# Patient Record
Sex: MALE | Race: WHITE | NOT HISPANIC OR LATINO | Employment: OTHER | URBAN - METROPOLITAN AREA
[De-identification: names, ages, dates, MRNs, and addresses within clinical notes are randomized per-mention and may not be internally consistent; named-entity substitution may affect disease eponyms.]

---

## 2022-11-11 ENCOUNTER — OFFICE VISIT (OUTPATIENT)
Dept: PRIMARY CARE | Facility: CLINIC | Age: 82
End: 2022-11-11
Payer: COMMERCIAL

## 2022-11-11 VITALS
OXYGEN SATURATION: 95 % | TEMPERATURE: 97.2 F | BODY MASS INDEX: 32.65 KG/M2 | DIASTOLIC BLOOD PRESSURE: 68 MMHG | SYSTOLIC BLOOD PRESSURE: 112 MMHG | WEIGHT: 208 LBS | HEART RATE: 65 BPM | HEIGHT: 67 IN | RESPIRATION RATE: 18 BRPM

## 2022-11-11 DIAGNOSIS — F10.10 ALCOHOL ABUSE: ICD-10-CM

## 2022-11-11 DIAGNOSIS — I50.21 CHF (CONGESTIVE HEART FAILURE), NYHA CLASS I, ACUTE, SYSTOLIC (CMS/HCC): ICD-10-CM

## 2022-11-11 DIAGNOSIS — I10 ESSENTIAL HYPERTENSION: ICD-10-CM

## 2022-11-11 DIAGNOSIS — I25.10 CORONARY ARTERY DISEASE INVOLVING NATIVE CORONARY ARTERY OF NATIVE HEART WITHOUT ANGINA PECTORIS: ICD-10-CM

## 2022-11-11 DIAGNOSIS — I50.22 CHRONIC SYSTOLIC CHF (CONGESTIVE HEART FAILURE) (CMS/HCC): ICD-10-CM

## 2022-11-11 DIAGNOSIS — E11.9 TYPE 2 DIABETES MELLITUS WITHOUT COMPLICATION, WITHOUT LONG-TERM CURRENT USE OF INSULIN (CMS/HCC): ICD-10-CM

## 2022-11-11 DIAGNOSIS — D50.8 IRON DEFICIENCY ANEMIA SECONDARY TO INADEQUATE DIETARY IRON INTAKE: ICD-10-CM

## 2022-11-11 DIAGNOSIS — I27.20 PULMONARY HYPERTENSION (CMS/HCC): ICD-10-CM

## 2022-11-11 DIAGNOSIS — I48.91 ATRIAL FIBRILLATION BY ELECTROCARDIOGRAM (CMS/HCC): ICD-10-CM

## 2022-11-11 DIAGNOSIS — Z95.0 PRESENCE OF CARDIAC PACEMAKER: ICD-10-CM

## 2022-11-11 DIAGNOSIS — E78.5 DYSLIPIDEMIA: ICD-10-CM

## 2022-11-11 DIAGNOSIS — E05.90 HYPERTHYROIDISM: ICD-10-CM

## 2022-11-11 DIAGNOSIS — U07.1 LAB TEST POSITIVE FOR DETECTION OF COVID-19 VIRUS: ICD-10-CM

## 2022-11-11 DIAGNOSIS — R06.89 RESPIRATORY INSUFFICIENCY: ICD-10-CM

## 2022-11-11 DIAGNOSIS — I45.9 HEART BLOCK: ICD-10-CM

## 2022-11-11 DIAGNOSIS — I48.0 PAROXYSMAL ATRIAL FIBRILLATION (CMS/HCC): Primary | ICD-10-CM

## 2022-11-11 DIAGNOSIS — Z95.2 S/P TAVR (TRANSCATHETER AORTIC VALVE REPLACEMENT): ICD-10-CM

## 2022-11-11 DIAGNOSIS — I26.90 ACUTE SEPTIC PULMONARY EMBOLISM WITHOUT ACUTE COR PULMONALE (CMS/HCC): ICD-10-CM

## 2022-11-11 DIAGNOSIS — Z95.5 STENTED CORONARY ARTERY: ICD-10-CM

## 2022-11-11 DIAGNOSIS — R41.0 DISORIENTATION: ICD-10-CM

## 2022-11-11 DIAGNOSIS — I71.21 ANEURYSM OF ASCENDING AORTA WITHOUT RUPTURE (CMS/HCC): ICD-10-CM

## 2022-11-11 DIAGNOSIS — F32.89 OTHER DEPRESSION: ICD-10-CM

## 2022-11-11 DIAGNOSIS — I50.43 ACUTE ON CHRONIC COMBINED SYSTOLIC AND DIASTOLIC CONGESTIVE HEART FAILURE (CMS/HCC): ICD-10-CM

## 2022-11-11 DIAGNOSIS — J41.8 MIXED SIMPLE AND MUCOPURULENT CHRONIC BRONCHITIS (CMS/HCC): ICD-10-CM

## 2022-11-11 DIAGNOSIS — E03.9 HYPOTHYROIDISM, UNSPECIFIED TYPE: ICD-10-CM

## 2022-11-11 DIAGNOSIS — I49.5 SSS (SICK SINUS SYNDROME) (CMS/HCC): ICD-10-CM

## 2022-11-11 PROBLEM — R41.82 ALTERED MENTAL STATUS: Status: ACTIVE | Noted: 2022-04-08

## 2022-11-11 PROBLEM — I95.1 ORTHOSTATIC HYPOTENSION: Status: ACTIVE | Noted: 2022-04-27

## 2022-11-11 PROBLEM — R93.89 ABNORMAL CXR: Status: ACTIVE | Noted: 2022-05-17

## 2022-11-11 PROBLEM — J44.9 COPD (CHRONIC OBSTRUCTIVE PULMONARY DISEASE) (CMS/HCC): Status: ACTIVE | Noted: 2020-11-17

## 2022-11-11 PROBLEM — R55 NEAR SYNCOPE: Status: ACTIVE | Noted: 2022-05-21

## 2022-11-11 PROBLEM — E78.00 HYPERCHOLESTEREMIA: Status: ACTIVE | Noted: 2019-11-11

## 2022-11-11 PROBLEM — R09.89 BILATERAL CAROTID BRUITS: Status: ACTIVE | Noted: 2019-11-11

## 2022-11-11 PROBLEM — I26.99 PULMONARY EMBOLISM (CMS/HCC): Status: ACTIVE | Noted: 2022-11-11

## 2022-11-11 PROBLEM — F32.A DEPRESSION: Status: ACTIVE | Noted: 2022-11-10

## 2022-11-11 PROBLEM — I45.10 RBBB: Status: ACTIVE | Noted: 2019-11-11

## 2022-11-11 PROBLEM — Z95.820 S/P ANGIOPLASTY WITH STENT: Status: ACTIVE | Noted: 2021-03-23

## 2022-11-11 PROBLEM — N39.0 UTI (URINARY TRACT INFECTION): Status: ACTIVE | Noted: 2022-04-09

## 2022-11-11 PROBLEM — A41.9 SEPSIS (CMS/HCC): Status: ACTIVE | Noted: 2022-05-16

## 2022-11-11 PROBLEM — R09.02 HYPOXIA: Status: ACTIVE | Noted: 2022-11-11

## 2022-11-11 PROBLEM — D50.9 IRON DEFICIENCY ANEMIA: Status: RESOLVED | Noted: 2020-02-17 | Resolved: 2022-11-11

## 2022-11-11 PROBLEM — I34.0 MITRAL REGURGITATION: Status: ACTIVE | Noted: 2020-12-10

## 2022-11-11 PROBLEM — I47.29 NSVT (NONSUSTAINED VENTRICULAR TACHYCARDIA) (CMS/HCC): Status: ACTIVE | Noted: 2022-04-08

## 2022-11-11 PROBLEM — A41.9 SEPSIS (CMS/HCC): Status: RESOLVED | Noted: 2022-05-16 | Resolved: 2022-11-11

## 2022-11-11 PROBLEM — R06.02 SHORTNESS OF BREATH: Status: ACTIVE | Noted: 2020-11-13

## 2022-11-11 PROBLEM — D64.9 ANEMIA: Status: ACTIVE | Noted: 2020-11-05

## 2022-11-11 PROBLEM — I35.0 NONRHEUMATIC AORTIC VALVE STENOSIS: Status: ACTIVE | Noted: 2019-11-11

## 2022-11-11 PROBLEM — D50.9 IRON DEFICIENCY ANEMIA: Status: ACTIVE | Noted: 2020-02-17

## 2022-11-11 PROBLEM — I48.19 PERSISTENT ATRIAL FIBRILLATION (CMS/HCC): Status: ACTIVE | Noted: 2020-11-13

## 2022-11-11 PROBLEM — E66.812 CLASS 2 OBESITY IN ADULT: Status: ACTIVE | Noted: 2020-11-17

## 2022-11-11 PROBLEM — I44.1 MOBITZ (TYPE) I (WENCKEBACH'S) ATRIOVENTRICULAR BLOCK: Status: ACTIVE | Noted: 2019-11-11

## 2022-11-11 PROCEDURE — 99205 OFFICE O/P NEW HI 60 MIN: CPT | Performed by: PHYSICIAN ASSISTANT

## 2022-11-11 PROCEDURE — 3008F BODY MASS INDEX DOCD: CPT | Performed by: PHYSICIAN ASSISTANT

## 2022-11-11 RX ORDER — FUROSEMIDE 40 MG/1
40 TABLET ORAL DAILY
Qty: 90 TABLET | Refills: 1 | Status: SHIPPED | OUTPATIENT
Start: 2022-11-11 | End: 2022-12-30 | Stop reason: SDUPTHER

## 2022-11-11 RX ORDER — LANOLIN ALCOHOL/MO/W.PET/CERES
100 CREAM (GRAM) TOPICAL DAILY
COMMUNITY
Start: 2022-11-11 | End: 2022-11-11 | Stop reason: SDUPTHER

## 2022-11-11 RX ORDER — ALBUTEROL SULFATE 90 UG/1
2 INHALANT RESPIRATORY (INHALATION) 4 TIMES DAILY PRN
Qty: 18 G | Refills: 1 | Status: SHIPPED | OUTPATIENT
Start: 2022-11-11 | End: 2023-05-05 | Stop reason: SDUPTHER

## 2022-11-11 RX ORDER — ATORVASTATIN CALCIUM 20 MG/1
20 TABLET, FILM COATED ORAL DAILY
COMMUNITY
Start: 2022-10-25 | End: 2022-11-11 | Stop reason: SDUPTHER

## 2022-11-11 RX ORDER — QUETIAPINE FUMARATE 25 MG/1
75 TABLET, FILM COATED ORAL NIGHTLY
COMMUNITY
End: 2022-11-11

## 2022-11-11 RX ORDER — MELATONIN 5 MG
10 CAPSULE ORAL NIGHTLY
COMMUNITY

## 2022-11-11 RX ORDER — DICLOFENAC SODIUM 10 MG/G
1 GEL TOPICAL AS NEEDED
COMMUNITY

## 2022-11-11 RX ORDER — ALBUTEROL SULFATE 0.83 MG/ML
2.5 SOLUTION RESPIRATORY (INHALATION)
COMMUNITY
End: 2022-11-11 | Stop reason: SDUPTHER

## 2022-11-11 RX ORDER — CLONAZEPAM 1 MG/1
0.5 TABLET ORAL DAILY
COMMUNITY
Start: 2022-11-10 | End: 2022-11-11

## 2022-11-11 RX ORDER — PANTOPRAZOLE SODIUM 40 MG/1
40 TABLET, DELAYED RELEASE ORAL 2 TIMES DAILY
COMMUNITY
Start: 2022-10-31 | End: 2022-12-27

## 2022-11-11 RX ORDER — METOPROLOL SUCCINATE 25 MG/1
25 TABLET, EXTENDED RELEASE ORAL DAILY
Qty: 90 TABLET | Refills: 1 | Status: SHIPPED | OUTPATIENT
Start: 2022-11-11 | End: 2023-04-14

## 2022-11-11 RX ORDER — FOLIC ACID 1 MG/1
1 TABLET ORAL DAILY
Qty: 90 TABLET | Refills: 1 | Status: SHIPPED | OUTPATIENT
Start: 2022-11-11 | End: 2023-05-05 | Stop reason: SDUPTHER

## 2022-11-11 RX ORDER — CLONAZEPAM 0.5 MG/1
0.5 TABLET ORAL NIGHTLY PRN
Qty: 30 TABLET | Refills: 0 | Status: SHIPPED | OUTPATIENT
Start: 2022-11-11 | End: 2022-12-13

## 2022-11-11 RX ORDER — ALBUTEROL SULFATE 0.83 MG/ML
2.5 SOLUTION RESPIRATORY (INHALATION)
Qty: 180 ML | Refills: 1 | Status: SHIPPED | OUTPATIENT
Start: 2022-11-11 | End: 2023-05-05 | Stop reason: SDUPTHER

## 2022-11-11 RX ORDER — ALBUTEROL SULFATE 90 UG/1
2 INHALANT RESPIRATORY (INHALATION)
COMMUNITY
End: 2022-11-11 | Stop reason: SDUPTHER

## 2022-11-11 RX ORDER — FOLIC ACID 1 MG/1
1 TABLET ORAL DAILY
COMMUNITY
Start: 2022-11-10 | End: 2022-11-11 | Stop reason: SDUPTHER

## 2022-11-11 RX ORDER — FERROUS SULFATE 325(65) MG
325 TABLET ORAL
COMMUNITY
End: 2022-11-11 | Stop reason: SDUPTHER

## 2022-11-11 RX ORDER — FLUOXETINE HYDROCHLORIDE 20 MG/1
CAPSULE ORAL
COMMUNITY
Start: 2022-10-31 | End: 2022-11-11

## 2022-11-11 RX ORDER — FUROSEMIDE 40 MG/1
TABLET ORAL
COMMUNITY
Start: 2022-11-10 | End: 2022-11-11 | Stop reason: SDUPTHER

## 2022-11-11 RX ORDER — LORATADINE 10 MG/1
10 TABLET ORAL DAILY
COMMUNITY
End: 2022-12-02

## 2022-11-11 RX ORDER — LANOLIN ALCOHOL/MO/W.PET/CERES
100 CREAM (GRAM) TOPICAL DAILY
Qty: 30 TABLET | Refills: 1 | Status: SHIPPED | OUTPATIENT
Start: 2022-11-11 | End: 2023-05-05 | Stop reason: SDUPTHER

## 2022-11-11 RX ORDER — METOPROLOL SUCCINATE 25 MG/1
25 TABLET, EXTENDED RELEASE ORAL
COMMUNITY
Start: 2022-10-25 | End: 2022-11-11 | Stop reason: SDUPTHER

## 2022-11-11 RX ORDER — LEVOTHYROXINE SODIUM 100 UG/1
100 TABLET ORAL
Qty: 90 TABLET | Refills: 1 | Status: SHIPPED | OUTPATIENT
Start: 2022-11-11 | End: 2023-01-24

## 2022-11-11 RX ORDER — FERROUS SULFATE 325(65) MG
325 TABLET ORAL
Qty: 90 TABLET | Refills: 1 | Status: SHIPPED | OUTPATIENT
Start: 2022-11-11 | End: 2023-05-05 | Stop reason: SDUPTHER

## 2022-11-11 RX ORDER — SERTRALINE HYDROCHLORIDE 25 MG/1
TABLET, FILM COATED ORAL
COMMUNITY
Start: 2022-11-10 | End: 2022-11-11 | Stop reason: SDUPTHER

## 2022-11-11 RX ORDER — LEVOTHYROXINE SODIUM 100 UG/1
100 TABLET ORAL
COMMUNITY
Start: 2022-11-10 | End: 2022-11-11 | Stop reason: SDUPTHER

## 2022-11-11 RX ORDER — ACETAMINOPHEN 325 MG/1
650 TABLET ORAL AS NEEDED
COMMUNITY

## 2022-11-11 RX ORDER — ATORVASTATIN CALCIUM 20 MG/1
20 TABLET, FILM COATED ORAL DAILY
Qty: 90 TABLET | Refills: 1 | Status: SHIPPED | OUTPATIENT
Start: 2022-11-11 | End: 2023-05-05 | Stop reason: SDUPTHER

## 2022-11-11 RX ORDER — SERTRALINE HYDROCHLORIDE 25 MG/1
25 TABLET, FILM COATED ORAL DAILY
Qty: 90 TABLET | Refills: 0 | Status: SHIPPED | OUTPATIENT
Start: 2022-11-11 | End: 2023-02-23

## 2022-11-11 RX ORDER — FLUTICASONE PROPIONATE AND SALMETEROL XINAFOATE 45; 21 UG/1; UG/1
2 AEROSOL, METERED RESPIRATORY (INHALATION) 2 TIMES DAILY
COMMUNITY
End: 2022-11-11

## 2022-11-11 ASSESSMENT — PATIENT HEALTH QUESTIONNAIRE - PHQ9
SUM OF ALL RESPONSES TO PHQ QUESTIONS 1-9: 9
SUM OF ALL RESPONSES TO PHQ9 QUESTIONS 1 & 2: 3

## 2022-11-11 NOTE — ASSESSMENT & PLAN NOTE
Previously provoked event when septic  He would not be an appropriate candidate for anticoagulation long-term as he is disoriented, a fall risk with his alcohol use as well  Socks is good today, no lower extremity edema

## 2022-11-11 NOTE — PATIENT INSTRUCTIONS
You are not to drive as discussed, you must get your neuropscyh eval and if they clear you for driving, then you can have your license back  Please get blood work and follow up with Dr. Chambers as scheduled

## 2022-11-11 NOTE — PROGRESS NOTES
I was immediately available to provide supervision and direction for the care of the patient.    Melanie Chambers DO

## 2022-11-11 NOTE — ASSESSMENT & PLAN NOTE
Paroxysmal A. fib, also with COPD  Also with alcohol abuse  Cardiology consult recommended with Dr. Luciano Hwang complete cessation of alcohol  On Lasix 40 mg daily, recent hospitalization 11 9-11 11 for acute exacerbation

## 2022-11-11 NOTE — ASSESSMENT & PLAN NOTE
Had been followed by cardiology  Daughter would like to see cardiologist here as it is closer to home  Consult

## 2022-11-11 NOTE — ASSESSMENT & PLAN NOTE
Secondary to COPD  He has acute systolic and diastolic CHF  He currently has no lower extremity edema  He currently has no rales  His blood pressure is currently well controlled

## 2022-11-11 NOTE — ASSESSMENT & PLAN NOTE
Patient is oriented to himself and daughter, not to place or time  I have asked the daughter to hold his keys for driving, I have sent forward to the department of state that I am pulling his license until he has a neuropsych eval.  Concern for alcohol use, marijuana use, and questionable underlying dementia  Patient also on Klonopin 1 mg daily, weaning to 0.5 mg daily and eventually off

## 2022-11-11 NOTE — ASSESSMENT & PLAN NOTE
Currently in sinus rhythm, he is not appropriate for anticoagulation therapy as he is a fall risk with his alcohol use and his disorientation

## 2022-11-11 NOTE — ASSESSMENT & PLAN NOTE
Sending for A1c as he has not had one that I can see  He is not currently on any medications for diabetes  Follow-up as appropriate  Labs

## 2022-11-11 NOTE — ASSESSMENT & PLAN NOTE
Currently in sinus rhythm, with his history of disorientation, and alcohol use, he is not appropriate for anticoagulation at this time

## 2022-11-11 NOTE — ASSESSMENT & PLAN NOTE
Trelegy daily, either albuterol nebs or inhaler as needed  Does not have a pulmonologist at this time
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
## 2022-11-11 NOTE — ASSESSMENT & PLAN NOTE
Meds were currently changed in his recent hospitalization from Prozac to Zoloft  He has been on 1 mg of Klonopin at home daily, it was recommended by psychiatric provider in the hospital that he be decreased to 0.5 mg daily, and slowly weaned, I discussed this with patient and his daughter  In light of his disorientation and alcohol use I completely agree with lowering this dose and titrating off  I have requested a neuropsych evaluation

## 2022-11-11 NOTE — ASSESSMENT & PLAN NOTE
Systolic and diastolic dysfunction with recent hospitalization 11 9-11 11, gentle gentle diuresis done in the hospital  No acute findings for cause  Questionable missed medications at home due to his disorientation  I have requested home health to follow with patient for home care needs  He is euvolemic today

## 2022-11-11 NOTE — ASSESSMENT & PLAN NOTE
Required hospitalization 11/9 to 11/11/2022   Patient may not be taking all of his evening meds due to his disorientation  I have requested home care for him to help with twice daily med management

## 2022-11-15 ENCOUNTER — TELEPHONE (OUTPATIENT)
Dept: SCHEDULING | Facility: CLINIC | Age: 82
End: 2022-11-15
Payer: COMMERCIAL

## 2022-11-15 NOTE — TELEPHONE ENCOUNTER
New Patient Appointment  [facility] 11/23/2022    Name of caller: Ann Caretaker     Reason for Visit: chf    Insurance: [facility]  medicare    Insurance ID #: [id number]    Recent Procedures: none    Referred by: PCP Melanie Chambers    Previous Cardiologist name and phone number: none    Best contact number: pt [phone]    Additional notes:

## 2022-11-19 LAB
ALBUMIN SERPL-MCNC: 4.1 G/DL (ref 3.6–5.1)
ALBUMIN/GLOB SERPL: 1.6 (CALC) (ref 1–2.5)
ALP SERPL-CCNC: 122 U/L (ref 35–144)
ALT SERPL-CCNC: 35 U/L (ref 9–46)
AST SERPL-CCNC: 56 U/L (ref 10–35)
B BURGDOR AB SER IA-ACNC: <0.9 INDEX
BASOPHILS # BLD AUTO: 41 CELLS/UL (ref 0–200)
BASOPHILS NFR BLD AUTO: 0.4 %
BILIRUB SERPL-MCNC: 1.8 MG/DL (ref 0.2–1.2)
BUN SERPL-MCNC: 12 MG/DL (ref 7–25)
BUN/CREAT SERPL: ABNORMAL (CALC) (ref 6–22)
CALCIUM SERPL-MCNC: 8.6 MG/DL (ref 8.6–10.3)
CHLORIDE SERPL-SCNC: 94 MMOL/L (ref 98–110)
CHOLEST SERPL-MCNC: 138 MG/DL
CHOLEST/HDLC SERPL: 2.2 (CALC)
CO2 SERPL-SCNC: 33 MMOL/L (ref 20–32)
CREAT SERPL-MCNC: 1.06 MG/DL (ref 0.7–1.22)
EGFRCR SERPLBLD CKD-EPI 2021: 69 ML/MIN/1.73M2
EOSINOPHIL # BLD AUTO: 20 CELLS/UL (ref 15–500)
EOSINOPHIL NFR BLD AUTO: 0.2 %
ERYTHROCYTE [DISTWIDTH] IN BLOOD BY AUTOMATED COUNT: 15.4 % (ref 11–15)
FERRITIN SERPL-MCNC: 142 NG/ML (ref 24–380)
GGT SERPL-CCNC: 113 U/L (ref 3–70)
GLOBULIN SER CALC-MCNC: 2.5 G/DL (CALC) (ref 1.9–3.7)
GLUCOSE SERPL-MCNC: 156 MG/DL (ref 65–139)
HBA1C MFR BLD: 5.9 % OF TOTAL HGB
HCT VFR BLD AUTO: 38.8 % (ref 38.5–50)
HDLC SERPL-MCNC: 64 MG/DL
HGB BLD-MCNC: 13 G/DL (ref 13.2–17.1)
IRON SATN MFR SERPL: 92 % (CALC) (ref 20–48)
IRON SERPL-MCNC: 311 MCG/DL (ref 50–180)
LDLC SERPL CALC-MCNC: 59 MG/DL (CALC)
LYMPHOCYTES # BLD AUTO: 5834 CELLS/UL (ref 850–3900)
LYMPHOCYTES NFR BLD AUTO: 57.2 %
MCH RBC QN AUTO: 31.8 PG (ref 27–33)
MCHC RBC AUTO-ENTMCNC: 33.5 G/DL (ref 32–36)
MCV RBC AUTO: 94.9 FL (ref 80–100)
MONOCYTES # BLD AUTO: 704 CELLS/UL (ref 200–950)
MONOCYTES NFR BLD AUTO: 6.9 %
NEUTROPHILS # BLD AUTO: 3601 CELLS/UL (ref 1500–7800)
NEUTROPHILS NFR BLD AUTO: 35.3 %
NONHDLC SERPL-MCNC: 74 MG/DL (CALC)
PLATELET # BLD AUTO: 210 THOUSAND/UL (ref 140–400)
PMV BLD REES-ECKER: 9.8 FL (ref 7.5–12.5)
POTASSIUM SERPL-SCNC: 4.3 MMOL/L (ref 3.5–5.3)
PROT SERPL-MCNC: 6.6 G/DL (ref 6.1–8.1)
RBC # BLD AUTO: 4.09 MILLION/UL (ref 4.2–5.8)
SERVICE CMNT-IMP: ABNORMAL
SODIUM SERPL-SCNC: 136 MMOL/L (ref 135–146)
T4 FREE SERPL-MCNC: 0.9 NG/DL (ref 0.8–1.8)
TIBC SERPL-MCNC: 337 MCG/DL (CALC) (ref 250–425)
TRIGL SERPL-MCNC: 71 MG/DL
TSH SERPL-ACNC: 93.33 MIU/L (ref 0.4–4.5)
VIT B12 SERPL-MCNC: 563 PG/ML (ref 200–1100)
WBC # BLD AUTO: 10.2 THOUSAND/UL (ref 3.8–10.8)

## 2022-11-21 NOTE — RESULT ENCOUNTER NOTE
CBC shows a normocytic anemia with a hemoglobin of 13, iron studies are high normal with a normal ferritin, B12 is normal at 563  Glucose is 156, hemoglobin A1c is 5.9  AST is 56, ggt is 113  Total cholesterol is 138, HDL is 64, LDL is 59    TSH is high at 93.33 with normal t4  Lyme is negative

## 2022-11-22 ENCOUNTER — TELEPHONE (OUTPATIENT)
Dept: PRIMARY CARE | Facility: CLINIC | Age: 82
End: 2022-11-22
Payer: COMMERCIAL

## 2022-11-22 ENCOUNTER — TELEPHONE (OUTPATIENT)
Dept: CARDIOLOGY | Facility: CLINIC | Age: 82
End: 2022-11-22
Payer: COMMERCIAL

## 2022-11-22 NOTE — TELEPHONE ENCOUNTER
Patient is out of alcohol and is a little shaky.  Patient is negative for Orthastatic and is a little SOB, but is scheduled to see Dr. Wolf tomorrow.  Family has taken his license.
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
## 2022-11-22 NOTE — TELEPHONE ENCOUNTER
I spoke with Cecil daughter today about his lab results, and asked how I could help to make sure he is taking his meds properly.  She reports that the home care that they have paid for has noticed he is not taking his meds and he is drinking all day.  She will encourage the home care helpers to make sure he takes his medications    I have asked to consider doing a cognitive evaluation at home how he is able to get alcohol since I revoked his license, and she reports he still has his keys.    I discussed with the daughter that I would reach out to our social work to see if there is any way we can put something in place for either help to get his meds or to potentially do a cognitive evaluation at home for safety as patient is drinking, not showering and not eating per the daughter.    Patient has an appointment tomorrow with Dr. Chambers and I will inform her of all the above

## 2022-11-23 ENCOUNTER — TELEPHONE (OUTPATIENT)
Dept: PRIMARY CARE | Facility: CLINIC | Age: 82
End: 2022-11-23
Payer: COMMERCIAL

## 2022-11-23 DIAGNOSIS — F10.930 ALCOHOL WITHDRAWAL SYNDROME WITHOUT COMPLICATION (CMS/HCC): Primary | ICD-10-CM

## 2022-11-23 RX ORDER — DIAZEPAM 5 MG/1
TABLET ORAL
Qty: 12 TABLET | Refills: 0 | Status: SHIPPED
Start: 2022-11-23 | End: 2022-12-02 | Stop reason: ALTCHOICE

## 2022-11-23 NOTE — TELEPHONE ENCOUNTER
Monique, patient's daughter called this morning, he was sent home from the ER last evening.  ER notes were reviewed during the conversation with daughter.  She reports that he has been shaky and calling her on the phone saying he is very anxious.  She would like to know if I would give him some medications to help since he has had not had alcohol for 2 days and she is worried he is going through withdrawal.  I discussed with her and explained that 1 his keys need to be taken from him which she promised the caregiver was doing today, and #2 someone needed to be there to administer and monitor him with these medications since he has not been taking his medications properly.  She agreed to both of these and takes full responsibility for monitoring her father.  If anything changes or his condition worsen she will bring him back to the emergency room or call 911  Sending diazepam for withdrawal  He has a scheduled appointment 12-22  Karina

## 2022-11-26 ENCOUNTER — NURSE TRIAGE (OUTPATIENT)
Dept: PRIMARY CARE | Facility: CLINIC | Age: 82
End: 2022-11-26
Payer: COMMERCIAL

## 2022-11-26 RX ORDER — FUROSEMIDE 20 MG/1
20 TABLET ORAL DAILY
Qty: 3 TABLET | Refills: 0 | Status: SHIPPED
Start: 2022-11-26 | End: 2022-12-02 | Stop reason: ALTCHOICE

## 2022-11-26 NOTE — TELEPHONE ENCOUNTER
"    Reason for Disposition   Weight gain > 5 lbs (2.3 kg) in one week (or 5 pounds over target weight)    Answer Assessment - Initial Assessment Questions  1. MAIN CONCERN OR SYMPTOM:  \"What is your main concern right now?\" \"What questions do you have?\" \"What's the main symptom you're worried about?\" (e.g., breathing difficulty, ankle swelling, weight gain.)      Mild difficulty Breathing   2. ONSET: \"When did the  ________  start?\"      yetserday  3. BETTER-SAME-WORSE: \"Are you getting better, staying the same, or getting worse compared to the day you were discharged?\"      same  4. HOSPITALIZATION: \"How long were you hospitalized?\" (e.g., days)      Went to ED on 11/22  5. DISCHARGE DATE: \"What date were you discharged from the hospital?\"      N/A  6. DISCHARGE DOCTOR: \"Who is the main doctor taking care of you now?\"      N/A  7. DISCHARGE APPOINTMENT: \"Have you scheduled a follow-up discharge appointment with your doctor?\"      N/a  8. DISCHARGE MEDICATIONS: \"Did the physician who discharged you order any new medications for you to use? If yes, have you filled the prescription and started taking the medication?\"       no  9. WEIGHT - DISCHARGE:  \"Do you know your weight when you were discharged from the hospital?\"       187.2lb  10. WEIGHT - TARGET:  \"Do you have a target weight?\"         no  11. WEIGHT - CURRENT:  \"What is your current weight?\"         193.2lb  12. BREATHING DIFFICULTY: \"Are you having any difficulty breathing?\" If so, ask \"How bad is it?\"  (e.g., none, mild, moderate, severe)    - MILD: No SOB at rest, mild SOB with walking, speaks normally in sentences, able to lie down, no retractions, pulse < 100.    - MODERATE: SOB at rest, SOB with minimal exertion and prefers to sit, cannot lie down flat, speaks in phrases, mild retractions, audible wheezing, pulse 100-120.    - SEVERE: Very SOB at rest, speaks in single words, struggling to breathe, sitting hunched forward, retractions, pulse > 120      " "   mild  13. OTHER SYMPTOMS: \"Do you have any other symptoms?\" (e.g., weakness)        none    Protocols used: HEART FAILURE POST-HOSPITALIZATION FOLLOW-UP CALL-ADULT-    Synopsis:  CHF/Weight gain of 6 lbs  Disposition:  Call PCP Within 24 Hours  Care Advice:  Care Advice Given     Given By Given At Modified    Rancho Johnson CRNP 11/26/2022 11:09 AM No    CONGESTIVE HEART FAILURE (CHF) - DEFINITION:   * Your heart is a pump. It pumps blood to your brain, your lungs, and all other parts of your body.   * In people with heart failure, the heart is not able to pump blood as well as it should. As a result, this can cause ankle swelling, shortness of breath, and weakness.    * There are medicines that help treat heart failure. There are things that you can do to prevent problems.   * The more you know about heart failure, the less chance you will have of getting admitted to the hospital.    Rancho Johnson CRNP 11/26/2022 11:09 AM No    CHF - TREATMENT - GENERAL ADVICE. Here is some general advice about things that you can do to treat your heart failure:   * Avoid eating too much salt. Salt can cause water to stay in your body. When this happens it is called fluid retention. Certain foods are very salty and you should avoid them. Examples include pizza and chips. Do not add salt to your food.   * Take your medicines according to your doctor's recommendations.   * Talk with your doctor before taking over-the-counter arthritis (anti-inflammatory) medicines like ibuprofen (e.g., Advil, Motrin) or naproxen (e.g., Aleve). In people with heart failure these medicines can cause fluid retention.   * Some regular exercise (such as daily walks) is healthy. Talk with your doctor and get his or her recommendations about how much exercise is right for you.   * If your doctor has placed you fluid restriction, then it is important to measure how much fluid you drink each day. Write this down in a daily diary.    Rancho Johnson CRNP " 11/26/2022 11:09 AM No    CALL BACK IF:    * You develop new or worsening symptoms.    * You become worse.    Rancho Johnson CRNP 11/26/2022 11:09 AM No    CARE ADVICE given per Heart Failure Post-Hospitalization Follow-Up Call (Adult) guideline.    Rancho Johnson CRNP 11/26/2022 11:09 AM No    CHF - TREATMENT - YOUR DOCTOR'S INSTRUCTIONS:    * You should closely follow all instructions (verbal and written) that your doctor gave you.    * Take all prescribed medicines as directed.       Patient/Caregiver understands and will follow care advice?  Yes, plans to follow advice        Orders Placed This Encounter:  Orders Placed This Encounter    furosemide (LASIX) 20 mg tablet     Sig: Take 1 tablet (20 mg total) by mouth daily for 3 days.     Dispense:  3 tablet     Refill:  0     Patient's daughterJovana called with concerns of her father's weight gain and labored breathing. Spoke with on call  who recommended lasix 20 mg for 3 days and follow with PCP/cardiologist on Monday. Strict instruction given to daughter to take patient to ED if symptoms get worse or new symptoms develop.

## 2022-11-28 ENCOUNTER — TELEPHONE (OUTPATIENT)
Dept: PRIMARY CARE | Facility: CLINIC | Age: 82
End: 2022-11-28
Payer: COMMERCIAL

## 2022-11-28 NOTE — TELEPHONE ENCOUNTER
Spoke with patient, he states he is feeling much better and I reminded him of his up coming appointment on 12/02/2022 with us, and told him to call if he needing anything in the mean time Thanks LW

## 2022-11-28 NOTE — TELEPHONE ENCOUNTER
Call from home care nurse Rylee on11/25/2022, patient weighs in the morning, and had a 3 pound weight gain on 11/25/2022, no lower extremity edema no shortness of breath and notes rales appreciated.  Patient had eaten a lot for Thanksgiving dinner  Encouraged family to weigh patient again the next morning, and if he gains more than 2 pounds, to call the office  Karina

## 2022-12-02 ENCOUNTER — OFFICE VISIT (OUTPATIENT)
Dept: PRIMARY CARE | Facility: CLINIC | Age: 82
End: 2022-12-02
Payer: COMMERCIAL

## 2022-12-02 VITALS
SYSTOLIC BLOOD PRESSURE: 110 MMHG | OXYGEN SATURATION: 98 % | HEIGHT: 67 IN | DIASTOLIC BLOOD PRESSURE: 78 MMHG | TEMPERATURE: 97.1 F | HEART RATE: 71 BPM | WEIGHT: 194 LBS | BODY MASS INDEX: 30.45 KG/M2

## 2022-12-02 DIAGNOSIS — E05.90 HYPERTHYROIDISM: ICD-10-CM

## 2022-12-02 DIAGNOSIS — J06.9 VIRAL URI WITH COUGH: ICD-10-CM

## 2022-12-02 DIAGNOSIS — I10 ESSENTIAL HYPERTENSION: Primary | ICD-10-CM

## 2022-12-02 DIAGNOSIS — R41.89 COGNITIVE IMPAIRMENT: ICD-10-CM

## 2022-12-02 PROCEDURE — 99214 OFFICE O/P EST MOD 30 MIN: CPT | Performed by: FAMILY MEDICINE

## 2022-12-02 PROCEDURE — 3008F BODY MASS INDEX DOCD: CPT | Performed by: FAMILY MEDICINE

## 2022-12-02 PROCEDURE — 87637 SARSCOV2&INF A&B&RSV AMP PRB: CPT | Performed by: FAMILY MEDICINE

## 2022-12-02 RX ORDER — LORATADINE 10 MG/1
10 TABLET ORAL DAILY
COMMUNITY
End: 2023-11-24

## 2022-12-02 RX ORDER — BENZONATATE 100 MG/1
100 CAPSULE ORAL 3 TIMES DAILY PRN
Qty: 21 CAPSULE | Refills: 0 | Status: SHIPPED | OUTPATIENT
Start: 2022-12-02 | End: 2022-12-09

## 2022-12-02 NOTE — ASSESSMENT & PLAN NOTE
MoCA score of 14 out of 30 today  Encouraged follow-up with neurology for work-up of reversible causes and consideration of imaging if needed  Can discuss medications with neurology if desired  Does have social support in place with 3 caregivers and 2 daughters  Also has medications being delivered in pill packs for morning and nighttime medication administration

## 2022-12-02 NOTE — PATIENT INSTRUCTIONS
Thyroid function tests in Feb 2022    Follow up with neurology  Dr Pinzon [phone]    Try Tessalon perles for cough if needed  Nasal saline spray 4x per day  Come back or call the office if symptoms worsen

## 2022-12-02 NOTE — ASSESSMENT & PLAN NOTE
TSH very elevated at last check in 11/2022  Will recheck in February 2023 at which time he will be taking levothyroxine consistently  Does now have pill packs from the pharmacy which are delivered in a time-based fashion so that he will get as easily confused when taking his medication

## 2022-12-02 NOTE — PROGRESS NOTES
[facility] in [place] [address] [address]  [facility] [address]  Phone: [phone]  Fax: [phone]       CHIEF COMPLAINT   Chief Complaint   Patient presents with   • Follow-up     3 week / Review lab results   • Cough     Junky cough that has been waking him up at night for 3 days       HISTORY OF PRESENT ILLNESS      This is a 82 y.o. male who presents for   Chief Complaint   Patient presents with   • Follow-up     3 week / Review lab results   • Cough     Junky cough that has been waking him up at night for 3 days      Ann, caregiver with him today  Started with him in April 2022  2nd person is Lupe for about one year  3rd person, Lindsay added for evenings    Jovana and Monique are his daughters    Ann states he is now getting all medications in a pill pack  Previously he was getting levothyroxine in a bottle and not in the pill pack  Was complicated for him previously     Does drink vodka at times  None since being out of the hospital    Recent admin on 11/22  Last weight on Tuesday was 187 or 189    Cardiology appointment yesterday, Dr Srinivasa Vargas    Does complain of a cough  Ann is with him today and states that on Tuesday when she saw him she he did not have this cough  He denies any fevers chills or muscle aches  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  Does not want to wear hearing aid  Denies any ear pain or sinus headaches    PAST MEDICAL AND SURGICAL HISTORY      Past Medical History:   Diagnosis Date   • A-fib (CMS/[facility])    • Acute on chronic combined systolic and diastolic congestive heart failure (CMS/HCC)    • CHF (congestive heart failure), NYHA class I, acute, systolic (CMS/HCC)    • Depression    • Hyperthyroidism    • Hypoxia    • Near syncope    • NSVT (nonsustained ventricular tachycardia)    • Orthostatic hypotension    • Presence of cardiac pacemaker    • Pulmonary embolism (CMS/HCC)    • Sepsis (CMS/HCC)    • Sepsis (CMS/HCC) 5/16/2022      Past Surgical History:   Procedure Laterality Date   • CARDIAC CATHETERIZATION  11/19/2020    CATH PLACE/CORON ANGIO, IMG SUPER/INTERP,R&L HRT CATH, L HRT VENTRIC   • CORONARY ARTERY BYPASS GRAFT  2005     MEDICATIONS        Current Outpatient Medications:   •  acetaminophen (TYLENOL) 325 mg tablet, Take 650 mg by mouth as needed., Disp: , Rfl:   •  albuterol 2.5 mg /3 mL (0.083 %) nebulizer solution, Take 3 mL (2.5 mg total) by nebulization 4 (four) times a day., Disp: 180 mL, Rfl: 1  •  albuterol HFA (VENTOLIN HFA) 90 mcg/actuation inhaler, Inhale 2 puffs 4 (four) times a day as needed for wheezing., Disp: 18 g, Rfl: 1  •  atorvastatin (LIPITOR) 20 mg tablet, Take 1 tablet (20 mg total) by mouth daily., Disp: 90 tablet, Rfl: 1  •  benzonatate (TESSALON PERLES) 100 mg capsule, Take 1 capsule (100 mg total) by mouth 3 (three) times a day as needed for cough for up to 7 days., Disp: 21 capsule, Rfl: 0  •  clonazePAM (KlonoPIN) 0.5 mg tablet, Take 1 tablet (0.5 mg total) by mouth nightly as needed for anxiety. (Patient taking differently: Take 0.5 mg by mouth nightly.), Disp: 30 tablet, Rfl: 0  •  diclofenac sodium (VOLTAREN) 1 % topical gel, Apply 1 g topically as needed., Disp: , Rfl:   •  ferrous sulfate 325 mg (65 mg iron) tablet, Take 1 tablet (325 mg total) by mouth daily with breakfast., Disp: 90 tablet, Rfl: 1  •  fluticasone-umeclidinium-vilanterol (TRELEGY ELLIPTA) 100-62.5-25 mcg blister with device powder for inhalation, Inhale 1 puff daily. (Patient taking differently: Inhale 1 puff as needed.), Disp: 1 each, Rfl: 1  •  folic acid (FOLVITE) 1 mg tablet, Take 1 tablet (1 mg total) by mouth daily., Disp: 90 tablet, Rfl: 1  •  furosemide (LASIX) 40 mg tablet, Take 1 tablet (40 mg total) by mouth daily., Disp: 90 tablet, Rfl: 1  •  levothyroxine (SYNTHROID) 100 mcg tablet, Take 1 tablet (100 mcg total) by mouth daily., Disp: 90 tablet, Rfl: 1  •  loratadine (CLARITIN) 10 mg tablet, Take 10 mg by mouth  daily., Disp: , Rfl:   •  medical marijuana, Take 1 each by mouth as needed., Disp: , Rfl:   •  melatonin 5 mg capsule, Take 5 mg by mouth nightly. Hs prn, Disp: , Rfl:   •  metoprolol succinate XL (TOPROL-XL) 25 mg 24 hr tablet, Take 1 tablet (25 mg total) by mouth daily. 0.5mg bid (Patient taking differently: Take 12.5 mg by mouth 2 (two) times a day.), Disp: 90 tablet, Rfl: 1  •  multivitamin tablet, Take 1 tablet by mouth daily., Disp: , Rfl:   •  pantoprazole (PROTONIX) 40 mg EC tablet, Take 40 mg by mouth 2 (two) times a day., Disp: , Rfl:   •  sertraline (ZOLOFT) 25 mg tablet, Take 1 tablet (25 mg total) by mouth daily., Disp: 90 tablet, Rfl: 0  •  thiamine 100 mg tablet, Take 1 tablet (100 mg total) by mouth daily., Disp: 30 tablet, Rfl: 1    ALLERGIES      Patient has no known allergies.    FAMILY HISTORY      Family History   Problem Relation Age of Onset   • Thyroid cancer Biological Mother    • ALS Biological Mother    • Parkinsonism Biological Mother    • Heart attack Biological Father    • Liver cancer Maternal Grandmother      SOCIAL HISTORY      Social History     Socioeconomic History   • Marital status:      Spouse name: None   • Number of children: 2   • Years of education: None   • Highest education level: None   Occupational History   • Occupation: retired     Comment: [employer] in [place]   Tobacco Use   • Smoking status: Former     Years: 40.00     Types: Cigarettes     Quit date: 11/1/2012     Years since quitting: 10.0   • Smokeless tobacco: Never   Vaping Use   • Vaping Use: Never used   Substance and Sexual Activity   • Alcohol use: Yes     Alcohol/week: 9.0 standard drinks     Types: 9 Shots of liquor per week     Comment: vodka   • Drug use: Never   • Sexual activity: Not Currently   Social History Narrative    Lives alone, and has help for meal prep and med management     REVIEW OF SYSTEMS      Review of Systems   All other systems reviewed and are negative.    PHYSICAL  "EXAMINATION      Vitals:    12/02/22 1104   BP: 110/78   BP Location: Left upper arm   Patient Position: Sitting   Pulse: 71   Temp: 36.2 °C (97.1 °F)   TempSrc: Temporal   SpO2: 98%   Weight: 88 kg (194 lb)   Height: 1.702 m (5' 7\")     Body mass index is 30.38 kg/m².     BP Readings from Last 3 Encounters:   12/02/22 110/78   11/11/22 112/68     Wt Readings from Last 3 Encounters:   12/02/22 88 kg (194 lb)   11/11/22 94.3 kg (208 lb)     Ht Readings from Last 3 Encounters:   12/02/22 1.702 m (5' 7\")   11/11/22 1.702 m (5' 7\")       Physical Exam  Vitals reviewed.   Constitutional:       General: He is not in acute distress.     Appearance: Normal appearance.   HENT:      Head: Normocephalic and atraumatic.      Right Ear: Ear canal and external ear normal. Tympanic membrane is bulging. Tympanic membrane is not injected or erythematous.      Left Ear: Ear canal and external ear normal. A middle ear effusion is present. Tympanic membrane is bulging. Tympanic membrane is not injected or erythematous.      Nose: Congestion and rhinorrhea present. Rhinorrhea is clear.      Right Turbinates: Enlarged.      Left Turbinates: Enlarged.      Mouth/Throat:      Mouth: Mucous membranes are moist.      Pharynx: Oropharynx is clear.   Eyes:      Conjunctiva/sclera: Conjunctivae normal.   Cardiovascular:      Rate and Rhythm: Normal rate and regular rhythm.   Pulmonary:      Effort: Pulmonary effort is normal. No respiratory distress.      Breath sounds: Normal breath sounds. No wheezing.   Skin:     General: Skin is warm and dry.   Neurological:      Mental Status: He is alert and oriented to person, place, and time. Mental status is at baseline.   Psychiatric:         Mood and Affect: Mood normal.         Behavior: Behavior normal.           LABS / IMAGING / STUDIES        Labs    Lab Results   Component Value Date    CREATININE 1.06 11/17/2022     Lab Results   Component Value Date    WBC 10.2 11/17/2022    HGB 13.0 (L) " 11/17/2022    HCT 38.8 11/17/2022    MCV 94.9 11/17/2022     11/17/2022         Lab Results   Component Value Date    HGBA1C 5.9 (H) 11/17/2022       HEALTH MAINTENANCE              ASSESSMENT AND PLAN   Problem List Items Addressed This Visit        Circulatory    Essential hypertension - Primary     Blood pressure well controlled in office today  Continue current medication regimen  Encouraged low sodium diet and increased physical activity  RTO in 2 months for routine follow up            Endocrine/Metabolic    Hyperthyroidism     TSH very elevated at last check in 11/2022  Will recheck in February 2023 at which time he will be taking levothyroxine consistently  Does now have pill packs from the pharmacy which are delivered in a time-based fashion so that he will get as easily confused when taking his medication         Relevant Orders    TSH w reflex FT4       Other    Cognitive impairment     MoCA score of 14 out of 30 today  Encouraged follow-up with neurology for work-up of reversible causes and consideration of imaging if needed  Can discuss medications with neurology if desired  Does have social support in place with 3 caregivers and 2 daughters  Also has medications being delivered in pill packs for morning and nighttime medication administration         Relevant Orders    Ambulatory referral to Neurology   Other Visit Diagnoses     Viral URI with cough        Testing for COVID flu and RSV  Encouraged conservative treatment per AVS  Return to office if symptoms persist beyond 1 week    Relevant Medications    benzonatate (TESSALON PERLES) 100 mg capsule    Other Relevant Orders    COVID- 19 PCR Symptomatic (includes FLU A/B & RSV) - [facility]          Patient Instructions   Thyroid function tests in Feb 2022    Follow up with neurology  Dr Pinzon [phone]    Try Tessalon perles for cough if needed  Nasal saline spray 4x per day  Come back or call the office if symptoms worsen      Return in about  2 months (around 2/2/2023) for Routine follow up.         Melanie Chambers,   12/02/22

## 2022-12-02 NOTE — ASSESSMENT & PLAN NOTE
Blood pressure well controlled in office today  Continue current medication regimen  Encouraged low sodium diet and increased physical activity  RTO in 2 months for routine follow up

## 2022-12-03 LAB
FLUAV RNA SPEC QL NAA+PROBE: NEGATIVE
FLUBV RNA SPEC QL NAA+PROBE: NEGATIVE
RSV RNA SPEC QL NAA+PROBE: NEGATIVE
SARS-COV-2 RNA RESP QL NAA+PROBE: NEGATIVE

## 2022-12-06 ENCOUNTER — TELEPHONE (OUTPATIENT)
Dept: CARDIOLOGY | Facility: CLINIC | Age: 82
End: 2022-12-06
Payer: COMMERCIAL

## 2022-12-06 ENCOUNTER — HOSPITAL ENCOUNTER (OUTPATIENT)
Dept: RADIOLOGY | Age: 82
Discharge: HOME | End: 2022-12-06
Attending: PHYSICIAN ASSISTANT
Payer: COMMERCIAL

## 2022-12-06 ENCOUNTER — TELEPHONE (OUTPATIENT)
Dept: PRIMARY CARE | Facility: CLINIC | Age: 82
End: 2022-12-06
Payer: COMMERCIAL

## 2022-12-06 DIAGNOSIS — R05.1 ACUTE COUGH: ICD-10-CM

## 2022-12-06 DIAGNOSIS — R05.1 ACUTE COUGH: Primary | ICD-10-CM

## 2022-12-06 PROCEDURE — 71046 X-RAY EXAM CHEST 2 VIEWS: CPT

## 2022-12-06 NOTE — TELEPHONE ENCOUNTER
Bring him in on my schedule at 3 or 330 for the message from nursing about his lungs    Or we can order a chest xray if he can not make it in    He was negative for covid, flu and rsv

## 2022-12-06 NOTE — TELEPHONE ENCOUNTER
Patient does not have a ride here today.  Spoke with daughter (Jovana) and she will get him a ride to [place] for a chest xray.  Please order chest xray.  Thanks

## 2022-12-06 NOTE — TELEPHONE ENCOUNTER
Pt has productive cough X 3 days.  Lungs diminished on right side.  Pt has wheezing and improved slightly with his Albuterol, no fever for sob.  Neg Covid test    Message received from [facility]

## 2022-12-07 ENCOUNTER — TELEPHONE (OUTPATIENT)
Dept: PRIMARY CARE | Facility: CLINIC | Age: 82
End: 2022-12-07
Payer: COMMERCIAL

## 2022-12-07 ENCOUNTER — OFFICE VISIT (OUTPATIENT)
Dept: PRIMARY CARE | Facility: CLINIC | Age: 82
End: 2022-12-07
Payer: COMMERCIAL

## 2022-12-07 VITALS
TEMPERATURE: 97.9 F | RESPIRATION RATE: 16 BRPM | BODY MASS INDEX: 30.64 KG/M2 | HEART RATE: 65 BPM | DIASTOLIC BLOOD PRESSURE: 68 MMHG | WEIGHT: 195.6 LBS | OXYGEN SATURATION: 95 % | SYSTOLIC BLOOD PRESSURE: 118 MMHG

## 2022-12-07 DIAGNOSIS — I50.21 CHF (CONGESTIVE HEART FAILURE), NYHA CLASS I, ACUTE, SYSTOLIC (CMS/HCC): ICD-10-CM

## 2022-12-07 DIAGNOSIS — J44.1 CHRONIC OBSTRUCTIVE PULMONARY DISEASE WITH ACUTE EXACERBATION (CMS/HCC): ICD-10-CM

## 2022-12-07 DIAGNOSIS — J18.9 COMMUNITY ACQUIRED PNEUMONIA OF LEFT LOWER LOBE OF LUNG: Primary | ICD-10-CM

## 2022-12-07 PROCEDURE — 99213 OFFICE O/P EST LOW 20 MIN: CPT | Performed by: PHYSICIAN ASSISTANT

## 2022-12-07 PROCEDURE — 3008F BODY MASS INDEX DOCD: CPT | Performed by: PHYSICIAN ASSISTANT

## 2022-12-07 RX ORDER — AZITHROMYCIN 250 MG/1
TABLET, FILM COATED ORAL
Qty: 6 TABLET | Refills: 0 | Status: SHIPPED
Start: 2022-12-07 | End: 2023-01-06

## 2022-12-07 RX ORDER — AMOXICILLIN AND CLAVULANATE POTASSIUM 875; 125 MG/1; MG/1
1 TABLET, FILM COATED ORAL 2 TIMES DAILY
Qty: 10 TABLET | Refills: 0 | Status: SHIPPED | OUTPATIENT
Start: 2022-12-07 | End: 2022-12-09 | Stop reason: SDUPTHER

## 2022-12-07 NOTE — ASSESSMENT & PLAN NOTE
"Takes nebs a \"few times a day\"  Unsure if he has a pnuemonia vs exac of copd  Treating with zithromax and augmentin- he has not started- Ann will get today  Continue nebulizer 4x's a day  Follow up here in 2 days  "

## 2022-12-07 NOTE — TELEPHONE ENCOUNTER
Chest x-ray significant for: Possible L pneumonia vs atelectasis. Nurse yesterday heard decreased lung sound in RLL.     Please call today and check in on his vitals and symptoms     Melanie Chambers, DO

## 2022-12-07 NOTE — ASSESSMENT & PLAN NOTE
Treating the left lower lung opacity as pneumonia, he has a history of COPD so we can consider this an exacerbation as well  His lungs are coarse throughout, no focal sounds in the left lower lobe  His pulse ox is great, his effort is normal  He will continue his nebulizers every 4 hours  Follow-up in 2 days with

## 2022-12-07 NOTE — TELEPHONE ENCOUNTER
Spoke with mary, he sounded winded when he was speaking with me. He stated he does have a little SOB,junky cough but nothing is coming up. He will have the nurse call us with vitals when she comes today Thanks LW

## 2022-12-07 NOTE — TELEPHONE ENCOUNTER
Sent treatment for pneumonia.  Advise patient to take Augmentin and azithromycin for 5 days per prescription which was sent to [place] [facility].  Have him scheduled for same-day appointment on Friday to check in on how he is feeling.     Melanie Chambers, DO

## 2022-12-07 NOTE — PROGRESS NOTES
[facility]      Reason for visit:   Chief Complaint   Patient presents with   • Cough   • left lower lobe pneumonia      Cecil Morris is a 82 y.o. male who presents for     Patient being seen because physical therapist went is at the house today and his pulse ox had dropped to 8991% and they were concerned about him looking a little more short of breath.  He had a chest x-ray done yesterday because his home visiting nurse thought she heard some decreased breath sounds on the right as compared to the left which improved with albuterol.  He had a left lower lobe opacity noted on the chest x-ray unsure if it was atelectasis versus pneumonia so antibiotics were sent this morning Zithromax and Augmentin, his caregiver is here with him Ann and they have not picked up the antibiotics yet.  He has been using his nebulizers a couple times a day, he is not so good at taking the inhaler  And reports he seems fine to her, does not seem short of breath or really changed in his physical appearance and she has been taking care of him since April          Past Medical History:   Diagnosis Date   • A-fib (CMS/HCC)    • Acute on chronic combined systolic and diastolic congestive heart failure (CMS/HCC)    • CHF (congestive heart failure), NYHA class I, acute, systolic (CMS/HCC)    • Depression    • Hyperthyroidism    • Hypoxia    • Near syncope    • NSVT (nonsustained ventricular tachycardia)    • Orthostatic hypotension    • Presence of cardiac pacemaker    • Pulmonary embolism (CMS/HCC)    • Sepsis (CMS/HCC)    • Sepsis (CMS/HCC) 5/16/2022       Past Surgical History:   Procedure Laterality Date   • CARDIAC CATHETERIZATION  11/19/2020    CATH PLACE/CORON ANGIO, IMG SUPER/INTERP,R&L HRT CATH, L HRT VENTRIC   • CORONARY ARTERY BYPASS GRAFT  2005       Social History     Tobacco Use   • Smoking status: Former     Years: 40.00     Types: Cigarettes     Quit date: 11/1/2012     Years since quitting: 10.1   • Smokeless tobacco: Never    Vaping Use   • Vaping Use: Never used   Substance Use Topics   • Alcohol use: Yes     Alcohol/week: 9.0 standard drinks     Types: 9 Shots of liquor per week     Comment: vodka   • Drug use: Never       Family History   Problem Relation Age of Onset   • Thyroid cancer Biological Mother    • ALS Biological Mother    • Parkinsonism Biological Mother    • Heart attack Biological Father    • Liver cancer Maternal Grandmother        Patient has no known allergies.      Current Outpatient Medications:   •  acetaminophen (TYLENOL) 325 mg tablet, Take 650 mg by mouth as needed., Disp: , Rfl:   •  albuterol 2.5 mg /3 mL (0.083 %) nebulizer solution, Take 3 mL (2.5 mg total) by nebulization 4 (four) times a day., Disp: 180 mL, Rfl: 1  •  albuterol HFA (VENTOLIN HFA) 90 mcg/actuation inhaler, Inhale 2 puffs 4 (four) times a day as needed for wheezing., Disp: 18 g, Rfl: 1  •  amoxicillin-pot clavulanate (AUGMENTIN) 875-125 mg per tablet, Take 1 tablet by mouth 2 (two) times a day for 5 days., Disp: 10 tablet, Rfl: 0  •  atorvastatin (LIPITOR) 20 mg tablet, Take 1 tablet (20 mg total) by mouth daily., Disp: 90 tablet, Rfl: 1  •  azithromycin (ZITHROMAX Z-PRESLEY) 250 mg tablet, Take 2 tablets the first day, then 1 tablet daily for 4 days., Disp: 6 tablet, Rfl: 0  •  benzonatate (TESSALON PERLES) 100 mg capsule, Take 1 capsule (100 mg total) by mouth 3 (three) times a day as needed for cough for up to 7 days., Disp: 21 capsule, Rfl: 0  •  clonazePAM (KlonoPIN) 0.5 mg tablet, Take 1 tablet (0.5 mg total) by mouth nightly as needed for anxiety. (Patient taking differently: Take 0.5 mg by mouth nightly.), Disp: 30 tablet, Rfl: 0  •  diclofenac sodium (VOLTAREN) 1 % topical gel, Apply 1 g topically as needed., Disp: , Rfl:   •  ferrous sulfate 325 mg (65 mg iron) tablet, Take 1 tablet (325 mg total) by mouth daily with breakfast., Disp: 90 tablet, Rfl: 1  •  fluticasone-umeclidinium-vilanterol (TRELEGY ELLIPTA) 100-62.5-25 mcg blister  with device powder for inhalation, Inhale 1 puff daily. (Patient taking differently: Inhale 1 puff as needed.), Disp: 1 each, Rfl: 1  •  folic acid (FOLVITE) 1 mg tablet, Take 1 tablet (1 mg total) by mouth daily., Disp: 90 tablet, Rfl: 1  •  furosemide (LASIX) 40 mg tablet, Take 1 tablet (40 mg total) by mouth daily., Disp: 90 tablet, Rfl: 1  •  levothyroxine (SYNTHROID) 100 mcg tablet, Take 1 tablet (100 mcg total) by mouth daily., Disp: 90 tablet, Rfl: 1  •  loratadine (CLARITIN) 10 mg tablet, Take 10 mg by mouth daily., Disp: , Rfl:   •  medical marijuana, Take 1 each by mouth as needed., Disp: , Rfl:   •  melatonin 5 mg capsule, Take 5 mg by mouth nightly. Hs prn, Disp: , Rfl:   •  metoprolol succinate XL (TOPROL-XL) 25 mg 24 hr tablet, Take 1 tablet (25 mg total) by mouth daily. 0.5mg bid (Patient taking differently: Take 12.5 mg by mouth 2 (two) times a day.), Disp: 90 tablet, Rfl: 1  •  multivitamin tablet, Take 1 tablet by mouth daily., Disp: , Rfl:   •  pantoprazole (PROTONIX) 40 mg EC tablet, Take 40 mg by mouth 2 (two) times a day., Disp: , Rfl:   •  sertraline (ZOLOFT) 25 mg tablet, Take 1 tablet (25 mg total) by mouth daily., Disp: 90 tablet, Rfl: 0  •  thiamine 100 mg tablet, Take 1 tablet (100 mg total) by mouth daily., Disp: 30 tablet, Rfl: 1    Review of Systems   All other systems reviewed and are negative.      Objective     Wt Readings from Last 3 Encounters:   12/07/22 88.7 kg (195 lb 9.6 oz)   12/02/22 88 kg (194 lb)   11/11/22 94.3 kg (208 lb)       Temp Readings from Last 3 Encounters:   12/07/22 36.6 °C (97.9 °F)   12/02/22 36.2 °C (97.1 °F) (Temporal)   11/11/22 36.2 °C (97.2 °F)       BP Readings from Last 3 Encounters:   12/07/22 118/68   12/02/22 110/78   11/11/22 112/68       Pulse Readings from Last 3 Encounters:   12/07/22 65   12/02/22 71   11/11/22 65     Vitals:    12/07/22 1349   BP: 118/68   BP Location: Left upper arm   Patient Position: Sitting   Pulse: 65   Resp: 16   Temp:  "36.6 °C (97.9 °F)   SpO2: 95%   Weight: 88.7 kg (195 lb 9.6 oz)     Body mass index is 30.64 kg/m².    Physical Exam  Vitals and nursing note reviewed.   HENT:      Head: Normocephalic.      Right Ear: Tympanic membrane normal.      Left Ear: Tympanic membrane normal.      Nose: Nose normal.      Mouth/Throat:      Mouth: Mucous membranes are moist.   Cardiovascular:      Rate and Rhythm: Normal rate and regular rhythm.      Pulses: Normal pulses.      Heart sounds: Normal heart sounds.   Pulmonary:      Effort: Pulmonary effort is normal.      Comments: He has and expiratory coarse breath sounds throughout all lung fields, no wheezing, no focal abnormality on exam  His effort is normal he is not tachypneic.  He is talking and laughing normally during the exam  Abdominal:      General: Abdomen is flat. Bowel sounds are normal.      Palpations: Abdomen is soft.         Lab Results   Component Value Date    WBC 10.2 11/17/2022    HGB 13.0 (L) 11/17/2022    HCT 38.8 11/17/2022     11/17/2022    CHOL 138 11/17/2022    TRIG 71 11/17/2022    HDL 64 11/17/2022    ALT 35 11/17/2022    AST 56 (H) 11/17/2022     11/17/2022    K 4.3 11/17/2022    CL 94 (L) 11/17/2022    CREATININE 1.06 11/17/2022    BUN 12 11/17/2022    CO2 33 (H) 11/17/2022    TSH 93.33 (H) 11/17/2022    HGBA1C 5.9 (H) 11/17/2022                            Assessment   Problem List Items Addressed This Visit        Respiratory    COPD (chronic obstructive pulmonary disease) (CMS/[facility])     Takes nebs a \"few times a day\"  Unsure if he has a pnuemonia vs exac of copd  Treating with zithromax and augmentin- he has not started- Ann will get today  Continue nebulizer 4x's a day  Follow up here in 2 days         Community acquired pneumonia of left lower lobe of lung - Primary     Treating the left lower lung opacity as pneumonia, he has a history of COPD so we can consider this an exacerbation as well  His lungs are coarse throughout, no focal sounds in " the left lower lobe  His pulse ox is great, his effort is normal  He will continue his nebulizers every 4 hours  Follow-up in 2 days with             Circulatory    CHF (congestive heart failure), NYHA class I, acute, systolic (CMS/HCC)     He is euvolemic today  Chest xray shows lll opacity, no chf              I spent over 45 minutes with patient, and or looking at previous documentation, labs or images  AMARILIS James  12/7/2022

## 2022-12-09 ENCOUNTER — OFFICE VISIT (OUTPATIENT)
Dept: PRIMARY CARE | Facility: CLINIC | Age: 82
End: 2022-12-09
Payer: COMMERCIAL

## 2022-12-09 VITALS
TEMPERATURE: 97 F | BODY MASS INDEX: 30.29 KG/M2 | HEART RATE: 67 BPM | OXYGEN SATURATION: 95 % | WEIGHT: 193 LBS | DIASTOLIC BLOOD PRESSURE: 70 MMHG | SYSTOLIC BLOOD PRESSURE: 110 MMHG | HEIGHT: 67 IN

## 2022-12-09 DIAGNOSIS — R41.89 COGNITIVE IMPAIRMENT: ICD-10-CM

## 2022-12-09 DIAGNOSIS — J18.9 COMMUNITY ACQUIRED PNEUMONIA OF LEFT LOWER LOBE OF LUNG: ICD-10-CM

## 2022-12-09 DIAGNOSIS — H66.002 NON-RECURRENT ACUTE SUPPURATIVE OTITIS MEDIA OF LEFT EAR WITHOUT SPONTANEOUS RUPTURE OF TYMPANIC MEMBRANE: Primary | ICD-10-CM

## 2022-12-09 PROBLEM — I48.91 ATRIAL FIBRILLATION BY ELECTROCARDIOGRAM (CMS/HCC): Status: RESOLVED | Noted: 2020-10-27 | Resolved: 2022-12-09

## 2022-12-09 PROBLEM — I44.1 MOBITZ (TYPE) I (WENCKEBACH'S) ATRIOVENTRICULAR BLOCK: Status: RESOLVED | Noted: 2019-11-11 | Resolved: 2022-12-09

## 2022-12-09 PROBLEM — N39.0 UTI (URINARY TRACT INFECTION): Status: RESOLVED | Noted: 2022-04-09 | Resolved: 2022-12-09

## 2022-12-09 PROBLEM — I50.43 ACUTE ON CHRONIC COMBINED SYSTOLIC AND DIASTOLIC CONGESTIVE HEART FAILURE (CMS/HCC): Status: RESOLVED | Noted: 2021-10-20 | Resolved: 2022-12-09

## 2022-12-09 PROBLEM — Z95.5 STENTED CORONARY ARTERY: Status: RESOLVED | Noted: 2020-11-19 | Resolved: 2022-12-09

## 2022-12-09 PROBLEM — I50.22 CHRONIC SYSTOLIC CHF (CONGESTIVE HEART FAILURE) (CMS/HCC): Status: RESOLVED | Noted: 2020-11-13 | Resolved: 2022-12-09

## 2022-12-09 PROBLEM — I95.1 ORTHOSTATIC HYPOTENSION: Status: RESOLVED | Noted: 2022-04-27 | Resolved: 2022-12-09

## 2022-12-09 PROBLEM — R09.02 HYPOXIA: Status: RESOLVED | Noted: 2022-11-11 | Resolved: 2022-12-09

## 2022-12-09 PROBLEM — E78.5 DYSLIPIDEMIA: Status: RESOLVED | Noted: 2022-11-11 | Resolved: 2022-12-09

## 2022-12-09 PROBLEM — R93.89 ABNORMAL CXR: Status: RESOLVED | Noted: 2022-05-17 | Resolved: 2022-12-09

## 2022-12-09 PROBLEM — I26.99 PULMONARY EMBOLISM (CMS/HCC): Status: RESOLVED | Noted: 2022-11-11 | Resolved: 2022-12-09

## 2022-12-09 PROBLEM — R55 NEAR SYNCOPE: Status: RESOLVED | Noted: 2022-05-21 | Resolved: 2022-12-09

## 2022-12-09 PROBLEM — E66.812 CLASS 2 OBESITY IN ADULT: Status: RESOLVED | Noted: 2020-11-17 | Resolved: 2022-12-09

## 2022-12-09 PROBLEM — R06.89 RESPIRATORY INSUFFICIENCY: Status: RESOLVED | Noted: 2020-11-27 | Resolved: 2022-12-09

## 2022-12-09 PROBLEM — I45.9 HEART BLOCK: Status: RESOLVED | Noted: 2021-03-11 | Resolved: 2022-12-09

## 2022-12-09 PROBLEM — R41.82 ALTERED MENTAL STATUS: Status: RESOLVED | Noted: 2022-04-08 | Resolved: 2022-12-09

## 2022-12-09 PROBLEM — D64.9 ANEMIA: Status: RESOLVED | Noted: 2020-11-05 | Resolved: 2022-12-09

## 2022-12-09 PROBLEM — R06.02 SHORTNESS OF BREATH: Status: RESOLVED | Noted: 2020-11-13 | Resolved: 2022-12-09

## 2022-12-09 PROCEDURE — 99214 OFFICE O/P EST MOD 30 MIN: CPT | Performed by: FAMILY MEDICINE

## 2022-12-09 PROCEDURE — 3008F BODY MASS INDEX DOCD: CPT | Performed by: FAMILY MEDICINE

## 2022-12-09 RX ORDER — AMOXICILLIN AND CLAVULANATE POTASSIUM 875; 125 MG/1; MG/1
1 TABLET, FILM COATED ORAL 2 TIMES DAILY
Qty: 10 TABLET | Refills: 0 | Status: SHIPPED | OUTPATIENT
Start: 2022-12-09 | End: 2022-12-14

## 2022-12-09 NOTE — ASSESSMENT & PLAN NOTE
Lungs improved and no longer sound coarse  Lungs clear to auscultation bilaterally today  Coughing less as well  Continue treatment as prescribed

## 2022-12-09 NOTE — PROGRESS NOTES
[facility] in [place] [address] [address]  [facility] [address]  Phone: [phone]  Fax: [phone]       CHIEF COMPLAINT   Chief Complaint   Patient presents with   • Follow-up     Feeling much better, still coughing but much improved with antibiotics, no SOB       HISTORY OF PRESENT ILLNESS      This is a 82 y.o. male who presents for   Chief Complaint   Patient presents with   • Follow-up     Feeling much better, still coughing but much improved with antibiotics, no SOB      Caretakers with him today and states he has been coughing much less since starting the antibiotics for suspected pneumonia  Chest x-ray with the left lower lobe opacification consistent with either atelectasis or pneumonia  Patient has been improving since initiation of antibiotics  His breathing has improved at home as well    He discusses his possibility of driving today and is concerned because he cannot find his keys  States his car is there but he is gone  Caretaker and I discussed in detail that it is considered unsafe for him to drive at this time due to his memory deficits  He accepts this and would like to follow-up with a neurologist for further evaluation as was previously discussed    PAST MEDICAL AND SURGICAL HISTORY      Past Medical History:   Diagnosis Date   • A-fib (CMS/HCC)    • Acute on chronic combined systolic and diastolic congestive heart failure (CMS/HCC)    • CHF (congestive heart failure), NYHA class I, acute, systolic (CMS/HCC)    • Depression    • Hyperthyroidism    • Hypoxia    • Near syncope    • NSVT (nonsustained ventricular tachycardia)    • Orthostatic hypotension    • Presence of cardiac pacemaker    • Pulmonary embolism (CMS/HCC)    • Sepsis (CMS/HCC)    • Sepsis (CMS/HCC) 5/16/2022       Past Surgical History:   Procedure Laterality Date   • CARDIAC CATHETERIZATION  11/19/2020    CATH PLACE/CORON ANGIO, IMG SUPER/INTERP,R&L HRT CATH, L HRT VENTRIC   • CORONARY ARTERY BYPASS GRAFT   2005       MEDICATIONS        Current Outpatient Medications:   •  acetaminophen (TYLENOL) 325 mg tablet, Take 650 mg by mouth as needed., Disp: , Rfl:   •  albuterol 2.5 mg /3 mL (0.083 %) nebulizer solution, Take 3 mL (2.5 mg total) by nebulization 4 (four) times a day., Disp: 180 mL, Rfl: 1  •  albuterol HFA (VENTOLIN HFA) 90 mcg/actuation inhaler, Inhale 2 puffs 4 (four) times a day as needed for wheezing., Disp: 18 g, Rfl: 1  •  amoxicillin-pot clavulanate (AUGMENTIN) 875-125 mg per tablet, Take 1 tablet by mouth 2 (two) times a day for 5 days., Disp: 10 tablet, Rfl: 0  •  atorvastatin (LIPITOR) 20 mg tablet, Take 1 tablet (20 mg total) by mouth daily., Disp: 90 tablet, Rfl: 1  •  azithromycin (ZITHROMAX Z-PRESLEY) 250 mg tablet, Take 2 tablets the first day, then 1 tablet daily for 4 days., Disp: 6 tablet, Rfl: 0  •  benzonatate (TESSALON PERLES) 100 mg capsule, Take 1 capsule (100 mg total) by mouth 3 (three) times a day as needed for cough for up to 7 days., Disp: 21 capsule, Rfl: 0  •  clonazePAM (KlonoPIN) 0.5 mg tablet, Take 1 tablet (0.5 mg total) by mouth nightly as needed for anxiety. (Patient taking differently: Take 0.5 mg by mouth nightly.), Disp: 30 tablet, Rfl: 0  •  diclofenac sodium (VOLTAREN) 1 % topical gel, Apply 1 g topically as needed., Disp: , Rfl:   •  ferrous sulfate 325 mg (65 mg iron) tablet, Take 1 tablet (325 mg total) by mouth daily with breakfast., Disp: 90 tablet, Rfl: 1  •  fluticasone-umeclidinium-vilanterol (TRELEGY ELLIPTA) 100-62.5-25 mcg blister with device powder for inhalation, Inhale 1 puff daily. (Patient taking differently: Inhale 1 puff as needed.), Disp: 1 each, Rfl: 1  •  folic acid (FOLVITE) 1 mg tablet, Take 1 tablet (1 mg total) by mouth daily., Disp: 90 tablet, Rfl: 1  •  furosemide (LASIX) 40 mg tablet, Take 1 tablet (40 mg total) by mouth daily., Disp: 90 tablet, Rfl: 1  •  levothyroxine (SYNTHROID) 100 mcg tablet, Take 1 tablet (100 mcg total) by mouth daily.,  Disp: 90 tablet, Rfl: 1  •  loratadine (CLARITIN) 10 mg tablet, Take 10 mg by mouth daily., Disp: , Rfl:   •  medical marijuana, Take 1 each by mouth as needed., Disp: , Rfl:   •  melatonin 5 mg capsule, Take 5 mg by mouth nightly. Hs prn, Disp: , Rfl:   •  metoprolol succinate XL (TOPROL-XL) 25 mg 24 hr tablet, Take 1 tablet (25 mg total) by mouth daily. 0.5mg bid (Patient taking differently: Take 12.5 mg by mouth 2 (two) times a day.), Disp: 90 tablet, Rfl: 1  •  multivitamin tablet, Take 1 tablet by mouth daily., Disp: , Rfl:   •  pantoprazole (PROTONIX) 40 mg EC tablet, Take 40 mg by mouth 2 (two) times a day., Disp: , Rfl:   •  sertraline (ZOLOFT) 25 mg tablet, Take 1 tablet (25 mg total) by mouth daily., Disp: 90 tablet, Rfl: 0  •  thiamine 100 mg tablet, Take 1 tablet (100 mg total) by mouth daily., Disp: 30 tablet, Rfl: 1    ALLERGIES      Patient has no known allergies.    FAMILY HISTORY      Family History   Problem Relation Age of Onset   • Thyroid cancer Biological Mother    • ALS Biological Mother    • Parkinsonism Biological Mother    • Heart attack Biological Father    • Liver cancer Maternal Grandmother        SOCIAL HISTORY      Social History     Socioeconomic History   • Marital status:      Spouse name: None   • Number of children: 2   • Years of education: None   • Highest education level: None   Occupational History   • Occupation: retired     Comment: [employer] in [place]   Tobacco Use   • Smoking status: Former     Years: 40.00     Types: Cigarettes     Quit date: 11/1/2012     Years since quitting: 10.1   • Smokeless tobacco: Never   Vaping Use   • Vaping Use: Never used   Substance and Sexual Activity   • Alcohol use: Yes     Alcohol/week: 9.0 standard drinks     Types: 9 Shots of liquor per week     Comment: vodka   • Drug use: Never   • Sexual activity: Not Currently   Social History Narrative    Lives alone, and has help for meal prep and med management       REVIEW OF SYSTEMS     "  Review of Systems   All other systems reviewed and are negative.    PHYSICAL EXAMINATION      Vitals:    12/09/22 0950   BP: 110/70   BP Location: Left upper arm   Patient Position: Sitting   Pulse: 67   Temp: 36.1 °C (97 °F)   TempSrc: Temporal   SpO2: 95%   Weight: 87.5 kg (193 lb)   Height: 1.702 m (5' 7\")     Body mass index is 30.23 kg/m².     BP Readings from Last 3 Encounters:   12/09/22 110/70   12/07/22 118/68   12/02/22 110/78     Wt Readings from Last 3 Encounters:   12/09/22 87.5 kg (193 lb)   12/07/22 88.7 kg (195 lb 9.6 oz)   12/02/22 88 kg (194 lb)     Ht Readings from Last 3 Encounters:   12/09/22 1.702 m (5' 7\")   12/02/22 1.702 m (5' 7\")   11/11/22 1.702 m (5' 7\")     Physical Exam  Vitals reviewed.   Constitutional:       General: He is not in acute distress.     Appearance: Normal appearance.   HENT:      Head: Normocephalic and atraumatic.      Right Ear: Tympanic membrane, ear canal and external ear normal.      Left Ear: Ear canal and external ear normal. A middle ear effusion is present. Tympanic membrane is injected and erythematous.      Nose: Congestion and rhinorrhea ( dry and yellow) present.      Mouth/Throat:      Mouth: Mucous membranes are moist.      Pharynx: Oropharynx is clear.   Eyes:      Conjunctiva/sclera: Conjunctivae normal.   Cardiovascular:      Rate and Rhythm: Normal rate and regular rhythm.      Heart sounds: Normal heart sounds.   Pulmonary:      Effort: Pulmonary effort is normal. No respiratory distress.      Breath sounds: Normal breath sounds. No wheezing or rhonchi.   Skin:     General: Skin is warm and dry.   Neurological:      Mental Status: He is alert and oriented to person, place, and time. Mental status is at baseline.   Psychiatric:         Mood and Affect: Mood and affect normal.         Behavior: Behavior normal. Behavior is cooperative.         Cognition and Memory: Memory is impaired (unable to remember the President).         LABS / IMAGING / STUDIES "        Labs    Lab Results   Component Value Date    CREATININE 1.06 11/17/2022     Lab Results   Component Value Date    WBC 10.2 11/17/2022    HGB 13.0 (L) 11/17/2022    HCT 38.8 11/17/2022    MCV 94.9 11/17/2022     11/17/2022         Lab Results   Component Value Date    HGBA1C 5.9 (H) 11/17/2022     HEALTH MAINTENANCE              ASSESSMENT AND PLAN   Problem List Items Addressed This Visit        Respiratory    Community acquired pneumonia of left lower lobe of lung     Lungs improved and no longer sound coarse  Lungs clear to auscultation bilaterally today  Coughing less as well  Continue treatment as prescribed         Relevant Medications    amoxicillin-pot clavulanate (AUGMENTIN) 875-125 mg per tablet       Other    Cognitive impairment     Caretaker and I discussed with Liu that driving at this time is considered unsafe  He does not currently have a license due to his cognitive decline and memory issues  He does have any caregivers in the home that come daily and will drive him where he needs to go  He does have upcoming follow-up with neurology and encouraged patient to further discuss at that follow-up visit  He understands and agrees to the plan        Other Visit Diagnoses     Non-recurrent acute suppurative otitis media of left ear without spontaneous rupture of tympanic membrane    -  Primary    Signs of ear infection on left side on physical exam today  Will extend Augmentin to a total of 10 days for treatment          Patient Instructions   Continue Augmentin for a total of 10 days    No driving a car      Return in about 2 months (around 2/9/2023) for Routine follow up.         Melanie Chambers,   12/09/22

## 2022-12-13 ENCOUNTER — TELEPHONE (OUTPATIENT)
Dept: PRIMARY CARE | Facility: CLINIC | Age: 82
End: 2022-12-13
Payer: COMMERCIAL

## 2022-12-13 DIAGNOSIS — F32.89 OTHER DEPRESSION: ICD-10-CM

## 2022-12-13 RX ORDER — CLONAZEPAM 0.5 MG/1
0.5 TABLET ORAL NIGHTLY PRN
Qty: 30 TABLET | Refills: 0 | Status: SHIPPED | OUTPATIENT
Start: 2022-12-22 | End: 2023-02-07

## 2022-12-13 NOTE — TELEPHONE ENCOUNTER
Phone call from patients care  Giver ANISA [phone].  She is asking abut antibiotics.  Patient did take 5 days of Zithromax. Patient took  5 days of Augmentin ( medication was lost).  Anisa states she found a full bottle of Zithromax and Augmentin, she is questioning if he should continue meds and if so which one or both.  Patient is feeling better but not 100%.  Aware you are in this afternoon.

## 2022-12-13 NOTE — TELEPHONE ENCOUNTER
Medicine Refill Request    Last Office: 12/9/2022   Last Consult Visit: Visit date not found  Last Telemedicine Visit: Visit date not found    Next Appointment: 2/17/2023      Current Outpatient Medications:   •  acetaminophen (TYLENOL) 325 mg tablet, Take 650 mg by mouth as needed., Disp: , Rfl:   •  albuterol 2.5 mg /3 mL (0.083 %) nebulizer solution, Take 3 mL (2.5 mg total) by nebulization 4 (four) times a day., Disp: 180 mL, Rfl: 1  •  albuterol HFA (VENTOLIN HFA) 90 mcg/actuation inhaler, Inhale 2 puffs 4 (four) times a day as needed for wheezing., Disp: 18 g, Rfl: 1  •  amoxicillin-pot clavulanate (AUGMENTIN) 875-125 mg per tablet, Take 1 tablet by mouth 2 (two) times a day for 5 days., Disp: 10 tablet, Rfl: 0  •  atorvastatin (LIPITOR) 20 mg tablet, Take 1 tablet (20 mg total) by mouth daily., Disp: 90 tablet, Rfl: 1  •  azithromycin (ZITHROMAX Z-PRESLEY) 250 mg tablet, Take 2 tablets the first day, then 1 tablet daily for 4 days., Disp: 6 tablet, Rfl: 0  •  clonazePAM (KlonoPIN) 0.5 mg tablet, Take 1 tablet (0.5 mg total) by mouth nightly as needed for anxiety. (Patient taking differently: Take 0.5 mg by mouth nightly.), Disp: 30 tablet, Rfl: 0  •  diclofenac sodium (VOLTAREN) 1 % topical gel, Apply 1 g topically as needed., Disp: , Rfl:   •  ferrous sulfate 325 mg (65 mg iron) tablet, Take 1 tablet (325 mg total) by mouth daily with breakfast., Disp: 90 tablet, Rfl: 1  •  fluticasone-umeclidinium-vilanterol (TRELEGY ELLIPTA) 100-62.5-25 mcg blister with device powder for inhalation, Inhale 1 puff daily. (Patient taking differently: Inhale 1 puff as needed.), Disp: 1 each, Rfl: 1  •  folic acid (FOLVITE) 1 mg tablet, Take 1 tablet (1 mg total) by mouth daily., Disp: 90 tablet, Rfl: 1  •  furosemide (LASIX) 40 mg tablet, Take 1 tablet (40 mg total) by mouth daily., Disp: 90 tablet, Rfl: 1  •  levothyroxine (SYNTHROID) 100 mcg tablet, Take 1 tablet (100 mcg total) by mouth daily., Disp: 90 tablet, Rfl: 1  •   loratadine (CLARITIN) 10 mg tablet, Take 10 mg by mouth daily., Disp: , Rfl:   •  medical marijuana, Take 1 each by mouth as needed., Disp: , Rfl:   •  melatonin 5 mg capsule, Take 5 mg by mouth nightly. Hs prn, Disp: , Rfl:   •  metoprolol succinate XL (TOPROL-XL) 25 mg 24 hr tablet, Take 1 tablet (25 mg total) by mouth daily. 0.5mg bid (Patient taking differently: Take 12.5 mg by mouth 2 (two) times a day.), Disp: 90 tablet, Rfl: 1  •  multivitamin tablet, Take 1 tablet by mouth daily., Disp: , Rfl:   •  pantoprazole (PROTONIX) 40 mg EC tablet, Take 40 mg by mouth 2 (two) times a day., Disp: , Rfl:   •  sertraline (ZOLOFT) 25 mg tablet, Take 1 tablet (25 mg total) by mouth daily., Disp: 90 tablet, Rfl: 0  •  thiamine 100 mg tablet, Take 1 tablet (100 mg total) by mouth daily., Disp: 30 tablet, Rfl: 1      BP Readings from Last 3 Encounters:   12/09/22 110/70   12/07/22 118/68   12/02/22 110/78       Recent Lab results:  Lab Results   Component Value Date    CHOL 138 11/17/2022   ,   Lab Results   Component Value Date    HDL 64 11/17/2022   ,   Lab Results   Component Value Date    LDLCALC 59 11/17/2022   ,   Lab Results   Component Value Date    TRIG 71 11/17/2022        Lab Results   Component Value Date    GLUCOSE 156 (H) 11/17/2022   ,   Lab Results   Component Value Date    HGBA1C 5.9 (H) 11/17/2022         Lab Results   Component Value Date    CREATININE 1.06 11/17/2022

## 2022-12-14 NOTE — TELEPHONE ENCOUNTER
PDMP reviewed. Refill approved but not until 12/22/2022.     Next visit is on: 2/17/2023    Melanie Chambers DO

## 2022-12-27 RX ORDER — PANTOPRAZOLE SODIUM 40 MG/1
40 TABLET, DELAYED RELEASE ORAL 2 TIMES DAILY
Qty: 180 TABLET | Refills: 0 | Status: SHIPPED | OUTPATIENT
Start: 2022-12-27 | End: 2023-02-07

## 2022-12-27 NOTE — TELEPHONE ENCOUNTER
Please see attached refill req. Thanks!    Medicine Refill Request    Last Office: 12/9/2022   Last Consult Visit: Visit date not found  Last Telemedicine Visit: Visit date not found    Next Appointment: 2/17/2023      Current Outpatient Medications:     acetaminophen (TYLENOL) 325 mg tablet, Take 650 mg by mouth as needed., Disp: , Rfl:     albuterol 2.5 mg /3 mL (0.083 %) nebulizer solution, Take 3 mL (2.5 mg total) by nebulization 4 (four) times a day., Disp: 180 mL, Rfl: 1    albuterol HFA (VENTOLIN HFA) 90 mcg/actuation inhaler, Inhale 2 puffs 4 (four) times a day as needed for wheezing., Disp: 18 g, Rfl: 1    atorvastatin (LIPITOR) 20 mg tablet, Take 1 tablet (20 mg total) by mouth daily., Disp: 90 tablet, Rfl: 1    azithromycin (ZITHROMAX Z-PRESLEY) 250 mg tablet, Take 2 tablets the first day, then 1 tablet daily for 4 days., Disp: 6 tablet, Rfl: 0    clonazePAM (klonoPIN) 0.5 mg tablet, Take 1 tablet (0.5 mg total) by mouth nightly as needed for anxiety., Disp: 30 tablet, Rfl: 0    diclofenac sodium (VOLTAREN) 1 % topical gel, Apply 1 g topically as needed., Disp: , Rfl:     ferrous sulfate 325 mg (65 mg iron) tablet, Take 1 tablet (325 mg total) by mouth daily with breakfast., Disp: 90 tablet, Rfl: 1    fluticasone-umeclidinium-vilanterol (TRELEGY ELLIPTA) 100-62.5-25 mcg blister with device powder for inhalation, Inhale 1 puff daily. (Patient taking differently: Inhale 1 puff as needed.), Disp: 1 each, Rfl: 1    folic acid (FOLVITE) 1 mg tablet, Take 1 tablet (1 mg total) by mouth daily., Disp: 90 tablet, Rfl: 1    furosemide (LASIX) 40 mg tablet, Take 1 tablet (40 mg total) by mouth daily., Disp: 90 tablet, Rfl: 1    levothyroxine (SYNTHROID) 100 mcg tablet, Take 1 tablet (100 mcg total) by mouth daily., Disp: 90 tablet, Rfl: 1    loratadine (CLARITIN) 10 mg tablet, Take 10 mg by mouth daily., Disp: , Rfl:     medical marijuana, Take 1 each by mouth as needed., Disp: , Rfl:     melatonin 5 mg capsule, Take 5  mg by mouth nightly. Hs prn, Disp: , Rfl:     metoprolol succinate XL (TOPROL-XL) 25 mg 24 hr tablet, Take 1 tablet (25 mg total) by mouth daily. 0.5mg bid (Patient taking differently: Take 12.5 mg by mouth 2 (two) times a day.), Disp: 90 tablet, Rfl: 1    multivitamin tablet, Take 1 tablet by mouth daily., Disp: , Rfl:     pantoprazole (PROTONIX) 40 mg EC tablet, Take 40 mg by mouth 2 (two) times a day., Disp: , Rfl:     sertraline (ZOLOFT) 25 mg tablet, Take 1 tablet (25 mg total) by mouth daily., Disp: 90 tablet, Rfl: 0    thiamine 100 mg tablet, Take 1 tablet (100 mg total) by mouth daily., Disp: 30 tablet, Rfl: 1      BP Readings from Last 3 Encounters:   12/09/22 110/70   12/07/22 118/68   12/02/22 110/78       Recent Lab results:  Lab Results   Component Value Date    CHOL 138 11/17/2022   ,   Lab Results   Component Value Date    HDL 64 11/17/2022   ,   Lab Results   Component Value Date    LDLCALC 59 11/17/2022   ,   Lab Results   Component Value Date    TRIG 71 11/17/2022        Lab Results   Component Value Date    GLUCOSE 156 (H) 11/17/2022   ,   Lab Results   Component Value Date    HGBA1C 5.9 (H) 11/17/2022         Lab Results   Component Value Date    CREATININE 1.06 11/17/2022       Lab Results   Component Value Date    TSH 93.33 (H) 11/17/2022

## 2022-12-29 ENCOUNTER — OFFICE VISIT (OUTPATIENT)
Dept: CARDIOLOGY | Facility: CLINIC | Age: 82
End: 2022-12-29
Attending: PHYSICIAN ASSISTANT
Payer: COMMERCIAL

## 2022-12-29 VITALS
HEIGHT: 67 IN | SYSTOLIC BLOOD PRESSURE: 114 MMHG | DIASTOLIC BLOOD PRESSURE: 70 MMHG | OXYGEN SATURATION: 97 % | BODY MASS INDEX: 30.29 KG/M2 | WEIGHT: 193 LBS | RESPIRATION RATE: 20 BRPM | HEART RATE: 62 BPM

## 2022-12-29 DIAGNOSIS — R41.89 COGNITIVE IMPAIRMENT: ICD-10-CM

## 2022-12-29 DIAGNOSIS — I48.91 ATRIAL FIBRILLATION BY ELECTROCARDIOGRAM (CMS/HCC): ICD-10-CM

## 2022-12-29 DIAGNOSIS — I50.22 CHRONIC SYSTOLIC CHF (CONGESTIVE HEART FAILURE) (CMS/HCC): ICD-10-CM

## 2022-12-29 DIAGNOSIS — I35.0 NONRHEUMATIC AORTIC VALVE STENOSIS: ICD-10-CM

## 2022-12-29 DIAGNOSIS — Z95.2 S/P TAVR (TRANSCATHETER AORTIC VALVE REPLACEMENT): ICD-10-CM

## 2022-12-29 DIAGNOSIS — Z95.0 PRESENCE OF CARDIAC PACEMAKER: ICD-10-CM

## 2022-12-29 DIAGNOSIS — I27.20 PULMONARY HYPERTENSION (CMS/HCC): ICD-10-CM

## 2022-12-29 DIAGNOSIS — E78.5 DYSLIPIDEMIA: ICD-10-CM

## 2022-12-29 DIAGNOSIS — I10 ESSENTIAL HYPERTENSION: ICD-10-CM

## 2022-12-29 DIAGNOSIS — I50.21 CHF (CONGESTIVE HEART FAILURE), NYHA CLASS I, ACUTE, SYSTOLIC (CMS/HCC): ICD-10-CM

## 2022-12-29 DIAGNOSIS — I49.5 SSS (SICK SINUS SYNDROME) (CMS/HCC): ICD-10-CM

## 2022-12-29 DIAGNOSIS — I25.10 CORONARY ARTERY DISEASE INVOLVING NATIVE CORONARY ARTERY OF NATIVE HEART WITHOUT ANGINA PECTORIS: Primary | ICD-10-CM

## 2022-12-29 DIAGNOSIS — J18.9 COMMUNITY ACQUIRED PNEUMONIA OF LEFT LOWER LOBE OF LUNG: ICD-10-CM

## 2022-12-29 DIAGNOSIS — I71.21 ANEURYSM OF ASCENDING AORTA WITHOUT RUPTURE (CMS/HCC): ICD-10-CM

## 2022-12-29 DIAGNOSIS — R09.89 BILATERAL CAROTID BRUITS: ICD-10-CM

## 2022-12-29 DIAGNOSIS — Z95.820 S/P ANGIOPLASTY WITH STENT: ICD-10-CM

## 2022-12-29 DIAGNOSIS — I45.10 RBBB: ICD-10-CM

## 2022-12-29 DIAGNOSIS — J00 ACUTE RHINITIS: ICD-10-CM

## 2022-12-29 DIAGNOSIS — I48.0 PAROXYSMAL ATRIAL FIBRILLATION (CMS/HCC): ICD-10-CM

## 2022-12-29 DIAGNOSIS — I47.29 NSVT (NONSUSTAINED VENTRICULAR TACHYCARDIA) (CMS/HCC): ICD-10-CM

## 2022-12-29 DIAGNOSIS — E78.00 HYPERCHOLESTEREMIA: ICD-10-CM

## 2022-12-29 DIAGNOSIS — I34.0 MITRAL VALVE INSUFFICIENCY, UNSPECIFIED ETIOLOGY: ICD-10-CM

## 2022-12-29 PROCEDURE — 99205 OFFICE O/P NEW HI 60 MIN: CPT | Performed by: INTERNAL MEDICINE

## 2022-12-29 PROCEDURE — 93000 ELECTROCARDIOGRAM COMPLETE: CPT | Performed by: INTERNAL MEDICINE

## 2022-12-29 PROCEDURE — 3008F BODY MASS INDEX DOCD: CPT | Performed by: INTERNAL MEDICINE

## 2022-12-29 RX ORDER — FLUTICASONE PROPIONATE 50 MCG
1 SPRAY, SUSPENSION (ML) NASAL DAILY
Qty: 16 G | Refills: 5 | Status: SHIPPED | OUTPATIENT
Start: 2022-12-29 | End: 2023-05-05 | Stop reason: SDUPTHER

## 2022-12-29 RX ORDER — BENZONATATE 100 MG/1
100 CAPSULE ORAL 3 TIMES DAILY PRN
Qty: 21 CAPSULE | Refills: 0 | Status: SHIPPED | OUTPATIENT
Start: 2022-12-29 | End: 2023-01-05

## 2022-12-29 ASSESSMENT — ENCOUNTER SYMPTOMS
FREQUENCY: 1
HEMATEMESIS: 0
LOSS OF BALANCE: 0
WHEEZING: 0
COUGH: 1
NUMBNESS: 0
BLURRED VISION: 0
JOINT SWELLING: 0
POOR WOUND HEALING: 0
HOARSE VOICE: 0
ANOREXIA: 0
SPUTUM PRODUCTION: 1
CHANGE IN BOWEL HABIT: 0
TREMORS: 0
ORTHOPNEA: 0
SHORTNESS OF BREATH: 0
SENSORY CHANGE: 0
NIGHT SWEATS: 0
BOWEL INCONTINENCE: 0
LEFT EYE: 0
SUSPICIOUS LESIONS: 0
RIGHT EYE: 0
IRREGULAR HEARTBEAT: 0
SLEEP DISTURBANCES DUE TO BREATHING: 0
PALPITATIONS: 0
DYSPNEA ON EXERTION: 0
BACK PAIN: 0
CLAUDICATION: 0
MYALGIAS: 0
DECREASED APPETITE: 0
NEAR-SYNCOPE: 0
FLANK PAIN: 0
WEIGHT GAIN: 0
DIZZINESS: 0
PERSISTENT INFECTIONS: 0
WEAKNESS: 0

## 2022-12-29 NOTE — PROGRESS NOTES
Cecil Morris is a 82 y.o. male is present in the office today for new patient appointment.    Cecil has a permanent pacemaker and a recent TAVR for bicuspid aortic valve.  Most recent echo in November with good gradients, normal function.     He is accompanied by helper and is suffering from dementia.  Denies cardiac symptoms.  Meds are packaged by the week and sent to him.    Denies recent bleeding.  He is getting over pneumonia, just finishing course of abx with improvement in symptoms.      Has been intolerant of GDMT due to low MAPs.  Denies fluid retention within 1lb of dry weight per caregiver.       Past Medical History:   Diagnosis Date   • A-fib (CMS/HCC)    • Acute on chronic combined systolic and diastolic congestive heart failure (CMS/HCC)    • CHF (congestive heart failure), NYHA class I, acute, systolic (CMS/HCC)    • Depression    • Hyperthyroidism    • Hypoxia    • Near syncope    • NSVT (nonsustained ventricular tachycardia)    • Orthostatic hypotension    • Presence of cardiac pacemaker    • Pulmonary embolism (CMS/HCC)    • Sepsis (CMS/HCC)    • Sepsis (CMS/HCC) 5/16/2022       Past Surgical History:   Procedure Laterality Date   • CARDIAC CATHETERIZATION  11/19/2020    CATH PLACE/CORON ANGIO, IMG SUPER/INTERP,R&L HRT CATH, L HRT VENTRIC   • CORONARY ARTERY BYPASS GRAFT  2005        Social History     Socioeconomic History   • Marital status:      Spouse name: None   • Number of children: 2   • Years of education: None   • Highest education level: None   Occupational History   • Occupation: retired     Comment: [employer] in [place]   Tobacco Use   • Smoking status: Former     Years: 40.00     Types: Cigarettes     Quit date: 11/1/2012     Years since quitting: 10.1   • Smokeless tobacco: Never   Vaping Use   • Vaping Use: Never used   Substance and Sexual Activity   • Alcohol use: Yes     Alcohol/week: 9.0 standard drinks     Types: 9 Shots of liquor per week     Comment: vodka   •  Drug use: Never   • Sexual activity: Not Currently   Social History Narrative    Lives alone, and has help for meal prep and med management       Family History   Problem Relation Age of Onset   • Thyroid cancer Biological Mother    • ALS Biological Mother    • Parkinsonism Biological Mother    • Heart attack Biological Father    • Liver cancer Maternal Grandmother        Patient has no known allergies.    Current Outpatient Medications   Medication Sig Dispense Refill   • acetaminophen (TYLENOL) 325 mg tablet Take 650 mg by mouth as needed.     • albuterol 2.5 mg /3 mL (0.083 %) nebulizer solution Take 3 mL (2.5 mg total) by nebulization 4 (four) times a day. 180 mL 1   • albuterol HFA (VENTOLIN HFA) 90 mcg/actuation inhaler Inhale 2 puffs 4 (four) times a day as needed for wheezing. 18 g 1   • atorvastatin (LIPITOR) 20 mg tablet Take 1 tablet (20 mg total) by mouth daily. 90 tablet 1   • azithromycin (ZITHROMAX Z-PRESLEY) 250 mg tablet Take 2 tablets the first day, then 1 tablet daily for 4 days. 6 tablet 0   • benzonatate (TESSALON PERLES) 100 mg capsule Take 1 capsule (100 mg total) by mouth 3 (three) times a day as needed for cough for up to 7 days. 21 capsule 0   • clonazePAM (klonoPIN) 0.5 mg tablet Take 1 tablet (0.5 mg total) by mouth nightly as needed for anxiety. 30 tablet 0   • diclofenac sodium (VOLTAREN) 1 % topical gel Apply 1 g topically as needed.     • ferrous sulfate 325 mg (65 mg iron) tablet Take 1 tablet (325 mg total) by mouth daily with breakfast. 90 tablet 1   • fluticasone propionate (FLONASE) 50 mcg/actuation nasal spray Administer 1 spray into each nostril daily. 16 g 5   • fluticasone-umeclidinium-vilanterol (TRELEGY ELLIPTA) 100-62.5-25 mcg blister with device powder for inhalation Inhale 1 puff daily. (Patient taking differently: Inhale 1 puff as needed.) 1 each 1   • folic acid (FOLVITE) 1 mg tablet Take 1 tablet (1 mg total) by mouth daily. 90 tablet 1   • furosemide (LASIX) 40 mg tablet  Take 1 tablet (40 mg total) by mouth daily. 90 tablet 1   • levothyroxine (SYNTHROID) 100 mcg tablet Take 1 tablet (100 mcg total) by mouth daily. 90 tablet 1   • loratadine (CLARITIN) 10 mg tablet Take 10 mg by mouth daily.     • medical marijuana Take 1 each by mouth as needed.     • melatonin 5 mg capsule Take 5 mg by mouth nightly. Hs prn     • metoprolol succinate XL (TOPROL-XL) 25 mg 24 hr tablet Take 1 tablet (25 mg total) by mouth daily. 0.5mg bid (Patient taking differently: Take 12.5 mg by mouth 2 (two) times a day.) 90 tablet 1   • multivitamin tablet Take 1 tablet by mouth daily.     • pantoprazole (PROTONIX) 40 mg EC tablet Take 1 tablet (40 mg total) by mouth 2 (two) times a day. 180 tablet 0   • rivaroxaban (XARELTO) 20 mg tablet Take 1 tablet (20 mg total) by mouth daily with dinner. 90 tablet 3   • sertraline (ZOLOFT) 25 mg tablet Take 1 tablet (25 mg total) by mouth daily. 90 tablet 0   • thiamine 100 mg tablet Take 1 tablet (100 mg total) by mouth daily. 30 tablet 1     No current facility-administered medications for this visit.       Review of Systems   Constitutional: Negative for decreased appetite, night sweats and weight gain.   HENT: Negative for congestion, hoarse voice and nosebleeds.    Eyes: Negative for blurred vision, vision loss in left eye and vision loss in right eye.   Cardiovascular: Negative for chest pain, claudication, cyanosis, dyspnea on exertion, irregular heartbeat, leg swelling, near-syncope, orthopnea and palpitations.   Respiratory: Positive for cough and sputum production. Negative for shortness of breath, sleep disturbances due to breathing and wheezing.    Hematologic/Lymphatic: Negative for bleeding problem.   Skin: Negative for poor wound healing, skin cancer and suspicious lesions.   Musculoskeletal: Negative for back pain, joint swelling and myalgias.   Gastrointestinal: Negative for anorexia, change in bowel habit, bowel incontinence, dysphagia, hematemesis and  melena.   Genitourinary: Positive for frequency. Negative for flank pain and nocturia.   Neurological: Negative for dizziness, loss of balance, numbness, sensory change, tremors and weakness.   Allergic/Immunologic: Negative for HIV exposure, hives and persistent infections.          Vitals:    12/29/22 1131   BP: 114/70   Pulse: 62   Resp: 20   SpO2: 97%       Body mass index is 30.23 kg/m².      Physical Exam  Constitutional:       Appearance: Normal appearance. He is normal weight.   HENT:      Head: Normocephalic.      Mouth/Throat:      Mouth: Mucous membranes are moist.      Pharynx: No posterior oropharyngeal erythema.   Eyes:      Extraocular Movements: Extraocular movements intact.   Cardiovascular:      Rate and Rhythm: Normal rate and regular rhythm.      Chest Wall: PMI is not displaced.      Pulses: Normal pulses.      Heart sounds: S1 normal and S2 normal. Murmur heard.   High-pitched blowing holosystolic murmur is present with a grade of 2/6 at the apex.    No gallop. No S3 or S4 sounds.   Pulmonary:      Effort: Pulmonary effort is normal.      Breath sounds: Normal breath sounds.   Abdominal:      General: Abdomen is flat. Bowel sounds are normal. There is no distension.      Tenderness: There is no abdominal tenderness.   Musculoskeletal:      Cervical back: Neck supple.      Comments: Functional status is:  Fair   Skin:     General: Skin is warm.   Neurological:      Mental Status: He is alert and oriented to person, place, and time. Mental status is at baseline.   Psychiatric:         Attention and Perception: Attention normal.         Mood and Affect: Mood and affect normal.         Speech: Speech normal.         Cognition and Memory: Cognition is impaired. He exhibits impaired recent memory and impaired remote memory.      No JVD or edema.    Lab Results   Component Value Date    WBC 10.2 11/17/2022    RBC 4.09 (L) 11/17/2022    HGB 13.0 (L) 11/17/2022    HCT 38.8 11/17/2022    MCV 94.9  11/17/2022    MCH 31.8 11/17/2022    MCHC 33.5 11/17/2022    RDW 15.4 (H) 11/17/2022     11/17/2022        Lab Results   Component Value Date    GLUCOSE 156 (H) 11/17/2022    BUN 12 11/17/2022    CREATININE 1.06 11/17/2022    EGFR 69 11/17/2022     11/17/2022    K 4.3 11/17/2022    CL 94 (L) 11/17/2022    CO2 33 (H) 11/17/2022    CALCIUM 8.6 11/17/2022    PROTEIN 6.6 11/17/2022    ALBUMIN 4.1 11/17/2022    GLOB 2.5 11/17/2022    AGRATIO 1.6 11/17/2022    BILITOT 1.8 (H) 11/17/2022    ALKPHOS 122 11/17/2022    AST 56 (H) 11/17/2022    ALT 35 11/17/2022        Lab Results   Component Value Date    HGBA1C 5.9 (H) 11/17/2022        Lab Results   Component Value Date    ALBUMIN 4.1 11/17/2022        Lab Results   Component Value Date    CHOL 138 11/17/2022    TRIG 71 11/17/2022    HDL 64 11/17/2022    LDLCALC 59 11/17/2022            No results found for this or any previous visit.   TTE 11/20/2022:    •  A complete transthoracic echocardiogram (including 2D, color flow   Doppler, spectral Doppler, M-mode and strain imaging) was performed using   the standard protocol. The study quality was good.   •  The left ventricle is moderately dilated. Top normal left ventricular   wall thickness. Regional wall motion abnormalities are noted (see wall   motion diagram). Moderately decreased left ventricular ejection fraction.   The left ventricular ejection fraction by biplane method of discs is 42%.   The average global longitudinal peak systolic strain is reduced. The   average global longitudinal peak systolic strain is 7.9 % (absolute   value).   •  Unable to assess LV diastolic function due to the presence of mitral   annular calcification.   •  The right ventricle is moderately dilated. There is normal function of   the right ventricle.   •  The left atrium is severely dilated.   •  The right atrium is mildly dilated.   •  There is mild mitral valve leaflet thickening. There is mild mitral   valve leaflet  calcification. There is moderate posterior mitral annular   calcification. There is severe mitral regurgitation. There is no mitral   stenosis.   •  There is a transcatheter bioprosthetic aortic valve present. There are   normal gradients across the prosthetic valve. There is trace anterior   paravalvular regurgitation present. Prosthetic aortic valve mean gradient   measures 6 mmHg. Dimensionless valve index is 0.3.   •  The tricuspid valve is normal in structure. There is mild to moderate   tricuspid regurgitation. Pulmonary artery systolic pressure measures 67   mmHg. The pulmonary artery systolic pressure is severely elevated.   •  The pulmonic valve is normal in structure.   •  The proximal segment of the ascending aorta is mildly enlarged. The   proximal segment of the ascending aorta measures 3.7 cm.   •  The inferior vena cava is dilated (diameter >21 mm) and does not   collapse during inspiration, suggesting an elevated right atrial pressure   of 20 mmHg (range 10-20 mmHg).   •  Compared to the prior study of 4/8/2022, there are no significant   changes.     Left Ventricle   The left ventricle is moderately dilated. Top normal left ventricular wall thickness. Regional wall motion abnormalities are noted (see wall motion diagram). The left ventricular ejection fraction by biplane method of discs is 42%. Moderately decreased left ventricular ejection fraction. The average global longitudinal peak systolic strain is reduced. The average global longitudinal peak systolic strain is 7.9 % (absolute value). Unable to assess LV diastolic function due to the presence of mitral annular calcification.     Right Ventricle   The right ventricle is moderately dilated. RV basal diameter measures 5.4 cm. There is normal function of the right ventricle.     Left Atrium   The left atrium is severely dilated. Left atrial volume index is 85 mL/m2.     Right Atrium   The right atrium is mildly dilated.     IVC/SVC   The  inferior vena cava is dilated (diameter >21 mm) and does not collapse during inspiration, suggesting an elevated right atrial pressure of 20 mmHg (range 10-20 mmHg).     Mitral Valve   There is mild mitral valve leaflet thickening. There is mild mitral valve leaflet calcification. There is moderate posterior mitral annular calcification. There is no mitral stenosis. There is severe mitral regurgitation.     Tricuspid Valve   The tricuspid valve is normal in structure. There is no tricuspid stenosis. There is mild to moderate tricuspid regurgitation. Pulmonary artery systolic pressure measures 67 mmHg. The pulmonary artery systolic pressure is severely elevated.     Aortic Valve   There is a transcatheter bioprosthetic aortic valve present. There are normal gradients across the prosthetic valve. There is trace anterior paravalvular regurgitation present. Prosthetic aortic valve mean gradient measures 6 mmHg. Dimensionless valve index is 0.3.     Pulmonic Valve   The pulmonic valve is normal in structure. There is no pulmonic stenosis. There is mild pulmonic valve regurgitation.     Pericardium   No pericardial effusion.     Septum   No obvious interatrial communication noted with color Doppler imaging.     Aortic Arch/Descending/Abdominal Aorta   The aortic root has post-procedural changes. The proximal segment of the ascending aorta is mildly enlarged. The proximal segment of the ascending aorta measures 3.7 cm.     Study Details   A complete transthoracic echocardiogram (including 2D, color flow Doppler, spectral Doppler, M-mode and strain imaging) was performed using the standard protocol. ECG: Sinus rhythm. During the study, the following view(s) were acquired: apical, parasternal, subcostal and suprasternal. The study quality was good. The study was difficult due to patient's body habitus.               Assessment/Plan        EKG: Atrial fibrillation, vpaced    A-fib (CMS/HCC)  Permanent.  Continue rate control  and anticoagulation.        Ascending aortic aneurysm  Stable would repeat TTE in 2023NOV.    Community acquired pneumonia of left lower lobe of lung  Resolving, will f/u with PCM.    Coronary artery disease involving native coronary artery  Stable, no symptoms.  Continue Lipitor.  He is off ASA due to bleeding risk on full dose AC with Xarelto.      Essential hypertension  Due to lower pressures, would defer adding more agents.      S/P TAVR (transcatheter aortic valve replacement)  Stable function with low gradients on most recent assessment.      CHF (congestive heart failure), NYHA class I, acute, systolic (CMS/[facility])  Ambulatory Class IIC.  His EF is stable at 42%.  He has been euvolemic on PO Lasix and appears euvolemic today.  In the future can consider adding Losartan 25mg or Diovan 40mg if BP allows.      Continue beta blocker and Lasix.  Labs ordered today.      Cognitive impairment  Limited history due to dementia.  He has an aid and no longer driving.         New Medications Ordered This Visit   Medications   • rivaroxaban (XARELTO) 20 mg tablet     Sig: Take 1 tablet (20 mg total) by mouth daily with dinner.     Dispense:  90 tablet     Refill:  3   • fluticasone propionate (FLONASE) 50 mcg/actuation nasal spray     Sig: Administer 1 spray into each nostril daily.     Dispense:  16 g     Refill:  5   • benzonatate (TESSALON PERLES) 100 mg capsule     Sig: Take 1 capsule (100 mg total) by mouth 3 (three) times a day as needed for cough for up to 7 days.     Dispense:  21 capsule     Refill:  0     No JVD on exam, would get labs to evaluate BNP, updated electrolytes.      Discussed CHF action plan with written instructions:  If weight increases by 2-3 lb would take an extra Furosemide 40mg.      There are no discontinued medications.     Orders Placed This Encounter   Procedures   • B-type natriuretic peptide     Standing Status:   Future     Number of Occurrences:   1     Standing Expiration Date:    12/29/2023     Order Specific Question:   Release to patient     Answer:   Immediate   • Basic metabolic panel     Standing Status:   Future     Number of Occurrences:   1     Standing Expiration Date:   12/29/2023     Order Specific Question:   Release to patient     Answer:   Immediate   • Magnesium     Standing Status:   Future     Number of Occurrences:   1     Standing Expiration Date:   12/29/2023     Order Specific Question:   Release to patient     Answer:   Immediate   • ECG 12 LEAD-OFFICE PERFORMED     Scheduling Instructions:      PLEASE USE THIS ORDER FOR ECG'S PERFORMED IN PHYSICIAN OFFICES     Order Specific Question:   Release to patient     Answer:   Immediate    I spent a total of 65 minutes on exam, interview, documentation and record review.      Yousuf Wolf MD  12/29/2022

## 2022-12-29 NOTE — LETTER
December 29, 2022     AMARILIS James C  [address]  [place] [address]    Patient: Cecil Morris  YOB: 1940  Date of Visit: 12/29/2022      Dear Dr. Bernstein:    Thank you for referring Cecil Morris to me for evaluation. Below are my notes for this consultation.    If you have questions, please do not hesitate to call me. I look forward to following your patient along with you.    He was c/o persistent cough, but lungs were clear and he appeared euvolemic.       Sincerely,        Yousuf Wolf MD        CC: DO Luciano Munguia Charles, MD  12/29/2022  1:06 PM  Signed    Cecil Morris is a 82 y.o. male is present in the office today for new patient appointment.    Cecil has a permanent pacemaker and a recent TAVR for bicuspid aortic valve.  Most recent echo in November with good gradients, normal function.     He is accompanied by helper and is suffering from dementia.  Denies cardiac symptoms.  Meds are packaged by the week and sent to him.    Denies recent bleeding.  He is getting over pneumonia, just finishing course of abx with improvement in symptoms.      Has been intolerant of GDMT due to low MAPs.  Denies fluid retention within 1lb of dry weight per caregiver.       Past Medical History:   Diagnosis Date   • A-fib (CMS/HCC)    • Acute on chronic combined systolic and diastolic congestive heart failure (CMS/HCC)    • CHF (congestive heart failure), NYHA class I, acute, systolic (CMS/HCC)    • Depression    • Hyperthyroidism    • Hypoxia    • Near syncope    • NSVT (nonsustained ventricular tachycardia)    • Orthostatic hypotension    • Presence of cardiac pacemaker    • Pulmonary embolism (CMS/HCC)    • Sepsis (CMS/HCC)    • Sepsis (CMS/HCC) 5/16/2022       Past Surgical History:   Procedure Laterality Date   • CARDIAC CATHETERIZATION  11/19/2020    CATH PLACE/CORON ANGIO, IMG SUPER/INTERP,R&L HRT CATH, L HRT VENTRIC   • CORONARY ARTERY BYPASS GRAFT   2005        Social History     Socioeconomic History   • Marital status:      Spouse name: None   • Number of children: 2   • Years of education: None   • Highest education level: None   Occupational History   • Occupation: retired     Comment: [employer] in [place]   Tobacco Use   • Smoking status: Former     Years: 40.00     Types: Cigarettes     Quit date: 11/1/2012     Years since quitting: 10.1   • Smokeless tobacco: Never   Vaping Use   • Vaping Use: Never used   Substance and Sexual Activity   • Alcohol use: Yes     Alcohol/week: 9.0 standard drinks     Types: 9 Shots of liquor per week     Comment: vodka   • Drug use: Never   • Sexual activity: Not Currently   Social History Narrative    Lives alone, and has help for meal prep and med management       Family History   Problem Relation Age of Onset   • Thyroid cancer Biological Mother    • ALS Biological Mother    • Parkinsonism Biological Mother    • Heart attack Biological Father    • Liver cancer Maternal Grandmother        Patient has no known allergies.    Current Outpatient Medications   Medication Sig Dispense Refill   • acetaminophen (TYLENOL) 325 mg tablet Take 650 mg by mouth as needed.     • albuterol 2.5 mg /3 mL (0.083 %) nebulizer solution Take 3 mL (2.5 mg total) by nebulization 4 (four) times a day. 180 mL 1   • albuterol HFA (VENTOLIN HFA) 90 mcg/actuation inhaler Inhale 2 puffs 4 (four) times a day as needed for wheezing. 18 g 1   • atorvastatin (LIPITOR) 20 mg tablet Take 1 tablet (20 mg total) by mouth daily. 90 tablet 1   • azithromycin (ZITHROMAX Z-PRESLEY) 250 mg tablet Take 2 tablets the first day, then 1 tablet daily for 4 days. 6 tablet 0   • benzonatate (TESSALON PERLES) 100 mg capsule Take 1 capsule (100 mg total) by mouth 3 (three) times a day as needed for cough for up to 7 days. 21 capsule 0   • clonazePAM (klonoPIN) 0.5 mg tablet Take 1 tablet (0.5 mg total) by mouth nightly as needed for anxiety. 30 tablet 0   • diclofenac  sodium (VOLTAREN) 1 % topical gel Apply 1 g topically as needed.     • ferrous sulfate 325 mg (65 mg iron) tablet Take 1 tablet (325 mg total) by mouth daily with breakfast. 90 tablet 1   • fluticasone propionate (FLONASE) 50 mcg/actuation nasal spray Administer 1 spray into each nostril daily. 16 g 5   • fluticasone-umeclidinium-vilanterol (TRELEGY ELLIPTA) 100-62.5-25 mcg blister with device powder for inhalation Inhale 1 puff daily. (Patient taking differently: Inhale 1 puff as needed.) 1 each 1   • folic acid (FOLVITE) 1 mg tablet Take 1 tablet (1 mg total) by mouth daily. 90 tablet 1   • furosemide (LASIX) 40 mg tablet Take 1 tablet (40 mg total) by mouth daily. 90 tablet 1   • levothyroxine (SYNTHROID) 100 mcg tablet Take 1 tablet (100 mcg total) by mouth daily. 90 tablet 1   • loratadine (CLARITIN) 10 mg tablet Take 10 mg by mouth daily.     • medical marijuana Take 1 each by mouth as needed.     • melatonin 5 mg capsule Take 5 mg by mouth nightly. Hs prn     • metoprolol succinate XL (TOPROL-XL) 25 mg 24 hr tablet Take 1 tablet (25 mg total) by mouth daily. 0.5mg bid (Patient taking differently: Take 12.5 mg by mouth 2 (two) times a day.) 90 tablet 1   • multivitamin tablet Take 1 tablet by mouth daily.     • pantoprazole (PROTONIX) 40 mg EC tablet Take 1 tablet (40 mg total) by mouth 2 (two) times a day. 180 tablet 0   • rivaroxaban (XARELTO) 20 mg tablet Take 1 tablet (20 mg total) by mouth daily with dinner. 90 tablet 3   • sertraline (ZOLOFT) 25 mg tablet Take 1 tablet (25 mg total) by mouth daily. 90 tablet 0   • thiamine 100 mg tablet Take 1 tablet (100 mg total) by mouth daily. 30 tablet 1     No current facility-administered medications for this visit.       Review of Systems   Constitutional: Negative for decreased appetite, night sweats and weight gain.   HENT: Negative for congestion, hoarse voice and nosebleeds.    Eyes: Negative for blurred vision, vision loss in left eye and vision loss in  right eye.   Cardiovascular: Negative for chest pain, claudication, cyanosis, dyspnea on exertion, irregular heartbeat, leg swelling, near-syncope, orthopnea and palpitations.   Respiratory: Positive for cough and sputum production. Negative for shortness of breath, sleep disturbances due to breathing and wheezing.    Hematologic/Lymphatic: Negative for bleeding problem.   Skin: Negative for poor wound healing, skin cancer and suspicious lesions.   Musculoskeletal: Negative for back pain, joint swelling and myalgias.   Gastrointestinal: Negative for anorexia, change in bowel habit, bowel incontinence, dysphagia, hematemesis and melena.   Genitourinary: Positive for frequency. Negative for flank pain and nocturia.   Neurological: Negative for dizziness, loss of balance, numbness, sensory change, tremors and weakness.   Allergic/Immunologic: Negative for HIV exposure, hives and persistent infections.          Vitals:    12/29/22 1131   BP: 114/70   Pulse: 62   Resp: 20   SpO2: 97%       Body mass index is 30.23 kg/m².      Physical Exam  Constitutional:       Appearance: Normal appearance. He is normal weight.   HENT:      Head: Normocephalic.      Mouth/Throat:      Mouth: Mucous membranes are moist.      Pharynx: No posterior oropharyngeal erythema.   Eyes:      Extraocular Movements: Extraocular movements intact.   Cardiovascular:      Rate and Rhythm: Normal rate and regular rhythm.      Chest Wall: PMI is not displaced.      Pulses: Normal pulses.      Heart sounds: S1 normal and S2 normal. Murmur heard.   High-pitched blowing holosystolic murmur is present with a grade of 2/6 at the apex.    No gallop. No S3 or S4 sounds.   Pulmonary:      Effort: Pulmonary effort is normal.      Breath sounds: Normal breath sounds.   Abdominal:      General: Abdomen is flat. Bowel sounds are normal. There is no distension.      Tenderness: There is no abdominal tenderness.   Musculoskeletal:      Cervical back: Neck supple.       Comments: Functional status is:  Fair   Skin:     General: Skin is warm.   Neurological:      Mental Status: He is alert and oriented to person, place, and time. Mental status is at baseline.   Psychiatric:         Attention and Perception: Attention normal.         Mood and Affect: Mood and affect normal.         Speech: Speech normal.         Cognition and Memory: Cognition is impaired. He exhibits impaired recent memory and impaired remote memory.      No JVD or edema.    Lab Results   Component Value Date    WBC 10.2 11/17/2022    RBC 4.09 (L) 11/17/2022    HGB 13.0 (L) 11/17/2022    HCT 38.8 11/17/2022    MCV 94.9 11/17/2022    MCH 31.8 11/17/2022    MCHC 33.5 11/17/2022    RDW 15.4 (H) 11/17/2022     11/17/2022        Lab Results   Component Value Date    GLUCOSE 156 (H) 11/17/2022    BUN 12 11/17/2022    CREATININE 1.06 11/17/2022    EGFR 69 11/17/2022     11/17/2022    K 4.3 11/17/2022    CL 94 (L) 11/17/2022    CO2 33 (H) 11/17/2022    CALCIUM 8.6 11/17/2022    PROTEIN 6.6 11/17/2022    ALBUMIN 4.1 11/17/2022    GLOB 2.5 11/17/2022    AGRATIO 1.6 11/17/2022    BILITOT 1.8 (H) 11/17/2022    ALKPHOS 122 11/17/2022    AST 56 (H) 11/17/2022    ALT 35 11/17/2022        Lab Results   Component Value Date    HGBA1C 5.9 (H) 11/17/2022        Lab Results   Component Value Date    ALBUMIN 4.1 11/17/2022        Lab Results   Component Value Date    CHOL 138 11/17/2022    TRIG 71 11/17/2022    HDL 64 11/17/2022    LDLCALC 59 11/17/2022            No results found for this or any previous visit.   TTE 11/20/2022:    •  A complete transthoracic echocardiogram (including 2D, color flow   Doppler, spectral Doppler, M-mode and strain imaging) was performed using   the standard protocol. The study quality was good.   •  The left ventricle is moderately dilated. Top normal left ventricular   wall thickness. Regional wall motion abnormalities are noted (see wall   motion diagram). Moderately decreased left  ventricular ejection fraction.   The left ventricular ejection fraction by biplane method of discs is 42%.   The average global longitudinal peak systolic strain is reduced. The   average global longitudinal peak systolic strain is 7.9 % (absolute   value).   •  Unable to assess LV diastolic function due to the presence of mitral   annular calcification.   •  The right ventricle is moderately dilated. There is normal function of   the right ventricle.   •  The left atrium is severely dilated.   •  The right atrium is mildly dilated.   •  There is mild mitral valve leaflet thickening. There is mild mitral   valve leaflet calcification. There is moderate posterior mitral annular   calcification. There is severe mitral regurgitation. There is no mitral   stenosis.   •  There is a transcatheter bioprosthetic aortic valve present. There are   normal gradients across the prosthetic valve. There is trace anterior   paravalvular regurgitation present. Prosthetic aortic valve mean gradient   measures 6 mmHg. Dimensionless valve index is 0.3.   •  The tricuspid valve is normal in structure. There is mild to moderate   tricuspid regurgitation. Pulmonary artery systolic pressure measures 67   mmHg. The pulmonary artery systolic pressure is severely elevated.   •  The pulmonic valve is normal in structure.   •  The proximal segment of the ascending aorta is mildly enlarged. The   proximal segment of the ascending aorta measures 3.7 cm.   •  The inferior vena cava is dilated (diameter >21 mm) and does not   collapse during inspiration, suggesting an elevated right atrial pressure   of 20 mmHg (range 10-20 mmHg).   •  Compared to the prior study of 4/8/2022, there are no significant   changes.     Left Ventricle   The left ventricle is moderately dilated. Top normal left ventricular wall thickness. Regional wall motion abnormalities are noted (see wall motion diagram). The left ventricular ejection fraction by biplane method of  discs is 42%. Moderately decreased left ventricular ejection fraction. The average global longitudinal peak systolic strain is reduced. The average global longitudinal peak systolic strain is 7.9 % (absolute value). Unable to assess LV diastolic function due to the presence of mitral annular calcification.     Right Ventricle   The right ventricle is moderately dilated. RV basal diameter measures 5.4 cm. There is normal function of the right ventricle.     Left Atrium   The left atrium is severely dilated. Left atrial volume index is 85 mL/m2.     Right Atrium   The right atrium is mildly dilated.     IVC/SVC   The inferior vena cava is dilated (diameter >21 mm) and does not collapse during inspiration, suggesting an elevated right atrial pressure of 20 mmHg (range 10-20 mmHg).     Mitral Valve   There is mild mitral valve leaflet thickening. There is mild mitral valve leaflet calcification. There is moderate posterior mitral annular calcification. There is no mitral stenosis. There is severe mitral regurgitation.     Tricuspid Valve   The tricuspid valve is normal in structure. There is no tricuspid stenosis. There is mild to moderate tricuspid regurgitation. Pulmonary artery systolic pressure measures 67 mmHg. The pulmonary artery systolic pressure is severely elevated.     Aortic Valve   There is a transcatheter bioprosthetic aortic valve present. There are normal gradients across the prosthetic valve. There is trace anterior paravalvular regurgitation present. Prosthetic aortic valve mean gradient measures 6 mmHg. Dimensionless valve index is 0.3.     Pulmonic Valve   The pulmonic valve is normal in structure. There is no pulmonic stenosis. There is mild pulmonic valve regurgitation.     Pericardium   No pericardial effusion.     Septum   No obvious interatrial communication noted with color Doppler imaging.     Aortic Arch/Descending/Abdominal Aorta   The aortic root has post-procedural changes. The proximal  segment of the ascending aorta is mildly enlarged. The proximal segment of the ascending aorta measures 3.7 cm.     Study Details   A complete transthoracic echocardiogram (including 2D, color flow Doppler, spectral Doppler, M-mode and strain imaging) was performed using the standard protocol. ECG: Sinus rhythm. During the study, the following view(s) were acquired: apical, parasternal, subcostal and suprasternal. The study quality was good. The study was difficult due to patient's body habitus.               Assessment/Plan         EKG: Atrial fibrillation, vpaced    A-fib (CMS/HCC)  Permanent.  Continue rate control and anticoagulation.        Ascending aortic aneurysm  Stable would repeat TTE in 2023NOV.    Community acquired pneumonia of left lower lobe of lung  Resolving, will f/u with PCM.    Coronary artery disease involving native coronary artery  Stable, no symptoms.  Continue Lipitor.  He is off ASA due to bleeding risk on full dose AC with Xarelto.      Essential hypertension  Due to lower pressures, would defer adding more agents.      S/P TAVR (transcatheter aortic valve replacement)  Stable function with low gradients on most recent assessment.      CHF (congestive heart failure), NYHA class I, acute, systolic (CMS/HCC)  Ambulatory Class IIC.  His EF is stable at 42%.  He has been euvolemic on PO Lasix and appears euvolemic today.  In the future can consider adding Losartan 25mg or Diovan 40mg if BP allows.      Continue beta blocker and Lasix.  Labs ordered today.      Cognitive impairment  Limited history due to dementia.  He has an aid and no longer driving.         New Medications Ordered This Visit   Medications   • rivaroxaban (XARELTO) 20 mg tablet     Sig: Take 1 tablet (20 mg total) by mouth daily with dinner.     Dispense:  90 tablet     Refill:  3   • fluticasone propionate (FLONASE) 50 mcg/actuation nasal spray     Sig: Administer 1 spray into each nostril daily.     Dispense:  16 g      Refill:  5   • benzonatate (TESSALON PERLES) 100 mg capsule     Sig: Take 1 capsule (100 mg total) by mouth 3 (three) times a day as needed for cough for up to 7 days.     Dispense:  21 capsule     Refill:  0     No JVD on exam, would get labs to evaluate BNP, updated electrolytes.      Discussed CHF action plan with written instructions:  If weight increases by 2-3 lb would take an extra Furosemide 40mg.      There are no discontinued medications.     Orders Placed This Encounter   Procedures   • B-type natriuretic peptide     Standing Status:   Future     Number of Occurrences:   1     Standing Expiration Date:   12/29/2023     Order Specific Question:   Release to patient     Answer:   Immediate   • Basic metabolic panel     Standing Status:   Future     Number of Occurrences:   1     Standing Expiration Date:   12/29/2023     Order Specific Question:   Release to patient     Answer:   Immediate   • Magnesium     Standing Status:   Future     Number of Occurrences:   1     Standing Expiration Date:   12/29/2023     Order Specific Question:   Release to patient     Answer:   Immediate   • ECG 12 LEAD-OFFICE PERFORMED     Scheduling Instructions:      PLEASE USE THIS ORDER FOR ECG'S PERFORMED IN PHYSICIAN OFFICES     Order Specific Question:   Release to patient     Answer:   Immediate    I spent a total of 65 minutes on exam, interview, documentation and record review.      Yousuf Wolf MD  12/29/2022

## 2022-12-29 NOTE — PATIENT INSTRUCTIONS
If weight increases by 2-3 lb would take an extra Furosemide 40mg    Low sodium diet    Resuming the anticoagulation

## 2022-12-29 NOTE — ASSESSMENT & PLAN NOTE
Stable, no symptoms.  Continue Lipitor.  He is off ASA due to bleeding risk on full dose AC with Xarelto.

## 2022-12-29 NOTE — ASSESSMENT & PLAN NOTE
Ambulatory Class IIC.  His EF is stable at 42%.  He has been euvolemic on PO Lasix and appears euvolemic today.  In the future can consider adding Losartan 25mg or Diovan 40mg if BP allows.      Continue beta blocker and Lasix.  Labs ordered today.

## 2022-12-30 ENCOUNTER — TELEPHONE (OUTPATIENT)
Dept: PRIMARY CARE | Facility: CLINIC | Age: 82
End: 2022-12-30
Payer: COMMERCIAL

## 2022-12-30 ENCOUNTER — TELEPHONE (OUTPATIENT)
Dept: SCHEDULING | Facility: CLINIC | Age: 82
End: 2022-12-30
Payer: COMMERCIAL

## 2022-12-30 DIAGNOSIS — I50.43 ACUTE ON CHRONIC COMBINED SYSTOLIC AND DIASTOLIC CONGESTIVE HEART FAILURE (CMS/HCC): ICD-10-CM

## 2022-12-30 DIAGNOSIS — I50.21 CHF (CONGESTIVE HEART FAILURE), NYHA CLASS I, ACUTE, SYSTOLIC (CMS/HCC): ICD-10-CM

## 2022-12-30 DIAGNOSIS — E05.90 HYPERTHYROIDISM: Primary | ICD-10-CM

## 2022-12-30 RX ORDER — FUROSEMIDE 40 MG/1
40 TABLET ORAL DAILY
Qty: 180 TABLET | Refills: 1 | Status: SHIPPED | OUTPATIENT
Start: 2022-12-30 | End: 2023-05-05 | Stop reason: SDUPTHER

## 2022-12-30 RX ORDER — FUROSEMIDE 40 MG/1
40 TABLET ORAL DAILY
Qty: 90 TABLET | Refills: 1 | Status: CANCELLED | OUTPATIENT
Start: 2022-12-30

## 2022-12-30 NOTE — TELEPHONE ENCOUNTER
"Rylee from [facility] called to let us know that while she was drawing labs for Dr. Wolf she saw your order for TSH and added that order in. She didn't realize until afterwards that it was to be drawn in Feb. If you would like him to have another TSH for Feb please mail that to his address with a note stating \"draw in Feb.\" Thanks!  "

## 2022-12-30 NOTE — TELEPHONE ENCOUNTER
Lm on Rylee's vm asking for r/c    Has pt been out of lasix? Or did he just run out today?    He was in yesterday with caregiver and both were poor historian    Do we need to send in a refill? (Looks like script is still good that was sent to pharmacy in November)

## 2022-12-30 NOTE — TELEPHONE ENCOUNTER
Please mail new TSH order to his home and indicate this should be completed in February 2023    Melanie Chambers, DO

## 2022-12-30 NOTE — TELEPHONE ENCOUNTER
Patient's daughter called needs a DME application/ form filled out, they are applying for assisted living home that is closest to the other sister, please advise if this requires an appointment Thanks LW

## 2022-12-30 NOTE — TELEPHONE ENCOUNTER
Spoke to Rylee pt is taking lasix everyday but is going to run out d/t the way the script is written     Per dr vazquez note states take extra 40mg prn for 2-3 lb wt gain     Updated script and sent to pharmacy and Rylee did update the orders on her end    AC

## 2022-12-30 NOTE — TELEPHONE ENCOUNTER
[facility] at home called to inform Dr. Wolf that pt has gained 3 more lbs since yesterday. She put in a req. For pt's lasix due to him currently not having any more pills left     P: [phone]

## 2022-12-30 NOTE — TELEPHONE ENCOUNTER
Medicine Refill Request    Last Office: Visit date not found   Last Consult Visit: Visit date not found  Last Telemedicine Visit: Visit date not found    [facility] req. A refill for lasix 40MG to be sent to [facility] pt has no more pills left     Next Appointment: Visit date not found      Current Outpatient Medications:   •  acetaminophen (TYLENOL) 325 mg tablet, Take 650 mg by mouth as needed., Disp: , Rfl:   •  albuterol 2.5 mg /3 mL (0.083 %) nebulizer solution, Take 3 mL (2.5 mg total) by nebulization 4 (four) times a day., Disp: 180 mL, Rfl: 1  •  albuterol HFA (VENTOLIN HFA) 90 mcg/actuation inhaler, Inhale 2 puffs 4 (four) times a day as needed for wheezing., Disp: 18 g, Rfl: 1  •  atorvastatin (LIPITOR) 20 mg tablet, Take 1 tablet (20 mg total) by mouth daily., Disp: 90 tablet, Rfl: 1  •  azithromycin (ZITHROMAX Z-PRESLEY) 250 mg tablet, Take 2 tablets the first day, then 1 tablet daily for 4 days., Disp: 6 tablet, Rfl: 0  •  benzonatate (TESSALON PERLES) 100 mg capsule, Take 1 capsule (100 mg total) by mouth 3 (three) times a day as needed for cough for up to 7 days., Disp: 21 capsule, Rfl: 0  •  clonazePAM (klonoPIN) 0.5 mg tablet, Take 1 tablet (0.5 mg total) by mouth nightly as needed for anxiety., Disp: 30 tablet, Rfl: 0  •  diclofenac sodium (VOLTAREN) 1 % topical gel, Apply 1 g topically as needed., Disp: , Rfl:   •  ferrous sulfate 325 mg (65 mg iron) tablet, Take 1 tablet (325 mg total) by mouth daily with breakfast., Disp: 90 tablet, Rfl: 1  •  fluticasone propionate (FLONASE) 50 mcg/actuation nasal spray, Administer 1 spray into each nostril daily., Disp: 16 g, Rfl: 5  •  fluticasone-umeclidinium-vilanterol (TRELEGY ELLIPTA) 100-62.5-25 mcg blister with device powder for inhalation, Inhale 1 puff daily. (Patient taking differently: Inhale 1 puff as needed.), Disp: 1 each, Rfl: 1  •  folic acid (FOLVITE) 1 mg tablet, Take 1 tablet (1 mg total) by mouth daily., Disp: 90  tablet, Rfl: 1  •  furosemide (LASIX) 40 mg tablet, Take 1 tablet (40 mg total) by mouth daily., Disp: 90 tablet, Rfl: 1  •  levothyroxine (SYNTHROID) 100 mcg tablet, Take 1 tablet (100 mcg total) by mouth daily., Disp: 90 tablet, Rfl: 1  •  loratadine (CLARITIN) 10 mg tablet, Take 10 mg by mouth daily., Disp: , Rfl:   •  medical marijuana, Take 1 each by mouth as needed., Disp: , Rfl:   •  melatonin 5 mg capsule, Take 5 mg by mouth nightly. Hs prn, Disp: , Rfl:   •  metoprolol succinate XL (TOPROL-XL) 25 mg 24 hr tablet, Take 1 tablet (25 mg total) by mouth daily. 0.5mg bid (Patient taking differently: Take 12.5 mg by mouth 2 (two) times a day.), Disp: 90 tablet, Rfl: 1  •  multivitamin tablet, Take 1 tablet by mouth daily., Disp: , Rfl:   •  pantoprazole (PROTONIX) 40 mg EC tablet, Take 1 tablet (40 mg total) by mouth 2 (two) times a day., Disp: 180 tablet, Rfl: 0  •  rivaroxaban (XARELTO) 20 mg tablet, Take 1 tablet (20 mg total) by mouth daily with dinner., Disp: 90 tablet, Rfl: 3  •  sertraline (ZOLOFT) 25 mg tablet, Take 1 tablet (25 mg total) by mouth daily., Disp: 90 tablet, Rfl: 0  •  thiamine 100 mg tablet, Take 1 tablet (100 mg total) by mouth daily., Disp: 30 tablet, Rfl: 1      BP Readings from Last 3 Encounters:   12/29/22 114/70   12/09/22 110/70   12/07/22 118/68       Recent Lab results:  Lab Results   Component Value Date    CHOL 138 11/17/2022   ,   Lab Results   Component Value Date    HDL 64 11/17/2022   ,   Lab Results   Component Value Date    LDLCALC 59 11/17/2022   ,   Lab Results   Component Value Date    TRIG 71 11/17/2022        Lab Results   Component Value Date    GLUCOSE 156 (H) 11/17/2022   ,   Lab Results   Component Value Date    HGBA1C 5.9 (H) 11/17/2022         Lab Results   Component Value Date    CREATININE 1.06 11/17/2022       Lab Results   Component Value Date    TSH 93.33 (H) 11/17/2022

## 2022-12-31 LAB
BUN SERPL-MCNC: 19 MG/DL (ref 7–25)
BUN/CREAT SERPL: ABNORMAL (CALC) (ref 6–22)
CALCIUM SERPL-MCNC: 8.8 MG/DL (ref 8.6–10.3)
CHLORIDE SERPL-SCNC: 95 MMOL/L (ref 98–110)
CO2 SERPL-SCNC: 34 MMOL/L (ref 20–32)
CREAT SERPL-MCNC: 0.84 MG/DL (ref 0.7–1.22)
EGFRCR SERPLBLD CKD-EPI 2021: 86 ML/MIN/1.73M2
GLUCOSE SERPL-MCNC: 235 MG/DL (ref 65–139)
MAGNESIUM SERPL-MCNC: 1.7 MG/DL (ref 1.5–2.5)
POTASSIUM SERPL-SCNC: 3.6 MMOL/L (ref 3.5–5.3)
SERVICE CMNT-IMP: NORMAL
SODIUM SERPL-SCNC: 137 MMOL/L (ref 135–146)
T4 FREE SERPL-MCNC: 1.4 NG/DL (ref 0.8–1.8)
TSH SERPL-ACNC: 11.85 MIU/L (ref 0.4–4.5)

## 2023-01-03 ENCOUNTER — TELEPHONE (OUTPATIENT)
Dept: PRIMARY CARE | Facility: CLINIC | Age: 83
End: 2023-01-03
Payer: COMMERCIAL

## 2023-01-03 ENCOUNTER — TELEPHONE (OUTPATIENT)
Dept: CARDIOLOGY | Facility: CLINIC | Age: 83
End: 2023-01-03
Payer: COMMERCIAL

## 2023-01-03 DIAGNOSIS — E83.42 HYPOMAGNESEMIA: Primary | ICD-10-CM

## 2023-01-03 RX ORDER — LANOLIN ALCOHOL/MO/W.PET/CERES
400 CREAM (GRAM) TOPICAL 2 TIMES DAILY
Qty: 180 TABLET | Refills: 3 | Status: SHIPPED | OUTPATIENT
Start: 2023-01-03 | End: 2023-06-27 | Stop reason: SDUPTHER

## 2023-01-03 NOTE — TELEPHONE ENCOUNTER
Spoke with Caregiver and she req an appt on Fri since she will be with him that day but will call if he needs to be seen sooner. Thanks!

## 2023-01-03 NOTE — TELEPHONE ENCOUNTER
"Dr. Wolf ot    Seen in the office on 12/29/22.    Call from [employer]O with tele monitoring trends.    Weight up 3.8 lbs overnight.    Pt denies SOB.    Chronic cough persists but no different than usual.    \" No worsening swelling\"    Vital signs: HR=68 /63 pulse ox on room air=92% (norm for him=92-95%).    Pt took an xtra 40mg Lasix dose earlier today.    Not clear what pt ate yesterday/wt gain sodium related.    MITCHELL Ramos will continue to track weight trends and call back with anything noteworthy.    Rachel can be reached at [phone] if needed.                "

## 2023-01-03 NOTE — TELEPHONE ENCOUNTER
Ann called because patient has cough and congestion for two weeks. Ann wants to know should she bring in patient. Ann, also, has a question on ordering an new nebulizer for patient. Patient current nubulizer is not working

## 2023-01-04 ENCOUNTER — TELEPHONE (OUTPATIENT)
Dept: CARDIOLOGY | Facility: CLINIC | Age: 83
End: 2023-01-04
Payer: COMMERCIAL

## 2023-01-04 NOTE — TELEPHONE ENCOUNTER
The patient was made aware that Dr. Wolf ordered magnesium as a new medication due to abnormal labs.

## 2023-01-05 ENCOUNTER — TELEPHONE (OUTPATIENT)
Dept: CARDIOLOGY | Facility: CLINIC | Age: 83
End: 2023-01-05
Payer: COMMERCIAL

## 2023-01-05 DIAGNOSIS — J18.9 COMMUNITY ACQUIRED PNEUMONIA OF LEFT LOWER LOBE OF LUNG: Primary | ICD-10-CM

## 2023-01-05 NOTE — TELEPHONE ENCOUNTER
Dr. Wolf pt   Last OV 12/29/22    PMH PPM, TAVR, Dementia., Afib, Ascending Aortic Aneurysm, CHF NYHA Class 1    [facility] is calling     90-92% pulseox     Productive cough, clear to light yellow   Cough is not better Pt does have recent pneumonia per OV note, Left lower lobe     Non skilled aids are giving Lasix -they have given an additional 5 times in the last 7 days.     BP was 91/49 on 1/4/23     1. VN thinks he needs a CXray or Abx   2. What should they do about Lasix? Perhaps better parameters need to be assigned? If aid is using more than 2 additional/ week should they be directed to call the office.   3. Should he have another visit? The aid can bring him.      Pt is not sodium restricted.     No peripheral edema   Heart is regular rate and rhythm.         VN can be reached at [phone]

## 2023-01-06 ENCOUNTER — HOSPITAL ENCOUNTER (OUTPATIENT)
Dept: RADIOLOGY | Age: 83
Discharge: HOME | End: 2023-01-06
Attending: FAMILY MEDICINE
Payer: COMMERCIAL

## 2023-01-06 ENCOUNTER — OFFICE VISIT (OUTPATIENT)
Dept: PRIMARY CARE | Facility: CLINIC | Age: 83
End: 2023-01-06
Payer: COMMERCIAL

## 2023-01-06 VITALS
DIASTOLIC BLOOD PRESSURE: 68 MMHG | HEART RATE: 72 BPM | WEIGHT: 189 LBS | HEIGHT: 67 IN | BODY MASS INDEX: 29.66 KG/M2 | TEMPERATURE: 97.2 F | SYSTOLIC BLOOD PRESSURE: 98 MMHG | OXYGEN SATURATION: 96 %

## 2023-01-06 DIAGNOSIS — I48.0 PAROXYSMAL ATRIAL FIBRILLATION (CMS/HCC): ICD-10-CM

## 2023-01-06 DIAGNOSIS — R09.89 BIBASILAR CRACKLES: ICD-10-CM

## 2023-01-06 DIAGNOSIS — J44.1 CHRONIC OBSTRUCTIVE PULMONARY DISEASE WITH ACUTE EXACERBATION (CMS/HCC): ICD-10-CM

## 2023-01-06 DIAGNOSIS — J06.9 VIRAL URI WITH COUGH: Primary | ICD-10-CM

## 2023-01-06 DIAGNOSIS — E11.9 TYPE 2 DIABETES MELLITUS WITHOUT COMPLICATION, WITHOUT LONG-TERM CURRENT USE OF INSULIN (CMS/HCC): ICD-10-CM

## 2023-01-06 DIAGNOSIS — J44.9 CHRONIC OBSTRUCTIVE PULMONARY DISEASE, UNSPECIFIED COPD TYPE (CMS/HCC): Primary | ICD-10-CM

## 2023-01-06 DIAGNOSIS — I27.20 PULMONARY HYPERTENSION (CMS/HCC): ICD-10-CM

## 2023-01-06 DIAGNOSIS — R41.89 COGNITIVE IMPAIRMENT: ICD-10-CM

## 2023-01-06 DIAGNOSIS — I10 ESSENTIAL HYPERTENSION: ICD-10-CM

## 2023-01-06 LAB
FLUAV RNA SPEC QL NAA+PROBE: NEGATIVE
FLUBV RNA SPEC QL NAA+PROBE: NEGATIVE
RSV RNA SPEC QL NAA+PROBE: NEGATIVE
SARS-COV-2 RNA RESP QL NAA+PROBE: POSITIVE

## 2023-01-06 PROCEDURE — 87637 SARSCOV2&INF A&B&RSV AMP PRB: CPT | Performed by: FAMILY MEDICINE

## 2023-01-06 PROCEDURE — 99214 OFFICE O/P EST MOD 30 MIN: CPT | Performed by: FAMILY MEDICINE

## 2023-01-06 PROCEDURE — 3008F BODY MASS INDEX DOCD: CPT | Performed by: FAMILY MEDICINE

## 2023-01-06 PROCEDURE — 71046 X-RAY EXAM CHEST 2 VIEWS: CPT

## 2023-01-06 RX ORDER — PEN NEEDLE, DIABETIC 31 GX5/16"
1 NEEDLE, DISPOSABLE MISCELLANEOUS 4 TIMES DAILY PRN
Qty: 1 EACH | Refills: 0 | Status: SHIPPED | OUTPATIENT
Start: 2023-01-06

## 2023-01-06 RX ORDER — BENZONATATE 100 MG/1
100 CAPSULE ORAL 3 TIMES DAILY PRN
COMMUNITY
End: 2023-01-09 | Stop reason: SDUPTHER

## 2023-01-06 NOTE — ASSESSMENT & PLAN NOTE
Continue follow-up with Dr. Wolf as scheduled  Continue anticoagulation and rate control as prescribed

## 2023-01-06 NOTE — ASSESSMENT & PLAN NOTE
Has upcoming appointment with neurology for further evaluation  Patient with dementia  Completed form for assisted living per daughter's request

## 2023-01-06 NOTE — PROGRESS NOTES
[facility] in [place] [address] [address]  [facility] [address]  Phone: [phone]  Fax: [phone]       CHIEF COMPLAINT   Chief Complaint   Patient presents with   • Cough     Still coughing, junky but only sometimes getting mucus up       HISTORY OF PRESENT ILLNESS      This is a 82 y.o. male who presents for   Chief Complaint   Patient presents with   • Cough     Still coughing, junky but only sometimes getting mucus up      Chest congestion and mucus coming up at times  Today is the best day of the week  Did have a viral illness that had started on Friday, one week ago  Did spend the holidays with family and may have been exposed to a virus  Having shortness of breath and chest congestion over the past 1 week  Caretaker is with him today and states that he sounds better today than he has all week  Overall she feels he is improving    Does have a telehealth scale at home  Not weighed the same way daily so caretaker is unsure if weights are truly accurate  She is unsure if his weight is truly up or not  He denies any lower extremity swelling  She is unsure if they should be continuing his diuretic  Reviewed Dr. Wolf's note who states that he should continue on Lasix 40 mg daily and increase dose to twice a day if weight is elevated 3 to 5 pounds    PAST MEDICAL AND SURGICAL HISTORY      Past Medical History:   Diagnosis Date   • A-fib (CMS/HCC)    • Acute on chronic combined systolic and diastolic congestive heart failure (CMS/HCC)    • CHF (congestive heart failure), NYHA class I, acute, systolic (CMS/HCC)    • Depression    • Hyperthyroidism    • Hypoxia    • Near syncope    • NSVT (nonsustained ventricular tachycardia)    • Orthostatic hypotension    • Presence of cardiac pacemaker    • Pulmonary embolism (CMS/HCC)    • Sepsis (CMS/HCC) 05/16/2022       Past Surgical History:   Procedure Laterality Date   • CARDIAC CATHETERIZATION  11/19/2020    CATH PLACE/CORON ANGIO, IMG  SUPER/INTERP,R&L HRT CATH, L HRT VENTRIC   • CORONARY ARTERY BYPASS GRAFT  2005       MEDICATIONS        Current Outpatient Medications:   •  acetaminophen (TYLENOL) 325 mg tablet, Take 650 mg by mouth as needed., Disp: , Rfl:   •  albuterol 2.5 mg /3 mL (0.083 %) nebulizer solution, Take 3 mL (2.5 mg total) by nebulization 4 (four) times a day., Disp: 180 mL, Rfl: 1  •  albuterol HFA (VENTOLIN HFA) 90 mcg/actuation inhaler, Inhale 2 puffs 4 (four) times a day as needed for wheezing., Disp: 18 g, Rfl: 1  •  atorvastatin (LIPITOR) 20 mg tablet, Take 1 tablet (20 mg total) by mouth daily., Disp: 90 tablet, Rfl: 1  •  benzonatate (TESSALON) 100 mg capsule, Take 100 mg by mouth 3 (three) times a day as needed for cough., Disp: , Rfl:   •  clonazePAM (klonoPIN) 0.5 mg tablet, Take 1 tablet (0.5 mg total) by mouth nightly as needed for anxiety., Disp: 30 tablet, Rfl: 0  •  diclofenac sodium (VOLTAREN) 1 % topical gel, Apply 1 g topically as needed., Disp: , Rfl:   •  ferrous sulfate 325 mg (65 mg iron) tablet, Take 1 tablet (325 mg total) by mouth daily with breakfast., Disp: 90 tablet, Rfl: 1  •  fluticasone propionate (FLONASE) 50 mcg/actuation nasal spray, Administer 1 spray into each nostril daily., Disp: 16 g, Rfl: 5  •  fluticasone-umeclidinium-vilanterol (TRELEGY ELLIPTA) 100-62.5-25 mcg blister with device powder for inhalation, Inhale 1 puff daily. (Patient taking differently: Inhale 1 puff as needed.), Disp: 1 each, Rfl: 1  •  folic acid (FOLVITE) 1 mg tablet, Take 1 tablet (1 mg total) by mouth daily., Disp: 90 tablet, Rfl: 1  •  furosemide (LASIX) 40 mg tablet, Take 1 tablet (40 mg total) by mouth daily. Per weight gain protocol extra 40 mg tab for 2-3 lbs wt gain, Disp: 180 tablet, Rfl: 1  •  levothyroxine (SYNTHROID) 100 mcg tablet, Take 1 tablet (100 mcg total) by mouth daily., Disp: 90 tablet, Rfl: 1  •  loratadine (CLARITIN) 10 mg tablet, Take 10 mg by mouth daily., Disp: , Rfl:   •  magnesium oxide  (MAG-OX) 400 mg (241.3 mg magnesium) tablet, Take 1 tablet (400 mg total) by mouth 2 (two) times a day., Disp: 180 tablet, Rfl: 3  •  medical marijuana, Take 1 each by mouth as needed., Disp: , Rfl:   •  melatonin 5 mg capsule, Take 5 mg by mouth nightly. Hs prn, Disp: , Rfl:   •  metoprolol succinate XL (TOPROL-XL) 25 mg 24 hr tablet, Take 1 tablet (25 mg total) by mouth daily. 0.5mg bid (Patient taking differently: Take 12.5 mg by mouth 2 (two) times a day.), Disp: 90 tablet, Rfl: 1  •  multivitamin tablet, Take 1 tablet by mouth daily., Disp: , Rfl:   •  nebulizers misc, 1 each 4 (four) times a day as needed (sob or wheeze)., Disp: 1 each, Rfl: 0  •  pantoprazole (PROTONIX) 40 mg EC tablet, Take 1 tablet (40 mg total) by mouth 2 (two) times a day., Disp: 180 tablet, Rfl: 0  •  rivaroxaban (XARELTO) 20 mg tablet, Take 1 tablet (20 mg total) by mouth daily with dinner., Disp: 90 tablet, Rfl: 3  •  sertraline (ZOLOFT) 25 mg tablet, Take 1 tablet (25 mg total) by mouth daily., Disp: 90 tablet, Rfl: 0  •  thiamine 100 mg tablet, Take 1 tablet (100 mg total) by mouth daily., Disp: 30 tablet, Rfl: 1    ALLERGIES      Patient has no known allergies.    FAMILY HISTORY      Family History   Problem Relation Age of Onset   • Thyroid cancer Biological Mother    • ALS Biological Mother    • Parkinsonism Biological Mother    • Heart attack Biological Father    • Liver cancer Maternal Grandmother      SOCIAL HISTORY      Social History     Socioeconomic History   • Marital status:      Spouse name: None   • Number of children: 2   • Years of education: None   • Highest education level: None   Occupational History   • Occupation: retired     Comment: [employer] in [place]   Tobacco Use   • Smoking status: Former     Years: 40.00     Types: Cigarettes     Quit date: 11/1/2012     Years since quitting: 10.1   • Smokeless tobacco: Never   Vaping Use   • Vaping Use: Never used   Substance and Sexual Activity   • Alcohol use:  "Yes     Alcohol/week: 9.0 standard drinks     Types: 9 Shots of liquor per week     Comment: vodka   • Drug use: Never   • Sexual activity: Not Currently   Social History Narrative    Lives alone, and has help for meal prep and med management       REVIEW OF SYSTEMS      Review of Systems   All other systems reviewed and are negative.    PHYSICAL EXAMINATION      Vitals:    01/06/23 1523   BP: 98/68   BP Location: Left upper arm   Patient Position: Sitting   Pulse: 72   Temp: 36.2 °C (97.2 °F)   TempSrc: Temporal   SpO2: 96%   Weight: 85.7 kg (189 lb)   Height: 1.702 m (5' 7\")     Body mass index is 29.6 kg/m².     BP Readings from Last 3 Encounters:   01/06/23 98/68   12/29/22 114/70   12/09/22 110/70     Wt Readings from Last 3 Encounters:   01/06/23 85.7 kg (189 lb)   12/29/22 87.5 kg (193 lb)   12/09/22 87.5 kg (193 lb)     Ht Readings from Last 3 Encounters:   01/06/23 1.702 m (5' 7\")   12/29/22 1.702 m (5' 7\")   12/09/22 1.702 m (5' 7\")     Physical Exam  Vitals reviewed.   Constitutional:       General: He is not in acute distress.     Appearance: Normal appearance.   HENT:      Head: Normocephalic and atraumatic.      Right Ear: Tympanic membrane, ear canal and external ear normal. Tympanic membrane is not injected, erythematous or bulging.      Left Ear: Ear canal and external ear normal. A middle ear effusion is present. Tympanic membrane is not injected, erythematous or bulging.      Nose: Congestion and rhinorrhea present.      Mouth/Throat:      Mouth: Mucous membranes are moist.      Pharynx: Oropharynx is clear.   Eyes:      Conjunctiva/sclera: Conjunctivae normal.   Cardiovascular:      Rate and Rhythm: Normal rate and regular rhythm.      Heart sounds: Normal heart sounds.   Pulmonary:      Effort: Pulmonary effort is normal. No respiratory distress.      Breath sounds: Rales (Bibasilar crackles) present. No wheezing.   Musculoskeletal:      Right lower leg: No edema.      Left lower leg: No edema. "   Skin:     General: Skin is warm and dry.   Neurological:      Mental Status: He is alert and oriented to person, place, and time. Mental status is at baseline.   Psychiatric:         Mood and Affect: Mood normal.         Behavior: Behavior normal.         LABS / IMAGING / STUDIES        Labs    Lab Results   Component Value Date    CREATININE 0.84 12/30/2022     Lab Results   Component Value Date    WBC 10.2 11/17/2022    HGB 13.0 (L) 11/17/2022    HCT 38.8 11/17/2022    MCV 94.9 11/17/2022     11/17/2022         Lab Results   Component Value Date    HGBA1C 5.9 (H) 11/17/2022     HEALTH MAINTENANCE              ASSESSMENT AND PLAN   Problem List Items Addressed This Visit        Respiratory    COPD (chronic obstructive pulmonary disease) (CMS/[facility])     Will check chest x-ray for concern of pneumonia  Oxygenating well on physical exam today         Relevant Medications    benzonatate (TESSALON) 100 mg capsule    nebulizers misc       Circulatory    Essential hypertension     Blood pressure slightly low today  Chest x-ray to assess for possible pneumonia         A-fib (CMS/[facility])     Continue follow-up with Dr. Wolf as scheduled  Continue anticoagulation and rate control as prescribed           Pulmonary hypertension (CMS/[facility])     Stable  Will continue assess and follow-up  Continue follow-up with Dr. Wolf            Endocrine/Metabolic    Type 2 diabetes mellitus without complication, without long-term current use of insulin (CMS/HCC)     Follow-up in 2 months for ongoing management            Other    Cognitive impairment     Has upcoming appointment with neurology for further evaluation  Patient with dementia  Completed form for assisted living per daughter's request        Other Visit Diagnoses     Viral URI with cough    -  Primary    Suspect symptoms are viral URI with cough  Test for COVID flu and RSV  Vitals appear stable currently  Chest x-ray to assess    Relevant Orders    COVID- 19 PCR Symptomatic  (includes FLU A/B & RSV) - [facility]    Bibasilar crackles        Concern for viral pneumonia versus bacterial pneumonia versus volume overload  No other signs to suspect volume  VSS so viral more likely than bacterial    Relevant Orders    X-RAY CHEST 2 VIEWS          Patient Instructions   Have chest x-ray completed    Will call with test results      Return in about 8 weeks (around 3/3/2023) for Routine follow up.         Melanie Chambers,   01/06/23

## 2023-01-09 DIAGNOSIS — J18.1 LEFT LOWER LOBE CONSOLIDATION (CMS/HCC): Primary | ICD-10-CM

## 2023-01-09 RX ORDER — BENZONATATE 100 MG/1
100 CAPSULE ORAL 3 TIMES DAILY PRN
Qty: 30 CAPSULE | Refills: 0 | Status: SHIPPED | OUTPATIENT
Start: 2023-01-09 | End: 2023-01-19

## 2023-01-09 NOTE — TELEPHONE ENCOUNTER
Medicine Refill Request    Last Office: 1/6/2023   Last Consult Visit: Visit date not found  Last Telemedicine Visit: Visit date not found    Next Appointment: 3/3/2023      Current Outpatient Medications:   •  acetaminophen (TYLENOL) 325 mg tablet, Take 650 mg by mouth as needed., Disp: , Rfl:   •  albuterol 2.5 mg /3 mL (0.083 %) nebulizer solution, Take 3 mL (2.5 mg total) by nebulization 4 (four) times a day., Disp: 180 mL, Rfl: 1  •  albuterol HFA (VENTOLIN HFA) 90 mcg/actuation inhaler, Inhale 2 puffs 4 (four) times a day as needed for wheezing., Disp: 18 g, Rfl: 1  •  atorvastatin (LIPITOR) 20 mg tablet, Take 1 tablet (20 mg total) by mouth daily., Disp: 90 tablet, Rfl: 1  •  benzonatate (TESSALON) 100 mg capsule, Take 100 mg by mouth 3 (three) times a day as needed for cough., Disp: , Rfl:   •  clonazePAM (klonoPIN) 0.5 mg tablet, Take 1 tablet (0.5 mg total) by mouth nightly as needed for anxiety., Disp: 30 tablet, Rfl: 0  •  diclofenac sodium (VOLTAREN) 1 % topical gel, Apply 1 g topically as needed., Disp: , Rfl:   •  ferrous sulfate 325 mg (65 mg iron) tablet, Take 1 tablet (325 mg total) by mouth daily with breakfast., Disp: 90 tablet, Rfl: 1  •  fluticasone propionate (FLONASE) 50 mcg/actuation nasal spray, Administer 1 spray into each nostril daily., Disp: 16 g, Rfl: 5  •  fluticasone-umeclidinium-vilanterol (TRELEGY ELLIPTA) 100-62.5-25 mcg blister with device powder for inhalation, Inhale 1 puff daily. (Patient taking differently: Inhale 1 puff as needed.), Disp: 1 each, Rfl: 1  •  folic acid (FOLVITE) 1 mg tablet, Take 1 tablet (1 mg total) by mouth daily., Disp: 90 tablet, Rfl: 1  •  furosemide (LASIX) 40 mg tablet, Take 1 tablet (40 mg total) by mouth daily. Per weight gain protocol extra 40 mg tab for 2-3 lbs wt gain, Disp: 180 tablet, Rfl: 1  •  levothyroxine (SYNTHROID) 100 mcg tablet, Take 1 tablet (100 mcg total) by mouth daily., Disp: 90 tablet, Rfl: 1  •  loratadine (CLARITIN) 10 mg  tablet, Take 10 mg by mouth daily., Disp: , Rfl:   •  magnesium oxide (MAG-OX) 400 mg (241.3 mg magnesium) tablet, Take 1 tablet (400 mg total) by mouth 2 (two) times a day., Disp: 180 tablet, Rfl: 3  •  medical marijuana, Take 1 each by mouth as needed., Disp: , Rfl:   •  melatonin 5 mg capsule, Take 5 mg by mouth nightly. Hs prn, Disp: , Rfl:   •  metoprolol succinate XL (TOPROL-XL) 25 mg 24 hr tablet, Take 1 tablet (25 mg total) by mouth daily. 0.5mg bid (Patient taking differently: Take 12.5 mg by mouth 2 (two) times a day.), Disp: 90 tablet, Rfl: 1  •  multivitamin tablet, Take 1 tablet by mouth daily., Disp: , Rfl:   •  nebulizers misc, 1 each 4 (four) times a day as needed (sob or wheeze)., Disp: 1 each, Rfl: 0  •  pantoprazole (PROTONIX) 40 mg EC tablet, Take 1 tablet (40 mg total) by mouth 2 (two) times a day., Disp: 180 tablet, Rfl: 0  •  rivaroxaban (XARELTO) 20 mg tablet, Take 1 tablet (20 mg total) by mouth daily with dinner., Disp: 90 tablet, Rfl: 3  •  sertraline (ZOLOFT) 25 mg tablet, Take 1 tablet (25 mg total) by mouth daily., Disp: 90 tablet, Rfl: 0  •  thiamine 100 mg tablet, Take 1 tablet (100 mg total) by mouth daily., Disp: 30 tablet, Rfl: 1      BP Readings from Last 3 Encounters:   01/06/23 98/68   12/29/22 114/70   12/09/22 110/70       Recent Lab results:  Lab Results   Component Value Date    CHOL 138 11/17/2022   ,   Lab Results   Component Value Date    HDL 64 11/17/2022   ,   Lab Results   Component Value Date    LDLCALC 59 11/17/2022   ,   Lab Results   Component Value Date    TRIG 71 11/17/2022        Lab Results   Component Value Date    GLUCOSE 235 (H) 12/30/2022   ,   Lab Results   Component Value Date    HGBA1C 5.9 (H) 11/17/2022         Lab Results   Component Value Date    CREATININE 0.84 12/30/2022       Lab Results   Component Value Date    TSH 11.85 (H) 12/30/2022

## 2023-01-09 NOTE — PROGRESS NOTES
Placed order for CTAP with and without contrast due to LLL consolidation which has been persistent since last imaging study. Will ruled out infection vs tumor.     Melanie Chambers, DO

## 2023-01-09 NOTE — TELEPHONE ENCOUNTER
Rylee from [place] [facility], [phone] called and is discharging patient today.  Lungs are clear and is doing good.

## 2023-01-12 ENCOUNTER — TELEPHONE (OUTPATIENT)
Dept: SCHEDULING | Facility: CLINIC | Age: 83
End: 2023-01-12
Payer: COMMERCIAL

## 2023-01-12 NOTE — TELEPHONE ENCOUNTER
Milagro from [facility] [place] calling to see if Dr Wolf will be signing home health care orders received and scanned in chart on   1/11/2023    Milagro [phone]  Fax [phone]

## 2023-01-12 NOTE — TELEPHONE ENCOUNTER
I lm on vm to r/c    The orders scanned in are not under dr vazquez name it looks like its under the pcp name    Looking for clarification on the orders that they need dr simth to sign off on     AC

## 2023-01-24 DIAGNOSIS — E05.90 HYPERTHYROIDISM: ICD-10-CM

## 2023-01-24 RX ORDER — LEVOTHYROXINE SODIUM 100 UG/1
TABLET ORAL
Qty: 30 TABLET | Refills: 0 | Status: SHIPPED
Start: 2023-01-24 | End: 2023-02-06

## 2023-02-04 LAB
T4 FREE SERPL-MCNC: 1.5 NG/DL (ref 0.8–1.8)
TSH SERPL-ACNC: 7.86 MIU/L (ref 0.4–4.5)

## 2023-02-06 DIAGNOSIS — E05.90 HYPERTHYROIDISM: Primary | ICD-10-CM

## 2023-02-06 RX ORDER — LEVOTHYROXINE SODIUM 112 UG/1
112 TABLET ORAL
Qty: 90 TABLET | Refills: 0 | Status: SHIPPED | OUTPATIENT
Start: 2023-02-06 | End: 2023-04-18

## 2023-02-07 DIAGNOSIS — F32.89 OTHER DEPRESSION: ICD-10-CM

## 2023-02-07 DIAGNOSIS — K21.9 GASTROESOPHAGEAL REFLUX DISEASE, UNSPECIFIED WHETHER ESOPHAGITIS PRESENT: Primary | ICD-10-CM

## 2023-02-07 RX ORDER — CLONAZEPAM 0.5 MG/1
0.5 TABLET ORAL NIGHTLY PRN
Qty: 30 TABLET | Refills: 0 | Status: SHIPPED | OUTPATIENT
Start: 2023-02-16 | End: 2023-04-12 | Stop reason: SDUPTHER

## 2023-02-07 RX ORDER — PANTOPRAZOLE SODIUM 40 MG/1
TABLET, DELAYED RELEASE ORAL
Qty: 60 TABLET | Refills: 0 | Status: SHIPPED | OUTPATIENT
Start: 2023-02-07 | End: 2023-05-05 | Stop reason: SDUPTHER

## 2023-02-08 NOTE — TELEPHONE ENCOUNTER
PDMP reviewed. Refill approved for fill on 2/16/2023.    Next visit is on: 2/7/2023    Melanie Chambers DO

## 2023-02-09 ENCOUNTER — TELEPHONE (OUTPATIENT)
Dept: PRIMARY CARE | Facility: CLINIC | Age: 83
End: 2023-02-09
Payer: COMMERCIAL

## 2023-02-10 NOTE — TELEPHONE ENCOUNTER
Discuss with Ann we do not refill medical cards here. They can make an appt where insurance does not cover it Thanks LW

## 2023-02-24 ENCOUNTER — OFFICE VISIT (OUTPATIENT)
Dept: NEUROLOGY | Facility: CLINIC | Age: 83
End: 2023-02-24
Payer: COMMERCIAL

## 2023-02-24 VITALS
SYSTOLIC BLOOD PRESSURE: 108 MMHG | WEIGHT: 189 LBS | OXYGEN SATURATION: 99 % | HEART RATE: 65 BPM | HEIGHT: 67 IN | BODY MASS INDEX: 29.66 KG/M2 | DIASTOLIC BLOOD PRESSURE: 62 MMHG

## 2023-02-24 DIAGNOSIS — R41.89 COGNITIVE IMPAIRMENT: Primary | ICD-10-CM

## 2023-02-24 PROCEDURE — 3008F BODY MASS INDEX DOCD: CPT | Performed by: STUDENT IN AN ORGANIZED HEALTH CARE EDUCATION/TRAINING PROGRAM

## 2023-02-24 PROCEDURE — 99204 OFFICE O/P NEW MOD 45 MIN: CPT | Performed by: STUDENT IN AN ORGANIZED HEALTH CARE EDUCATION/TRAINING PROGRAM

## 2023-02-24 RX ORDER — DONEPEZIL HYDROCHLORIDE 5 MG/1
TABLET, FILM COATED ORAL
Qty: 30 TABLET | Refills: 0 | Status: SHIPPED | OUTPATIENT
Start: 2023-02-24 | End: 2023-06-27

## 2023-02-24 RX ORDER — DONEPEZIL HYDROCHLORIDE 10 MG/1
10 TABLET, FILM COATED ORAL NIGHTLY
Qty: 90 TABLET | Refills: 1 | Status: SHIPPED | OUTPATIENT
Start: 2023-03-26 | End: 2023-09-18

## 2023-02-24 NOTE — PROGRESS NOTES
"Neurology Outpatient Encounter  [facility]    Patient Name: Cecil Morris   Patient : 1940  Patient MRN: [id number]  Date of service: 23     Summary:   Presents with caretaker, Ann.  Presents for management of dementia.  He has experience a progressive decline in cognitive abilities.  He currently lives alone but does have caretakers.  His daughter manages finances.  Caretakers help arrange food but Liu is able to make most of his meals.  Lives is a mobile home in [place], [place].  No college, retired .  Past period of excessive alcohol use.  Physically is doing fairly well, ambulates without assistance, no tremor, no hallucinations.  He reports his sleep is \"pretty good,\" takes clonazepam nightly.  Does have history of excessive alcohol use recently, hasn't had much since not being able to drive and caretaker has noted great improvement.       Assessment & Plan:  Pt has progressive cognitive decline consistent with Alzheimer's dementia.  This appears relatively mild at this point and he has a great support system.      Will start donepezil 10 mg daily.      Recent B12 testing was OK.     Encouraged judicious alcohol use.     Examination:   Vitals:    23 1409   BP: 108/62   Pulse: 65   SpO2: 99%     Mental status: Wide awake, alert.  Pleasant.  Oriented to .  Not oriented to President (Marcelo Kraig).  Immediate 3/3, 2/3 delayed recall.  Able to draw clock but not hands (ten past eleven).    Cranial nerves: Pupils equal, reactive.  Full EOM.  Face symmetric.  Speech clear.    Motor: No focal weakness.  Normal tone.  No bradykinesia.   No abnormal movements.    Reflexes: 2+ biceps, triceps, patellar.    Cerebellar: No dysmetria FNF.  No truncal ataxia.    No orders of the defined types were placed in this encounter.    Past Medical History:   Diagnosis Date   • A-fib (CMS/[facility])    • Acute on chronic combined systolic and diastolic congestive heart failure " (CMS/[facility])    • CHF (congestive heart failure), NYHA class I, acute, systolic (CMS/[facility])    • Depression    • Hyperthyroidism    • Hypoxia    • Near syncope    • NSVT (nonsustained ventricular tachycardia)    • Orthostatic hypotension    • Presence of cardiac pacemaker    • Pulmonary embolism (CMS/[facility])    • Sepsis (CMS/[facility]) 05/16/2022     Current Outpatient Medications   Medication Sig Dispense Refill   • acetaminophen (TYLENOL) 325 mg tablet Take 650 mg by mouth as needed.     • albuterol 2.5 mg /3 mL (0.083 %) nebulizer solution Take 3 mL (2.5 mg total) by nebulization 4 (four) times a day. 180 mL 1   • albuterol HFA (VENTOLIN HFA) 90 mcg/actuation inhaler Inhale 2 puffs 4 (four) times a day as needed for wheezing. (Patient taking differently: Inhale 2 puffs as needed for wheezing.) 18 g 1   • atorvastatin (LIPITOR) 20 mg tablet Take 1 tablet (20 mg total) by mouth daily. 90 tablet 1   • clonazePAM (klonoPIN) 0.5 mg tablet Take 1 tablet (0.5 mg total) by mouth nightly as needed for anxiety. 30 tablet 0   • diclofenac sodium (VOLTAREN) 1 % topical gel Apply 1 g topically as needed.     • ferrous sulfate 325 mg (65 mg iron) tablet Take 1 tablet (325 mg total) by mouth daily with breakfast. 90 tablet 1   • folic acid (FOLVITE) 1 mg tablet Take 1 tablet (1 mg total) by mouth daily. 90 tablet 1   • furosemide (LASIX) 40 mg tablet Take 1 tablet (40 mg total) by mouth daily. Per weight gain protocol extra 40 mg tab for 2-3 lbs wt gain 180 tablet 1   • levothyroxine (SYNTHROID) 112 mcg tablet Take 1 tablet (112 mcg total) by mouth daily. 90 tablet 0   • loratadine (CLARITIN) 10 mg tablet Take 10 mg by mouth daily.     • magnesium oxide (MAG-OX) 400 mg (241.3 mg magnesium) tablet Take 1 tablet (400 mg total) by mouth 2 (two) times a day. 180 tablet 3   • medical marijuana Take 1 each by mouth as needed.     • melatonin 5 mg capsule Take 5 mg by mouth nightly. Hs prn     • metoprolol succinate XL (TOPROL-XL) 25 mg 24 hr  tablet Take 1 tablet (25 mg total) by mouth daily. 0.5mg bid (Patient taking differently: Take 12.5 mg by mouth 2 (two) times a day.) 90 tablet 1   • multivitamin tablet Take 1 tablet by mouth daily.     • nebulizers misc 1 each 4 (four) times a day as needed (sob or wheeze). 1 each 0   • pantoprazole (PROTONIX) 40 mg EC tablet TAKE 1 TABLET BY MOUTH 2 TIMES A DAY. 60 tablet 0   • rivaroxaban (XARELTO) 20 mg tablet Take 1 tablet (20 mg total) by mouth daily with dinner. 90 tablet 3   • sertraline (ZOLOFT) 25 mg tablet TAKE 1 TABLET BY MOUTH DAILY 30 tablet 1   • thiamine 100 mg tablet Take 1 tablet (100 mg total) by mouth daily. 30 tablet 1   • fluticasone propionate (FLONASE) 50 mcg/actuation nasal spray Administer 1 spray into each nostril daily. (Patient not taking: Reported on 2/24/2023) 16 g 5   • fluticasone-umeclidinium-vilanterol (TRELEGY ELLIPTA) 100-62.5-25 mcg blister with device powder for inhalation Inhale 1 puff daily. (Patient not taking: Reported on 2/24/2023) 1 each 1     No current facility-administered medications for this visit.     Morgan Barrientos MD  Neurology

## 2023-03-03 ENCOUNTER — OFFICE VISIT (OUTPATIENT)
Dept: PRIMARY CARE | Facility: CLINIC | Age: 83
End: 2023-03-03
Payer: COMMERCIAL

## 2023-03-03 VITALS
OXYGEN SATURATION: 97 % | HEIGHT: 67 IN | WEIGHT: 195 LBS | DIASTOLIC BLOOD PRESSURE: 60 MMHG | HEART RATE: 74 BPM | BODY MASS INDEX: 30.61 KG/M2 | TEMPERATURE: 96.7 F | SYSTOLIC BLOOD PRESSURE: 110 MMHG

## 2023-03-03 DIAGNOSIS — I10 ESSENTIAL HYPERTENSION: ICD-10-CM

## 2023-03-03 DIAGNOSIS — F02.B0 MODERATE ALZHEIMER'S DEMENTIA, UNSPECIFIED TIMING OF DEMENTIA ONSET, UNSPECIFIED WHETHER BEHAVIORAL, PSYCHOTIC, OR MOOD DISTURBANCE OR ANXIETY (CMS/HCC): ICD-10-CM

## 2023-03-03 DIAGNOSIS — G30.9 MODERATE ALZHEIMER'S DEMENTIA, UNSPECIFIED TIMING OF DEMENTIA ONSET, UNSPECIFIED WHETHER BEHAVIORAL, PSYCHOTIC, OR MOOD DISTURBANCE OR ANXIETY (CMS/HCC): ICD-10-CM

## 2023-03-03 DIAGNOSIS — E11.9 TYPE 2 DIABETES MELLITUS WITHOUT COMPLICATION, WITHOUT LONG-TERM CURRENT USE OF INSULIN (CMS/HCC): ICD-10-CM

## 2023-03-03 DIAGNOSIS — I71.21 ANEURYSM OF ASCENDING AORTA WITHOUT RUPTURE (CMS/HCC): ICD-10-CM

## 2023-03-03 DIAGNOSIS — E03.9 ACQUIRED HYPOTHYROIDISM: Primary | ICD-10-CM

## 2023-03-03 PROBLEM — R41.89 COGNITIVE IMPAIRMENT: Status: RESOLVED | Noted: 2022-12-02 | Resolved: 2023-03-03

## 2023-03-03 PROBLEM — F02.80 ALZHEIMER'S DEMENTIA (CMS/HCC): Status: ACTIVE | Noted: 2023-03-03

## 2023-03-03 PROBLEM — J18.9 COMMUNITY ACQUIRED PNEUMONIA OF LEFT LOWER LOBE OF LUNG: Status: RESOLVED | Noted: 2022-12-07 | Resolved: 2023-03-03

## 2023-03-03 PROCEDURE — 99214 OFFICE O/P EST MOD 30 MIN: CPT | Performed by: FAMILY MEDICINE

## 2023-03-03 PROCEDURE — 3008F BODY MASS INDEX DOCD: CPT | Performed by: FAMILY MEDICINE

## 2023-03-03 NOTE — ASSESSMENT & PLAN NOTE
blood pressure well controlled today  Continue medications as prescribed  Continue follow-up with cardiology

## 2023-03-03 NOTE — PROGRESS NOTES
[facility] in [facility]  [address]  [facility] [address]  Phone: [phone]  Fax: [phone]       CHIEF COMPLAINT   Chief Complaint   Patient presents with   • Follow-up     2 month + Review lab results      HISTORY OF PRESENT ILLNESS      This is a 82 y.o. male who presents for   Chief Complaint   Patient presents with   • Follow-up     2 month + Review lab results     Had follow up with neurology and started on donepezil    TSH improved although not at goal  Increased levothyroxine to 112 mcg daily    Caretaker Ann is with him in the office today and reports that he has been much improved    No longer using alcohol     PAST MEDICAL AND SURGICAL HISTORY      Past Medical History:   Diagnosis Date   • A-fib (CMS/HCC)    • Acute on chronic combined systolic and diastolic congestive heart failure (CMS/HCC)    • CHF (congestive heart failure), NYHA class I, acute, systolic (CMS/HCC)    • Depression    • Hyperthyroidism    • Hypoxia    • Near syncope    • NSVT (nonsustained ventricular tachycardia)    • Orthostatic hypotension    • Presence of cardiac pacemaker    • Pulmonary embolism (CMS/[facility])    • Sepsis (CMS/[facility]) 05/16/2022       Past Surgical History:   Procedure Laterality Date   • CARDIAC CATHETERIZATION  11/19/2020    CATH PLACE/CORON ANGIO, IMG SUPER/INTERP,R&L HRT CATH, L HRT VENTRIC   • CORONARY ARTERY BYPASS GRAFT  2005       MEDICATIONS        Current Outpatient Medications:   •  acetaminophen (TYLENOL) 325 mg tablet, Take 650 mg by mouth as needed., Disp: , Rfl:   •  albuterol 2.5 mg /3 mL (0.083 %) nebulizer solution, Take 3 mL (2.5 mg total) by nebulization 4 (four) times a day., Disp: 180 mL, Rfl: 1  •  albuterol HFA (VENTOLIN HFA) 90 mcg/actuation inhaler, Inhale 2 puffs 4 (four) times a day as needed for wheezing. (Patient taking differently: Inhale 2 puffs as needed for wheezing.), Disp: 18 g, Rfl: 1  •  atorvastatin (LIPITOR) 20 mg tablet, Take 1 tablet (20 mg total)  by mouth daily., Disp: 90 tablet, Rfl: 1  •  clonazePAM (klonoPIN) 0.5 mg tablet, Take 1 tablet (0.5 mg total) by mouth nightly as needed for anxiety., Disp: 30 tablet, Rfl: 0  •  diclofenac sodium (VOLTAREN) 1 % topical gel, Apply 1 g topically as needed., Disp: , Rfl:   •  [START ON 3/26/2023] donepeziL (ARICEPT) 10 mg tablet, Take 1 tablet (10 mg total) by mouth nightly., Disp: 90 tablet, Rfl: 1  •  donepeziL (ARICEPT) 5 mg tablet, Take 5 mg daily x 30 days then 10 mg daily, Disp: 30 tablet, Rfl: 0  •  ferrous sulfate 325 mg (65 mg iron) tablet, Take 1 tablet (325 mg total) by mouth daily with breakfast., Disp: 90 tablet, Rfl: 1  •  fluticasone propionate (FLONASE) 50 mcg/actuation nasal spray, Administer 1 spray into each nostril daily., Disp: 16 g, Rfl: 5  •  fluticasone-umeclidinium-vilanterol (TRELEGY ELLIPTA) 100-62.5-25 mcg blister with device powder for inhalation, Inhale 1 puff daily., Disp: 1 each, Rfl: 1  •  folic acid (FOLVITE) 1 mg tablet, Take 1 tablet (1 mg total) by mouth daily., Disp: 90 tablet, Rfl: 1  •  furosemide (LASIX) 40 mg tablet, Take 1 tablet (40 mg total) by mouth daily. Per weight gain protocol extra 40 mg tab for 2-3 lbs wt gain, Disp: 180 tablet, Rfl: 1  •  levothyroxine (SYNTHROID) 112 mcg tablet, Take 1 tablet (112 mcg total) by mouth daily., Disp: 90 tablet, Rfl: 0  •  loratadine (CLARITIN) 10 mg tablet, Take 10 mg by mouth daily., Disp: , Rfl:   •  magnesium oxide (MAG-OX) 400 mg (241.3 mg magnesium) tablet, Take 1 tablet (400 mg total) by mouth 2 (two) times a day., Disp: 180 tablet, Rfl: 3  •  medical marijuana, Take 1 each by mouth as needed., Disp: , Rfl:   •  melatonin 5 mg capsule, Take 5 mg by mouth nightly. Hs prn, Disp: , Rfl:   •  metoprolol succinate XL (TOPROL-XL) 25 mg 24 hr tablet, Take 1 tablet (25 mg total) by mouth daily. 0.5mg bid (Patient taking differently: Take 12.5 mg by mouth 2 (two) times a day.), Disp: 90 tablet, Rfl: 1  •  multivitamin tablet, Take 1  tablet by mouth daily., Disp: , Rfl:   •  nebulizers misc, 1 each 4 (four) times a day as needed (sob or wheeze)., Disp: 1 each, Rfl: 0  •  pantoprazole (PROTONIX) 40 mg EC tablet, TAKE 1 TABLET BY MOUTH 2 TIMES A DAY., Disp: 60 tablet, Rfl: 0  •  rivaroxaban (XARELTO) 20 mg tablet, Take 1 tablet (20 mg total) by mouth daily with dinner., Disp: 90 tablet, Rfl: 3  •  sertraline (ZOLOFT) 25 mg tablet, TAKE 1 TABLET BY MOUTH DAILY, Disp: 30 tablet, Rfl: 1  •  thiamine 100 mg tablet, Take 1 tablet (100 mg total) by mouth daily., Disp: 30 tablet, Rfl: 1    ALLERGIES      Patient has no known allergies.    FAMILY HISTORY      Family History   Problem Relation Age of Onset   • Thyroid cancer Biological Mother    • ALS Biological Mother    • Parkinsonism Biological Mother    • Heart attack Biological Father    • Liver cancer Maternal Grandmother        SOCIAL HISTORY      Social History     Socioeconomic History   • Marital status:      Spouse name: None   • Number of children: 2   • Years of education: None   • Highest education level: None   Occupational History   • Occupation: retired     Comment: [employer] in [place]   Tobacco Use   • Smoking status: Former     Years: 40.00     Types: Cigarettes     Quit date: 11/1/2012     Years since quitting: 10.3   • Smokeless tobacco: Never   Vaping Use   • Vaping Use: Never used   Substance and Sexual Activity   • Alcohol use: Yes     Alcohol/week: 9.0 standard drinks     Types: 9 Shots of liquor per week     Comment: vodka   • Drug use: Never   • Sexual activity: Not Currently   Social History Narrative    Lives alone, and has help for meal prep and med management       REVIEW OF SYSTEMS      Review of Systems   All other systems reviewed and are negative.    PHYSICAL EXAMINATION      Vitals:    03/03/23 1322   BP: 110/60   BP Location: Left upper arm   Patient Position: Sitting   Pulse: 74   Temp: (!) 35.9 °C (96.7 °F)   TempSrc: Temporal   SpO2: 97%   Weight: 88.5 kg (195  "lb)   Height: 1.702 m (5' 7\")     Body mass index is 30.54 kg/m².     BP Readings from Last 3 Encounters:   03/03/23 110/60   02/24/23 108/62   01/06/23 98/68     Wt Readings from Last 3 Encounters:   03/03/23 88.5 kg (195 lb)   02/24/23 85.7 kg (189 lb)   01/06/23 85.7 kg (189 lb)     Ht Readings from Last 3 Encounters:   03/03/23 1.702 m (5' 7\")   02/24/23 1.702 m (5' 7\")   01/06/23 1.702 m (5' 7\")     Physical Exam  Vitals reviewed.   Constitutional:       General: He is not in acute distress.     Appearance: Normal appearance.   HENT:      Head: Normocephalic and atraumatic.   Eyes:      Conjunctiva/sclera: Conjunctivae normal.   Cardiovascular:      Rate and Rhythm: Normal rate and regular rhythm.      Pulses:           Dorsalis pedis pulses are 2+ on the right side and 2+ on the left side.        Posterior tibial pulses are 1+ on the right side and 1+ on the left side.      Heart sounds: Normal heart sounds.   Pulmonary:      Effort: Pulmonary effort is normal. No respiratory distress.      Breath sounds: Normal breath sounds. No wheezing, rhonchi or rales.   Musculoskeletal:      Right lower leg: No edema.      Left lower leg: No edema.   Feet:      Right foot:      Protective Sensation: 5 sites tested. 5 sites sensed.      Skin integrity: Skin integrity normal.      Toenail Condition: Right toenails are abnormally thick.      Left foot:      Protective Sensation: 5 sites tested. 5 sites sensed.      Skin integrity: Skin integrity normal.      Toenail Condition: Left toenails are abnormally thick.      Comments: Cut at L 4th toe nail as he tried to pull of a piece of nail in the office. Cleaned and bandaged  Skin:     General: Skin is warm and dry.   Neurological:      Mental Status: He is alert and oriented to person, place, and time. Mental status is at baseline.   Psychiatric:         Mood and Affect: Mood normal.         Behavior: Behavior normal.         LABS / IMAGING / STUDIES        Labs    Lab " Results   Component Value Date    CREATININE 0.84 12/30/2022     Lab Results   Component Value Date    WBC 10.2 11/17/2022    HGB 13.0 (L) 11/17/2022    HCT 38.8 11/17/2022    MCV 94.9 11/17/2022     11/17/2022         Lab Results   Component Value Date    HGBA1C 5.9 (H) 11/17/2022     HEALTH MAINTENANCE              ASSESSMENT AND PLAN   Problem List Items Addressed This Visit        Nervous    Alzheimer's dementia (CMS/HCC)     Symptoms stable  Great support system  Continue follow-up with neurology and donepezil as prescribed  Will continue to monitor in follow up            Circulatory    Ascending aortic aneurysm     Follow up with cardiology as scheduled to monitor         Essential hypertension     blood pressure well controlled today  Continue medications as prescribed  Continue follow-up with cardiology              Endocrine/Metabolic    Acquired hypothyroidism - Primary     Recently increased levothyroxine to 112 mcg daily due to TSH of 7  We will have her repeat TSH completed in April 2023  Return to office in 3 months for continued evaluation         Relevant Orders    TSH w reflex FT4    Type 2 diabetes mellitus without complication, without long-term current use of insulin (CMS/[facility])     Last hemoglobin A1c very well controlled  We will repeat at follow-up visit  Encourage patient to follow-up with an ophthalmologist for dilated retinal exam  Foot exam within normal limits today  Urine collected for microalbumin to creatinine ratio         Relevant Orders    Ambulatory referral to Ophthalmology    Microalbumin / creatinine urine ratio       Patient Instructions   Thyroid function tests in April 2023      Return in about 4 months (around 7/3/2023).       This note was written with the assistance of voice recognition software, please excuse errors associated with voice recognition software.      Melanie Chambers,   03/03/23

## 2023-03-03 NOTE — ASSESSMENT & PLAN NOTE
Symptoms stable  Great support system  Continue follow-up with neurology and donepezil as prescribed  Will continue to monitor in follow up

## 2023-03-03 NOTE — ASSESSMENT & PLAN NOTE
Recently increased levothyroxine to 112 mcg daily due to TSH of 7  We will have her repeat TSH completed in April 2023  Return to office in 3 months for continued evaluation

## 2023-03-03 NOTE — ASSESSMENT & PLAN NOTE
Last hemoglobin A1c very well controlled  We will repeat at follow-up visit  Encourage patient to follow-up with an ophthalmologist for dilated retinal exam  Foot exam within normal limits today  Urine collected for microalbumin to creatinine ratio

## 2023-03-04 LAB
ALBUMIN/CREAT UR: NORMAL MCG/MG CREAT
CREAT UR-MCNC: 28 MG/DL (ref 20–320)
MICROALBUMIN UR-MCNC: <0.2 MG/DL

## 2023-03-27 DIAGNOSIS — F32.89 OTHER DEPRESSION: ICD-10-CM

## 2023-03-27 RX ORDER — CLONAZEPAM 0.5 MG/1
0.5 TABLET ORAL NIGHTLY PRN
Qty: 30 TABLET | Refills: 0 | OUTPATIENT
Start: 2023-03-27 | End: 2023-04-26

## 2023-04-06 ENCOUNTER — TELEPHONE (OUTPATIENT)
Dept: PRIMARY CARE | Facility: CLINIC | Age: 83
End: 2023-04-06
Payer: COMMERCIAL

## 2023-04-06 NOTE — TELEPHONE ENCOUNTER
Spw pt's daughter (Jovana) about scheduling hosp f/u. She will call when he is d/c to schedule appointment.

## 2023-04-11 ENCOUNTER — OFFICE VISIT (OUTPATIENT)
Dept: PRIMARY CARE | Facility: CLINIC | Age: 83
End: 2023-04-11
Payer: COMMERCIAL

## 2023-04-11 VITALS
HEIGHT: 67 IN | TEMPERATURE: 97.3 F | SYSTOLIC BLOOD PRESSURE: 118 MMHG | OXYGEN SATURATION: 96 % | WEIGHT: 195 LBS | HEART RATE: 66 BPM | BODY MASS INDEX: 30.61 KG/M2 | DIASTOLIC BLOOD PRESSURE: 66 MMHG

## 2023-04-11 DIAGNOSIS — F41.9 ANXIETY: Primary | ICD-10-CM

## 2023-04-11 PROCEDURE — 99214 OFFICE O/P EST MOD 30 MIN: CPT | Performed by: FAMILY MEDICINE

## 2023-04-11 PROCEDURE — 3074F SYST BP LT 130 MM HG: CPT | Performed by: FAMILY MEDICINE

## 2023-04-11 PROCEDURE — 3008F BODY MASS INDEX DOCD: CPT | Performed by: FAMILY MEDICINE

## 2023-04-11 PROCEDURE — 3078F DIAST BP <80 MM HG: CPT | Performed by: FAMILY MEDICINE

## 2023-04-11 RX ORDER — SERTRALINE HYDROCHLORIDE 50 MG/1
50 TABLET, FILM COATED ORAL DAILY
Qty: 90 TABLET | Refills: 0 | Status: SHIPPED | OUTPATIENT
Start: 2023-04-11 | End: 2023-05-05 | Stop reason: SDUPTHER

## 2023-04-11 RX ORDER — BUSPIRONE HYDROCHLORIDE 5 MG/1
5 TABLET ORAL 2 TIMES DAILY
Qty: 60 TABLET | Refills: 0 | Status: SHIPPED | OUTPATIENT
Start: 2023-04-11 | End: 2023-04-24

## 2023-04-11 ASSESSMENT — PATIENT HEALTH QUESTIONNAIRE - PHQ9
SUM OF ALL RESPONSES TO PHQ9 QUESTIONS 1 & 2: 3
SUM OF ALL RESPONSES TO PHQ QUESTIONS 1-9: 11

## 2023-04-11 NOTE — PROGRESS NOTES
[facility] in [place] [address] [address]  [facility] [address]  Phone: [phone]  Fax: [phone]       CHIEF COMPLAINT   Chief Complaint   Patient presents with   • ER Follow up     04/5- [facility] ER for Anxiety  04/11- [facility] ER for Anxiety      HISTORY OF PRESENT ILLNESS      This is a 82 y.o. male who presents for   Chief Complaint   Patient presents with   • ER Follow up     04/5- [facility] ER for Anxiety  04/11- [facility] ER for Anxiety     Went to the emergency department 2x for anxiety  Wants to drink alcohol to cope  On a small amount of Klonopin for his mood but does not seem to be helping much  He says getting old is rough  Does have good support system with caretakers to come to the home  Does not live next to his daughters  Did have plans canceled with his daughter last time which worsened his anxiety  No recent alcohol use    PAST MEDICAL AND SURGICAL HISTORY      Past Medical History:   Diagnosis Date   • A-fib (CMS/[facility])    • Acute on chronic combined systolic and diastolic congestive heart failure (CMS/[facility])    • Anxiety 4/11/2023   • CHF (congestive heart failure), NYHA class I, acute, systolic (CMS/[facility])    • Depression    • Hyperthyroidism    • Hypoxia    • Near syncope    • NSVT (nonsustained ventricular tachycardia) (CMS/[facility])    • Orthostatic hypotension    • Presence of cardiac pacemaker    • Pulmonary embolism (CMS/[facility])    • Sepsis (CMS/[facility]) 05/16/2022     Past Surgical History:   Procedure Laterality Date   • CARDIAC CATHETERIZATION  11/19/2020    CATH PLACE/CORON ANGIO, IMG SUPER/INTERP,R&L HRT CATH, L HRT VENTRIC   • CORONARY ARTERY BYPASS GRAFT  2005     MEDICATIONS        Current Outpatient Medications:   •  acetaminophen (TYLENOL) 325 mg tablet, Take 650 mg by mouth as needed., Disp: , Rfl:   •  albuterol 2.5 mg /3 mL (0.083 %) nebulizer solution, Take 3 mL (2.5 mg total) by nebulization 4 (four) times a day., Disp: 180 mL, Rfl: 1  •  albuterol HFA (VENTOLIN HFA) 90 mcg/actuation  inhaler, Inhale 2 puffs 4 (four) times a day as needed for wheezing. (Patient taking differently: Inhale 2 puffs as needed for wheezing.), Disp: 18 g, Rfl: 1  •  atorvastatin (LIPITOR) 20 mg tablet, Take 1 tablet (20 mg total) by mouth daily., Disp: 90 tablet, Rfl: 1  •  busPIRone (BUSPAR) 5 mg tablet, Take 1 tablet (5 mg total) by mouth 2 (two) times a day., Disp: 60 tablet, Rfl: 0  •  clonazePAM (klonoPIN) 0.5 mg tablet, Take 1 tablet (0.5 mg total) by mouth nightly as needed for anxiety., Disp: 30 tablet, Rfl: 0  •  diclofenac sodium (VOLTAREN) 1 % topical gel, Apply 1 g topically as needed., Disp: , Rfl:   •  donepeziL (ARICEPT) 10 mg tablet, Take 1 tablet (10 mg total) by mouth nightly., Disp: 90 tablet, Rfl: 1  •  donepeziL (ARICEPT) 5 mg tablet, Take 5 mg daily x 30 days then 10 mg daily, Disp: 30 tablet, Rfl: 0  •  ferrous sulfate 325 mg (65 mg iron) tablet, Take 1 tablet (325 mg total) by mouth daily with breakfast., Disp: 90 tablet, Rfl: 1  •  fluticasone propionate (FLONASE) 50 mcg/actuation nasal spray, Administer 1 spray into each nostril daily., Disp: 16 g, Rfl: 5  •  fluticasone-umeclidinium-vilanterol (TRELEGY ELLIPTA) 100-62.5-25 mcg blister with device powder for inhalation, Inhale 1 puff daily., Disp: 1 each, Rfl: 1  •  folic acid (FOLVITE) 1 mg tablet, Take 1 tablet (1 mg total) by mouth daily., Disp: 90 tablet, Rfl: 1  •  furosemide (LASIX) 40 mg tablet, Take 1 tablet (40 mg total) by mouth daily. Per weight gain protocol extra 40 mg tab for 2-3 lbs wt gain, Disp: 180 tablet, Rfl: 1  •  levothyroxine (SYNTHROID) 112 mcg tablet, Take 1 tablet (112 mcg total) by mouth daily., Disp: 90 tablet, Rfl: 0  •  loratadine (CLARITIN) 10 mg tablet, Take 10 mg by mouth daily., Disp: , Rfl:   •  magnesium oxide (MAG-OX) 400 mg (241.3 mg magnesium) tablet, Take 1 tablet (400 mg total) by mouth 2 (two) times a day., Disp: 180 tablet, Rfl: 3  •  medical marijuana, Take 1 each by mouth as needed., Disp: , Rfl:    •  melatonin 5 mg capsule, Take 5 mg by mouth nightly. Hs prn, Disp: , Rfl:   •  metoprolol succinate XL (TOPROL-XL) 25 mg 24 hr tablet, Take 1 tablet (25 mg total) by mouth daily. 0.5mg bid (Patient taking differently: Take 12.5 mg by mouth 2 (two) times a day.), Disp: 90 tablet, Rfl: 1  •  multivitamin tablet, Take 1 tablet by mouth daily., Disp: , Rfl:   •  nebulizers misc, 1 each 4 (four) times a day as needed (sob or wheeze)., Disp: 1 each, Rfl: 0  •  pantoprazole (PROTONIX) 40 mg EC tablet, TAKE 1 TABLET BY MOUTH 2 TIMES A DAY., Disp: 60 tablet, Rfl: 0  •  rivaroxaban (XARELTO) 20 mg tablet, Take 1 tablet (20 mg total) by mouth daily with dinner., Disp: 90 tablet, Rfl: 3  •  sertraline (ZOLOFT) 50 mg tablet, Take 1 tablet (50 mg total) by mouth daily., Disp: 90 tablet, Rfl: 0  •  thiamine 100 mg tablet, Take 1 tablet (100 mg total) by mouth daily., Disp: 30 tablet, Rfl: 1    ALLERGIES      Patient has no known allergies.    FAMILY HISTORY      Family History   Problem Relation Age of Onset   • Thyroid cancer Biological Mother    • ALS Biological Mother    • Parkinsonism Biological Mother    • Heart attack Biological Father    • Liver cancer Maternal Grandmother        SOCIAL HISTORY      Social History     Socioeconomic History   • Marital status:      Spouse name: None   • Number of children: 2   • Years of education: None   • Highest education level: None   Occupational History   • Occupation: retired     Comment: [employer] in [place]   Tobacco Use   • Smoking status: Former     Years: 40.00     Types: Cigarettes     Quit date: 11/1/2012     Years since quitting: 10.4   • Smokeless tobacco: Never   Vaping Use   • Vaping status: Never Used   Substance and Sexual Activity   • Alcohol use: Yes     Alcohol/week: 9.0 standard drinks of alcohol     Types: 9 Shots of liquor per week     Comment: [email or web address]   • Drug use: Never   • Sexual activity: Not Currently   Social History Narrative    Lives alone, and has  help for meal prep and med management       REVIEW OF SYSTEMS      Review of Systems   All other systems reviewed and are negative.    DEPRESSION/ANXIETY SCREENING   PHQ 2 to 9:  Will the patient answer the depression questions?: Y    Little interest or pleasure in doing things: Not at all    Feeling down, depressed, or hopeless: Almost all    Depression Risk: 3    Trouble falling or staying asleep, or sleeping too much: Almost all    Feeling tired or having little energy: Not at all    Poor appetite or overeating: Not at all    Feeling bad about yourself - or that you are a failure or have let yourself or your family down: Over half    Trouble concentrating on things, such as reading the newspaper or watching television: Not at all    Moving or speaking so slowly that other people could have noticed? Or the opposite - being so fidgety or restless that you have been moving around a lot more than usual.: Almost all    Thoughts that you would be better off dead or hurting yourself in some way: Not at all    Depression Risk Score: 11    If You Checked Off Any Problems, How Difficult Have These Problems Made It For You to Do Your Work, Take Care of Things at Home, or Get Along with Other People?: somewhat difficult    PHQ-9 interpretation: PHQ result may indicate moderate depression      AURELIA-7  Feeling nervous, anxious or on edge: 3-->Nearly every day    Not being able to stop or control worrying: 3-->Nearly every day    Worrying too much about different things: 3-->Nearly every day    Trouble relaxing: 3-->Nearly every day    Being so restless that it is hard to sit still: 3-->Nearly every day    Becoming easily annoyed or irritable: 3-->Nearly every day    Feeling afraid as if something awful might happen: 3-->Nearly every day    GAD7 Total Score: : 21    If you checked off any problems, how difficult have these made it for you to do your work, take care of things at home, or get along with other people?: Extremely  "difficult      PHYSICAL EXAMINATION      Vitals:    04/11/23 1406   BP: 118/66   BP Location: Left upper arm   Patient Position: Sitting   Pulse: 66   Temp: 36.3 °C (97.3 °F)   TempSrc: Temporal   SpO2: 96%   Weight: 88.5 kg (195 lb)   Height: 1.702 m (5' 7\")     Body mass index is 30.54 kg/m².     BP Readings from Last 3 Encounters:   04/11/23 118/66   03/03/23 110/60   02/24/23 108/62     Wt Readings from Last 3 Encounters:   04/11/23 88.5 kg (195 lb)   03/03/23 88.5 kg (195 lb)   02/24/23 85.7 kg (189 lb)     Ht Readings from Last 3 Encounters:   04/11/23 1.702 m (5' 7\")   03/03/23 1.702 m (5' 7\")   02/24/23 1.702 m (5' 7\")     Physical Exam  Vitals reviewed.   Constitutional:       General: He is not in acute distress.     Appearance: Normal appearance.   HENT:      Head: Normocephalic and atraumatic.      Right Ear: Tympanic membrane, ear canal and external ear normal.      Left Ear: Tympanic membrane, ear canal and external ear normal.      Nose: Nose normal.      Mouth/Throat:      Mouth: Mucous membranes are moist.      Pharynx: Oropharynx is clear.   Eyes:      Conjunctiva/sclera: Conjunctivae normal.   Cardiovascular:      Rate and Rhythm: Normal rate and regular rhythm.      Heart sounds: Normal heart sounds.   Pulmonary:      Effort: Pulmonary effort is normal. No respiratory distress.      Breath sounds: Normal breath sounds. No wheezing or rhonchi.   Musculoskeletal:      Right lower leg: No edema.      Left lower leg: No edema.   Skin:     General: Skin is warm and dry.   Neurological:      Mental Status: He is alert and oriented to person, place, and time. Mental status is at baseline.   Psychiatric:         Mood and Affect: Mood normal.         Behavior: Behavior normal.       LABS / IMAGING / STUDIES        Labs    Lab Results   Component Value Date    CREATININE 0.84 12/30/2022     Lab Results   Component Value Date    WBC 10.2 11/17/2022    HGB 13.0 (L) 11/17/2022    HCT 38.8 11/17/2022    MCV " 94.9 11/17/2022     11/17/2022     Lab Results   Component Value Date    HGBA1C 5.9 (H) 11/17/2022     Lab Results   Component Value Date    MICROALBUR <0.2 03/03/2023     HEALTH MAINTENANCE              ASSESSMENT AND PLAN   Problem List Items Addressed This Visit        Mental Health    Anxiety - Primary     Significant anxiety which prompted 2 visits to the emergency department  Discussed need for follow-up with a therapist to discuss coping skills and to have regular interaction to discuss his feelings  Increase sertraline to 50 mg daily  Add BuSpar 5 mg twice a day  We will have patient contacted by [facility] behavioral health for further assistance  Discussed that have [facility] is unable to help we will have an embedded behavioral health specialist on Mondays so that he can participate in this office  Return to office in June 2023 for next scheduled visit         Relevant Medications    busPIRone (BUSPAR) 5 mg tablet    sertraline (ZOLOFT) 50 mg tablet    Other Relevant Orders    Ambulatory Referal to [facility]       Patient Instructions   Increase sertraline  Start Buspar  Follow up with a therapist      Return for previously scheduled visit.       This note was written with the assistance of voice recognition software, please excuse errors associated with voice recognition software.      Melanie Chambers,   04/11/23

## 2023-04-11 NOTE — ASSESSMENT & PLAN NOTE
Significant anxiety which prompted 2 visits to the emergency department  Discussed need for follow-up with a therapist to discuss coping skills and to have regular interaction to discuss his feelings  Increase sertraline to 50 mg daily  Add BuSpar 5 mg twice a day  We will have patient contacted by [facility] behavioral health for further assistance  Discussed that have [facility] behavioral health is unable to help we will have an embedded behavioral health specialist on Mondays so that he can participate in this office  Return to office in June 2023 for next scheduled visit

## 2023-04-12 DIAGNOSIS — F32.89 OTHER DEPRESSION: ICD-10-CM

## 2023-04-12 RX ORDER — CLONAZEPAM 0.5 MG/1
0.5 TABLET ORAL NIGHTLY PRN
Qty: 30 TABLET | Refills: 0 | Status: SHIPPED | OUTPATIENT
Start: 2023-04-14 | End: 2023-05-22 | Stop reason: SDUPTHER

## 2023-04-12 NOTE — TELEPHONE ENCOUNTER
Juan José from [facility] called requesting a refill on pt's klonopin. He states that he has called before with this refill request for pt.

## 2023-04-13 ENCOUNTER — TELEPHONE (OUTPATIENT)
Dept: ADMISSIONS | Facility: HOSPITAL | Age: 83
End: 2023-04-13
Payer: COMMERCIAL

## 2023-04-13 NOTE — TELEPHONE ENCOUNTER
Initial Intake    glenn Dixon 82 y.o. male     [facility]  [facility] : [facility]                      Initial Intake    glenn Dixon 82 y.o. male     General  [facility] Referral? : Yes                      Initial Intake    glenn Dixon 82 y.o. male     General  [facility] Referral? : Yes

## 2023-04-14 ENCOUNTER — OFFICE VISIT (OUTPATIENT)
Dept: PRIMARY CARE | Facility: CLINIC | Age: 83
End: 2023-04-14
Payer: COMMERCIAL

## 2023-04-14 VITALS
HEIGHT: 67 IN | DIASTOLIC BLOOD PRESSURE: 72 MMHG | SYSTOLIC BLOOD PRESSURE: 130 MMHG | TEMPERATURE: 98.3 F | BODY MASS INDEX: 30.45 KG/M2 | HEART RATE: 83 BPM | WEIGHT: 194 LBS | OXYGEN SATURATION: 94 %

## 2023-04-14 DIAGNOSIS — Z91.89 AT INCREASED RISK OF EXPOSURE TO COVID-19 VIRUS: ICD-10-CM

## 2023-04-14 DIAGNOSIS — I50.21 CHF (CONGESTIVE HEART FAILURE), NYHA CLASS I, ACUTE, SYSTOLIC (CMS/HCC): ICD-10-CM

## 2023-04-14 DIAGNOSIS — I48.91 ATRIAL FIBRILLATION BY ELECTROCARDIOGRAM (CMS/HCC): ICD-10-CM

## 2023-04-14 DIAGNOSIS — J44.1 COPD EXACERBATION (CMS/HCC): ICD-10-CM

## 2023-04-14 DIAGNOSIS — R05.1 ACUTE COUGH: ICD-10-CM

## 2023-04-14 DIAGNOSIS — D64.9 ANEMIA, UNSPECIFIED TYPE: ICD-10-CM

## 2023-04-14 DIAGNOSIS — D72.820 LYMPHOCYTOSIS: Primary | ICD-10-CM

## 2023-04-14 PROCEDURE — 3078F DIAST BP <80 MM HG: CPT | Performed by: PHYSICIAN ASSISTANT

## 2023-04-14 PROCEDURE — 3008F BODY MASS INDEX DOCD: CPT | Performed by: PHYSICIAN ASSISTANT

## 2023-04-14 PROCEDURE — 87637 SARSCOV2&INF A&B&RSV AMP PRB: CPT | Performed by: PHYSICIAN ASSISTANT

## 2023-04-14 PROCEDURE — 99214 OFFICE O/P EST MOD 30 MIN: CPT | Performed by: PHYSICIAN ASSISTANT

## 2023-04-14 PROCEDURE — 3075F SYST BP GE 130 - 139MM HG: CPT | Performed by: PHYSICIAN ASSISTANT

## 2023-04-14 RX ORDER — AZITHROMYCIN 250 MG/1
TABLET, FILM COATED ORAL
Qty: 6 TABLET | Refills: 0 | Status: SHIPPED | OUTPATIENT
Start: 2023-04-14 | End: 2023-04-19

## 2023-04-14 RX ORDER — METOPROLOL SUCCINATE 25 MG/1
TABLET, EXTENDED RELEASE ORAL
Qty: 30 TABLET | Refills: 4 | Status: SHIPPED | OUTPATIENT
Start: 2023-04-14 | End: 2023-05-05 | Stop reason: SDUPTHER

## 2023-04-14 NOTE — PATIENT INSTRUCTIONS
You need to increase your nebulizer to at least 3 x's a day and would prefer 4x's a day, must take the trelegy daily  Start the zpak, get the chest xray    Repeat labs for the abnormal findings on labs earlier this month      Will swab for covid as you were in er prior to this onset

## 2023-04-14 NOTE — TELEPHONE ENCOUNTER
Pt has appt today. Thanks!    Medicine Refill Request    Last Office: 4/11/2023   Last Consult Visit: Visit date not found  Last Telemedicine Visit: Visit date not found    Next Appointment: 7/7/2023      Current Outpatient Medications:     acetaminophen (TYLENOL) 325 mg tablet, Take 650 mg by mouth as needed., Disp: , Rfl:     albuterol 2.5 mg /3 mL (0.083 %) nebulizer solution, Take 3 mL (2.5 mg total) by nebulization 4 (four) times a day., Disp: 180 mL, Rfl: 1    albuterol HFA (VENTOLIN HFA) 90 mcg/actuation inhaler, Inhale 2 puffs 4 (four) times a day as needed for wheezing. (Patient taking differently: Inhale 2 puffs as needed for wheezing.), Disp: 18 g, Rfl: 1    atorvastatin (LIPITOR) 20 mg tablet, Take 1 tablet (20 mg total) by mouth daily., Disp: 90 tablet, Rfl: 1    busPIRone (BUSPAR) 5 mg tablet, Take 1 tablet (5 mg total) by mouth 2 (two) times a day., Disp: 60 tablet, Rfl: 0    clonazePAM (klonoPIN) 0.5 mg tablet, Take 1 tablet (0.5 mg total) by mouth nightly as needed for anxiety., Disp: 30 tablet, Rfl: 0    diclofenac sodium (VOLTAREN) 1 % topical gel, Apply 1 g topically as needed., Disp: , Rfl:     donepeziL (ARICEPT) 10 mg tablet, Take 1 tablet (10 mg total) by mouth nightly., Disp: 90 tablet, Rfl: 1    donepeziL (ARICEPT) 5 mg tablet, Take 5 mg daily x 30 days then 10 mg daily, Disp: 30 tablet, Rfl: 0    ferrous sulfate 325 mg (65 mg iron) tablet, Take 1 tablet (325 mg total) by mouth daily with breakfast., Disp: 90 tablet, Rfl: 1    fluticasone propionate (FLONASE) 50 mcg/actuation nasal spray, Administer 1 spray into each nostril daily., Disp: 16 g, Rfl: 5    fluticasone-umeclidinium-vilanterol (TRELEGY ELLIPTA) 100-62.5-25 mcg blister with device powder for inhalation, Inhale 1 puff daily., Disp: 1 each, Rfl: 1    folic acid (FOLVITE) 1 mg tablet, Take 1 tablet (1 mg total) by mouth daily., Disp: 90 tablet, Rfl: 1    furosemide (LASIX) 40 mg tablet, Take 1 tablet (40 mg total) by mouth daily.  Per weight gain protocol extra 40 mg tab for 2-3 lbs wt gain, Disp: 180 tablet, Rfl: 1    levothyroxine (SYNTHROID) 112 mcg tablet, Take 1 tablet (112 mcg total) by mouth daily., Disp: 90 tablet, Rfl: 0    loratadine (CLARITIN) 10 mg tablet, Take 10 mg by mouth daily., Disp: , Rfl:     magnesium oxide (MAG-OX) 400 mg (241.3 mg magnesium) tablet, Take 1 tablet (400 mg total) by mouth 2 (two) times a day., Disp: 180 tablet, Rfl: 3    medical marijuana, Take 1 each by mouth as needed., Disp: , Rfl:     melatonin 5 mg capsule, Take 5 mg by mouth nightly. Hs prn, Disp: , Rfl:     metoprolol succinate XL (TOPROL-XL) 25 mg 24 hr tablet, Take 1 tablet (25 mg total) by mouth daily. 0.5mg bid (Patient taking differently: Take 12.5 mg by mouth 2 (two) times a day.), Disp: 90 tablet, Rfl: 1    multivitamin tablet, Take 1 tablet by mouth daily., Disp: , Rfl:     nebulizers misc, 1 each 4 (four) times a day as needed (sob or wheeze)., Disp: 1 each, Rfl: 0    pantoprazole (PROTONIX) 40 mg EC tablet, TAKE 1 TABLET BY MOUTH 2 TIMES A DAY., Disp: 60 tablet, Rfl: 0    rivaroxaban (XARELTO) 20 mg tablet, Take 1 tablet (20 mg total) by mouth daily with dinner., Disp: 90 tablet, Rfl: 3    sertraline (ZOLOFT) 50 mg tablet, Take 1 tablet (50 mg total) by mouth daily., Disp: 90 tablet, Rfl: 0    thiamine 100 mg tablet, Take 1 tablet (100 mg total) by mouth daily., Disp: 30 tablet, Rfl: 1      BP Readings from Last 3 Encounters:   04/11/23 118/66   03/03/23 110/60   02/24/23 108/62       Recent Lab results:  Lab Results   Component Value Date    CHOL 138 11/17/2022   ,   Lab Results   Component Value Date    HDL 64 11/17/2022   ,   Lab Results   Component Value Date    LDLCALC 59 11/17/2022   ,   Lab Results   Component Value Date    TRIG 71 11/17/2022        Lab Results   Component Value Date    GLUCOSE 235 (H) 12/30/2022   ,   Lab Results   Component Value Date    HGBA1C 5.9 (H) 11/17/2022         Lab Results   Component Value Date     CREATININE 0.84 12/30/2022       Lab Results   Component Value Date    TSH 7.86 (H) 02/03/2023

## 2023-04-14 NOTE — ASSESSMENT & PLAN NOTE
4-day history of cough not using his Trelegy and not using his nebulizer more than once a day  Suspect COPD exacerbation  Since he also has a lymphocytosis from the ER, treating with a Z-Justin at this time  Also encourage patient to increase his nebulizer to 4 times a day  Must take the Trelegy daily  Get chest x-ray to assure no pneumonia

## 2023-04-14 NOTE — PROGRESS NOTES
[facility]      Reason for visit:   Chief Complaint   Patient presents with   • Cough      Cecil Morris is a 82 y.o. male who presents for     Patient presents with his caregiver for cough that started after he was in the ER on Monday, reports the cough continuous he is getting worse, not very productive, he does not recall having a fever, he is not short of breath, reports the cough seems to be worse at night when he is trying to lay down.  He denies being short of breath at night.  Has not been using his Trelegy daily, and only using his nebulizer once a day, used it this morning.  He denies any swelling in his lower legs.  He denies any chest pain    Caregiver requesting we check for COVID since he was in the ER on Monday and had likely some exposure to other patients in the emergency room.  Patient is up-to-date on all COVID vaccines          Past Medical History:   Diagnosis Date   • A-fib (CMS/[facility])    • Acute on chronic combined systolic and diastolic congestive heart failure (CMS/[facility])    • Anxiety 4/11/2023   • CHF (congestive heart failure), NYHA class I, acute, systolic (CMS/[facility])    • Depression    • Hyperthyroidism    • Hypoxia    • Near syncope    • NSVT (nonsustained ventricular tachycardia) (CMS/[facility])    • Orthostatic hypotension    • Presence of cardiac pacemaker    • Pulmonary embolism (CMS/[facility])    • Sepsis (CMS/[facility]) 05/16/2022       Past Surgical History:   Procedure Laterality Date   • CARDIAC CATHETERIZATION  11/19/2020    CATH PLACE/CORON ANGIO, IMG SUPER/INTERP,R&L HRT CATH, L HRT VENTRIC   • CORONARY ARTERY BYPASS GRAFT  2005       Social History     Tobacco Use   • Smoking status: Former     Years: 40.00     Types: Cigarettes     Quit date: 11/1/2012     Years since quitting: 10.4   • Smokeless tobacco: Never   Vaping Use   • Vaping status: Never Used   Substance Use Topics   • Alcohol use: Yes     Alcohol/week: 9.0 standard drinks of alcohol     Types: 9 Shots of liquor per week      Comment: celestine   • Drug use: Never       Family History   Problem Relation Age of Onset   • Thyroid cancer Biological Mother    • ALS Biological Mother    • Parkinsonism Biological Mother    • Heart attack Biological Father    • Liver cancer Maternal Grandmother        Patient has no known allergies.      Current Outpatient Medications:   •  acetaminophen (TYLENOL) 325 mg tablet, Take 650 mg by mouth as needed., Disp: , Rfl:   •  albuterol 2.5 mg /3 mL (0.083 %) nebulizer solution, Take 3 mL (2.5 mg total) by nebulization 4 (four) times a day., Disp: 180 mL, Rfl: 1  •  albuterol HFA (VENTOLIN HFA) 90 mcg/actuation inhaler, Inhale 2 puffs 4 (four) times a day as needed for wheezing. (Patient taking differently: Inhale 2 puffs as needed for wheezing.), Disp: 18 g, Rfl: 1  •  atorvastatin (LIPITOR) 20 mg tablet, Take 1 tablet (20 mg total) by mouth daily., Disp: 90 tablet, Rfl: 1  •  azithromycin (ZITHROMAX) 250 mg tablet, Take 2 tablets the first day, then 1 tablet daily for 4 days., Disp: 6 tablet, Rfl: 0  •  busPIRone (BUSPAR) 5 mg tablet, Take 1 tablet (5 mg total) by mouth 2 (two) times a day., Disp: 60 tablet, Rfl: 0  •  clonazePAM (klonoPIN) 0.5 mg tablet, Take 1 tablet (0.5 mg total) by mouth nightly as needed for anxiety., Disp: 30 tablet, Rfl: 0  •  diclofenac sodium (VOLTAREN) 1 % topical gel, Apply 1 g topically as needed., Disp: , Rfl:   •  donepeziL (ARICEPT) 10 mg tablet, Take 1 tablet (10 mg total) by mouth nightly., Disp: 90 tablet, Rfl: 1  •  donepeziL (ARICEPT) 5 mg tablet, Take 5 mg daily x 30 days then 10 mg daily, Disp: 30 tablet, Rfl: 0  •  ferrous sulfate 325 mg (65 mg iron) tablet, Take 1 tablet (325 mg total) by mouth daily with breakfast., Disp: 90 tablet, Rfl: 1  •  fluticasone propionate (FLONASE) 50 mcg/actuation nasal spray, Administer 1 spray into each nostril daily., Disp: 16 g, Rfl: 5  •  fluticasone-umeclidinium-vilanterol (TRELEGY ELLIPTA) 100-62.5-25 mcg blister with device powder  for inhalation, Inhale 1 puff daily., Disp: 1 each, Rfl: 1  •  folic acid (FOLVITE) 1 mg tablet, Take 1 tablet (1 mg total) by mouth daily., Disp: 90 tablet, Rfl: 1  •  furosemide (LASIX) 40 mg tablet, Take 1 tablet (40 mg total) by mouth daily. Per weight gain protocol extra 40 mg tab for 2-3 lbs wt gain, Disp: 180 tablet, Rfl: 1  •  levothyroxine (SYNTHROID) 112 mcg tablet, Take 1 tablet (112 mcg total) by mouth daily., Disp: 90 tablet, Rfl: 0  •  loratadine (CLARITIN) 10 mg tablet, Take 10 mg by mouth daily., Disp: , Rfl:   •  magnesium oxide (MAG-OX) 400 mg (241.3 mg magnesium) tablet, Take 1 tablet (400 mg total) by mouth 2 (two) times a day., Disp: 180 tablet, Rfl: 3  •  medical marijuana, Take 1 each by mouth as needed., Disp: , Rfl:   •  melatonin 5 mg capsule, Take 5 mg by mouth nightly. Hs prn, Disp: , Rfl:   •  metoprolol succinate XL (TOPROL-XL) 25 mg 24 hr tablet, Take 1 tablet (25 mg total) by mouth daily. 0.5mg bid (Patient taking differently: Take 12.5 mg by mouth 2 (two) times a day.), Disp: 90 tablet, Rfl: 1  •  multivitamin tablet, Take 1 tablet by mouth daily., Disp: , Rfl:   •  nebulizers misc, 1 each 4 (four) times a day as needed (sob or wheeze)., Disp: 1 each, Rfl: 0  •  pantoprazole (PROTONIX) 40 mg EC tablet, TAKE 1 TABLET BY MOUTH 2 TIMES A DAY., Disp: 60 tablet, Rfl: 0  •  rivaroxaban (XARELTO) 20 mg tablet, Take 1 tablet (20 mg total) by mouth daily with dinner., Disp: 90 tablet, Rfl: 3  •  sertraline (ZOLOFT) 50 mg tablet, Take 1 tablet (50 mg total) by mouth daily., Disp: 90 tablet, Rfl: 0  •  thiamine 100 mg tablet, Take 1 tablet (100 mg total) by mouth daily., Disp: 30 tablet, Rfl: 1    Review of Systems   All other systems reviewed and are negative.      Objective     Wt Readings from Last 3 Encounters:   04/14/23 88 kg (194 lb)   04/11/23 88.5 kg (195 lb)   03/03/23 88.5 kg (195 lb)       Temp Readings from Last 3 Encounters:   04/14/23 36.8 °C (98.3 °F) (Oral)   04/11/23 36.3 °C  "(97.3 °F) (Temporal)   03/03/23 (!) 35.9 °C (96.7 °F) (Temporal)       BP Readings from Last 3 Encounters:   04/14/23 130/72   04/11/23 118/66   03/03/23 110/60       Pulse Readings from Last 3 Encounters:   04/14/23 83   04/11/23 66   03/03/23 74     Vitals:    04/14/23 1118   BP: 130/72   BP Location: Left upper arm   Patient Position: Sitting   Pulse: 83   Temp: 36.8 °C (98.3 °F)   TempSrc: Oral   SpO2: 94%   Weight: 88 kg (194 lb)   Height: 1.702 m (5' 7\")     Body mass index is 30.38 kg/m².    Physical Exam  Vitals and nursing note reviewed.   HENT:      Head: Normocephalic.      Mouth/Throat:      Mouth: Mucous membranes are moist.   Eyes:      Conjunctiva/sclera: Conjunctivae normal.      Pupils: Pupils are equal, round, and reactive to light.   Cardiovascular:      Rate and Rhythm: Normal rate and regular rhythm.   Pulmonary:      Breath sounds: Examination of the right-upper field reveals decreased breath sounds and wheezing. Examination of the left-upper field reveals decreased breath sounds and wheezing. Examination of the right-middle field reveals decreased breath sounds and wheezing. Examination of the left-middle field reveals decreased breath sounds and wheezing. Examination of the right-lower field reveals decreased breath sounds and wheezing. Examination of the left-lower field reveals decreased breath sounds and wheezing. Decreased breath sounds and wheezing present.      Comments: No focal sounds, wheezing and diminished throughout all lung fields  Abdominal:      General: Abdomen is flat. Bowel sounds are normal.      Palpations: Abdomen is soft.   Musculoskeletal:         General: Normal range of motion.      Cervical back: Normal range of motion.   Lymphadenopathy:      Head:      Right side of head: No tonsillar or posterior auricular adenopathy.      Left side of head: No tonsillar or posterior auricular adenopathy.      Cervical:      Right cervical: No superficial cervical adenopathy.     " Left cervical: No superficial cervical adenopathy.      Upper Body:      Right upper body: No supraclavicular adenopathy.      Left upper body: No supraclavicular adenopathy.   Skin:     General: Skin is warm and dry.      Capillary Refill: Capillary refill takes less than 2 seconds.   Neurological:      General: No focal deficit present.      Mental Status: He is alert and oriented to person, place, and time.   Psychiatric:         Mood and Affect: Mood normal.         Behavior: Behavior normal.         Lab Results   Component Value Date    WBC 10.2 11/17/2022    HGB 13.0 (L) 11/17/2022    HCT 38.8 11/17/2022     11/17/2022    CHOL 138 11/17/2022    TRIG 71 11/17/2022    HDL 64 11/17/2022    ALT 35 11/17/2022    AST 56 (H) 11/17/2022     12/30/2022    K 3.6 12/30/2022    CL 95 (L) 12/30/2022    CREATININE 0.84 12/30/2022    BUN 19 12/30/2022    CO2 34 (H) 12/30/2022    TSH 7.86 (H) 02/03/2023    HGBA1C 5.9 (H) 11/17/2022    MICROALBUR <0.2 03/03/2023                     Assessment   Problem List Items Addressed This Visit        Respiratory    COPD exacerbation (CMS/[facility])     4-day history of cough not using his Trelegy and not using his nebulizer more than once a day  Suspect COPD exacerbation  Since he also has a lymphocytosis from the ER, treating with a Z-Justin at this time  Also encourage patient to increase his nebulizer to 4 times a day  Must take the Trelegy daily  Get chest x-ray to assure no pneumonia         Relevant Medications    azithromycin (ZITHROMAX) 250 mg tablet    Acute cough     See COPD exacerbation         Relevant Medications    azithromycin (ZITHROMAX) 250 mg tablet    Other Relevant Orders    X-RAY CHEST 2 VIEWS       Hematologic    Lymphocytosis - Primary     REVIEW  PERIPHERAL BLOOD SMEAR REVIEWED AND FINDINGS CONFIRMED BY DR. POTTS.   LYMPHOCYTOSIS,POSSIBILITY OF LYMPHOPROLIFERATIVE DISORDER SHOULD BE CONSIDERED, RECOMMEND FLOW CYTOMETRY IF CLINICALLY INDICATED      Above for  was from ER visit on 4/10/2023  His white count was 13.9 at that time, hemoglobin 11.6 and lymphocytes 51 with absolute lymphocytes 7.09  Labs from 4/5/2023 with a white count of 15.9, hemoglobin 11.7 and lymphocytes 44  Labs from November 2022 with a white count of 9.6, hemoglobin 12.9 and lymphocytes 38    Repeating labs as well as some viral cultures today  We will send to hematology oncology if no viral course found or if lymphocytes remain elevated         Relevant Orders    Arvin-Barr virus VCA antibody panel    Cytomegalovirus antibody, IgG    Cytomegalovirus antibody, IgM    CBC and differential    X-RAY CHEST 2 VIEWS    C-reactive protein    Sedimentation rate, automated       Other    At increased risk of exposure to COVID-19 virus     Will check COVID RSV and flu since he was in the ER prior to the onset of the cough  He is up-to-date on his vaccines and is beyond the timeframe for antivirals  Vital signs and exam all stable         Relevant Orders    COVID- 19 PCR Symptomatic (includes FLU A/B & RSV) - [facility]   Other Visit Diagnoses     Anemia, unspecified type        Relevant Orders    CBC and differential    Ferritin    Iron and TIBC    Reticulocyte Count w/ CBC    Vitamin B12    Comprehensive metabolic panel                This note was written with the assistance of voice recognition software, please excuse errors associated with voice recognition software.  AMARILIS James  4/14/2023

## 2023-04-14 NOTE — ASSESSMENT & PLAN NOTE
REVIEW  PERIPHERAL BLOOD SMEAR REVIEWED AND FINDINGS CONFIRMED BY DR. POTTS.   LYMPHOCYTOSIS,POSSIBILITY OF LYMPHOPROLIFERATIVE DISORDER SHOULD BE CONSIDERED, RECOMMEND FLOW CYTOMETRY IF CLINICALLY INDICATED      Above for was from ER visit on 4/10/2023  His white count was 13.9 at that time, hemoglobin 11.6 and lymphocytes 51 with absolute lymphocytes 7.09  Labs from 4/5/2023 with a white count of 15.9, hemoglobin 11.7 and lymphocytes 44  Labs from November 2022 with a white count of 9.6, hemoglobin 12.9 and lymphocytes 38    Repeating labs as well as some viral cultures today  We will send to hematology oncology if no viral course found or if lymphocytes remain elevated

## 2023-04-14 NOTE — ASSESSMENT & PLAN NOTE
Will check COVID RSV and flu since he was in the ER prior to the onset of the cough  He is up-to-date on his vaccines and is beyond the timeframe for antivirals  Vital signs and exam all stable

## 2023-04-17 ENCOUNTER — TELEPHONE (OUTPATIENT)
Dept: PRIMARY CARE | Facility: CLINIC | Age: 83
End: 2023-04-17
Payer: COMMERCIAL

## 2023-04-17 NOTE — TELEPHONE ENCOUNTER
----- Message from AMARILIS James sent at 4/17/2023  7:37 AM EDT -----  Sent a message via "[employer]" this weekend that he was negative for COVID RSV and flu, can you call the family and let them know  Karina

## 2023-04-17 NOTE — TELEPHONE ENCOUNTER
LMOM for patient's daughter that swab was negative and to call back with any further questions or concerns Thanks LW

## 2023-04-17 NOTE — RESULT ENCOUNTER NOTE
Sent a message via [employer] this weekend that he was negative for COVID RSV and flu, can you call the family and let them know  Karina

## 2023-04-21 ENCOUNTER — TELEPHONE (OUTPATIENT)
Dept: PRIMARY CARE | Facility: CLINIC | Age: 83
End: 2023-04-21
Payer: COMMERCIAL

## 2023-04-21 ENCOUNTER — HOSPITAL ENCOUNTER (OUTPATIENT)
Dept: RADIOLOGY | Age: 83
Discharge: HOME | End: 2023-04-21
Attending: PHYSICIAN ASSISTANT
Payer: COMMERCIAL

## 2023-04-21 DIAGNOSIS — D72.820 LYMPHOCYTOSIS: ICD-10-CM

## 2023-04-21 DIAGNOSIS — R05.1 ACUTE COUGH: ICD-10-CM

## 2023-04-21 PROCEDURE — 71046 X-RAY EXAM CHEST 2 VIEWS: CPT

## 2023-04-21 NOTE — RESULT ENCOUNTER NOTE
Chest x-ray shows no pneumonia  Vascular congestion  He has been euvolemic on cardiac exam as well as primary care exam  Please let Liu know his chest x-ray is clear of pneumonia, if he still not feeling well have them schedule another appointment  Karina

## 2023-04-22 LAB
ALBUMIN SERPL-MCNC: 4.1 G/DL (ref 3.6–5.1)
ALBUMIN/GLOB SERPL: 1.6 (CALC) (ref 1–2.5)
ALP SERPL-CCNC: 101 U/L (ref 35–144)
ALT SERPL-CCNC: 15 U/L (ref 9–46)
AST SERPL-CCNC: 22 U/L (ref 10–35)
BASOPHILS # BLD AUTO: 29 CELLS/UL (ref 0–200)
BASOPHILS NFR BLD AUTO: 0.3 %
BILIRUB SERPL-MCNC: 0.6 MG/DL (ref 0.2–1.2)
BUN SERPL-MCNC: 15 MG/DL (ref 7–25)
BUN/CREAT SERPL: ABNORMAL (CALC) (ref 6–22)
CALCIUM SERPL-MCNC: 8.9 MG/DL (ref 8.6–10.3)
CHLORIDE SERPL-SCNC: 98 MMOL/L (ref 98–110)
CMV IGG SERPL IA-ACNC: >10 U/ML
CMV IGM SERPL IA-ACNC: <30 AU/ML
CO2 SERPL-SCNC: 34 MMOL/L (ref 20–32)
CREAT SERPL-MCNC: 1 MG/DL (ref 0.7–1.22)
CRP SERPL-MCNC: 2 MG/L
EBV NA IGG SER IA-ACNC: 328 U/ML
EBV VCA IGG SER IA-ACNC: 391 U/ML
EBV VCA IGM SER IA-ACNC: <36 U/ML
EGFRCR SERPLBLD CKD-EPI 2021: 74 ML/MIN/1.73M2
EOSINOPHIL # BLD AUTO: 48 CELLS/UL (ref 15–500)
EOSINOPHIL NFR BLD AUTO: 0.5 %
ERYTHROCYTE [DISTWIDTH] IN BLOOD BY AUTOMATED COUNT: 12.1 % (ref 11–15)
ERYTHROCYTE [SEDIMENTATION RATE] IN BLOOD BY WESTERGREN METHOD: 14 MM/H
FERRITIN SERPL-MCNC: 31 NG/ML (ref 24–380)
GLOBULIN SER CALC-MCNC: 2.6 G/DL (CALC) (ref 1.9–3.7)
GLUCOSE SERPL-MCNC: 194 MG/DL (ref 65–99)
HCT VFR BLD AUTO: 35.8 % (ref 38.5–50)
HGB BLD-MCNC: 11.7 G/DL (ref 13.2–17.1)
IRON SATN MFR SERPL: 43 % (CALC) (ref 20–48)
IRON SERPL-MCNC: 169 MCG/DL (ref 50–180)
LYMPHOCYTES # BLD AUTO: 4176 CELLS/UL (ref 850–3900)
LYMPHOCYTES NFR BLD AUTO: 43.5 %
MCH RBC QN AUTO: 29.3 PG (ref 27–33)
MCHC RBC AUTO-ENTMCNC: 32.7 G/DL (ref 32–36)
MCV RBC AUTO: 89.5 FL (ref 80–100)
MONOCYTES # BLD AUTO: 586 CELLS/UL (ref 200–950)
MONOCYTES NFR BLD AUTO: 6.1 %
NEUTROPHILS # BLD AUTO: 4762 CELLS/UL (ref 1500–7800)
NEUTROPHILS NFR BLD AUTO: 49.6 %
PLATELET # BLD AUTO: 230 THOUSAND/UL (ref 140–400)
PMV BLD REES-ECKER: 9.3 FL (ref 7.5–12.5)
POTASSIUM SERPL-SCNC: 4 MMOL/L (ref 3.5–5.3)
PROT SERPL-MCNC: 6.7 G/DL (ref 6.1–8.1)
QUEST INTERPRETATION:: ABNORMAL
RBC # BLD AUTO: 4 MILLION/UL (ref 4.2–5.8)
RETICS #: NORMAL CELLS/UL (ref 25000–90000)
RETICS/RBC NFR AUTO: 1.8 %
SODIUM SERPL-SCNC: 140 MMOL/L (ref 135–146)
TIBC SERPL-MCNC: 391 MCG/DL (CALC) (ref 250–425)
TSH SERPL-ACNC: 3.06 MIU/L (ref 0.4–4.5)
VIT B12 SERPL-MCNC: 413 PG/ML (ref 200–1100)
WBC # BLD AUTO: 9.6 THOUSAND/UL (ref 3.8–10.8)

## 2023-04-24 DIAGNOSIS — F41.9 ANXIETY: ICD-10-CM

## 2023-04-24 RX ORDER — BUSPIRONE HYDROCHLORIDE 5 MG/1
TABLET ORAL
Qty: 60 TABLET | Refills: 0 | Status: SHIPPED | OUTPATIENT
Start: 2023-04-24 | End: 2023-05-05 | Stop reason: SDUPTHER

## 2023-04-24 NOTE — RESULT ENCOUNTER NOTE
Old mono, negative acute mono  Old CMV  Negative cytomegalovirus acute  Hemoglobin 11.7, iron stores and B12 are normal, reticulocyte count is normal  Lymphocytes down from [id number]  CMP is normal  Thyroid function is normal

## 2023-05-04 ENCOUNTER — TELEPHONE (OUTPATIENT)
Dept: PRIMARY CARE | Facility: CLINIC | Age: 83
End: 2023-05-04
Payer: COMMERCIAL

## 2023-05-04 DIAGNOSIS — K21.9 GASTROESOPHAGEAL REFLUX DISEASE, UNSPECIFIED WHETHER ESOPHAGITIS PRESENT: ICD-10-CM

## 2023-05-04 DIAGNOSIS — E05.90 HYPERTHYROIDISM: ICD-10-CM

## 2023-05-04 DIAGNOSIS — D50.8 IRON DEFICIENCY ANEMIA SECONDARY TO INADEQUATE DIETARY IRON INTAKE: ICD-10-CM

## 2023-05-04 DIAGNOSIS — I27.20 PULMONARY HYPERTENSION (CMS/HCC): ICD-10-CM

## 2023-05-04 DIAGNOSIS — F41.9 ANXIETY: ICD-10-CM

## 2023-05-04 DIAGNOSIS — Z95.5 STENTED CORONARY ARTERY: ICD-10-CM

## 2023-05-04 DIAGNOSIS — F10.10 ALCOHOL ABUSE: ICD-10-CM

## 2023-05-04 DIAGNOSIS — J41.8 MIXED SIMPLE AND MUCOPURULENT CHRONIC BRONCHITIS (CMS/HCC): ICD-10-CM

## 2023-05-04 DIAGNOSIS — F02.B0 MODERATE ALZHEIMER'S DEMENTIA, UNSPECIFIED TIMING OF DEMENTIA ONSET, UNSPECIFIED WHETHER BEHAVIORAL, PSYCHOTIC, OR MOOD DISTURBANCE OR ANXIETY (CMS/HCC): Primary | ICD-10-CM

## 2023-05-04 DIAGNOSIS — G30.9 MODERATE ALZHEIMER'S DEMENTIA, UNSPECIFIED TIMING OF DEMENTIA ONSET, UNSPECIFIED WHETHER BEHAVIORAL, PSYCHOTIC, OR MOOD DISTURBANCE OR ANXIETY (CMS/HCC): Primary | ICD-10-CM

## 2023-05-04 DIAGNOSIS — J00 ACUTE RHINITIS: ICD-10-CM

## 2023-05-04 DIAGNOSIS — E78.5 DYSLIPIDEMIA: ICD-10-CM

## 2023-05-04 NOTE — TELEPHONE ENCOUNTER
Ann called and is requesting a call back. The pharmacy Liu uses is closing and they need refills sent to the [facility]. There was also a recall on one of Liu's meds. Ann can be reached at: [phone]

## 2023-05-04 NOTE — TELEPHONE ENCOUNTER
Spoke with Ann we will discuss tomorrow all medications that are needing refilled currently Thanks LW

## 2023-05-05 ENCOUNTER — TELEPHONE (OUTPATIENT)
Dept: PRIMARY CARE | Facility: CLINIC | Age: 83
End: 2023-05-05

## 2023-05-05 ENCOUNTER — TELEPHONE (OUTPATIENT)
Dept: SCHEDULING | Facility: CLINIC | Age: 83
End: 2023-05-05
Payer: COMMERCIAL

## 2023-05-05 DIAGNOSIS — I48.0 PAROXYSMAL ATRIAL FIBRILLATION (CMS/HCC): ICD-10-CM

## 2023-05-05 DIAGNOSIS — I50.43 ACUTE ON CHRONIC COMBINED SYSTOLIC AND DIASTOLIC CONGESTIVE HEART FAILURE (CMS/HCC): ICD-10-CM

## 2023-05-05 DIAGNOSIS — I48.91 ATRIAL FIBRILLATION BY ELECTROCARDIOGRAM (CMS/HCC): ICD-10-CM

## 2023-05-05 DIAGNOSIS — Z95.5 STENTED CORONARY ARTERY: ICD-10-CM

## 2023-05-05 DIAGNOSIS — E78.5 DYSLIPIDEMIA: ICD-10-CM

## 2023-05-05 DIAGNOSIS — I50.21 CHF (CONGESTIVE HEART FAILURE), NYHA CLASS I, ACUTE, SYSTOLIC (CMS/HCC): ICD-10-CM

## 2023-05-05 RX ORDER — LEVOTHYROXINE SODIUM 112 UG/1
112 TABLET ORAL DAILY
Qty: 90 TABLET | Refills: 1 | Status: SHIPPED | OUTPATIENT
Start: 2023-05-05 | End: 2023-11-15

## 2023-05-05 RX ORDER — FOLIC ACID 1 MG/1
1 TABLET ORAL DAILY
Qty: 90 TABLET | Refills: 1 | Status: SHIPPED | OUTPATIENT
Start: 2023-05-05 | End: 2023-05-05 | Stop reason: SDUPTHER

## 2023-05-05 RX ORDER — FERROUS SULFATE 325(65) MG
325 TABLET ORAL
Qty: 90 TABLET | Refills: 1 | Status: SHIPPED | OUTPATIENT
Start: 2023-05-05 | End: 2023-05-05 | Stop reason: SDUPTHER

## 2023-05-05 RX ORDER — METOPROLOL SUCCINATE 25 MG/1
TABLET, EXTENDED RELEASE ORAL
Qty: 90 TABLET | Refills: 3 | Status: SHIPPED | OUTPATIENT
Start: 2023-05-05 | End: 2023-05-05 | Stop reason: SDUPTHER

## 2023-05-05 RX ORDER — LANOLIN ALCOHOL/MO/W.PET/CERES
100 CREAM (GRAM) TOPICAL DAILY
Qty: 90 TABLET | Refills: 1 | Status: SHIPPED | OUTPATIENT
Start: 2023-05-05 | End: 2023-05-05 | Stop reason: SDUPTHER

## 2023-05-05 RX ORDER — ATORVASTATIN CALCIUM 20 MG/1
20 TABLET, FILM COATED ORAL DAILY
Qty: 90 TABLET | Refills: 1 | Status: SHIPPED | OUTPATIENT
Start: 2023-05-05

## 2023-05-05 RX ORDER — BUSPIRONE HYDROCHLORIDE 5 MG/1
5 TABLET ORAL 2 TIMES DAILY
Qty: 180 TABLET | Refills: 1 | Status: SHIPPED | OUTPATIENT
Start: 2023-05-05 | End: 2023-11-15

## 2023-05-05 RX ORDER — FOLIC ACID 1 MG/1
1 TABLET ORAL DAILY
Qty: 90 TABLET | Refills: 1 | Status: SHIPPED | OUTPATIENT
Start: 2023-05-05 | End: 2023-11-30 | Stop reason: SDUPTHER

## 2023-05-05 RX ORDER — ALBUTEROL SULFATE 90 UG/1
2 INHALANT RESPIRATORY (INHALATION) EVERY 6 HOURS PRN
Qty: 18 G | Refills: 1 | Status: SHIPPED
Start: 2023-05-05 | End: 2023-08-11

## 2023-05-05 RX ORDER — FUROSEMIDE 40 MG/1
40 TABLET ORAL DAILY
Qty: 180 TABLET | Refills: 2 | Status: SHIPPED
Start: 2023-05-05 | End: 2023-06-27 | Stop reason: ALTCHOICE

## 2023-05-05 RX ORDER — ALBUTEROL SULFATE 0.83 MG/ML
2.5 SOLUTION RESPIRATORY (INHALATION)
Qty: 180 ML | Refills: 1 | Status: SHIPPED | OUTPATIENT
Start: 2023-05-05 | End: 2023-05-05 | Stop reason: SDUPTHER

## 2023-05-05 RX ORDER — ATORVASTATIN CALCIUM 20 MG/1
20 TABLET, FILM COATED ORAL DAILY
Qty: 90 TABLET | Refills: 1 | Status: SHIPPED | OUTPATIENT
Start: 2023-05-05 | End: 2023-05-05 | Stop reason: SDUPTHER

## 2023-05-05 RX ORDER — ALBUTEROL SULFATE 90 UG/1
2 INHALANT RESPIRATORY (INHALATION) EVERY 6 HOURS PRN
Qty: 18 G | Refills: 1 | Status: SHIPPED | OUTPATIENT
Start: 2023-05-05 | End: 2023-05-05 | Stop reason: SDUPTHER

## 2023-05-05 RX ORDER — SERTRALINE HYDROCHLORIDE 50 MG/1
50 TABLET, FILM COATED ORAL DAILY
Qty: 90 TABLET | Refills: 0 | Status: SHIPPED | OUTPATIENT
Start: 2023-05-05 | End: 2023-08-17

## 2023-05-05 RX ORDER — FERROUS SULFATE 325(65) MG
325 TABLET ORAL
Qty: 90 TABLET | Refills: 1 | Status: SHIPPED | OUTPATIENT
Start: 2023-05-05 | End: 2023-11-24

## 2023-05-05 RX ORDER — FUROSEMIDE 40 MG/1
40 TABLET ORAL DAILY
Qty: 180 TABLET | Refills: 1 | Status: SHIPPED | OUTPATIENT
Start: 2023-05-05 | End: 2023-05-05 | Stop reason: SDUPTHER

## 2023-05-05 RX ORDER — ATORVASTATIN CALCIUM 20 MG/1
20 TABLET, FILM COATED ORAL DAILY
Qty: 90 TABLET | Refills: 2 | Status: SHIPPED | OUTPATIENT
Start: 2023-05-05 | End: 2023-05-05 | Stop reason: SDUPTHER

## 2023-05-05 RX ORDER — BUSPIRONE HYDROCHLORIDE 5 MG/1
5 TABLET ORAL 2 TIMES DAILY
Qty: 180 TABLET | Refills: 1 | Status: SHIPPED | OUTPATIENT
Start: 2023-05-05 | End: 2023-05-05 | Stop reason: SDUPTHER

## 2023-05-05 RX ORDER — ALBUTEROL SULFATE 0.83 MG/ML
2.5 SOLUTION RESPIRATORY (INHALATION)
Qty: 180 ML | Refills: 1 | Status: SHIPPED | OUTPATIENT
Start: 2023-05-05 | End: 2023-08-11

## 2023-05-05 RX ORDER — SERTRALINE HYDROCHLORIDE 50 MG/1
50 TABLET, FILM COATED ORAL DAILY
Qty: 90 TABLET | Refills: 0 | Status: SHIPPED | OUTPATIENT
Start: 2023-05-05 | End: 2023-05-05 | Stop reason: SDUPTHER

## 2023-05-05 RX ORDER — METOPROLOL SUCCINATE 25 MG/1
TABLET, EXTENDED RELEASE ORAL
Qty: 90 TABLET | Refills: 2 | Status: SHIPPED | OUTPATIENT
Start: 2023-05-05 | End: 2023-08-29 | Stop reason: HOSPADM

## 2023-05-05 RX ORDER — PANTOPRAZOLE SODIUM 40 MG/1
40 TABLET, DELAYED RELEASE ORAL 2 TIMES DAILY
Qty: 180 TABLET | Refills: 0 | Status: SHIPPED | OUTPATIENT
Start: 2023-05-05 | End: 2023-07-07 | Stop reason: SDUPTHER

## 2023-05-05 RX ORDER — LEVOTHYROXINE SODIUM 112 UG/1
112 TABLET ORAL DAILY
Qty: 90 TABLET | Refills: 1 | Status: SHIPPED | OUTPATIENT
Start: 2023-05-05 | End: 2023-05-05 | Stop reason: SDUPTHER

## 2023-05-05 RX ORDER — LANOLIN ALCOHOL/MO/W.PET/CERES
100 CREAM (GRAM) TOPICAL DAILY
Qty: 90 TABLET | Refills: 1 | Status: SHIPPED | OUTPATIENT
Start: 2023-05-05 | End: 2023-11-24

## 2023-05-05 RX ORDER — FLUTICASONE PROPIONATE 50 MCG
1 SPRAY, SUSPENSION (ML) NASAL DAILY
Qty: 16 G | Refills: 5 | Status: SHIPPED | OUTPATIENT
Start: 2023-05-05 | End: 2023-08-11 | Stop reason: HOSPADM

## 2023-05-05 NOTE — TELEPHONE ENCOUNTER
Saira, patient's co worker called stating that mary needs all of his cardiac meds sent to [facility], saira is unsure which meds patient is on    Saira [phone]    [facility] tel: [phone]

## 2023-05-05 NOTE — TELEPHONE ENCOUNTER
Ann called back to discuss pt's meds also said one of the meds had a recall  and  All his meds need to be transferred to a different pharmacy. And pt needs to schedule a apt with therapy and also discuss what labs needs to be done  Ann [phone]

## 2023-05-05 NOTE — TELEPHONE ENCOUNTER
Ann called back.  Medications need to go to [facility] since [facility] is closing permanently next week.    Resent all cardiac medications and instructed Ann to call PCP for all other refills.    Ann wanted to confirm medications to be sent but she does not know the names.  Ann is not on chart so instructed her I could not give out any information/medications but I would re-send cardiac medications listed in his chart.

## 2023-05-22 DIAGNOSIS — F32.89 OTHER DEPRESSION: ICD-10-CM

## 2023-05-22 NOTE — TELEPHONE ENCOUNTER
RX refill for Clonazepam to local pharmacy Thanks     Medicine Refill Request    Last Office: 4/14/2023   Last Consult Visit: Visit date not found  Last Telemedicine Visit: Visit date not found    Next Appointment: 7/7/2023      Current Outpatient Medications:   •  acetaminophen (TYLENOL) 325 mg tablet, Take 650 mg by mouth as needed., Disp: , Rfl:   •  albuterol 2.5 mg /3 mL (0.083 %) nebulizer solution, Take 3 mL (2.5 mg total) by nebulization 4 (four) times a day., Disp: 180 mL, Rfl: 1  •  albuterol HFA 90 mcg/actuation inhaler, Inhale 2 puffs every 6 (six) hours as needed for wheezing or shortness of breath., Disp: 18 g, Rfl: 1  •  atorvastatin (LIPITOR) 20 mg tablet, Take 1 tablet (20 mg total) by mouth daily. Pill pack, Disp: 90 tablet, Rfl: 1  •  busPIRone (BUSPAR) 5 mg tablet, Take 1 tablet (5 mg total) by mouth 2 (two) times a day. Pill pack, Disp: 180 tablet, Rfl: 1  •  clonazePAM (klonoPIN) 0.5 mg tablet, Take 1 tablet (0.5 mg total) by mouth nightly as needed for anxiety., Disp: 30 tablet, Rfl: 0  •  diclofenac sodium (VOLTAREN) 1 % topical gel, Apply 1 g topically as needed., Disp: , Rfl:   •  donepeziL (ARICEPT) 10 mg tablet, Take 1 tablet (10 mg total) by mouth nightly., Disp: 90 tablet, Rfl: 1  •  donepeziL (ARICEPT) 5 mg tablet, Take 5 mg daily x 30 days then 10 mg daily, Disp: 30 tablet, Rfl: 0  •  ferrous sulfate 325 mg (65 mg iron) tablet, Take 1 tablet (325 mg total) by mouth daily with breakfast., Disp: 90 tablet, Rfl: 1  •  fluticasone propionate (FLONASE) 50 mcg/actuation nasal spray, Administer 1 spray into each nostril daily., Disp: 16 g, Rfl: 5  •  fluticasone-umeclidinium-vilanterol (TRELEGY ELLIPTA) 100-62.5-25 mcg blister with device powder for inhalation, Inhale 1 puff daily., Disp: 3 each, Rfl: 0  •  folic acid (FOLVITE) 1 mg tablet, Take 1 tablet (1 mg total) by mouth daily., Disp: 90 tablet, Rfl: 1  •  furosemide (LASIX) 40 mg tablet, Take 1 tablet (40 mg total) by mouth daily.  Per weight gain protocol extra 40 mg tab for 2-3 lbs wt gain, Disp: 180 tablet, Rfl: 2  •  levothyroxine (SYNTHROID) 112 mcg tablet, Take 1 tablet (112 mcg total) by mouth daily. Pill pack, Disp: 90 tablet, Rfl: 1  •  loratadine (CLARITIN) 10 mg tablet, Take 10 mg by mouth daily., Disp: , Rfl:   •  magnesium oxide (MAG-OX) 400 mg (241.3 mg magnesium) tablet, Take 1 tablet (400 mg total) by mouth 2 (two) times a day., Disp: 180 tablet, Rfl: 3  •  medical marijuana, Take 1 each by mouth as needed., Disp: , Rfl:   •  melatonin 5 mg capsule, Take 5 mg by mouth nightly. Hs prn, Disp: , Rfl:   •  metoprolol succinate XL (TOPROL-XL) 25 mg 24 hr tablet, TAKE 1/2 OF A TABLET BY MOUTH TWICE DAILY, Disp: 90 tablet, Rfl: 2  •  multivitamin tablet, Take 1 tablet by mouth daily., Disp: , Rfl:   •  nebulizers misc, 1 each 4 (four) times a day as needed (sob or wheeze)., Disp: 1 each, Rfl: 0  •  pantoprazole (PROTONIX) 40 mg EC tablet, Take 1 tablet (40 mg total) by mouth 2 (two) times a day. Pill pack, Disp: 180 tablet, Rfl: 0  •  rivaroxaban (XARELTO) 20 mg tablet, Take 1 tablet (20 mg total) by mouth daily with dinner., Disp: 90 tablet, Rfl: 2  •  sertraline (ZOLOFT) 50 mg tablet, Take 1 tablet (50 mg total) by mouth daily. Pill pack, Disp: 90 tablet, Rfl: 0  •  thiamine 100 mg tablet, Take 1 tablet (100 mg total) by mouth daily., Disp: 90 tablet, Rfl: 1      BP Readings from Last 3 Encounters:   04/14/23 130/72   04/11/23 118/66   03/03/23 110/60       Recent Lab results:  Lab Results   Component Value Date    CHOL 138 11/17/2022   ,   Lab Results   Component Value Date    HDL 64 11/17/2022   ,   Lab Results   Component Value Date    LDLCALC 59 11/17/2022   ,   Lab Results   Component Value Date    TRIG 71 11/17/2022        Lab Results   Component Value Date    GLUCOSE 194 (H) 04/21/2023   ,   Lab Results   Component Value Date    HGBA1C 5.9 (H) 11/17/2022         Lab Results   Component Value Date    CREATININE 1.00 04/21/2023        Lab Results   Component Value Date    TSH 3.06 04/21/2023

## 2023-05-24 RX ORDER — CLONAZEPAM 0.5 MG/1
0.5 TABLET ORAL NIGHTLY PRN
Qty: 30 TABLET | Refills: 0 | Status: SHIPPED | OUTPATIENT
Start: 2023-05-24 | End: 2023-06-19 | Stop reason: SDUPTHER

## 2023-05-30 ENCOUNTER — TELEPHONE (OUTPATIENT)
Dept: PRIMARY CARE | Facility: CLINIC | Age: 83
End: 2023-05-30
Payer: COMMERCIAL

## 2023-05-30 NOTE — TELEPHONE ENCOUNTER
Ann called and stating you were going to get him scheduled in office but then the provider quit is there anywhere you suggest they are calling us for guidance ? Thanks LW

## 2023-05-31 ENCOUNTER — PATIENT OUTREACH (OUTPATIENT)
Dept: CASE MANAGEMENT | Facility: CLINIC | Age: 83
End: 2023-05-31
Payer: COMMERCIAL

## 2023-05-31 LAB — MLHC DIABETIC EYE EXAM (EXTERNAL): ABNORMAL

## 2023-06-15 ENCOUNTER — PATIENT OUTREACH (OUTPATIENT)
Dept: CASE MANAGEMENT | Facility: CLINIC | Age: 83
End: 2023-06-15
Payer: COMMERCIAL

## 2023-06-15 NOTE — PROGRESS NOTES
Social Work Note      received referral from  PCP.     Patient is an 82 year old male who needs to establish care with behavioral health provider that accepts his BC/BS Medicare insurance.      SW placed 2nd call to pt to provide referral information, no answer.  SW left  2nd VM message for pt with SW number and requested a return call.     SW to follow up.     JOANIE Garcia  [facility]   [phone]

## 2023-06-19 ENCOUNTER — TELEPHONE (OUTPATIENT)
Dept: PRIMARY CARE | Facility: CLINIC | Age: 83
End: 2023-06-19
Payer: COMMERCIAL

## 2023-06-19 DIAGNOSIS — F32.89 OTHER DEPRESSION: ICD-10-CM

## 2023-06-19 RX ORDER — LORAZEPAM 0.5 MG/1
TABLET ORAL
COMMUNITY
Start: 2023-06-17 | End: 2023-06-27

## 2023-06-19 RX ORDER — CLONAZEPAM 0.5 MG/1
0.5 TABLET ORAL NIGHTLY PRN
Qty: 30 TABLET | Refills: 0 | Status: SHIPPED | OUTPATIENT
Start: 2023-06-19 | End: 2023-07-18

## 2023-06-19 NOTE — TELEPHONE ENCOUNTER
Pt's caregiver has an update re: recent  visit 6.16.23 about bilateral leg pain. Is requesting to speak with Dr. Chambers or a nurse about it and whether he needs meds or bloodwork. Concerned because congestive heart failure. Requested encounter be high priority. Ann, [phone]

## 2023-06-19 NOTE — TELEPHONE ENCOUNTER
Is he taking 40 mg daily? He could double his fursoemide to BID at 0800 and 1400 for 3 days and then monitor weight. Let me know what they think.

## 2023-06-19 NOTE — TELEPHONE ENCOUNTER
PDMP reviewed. Refill approved for refill on 6/23.    Next visit is on: 7/7/2023    Melanie Chambers DO

## 2023-06-19 NOTE — TELEPHONE ENCOUNTER
Medicine Refill Request    Last Office: 4/14/2023   Last Consult Visit: Visit date not found  Last Telemedicine Visit: Visit date not found    Next Appointment: 7/7/2023      Current Outpatient Medications:   •  acetaminophen (TYLENOL) 325 mg tablet, Take 650 mg by mouth as needed., Disp: , Rfl:   •  albuterol 2.5 mg /3 mL (0.083 %) nebulizer solution, Take 3 mL (2.5 mg total) by nebulization 4 (four) times a day., Disp: 180 mL, Rfl: 1  •  albuterol HFA 90 mcg/actuation inhaler, Inhale 2 puffs every 6 (six) hours as needed for wheezing or shortness of breath., Disp: 18 g, Rfl: 1  •  atorvastatin (LIPITOR) 20 mg tablet, Take 1 tablet (20 mg total) by mouth daily. Pill pack, Disp: 90 tablet, Rfl: 1  •  busPIRone (BUSPAR) 5 mg tablet, Take 1 tablet (5 mg total) by mouth 2 (two) times a day. Pill pack, Disp: 180 tablet, Rfl: 1  •  clonazePAM (klonoPIN) 0.5 mg tablet, Take 1 tablet (0.5 mg total) by mouth nightly as needed for anxiety., Disp: 30 tablet, Rfl: 0  •  diclofenac sodium (VOLTAREN) 1 % topical gel, Apply 1 g topically as needed., Disp: , Rfl:   •  donepeziL (ARICEPT) 10 mg tablet, Take 1 tablet (10 mg total) by mouth nightly., Disp: 90 tablet, Rfl: 1  •  donepeziL (ARICEPT) 5 mg tablet, Take 5 mg daily x 30 days then 10 mg daily, Disp: 30 tablet, Rfl: 0  •  ferrous sulfate 325 mg (65 mg iron) tablet, Take 1 tablet (325 mg total) by mouth daily with breakfast., Disp: 90 tablet, Rfl: 1  •  fluticasone propionate (FLONASE) 50 mcg/actuation nasal spray, Administer 1 spray into each nostril daily., Disp: 16 g, Rfl: 5  •  fluticasone-umeclidinium-vilanterol (TRELEGY ELLIPTA) 100-62.5-25 mcg blister with device powder for inhalation, Inhale 1 puff daily., Disp: 3 each, Rfl: 0  •  folic acid (FOLVITE) 1 mg tablet, Take 1 tablet (1 mg total) by mouth daily., Disp: 90 tablet, Rfl: 1  •  furosemide (LASIX) 40 mg tablet, Take 1 tablet (40 mg total) by mouth daily. Per weight gain protocol extra 40 mg tab for 2-3 lbs wt  gain, Disp: 180 tablet, Rfl: 2  •  levothyroxine (SYNTHROID) 112 mcg tablet, Take 1 tablet (112 mcg total) by mouth daily. Pill pack, Disp: 90 tablet, Rfl: 1  •  loratadine (CLARITIN) 10 mg tablet, Take 10 mg by mouth daily., Disp: , Rfl:   •  magnesium oxide (MAG-OX) 400 mg (241.3 mg magnesium) tablet, Take 1 tablet (400 mg total) by mouth 2 (two) times a day., Disp: 180 tablet, Rfl: 3  •  medical marijuana, Take 1 each by mouth as needed., Disp: , Rfl:   •  melatonin 5 mg capsule, Take 5 mg by mouth nightly. Hs prn, Disp: , Rfl:   •  metoprolol succinate XL (TOPROL-XL) 25 mg 24 hr tablet, TAKE 1/2 OF A TABLET BY MOUTH TWICE DAILY, Disp: 90 tablet, Rfl: 2  •  multivitamin tablet, Take 1 tablet by mouth daily., Disp: , Rfl:   •  nebulizers misc, 1 each 4 (four) times a day as needed (sob or wheeze)., Disp: 1 each, Rfl: 0  •  pantoprazole (PROTONIX) 40 mg EC tablet, Take 1 tablet (40 mg total) by mouth 2 (two) times a day. Pill pack, Disp: 180 tablet, Rfl: 0  •  rivaroxaban (XARELTO) 20 mg tablet, Take 1 tablet (20 mg total) by mouth daily with dinner., Disp: 90 tablet, Rfl: 2  •  sertraline (ZOLOFT) 50 mg tablet, Take 1 tablet (50 mg total) by mouth daily. Pill pack, Disp: 90 tablet, Rfl: 0  •  thiamine 100 mg tablet, Take 1 tablet (100 mg total) by mouth daily., Disp: 90 tablet, Rfl: 1      BP Readings from Last 3 Encounters:   04/14/23 130/72   04/11/23 118/66   03/03/23 110/60       Recent Lab results:  Lab Results   Component Value Date    CHOL 138 11/17/2022   ,   Lab Results   Component Value Date    HDL 64 11/17/2022   ,   Lab Results   Component Value Date    LDLCALC 59 11/17/2022   ,   Lab Results   Component Value Date    TRIG 71 11/17/2022        Lab Results   Component Value Date    GLUCOSE 194 (H) 04/21/2023   ,   Lab Results   Component Value Date    HGBA1C 5.9 (H) 11/17/2022         Lab Results   Component Value Date    CREATININE 1.00 04/21/2023       Lab Results   Component Value Date    TSH 3.06  04/21/2023

## 2023-06-19 NOTE — TELEPHONE ENCOUNTER
Spoke with Ann he is taking 40 mg so she will increase to BID for 3 days and let us know Thanks LW

## 2023-06-20 NOTE — TELEPHONE ENCOUNTER
Ann patients caregiver call stating the ambulatory referral for a mental health provider does not except medicare. Please contact her with other mental health providers that take medicare. Ann can be reached at [phone]

## 2023-06-20 NOTE — TELEPHONE ENCOUNTER
Spoke with Ann told her the quickest and easiest way to go about finding out this information is to call the back of his insurance card Thanks LW

## 2023-06-26 ENCOUNTER — PATIENT OUTREACH (OUTPATIENT)
Dept: CASE MANAGEMENT | Facility: CLINIC | Age: 83
End: 2023-06-26
Payer: COMMERCIAL

## 2023-06-26 ASSESSMENT — SOCIAL DETERMINANTS OF HEALTH (SDOH)
HOW OFTEN DO YOU ATTENT MEETINGS OF THE CLUB OR ORGANIZATION YOU BELONG TO?: NEVER
HOW OFTEN DO YOU GET TOGETHER WITH FRIENDS OR RELATIVES?: ONCE A WEEK
IN A TYPICAL WEEK, HOW MANY TIMES DO YOU TALK ON THE PHONE WITH FAMILY, FRIENDS, OR NEIGHBORS?: MORE THAN THREE TIMES A WEEK
HOW OFTEN DO YOU ATTENT MEETINGS OF THE CLUB OR ORGANIZATION YOU BELONG TO?: PATIENT DECLINED
HOW OFTEN DO YOU ATTEND CHURCH OR RELIGIOUS SERVICES?: NEVER
DO YOU BELONG TO ANY CLUBS OR ORGANIZATIONS SUCH AS CHURCH GROUPS UNIONS, FRATERNAL OR ATHLETIC GROUPS, OR SCHOOL GROUPS?: NO

## 2023-06-26 NOTE — PROGRESS NOTES
Social Work Note      received referral from  PCP.  SW received VM message from pt's daughter, who was returning SW call.   SW placed 3rd call to pt to provide referral information.    Patient is an 82 year old male who needs to establish care with behavioral health provider that accepts his BC/ Medicare insurance.   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  Daughter and her sister are the ones to help him and support him as much as possible.  Pt has paid care givers 4 hours/day 7 days/week.  Daughter would like pt to see therapist in order to receive emotional support.   Options for emotional support were reviewed:  1. Daughter would like pt to be seen by therapist at PCP office.  SW educated daughter on the IB model and limited sessions.  2. [facility] -   [phone]   For long term and higher lever of care therapy services.  Daughter prefers pt to start therapy services at PCP office as pt will be more likely to accept therapy services this way.  If needed, she will contact [facility] in order to schedule appointment.     SW completed SDOH assessment, and there were no SDOH insecurities identified at this time.    Daughter denied further needs at this time.  Daughter has SW number for future assistance.   SW to follow as requested.     Qiana Lewis, JOANIE  [facility]   [phone]

## 2023-06-27 ENCOUNTER — TELEPHONE (OUTPATIENT)
Dept: SCHEDULING | Facility: CLINIC | Age: 83
End: 2023-06-27
Payer: COMMERCIAL

## 2023-06-27 ENCOUNTER — OFFICE VISIT (OUTPATIENT)
Dept: CARDIOLOGY | Facility: CLINIC | Age: 83
End: 2023-06-27
Payer: COMMERCIAL

## 2023-06-27 ENCOUNTER — TELEPHONE (OUTPATIENT)
Dept: CARDIOLOGY | Facility: CLINIC | Age: 83
End: 2023-06-27

## 2023-06-27 VITALS
BODY MASS INDEX: 32.65 KG/M2 | HEIGHT: 67 IN | WEIGHT: 208 LBS | RESPIRATION RATE: 18 BRPM | OXYGEN SATURATION: 95 % | DIASTOLIC BLOOD PRESSURE: 62 MMHG | SYSTOLIC BLOOD PRESSURE: 130 MMHG | HEART RATE: 81 BPM

## 2023-06-27 DIAGNOSIS — I49.5 SSS (SICK SINUS SYNDROME) (CMS/HCC): ICD-10-CM

## 2023-06-27 DIAGNOSIS — I48.0 PAROXYSMAL ATRIAL FIBRILLATION (CMS/HCC): Primary | ICD-10-CM

## 2023-06-27 DIAGNOSIS — I34.0 MITRAL VALVE INSUFFICIENCY, UNSPECIFIED ETIOLOGY: ICD-10-CM

## 2023-06-27 DIAGNOSIS — I47.29 NSVT (NONSUSTAINED VENTRICULAR TACHYCARDIA) (CMS/HCC): ICD-10-CM

## 2023-06-27 DIAGNOSIS — I25.10 CORONARY ARTERY DISEASE INVOLVING NATIVE CORONARY ARTERY OF NATIVE HEART WITHOUT ANGINA PECTORIS: ICD-10-CM

## 2023-06-27 DIAGNOSIS — R09.89 BILATERAL CAROTID BRUITS: ICD-10-CM

## 2023-06-27 DIAGNOSIS — I50.21 CHF (CONGESTIVE HEART FAILURE), NYHA CLASS I, ACUTE, SYSTOLIC (CMS/HCC): ICD-10-CM

## 2023-06-27 DIAGNOSIS — I45.10 RBBB: ICD-10-CM

## 2023-06-27 DIAGNOSIS — Z95.2 S/P TAVR (TRANSCATHETER AORTIC VALVE REPLACEMENT): ICD-10-CM

## 2023-06-27 DIAGNOSIS — E11.9 TYPE 2 DIABETES MELLITUS WITHOUT COMPLICATION, WITHOUT LONG-TERM CURRENT USE OF INSULIN (CMS/HCC): ICD-10-CM

## 2023-06-27 DIAGNOSIS — I48.91 ATRIAL FIBRILLATION BY ELECTROCARDIOGRAM (CMS/HCC): ICD-10-CM

## 2023-06-27 DIAGNOSIS — I27.20 PULMONARY HYPERTENSION (CMS/HCC): ICD-10-CM

## 2023-06-27 DIAGNOSIS — G30.9 MODERATE ALZHEIMER'S DEMENTIA, UNSPECIFIED TIMING OF DEMENTIA ONSET, UNSPECIFIED WHETHER BEHAVIORAL, PSYCHOTIC, OR MOOD DISTURBANCE OR ANXIETY (CMS/HCC): ICD-10-CM

## 2023-06-27 DIAGNOSIS — Z95.820 S/P ANGIOPLASTY WITH STENT: ICD-10-CM

## 2023-06-27 DIAGNOSIS — I35.0 NONRHEUMATIC AORTIC VALVE STENOSIS: ICD-10-CM

## 2023-06-27 DIAGNOSIS — E83.42 HYPOMAGNESEMIA: ICD-10-CM

## 2023-06-27 DIAGNOSIS — I71.21 ANEURYSM OF ASCENDING AORTA WITHOUT RUPTURE (CMS/HCC): ICD-10-CM

## 2023-06-27 DIAGNOSIS — I10 ESSENTIAL HYPERTENSION: ICD-10-CM

## 2023-06-27 DIAGNOSIS — F02.B0 MODERATE ALZHEIMER'S DEMENTIA, UNSPECIFIED TIMING OF DEMENTIA ONSET, UNSPECIFIED WHETHER BEHAVIORAL, PSYCHOTIC, OR MOOD DISTURBANCE OR ANXIETY (CMS/HCC): ICD-10-CM

## 2023-06-27 DIAGNOSIS — Z95.0 PRESENCE OF CARDIAC PACEMAKER: ICD-10-CM

## 2023-06-27 DIAGNOSIS — R05.3 PERSISTENT COUGH: ICD-10-CM

## 2023-06-27 LAB
ATRIAL RATE: 64
QRS DURATION: 200
QT INTERVAL: 514
QTC CALCULATION(BAZETT): 534
R AXIS: 268
T WAVE AXIS: 81
VENTRICULAR RATE: 65

## 2023-06-27 PROCEDURE — 99215 OFFICE O/P EST HI 40 MIN: CPT | Mod: 25 | Performed by: INTERNAL MEDICINE

## 2023-06-27 PROCEDURE — 94761 N-INVAS EAR/PLS OXIMETRY MLT: CPT | Performed by: INTERNAL MEDICINE

## 2023-06-27 PROCEDURE — 3078F DIAST BP <80 MM HG: CPT | Performed by: INTERNAL MEDICINE

## 2023-06-27 PROCEDURE — 3008F BODY MASS INDEX DOCD: CPT | Performed by: INTERNAL MEDICINE

## 2023-06-27 PROCEDURE — 3075F SYST BP GE 130 - 139MM HG: CPT | Performed by: INTERNAL MEDICINE

## 2023-06-27 PROCEDURE — 93000 ELECTROCARDIOGRAM COMPLETE: CPT | Performed by: INTERNAL MEDICINE

## 2023-06-27 RX ORDER — SPIRONOLACTONE 25 MG/1
25 TABLET ORAL DAILY
Qty: 90 TABLET | Refills: 1 | Status: SHIPPED | OUTPATIENT
Start: 2023-06-27 | End: 2023-12-11

## 2023-06-27 RX ORDER — LANOLIN ALCOHOL/MO/W.PET/CERES
400 CREAM (GRAM) TOPICAL 2 TIMES DAILY
Qty: 180 TABLET | Refills: 3 | Status: SHIPPED | OUTPATIENT
Start: 2023-06-27 | End: 2024-06-26

## 2023-06-27 RX ORDER — TORSEMIDE 20 MG/1
20 TABLET ORAL DAILY
Qty: 90 TABLET | Refills: 3 | Status: SHIPPED
Start: 2023-06-27 | End: 2023-07-17 | Stop reason: ALTCHOICE

## 2023-06-27 SDOH — ECONOMIC STABILITY: TRANSPORTATION INSECURITY
IN THE PAST 12 MONTHS, HAS THE LACK OF TRANSPORTATION KEPT YOU FROM MEDICAL APPOINTMENTS OR FROM GETTING MEDICATIONS?: NO

## 2023-06-27 SDOH — ECONOMIC STABILITY: FOOD INSECURITY: WITHIN THE PAST 12 MONTHS, THE FOOD YOU BOUGHT JUST DIDN'T LAST AND YOU DIDN'T HAVE MONEY TO GET MORE.: NEVER TRUE

## 2023-06-27 SDOH — ECONOMIC STABILITY: FOOD INSECURITY: WITHIN THE PAST 12 MONTHS, YOU WORRIED THAT YOUR FOOD WOULD RUN OUT BEFORE YOU GOT MONEY TO BUY MORE.: NEVER TRUE

## 2023-06-27 SDOH — ECONOMIC STABILITY: INCOME INSECURITY: IN THE LAST 12 MONTHS, WAS THERE A TIME WHEN YOU WERE NOT ABLE TO PAY THE MORTGAGE OR RENT ON TIME?: NO

## 2023-06-27 SDOH — ECONOMIC STABILITY: TRANSPORTATION INSECURITY
IN THE PAST 12 MONTHS, HAS LACK OF TRANSPORTATION KEPT YOU FROM MEETINGS, WORK, OR FROM GETTING THINGS NEEDED FOR DAILY LIVING?: NO

## 2023-06-27 ASSESSMENT — ENCOUNTER SYMPTOMS
NEAR-SYNCOPE: 0
SHORTNESS OF BREATH: 1
LOSS OF BALANCE: 0
ORTHOPNEA: 0
PALPITATIONS: 0
IRREGULAR HEARTBEAT: 0
MUSCLE CRAMPS: 1
SLEEP DISTURBANCES DUE TO BREATHING: 1
COUGH: 1
MYALGIAS: 1
SYNCOPE: 0
MEMORY LOSS: 1
PND: 0
INSOMNIA: 1
WEIGHT GAIN: 1
DYSPNEA ON EXERTION: 0

## 2023-06-27 ASSESSMENT — SOCIAL DETERMINANTS OF HEALTH (SDOH): HOW HARD IS IT FOR YOU TO PAY FOR THE VERY BASICS LIKE FOOD, HOUSING, MEDICAL CARE, AND HEATING?: NOT HARD AT ALL

## 2023-06-27 NOTE — ASSESSMENT & PLAN NOTE
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  Class IIIC- clinically hypervolemic.  Change Lasix to Torsemide. Will attempt to optimize medical therapy for same.  Had lower BP inpatient and his Aldactone was stopped, will resume now to help balance his loop diuretic induced Hypokalemia.

## 2023-06-27 NOTE — TELEPHONE ENCOUNTER
Updated in record. 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    Added 2005 LHC/RHC into history.

## 2023-06-27 NOTE — TELEPHONE ENCOUNTER
Medication Clarification     Name of caller: [facility]    Relationship to patient: pharmacist    Name of patient: Cecil Tavarezvimal    Name of physician: Yousuf Wolf MD    Name of medication:   torsemide (DEMADEX) 20 mg tablet  spironolactone (ALDACTONE) 25 mg tablet    What needs to be clarified: Are the meds above replacing pt's furosemide rx?    Best contact number: [phone]

## 2023-06-27 NOTE — TELEPHONE ENCOUNTER
Needs Auth for Echo CPT 72454 and Dx. Z95.2    Needs Auth for CT Chest w/wo CPT 45172 and Dx. I71.21    Both Test to be done at [place]    INS:  [facility] [id number]

## 2023-06-27 NOTE — TELEPHONE ENCOUNTER
Furosemide d/c. Torsemide and spironolactone replacing it. Med list appropriately reflects this. Spoke with [facility] and told him this. He verbalized understanding.

## 2023-06-27 NOTE — TELEPHONE ENCOUNTER
Ann - caregiver called and said she received some past history from the family.    Patient had R & L cardiac cath in 2005 and had coronary artery bypass graft. Just wanted to pass this info along to the doctor

## 2023-06-27 NOTE — TELEPHONE ENCOUNTER
[place] - CT Chest 37869:   [id number]    6/27/23 -- 9/24/23    [place] - Echo 95407:   [id number]  6/27/23 -- 9/24/23    Please call pt to give auth info and to assist with scheduling testing

## 2023-06-27 NOTE — PATIENT INSTRUCTIONS
Get CT at [place] per Dr. Chambers's order    Get down to weight of 190lb with diuretics.      Want to drink water or crystal lite 40oz a day.    Now diuretics are Torsemide 20mg and Aldactone 25mg    This will treat the fluid in lungs and help with leg swelling.    Labs in 1-2 weeks.

## 2023-06-27 NOTE — ASSESSMENT & PLAN NOTE
Continue Xarelto and Toprol, statin for Ischemic Cardiomyopathy.  No symptoms at present outside CHF symptoms.  TTE is pending.

## 2023-06-27 NOTE — ASSESSMENT & PLAN NOTE
Stable, no symptoms but may be high % pacing in which cased I'd get a BiV if he will remain essentially 100% paced and having systolic dysfunction.

## 2023-06-27 NOTE — PROGRESS NOTES
Cecil Morris is a 83 y.o. male is present in the office today for followup visit.    84 yo male is here for SOB, fluid retention, weight gain.  Known HFREF EF 42%.  Had a prolonged PNA this winter and a CT was ordered but never completed.     Legs have been cramping and he's gained 10lb, eating late at night.  He takes melatonin for sleep.  He denies any chest pain.  No Fevers, +productive cough.  He doesn't drink water only green tea and soda.  He lives in [facility] and he went to [employer].  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  He eats a very high Na diet but eats lots of prepared foods.     Ambulatory sats were 94-96% on room air.  Drinks no water at all, only tea.             Past Medical History:   Diagnosis Date   • A-fib (CMS/[facility])    • Acute on chronic combined systolic and diastolic congestive heart failure (CMS/[facility])    • Anxiety 4/11/2023   • CHF (congestive heart failure), NYHA class I, acute, systolic (CMS/[facility])    • Depression    • Hyperthyroidism    • Hypoxia    • Near syncope    • NSVT (nonsustained ventricular tachycardia) (CMS/[facility])    • Orthostatic hypotension    • Presence of cardiac pacemaker    • Pulmonary embolism (CMS/[facility])    • Sepsis (CMS/[facility]) 05/16/2022       Past Surgical History:   Procedure Laterality Date   • CARDIAC CATHETERIZATION  11/19/2020    CATH PLACE/CORON ANGIO, IMG SUPER/INTERP,R&L HRT CATH, L HRT VENTRIC   • CORONARY ARTERY BYPASS GRAFT  2005        Social History     Socioeconomic History   • Marital status:      Spouse name: None   • Number of children: 2   • Years of education: None   • Highest education level: None   Occupational History   • Occupation: retired     Comment: [employer] in [place]   Tobacco Use   • Smoking status: Former     Years: 40.00     Types: Cigarettes     Quit date: 11/1/2012     Years since quitting: 10.6   • Smokeless tobacco: Never   Vaping Use   • Vaping Use: Never used   Substance  and Sexual Activity   • Alcohol use: Not Currently     Alcohol/week: 9.0 standard drinks of alcohol     Types: 9 Shots of liquor per week     Comment: vodka stopped late in winter   • Drug use: Never   • Sexual activity: Not Currently   Social History Narrative    Lives alone, and has help for meal prep and med management     Social Determinants of Health     Financial Resource Strain: Low Risk  (6/27/2023)    Overall Financial Resource Strain (CARDIA)    • Difficulty of Paying Living Expenses: Not hard at all   Food Insecurity: No Food Insecurity (6/27/2023)    Hunger Vital Sign    • Worried About Running Out of Food in the Last Year: Never true    • Ran Out of Food in the Last Year: Never true   Transportation Needs: No Transportation Needs (6/27/2023)    PRAPARE - Transportation    • Lack of Transportation (Medical): No    • Lack of Transportation (Non-Medical): No   Social Connections: Socially Isolated (6/26/2023)    Social Connection and Isolation Panel [NHANES]    • Frequency of Communication with Friends and Family: More than three times a week    • Frequency of Social Gatherings with Friends and Family: Once a week    • Attends Confucianism Services: Never    • Active Member of Clubs or Organizations: No    • Attends Club or Organization Meetings: Patient refused    • Marital Status:    Housing Stability: Low Risk  (6/27/2023)    Housing Stability Vital Sign    • Unable to Pay for Housing in the Last Year: No    • Number of Places Lived in the Last Year: 1    • Unstable Housing in the Last Year: No       Family History   Problem Relation Age of Onset   • Thyroid cancer Biological Mother    • ALS Biological Mother    • Parkinsonism Biological Mother    • Heart attack Biological Father    • Liver cancer Maternal Grandmother        Patient has no known allergies.    Current Outpatient Medications   Medication Sig Dispense Refill   • acetaminophen (TYLENOL) 325 mg tablet Take 650 mg by mouth as needed.      • albuterol 2.5 mg /3 mL (0.083 %) nebulizer solution Take 3 mL (2.5 mg total) by nebulization 4 (four) times a day. 180 mL 1   • albuterol HFA 90 mcg/actuation inhaler Inhale 2 puffs every 6 (six) hours as needed for wheezing or shortness of breath. 18 g 1   • atorvastatin (LIPITOR) 20 mg tablet Take 1 tablet (20 mg total) by mouth daily. Pill pack 90 tablet 1   • busPIRone (BUSPAR) 5 mg tablet Take 1 tablet (5 mg total) by mouth 2 (two) times a day. Pill pack 180 tablet 1   • clonazePAM (klonoPIN) 0.5 mg tablet Take 1 tablet (0.5 mg total) by mouth nightly as needed for anxiety. 30 tablet 0   • diclofenac sodium (VOLTAREN) 1 % topical gel Apply 1 g topically as needed.     • donepeziL (ARICEPT) 10 mg tablet Take 1 tablet (10 mg total) by mouth nightly. 90 tablet 1   • ferrous sulfate 325 mg (65 mg iron) tablet Take 1 tablet (325 mg total) by mouth daily with breakfast. 90 tablet 1   • fluticasone propionate (FLONASE) 50 mcg/actuation nasal spray Administer 1 spray into each nostril daily. 16 g 5   • fluticasone-umeclidinium-vilanterol (TRELEGY ELLIPTA) 100-62.5-25 mcg blister with device powder for inhalation Inhale 1 puff daily. 3 each 0   • folic acid (FOLVITE) 1 mg tablet Take 1 tablet (1 mg total) by mouth daily. 90 tablet 1   • levothyroxine (SYNTHROID) 112 mcg tablet Take 1 tablet (112 mcg total) by mouth daily. Pill pack 90 tablet 1   • loratadine (CLARITIN) 10 mg tablet Take 10 mg by mouth daily.     • magnesium oxide (MAG-OX) 400 mg (241.3 mg magnesium) tablet Take 1 tablet (400 mg total) by mouth 2 (two) times a day. 180 tablet 3   • medical marijuana Take 1 each by mouth as needed.     • melatonin 5 mg capsule Take 5 mg by mouth nightly. Hs prn     • metoprolol succinate XL (TOPROL-XL) 25 mg 24 hr tablet TAKE 1/2 OF A TABLET BY MOUTH TWICE DAILY 90 tablet 2   • multivitamin tablet Take 1 tablet by mouth daily.     • nebulizers misc 1 each 4 (four) times a day as needed (sob or wheeze). 1 each 0   •  pantoprazole (PROTONIX) 40 mg EC tablet Take 1 tablet (40 mg total) by mouth 2 (two) times a day. Pill pack 180 tablet 0   • rivaroxaban (XARELTO) 20 mg tablet Take 1 tablet (20 mg total) by mouth daily with dinner. 90 tablet 2   • sertraline (ZOLOFT) 50 mg tablet Take 1 tablet (50 mg total) by mouth daily. Pill pack 90 tablet 0   • spironolactone (ALDACTONE) 25 mg tablet Take 1 tablet (25 mg total) by mouth daily. 90 tablet 1   • thiamine 100 mg tablet Take 1 tablet (100 mg total) by mouth daily. 90 tablet 1   • torsemide (DEMADEX) 20 mg tablet Take 1 tablet (20 mg total) by mouth daily. 90 tablet 3     No current facility-administered medications for this visit.       Review of Systems   Constitutional: Positive for weight gain.   Cardiovascular: Positive for leg swelling. Negative for chest pain, dyspnea on exertion, irregular heartbeat, near-syncope, orthopnea, palpitations, paroxysmal nocturnal dyspnea and syncope.   Respiratory: Positive for cough, shortness of breath and sleep disturbances due to breathing.    Hematologic/Lymphatic: Negative for bleeding problem.   Musculoskeletal: Positive for muscle cramps and myalgias.   Neurological: Negative for loss of balance.   Psychiatric/Behavioral: Positive for memory loss. The patient has insomnia.           Vitals:    06/27/23 0932   BP: 130/62   Pulse: 81   Resp: 18   SpO2: 95%     Ambulatory pulse ox 94%, 96% after walking 100feet.    Body mass index is 32.58 kg/m².      Physical Exam  Constitutional:       Appearance: Normal appearance. He is normal weight.   HENT:      Head: Normocephalic.      Mouth/Throat:      Mouth: Mucous membranes are moist.      Pharynx: No posterior oropharyngeal erythema.   Eyes:      Extraocular Movements: Extraocular movements intact.   Neck:      Vascular: JVD present.   Cardiovascular:      Rate and Rhythm: Normal rate and regular rhythm.      Chest Wall: PMI is not displaced.      Pulses: Normal pulses.      Heart sounds: S1  normal and S2 normal. No murmur heard.     No gallop. No S3 or S4 sounds.   Pulmonary:      Effort: Pulmonary effort is normal.      Breath sounds: Rales present.   Chest:      Chest wall: No tenderness.   Abdominal:      General: Abdomen is flat. Bowel sounds are normal. There is distension.      Tenderness: There is no abdominal tenderness.   Musculoskeletal:      Cervical back: Neck supple.      Right lower leg: No edema.      Left lower leg: No edema.      Comments: Functional status is:  good   Skin:     General: Skin is warm.   Neurological:      Mental Status: He is alert and oriented to person, place, and time. Mental status is at baseline.   Psychiatric:         Attention and Perception: Attention normal.         Mood and Affect: Mood and affect normal.         Speech: Speech normal.          Lab Results   Component Value Date    WBC 9.6 04/21/2023    RBC 4.00 (L) 04/21/2023    HGB 11.7 (L) 04/21/2023    HCT 35.8 (L) 04/21/2023    MCV 89.5 04/21/2023    MCH 29.3 04/21/2023    MCHC 32.7 04/21/2023    RDW 12.1 04/21/2023     04/21/2023        Lab Results   Component Value Date    GLUCOSE 194 (H) 04/21/2023    BUN 15 04/21/2023    CREATININE 1.00 04/21/2023    EGFR 74 04/21/2023     04/21/2023    K 4.0 04/21/2023    CL 98 04/21/2023    CO2 34 (H) 04/21/2023    CALCIUM 8.9 04/21/2023    PROTEIN 6.7 04/21/2023    ALBUMIN 4.1 04/21/2023    GLOB 2.6 04/21/2023    AGRATIO 1.6 04/21/2023    BILITOT 0.6 04/21/2023    ALKPHOS 101 04/21/2023    AST 22 04/21/2023    ALT 15 04/21/2023        Lab Results   Component Value Date    HGBA1C 5.9 (H) 11/17/2022        Lab Results   Component Value Date    ALBUMIN 4.1 04/21/2023        Lab Results   Component Value Date    CHOL 138 11/17/2022    TRIG 71 11/17/2022    HDL 64 11/17/2022    LDLCALC 59 11/17/2022            No results found for this or any previous visit.     [facility] TTE -        A complete transthoracic echocardiogram (including 2D, color flow    Doppler, spectral Doppler, M-mode and strain imaging) was performed using   the standard protocol. The study quality was good.   •  The left ventricle is moderately dilated. Top normal left ventricular   wall thickness. Regional wall motion abnormalities are noted (see wall   motion diagram). Moderately decreased left ventricular ejection fraction.   The left ventricular ejection fraction by biplane method of discs is 42%.   The average global longitudinal peak systolic strain is reduced. The   average global longitudinal peak systolic strain is 7.9 % (absolute   value).   •  Unable to assess LV diastolic function due to the presence of mitral   annular calcification.   •  The right ventricle is moderately dilated. There is normal function of   the right ventricle.   •  The left atrium is severely dilated.   •  The right atrium is mildly dilated.   •  There is mild mitral valve leaflet thickening. There is mild mitral   valve leaflet calcification. There is moderate posterior mitral annular   calcification. There is severe mitral regurgitation. There is no mitral   stenosis.   •  There is a transcatheter bioprosthetic aortic valve present. There are   normal gradients across the prosthetic valve. There is trace anterior   paravalvular regurgitation present. Prosthetic aortic valve mean gradient   measures 6 mmHg. Dimensionless valve index is 0.3.   •  The tricuspid valve is normal in structure. There is mild to moderate   tricuspid regurgitation. Pulmonary artery systolic pressure measures 67   mmHg. The pulmonary artery systolic pressure is severely elevated.   •  The pulmonic valve is normal in structure.   •  The proximal segment of the ascending aorta is mildly enlarged. The   proximal segment of the ascending aorta measures 3.7 cm.   •  The inferior vena cava is dilated (diameter >21 mm) and does not   collapse during inspiration, suggesting an elevated right atrial pressure   of 20 mmHg (range 10-20 mmHg).    •  Compared to the prior study of 4/8/2022, there are no significant   changes.          Assessment/Plan        EKG: Vpaced, Low risk EKG    Alzheimer's dementia (CMS/[facility])  May benefit vera, quite disinhibited today.    A-fib (CMS/HCC)  Continue rate control and Xarelto for AC.    Ascending aortic aneurysm  Repeat sizing via CT scan, while looking at lungs.  BP is at goal for age.    CHF (congestive heart failure), NYHA class I, acute, systolic (CMS/[facility])  Ischemic CMO, last EF 42%.  Class IIIC- clinically hypervolemic.  Change Lasix to Torsemide. Will attempt to optimize medical therapy for same.  Had lower BP inpatient and his Aldactone was stopped, will resume now to help balance his loop diuretic induced Hypokalemia.    Coronary artery disease involving native coronary artery  Continue Xarelto and Toprol, statin for Ischemic Cardiomyopathy.  No symptoms at present outside CHF symptoms.  TTE is pending.    Bilateral carotid bruits  BP control, lipid control and stroke prevention discussion.    Essential hypertension  Adding Aldactone.  Drink more water, eat less salt.    Mitral regurgitation  F/u TTE is pending.    NSVT (nonsustained ventricular tachycardia)  Doing well on beta blocker.  Will get ST elizabeth interrogation at next appointment.      S/P angioplasty with stent  No current angina.  Continue medical therapy for secondary prevention.  Get lipids next appt.    S/P TAVR (transcatheter aortic valve replacement)  F.u TTE is pending.    SSS (sick sinus syndrome) (CMS/[facility])  Stable, no symptoms but may be high % pacing in which cased I'd get a BiV if he will remain essentially 100% paced and having systolic dysfunction.    Type 2 diabetes mellitus without complication, without long-term current use of insulin (CMS/HCC)  Would consider changing to Jardiance at next appt.     I spent 50min on exam, interview, documentation and counseling.  Extensive CHF education 30min of the total clinical time.  New  Medications Ordered This Visit   Medications   • spironolactone (ALDACTONE) 25 mg tablet     Sig: Take 1 tablet (25 mg total) by mouth daily.     Dispense:  90 tablet     Refill:  1   • torsemide (DEMADEX) 20 mg tablet     Sig: Take 1 tablet (20 mg total) by mouth daily.     Dispense:  90 tablet     Refill:  3   • magnesium oxide (MAG-OX) 400 mg (241.3 mg magnesium) tablet     Sig: Take 1 tablet (400 mg total) by mouth 2 (two) times a day.     Dispense:  180 tablet     Refill:  3       Medications Discontinued During This Encounter   Medication Reason   • donepeziL (ARICEPT) 5 mg tablet    • LORazepam (ATIVAN) 0.5 mg tablet    • furosemide (LASIX) 40 mg tablet Alternate therapy   • magnesium oxide (MAG-OX) 400 mg (241.3 mg magnesium) tablet Reorder        Orders Placed This Encounter   Procedures   • CT CHEST WITH AND WITHOUT IV CONTRAST     Standing Status:   Future     Standing Expiration Date:   6/27/2024     Order Specific Question:   Release to patient     Answer:   Immediate   • Comprehensive metabolic panel     Standing Status:   Future     Number of Occurrences:   1     Standing Expiration Date:   6/27/2024     Order Specific Question:   Release to patient     Answer:   Immediate   • B-type natriuretic peptide     Standing Status:   Future     Number of Occurrences:   1     Standing Expiration Date:   6/27/2024     Order Specific Question:   Release to patient     Answer:   Immediate   • Magnesium     Standing Status:   Future     Number of Occurrences:   1     Standing Expiration Date:   6/27/2024     Order Specific Question:   Release to patient     Answer:   Immediate   • [facility] performed)     Scheduling Instructions:      PLEASE USE THIS ORDER FOR ECG'S PERFORMED IN PHYSICIAN OFFICES     Order Specific Question:   Release to patient     Answer:   Immediate   • Transthoracic echo (TTE) complete     Standing Status:   Future     Standing Expiration Date:   6/27/2025      Scheduling Instructions:      To schedule cardiology appointments with [facility], call Central Scheduling at [phone]. Thank you.     Order Specific Question:   Is contrast and/or agitated saline (bubbles) indicated for the study?     Answer:   Contrast     Order Specific Question:   Release to patient     Answer:   Immediate        Yousuf Wolf MD  6/27/2023

## 2023-06-28 NOTE — TELEPHONE ENCOUNTER
Ann calling on behalf of the PT stating she attempted to make the appt's at [place]. Echo wasn't available until Sept and CT was 7/26.    States she will be away in Sept so the transpotation of the PT for the ECHO will be difficult. Wondering if the location could possible be changed    Ann- Caretaker can be reached at [phone]

## 2023-06-28 NOTE — TELEPHONE ENCOUNTER
Echo is ordered with bubbles. Needs to be done at [facility].     Spoke with Ann and told her this. She will keep his appts.

## 2023-06-28 NOTE — TELEPHONE ENCOUNTER
CT chest with contrast needs to be done at . Can we change the location of the echo to be done here so pt can get in earlier?

## 2023-07-07 ENCOUNTER — OFFICE VISIT (OUTPATIENT)
Dept: PRIMARY CARE | Facility: CLINIC | Age: 83
End: 2023-07-07
Payer: COMMERCIAL

## 2023-07-07 VITALS
HEART RATE: 66 BPM | SYSTOLIC BLOOD PRESSURE: 132 MMHG | HEIGHT: 67 IN | WEIGHT: 200 LBS | BODY MASS INDEX: 31.39 KG/M2 | DIASTOLIC BLOOD PRESSURE: 68 MMHG | TEMPERATURE: 97.3 F | OXYGEN SATURATION: 96 %

## 2023-07-07 DIAGNOSIS — I10 ESSENTIAL HYPERTENSION: ICD-10-CM

## 2023-07-07 DIAGNOSIS — I50.21 CHF (CONGESTIVE HEART FAILURE), NYHA CLASS I, ACUTE, SYSTOLIC (CMS/HCC): ICD-10-CM

## 2023-07-07 DIAGNOSIS — M79.651 RIGHT THIGH PAIN: ICD-10-CM

## 2023-07-07 DIAGNOSIS — K21.9 GASTROESOPHAGEAL REFLUX DISEASE, UNSPECIFIED WHETHER ESOPHAGITIS PRESENT: ICD-10-CM

## 2023-07-07 DIAGNOSIS — F02.B0 MODERATE ALZHEIMER'S DEMENTIA, UNSPECIFIED TIMING OF DEMENTIA ONSET, UNSPECIFIED WHETHER BEHAVIORAL, PSYCHOTIC, OR MOOD DISTURBANCE OR ANXIETY (CMS/HCC): Primary | ICD-10-CM

## 2023-07-07 DIAGNOSIS — E11.9 TYPE 2 DIABETES MELLITUS WITHOUT COMPLICATION, WITHOUT LONG-TERM CURRENT USE OF INSULIN (CMS/HCC): ICD-10-CM

## 2023-07-07 DIAGNOSIS — D64.9 ANEMIA, UNSPECIFIED TYPE: ICD-10-CM

## 2023-07-07 DIAGNOSIS — G30.9 MODERATE ALZHEIMER'S DEMENTIA, UNSPECIFIED TIMING OF DEMENTIA ONSET, UNSPECIFIED WHETHER BEHAVIORAL, PSYCHOTIC, OR MOOD DISTURBANCE OR ANXIETY (CMS/HCC): Primary | ICD-10-CM

## 2023-07-07 DIAGNOSIS — R53.81 DEBILITY: ICD-10-CM

## 2023-07-07 PROBLEM — R05.1 ACUTE COUGH: Status: RESOLVED | Noted: 2023-04-14 | Resolved: 2023-07-07

## 2023-07-07 PROBLEM — J44.1 COPD EXACERBATION (CMS/HCC): Status: RESOLVED | Noted: 2020-11-17 | Resolved: 2023-07-07

## 2023-07-07 PROBLEM — Z91.89 AT INCREASED RISK OF EXPOSURE TO COVID-19 VIRUS: Status: RESOLVED | Noted: 2023-04-14 | Resolved: 2023-07-07

## 2023-07-07 PROCEDURE — 3075F SYST BP GE 130 - 139MM HG: CPT | Performed by: FAMILY MEDICINE

## 2023-07-07 PROCEDURE — 3078F DIAST BP <80 MM HG: CPT | Performed by: FAMILY MEDICINE

## 2023-07-07 PROCEDURE — 3008F BODY MASS INDEX DOCD: CPT | Performed by: FAMILY MEDICINE

## 2023-07-07 PROCEDURE — 99214 OFFICE O/P EST MOD 30 MIN: CPT | Performed by: FAMILY MEDICINE

## 2023-07-07 RX ORDER — PANTOPRAZOLE SODIUM 40 MG/1
40 TABLET, DELAYED RELEASE ORAL 2 TIMES DAILY
Qty: 180 TABLET | Refills: 0 | Status: SHIPPED | OUTPATIENT
Start: 2023-07-07 | End: 2023-08-17

## 2023-07-07 NOTE — PROGRESS NOTES
[facility] in [place] [address] [address]  [facility] [address]  Phone: [phone]  Fax: [phone]       CHIEF COMPLAINT   Chief Complaint   Patient presents with   • Follow-up     4 month     • Anxiety     Increase sertraline  Start Buspar  Follow up with a therapist- no appt yet       HISTORY OF PRESENT ILLNESS      This is a 83 y.o. male who presents for   Chief Complaint   Patient presents with   • Follow-up     4 month     • Anxiety     Increase sertraline  Start Buspar  Follow up with a therapist- no appt yet      Presents with caretaker today  His only complaint is that he has right lower extremity pain  Had recently had follow-up with Dr. Wolf, cardiology and was found to be hypervolemic  Started Aldactone and switch diuretic to torsemide  Caretaker states that his weight is much improved and he is down about 10 pounds so far  This change was made just a week ago at which time he was complaining of bilateral lower extremity discomfort  Suspect that his discomfort was related to swelling  Today he states that he still having right lower extremity discomfort which keeps him up at night  Wonders what he can take for his discomfort    Caretaker also states that he was walking much more than he has been recently  He has slowed down and has not been as active recently    PAST MEDICAL AND SURGICAL HISTORY      Past Medical History:   Diagnosis Date   • A-fib (CMS/HCC)    • Acute on chronic combined systolic and diastolic congestive heart failure (CMS/HCC)    • Anxiety 4/11/2023   • CHF (congestive heart failure), NYHA class I, acute, systolic (CMS/HCC)    • Depression    • Hyperthyroidism    • Hypoxia    • Near syncope    • NSVT (nonsustained ventricular tachycardia) (CMS/HCC)    • Orthostatic hypotension    • Presence of cardiac pacemaker    • Pulmonary embolism (CMS/HCC)    • Sepsis (CMS/HCC) 05/16/2022       Past Surgical History:   Procedure Laterality Date   • CARDIAC CATHETERIZATION   11/19/2020    CATH PLACE/CORON ANGIO, IMG SUPER/INTERP,R&L HRT CATH, L HRT VENTRIC   • CARDIAC CATHETERIZATION Bilateral 2005   • CORONARY ARTERY BYPASS GRAFT  2005       MEDICATIONS        Current Outpatient Medications:   •  acetaminophen (TYLENOL) 325 mg tablet, Take 650 mg by mouth as needed., Disp: , Rfl:   •  albuterol 2.5 mg /3 mL (0.083 %) nebulizer solution, Take 3 mL (2.5 mg total) by nebulization 4 (four) times a day., Disp: 180 mL, Rfl: 1  •  albuterol HFA 90 mcg/actuation inhaler, Inhale 2 puffs every 6 (six) hours as needed for wheezing or shortness of breath., Disp: 18 g, Rfl: 1  •  atorvastatin (LIPITOR) 20 mg tablet, Take 1 tablet (20 mg total) by mouth daily. Pill pack, Disp: 90 tablet, Rfl: 1  •  busPIRone (BUSPAR) 5 mg tablet, Take 1 tablet (5 mg total) by mouth 2 (two) times a day. Pill pack, Disp: 180 tablet, Rfl: 1  •  clonazePAM (klonoPIN) 0.5 mg tablet, Take 1 tablet (0.5 mg total) by mouth nightly as needed for anxiety., Disp: 30 tablet, Rfl: 0  •  diclofenac sodium (VOLTAREN) 1 % topical gel, Apply 1 g topically as needed., Disp: , Rfl:   •  donepeziL (ARICEPT) 10 mg tablet, Take 1 tablet (10 mg total) by mouth nightly., Disp: 90 tablet, Rfl: 1  •  ferrous sulfate 325 mg (65 mg iron) tablet, Take 1 tablet (325 mg total) by mouth daily with breakfast., Disp: 90 tablet, Rfl: 1  •  fluticasone propionate (FLONASE) 50 mcg/actuation nasal spray, Administer 1 spray into each nostril daily., Disp: 16 g, Rfl: 5  •  fluticasone-umeclidinium-vilanterol (TRELEGY ELLIPTA) 100-62.5-25 mcg blister with device powder for inhalation, Inhale 1 puff daily., Disp: 3 each, Rfl: 0  •  folic acid (FOLVITE) 1 mg tablet, Take 1 tablet (1 mg total) by mouth daily., Disp: 90 tablet, Rfl: 1  •  levothyroxine (SYNTHROID) 112 mcg tablet, Take 1 tablet (112 mcg total) by mouth daily. Pill pack, Disp: 90 tablet, Rfl: 1  •  loratadine (CLARITIN) 10 mg tablet, Take 10 mg by mouth daily., Disp: , Rfl:   •  magnesium oxide  (MAG-OX) 400 mg (241.3 mg magnesium) tablet, Take 1 tablet (400 mg total) by mouth 2 (two) times a day., Disp: 180 tablet, Rfl: 3  •  medical marijuana, Take 1 each by mouth as needed., Disp: , Rfl:   •  melatonin 5 mg capsule, Take 5 mg by mouth nightly. Hs prn, Disp: , Rfl:   •  metoprolol succinate XL (TOPROL-XL) 25 mg 24 hr tablet, TAKE 1/2 OF A TABLET BY MOUTH TWICE DAILY, Disp: 90 tablet, Rfl: 2  •  multivitamin tablet, Take 1 tablet by mouth daily., Disp: , Rfl:   •  nebulizers misc, 1 each 4 (four) times a day as needed (sob or wheeze)., Disp: 1 each, Rfl: 0  •  pantoprazole (PROTONIX) 40 mg EC tablet, Take 1 tablet (40 mg total) by mouth 2 (two) times a day. Pill pack, Disp: 180 tablet, Rfl: 0  •  rivaroxaban (XARELTO) 20 mg tablet, Take 1 tablet (20 mg total) by mouth daily with dinner., Disp: 90 tablet, Rfl: 2  •  sertraline (ZOLOFT) 50 mg tablet, Take 1 tablet (50 mg total) by mouth daily. Pill pack, Disp: 90 tablet, Rfl: 0  •  spironolactone (ALDACTONE) 25 mg tablet, Take 1 tablet (25 mg total) by mouth daily., Disp: 90 tablet, Rfl: 1  •  thiamine 100 mg tablet, Take 1 tablet (100 mg total) by mouth daily., Disp: 90 tablet, Rfl: 1  •  torsemide (DEMADEX) 20 mg tablet, Take 1 tablet (20 mg total) by mouth daily., Disp: 90 tablet, Rfl: 3    ALLERGIES      Patient has no known allergies.    FAMILY HISTORY      Family History   Problem Relation Age of Onset   • Thyroid cancer Biological Mother    • ALS Biological Mother    • Parkinsonism Biological Mother    • Heart attack Biological Father    • Liver cancer Maternal Grandmother      SOCIAL HISTORY      Social History     Socioeconomic History   • Marital status:      Spouse name: None   • Number of children: 2   • Years of education: None   • Highest education level: None   Occupational History   • Occupation: retired     Comment: Mill in [place]   Tobacco Use   • Smoking status: Former     Years: 40.00     Types: Cigarettes     Quit date:  11/1/2012     Years since quitting: 10.6   • Smokeless tobacco: Never   Vaping Use   • Vaping Use: Never used   Substance and Sexual Activity   • Alcohol use: Not Currently     Alcohol/week: 9.0 standard drinks of alcohol     Types: 9 Shots of liquor per week     Comment: vodka stopped late in winter   • Drug use: Never   • Sexual activity: Not Currently   Social History Narrative    Lives alone, and has help for meal prep and med management     Social Determinants of Health     Financial Resource Strain: Low Risk  (6/27/2023)    Overall Financial Resource Strain (CARDIA)    • Difficulty of Paying Living Expenses: Not hard at all   Food Insecurity: No Food Insecurity (6/27/2023)    Hunger Vital Sign    • Worried About Running Out of Food in the Last Year: Never true    • Ran Out of Food in the Last Year: Never true   Transportation Needs: No Transportation Needs (6/27/2023)    PRAPARE - Transportation    • Lack of Transportation (Medical): No    • Lack of Transportation (Non-Medical): No   Social Connections: Socially Isolated (6/26/2023)    Social Connection and Isolation Panel [NHANES]    • Frequency of Communication with Friends and Family: More than three times a week    • Frequency of Social Gatherings with Friends and Family: Once a week    • Attends Hinduism Services: Never    • Active Member of Clubs or Organizations: No    • Attends Club or Organization Meetings: Patient refused    • Marital Status:    Housing Stability: Low Risk  (6/27/2023)    Housing Stability Vital Sign    • Unable to Pay for Housing in the Last Year: No    • Number of Places Lived in the Last Year: 1    • Unstable Housing in the Last Year: No     REVIEW OF SYSTEMS      Review of Systems   All other systems reviewed and are negative.    DEPRESSION/ANXIETY SCREENING   PHQ 2 to 9:  No data recorded  No data recorded    No data recorded    PHYSICAL EXAMINATION      Vitals:    07/07/23 1300   BP: 132/68   BP Location: Left upper arm  "  Patient Position: Sitting   Pulse: 66   Temp: 36.3 °C (97.3 °F)   TempSrc: Temporal   SpO2: 96%   Weight: 90.7 kg (200 lb)   Height: 1.702 m (5' 7\")     Body mass index is 31.32 kg/m².     BP Readings from Last 3 Encounters:   07/07/23 132/68   06/27/23 130/62   04/14/23 130/72     Wt Readings from Last 3 Encounters:   07/07/23 90.7 kg (200 lb)   06/27/23 94.3 kg (208 lb)   04/14/23 88 kg (194 lb)     Ht Readings from Last 3 Encounters:   07/07/23 1.702 m (5' 7\")   06/27/23 1.702 m (5' 7\")   04/14/23 1.702 m (5' 7\")     Physical Exam  Vitals reviewed.   Constitutional:       General: He is not in acute distress.     Appearance: Normal appearance.   HENT:      Head: Normocephalic and atraumatic.   Eyes:      Conjunctiva/sclera: Conjunctivae normal.   Cardiovascular:      Rate and Rhythm: Normal rate and regular rhythm.      Heart sounds: Murmur heard.   Pulmonary:      Effort: Pulmonary effort is normal. No respiratory distress.      Breath sounds: Normal breath sounds. No wheezing.   Musculoskeletal:         General: No tenderness (No tenderness to palpation over spine or SI joints bilaterally).      Right lower leg: No edema.      Left lower leg: No edema.      Comments: Tight hamstring on right side   Skin:     General: Skin is warm and dry.   Neurological:      Mental Status: He is alert and oriented to person, place, and time. Mental status is at baseline.   Psychiatric:         Mood and Affect: Mood normal.         Behavior: Behavior normal.         LABS / IMAGING / STUDIES        Labs    Lab Results   Component Value Date    CREATININE 1.00 04/21/2023     Lab Results   Component Value Date    WBC 9.6 04/21/2023    HGB 11.7 (L) 04/21/2023    HCT 35.8 (L) 04/21/2023    MCV 89.5 04/21/2023     04/21/2023         Lab Results   Component Value Date    HGBA1C 5.9 (H) 11/17/2022     Lab Results   Component Value Date    MICROALBUR <0.2 03/03/2023           HEALTH MAINTENANCE              ASSESSMENT AND PLAN "   Problem List Items Addressed This Visit        Nervous    Alzheimer's dementia (CMS/[facility]) - Primary     Stable  Good support system  Continue follow-up with neurology as scheduled         Relevant Orders    Ambulatory referral to Home Health       Circulatory    Essential hypertension     Blood pressure well controlled on current regimen today  Continue follow-up with cardiology as scheduled         Relevant Orders    Microalbumin/Creatinine Ur Random    CHF (congestive heart failure), NYHA class I, acute, systolic (CMS/[facility])     Improved weight on physical exam today  Taking medication as prescribed  Encourage avoidance of salt  To have labs completed soon            Endocrine/Metabolic    Type 2 diabetes mellitus without complication, without long-term current use of insulin (CMS/[facility])     Due for hemoglobin A1c at this time  Diet controlled  Will continue to monitor in follow up         Relevant Orders    Hemoglobin A1c       Hematologic    Anemia     History of anemia  Due to leg discomfort we will check CBC and iron stores  Will continue to monitor in follow up         Relevant Orders    CBC and Differential    Transferrin    Iron and TIBC    Ferritin       Other    Debility     Debility and has not been walking as much as he was in the past  Does have right thigh pain which his caretaker says was worse when his volume status was up  Overall she feels that as though his pain has been improving but today he states that he still has right lower extremity pain  Suspect related to debility  We will have home health physical therapy set up  Encouraged to return to office if if symptoms should worsen or continue despite working with physical therapy         Relevant Orders    Ambulatory referral to Home Health   Other Visit Diagnoses     Right thigh pain        No tenderness to palpation or edema in right lower extremity  No back pain on exam today    Relevant Orders    Ambulatory referral to Home Health     Gastroesophageal reflux disease, unspecified whether esophagitis present        Relevant Medications    pantoprazole (PROTONIX) 40 mg EC tablet        Return in about 4 months (around 11/7/2023) for AWV, Lab review.       This note was written with the assistance of voice recognition software, please excuse errors associated with voice recognition software.      Melanie Chambers,   07/07/23

## 2023-07-07 NOTE — ASSESSMENT & PLAN NOTE
Improved weight on physical exam today  Taking medication as prescribed  Encourage avoidance of salt  To have labs completed soon

## 2023-07-07 NOTE — ASSESSMENT & PLAN NOTE
History of anemia  Due to leg discomfort we will check CBC and iron stores  Will continue to monitor in follow up

## 2023-07-07 NOTE — ASSESSMENT & PLAN NOTE
Blood pressure well controlled on current regimen today  Continue follow-up with cardiology as scheduled

## 2023-07-07 NOTE — ASSESSMENT & PLAN NOTE
Debility and has not been walking as much as he was in the past  Does have right thigh pain which his caretaker says was worse when his volume status was up  Overall she feels that as though his pain has been improving but today he states that he still has right lower extremity pain  Suspect related to debility  We will have home health physical therapy set up  Encouraged to return to office if if symptoms should worsen or continue despite working with physical therapy

## 2023-07-12 ENCOUNTER — TELEPHONE (OUTPATIENT)
Dept: PRIMARY CARE | Facility: CLINIC | Age: 83
End: 2023-07-12
Payer: COMMERCIAL

## 2023-07-12 DIAGNOSIS — N28.9 FUNCTION KIDNEY DECREASED: ICD-10-CM

## 2023-07-12 DIAGNOSIS — R79.89 ABNORMAL CBC: Primary | ICD-10-CM

## 2023-07-12 LAB
ALBUMIN SERPL-MCNC: 4.2 G/DL (ref 3.6–5.1)
ALBUMIN/CREAT UR: 4 MCG/MG CREAT
ALBUMIN/GLOB SERPL: 1.6 (CALC) (ref 1–2.5)
ALP SERPL-CCNC: 120 U/L (ref 35–144)
ALT SERPL-CCNC: 18 U/L (ref 9–46)
AST SERPL-CCNC: 23 U/L (ref 10–35)
BASOPHILS # BLD AUTO: 32 CELLS/UL (ref 0–200)
BASOPHILS NFR BLD AUTO: 0.3 %
BILIRUB SERPL-MCNC: 0.6 MG/DL (ref 0.2–1.2)
BNP SERPL-MCNC: 243 PG/ML
BUN SERPL-MCNC: 20 MG/DL (ref 7–25)
BUN/CREAT SERPL: 15 (CALC) (ref 6–22)
CALCIUM SERPL-MCNC: 9.1 MG/DL (ref 8.6–10.3)
CHLORIDE SERPL-SCNC: 98 MMOL/L (ref 98–110)
CO2 SERPL-SCNC: 32 MMOL/L (ref 20–32)
CREAT SERPL-MCNC: 1.3 MG/DL (ref 0.7–1.22)
CREAT UR-MCNC: 46 MG/DL (ref 20–320)
EGFRCR SERPLBLD CKD-EPI 2021: 54 ML/MIN/1.73M2
EOSINOPHIL # BLD AUTO: 96 CELLS/UL (ref 15–500)
EOSINOPHIL NFR BLD AUTO: 0.9 %
ERYTHROCYTE [DISTWIDTH] IN BLOOD BY AUTOMATED COUNT: 13.6 % (ref 11–15)
FERRITIN SERPL-MCNC: 47 NG/ML (ref 24–380)
GLOBULIN SER CALC-MCNC: 2.6 G/DL (CALC) (ref 1.9–3.7)
GLUCOSE SERPL-MCNC: 298 MG/DL (ref 65–99)
HBA1C MFR BLD: 8.1 % OF TOTAL HGB
HCT VFR BLD AUTO: 36.6 % (ref 38.5–50)
HGB BLD-MCNC: 11.6 G/DL (ref 13.2–17.1)
IRON SATN MFR SERPL: 29 % (CALC) (ref 20–48)
IRON SERPL-MCNC: 115 MCG/DL (ref 50–180)
LYMPHOCYTES # BLD AUTO: 4911 CELLS/UL (ref 850–3900)
LYMPHOCYTES NFR BLD AUTO: 45.9 %
MAGNESIUM SERPL-MCNC: 2 MG/DL (ref 1.5–2.5)
MCH RBC QN AUTO: 29.3 PG (ref 27–33)
MCHC RBC AUTO-ENTMCNC: 31.7 G/DL (ref 32–36)
MCV RBC AUTO: 92.4 FL (ref 80–100)
MICROALBUMIN UR-MCNC: 0.2 MG/DL
MONOCYTES # BLD AUTO: 663 CELLS/UL (ref 200–950)
MONOCYTES NFR BLD AUTO: 6.2 %
NEUTROPHILS # BLD AUTO: 4997 CELLS/UL (ref 1500–7800)
NEUTROPHILS NFR BLD AUTO: 46.7 %
PLATELET # BLD AUTO: 243 THOUSAND/UL (ref 140–400)
PMV BLD REES-ECKER: 9.2 FL (ref 7.5–12.5)
POTASSIUM SERPL-SCNC: 4.7 MMOL/L (ref 3.5–5.3)
PROT SERPL-MCNC: 6.8 G/DL (ref 6.1–8.1)
RBC # BLD AUTO: 3.96 MILLION/UL (ref 4.2–5.8)
SODIUM SERPL-SCNC: 138 MMOL/L (ref 135–146)
TIBC SERPL-MCNC: 391 MCG/DL (CALC) (ref 250–425)
TRANSFERRIN SERPL-MCNC: 311 MG/DL (ref 188–341)
WBC # BLD AUTO: 10.7 THOUSAND/UL (ref 3.8–10.8)

## 2023-07-12 NOTE — RESULT ENCOUNTER NOTE
Hemoglobin A1c consistent with uncontrolled diabetes  Iron stores normal  No microalbuminuria  CBC with anemia and borderline elevated white blood cell count with lymphocyte predominance    Will have patient go to the lab for SPEP and UPEP to assess for possible underlying causes especially given decreased kidney function  Shamika, call and advise his caretaker that he needs to go back to the lab for a blood and urine test, no fasting needed

## 2023-07-12 NOTE — TELEPHONE ENCOUNTER
----- Message from Melanie Chambers DO sent at 7/12/2023  4:26 PM EDT -----  Hemoglobin A1c consistent with uncontrolled diabetes  Iron stores normal  No microalbuminuria  CBC with anemia and borderline elevated white blood cell count with lymphocyte predominance    Will have patient go to the lab for SPEP and UPEP to assess for possible underlying causes especially given decreased kidney function  Shamika, call and advise his caretaker that he needs to go back to the lab for a blood and urine test, no fasting needed  
Spoke with Ann let her know lab results and she will see him on Friday and take him to the lab Thanks LW  
No pertinent family history in first degree relatives

## 2023-07-14 ENCOUNTER — TELEPHONE (OUTPATIENT)
Dept: SCHEDULING | Facility: CLINIC | Age: 83
End: 2023-07-14
Payer: COMMERCIAL

## 2023-07-14 DIAGNOSIS — E11.9 TYPE 2 DIABETES MELLITUS WITHOUT COMPLICATION, WITHOUT LONG-TERM CURRENT USE OF INSULIN (CMS/HCC): Primary | ICD-10-CM

## 2023-07-14 DIAGNOSIS — I50.21 CHF (CONGESTIVE HEART FAILURE), NYHA CLASS I, ACUTE, SYSTOLIC (CMS/HCC): ICD-10-CM

## 2023-07-14 NOTE — TELEPHONE ENCOUNTER
Pt's daughter Jovana states that she just missed a call from Nereyda.     Jovana can be reached at [phone].    Ty.

## 2023-07-14 NOTE — TELEPHONE ENCOUNTER
Patient Name: Cecil Morris    Caller name: Ann    Relationship: Caregiver     Reason for call: Pt was switched to Jardiance today. Ann is asking if pt should still have labs and urine analysis ordered by Dr. Melanie Chambers. Ann needs to know ASAP as lab closes at 2PM.    Callback number: [phone]

## 2023-07-14 NOTE — TELEPHONE ENCOUNTER
Spoke with Ann. Told her to get labs per Dr Chambers's orders. Updated her with plan of care in regards to starting jardiance. See below. She verbalized understanding, will f/u next week.

## 2023-07-14 NOTE — TELEPHONE ENCOUNTER
Need to clarify what dose pt is starting jardiance on. Spoke with pharmacist at [facility]. They will continue torsemide in the meantime. Pill packs being dispensed today. Will f/u with pharmacy on Monday once I know the dosage.     Attempted to update Ann caregiver but unable to lvm as mailbox is full.

## 2023-07-14 NOTE — TELEPHONE ENCOUNTER
Spoke with Jovana and reviewed recent blood work results. She doubts that he was fasting. She has been trying for a while to encourage low salt, carb diet but he is not compliant with this.     Agreeable with making change from torsemide to jardiance. Need to call [facility] to ensure that this change is reflected in his pill pack being delivered today.

## 2023-07-17 DIAGNOSIS — F32.89 OTHER DEPRESSION: ICD-10-CM

## 2023-07-17 NOTE — TELEPHONE ENCOUNTER
Spoke with Ann and made her aware of Jardiance approval. She verbalized understanding and will make Jovana aware as well.     Unfortunately as pt has Medicare there are not coupons available.

## 2023-07-17 NOTE — TELEPHONE ENCOUNTER
Submitted PA through [place]. Key [id number].     Received approval as Jardiance is available for pt without authorization. He is able to fill at Pharmacy.     Call [facility] and updated them. They states the script did go through. It will cost $80 for a 3 mo supply. Can dispense monthy supplies for a lower cost. Pharmacy staff will reach out to pt's family to see if they will be willing to pay this. They will reach out if not. They also confirmed they received the torsemide discontinuation.     Attempted to call Ann and make her aware but she did not answer. Unable to lvm as mailbox is full.

## 2023-07-18 RX ORDER — CLONAZEPAM 0.5 MG/1
0.5 TABLET ORAL NIGHTLY PRN
Qty: 30 TABLET | Refills: 0 | Status: SHIPPED | OUTPATIENT
Start: 2023-07-18 | End: 2023-08-17

## 2023-07-20 LAB
ALBUMIN ?TM MFR UR ELPH: 26 %
ALBUMIN SERPL ELPH-MCNC: 4.1 G/DL (ref 3.8–4.8)
ALPHA1 GLOB ?TM MFR UR ELPH: 0 %
ALPHA1 GLOB SERPL ELPH-MCNC: 0.3 G/DL (ref 0.2–0.3)
ALPHA2 GLOB ?TM MFR UR ELPH: 20 %
ALPHA2 GLOB SERPL ELPH-MCNC: 0.7 G/DL (ref 0.5–0.9)
B-GLOBULIN ?TM MFR UR ELPH: 35 %
BETA1 GLOB SERPL ELPH-MCNC: 0.5 G/DL (ref 0.4–0.6)
BETA2 GLOB SERPL ELPH-MCNC: 0.3 G/DL (ref 0.2–0.5)
CREAT UR-MCNC: 55 MG/DL (ref 20–320)
GAMMA GLOB ?TM MFR UR ELPH: 19 %
GAMMA GLOB SERPL ELPH-MCNC: 0.9 G/DL (ref 0.8–1.7)
PROT PATTERN SERPL ELPH-IMP: NORMAL
PROT PATTERN UR ELPH-IMP: NORMAL
PROT SERPL-MCNC: 6.8 G/DL (ref 6.1–8.1)
PROT UR-MCNC: 8 MG/DL (ref 5–25)
PROT/CREAT UR: 0.14 MG/MG CREAT (ref 0.03–0.15)
PROT/CREAT UR: 145 MG/G CREAT (ref 25–148)

## 2023-07-26 ENCOUNTER — TELEPHONE (OUTPATIENT)
Dept: CARDIOLOGY | Facility: CLINIC | Age: 83
End: 2023-07-26
Payer: COMMERCIAL

## 2023-07-26 ENCOUNTER — HOSPITAL ENCOUNTER (OUTPATIENT)
Dept: RADIOLOGY | Facility: HOSPITAL | Age: 83
Discharge: HOME | End: 2023-07-26
Attending: INTERNAL MEDICINE
Payer: COMMERCIAL

## 2023-07-26 ENCOUNTER — TELEPHONE (OUTPATIENT)
Dept: PRIMARY CARE | Facility: CLINIC | Age: 83
End: 2023-07-26
Payer: COMMERCIAL

## 2023-07-26 DIAGNOSIS — I50.21 CHF (CONGESTIVE HEART FAILURE), NYHA CLASS I, ACUTE, SYSTOLIC (CMS/HCC): ICD-10-CM

## 2023-07-26 DIAGNOSIS — I71.21 ANEURYSM OF ASCENDING AORTA WITHOUT RUPTURE (CMS/HCC): ICD-10-CM

## 2023-07-26 DIAGNOSIS — R05.3 PERSISTENT COUGH: ICD-10-CM

## 2023-07-26 DIAGNOSIS — I27.20 PULMONARY HYPERTENSION (CMS/HCC): ICD-10-CM

## 2023-07-26 PROCEDURE — 63600105 HC IODINE BASED CONTRAST: Mod: JZ | Performed by: INTERNAL MEDICINE

## 2023-07-26 PROCEDURE — 71270 CT THORAX DX C-/C+: CPT | Mod: MG

## 2023-07-26 RX ADMIN — IOHEXOL 100 ML: 350 INJECTION, SOLUTION INTRAVENOUS at 15:00

## 2023-07-26 NOTE — TELEPHONE ENCOUNTER
Ann, patient's caregiver dropped off renewal form for [employer] form, on your desk for signature. Thanks LW

## 2023-07-26 NOTE — TELEPHONE ENCOUNTER
Received call from Tata at  radiology. Pt currently at  waiting for CT.     They state for the ICD codes associated with the CT chest ordered, pt needs a CT angiogram of the chest not a regular CT chest w and w/o contrast. This is the type of test to assess for aneurysm. The tech will put in the orders for him to cosign, however it still requires a precert.      I tried to get the pre certification, however I cannot find who to contact because the back of his insurance card is not scanned in. The pt is waiting at  currently and the tech leaves at 3:30.     Called radiology back and told them I will not be able to get the precert done in time. They are just going to proceed with the existing order.

## 2023-08-11 ENCOUNTER — HOSPITAL ENCOUNTER (INPATIENT)
Facility: HOSPITAL | Age: 83
LOS: 3 days | Discharge: HOME | DRG: 194 | End: 2023-08-15
Attending: EMERGENCY MEDICINE | Admitting: INTERNAL MEDICINE
Payer: COMMERCIAL

## 2023-08-11 ENCOUNTER — APPOINTMENT (OUTPATIENT)
Dept: RADIOLOGY | Age: 83
End: 2023-08-11
Attending: EMERGENCY MEDICINE
Payer: COMMERCIAL

## 2023-08-11 ENCOUNTER — TELEPHONE (OUTPATIENT)
Dept: PRIMARY CARE | Facility: CLINIC | Age: 83
End: 2023-08-11
Payer: COMMERCIAL

## 2023-08-11 ENCOUNTER — APPOINTMENT (EMERGENCY)
Dept: RADIOLOGY | Facility: HOSPITAL | Age: 83
DRG: 194 | End: 2023-08-11
Attending: EMERGENCY MEDICINE
Payer: COMMERCIAL

## 2023-08-11 ENCOUNTER — HOSPITAL ENCOUNTER (OUTPATIENT)
Facility: CLINIC | Age: 83
Discharge: OTHER INSTITUTION | End: 2023-08-11
Attending: EMERGENCY MEDICINE
Payer: COMMERCIAL

## 2023-08-11 VITALS
DIASTOLIC BLOOD PRESSURE: 75 MMHG | BODY MASS INDEX: 29.86 KG/M2 | OXYGEN SATURATION: 91 % | HEART RATE: 65 BPM | HEIGHT: 68 IN | TEMPERATURE: 97.5 F | WEIGHT: 197 LBS | SYSTOLIC BLOOD PRESSURE: 137 MMHG

## 2023-08-11 DIAGNOSIS — R09.02 HYPOXIA: ICD-10-CM

## 2023-08-11 DIAGNOSIS — J41.8 MIXED SIMPLE AND MUCOPURULENT CHRONIC BRONCHITIS (CMS/HCC): ICD-10-CM

## 2023-08-11 DIAGNOSIS — J81.0 ACUTE PULMONARY EDEMA (CMS/HCC): Primary | ICD-10-CM

## 2023-08-11 DIAGNOSIS — K21.9 GASTROESOPHAGEAL REFLUX DISEASE, UNSPECIFIED WHETHER ESOPHAGITIS PRESENT: ICD-10-CM

## 2023-08-11 DIAGNOSIS — F41.9 ANXIETY: ICD-10-CM

## 2023-08-11 DIAGNOSIS — I27.20 PULMONARY HYPERTENSION (CMS/HCC): ICD-10-CM

## 2023-08-11 DIAGNOSIS — J18.9 PNEUMONIA DUE TO INFECTIOUS ORGANISM, UNSPECIFIED LATERALITY, UNSPECIFIED PART OF LUNG: Primary | ICD-10-CM

## 2023-08-11 DIAGNOSIS — F32.89 OTHER DEPRESSION: ICD-10-CM

## 2023-08-11 LAB
ACANTHOCYTES BLD QL SMEAR: ABNORMAL
ALBUMIN SERPL-MCNC: 4 G/DL (ref 3.5–5.7)
ALP SERPL-CCNC: 90 IU/L (ref 34–125)
ALT SERPL-CCNC: 24 IU/L (ref 7–52)
ANION GAP SERPL CALC-SCNC: 9 MEQ/L (ref 3–15)
AST SERPL-CCNC: 28 IU/L (ref 13–39)
BASOPHILS # BLD: 0 K/UL (ref 0.01–0.1)
BASOPHILS NFR BLD: 0 %
BILIRUB SERPL-MCNC: 0.8 MG/DL (ref 0.3–1.2)
BNP SERPL-MCNC: 457 PG/ML
BUN SERPL-MCNC: 10 MG/DL (ref 7–25)
BURR CELLS BLD QL SMEAR: ABNORMAL
CALCIUM SERPL-MCNC: 9.4 MG/DL (ref 8.6–10.3)
CHLORIDE SERPL-SCNC: 103 MEQ/L (ref 98–107)
CO2 SERPL-SCNC: 27 MEQ/L (ref 21–31)
CREAT SERPL-MCNC: 1.1 MG/DL (ref 0.7–1.3)
DIFFERENTIAL METHOD BLD: ABNORMAL
EOSINOPHIL # BLD: 0.39 K/UL (ref 0.04–0.54)
EOSINOPHIL NFR BLD: 2 %
ERYTHROCYTE [DISTWIDTH] IN BLOOD BY AUTOMATED COUNT: 14.2 % (ref 11.6–14.4)
FLUAV RNA SPEC QL NAA+PROBE: NEGATIVE
FLUBV RNA SPEC QL NAA+PROBE: NEGATIVE
GFR SERPL CREATININE-BSD FRML MDRD: >60 ML/MIN/1.73M*2
GLUCOSE SERPL-MCNC: 148 MG/DL (ref 70–99)
HCT VFR BLDCO AUTO: 35.6 % (ref 40.1–51)
HGB BLD-MCNC: 10.8 G/DL (ref 13.7–17.5)
HYPOCHROMIA BLD QL SMEAR: ABNORMAL
LYMPHOCYTES # BLD: 5.49 K/UL (ref 1.2–3.5)
LYMPHOCYTES NFR BLD: 28 %
MANUAL DIF COMMENT BLD-IMP: ABNORMAL
MCH RBC QN AUTO: 28.4 PG (ref 28–33.2)
MCHC RBC AUTO-ENTMCNC: 30.3 G/DL (ref 32.2–36.5)
MCV RBC AUTO: 93.7 FL (ref 83–98)
MONOCYTES # BLD: 2.75 K/UL (ref 0.3–1)
MONOCYTES NFR BLD: 14 %
NEUTS BAND # BLD: 10.2 K/UL (ref 1.7–7)
NEUTS SEG NFR BLD: 52 %
OTHER CELLS # BLD MANUAL: 0.2 K/UL
OTHER CELLS # BLD MANUAL: 1 %
OVALOCYTES BLD QL SMEAR: ABNORMAL
PDW BLD AUTO: 9.1 FL (ref 9.4–12.4)
PLAT MORPH BLD: NORMAL
PLATELET # BLD AUTO: 264 K/UL (ref 150–350)
PLATELET # BLD EST: ABNORMAL 10*3/UL
POLYCHROMASIA BLD QL SMEAR: ABNORMAL
POTASSIUM SERPL-SCNC: 4.4 MEQ/L (ref 3.5–5.1)
PROT SERPL-MCNC: 7.2 G/DL (ref 6–8.2)
RBC # BLD AUTO: 3.8 M/UL (ref 4.5–5.8)
ROULEAUX BLD QL SMEAR: ABNORMAL
RSV RNA SPEC QL NAA+PROBE: NEGATIVE
SARS-COV-2 RNA RESP QL NAA+PROBE: NEGATIVE
SMUDGE CELLS BLD QL SMEAR: ABNORMAL
SODIUM SERPL-SCNC: 139 MEQ/L (ref 136–145)
TROPONIN I SERPL HS-MCNC: 17.5 PG/ML
TROPONIN I SERPL HS-MCNC: 22.7 PG/ML
VARIANT LYMPHS # BLD MANUAL: 0.59 K/UL
VARIANT LYMPHS NFR BLD: 3 %
WBC # BLD AUTO: 19.62 K/UL (ref 3.8–10.5)

## 2023-08-11 PROCEDURE — 96375 TX/PRO/DX INJ NEW DRUG ADDON: CPT | Mod: 59

## 2023-08-11 PROCEDURE — 80053 COMPREHEN METABOLIC PANEL: CPT | Performed by: EMERGENCY MEDICINE

## 2023-08-11 PROCEDURE — 93005 ELECTROCARDIOGRAM TRACING: CPT

## 2023-08-11 PROCEDURE — 71046 X-RAY EXAM CHEST 2 VIEWS: CPT | Performed by: EMERGENCY MEDICINE

## 2023-08-11 PROCEDURE — S9088 SERVICES PROVIDED IN URGENT: HCPCS | Performed by: EMERGENCY MEDICINE

## 2023-08-11 PROCEDURE — 87637 SARSCOV2&INF A&B&RSV AMP PRB: CPT | Performed by: EMERGENCY MEDICINE

## 2023-08-11 PROCEDURE — 84484 ASSAY OF TROPONIN QUANT: CPT | Performed by: EMERGENCY MEDICINE

## 2023-08-11 PROCEDURE — 96374 THER/PROPH/DIAG INJ IV PUSH: CPT | Mod: 59

## 2023-08-11 PROCEDURE — 63600105 HC IODINE BASED CONTRAST: Mod: JZ | Performed by: EMERGENCY MEDICINE

## 2023-08-11 PROCEDURE — 36415 COLL VENOUS BLD VENIPUNCTURE: CPT

## 2023-08-11 PROCEDURE — 83605 ASSAY OF LACTIC ACID: CPT | Performed by: PHYSICIAN ASSISTANT

## 2023-08-11 PROCEDURE — 93005 ELECTROCARDIOGRAM TRACING: CPT | Performed by: EMERGENCY MEDICINE

## 2023-08-11 PROCEDURE — 87040 BLOOD CULTURE FOR BACTERIA: CPT | Performed by: PHYSICIAN ASSISTANT

## 2023-08-11 PROCEDURE — 99285 EMERGENCY DEPT VISIT HI MDM: CPT | Mod: 25

## 2023-08-11 PROCEDURE — 99213 OFFICE O/P EST LOW 20 MIN: CPT | Performed by: EMERGENCY MEDICINE

## 2023-08-11 PROCEDURE — G1004 CDSM NDSC: HCPCS

## 2023-08-11 PROCEDURE — 84484 ASSAY OF TROPONIN QUANT: CPT | Mod: 91 | Performed by: EMERGENCY MEDICINE

## 2023-08-11 PROCEDURE — 85025 COMPLETE CBC W/AUTO DIFF WBC: CPT | Performed by: EMERGENCY MEDICINE

## 2023-08-11 PROCEDURE — 63600000 HC DRUGS/DETAIL CODE: Mod: JZ | Performed by: PHYSICIAN ASSISTANT

## 2023-08-11 PROCEDURE — 25800000 HC PHARMACY IV SOLUTIONS: Performed by: PHYSICIAN ASSISTANT

## 2023-08-11 PROCEDURE — 83880 ASSAY OF NATRIURETIC PEPTIDE: CPT | Performed by: EMERGENCY MEDICINE

## 2023-08-11 PROCEDURE — 63700000 HC SELF-ADMINISTRABLE DRUG: Performed by: PHYSICIAN ASSISTANT

## 2023-08-11 RX ORDER — ALBUTEROL SULFATE 90 UG/1
2 INHALANT RESPIRATORY (INHALATION) EVERY 6 HOURS PRN
Qty: 18 G | Refills: 1 | Status: SHIPPED | OUTPATIENT
Start: 2023-08-11 | End: 2023-08-25 | Stop reason: SDUPTHER

## 2023-08-11 RX ORDER — ACETAMINOPHEN 325 MG/1
650 TABLET ORAL ONCE
Status: COMPLETED | OUTPATIENT
Start: 2023-08-11 | End: 2023-08-11

## 2023-08-11 RX ORDER — ALBUTEROL SULFATE 0.83 MG/ML
2.5 SOLUTION RESPIRATORY (INHALATION)
Qty: 150 ML | Refills: 3 | Status: SHIPPED | OUTPATIENT
Start: 2023-08-11 | End: 2023-12-18

## 2023-08-11 RX ORDER — DIAZEPAM 10 MG/2ML
2 INJECTION INTRAMUSCULAR ONCE
Status: COMPLETED | OUTPATIENT
Start: 2023-08-11 | End: 2023-08-11

## 2023-08-11 RX ADMIN — CEFTRIAXONE SODIUM 1 G: 1 INJECTION, POWDER, FOR SOLUTION INTRAMUSCULAR; INTRAVENOUS at 23:55

## 2023-08-11 RX ADMIN — DIAZEPAM 2 MG: 5 INJECTION, SOLUTION INTRAMUSCULAR; INTRAVENOUS at 21:45

## 2023-08-11 RX ADMIN — IOHEXOL 100 ML: 350 INJECTION, SOLUTION INTRAVENOUS at 22:20

## 2023-08-11 RX ADMIN — ACETAMINOPHEN 650 MG: 325 TABLET ORAL at 20:34

## 2023-08-11 ASSESSMENT — ENCOUNTER SYMPTOMS
SHORTNESS OF BREATH: 1
CHILLS: 0
SPUTUM PRODUCTION: 0
PALPITATIONS: 0
COUGH: 1
FEVER: 0
SHORTNESS OF BREATH: 1
RHINORRHEA: 1
COUGH: 0

## 2023-08-11 NOTE — TELEPHONE ENCOUNTER
Called and spoke with Ann - she agrees to have Liu go to the urgent care to be evaluated. Holding appt. For Tuesday - may have to cancel.

## 2023-08-11 NOTE — TELEPHONE ENCOUNTER
Medicine Refill Request    Last Office Visit: 7/7/2023   Last Consult Visit: Visit date not found  Last Telemedicine Visit: Visit date not found    Next Appointment: 8/15/2023      Current Outpatient Medications:   •  acetaminophen (TYLENOL) 325 mg tablet, Take 650 mg by mouth as needed., Disp: , Rfl:   •  albuterol 2.5 mg /3 mL (0.083 %) nebulizer solution, Take 3 mL (2.5 mg total) by nebulization 4 (four) times a day., Disp: 180 mL, Rfl: 1  •  albuterol HFA 90 mcg/actuation inhaler, Inhale 2 puffs every 6 (six) hours as needed for wheezing or shortness of breath., Disp: 18 g, Rfl: 1  •  atorvastatin (LIPITOR) 20 mg tablet, Take 1 tablet (20 mg total) by mouth daily. Pill pack, Disp: 90 tablet, Rfl: 1  •  busPIRone (BUSPAR) 5 mg tablet, Take 1 tablet (5 mg total) by mouth 2 (two) times a day. Pill pack, Disp: 180 tablet, Rfl: 1  •  clonazePAM (klonoPIN) 0.5 mg tablet, TAKE 1 TABLET (0.5 MG TOTAL) BY MOUTH NIGHTLY AS NEEDED FOR ANXIETY., Disp: 30 tablet, Rfl: 0  •  diclofenac sodium (VOLTAREN) 1 % topical gel, Apply 1 g topically as needed., Disp: , Rfl:   •  donepeziL (ARICEPT) 10 mg tablet, Take 1 tablet (10 mg total) by mouth nightly., Disp: 90 tablet, Rfl: 1  •  empagliflozin (JARDIANCE) 25 mg tablet, Take 1 tablet (25 mg total) by mouth daily., Disp: 90 tablet, Rfl: 3  •  ferrous sulfate 325 mg (65 mg iron) tablet, Take 1 tablet (325 mg total) by mouth daily with breakfast., Disp: 90 tablet, Rfl: 1  •  fluticasone propionate (FLONASE) 50 mcg/actuation nasal spray, Administer 1 spray into each nostril daily., Disp: 16 g, Rfl: 5  •  fluticasone-umeclidinium-vilanterol (TRELEGY ELLIPTA) 100-62.5-25 mcg blister with device powder for inhalation, Inhale 1 puff daily., Disp: 3 each, Rfl: 0  •  folic acid (FOLVITE) 1 mg tablet, Take 1 tablet (1 mg total) by mouth daily., Disp: 90 tablet, Rfl: 1  •  levothyroxine (SYNTHROID) 112 mcg tablet, Take 1 tablet (112 mcg total) by mouth daily. Pill pack, Disp: 90 tablet, Rfl:  1  •  loratadine (CLARITIN) 10 mg tablet, Take 10 mg by mouth daily., Disp: , Rfl:   •  magnesium oxide (MAG-OX) 400 mg (241.3 mg magnesium) tablet, Take 1 tablet (400 mg total) by mouth 2 (two) times a day., Disp: 180 tablet, Rfl: 3  •  medical marijuana, Take 1 each by mouth as needed., Disp: , Rfl:   •  melatonin 5 mg capsule, Take 5 mg by mouth nightly. Hs prn, Disp: , Rfl:   •  metoprolol succinate XL (TOPROL-XL) 25 mg 24 hr tablet, TAKE 1/2 OF A TABLET BY MOUTH TWICE DAILY, Disp: 90 tablet, Rfl: 2  •  multivitamin tablet, Take 1 tablet by mouth daily., Disp: , Rfl:   •  nebulizers misc, 1 each 4 (four) times a day as needed (sob or wheeze)., Disp: 1 each, Rfl: 0  •  pantoprazole (PROTONIX) 40 mg EC tablet, Take 1 tablet (40 mg total) by mouth 2 (two) times a day. Pill pack, Disp: 180 tablet, Rfl: 0  •  rivaroxaban (XARELTO) 20 mg tablet, Take 1 tablet (20 mg total) by mouth daily with dinner., Disp: 90 tablet, Rfl: 2  •  sertraline (ZOLOFT) 50 mg tablet, Take 1 tablet (50 mg total) by mouth daily. Pill pack, Disp: 90 tablet, Rfl: 0  •  spironolactone (ALDACTONE) 25 mg tablet, Take 1 tablet (25 mg total) by mouth daily., Disp: 90 tablet, Rfl: 1  •  thiamine 100 mg tablet, Take 1 tablet (100 mg total) by mouth daily., Disp: 90 tablet, Rfl: 1      BP Readings from Last 3 Encounters:   07/07/23 132/68   06/27/23 130/62   04/14/23 130/72       Recent Lab results:  Lab Results   Component Value Date    CHOL 138 11/17/2022   ,   Lab Results   Component Value Date    HDL 64 11/17/2022   ,   Lab Results   Component Value Date    LDLCALC 59 11/17/2022   ,   Lab Results   Component Value Date    TRIG 71 11/17/2022        Lab Results   Component Value Date    GLUCOSE 298 (H) 07/11/2023   ,   Lab Results   Component Value Date    HGBA1C 8.1 (H) 07/11/2023         Lab Results   Component Value Date    CREATININE 1.30 (H) 07/11/2023       Lab Results   Component Value Date    TSH 3.06 04/21/2023           Lab Results    Component Value Date    [facility] 8.1 (H) 07/11/2023

## 2023-08-11 NOTE — TELEPHONE ENCOUNTER
I will send the inhaler, but strongly advise he is seen for this cough- he can go to urgent care this weekend or er, if chf, needs different treatment  Karina

## 2023-08-11 NOTE — TELEPHONE ENCOUNTER
Pts caregiver Ann called to let us know that Liu picked up his Albuterol inhaler on Mon 8/7 and is out already. The Pharmacist suggested they call and let us know. He's been using it more often because his productive cough has returned for about a week. I scheduled an appt on Tues (per their availability) to address the cough but they are now asking for a refill on the inhaler for the weekend. Last time he had a bad cough it was due to CHF. Please send meds to [facility]. Thanks!

## 2023-08-11 NOTE — ED PROVIDER NOTES
History  No chief complaint on file.    Pt used entire Albuterol MDI this week due to cough  Difficulty sleeping  NO LE edema  NO abdominal distention    Lasix discontinued few months back- added aldactone      Shortness of Breath  Severity:  Moderate  Onset quality:  Gradual  Duration:  1 week  Timing:  Intermittent  Progression:  Waxing and waning  Chronicity:  New  Context: not URI    Relieved by:  Nothing  Worsened by:  Nothing  Ineffective treatments: albuterol MDI in one week.  Associated symptoms: no chest pain, no cough, no ear pain, no fever and no sputum production    Risk factors comment:  Hx CHF      Past Medical History:   Diagnosis Date   • A-fib (CMS/Formerly [facility])    • Acute on chronic combined systolic and diastolic congestive heart failure (CMS/Formerly [facility])    • Anxiety 4/11/2023   • CHF (congestive heart failure), NYHA class I, acute, systolic (CMS/Formerly [facility])    • Depression    • Hyperthyroidism    • Hypoxia    • Near syncope    • NSVT (nonsustained ventricular tachycardia) (CMS/Formerly [facility])    • Orthostatic hypotension    • Presence of cardiac pacemaker    • Pulmonary embolism (CMS/[facility]ly [facility])    • Sepsis (CMS/[facility]ly [facility]) 05/16/2022       Past Surgical History:   Procedure Laterality Date   • CARDIAC CATHETERIZATION  11/19/2020    CATH PLACE/CORON ANGIO, IMG SUPER/INTERP,R&L HRT CATH, L HRT VENTRIC   • CARDIAC CATHETERIZATION Bilateral 2005   • CORONARY ARTERY BYPASS GRAFT  2005       Family History   Problem Relation Age of Onset   • Thyroid cancer Biological Mother    • ALS Biological Mother    • Parkinsonism Biological Mother    • Heart attack Biological Father    • Liver cancer Maternal Grandmother        Social History     Tobacco Use   • Smoking status: Former     Years: 40.00     Types: Cigarettes     Quit date: 11/1/2012     Years since quitting: 10.7   • Smokeless tobacco: Never   Vaping Use   • Vaping Use: Never used   Substance Use Topics   • Alcohol use: Not Currently     Alcohol/week: 9.0 standard drinks of alcohol     Types: 9  Shots of liquor per week     Comment: vodka stopped late in winter   • Drug use: Never       Review of Systems   Constitutional: Negative for fever.   HENT: Negative for ear pain.    Respiratory: Positive for shortness of breath. Negative for cough and sputum production.    Cardiovascular: Negative for chest pain and palpitations.   Allergic/Immunologic: Negative for immunocompromised state.       Physical Exam  ED Triage Vitals [08/11/23 1728]   Temp Heart Rate Resp BP SpO2   36.4 °C (97.5 °F) 65 -- 137/75 (!) 91 %      Temp src Heart Rate Source Patient Position BP Location FiO2 (%) (Set)   -- -- -- -- --       Physical Exam  Constitutional:       Appearance: He is normal weight.   HENT:      Head: Normocephalic.   Pulmonary:      Effort: Respiratory distress present.      Comments: Tachypnea with mild exertion  Decreased BS bilaterally  Abdominal:      Palpations: Abdomen is soft.      Tenderness: There is no abdominal tenderness.   Musculoskeletal:         General: Normal range of motion.      Right lower leg: No edema.      Left lower leg: No edema.   Skin:     General: Skin is warm.   Neurological:      General: No focal deficit present.      Mental Status: He is alert. Mental status is at baseline.           Procedures  Procedures    UC Course       Medical Decision Making  Ambulating causes tachypnea and hypoxia to 91%  Resting resolves tachypnea, pulse ox 97%  Pt admits he eats microwaveable meals.  CXR shows interstitial edema  Will send to ED for diuresis and eval  Family member comfortable driving him to [facility]  'Staff notified                   Carine Li,   08/11/23 9882

## 2023-08-12 PROBLEM — J18.9 PNEUMONIA DUE TO INFECTIOUS ORGANISM, UNSPECIFIED LATERALITY, UNSPECIFIED PART OF LUNG: Status: ACTIVE | Noted: 2023-08-12

## 2023-08-12 LAB
ATRIAL RATE: 357
LACTATE SERPL-SCNC: 1.1 MMOL/L (ref 0.4–2)
QRS DURATION: 192
QT INTERVAL: 498
QTC CALCULATION(BAZETT): 517
R AXIS: 257
T WAVE AXIS: 70
VENTRICULAR RATE: 65

## 2023-08-12 PROCEDURE — 25800000 HC PHARMACY IV SOLUTIONS: Performed by: PHYSICIAN ASSISTANT

## 2023-08-12 PROCEDURE — 12000000 HC ROOM AND CARE MED/SURG

## 2023-08-12 PROCEDURE — 63700000 HC SELF-ADMINISTRABLE DRUG: Performed by: INTERNAL MEDICINE

## 2023-08-12 PROCEDURE — 99223 1ST HOSP IP/OBS HIGH 75: CPT | Performed by: INTERNAL MEDICINE

## 2023-08-12 PROCEDURE — 25800000 HC PHARMACY IV SOLUTIONS: Performed by: INTERNAL MEDICINE

## 2023-08-12 PROCEDURE — 25000000 HC PHARMACY GENERAL: Performed by: INTERNAL MEDICINE

## 2023-08-12 PROCEDURE — 63600000 HC DRUGS/DETAIL CODE: Mod: JZ | Performed by: INTERNAL MEDICINE

## 2023-08-12 PROCEDURE — 63700000 HC SELF-ADMINISTRABLE DRUG: Performed by: STUDENT IN AN ORGANIZED HEALTH CARE EDUCATION/TRAINING PROGRAM

## 2023-08-12 PROCEDURE — 1123F ACP DISCUSS/DSCN MKR DOCD: CPT | Performed by: INTERNAL MEDICINE

## 2023-08-12 PROCEDURE — 63600000 HC DRUGS/DETAIL CODE: Mod: JZ | Performed by: PHYSICIAN ASSISTANT

## 2023-08-12 PROCEDURE — 63700000 HC SELF-ADMINISTRABLE DRUG: Performed by: FAMILY MEDICINE

## 2023-08-12 RX ORDER — CLONAZEPAM 0.5 MG/1
0.5 TABLET ORAL NIGHTLY PRN
Status: DISCONTINUED | OUTPATIENT
Start: 2023-08-12 | End: 2023-08-15 | Stop reason: HOSPADM

## 2023-08-12 RX ORDER — OXYCODONE HYDROCHLORIDE 5 MG/1
5 TABLET ORAL ONCE
Status: COMPLETED | OUTPATIENT
Start: 2023-08-12 | End: 2023-08-12

## 2023-08-12 RX ORDER — POTASSIUM CHLORIDE 14.9 MG/ML
20 INJECTION INTRAVENOUS AS NEEDED
Status: DISCONTINUED | OUTPATIENT
Start: 2023-08-12 | End: 2023-08-15 | Stop reason: HOSPADM

## 2023-08-12 RX ORDER — IBUPROFEN 200 MG
16-32 TABLET ORAL AS NEEDED
Status: DISCONTINUED | OUTPATIENT
Start: 2023-08-12 | End: 2023-08-15 | Stop reason: HOSPADM

## 2023-08-12 RX ORDER — LIDOCAINE 560 MG/1
1 PATCH PERCUTANEOUS; TOPICAL; TRANSDERMAL DAILY
Status: DISCONTINUED | OUTPATIENT
Start: 2023-08-12 | End: 2023-08-15 | Stop reason: HOSPADM

## 2023-08-12 RX ORDER — ATORVASTATIN CALCIUM 20 MG/1
20 TABLET, FILM COATED ORAL
Status: DISCONTINUED | OUTPATIENT
Start: 2023-08-12 | End: 2023-08-15 | Stop reason: HOSPADM

## 2023-08-12 RX ORDER — GUAIFENESIN 100 MG/5ML
100 SOLUTION ORAL EVERY 4 HOURS PRN
Status: DISCONTINUED | OUTPATIENT
Start: 2023-08-12 | End: 2023-08-15 | Stop reason: HOSPADM

## 2023-08-12 RX ORDER — CETIRIZINE HYDROCHLORIDE 5 MG/1
5 TABLET ORAL DAILY
Status: DISCONTINUED | OUTPATIENT
Start: 2023-08-12 | End: 2023-08-15 | Stop reason: HOSPADM

## 2023-08-12 RX ORDER — DONEPEZIL HYDROCHLORIDE 10 MG/1
10 TABLET, FILM COATED ORAL NIGHTLY
Status: DISCONTINUED | OUTPATIENT
Start: 2023-08-12 | End: 2023-08-15 | Stop reason: HOSPADM

## 2023-08-12 RX ORDER — DEXTROSE 50 % IN WATER (D50W) INTRAVENOUS SYRINGE
25 AS NEEDED
Status: DISCONTINUED | OUTPATIENT
Start: 2023-08-12 | End: 2023-08-15 | Stop reason: HOSPADM

## 2023-08-12 RX ORDER — SPIRONOLACTONE 25 MG/1
25 TABLET ORAL DAILY
Status: DISCONTINUED | OUTPATIENT
Start: 2023-08-12 | End: 2023-08-15 | Stop reason: HOSPADM

## 2023-08-12 RX ORDER — POTASSIUM CHLORIDE 750 MG/1
20 TABLET, EXTENDED RELEASE ORAL AS NEEDED
Status: DISCONTINUED | OUTPATIENT
Start: 2023-08-12 | End: 2023-08-15 | Stop reason: HOSPADM

## 2023-08-12 RX ORDER — FOLIC ACID 1 MG/1
1 TABLET ORAL DAILY
Status: DISCONTINUED | OUTPATIENT
Start: 2023-08-12 | End: 2023-08-15 | Stop reason: HOSPADM

## 2023-08-12 RX ORDER — ACETAMINOPHEN 325 MG/1
650 TABLET ORAL EVERY 6 HOURS PRN
Status: DISCONTINUED | OUTPATIENT
Start: 2023-08-12 | End: 2023-08-15 | Stop reason: HOSPADM

## 2023-08-12 RX ORDER — DEXTROSE 40 %
15-30 GEL (GRAM) ORAL AS NEEDED
Status: DISCONTINUED | OUTPATIENT
Start: 2023-08-12 | End: 2023-08-15 | Stop reason: HOSPADM

## 2023-08-12 RX ORDER — TRAMADOL HYDROCHLORIDE 50 MG/1
25 TABLET ORAL EVERY 8 HOURS PRN
Status: DISCONTINUED | OUTPATIENT
Start: 2023-08-12 | End: 2023-08-15 | Stop reason: HOSPADM

## 2023-08-12 RX ORDER — BUSPIRONE HYDROCHLORIDE 5 MG/1
5 TABLET ORAL 2 TIMES DAILY
Status: DISCONTINUED | OUTPATIENT
Start: 2023-08-12 | End: 2023-08-15 | Stop reason: HOSPADM

## 2023-08-12 RX ORDER — ZOLPIDEM TARTRATE 5 MG/1
2.5 TABLET ORAL ONCE
Status: COMPLETED | OUTPATIENT
Start: 2023-08-12 | End: 2023-08-12

## 2023-08-12 RX ORDER — LEVOTHYROXINE SODIUM 112 UG/1
112 TABLET ORAL
Status: DISCONTINUED | OUTPATIENT
Start: 2023-08-12 | End: 2023-08-15 | Stop reason: HOSPADM

## 2023-08-12 RX ORDER — BUSPIRONE HYDROCHLORIDE 5 MG/1
5 TABLET ORAL 2 TIMES DAILY
Status: DISCONTINUED | OUTPATIENT
Start: 2023-08-12 | End: 2023-08-12

## 2023-08-12 RX ORDER — ALBUTEROL SULFATE 90 UG/1
2 INHALANT RESPIRATORY (INHALATION) EVERY 6 HOURS PRN
Status: DISCONTINUED | OUTPATIENT
Start: 2023-08-12 | End: 2023-08-15 | Stop reason: HOSPADM

## 2023-08-12 RX ORDER — PANTOPRAZOLE SODIUM 40 MG/1
40 TABLET, DELAYED RELEASE ORAL 2 TIMES DAILY
Status: DISCONTINUED | OUTPATIENT
Start: 2023-08-12 | End: 2023-08-15 | Stop reason: HOSPADM

## 2023-08-12 RX ORDER — THIAMINE HCL 100 MG
100 TABLET ORAL DAILY
Status: DISCONTINUED | OUTPATIENT
Start: 2023-08-12 | End: 2023-08-15 | Stop reason: HOSPADM

## 2023-08-12 RX ORDER — ALBUTEROL SULFATE 0.83 MG/ML
2.5 SOLUTION RESPIRATORY (INHALATION)
Status: DISCONTINUED | OUTPATIENT
Start: 2023-08-12 | End: 2023-08-15 | Stop reason: HOSPADM

## 2023-08-12 RX ORDER — ACETAMINOPHEN 500 MG
5 TABLET ORAL NIGHTLY PRN
Status: DISCONTINUED | OUTPATIENT
Start: 2023-08-12 | End: 2023-08-15 | Stop reason: HOSPADM

## 2023-08-12 RX ORDER — METOPROLOL SUCCINATE 25 MG/1
12.5 TABLET, EXTENDED RELEASE ORAL EVERY 12 HOURS
Status: DISCONTINUED | OUTPATIENT
Start: 2023-08-12 | End: 2023-08-15 | Stop reason: HOSPADM

## 2023-08-12 RX ORDER — LANOLIN ALCOHOL/MO/W.PET/CERES
400 CREAM (GRAM) TOPICAL 2 TIMES DAILY
Status: DISCONTINUED | OUTPATIENT
Start: 2023-08-12 | End: 2023-08-12

## 2023-08-12 RX ORDER — LANOLIN ALCOHOL/MO/W.PET/CERES
400 CREAM (GRAM) TOPICAL 2 TIMES DAILY
Status: DISCONTINUED | OUTPATIENT
Start: 2023-08-12 | End: 2023-08-15 | Stop reason: HOSPADM

## 2023-08-12 RX ORDER — POTASSIUM CHLORIDE 750 MG/1
40 TABLET, EXTENDED RELEASE ORAL AS NEEDED
Status: DISCONTINUED | OUTPATIENT
Start: 2023-08-12 | End: 2023-08-15 | Stop reason: HOSPADM

## 2023-08-12 RX ORDER — FERROUS SULFATE 325(65) MG
325 TABLET ORAL
Status: DISCONTINUED | OUTPATIENT
Start: 2023-08-12 | End: 2023-08-15 | Stop reason: HOSPADM

## 2023-08-12 RX ADMIN — SPIRONOLACTONE 25 MG: 25 TABLET ORAL at 10:01

## 2023-08-12 RX ADMIN — RIVAROXABAN 20 MG: 10 TABLET, FILM COATED ORAL at 17:06

## 2023-08-12 RX ADMIN — ALBUTEROL SULFATE 2.5 MG: 2.5 SOLUTION RESPIRATORY (INHALATION) at 18:07

## 2023-08-12 RX ADMIN — LIDOCAINE 1 PATCH: 4 PATCH TOPICAL at 17:06

## 2023-08-12 RX ADMIN — EMPAGLIFLOZIN 25 MG: 10 TABLET, FILM COATED ORAL at 10:00

## 2023-08-12 RX ADMIN — ALBUTEROL SULFATE 2.5 MG: 2.5 SOLUTION RESPIRATORY (INHALATION) at 20:14

## 2023-08-12 RX ADMIN — ACETAMINOPHEN 650 MG: 325 TABLET ORAL at 13:14

## 2023-08-12 RX ADMIN — MOMETASONE FUROATE AND FORMOTEROL FUMARATE DIHYDRATE 2 PUFF: 100; 5 AEROSOL RESPIRATORY (INHALATION) at 20:30

## 2023-08-12 RX ADMIN — ZOLPIDEM TARTRATE 2.5 MG: 5 TABLET ORAL at 01:31

## 2023-08-12 RX ADMIN — CETIRIZINE HYDROCHLORIDE 5 MG: 5 TABLET ORAL at 10:01

## 2023-08-12 RX ADMIN — FERROUS SULFATE TAB 325 MG (65 MG ELEMENTAL FE) 325 MG: 325 (65 FE) TAB at 10:01

## 2023-08-12 RX ADMIN — PANTOPRAZOLE SODIUM 40 MG: 40 TABLET, DELAYED RELEASE ORAL at 20:14

## 2023-08-12 RX ADMIN — OXYCODONE HYDROCHLORIDE 5 MG: 5 TABLET ORAL at 21:17

## 2023-08-12 RX ADMIN — ATORVASTATIN CALCIUM 20 MG: 20 TABLET, FILM COATED ORAL at 18:14

## 2023-08-12 RX ADMIN — TRAMADOL HYDROCHLORIDE 25 MG: 50 TABLET, COATED ORAL at 15:10

## 2023-08-12 RX ADMIN — CLONAZEPAM 0.5 MG: 0.5 TABLET ORAL at 22:26

## 2023-08-12 RX ADMIN — GUAIFENESIN 100 MG: 200 SOLUTION ORAL at 11:22

## 2023-08-12 RX ADMIN — AZITHROMYCIN MONOHYDRATE 500 MG: 500 INJECTION, POWDER, LYOPHILIZED, FOR SOLUTION INTRAVENOUS at 00:17

## 2023-08-12 RX ADMIN — ACETAMINOPHEN 650 MG: 325 TABLET ORAL at 20:14

## 2023-08-12 RX ADMIN — TIOTROPIUM BROMIDE INHALATION SPRAY 2 PUFF: 3.12 SPRAY, METERED RESPIRATORY (INHALATION) at 09:58

## 2023-08-12 RX ADMIN — BUSPIRONE HYDROCHLORIDE 5 MG: 5 TABLET ORAL at 10:00

## 2023-08-12 RX ADMIN — Medication 400 MG: at 10:01

## 2023-08-12 RX ADMIN — Medication 5 MG: at 22:26

## 2023-08-12 RX ADMIN — ALBUTEROL SULFATE 2.5 MG: 2.5 SOLUTION RESPIRATORY (INHALATION) at 07:14

## 2023-08-12 RX ADMIN — ACETAMINOPHEN 650 MG: 325 TABLET ORAL at 06:10

## 2023-08-12 RX ADMIN — METOPROLOL SUCCINATE 12.5 MG: 25 TABLET, EXTENDED RELEASE ORAL at 10:05

## 2023-08-12 RX ADMIN — Medication 100 MG: at 10:00

## 2023-08-12 RX ADMIN — MOMETASONE FUROATE AND FORMOTEROL FUMARATE DIHYDRATE 2 PUFF: 100; 5 AEROSOL RESPIRATORY (INHALATION) at 09:58

## 2023-08-12 RX ADMIN — ALBUTEROL SULFATE 2 PUFF: 108 INHALANT RESPIRATORY (INHALATION) at 06:42

## 2023-08-12 RX ADMIN — Medication 1 MG: at 10:01

## 2023-08-12 RX ADMIN — ALBUTEROL SULFATE 2.5 MG: 2.5 SOLUTION RESPIRATORY (INHALATION) at 13:14

## 2023-08-12 RX ADMIN — PANTOPRAZOLE SODIUM 40 MG: 40 TABLET, DELAYED RELEASE ORAL at 10:00

## 2023-08-12 RX ADMIN — CEFTRIAXONE SODIUM 1 G: 1 INJECTION, POWDER, FOR SOLUTION INTRAMUSCULAR; INTRAVENOUS at 23:26

## 2023-08-12 RX ADMIN — BUSPIRONE HYDROCHLORIDE 5 MG: 5 TABLET ORAL at 20:14

## 2023-08-12 RX ADMIN — MULTIPLE VITAMINS W/ MINERALS TAB 1 TABLET: TAB at 10:00

## 2023-08-12 RX ADMIN — DONEPEZIL HYDROCHLORIDE 10 MG: 10 TABLET ORAL at 21:17

## 2023-08-12 RX ADMIN — METOPROLOL SUCCINATE 12.5 MG: 25 TABLET, EXTENDED RELEASE ORAL at 20:14

## 2023-08-12 RX ADMIN — LEVOTHYROXINE SODIUM 112 MCG: 112 TABLET ORAL at 06:10

## 2023-08-12 RX ADMIN — Medication 400 MG: at 20:14

## 2023-08-12 ASSESSMENT — COGNITIVE AND FUNCTIONAL STATUS - GENERAL
WALKING IN HOSPITAL ROOM: 3 - A LITTLE
MOVING TO AND FROM BED TO CHAIR: 3 - A LITTLE
STANDING UP FROM CHAIR USING ARMS: 3 - A LITTLE
MOVING TO AND FROM BED TO CHAIR: 3 - A LITTLE
CLIMB 3 TO 5 STEPS WITH RAILING: 3 - A LITTLE
WALKING IN HOSPITAL ROOM: 3 - A LITTLE
MOVING TO AND FROM BED TO CHAIR: 3 - A LITTLE
CLIMB 3 TO 5 STEPS WITH RAILING: 3 - A LITTLE
STANDING UP FROM CHAIR USING ARMS: 3 - A LITTLE
CLIMB 3 TO 5 STEPS WITH RAILING: 3 - A LITTLE
STANDING UP FROM CHAIR USING ARMS: 3 - A LITTLE
WALKING IN HOSPITAL ROOM: 3 - A LITTLE

## 2023-08-12 NOTE — PLAN OF CARE
Problem: Adult Inpatient Plan of Care  Goal: Plan of Care Review  Outcome: Progressing  Flowsheets (Taken 8/12/2023 5154)  Progress: improving  Outcome Evaluation: Pt AAOx3. No S/S of SOB or Res distress noted. No C/O pain at this time. Pt used the urinal and voided ithout any ssues. No acute distress, VSS. Call bell within reach.  Plan of Care Reviewed With: patient

## 2023-08-12 NOTE — ED PROVIDER NOTES
Emergency Medicine Note  HPI   HISTORY OF PRESENT ILLNESS     83-year-old male with history of aortic aneurysm, anemia, right bundle branch block, CAD, hypertension, TAVR, hypercholesterolemia, and SVT, atrial fibrillation, pulmonary hypertension, CHF, Alzheimer's dementia, pacemaker, sepsis, stent placement, presents the emergency department for shortness of breath and cough that is been getting gradually worse over the last 2 weeks.  He was seen at [facility] urgent care, told there was fluid in his lungs and sent to the emergency department for further evaluation.  Patient recently had an outpatient CAT scan on 7/26/2023 for aortic aneurysm.  Reports associated runny nose.  Cough is nonproductive.  Tmax 99.4.  Patient was found to be hypoxic at urgent care, 91% on room air.  Patient does not wear oxygen at home.            Patient History   PAST HISTORY     Reviewed from Nursing Triage:  Problems       Past Medical History:   Diagnosis Date   • A-fib (CMS/HCC)    • Acute on chronic combined systolic and diastolic congestive heart failure (CMS/HCC)    • Anxiety 4/11/2023   • CHF (congestive heart failure), NYHA class I, acute, systolic (CMS/HCC)    • Depression    • Hyperthyroidism    • Hypoxia    • Near syncope    • NSVT (nonsustained ventricular tachycardia) (CMS/HCC)    • Orthostatic hypotension    • Presence of cardiac pacemaker    • Pulmonary embolism (CMS/HCC)    • Sepsis (CMS/HCC) 05/16/2022       Past Surgical History:   Procedure Laterality Date   • CARDIAC CATHETERIZATION  11/19/2020    CATH PLACE/CORON ANGIO, IMG SUPER/INTERP,R&L HRT CATH, L HRT VENTRIC   • CARDIAC CATHETERIZATION Bilateral 2005   • CORONARY ARTERY BYPASS GRAFT  2005       Family History   Problem Relation Age of Onset   • Thyroid cancer Biological Mother    • ALS Biological Mother    • Parkinsonism Biological Mother    • Heart attack Biological Father    • Liver cancer Maternal Grandmother        Social History     Tobacco Use    • Smoking status: Former     Years: 40.00     Types: Cigarettes     Quit date: 11/1/2012     Years since quitting: 10.7   • Smokeless tobacco: Never   Vaping Use   • Vaping Use: Never used   Substance Use Topics   • Alcohol use: Not Currently     Alcohol/week: 9.0 standard drinks of alcohol     Types: 9 Shots of liquor per week     Comment: vodka stopped late in winter   • Drug use: Never         Review of Systems   REVIEW OF SYSTEMS     Review of Systems   Constitutional: Negative for chills.   HENT: Positive for rhinorrhea.    Respiratory: Positive for cough and shortness of breath.    Cardiovascular: Negative for chest pain.         VITALS     ED Vitals    Date/Time Temp Pulse Resp BP SpO2 [facility]   08/11/23 2340 -- 68 29 117/57 95 % SDB   08/11/23 2240 -- 64 22 117/58 95 % SDB   08/11/23 2136 -- 64 25 140/58 96 %    08/11/23 1909 37.4 °C (99.4 °F) 65 18 146/65 91 % MCJ        Pulse Ox %: 91 % (08/12/23 0018)  Pulse Ox Interpretation: Low (08/12/23 0018)  Heart Rate: 65 (08/12/23 0018)  Rhythm Strip Interpretation: Paced Rhythm (08/12/23 0018)     Physical Exam   PHYSICAL EXAM     Physical Exam  Vitals reviewed.   Constitutional:       General: He is not in acute distress.     Appearance: Normal appearance. He is not ill-appearing, toxic-appearing or diaphoretic.   HENT:      Head: Normocephalic.   Cardiovascular:      Rate and Rhythm: Normal rate and regular rhythm.   Pulmonary:      Effort: Pulmonary effort is normal. No respiratory distress.      Breath sounds: Normal breath sounds. No stridor. No wheezing, rhonchi or rales.   Musculoskeletal:      Right lower leg: No edema.      Left lower leg: No edema.   Skin:     General: Skin is warm and dry.   Neurological:      Mental Status: He is alert and oriented to person, place, and time.   Psychiatric:         Mood and Affect: Mood normal.         Behavior: Behavior normal.           PROCEDURES     Procedures     DATA     Results     Procedure Component Value  Units Date/Time    [facility] Draw Panel [[id number]] Collected: 08/11/23 1913    Specimen: Blood, Venous Updated: 08/12/23 0401    Narrative:      The following orders were created for panel order [facility] Draw Panel.  Procedure                               Abnormality         Status                     ---------                               -----------         ------                     RAINBOW RED[[id number]]                                      Final result               RAINBOW LT BLUE[[id number]]                                  Final result               RAINBOW GOLD[[id number]]                                     Final result               [facility] Blood Culture[[id number]]                            Final result                 Please view results for these tests on the individual orders.    RAINBOW RED [[id number]] Collected: 08/11/23 1913    Specimen: Blood, Venous Updated: 08/12/23 0401    RAINBOW LT BLUE [[id number]] Collected: 08/11/23 1913    Specimen: Blood, Venous Updated: 08/12/23 0401    LINDA GOLD [[id number]] Collected: 08/11/23 1913    Specimen: Blood, Venous Updated: 08/12/23 0401    [facility] Blood Culture [[id number]] Collected: 08/11/23 1913    Specimen: Blood, Venous Updated: 08/12/23 0401    Lactate, w/ reflex repeat if > 2.0 [[id number]]  (Normal) Collected: 08/11/23 2355    Specimen: Blood, Venous Updated: 08/12/23 0004     Lactate 1.1 mmol/L     Blood Culture Blood, Venous [[id number]] Collected: 08/11/23 2355    Specimen: Blood, Venous Updated: 08/12/23 0000    Blood Culture Blood, Venous [[id number]] Collected: 08/11/23 1913    Specimen: Blood, Venous Updated: 08/11/23 2352    CBC and differential [[id number]]  (Abnormal) Collected: 08/11/23 1913    Specimen: Blood, Venous Updated: 08/11/23 2311     WBC 19.62 K/uL      RBC 3.80 M/uL      Hemoglobin 10.8 g/dL      Hematocrit 35.6 %      MCV 93.7 fL      MCH 28.4 pg      MCHC 30.3 g/dL      RDW 14.2 %      Platelets 264 K/uL      MPV 9.1 fL       Differential Type Manu     Neutrophils 52 %      Lymphocytes 28 %      Monocytes 14 %      Eosinophils 2 %      Basophils 0 %      Atypical Lymphocytes 3 %      Other Cells 1 %      Neutrophils, Absolute 10.20 K/uL      Lymphocytes, Absolute 5.49 K/uL      Monocytes, Absolute 2.75 K/uL      Eosinophils, Absolute 0.39 K/uL      Basophils, Absolute 0.00 K/uL      Atypical Lymphs, Absolute 0.59 K/uL      Other Cells 0.20 K/uL      PLT Morphology Normal     Platelet Estimate Adequate (150,000-400,000)     Smudge Cells 2+     Hypochromia Occasional     Ovalocytes 1+     Acanthocytes Occasional     Polychromasia Occasional     Rouleaux Occasional     Gayla Cells 2+     Diff Comment DIFF PERFORMED ON ALBUMINIZED SMEAR TO MINIMIZE SMUDGE CELLS    SARS-CoV-2 (COVID-19), PCR Nasopharynx [[id number]]  (Normal) Collected: 08/11/23 1913    Specimen: Nasopharyngeal Swab from Nasopharynx Updated: 08/11/23 2024    Narrative:      The following orders were created for panel order SARS-CoV-2 (COVID-19), PCR Nasopharynx.  Procedure                               Abnormality         Status                     ---------                               -----------         ------                     SARS-COV-2 (COVID-19)/ F...[[id number]]  Normal              Final result                 Please view results for these tests on the individual orders.    SARS-COV-2 (COVID-19)/ FLU A/B, AND RSV, PCR Nasopharynx [[id number]]  (Normal) Collected: 08/11/23 1913    Specimen: Nasopharyngeal Swab from Nasopharynx Updated: 08/11/23 2024     SARS-CoV-2 (COVID-19) Negative     Influenza A Negative     Influenza B Negative     Respiratory Syncytial Virus Negative    Narrative:      Testing performed using real-time PCR for detection of COVID-19. EUA approved validation studies performed on site.     HS Troponin I (with 2 hour reflex) [[id number]]  (Abnormal) Collected: 08/11/23 1913    Specimen: Blood, Venous Updated: 08/11/23 2004     High Sens Troponin I  17.5 pg/mL     B-type natriuretic peptide [[id number]]  (Abnormal) Collected: 08/11/23 1913    Specimen: Blood, Venous Updated: 08/11/23 2002      pg/mL     Comprehensive metabolic panel [[id number]]  (Abnormal) Collected: 08/11/23 1913    Specimen: Blood, Venous Updated: 08/11/23 1950     Sodium 139 mEQ/L      Potassium 4.4 mEQ/L      Comment: Results obtained on plasma. Plasma Potassium values may be up to 0.4 mEQ/L less than serum values. The differences may be greater for patients with high platelet or white cell counts.        Chloride 103 mEQ/L      CO2 27 mEQ/L      BUN 10 mg/dL      Creatinine 1.1 mg/dL      Glucose 148 mg/dL      Calcium 9.4 mg/dL      AST (SGOT) 28 IU/L      ALT (SGPT) 24 IU/L      Alkaline Phosphatase 90 IU/L      Total Protein 7.2 g/dL      Comment: Test performed on plasma which typically contains approximately 0.4 g/dL more protein than serum.        Albumin 4.0 g/dL      Bilirubin, Total 0.8 mg/dL      eGFR >60.0 mL/min/1.73m*2      Anion Gap 9 mEQ/L           Imaging Results          CT ANGIOGRAPHY CHEST/ABDOMEN/PELVIS WITH AND WITHOUT IV CONTRAST (Final result)  Result time 08/12/23 09:20:55    Final result                 Impression:    IMPRESSION:    No evidence of pulmonary embolism.    Ectatic ascending aorta is noted, measures 4.2 cm in diameter at the level of  right pulmonary artery.    Liver contour is mildly nodular, cannot rule out cirrhosis.    Scattered consolidation and groundglass opacity seen in lungs, may represent  multifocal pneumonia or pulmonary edema.               Narrative:    CLINICAL HISTORY: Aortic aneurysm, known or suspected  Pulmonary Embolism (PE) suspected, Aortic dissection suspected (PE/Dissection  study)    COMMENT:  COMPARISON: CT chest on 7/26/2023.    TECHNIQUE:  CTA of the chest was performed with intravenous contrast. CT abdomen  and pelvis with contrast was performed.  Coronal and sagittal reconstructed  images were  obtained.    ADMINISTERED IV CONTRAST AND DOSE: 100mL of iohexoL (OMNIPAQUE 350) 350 mg  iodine/mL solution 100 mL    3D MIP and/or volume rendered reconstructions performed and reviewed.    CT DOSE:  One or more dose reduction techniques (e.g. automated exposure  control, adjustment of the mA and/or kV according to patient size, use of  iterative reconstruction technique) utilized for this examination.    FINDINGS:    No evidence of pulmonary embolism.    Ectatic ascending aorta is noted, measures 4.2 cm in diameter at the level of  right pulmonary artery.    Lines/tubes: Left chest cardiopacer leads are noted.    Lower neck:  Limited thyroid: Thyroid gland is atrophic or surgically absent.  Limited supraclavicular region: Normal.    Lungs, airways and pleura:  Pleura: No pleural effusion.  Lungs: Scattered consolidation and groundglass opacity seen in lungs.  Airways: Normal.    Mediastinum:  Heart: Heart is mildly enlarged.  Severe coronary artery calcification. The  patient is status post CABG. Prosthetic aortic valve is noted.  Pericardium: No pericardial effusion.  Hilar and mediastinal lymph node stations: No lymphadenopathy.  Esophagus: Small hiatal hernia is noted.  Vessels: Main pulmonary artery is at the upper limits of normal, measures 3.2  cm in diameter.    Axilla: Normal.    Bones and soft tissues:  Soft tissues: Unremarkable.  Bones: Unremarkable.    Abdomen and Pelvis:  Hepatobiliary:  Liver: No focal hepatic mass. Liver contour is mildly nodular, cannot rule  out cirrhosis.  Gallbladder: Cholelithiasis is noted.  Bile Ducts: No intrahepatic or extrahepatic biliary ductal dilatation    Spleen: Punctate calcification seen in spleen, likely due to sequela of  granulomatous disease.    Pancreas: No focal mass or ductal dilatation.    Adrenal glands: Normal in appearance.    Kidneys: No hydronephrosis or solid lesions.    GI Tract:  GE junction and stomach:  Small hiatal hernia is noted.  Small bowel:   Unremarkable.  Appendix:  Unremarkable.  Colon and rectum:  Colonic diverticulosis is noted.    Pelvic organs and bladder: Unremarkable.    Peritoneum/retroperitoneum: No free air or free fluid.    Lymph nodes: No lymphadenopathy.    Vessels: Moderate calcified plaque seen in aorta and iliac arteries.    Bones and soft tissues:  Soft tissues: Unremarkable.  Bones: Unremarkable.                    Preliminary Interpretation    Preliminary Impression:    Postsurgical changes of aortic root repair with no evidence of aortic dissection.  No evidence of pulmonary embolism. Mixing artifact seen within the SVC.  Significant atherosclerotic calcification of the visualized aorta and its vessel takeoff.  Cardiomegaly. Severe coronary artery calcification.  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  Multiple patchy areas of groundglass opacities suggestive of early inflammation/infection versus pulmonary edema. Clinical correlation and follow-up to resolution recommended. Elevation of left hemidiaphragm.  Cirrhotic liver morphology.  Cholelithiasis.  Prostatomegaly. Decompressed bladder with hyperdense material. Correlation to urinalysis recommended.  Multiple colonic diverticulosis with no evidence of diverticulitis.    Interpreted by: Jose Angel Marinelli DO, Aug 11, 2023 11:25 PM                                 ECG 12 lead          Scoring tools                                  ED Course & MDM   MDM / ED COURSE / CLINICAL IMPRESSION / DISPO     Medical Decision Making  Hypoxia: acute illness or injury  Pneumonia due to infectious organism, unspecified laterality, unspecified part of lung: acute illness or injury  Amount and/or Complexity of Data Reviewed  Labs: ordered. Decision-making details documented in ED Course.  Radiology: ordered and independent interpretation performed. Decision-making details documented in ED Course.  ECG/medicine tests: ordered.      Risk  OTC drugs.  Prescription drug management.  Decision  regarding hospitalization.          ED Course as of 08/12/23 1832   Fri Aug 11, 2023   1948 COMMENT: Two views of the chest are submitted for review and compared to prior  from 4/21/2023.     Cardiomegaly. Dual-chamber pacer wires are seen in place. The mediastinal  silhouette is unremarkable. Stable elevated left hemidiaphragm. Diffuse  pulmonary vascular congestion. No consolidation, effusion, or pneumothorax.     --  IMPRESSION:  Cardiomegaly and mild interstitial edema [SB]   2012 Impression: Cough, shortness of breath  Plan: Labs, EKG, CT chest, COVID/flu/RSV, observe [MG]   2029 B-type natriuretic peptide(!) [JEROD]   2032 Requesting something for his chronic back pain.  Will order Tylenol [MG]   2155 Patient still requesting something additional for the back pain.  Will order small dose of Valium. [MG]   2156 SARS-CoV-2 (COVID-19): Negative [MG]   2156 Influenza A: Negative [MG]   2156 Influenza B: Negative [MG]   2156 Respiratory Syncytial Virus: Negative [MG]   2156 WBC(!): 19.62  elevated [MG]   2339 CT ANGIOGRAPHY CHEST/ABDOMEN/PELVIS WITH AND WITHOUT IV CONTRAST  Will order rocephin/zithromax [MG]   Sat Aug 12, 2023   0009 Will admit for PNA, hypoxia.  [facility] [place] [MG]      ED Course User Index  [JEROD] Carine Li DO  [MG] Josi Pop PA C  [SB] Son Weathers MD     Clinical Impression      Pneumonia due to infectious organism, unspecified laterality, unspecified part of lung   Hypoxia     _________________     ED Disposition   Admit / Observation                   Josi Pop PA C  08/12/23 1832

## 2023-08-12 NOTE — CONSULTS
Visited with Mr. Morris while rounding on 4B.  Offered supportive conversation. He shared stories from his youth.  We talked about his family, his home and his musical interests.    Esther Ramirez  Spiritual Care Specialist  #[phone]

## 2023-08-12 NOTE — ASSESSMENT & PLAN NOTE
- CT chest abdomen and pelvis (8/12/23): No evidence of pulmonary embolism. Ectatic ascending aorta is noted, measures 4.2 cm in diameter at the level of right pulmonary artery. Liver contour is mildly nodular, cannot rule out cirrhosis. Scattered consolidation and groundglass opacity seen in lungs, may represent multifocal pneumonia or pulmonary edema.   - with leukocytosis 19 on admission, improved to 12  - Continue ceftriaxone (abx day 4) - plan to change to PO to complete 7 days of tx on DC  - Supplemental oxygen as needed, wean as tolerated   - might need home O2 - will need an ambulatory O2 study

## 2023-08-12 NOTE — NURSING NOTE
Pt noted coughing and C/O trouble breathing. Expiratory wheezing noted upon ausculation.  PRN albuterol inhaler given with some relief. On call nocturnist MIGUEL Baez notified, she advised to give albuterol neb treatment. Neb treatment given per order.

## 2023-08-12 NOTE — H&P
Hospital Medicine Service -  History & Physical        CHIEF COMPLAINT   SOB     HISTORY OF PRESENT ILLNESS      Cecil Morris is a 83 y.o. male with a past medical history of CAD, RBBB, HTN, PPM, CAD, stents, Afib on AC, Dementia, HLD, hypothyroidism, who presented to  with SOB and coughing for 2 weeks. Pt notes wet cough at home, no fevers, chills, but feels more tired than usual. No CP, orthopnea, PND. No abd pain, N/V/d/C. Eating/drinking okay at home. No known sick contacts. Pt denies any vomiting, difficulty swallowing, or excessive coughing while eating/swallowing. Not on home oxygen.       PAST MEDICAL AND SURGICAL HISTORY      Past Medical History:   Diagnosis Date   • A-fib (CMS/[facility])    • Acute on chronic combined systolic and diastolic congestive heart failure (CMS/[facility])    • Anxiety 4/11/2023   • CHF (congestive heart failure), NYHA class I, acute, systolic (CMS/[facility])    • Depression    • Hyperthyroidism    • Hypoxia    • Near syncope    • NSVT (nonsustained ventricular tachycardia) (CMS/[facility])    • Orthostatic hypotension    • Presence of cardiac pacemaker    • Pulmonary embolism (CMS/[facility])    • Sepsis (CMS/[facility]) 05/16/2022       Past Surgical History:   Procedure Laterality Date   • CARDIAC CATHETERIZATION  11/19/2020    CATH PLACE/CORON ANGIO, IMG SUPER/INTERP,R&L HRT CATH, L HRT VENTRIC   • CARDIAC CATHETERIZATION Bilateral 2005   • CORONARY ARTERY BYPASS GRAFT  2005       PCP: Melanie Chambers, DO    MEDICATIONS      Prior to Admission medications    Medication Sig Start Date End Date Taking? Authorizing Provider   acetaminophen (TYLENOL) 325 mg tablet Take 650 mg by mouth as needed.    Provider, lou Hawkins MD   albuterol 2.5 mg /3 mL (0.083 %) nebulizer solution TAKE 3 ML (2.5 MG TOTAL) BY NEBULIZATION 4 (FOUR) TIMES A DAY. 8/11/23   Karina Bernstein PA C   albuterol HFA 90 mcg/actuation inhaler Inhale 2 puffs every 6 (six) hours as needed for wheezing. 8/11/23 9/10/23  Day  AMARILIS Okeefe   atorvastatin (LIPITOR) 20 mg tablet Take 1 tablet (20 mg total) by mouth daily. Pill pack 5/5/23 11/1/23  Melanie Chambers DO   busPIRone (BUSPAR) 5 mg tablet Take 1 tablet (5 mg total) by mouth 2 (two) times a day. Pill pack 5/5/23 11/1/23  Melanie Chambers DO   clonazePAM (klonoPIN) 0.5 mg tablet TAKE 1 TABLET (0.5 MG TOTAL) BY MOUTH NIGHTLY AS NEEDED FOR ANXIETY. 7/18/23 8/17/23  Melanie Chambers DO   diclofenac sodium (VOLTAREN) 1 % topical gel Apply 1 g topically as needed.    Provider, Arturo Hawkins MD   donepeziL (ARICEPT) 10 mg tablet Take 1 tablet (10 mg total) by mouth nightly. 3/26/23 9/22/23  Morgan Barrientos MD   empagliflozin (JARDIANCE) 25 mg tablet Take 1 tablet (25 mg total) by mouth daily. 7/17/23   Yousuf Wolf MD   ferrous sulfate 325 mg (65 mg iron) tablet Take 1 tablet (325 mg total) by mouth daily with breakfast. 5/5/23 11/1/23  Melanie Chambers DO   fluticasone-umeclidinium-vilanterol (TRELEGY ELLIPTA) 100-62.5-25 mcg blister with device powder for inhalation Inhale 1 puff daily. 5/5/23 8/3/23  Melanie Chambers DO   folic acid (FOLVITE) 1 mg tablet Take 1 tablet (1 mg total) by mouth daily. 5/5/23 11/1/23  Melanie Chambers DO   levothyroxine (SYNTHROID) 112 mcg tablet Take 1 tablet (112 mcg total) by mouth daily. Pill pack 5/5/23 11/1/23  Melanie Chambers DO   loratadine (CLARITIN) 10 mg tablet Take 10 mg by mouth daily.    Provider, Arturo Hawkins MD   magnesium oxide (MAG-OX) 400 mg (241.3 mg magnesium) tablet Take 1 tablet (400 mg total) by mouth 2 (two) times a day. 6/27/23 6/26/24  Yousfu Wolf MD   medical marijuana Take 1 each by mouth as needed.    Provider, Arturo Hawkins MD   melatonin 5 mg capsule Take 5 mg by mouth nightly. Hs prn    Provider, Arturo Hawkins MD   metoprolol succinate XL (TOPROL-XL) 25 mg 24 hr tablet TAKE 1/2 OF A TABLET BY MOUTH TWICE DAILY 5/5/23 5/5/24  Yousuf Wolf MD   multivitamin tablet Take 1 tablet by mouth daily.    ProviderArturo  MD Ross   nebulizers misc 1 each 4 (four) times a day as needed (sob or wheeze). 1/6/23   Karina Bernstein PA C   pantoprazole (PROTONIX) 40 mg EC tablet Take 1 tablet (40 mg total) by mouth 2 (two) times a day. Pill pack 7/7/23 10/5/23  Melanie Chambers DO   rivaroxaban (XARELTO) 20 mg tablet Take 1 tablet (20 mg total) by mouth daily with dinner. 5/5/23 11/1/23  Yousuf Wolf MD   sertraline (ZOLOFT) 50 mg tablet Take 1 tablet (50 mg total) by mouth daily. Pill pack 5/5/23 8/3/23  Melanie Chambers DO   spironolactone (ALDACTONE) 25 mg tablet Take 1 tablet (25 mg total) by mouth daily. 6/27/23 12/24/23  Yousuf Wolf MD   thiamine 100 mg tablet Take 1 tablet (100 mg total) by mouth daily. 5/5/23 11/1/23  Melanie Chambers DO       ALLERGIES      Patient has no known allergies.    FAMILY HISTORY      Family History   Problem Relation Age of Onset   • Thyroid cancer Biological Mother    • ALS Biological Mother    • Parkinsonism Biological Mother    • Heart attack Biological Father    • Liver cancer Maternal Grandmother        SOCIAL HISTORY      Social History     Socioeconomic History   • Marital status:    • Number of children: 2   Occupational History   • Occupation: retired     Comment: [employer] in [place]   Tobacco Use   • Smoking status: Former     Years: 40.00     Types: Cigarettes     Quit date: 11/1/2012     Years since quitting: 10.7   • Smokeless tobacco: Never   Vaping Use   • Vaping Use: Never used   Substance and Sexual Activity   • Alcohol use: Not Currently     Alcohol/week: 9.0 standard drinks of alcohol     Types: 9 Shots of liquor per week     Comment: vodka stopped late in winter   • Drug use: Never   • Sexual activity: Not Currently   Social History Narrative    Lives alone, and has help for meal prep and med management     Social Determinants of Health     Financial Resource Strain: Low Risk  (6/27/2023)    Overall Financial Resource Strain (CARDIA)    • Difficulty of Paying  Living Expenses: Not hard at all   Food Insecurity: No Food Insecurity (8/11/2023)    Hunger Vital Sign    • Worried About Running Out of Food in the Last Year: Never true    • Ran Out of Food in the Last Year: Never true   Transportation Needs: No Transportation Needs (6/27/2023)    PRAPARE - Transportation    • Lack of Transportation (Medical): No    • Lack of Transportation (Non-Medical): No   Social Connections: Socially Isolated (6/26/2023)    Social Connection and Isolation Panel [NHANES]    • Frequency of Communication with Friends and Family: More than three times a week    • Frequency of Social Gatherings with Friends and Family: Once a week    • Attends Oriental orthodox Services: Never    • Active Member of Clubs or Organizations: No    • Attends Club or Organization Meetings: Patient refused    • Marital Status:    Housing Stability: Low Risk  (6/27/2023)    Housing Stability Vital Sign    • Unable to Pay for Housing in the Last Year: No    • Number of Places Lived in the Last Year: 1    • Unstable Housing in the Last Year: No       REVIEW OF SYSTEMS      All other systems reviewed and negative except as noted in HPI    PHYSICAL EXAMINATION      Temp:  [36.4 °C (97.5 °F)-37.4 °C (99.4 °F)] 36.9 °C (98.5 °F)  Heart Rate:  [64-68] 64  Resp:  [16-29] 16  BP: (117-146)/(57-75) 128/68  Body mass index is 29.95 kg/m².    Physical Exam  Vitals and nursing note reviewed.   Constitutional:       General: He is not in acute distress.     Appearance: He is not ill-appearing.   HENT:      Head: Normocephalic and atraumatic.      Right Ear: External ear normal.      Left Ear: External ear normal.   Eyes:      General: No scleral icterus.        Right eye: No discharge.         Left eye: No discharge.   Cardiovascular:      Rate and Rhythm: Normal rate.      Heart sounds: Normal heart sounds.   Pulmonary:      Breath sounds: Rhonchi and rales present.   Abdominal:      General: There is no distension.      Palpations:  Abdomen is soft.   Musculoskeletal:      Right lower leg: No edema.      Left lower leg: No edema.   Skin:     Findings: No lesion or rash.   Neurological:      General: No focal deficit present.      Mental Status: He is alert. Mental status is at baseline.   Psychiatric:         Mood and Affect: Mood normal.         Behavior: Behavior normal.         LABS / IMAGING / EKG        Labs  Lab Results   Component Value Date    WBC 19.62 (H) 08/11/2023    HGB 10.8 (L) 08/11/2023    HCT 35.6 (L) 08/11/2023    MCV 93.7 08/11/2023     08/11/2023     Lab Results   Component Value Date    GLUCOSE 148 (H) 08/11/2023    CALCIUM 9.4 08/11/2023     08/11/2023    K 4.4 08/11/2023    CO2 27 08/11/2023     08/11/2023    BUN 10 08/11/2023    CREATININE 1.1 08/11/2023         Imaging  CTA chest/abdomen: + multifocal pneumonia, Cirrhosis, Cholelithiasis, Diverticulosis    No cardio studies.       ASSESSMENT AND PLAN           Alzheimer's dementia (CMS/[facility])  Assessment & Plan  Stable at baseline mental status  Cont with po meds.       Type 2 diabetes mellitus without complication, without long-term current use of insulin (CMS/[facility])  Assessment & Plan  Glucose stable  Cont with SSI, accuchecks, DM diet.   Stable otherwise.       A-fib (CMS/[facility])  Assessment & Plan  HR stable  Cont with po meds,   On Xarelto for AC    Acquired hypothyroidism  Assessment & Plan  Stable,   Lab Results   Component Value Date    TSH 3.06 04/21/2023     Cont with synthroid, no symptoms.       Ascending aortic aneurysm (CMS/[facility])  Assessment & Plan  Stable for now, cardio f/u  Pt had imaging this past month at     * Pneumonia due to infectious organism, unspecified laterality, unspecified part of lung  Assessment & Plan  Cont with IV abx, f/u cultures  O2NC, nebs prn  Elevated WBC , + leukocytosis  Monitor O2 sats.        VTE Assessment: Padua VTE Score: 2  VTE Prophylaxis: Current anticoagulants:  rivaroxaban (XARELTO) tablet 20 mg, oral,  Daily with dinner      Code Status: Full Code  Palliative Care Screening Score: 1   Discussed advanced care planning. FULL  Estimated Discharge Date: 8/15/2023  Disposition Planning: DC when resp status improves, cultures result, can switch to po abx.        Tarik Kerns MD  8/12/2023

## 2023-08-13 ENCOUNTER — APPOINTMENT (INPATIENT)
Dept: RADIOLOGY | Facility: HOSPITAL | Age: 83
DRG: 194 | End: 2023-08-13
Attending: FAMILY MEDICINE
Payer: COMMERCIAL

## 2023-08-13 PROCEDURE — 71046 X-RAY EXAM CHEST 2 VIEWS: CPT

## 2023-08-13 PROCEDURE — 25800000 HC PHARMACY IV SOLUTIONS: Performed by: INTERNAL MEDICINE

## 2023-08-13 PROCEDURE — 25000000 HC PHARMACY GENERAL: Performed by: INTERNAL MEDICINE

## 2023-08-13 PROCEDURE — 12000000 HC ROOM AND CARE MED/SURG

## 2023-08-13 PROCEDURE — 63700000 HC SELF-ADMINISTRABLE DRUG: Performed by: INTERNAL MEDICINE

## 2023-08-13 PROCEDURE — 99232 SBSQ HOSP IP/OBS MODERATE 35: CPT | Performed by: FAMILY MEDICINE

## 2023-08-13 PROCEDURE — 63600000 HC DRUGS/DETAIL CODE: Mod: JZ | Performed by: INTERNAL MEDICINE

## 2023-08-13 PROCEDURE — 63700000 HC SELF-ADMINISTRABLE DRUG: Performed by: FAMILY MEDICINE

## 2023-08-13 RX ADMIN — AZITHROMYCIN MONOHYDRATE 500 MG: 500 INJECTION, POWDER, LYOPHILIZED, FOR SOLUTION INTRAVENOUS at 00:39

## 2023-08-13 RX ADMIN — ACETAMINOPHEN 650 MG: 325 TABLET ORAL at 08:49

## 2023-08-13 RX ADMIN — CETIRIZINE HYDROCHLORIDE 5 MG: 5 TABLET ORAL at 08:50

## 2023-08-13 RX ADMIN — RIVAROXABAN 20 MG: 10 TABLET, FILM COATED ORAL at 17:20

## 2023-08-13 RX ADMIN — TRAMADOL HYDROCHLORIDE 25 MG: 50 TABLET, COATED ORAL at 20:14

## 2023-08-13 RX ADMIN — MOMETASONE FUROATE AND FORMOTEROL FUMARATE DIHYDRATE 2 PUFF: 100; 5 AEROSOL RESPIRATORY (INHALATION) at 08:54

## 2023-08-13 RX ADMIN — FERROUS SULFATE TAB 325 MG (65 MG ELEMENTAL FE) 325 MG: 325 (65 FE) TAB at 08:50

## 2023-08-13 RX ADMIN — Medication 400 MG: at 20:15

## 2023-08-13 RX ADMIN — EMPAGLIFLOZIN 25 MG: 10 TABLET, FILM COATED ORAL at 08:49

## 2023-08-13 RX ADMIN — CLONAZEPAM 0.5 MG: 0.5 TABLET ORAL at 20:14

## 2023-08-13 RX ADMIN — ALBUTEROL SULFATE 2.5 MG: 2.5 SOLUTION RESPIRATORY (INHALATION) at 08:51

## 2023-08-13 RX ADMIN — Medication 100 MG: at 08:50

## 2023-08-13 RX ADMIN — Medication 400 MG: at 08:50

## 2023-08-13 RX ADMIN — MULTIPLE VITAMINS W/ MINERALS TAB 1 TABLET: TAB at 08:50

## 2023-08-13 RX ADMIN — ALBUTEROL SULFATE 2.5 MG: 2.5 SOLUTION RESPIRATORY (INHALATION) at 13:06

## 2023-08-13 RX ADMIN — SPIRONOLACTONE 25 MG: 25 TABLET ORAL at 08:50

## 2023-08-13 RX ADMIN — LEVOTHYROXINE SODIUM 112 MCG: 112 TABLET ORAL at 05:59

## 2023-08-13 RX ADMIN — LIDOCAINE 1 PATCH: 4 PATCH TOPICAL at 08:49

## 2023-08-13 RX ADMIN — Medication 1 MG: at 08:50

## 2023-08-13 RX ADMIN — METOPROLOL SUCCINATE 12.5 MG: 25 TABLET, EXTENDED RELEASE ORAL at 20:15

## 2023-08-13 RX ADMIN — PANTOPRAZOLE SODIUM 40 MG: 40 TABLET, DELAYED RELEASE ORAL at 08:50

## 2023-08-13 RX ADMIN — TIOTROPIUM BROMIDE INHALATION SPRAY 2 PUFF: 3.12 SPRAY, METERED RESPIRATORY (INHALATION) at 08:54

## 2023-08-13 RX ADMIN — METOPROLOL SUCCINATE 12.5 MG: 25 TABLET, EXTENDED RELEASE ORAL at 08:50

## 2023-08-13 RX ADMIN — BUSPIRONE HYDROCHLORIDE 5 MG: 5 TABLET ORAL at 08:50

## 2023-08-13 RX ADMIN — PANTOPRAZOLE SODIUM 40 MG: 40 TABLET, DELAYED RELEASE ORAL at 20:14

## 2023-08-13 RX ADMIN — ALBUTEROL SULFATE 2.5 MG: 2.5 SOLUTION RESPIRATORY (INHALATION) at 17:20

## 2023-08-13 RX ADMIN — CEFTRIAXONE SODIUM 1 G: 1 INJECTION, POWDER, FOR SOLUTION INTRAMUSCULAR; INTRAVENOUS at 23:59

## 2023-08-13 RX ADMIN — ATORVASTATIN CALCIUM 20 MG: 20 TABLET, FILM COATED ORAL at 17:20

## 2023-08-13 RX ADMIN — DONEPEZIL HYDROCHLORIDE 10 MG: 10 TABLET ORAL at 20:14

## 2023-08-13 RX ADMIN — TRAMADOL HYDROCHLORIDE 25 MG: 50 TABLET, COATED ORAL at 10:16

## 2023-08-13 RX ADMIN — BUSPIRONE HYDROCHLORIDE 5 MG: 5 TABLET ORAL at 20:14

## 2023-08-13 RX ADMIN — ACETAMINOPHEN 650 MG: 325 TABLET ORAL at 17:20

## 2023-08-13 ASSESSMENT — COGNITIVE AND FUNCTIONAL STATUS - GENERAL
STANDING UP FROM CHAIR USING ARMS: 3 - A LITTLE
CLIMB 3 TO 5 STEPS WITH RAILING: 3 - A LITTLE
WALKING IN HOSPITAL ROOM: 3 - A LITTLE
MOVING TO AND FROM BED TO CHAIR: 3 - A LITTLE

## 2023-08-13 NOTE — PROGRESS NOTES
Hospital Medicine Service -  Daily Progress Note       SUBJECTIVE   Interval History:     No acute overnight events. Mr. Morris was seen and examined at bedside.      OBJECTIVE      Vital signs in last 24 hours:  Temp:  [36.7 °C (98.1 °F)-37 °C (98.6 °F)] 37 °C (98.6 °F)  Heart Rate:  [65] 65  Resp:  [16-18] 16  BP: (123-133)/(58-67) 123/58    Intake/Output Summary (Last 24 hours) at 8/13/2023 1543  Last data filed at 8/13/2023 1300  Gross per 24 hour   Intake 680 ml   Output 625 ml   Net 55 ml       PHYSICAL EXAMINATION      Physical Exam    General: No acute distress. Non toxic appearing.   HEENT: NC/AT MMM  Respiratory: Clear breath sounds bilaterally. No wheezing, rhonchi, rales. Non labored breathing. No accessory muscle use  Cardiovascular: RRR. Normal S1 and S2.    Abdomen: Soft, non distended, non tender. Bowel sounds present.   Psychiatric: Calm and cooperative.    Extremities: No clubbing, cyanosis, or edema.      LINES, CATHETERS, DRAINS, AIRWAYS, AND WOUNDS   Lines, Drains, and Airways:  Wounds (agree with documentation and present on admission):  Peripheral IV (Adult) 08/13/23 Right Antecubital (Active)         LABS / IMAGING / TELE      Labs    CBC Results       08/11/23 07/11/23 04/21/23 1913 1346 1051    WBC 19.62 10.7 9.6    RBC 3.80 3.96 4.00    HGB 10.8 11.6 11.7    HCT 35.6 36.6 35.8    MCV 93.7 92.4 89.5    MCH 28.4 29.3 29.3    MCHC 30.3 31.7 32.7     243 230        CMP Results       08/11/23 07/14/23 07/11/23 1913 1346 1347     -- 138    K 4.4 -- 4.7    Cl 103 -- 98    CO2 27 -- 32    Glucose 148 -- 298    BUN 10 -- 20    Creatinine 1.1 -- 1.30    Calcium 9.4 -- 9.1    Anion Gap 9 -- --    AST 28 -- 23    ALT 24 -- 18    Albumin 4.0 4.1 4.2      26     EGFR >60.0 -- 54         Comment for K at 1913 on 08/11/23: Results obtained on plasma. Plasma Potassium values may be up to 0.4 mEQ/L less than serum values. The differences may be greater for patients with high  platelet or white cell counts.    Comment for Glucose at 1347 on 07/11/23:               Fasting reference interval     For someone without known diabetes, a glucose  value >125 mg/dL indicates that they may have  diabetes and this should be confirmed with a  follow-up test.         Comment for EGFR at 1347 on 07/11/23: The eGFR is based on the CKD-EPI 2021 equation. To calculate   the new eGFR from a previous Creatinine or Cystatin C  result, go to https://www.kidney.org/professionals/  kdoqi/gfr%[email or web address]                   ASSESSMENT AND PLAN      * Pneumonia due to infectious organism, unspecified laterality, unspecified part of lung  Assessment & Plan  - CT chest abdomen and pelvis (8/12/23): No evidence of pulmonary embolism. Ectatic ascending aorta is noted, measures 4.2 cm in diameter at the level of right pulmonary artery. Liver contour is mildly nodular, cannot rule out cirrhosis. Scattered consolidation and groundglass opacity seen in lungs, may represent multifocal pneumonia or pulmonary edema.   - Continue ceftriaxone (abx day 2)   - Supplemental oxygen as needed, wean as tolerated     Type 2 diabetes mellitus without complication, without long-term current use of insulin (CMS/[facility])  Assessment & Plan  Glucose stable  Cont with SSI, accuchecks, DM diet.   Stable otherwise.       A-fib (CMS/[facility])  Assessment & Plan  - HR stable  - Cont with po meds,   - On Xarelto for AC    Alzheimer's dementia (CMS/[facility])  Assessment & Plan  - Stable at baseline mental status  - Cont with po meds.       Acquired hypothyroidism  Assessment & Plan  - Continue home medication levothyroxine       Ascending aortic aneurysm (CMS/[facility])  Assessment & Plan  - Stable for now, cardio f/u  - Pt had imaging this past month at          VTE Assessment: Padua VTE Score: 2  VTE Prophylaxis:  Current anticoagulants:  rivaroxaban (XARELTO) tablet 20 mg, oral, Daily with dinner      Code Status: Full Code  Palliative Care Screening Score: 1    Estimated Discharge Date: 8/15/2023     Disposition Planning: Wean O2 repeat CXR.      Nacho Garcia MD  8/13/2023

## 2023-08-13 NOTE — PLAN OF CARE
Problem: Adult Inpatient Plan of Care  Goal: Plan of Care Review  Outcome: Progressing  Goal: Absence of Hospital-Acquired Illness or Injury  Outcome: Progressing     Problem: Fall Injury Risk  Goal: Absence of Fall and Fall-Related Injury  Outcome: Progressing    Pt remaining saturated on 3L of oxygen. Received neb and inhaler as ordered. Complained of severe back pain throughout the evening. Tylenol administered but ineffective and patient not yet due for tramadol. Nocturnist notified; one time order given for 5mg of oxycodone. Rocephin and azithromycin given; no adverse reactions noted. Bed alarm active, call bell in reach.

## 2023-08-13 NOTE — PLAN OF CARE
Care Coordination Admission Assessment Note    General Information:  Readmission Within the last 30 days: no previous admission in last 30 days  Does patient have a : No  Patient-Specific Goals (include timeframe):      Living Arrangements:  Arrived From: home  Current Living Arrangements: home  People in Home: alone  Home Accessibility: other (see comments)  Living Arrangement Comments: Modular home    Housing Stability and Financial Resources (SDOH):  In the last 12 months, was there a time when you were not able to pay the mortgage or rent on time?: No  In the last 12 months, how many places have you lived?: 1  In the last 12 months, was there a time when you did not have a steady place to sleep or slept in a shelter (including now)?: No  How hard is it for you to pay for the very basics like food, housing, medical care, and heating?: Not hard at all    Functional Status Prior to Admission:   Assistive Device/Animal Currently Used at Home: walker, front-wheeled  Functional Status Comments: Per pt's dtr, pt has RW that he refuses to use  IADL Comments: Independent     Supports and Services:  Current Outpatient/Agency/Support Group: none  Type of Current Home Care Services:    History of home care episode or rehab stay: [facility] at home in the past    Discharge Needs Assessment:   Concerns to be Addressed: discharge planning  Current Discharge Risk:    Anticipated Changes Related to Illness: none    Patient/Family Anticipated Discharge Plan:  Patient/Family Anticipates Transition To: home with help/services  Patient/Family Anticipated Services at Transition: home health care    Connection to Community  Not applicable      Patient Choice:   Offered/Gave Vendor List: no  Patient's Choice of Community Agency(s):         Anticipated Discharge Plan:     Anticipated Discharge Disposition: home with home health, home with assistance     Transportation Needs (SDOH):  Transportation Concerns:   "  Transportation Anticipated: family or friend will provide  Is Out of Hospital DNR needed at discharge?: no    In the past 12 months, has lack of transportation kept you from medical appointments or from getting medications?: No  In the past 12 months, has lack of transportation kept you from meetings, work, or from getting things needed for daily living?: No    Concerns - comments: TONYA spoke w/ pt's dtr, Jovana, regarding dcp d/t pt dementia at baseline. Reviewed CCMSW role. Pt resides alone and has private caregivers 7x a week for 3+ hours a day. Pt does not use DME at baseline and typically \"furniture grabs\" to assist w/ ambulation. Pt had hc services int he past through [facility] at home. Pt does not drive. Per Jovana, pt has hx of ETOH use, however he has been alcohol-free for 1 year. Pt currently on 3L NC. Pt uses a nebulizer at home. Jovana is hopeful pt can d/ch home when medically cleared. TONYA will conitnue to follow for hc needs  "

## 2023-08-14 ENCOUNTER — TELEPHONE (OUTPATIENT)
Dept: PRIMARY CARE | Facility: CLINIC | Age: 83
End: 2023-08-14
Payer: COMMERCIAL

## 2023-08-14 LAB
ANION GAP SERPL CALC-SCNC: 7 MEQ/L (ref 3–15)
BUN SERPL-MCNC: 17 MG/DL (ref 7–25)
CALCIUM SERPL-MCNC: 8.8 MG/DL (ref 8.6–10.3)
CHLORIDE SERPL-SCNC: 106 MEQ/L (ref 98–107)
CO2 SERPL-SCNC: 27 MEQ/L (ref 21–31)
CREAT SERPL-MCNC: 0.9 MG/DL (ref 0.7–1.3)
ERYTHROCYTE [DISTWIDTH] IN BLOOD BY AUTOMATED COUNT: 13.8 % (ref 11.6–14.4)
GFR SERPL CREATININE-BSD FRML MDRD: >60 ML/MIN/1.73M*2
GLUCOSE SERPL-MCNC: 200 MG/DL (ref 70–99)
HCT VFR BLDCO AUTO: 32.9 % (ref 40.1–51)
HGB BLD-MCNC: 10 G/DL (ref 13.7–17.5)
MCH RBC QN AUTO: 28 PG (ref 28–33.2)
MCHC RBC AUTO-ENTMCNC: 30.4 G/DL (ref 32.2–36.5)
MCV RBC AUTO: 92.2 FL (ref 83–98)
PDW BLD AUTO: 9.5 FL (ref 9.4–12.4)
PLATELET # BLD AUTO: 214 K/UL (ref 150–350)
POTASSIUM SERPL-SCNC: 4.4 MEQ/L (ref 3.5–5.1)
RBC # BLD AUTO: 3.57 M/UL (ref 4.5–5.8)
SODIUM SERPL-SCNC: 140 MEQ/L (ref 136–145)
WBC # BLD AUTO: 12.29 K/UL (ref 3.8–10.5)

## 2023-08-14 PROCEDURE — 85027 COMPLETE CBC AUTOMATED: CPT | Performed by: HOSPITALIST

## 2023-08-14 PROCEDURE — 63700000 HC SELF-ADMINISTRABLE DRUG: Performed by: FAMILY MEDICINE

## 2023-08-14 PROCEDURE — 25800000 HC PHARMACY IV SOLUTIONS: Performed by: NURSE PRACTITIONER

## 2023-08-14 PROCEDURE — 25000000 HC PHARMACY GENERAL: Performed by: INTERNAL MEDICINE

## 2023-08-14 PROCEDURE — 99232 SBSQ HOSP IP/OBS MODERATE 35: CPT | Performed by: HOSPITALIST

## 2023-08-14 PROCEDURE — 80048 BASIC METABOLIC PNL TOTAL CA: CPT | Performed by: HOSPITALIST

## 2023-08-14 PROCEDURE — 63600000 HC DRUGS/DETAIL CODE: Mod: JZ | Performed by: NURSE PRACTITIONER

## 2023-08-14 PROCEDURE — 36415 COLL VENOUS BLD VENIPUNCTURE: CPT | Performed by: HOSPITALIST

## 2023-08-14 PROCEDURE — 63700000 HC SELF-ADMINISTRABLE DRUG: Performed by: INTERNAL MEDICINE

## 2023-08-14 PROCEDURE — 25800000 HC PHARMACY IV SOLUTIONS: Performed by: INTERNAL MEDICINE

## 2023-08-14 PROCEDURE — 12000000 HC ROOM AND CARE MED/SURG

## 2023-08-14 PROCEDURE — 63600000 HC DRUGS/DETAIL CODE: Mod: JZ | Performed by: INTERNAL MEDICINE

## 2023-08-14 RX ADMIN — LEVOTHYROXINE SODIUM 112 MCG: 112 TABLET ORAL at 06:06

## 2023-08-14 RX ADMIN — MOMETASONE FUROATE AND FORMOTEROL FUMARATE DIHYDRATE 2 PUFF: 100; 5 AEROSOL RESPIRATORY (INHALATION) at 08:32

## 2023-08-14 RX ADMIN — PANTOPRAZOLE SODIUM 40 MG: 40 TABLET, DELAYED RELEASE ORAL at 20:14

## 2023-08-14 RX ADMIN — BUSPIRONE HYDROCHLORIDE 5 MG: 5 TABLET ORAL at 08:25

## 2023-08-14 RX ADMIN — Medication 400 MG: at 20:16

## 2023-08-14 RX ADMIN — CETIRIZINE HYDROCHLORIDE 5 MG: 5 TABLET ORAL at 08:25

## 2023-08-14 RX ADMIN — Medication 400 MG: at 12:24

## 2023-08-14 RX ADMIN — DONEPEZIL HYDROCHLORIDE 10 MG: 10 TABLET ORAL at 20:16

## 2023-08-14 RX ADMIN — ATORVASTATIN CALCIUM 20 MG: 20 TABLET, FILM COATED ORAL at 18:02

## 2023-08-14 RX ADMIN — Medication 1 MG: at 08:27

## 2023-08-14 RX ADMIN — TIOTROPIUM BROMIDE INHALATION SPRAY 2 PUFF: 3.12 SPRAY, METERED RESPIRATORY (INHALATION) at 08:30

## 2023-08-14 RX ADMIN — ALBUTEROL SULFATE 2.5 MG: 2.5 SOLUTION RESPIRATORY (INHALATION) at 14:02

## 2023-08-14 RX ADMIN — Medication 100 MG: at 08:26

## 2023-08-14 RX ADMIN — METOPROLOL SUCCINATE 12.5 MG: 25 TABLET, EXTENDED RELEASE ORAL at 08:27

## 2023-08-14 RX ADMIN — EMPAGLIFLOZIN 25 MG: 10 TABLET, FILM COATED ORAL at 08:26

## 2023-08-14 RX ADMIN — ALBUTEROL SULFATE 2.5 MG: 2.5 SOLUTION RESPIRATORY (INHALATION) at 18:25

## 2023-08-14 RX ADMIN — MOMETASONE FUROATE AND FORMOTEROL FUMARATE DIHYDRATE 2 PUFF: 100; 5 AEROSOL RESPIRATORY (INHALATION) at 20:15

## 2023-08-14 RX ADMIN — TRAMADOL HYDROCHLORIDE 25 MG: 50 TABLET, COATED ORAL at 20:26

## 2023-08-14 RX ADMIN — TRAMADOL HYDROCHLORIDE 25 MG: 50 TABLET, COATED ORAL at 05:00

## 2023-08-14 RX ADMIN — SPIRONOLACTONE 25 MG: 25 TABLET ORAL at 08:26

## 2023-08-14 RX ADMIN — MULTIPLE VITAMINS W/ MINERALS TAB 1 TABLET: TAB at 08:28

## 2023-08-14 RX ADMIN — BUSPIRONE HYDROCHLORIDE 5 MG: 5 TABLET ORAL at 20:14

## 2023-08-14 RX ADMIN — AZITHROMYCIN MONOHYDRATE 500 MG: 500 INJECTION, POWDER, LYOPHILIZED, FOR SOLUTION INTRAVENOUS at 12:17

## 2023-08-14 RX ADMIN — Medication 5 MG: at 21:48

## 2023-08-14 RX ADMIN — RIVAROXABAN 20 MG: 10 TABLET, FILM COATED ORAL at 18:02

## 2023-08-14 RX ADMIN — ALBUTEROL SULFATE 2.5 MG: 2.5 SOLUTION RESPIRATORY (INHALATION) at 08:33

## 2023-08-14 RX ADMIN — CEFTRIAXONE SODIUM 1 G: 1 INJECTION, POWDER, FOR SOLUTION INTRAMUSCULAR; INTRAVENOUS at 23:59

## 2023-08-14 RX ADMIN — PANTOPRAZOLE SODIUM 40 MG: 40 TABLET, DELAYED RELEASE ORAL at 08:28

## 2023-08-14 RX ADMIN — LIDOCAINE 1 PATCH: 4 PATCH TOPICAL at 08:22

## 2023-08-14 RX ADMIN — METOPROLOL SUCCINATE 12.5 MG: 25 TABLET, EXTENDED RELEASE ORAL at 20:14

## 2023-08-14 RX ADMIN — FERROUS SULFATE TAB 325 MG (65 MG ELEMENTAL FE) 325 MG: 325 (65 FE) TAB at 08:28

## 2023-08-14 ASSESSMENT — COGNITIVE AND FUNCTIONAL STATUS - GENERAL
CLIMB 3 TO 5 STEPS WITH RAILING: 3 - A LITTLE
MOVING TO AND FROM BED TO CHAIR: 4 - NONE
STANDING UP FROM CHAIR USING ARMS: 4 - NONE
STANDING UP FROM CHAIR USING ARMS: 4 - NONE
MOVING TO AND FROM BED TO CHAIR: 4 - NONE
WALKING IN HOSPITAL ROOM: 3 - A LITTLE
WALKING IN HOSPITAL ROOM: 3 - A LITTLE
CLIMB 3 TO 5 STEPS WITH RAILING: 3 - A LITTLE

## 2023-08-14 NOTE — PROGRESS NOTES
[facility] Medicine Service -  Daily Progress Note       SUBJECTIVE   Interval History: Patient seen and examined in room 4222. Pt denies new complaints.      OBJECTIVE      Vital signs in last 24 hours:  Temp:  [36.6 °C (97.8 °F)-37 °C (98.6 °F)] 37 °C (98.6 °F)  Heart Rate:  [64-65] 65  Resp:  [14-18] 16  BP: (126-140)/(59-65) 132/61    Intake/Output Summary (Last 24 hours) at 8/14/2023 2057  Last data filed at 8/14/2023 1800  Gross per 24 hour   Intake 880 ml   Output 1450 ml   Net -570 ml         PHYSICAL EXAMINATION      General:  Alert, cooperative, in no acute distress   Head:  Normocephalic, atraumatic   Eyes:  conjunctiva clear, sclera anicteric, EOMI BL   Mouth/Throat:  Mucosa moist, lips and tongue normal and extends to the midline, pharynx clear   Neck:  Supple, symmetrical, trachea midline   Lungs:  Respirations unlabored, scattered rales   Heart:  RRR, S1 and S2 normal, no m/r/g   Abdomen:  Soft, non-tender, (+) BS, ND, no masses, no organomegaly   Extremities:  Extremities normal, atraumatic, no cyanosis,  No edema   Musculoskeletal:  No injury or deformity, moves all limbs with good range of motion   Pulses:  2+ and symmetric x4 extremities   Skin:  Skin color, texture, and turgor normal, no rashes or lesions   Neurologic:  AAOx3. Grossly non-focal   Behavior/Emotional  Appropriate, cooperative       LINES, CATHETERS, DRAINS, AIRWAYS, AND WOUNDS   Lines, Drains, Airways, Wounds:       Comments:         LABS / IMAGING / TELE      Labs  Results from last 7 days   Lab Units 08/14/23  1553 08/11/23  1913   WBC K/uL 12.29* 19.62*   HEMOGLOBIN g/dL 10.0* 10.8*   HEMATOCRIT % 32.9* 35.6*   PLATELETS K/uL 214 264       Results from last 7 days   Lab Units 08/14/23  1553 08/11/23  1913   SODIUM mEQ/L 140 139   POTASSIUM mEQ/L 4.4 4.4   CHLORIDE mEQ/L 106 103   CO2 mEQ/L 27 27   BUN mg/dL 17 10   CREATININE mg/dL 0.9 1.1   CALCIUM mg/dL 8.8 9.4   ALBUMIN g/dL  --  4.0   BILIRUBIN TOTAL mg/dL  --  0.8   ALK  PHOS IU/L  --  90   ALT IU/L  --  24   AST IU/L  --  28   GLUCOSE mg/dL 200* 148*             High Sens Troponin I   Date Value Ref Range Status   08/11/2023 22.7 (H) <15.0 pg/mL Final   08/11/2023 17.5 (H) <15.0 pg/mL Final               SARS-CoV-2 (COVID-19) (no units)   Date/Time Value   08/11/2023 1913 Negative             Imaging      X-RAY CHEST 2 VIEWS    Result Date: 8/14/2023  CLINICAL HISTORY: dyspnea     IMPRESSION: Similar appearance of patchy groundglass opacities within both lungs which may reflect multifocal pneumonia and/or pulmonary edema. COMMENT: Comparison: Chest x-ray and CT chest 8/11/2023. Technique: PA frontal and lateral views of the chest were obtained. FINDINGS: There is similar appearance of patchy groundglass opacities throughout both lungs. Again noted is mild blunting of the right costophrenic angle corresponding to pleural thickening. No significant pleural effusion is seen. There is no pneumothorax. Again seen is a left chest wall dual-lead pacemaker with right atrial right ventricular leads. There is stable enlargement of the cardiac silhouette. There are median sternotomy wires and mediastinal clips. 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 The upper abdomen is unremarkable. There are multilevel degenerative changes of the thoracic spine. There is no acute osseous abnormality.         ECG/Telemetry      ASSESSMENT AND PLAN      * Pneumonia due to infectious organism, unspecified laterality, unspecified part of lung  Assessment & Plan  - CT chest abdomen and pelvis (8/12/23): No evidence of pulmonary embolism. Ectatic ascending aorta is noted, measures 4.2 cm in diameter at the level of right pulmonary artery. Liver contour is mildly nodular, cannot rule out cirrhosis. Scattered consolidation and groundglass opacity seen in lungs, may represent multifocal pneumonia or pulmonary edema.   - with leukocytosis 19 on admission, improved to 12  - Continue ceftriaxone (abx day 4) -  plan to change to PO to complete 7 days of tx on DC  - Supplemental oxygen as needed, wean as tolerated   - might need home O2 - will need an ambulatory O2 study    Alzheimer's dementia (CMS/[facility])  Assessment & Plan  - Stable at baseline mental status  - Cont with po meds.       Type 2 diabetes mellitus without complication, without long-term current use of insulin (CMS/[facility])  Assessment & Plan  Glucose stable  Cont with SSI, accuchecks, DM diet.   Stable otherwise.       A-fib (CMS/[facility])  Assessment & Plan  - HR stable  - Cont with po meds,   - On Xarelto for AC    Acquired hypothyroidism  Assessment & Plan  - Continue home medication levothyroxine       Ascending aortic aneurysm (CMS/[facility])  Assessment & Plan  - Stable for now, cardio f/u  - Pt had imaging this past month at        VTE Assessment: Padua VTE Score: 2  VTE Prophylaxis:  Current anticoagulants:  rivaroxaban (XARELTO) tablet 20 mg, oral, Daily with dinner      Code Status: Full Code  Palliative Care Screening Score: 1   Estimated Discharge Date: 8/15/2023     Disposition Planning: anticipate DC to home tomorrow with homecare; needs home O2 test     Stew Gold DO  8/14/2023

## 2023-08-14 NOTE — PLAN OF CARE
Problem: Adult Inpatient Plan of Care  Goal: Plan of Care Review  Outcome: Progressing    Pt doing well overall. Confused to situation. Down to 1-2L NC. Right AC site blew, unable to get new site. Had IV Azythromycin pushed back to 6am, but will need IV team for new site. Otherwise all needs and wants met. Voids in urinal. Call bell within reach, will continue to monitor.

## 2023-08-14 NOTE — PLAN OF CARE
Care Coordination Discharge Plan Note     Discharge Needs Assessment  Concerns to be Addressed: discharge planning  Current Discharge Risk:      Anticipated Discharge Plan  Anticipated Discharge Disposition: home with home health, home with assistance       Patient Choice  Offered/Gave Vendor List: no  Patient's Choice of Community Agency(s):           ---------------------------------------------------------------------------------------------------------------------    Interdisciplinary Discharge Plan Review:  Participants:     Concerns Comments:      Discharge Plan:   Disposition/Destination:   /    Discharge Facility:    Community Resources:      Discharge Transportation:  Is Out of Hospital DNR needed at Discharge: no  Does patient need discharge transport?        Referral sent to [facility] [phone] and spoke to /[facility] to confirm receipt.  She stated that patient is not currently in an open episode with them.  He has not been on their service since January 2023 but they will be brodie to accept him back.

## 2023-08-15 VITALS
WEIGHT: 197 LBS | RESPIRATION RATE: 16 BRPM | DIASTOLIC BLOOD PRESSURE: 58 MMHG | HEIGHT: 68 IN | TEMPERATURE: 98.4 F | OXYGEN SATURATION: 95 % | HEART RATE: 65 BPM | BODY MASS INDEX: 29.86 KG/M2 | SYSTOLIC BLOOD PRESSURE: 120 MMHG

## 2023-08-15 PROBLEM — I50.42 CHRONIC COMBINED SYSTOLIC AND DIASTOLIC HEART FAILURE (CMS/HCC): Status: ACTIVE | Noted: 2020-11-13

## 2023-08-15 PROCEDURE — 99238 HOSP IP/OBS DSCHRG MGMT 30/<: CPT | Performed by: HOSPITALIST

## 2023-08-15 PROCEDURE — 63700000 HC SELF-ADMINISTRABLE DRUG: Performed by: INTERNAL MEDICINE

## 2023-08-15 PROCEDURE — 94640 AIRWAY INHALATION TREATMENT: CPT

## 2023-08-15 PROCEDURE — 63700000 HC SELF-ADMINISTRABLE DRUG: Performed by: FAMILY MEDICINE

## 2023-08-15 PROCEDURE — 25000000 HC PHARMACY GENERAL: Performed by: INTERNAL MEDICINE

## 2023-08-15 PROCEDURE — 63700000 HC SELF-ADMINISTRABLE DRUG: Performed by: HOSPITALIST

## 2023-08-15 RX ORDER — LIDOCAINE 560 MG/1
1 PATCH PERCUTANEOUS; TOPICAL; TRANSDERMAL DAILY
Qty: 30 PATCH | Refills: 0 | Status: SHIPPED | OUTPATIENT
Start: 2023-08-15

## 2023-08-15 RX ORDER — CEFUROXIME AXETIL 250 MG/1
250 TABLET ORAL EVERY 12 HOURS
Qty: 6 TABLET | Refills: 0 | Status: CANCELLED | OUTPATIENT
Start: 2023-08-15 | End: 2023-08-18

## 2023-08-15 RX ORDER — CEFUROXIME AXETIL 250 MG/1
250 TABLET ORAL EVERY 12 HOURS
Status: DISCONTINUED | OUTPATIENT
Start: 2023-08-15 | End: 2023-08-15 | Stop reason: HOSPADM

## 2023-08-15 RX ORDER — CEFUROXIME AXETIL 250 MG/1
250 TABLET ORAL EVERY 12 HOURS
Qty: 5 TABLET | Refills: 0 | Status: SHIPPED | OUTPATIENT
Start: 2023-08-15 | End: 2023-08-18

## 2023-08-15 RX ADMIN — MOMETASONE FUROATE AND FORMOTEROL FUMARATE DIHYDRATE 2 PUFF: 100; 5 AEROSOL RESPIRATORY (INHALATION) at 09:22

## 2023-08-15 RX ADMIN — BUSPIRONE HYDROCHLORIDE 5 MG: 5 TABLET ORAL at 09:18

## 2023-08-15 RX ADMIN — MULTIPLE VITAMINS W/ MINERALS TAB 1 TABLET: TAB at 09:19

## 2023-08-15 RX ADMIN — LIDOCAINE 1 PATCH: 4 PATCH TOPICAL at 09:17

## 2023-08-15 RX ADMIN — Medication 400 MG: at 09:29

## 2023-08-15 RX ADMIN — Medication 100 MG: at 09:12

## 2023-08-15 RX ADMIN — EMPAGLIFLOZIN 25 MG: 10 TABLET, FILM COATED ORAL at 09:12

## 2023-08-15 RX ADMIN — TIOTROPIUM BROMIDE INHALATION SPRAY 2 PUFF: 3.12 SPRAY, METERED RESPIRATORY (INHALATION) at 09:21

## 2023-08-15 RX ADMIN — ALBUTEROL SULFATE 2.5 MG: 2.5 SOLUTION RESPIRATORY (INHALATION) at 09:17

## 2023-08-15 RX ADMIN — METOPROLOL SUCCINATE 12.5 MG: 25 TABLET, EXTENDED RELEASE ORAL at 09:18

## 2023-08-15 RX ADMIN — LEVOTHYROXINE SODIUM 112 MCG: 112 TABLET ORAL at 06:19

## 2023-08-15 RX ADMIN — ALBUTEROL SULFATE 2.5 MG: 2.5 SOLUTION RESPIRATORY (INHALATION) at 12:50

## 2023-08-15 RX ADMIN — CETIRIZINE HYDROCHLORIDE 5 MG: 5 TABLET ORAL at 09:18

## 2023-08-15 RX ADMIN — CEFUROXIME AXETIL 250 MG: 250 TABLET ORAL at 09:29

## 2023-08-15 RX ADMIN — Medication 1 MG: at 09:12

## 2023-08-15 RX ADMIN — PANTOPRAZOLE SODIUM 40 MG: 40 TABLET, DELAYED RELEASE ORAL at 09:17

## 2023-08-15 RX ADMIN — SPIRONOLACTONE 25 MG: 25 TABLET ORAL at 09:19

## 2023-08-15 RX ADMIN — FERROUS SULFATE TAB 325 MG (65 MG ELEMENTAL FE) 325 MG: 325 (65 FE) TAB at 09:18

## 2023-08-15 NOTE — NURSING NOTE
Discharge home with patients personal aid and home o2. Instructed on how to use. Discharge instructions distributed and reviewed Also instructed patient that he can not vape while smoking. Pt aid educated as well

## 2023-08-15 NOTE — PLAN OF CARE
Care Coordination Discharge Plan Note     Discharge Needs Assessment  Concerns to be Addressed: discharge planning  Current Discharge Risk:      Anticipated Discharge Plan  Anticipated Discharge Disposition: home with home health, home with assistance       Patient Choice  Offered/Gave Vendor List: no  Patient's Choice of Community Agency(s):           ---------------------------------------------------------------------------------------------------------------------    Interdisciplinary Discharge Plan Review:  Participants:     Concerns Comments:      Discharge Plan:   Disposition/Destination:   /    Discharge Facility:    Community Resources:      Discharge Transportation:  Is Out of Hospital DNR needed at Discharge: no  Does patient need discharge transport?        Patient requires O2, ordered home O2 from [employer].  Provided patient with a portable tank to take with him upon discharge.

## 2023-08-15 NOTE — DISCHARGE SUMMARY
[facility] -  Inpatient Discharge Summary        BRIEF OVERVIEW   Admitting Provider: Tarik Kerns MD  Attending Provider: Stew Gold DO Attending phys phone: [phone]    PCP: Melanie Chambers [phone]    Admission Date: 8/11/2023  Discharge Date: 8/15/2023     DISCHARGE DIAGNOSES      Primary Discharge Diagnosis  Pneumonia due to infectious organism, unspecified laterality, unspecified part of lung    Secondary Discharge Diagnoses  Active Hospital Problems    Diagnosis Date Noted    Pneumonia due to infectious organism, unspecified laterality, unspecified part of lung 08/12/2023     Priority: High    Alzheimer's dementia (CMS/HCC) 03/03/2023    Acquired hypothyroidism 11/10/2022    Ascending aortic aneurysm (CMS/HCC) 02/10/2021    A-fib (CMS/HCC) 11/13/2020    Chronic combined systolic and diastolic heart failure (CMS/HCC) 11/13/2020    Type 2 diabetes mellitus without complication, without long-term current use of insulin (CMS/HCC) 11/11/2019      Resolved Hospital Problems   No resolved problems to display.       SUMMARY OF HOSPITALIZATION      Presenting Problem/History of Present Illness  Hypoxia [R09.02]  Pneumonia due to infectious organism, unspecified laterality, unspecified part of lung [J18.9]    This is a 83 y.o. year-old male admitted on 8/11/2023 with Hypoxia [R09.02]  Pneumonia due to infectious organism, unspecified laterality, unspecified part of lung [J18.9].      Hospital Course    Cecil Morris is a 83 y.o. male with a past medical history of CAD, RBBB, HTN, PPM, CAD, stents, Afib on AC, Dementia, HLD, hypothyroidism, who presented to  with SOB and coughing for 2 week found to have PNA.  Patient was treated with IV ceftriaxone and supplemental oxygen.  Patient did have leukocytosis of 19 on presentation which improved to 12.  Patient is discharged home on oral dosing of cefuroxime in order to complete a total of 7 days of antibiotics.  Patient  did still need 1 L of oxygen as oxygen saturations decreased with ambulation.  This was set up at time of discharge.    See below for the specific management per diagnosis.       Problem List on Day of Discharge  * Pneumonia due to infectious organism, unspecified laterality, unspecified part of lung  Assessment & Plan  - CT chest abdomen and pelvis (8/12/23): No evidence of pulmonary embolism. Ectatic ascending aorta is noted, measures 4.2 cm in diameter at the level of right pulmonary artery. Liver contour is mildly nodular, cannot rule out cirrhosis. Scattered consolidation and groundglass opacity seen in lungs, may represent multifocal pneumonia or pulmonary edema.   - with leukocytosis 19 on admission, improved to 12  - Continue ceftriaxone (abx day 4) - plan to change to PO to complete 7 days of tx on DC  - Supplemental oxygen as needed, wean as tolerated   - might need home O2 - will need an ambulatory O2 study    Alzheimer's dementia (CMS/[facility])  Assessment & Plan  - Stable at baseline mental status  - Cont with po meds.       Type 2 diabetes mellitus without complication, without long-term current use of insulin (CMS/[facility]ly [facility])  Assessment & Plan  Glucose stable  Cont with SSI, accuchecks, DM diet.   Stable otherwise.       A-fib (CMS/[facility])  Assessment & Plan  - HR stable  - Cont with po meds,   - On Xarelto for AC    Acquired hypothyroidism  Assessment & Plan  - Continue home medication levothyroxine       Chronic combined systolic and diastolic heart failure (CMS/[facility])  Assessment & Plan  patient admitted with PNA  patient examines euvolemic, heart failure currently compensated    Ascending aortic aneurysm (CMS/[facility])  Assessment & Plan  - Stable for now, cardio f/u  - Pt had imaging this past month at [facility]      Exam on Day of Discharge  Vital stable, lungs with diffuse scattered rales right greater than left, heart regular, abdomen soft nontender, no lower extremity edema.    Consults During  Admission  None    DISCHARGE MEDICATIONS        Medication List        START taking these medications      cefUROXime 250 mg tablet  Commonly known as: CEFTIN  Take 1 tablet (250 mg total) by mouth every 12 (twelve) hours for 5 doses Indications: pneumonia.  Dose: 250 mg     lidocaine 4 % adhesive patch,medicated topical patch  Commonly known as: ASPERCREME  Apply 1 patch topically daily.  Dose: 1 patch            CONTINUE taking these medications      acetaminophen 325 mg tablet  Commonly known as: TYLENOL  Take 650 mg by mouth as needed.  Dose: 650 mg     * albuterol 2.5 mg /3 mL (0.083 %) nebulizer solution  TAKE 3 ML (2.5 MG TOTAL) BY NEBULIZATION 4 (FOUR) TIMES A DAY.  Dose: 2.5 mg     * albuterol HFA 90 mcg/actuation inhaler  Inhale 2 puffs every 6 (six) hours as needed for wheezing.  Dose: 2 puff     atorvastatin 20 mg tablet  Commonly known as: LIPITOR  Take 1 tablet (20 mg total) by mouth daily. Pill pack  Dose: 20 mg     busPIRone 5 mg tablet  Commonly known as: BUSPAR  Take 1 tablet (5 mg total) by mouth 2 (two) times a day. Pill pack  Dose: 5 mg     clonazePAM 0.5 mg tablet  Commonly known as: klonoPIN  TAKE 1 TABLET (0.5 MG TOTAL) BY MOUTH NIGHTLY AS NEEDED FOR ANXIETY.  Dose: 0.5 mg     diclofenac sodium 1 % topical gel  Commonly known as: VOLTAREN  Apply 1 g topically as needed.  Dose: 1 g     donepeziL 10 mg tablet  Commonly known as: ARICEPT  Take 1 tablet (10 mg total) by mouth nightly.  Dose: 10 mg     empagliflozin 25 mg tablet  Commonly known as: JARDIANCE  Take 1 tablet (25 mg total) by mouth daily.  Dose: 25 mg     ferrous sulfate 325 mg (65 mg iron) tablet  Take 1 tablet (325 mg total) by mouth daily with breakfast.  Dose: 325 mg     fluticasone-umeclidinium-vilanterol 100-62.5-25 mcg blister with device powder for inhalation  Commonly known as: TRELEGY ELLIPTA  Inhale 1 puff daily.  Dose: 1 puff     folic acid 1 mg tablet  Commonly known as: FOLVITE  Take 1 tablet (1 mg total) by mouth  daily.  Dose: 1 mg     levothyroxine 112 mcg tablet  Commonly known as: SYNTHROID  Take 1 tablet (112 mcg total) by mouth daily. Pill pack  Dose: 112 mcg     loratadine 10 mg tablet  Commonly known as: CLARITIN  Take 10 mg by mouth daily.  Dose: 10 mg     magnesium oxide 400 mg (241.3 mg magnesium) tablet  Commonly known as: MAG-OX  Take 1 tablet (400 mg total) by mouth 2 (two) times a day.  Dose: 400 mg     medical marijuana  Take 1 each by mouth as needed.  Dose: 1 each     melatonin 5 mg capsule  Take 5 mg by mouth nightly. Hs prn  Dose: 5 mg     metoprolol succinate XL 25 mg 24 hr tablet  Commonly known as: TOPROL-XL  TAKE 1/2 OF A TABLET BY MOUTH TWICE DAILY     multivitamin tablet  Take 1 tablet by mouth daily.  Dose: 1 tablet     nebulizers misc  1 each 4 (four) times a day as needed (sob or wheeze).  Dose: 1 each     pantoprazole 40 mg EC tablet  Commonly known as: PROTONIX  Take 1 tablet (40 mg total) by mouth 2 (two) times a day. Pill pack  Dose: 40 mg     rivaroxaban 20 mg tablet  Commonly known as: XARELTO  Take 1 tablet (20 mg total) by mouth daily with dinner.  Dose: 20 mg     sertraline 50 mg tablet  Commonly known as: ZOLOFT  Take 1 tablet (50 mg total) by mouth daily. Pill pack  Dose: 50 mg     spironolactone 25 mg tablet  Commonly known as: ALDACTONE  Take 1 tablet (25 mg total) by mouth daily.  Dose: 25 mg     thiamine 100 mg tablet  Take 1 tablet (100 mg total) by mouth daily.  Dose: 100 mg           * This list has 2 medication(s) that are the same as other medications prescribed for you. Read the directions carefully, and ask your doctor or other care provider to review them with you.                  Instructions for after discharge       Follow-up with primary physician (PCP)      Follow up with your PCP, Dr. Chambers, in 1-2 weeks. Call for an appointment.               PROCEDURES / LABS / IMAGING      Operative Procedures      Other Procedures  none    Pertinent Labs    Results from last 7  days   Lab Units 08/14/23  1553 08/11/23 1913   WBC K/uL 12.29* 19.62*   HEMOGLOBIN g/dL 10.0* 10.8*   HEMATOCRIT % 32.9* 35.6*   PLATELETS K/uL 214 264       Results from last 7 days   Lab Units 08/14/23  1553 08/11/23  1913   SODIUM mEQ/L 140 139   POTASSIUM mEQ/L 4.4 4.4   CHLORIDE mEQ/L 106 103   CO2 mEQ/L 27 27   BUN mg/dL 17 10   CREATININE mg/dL 0.9 1.1   CALCIUM mg/dL 8.8 9.4   ALBUMIN g/dL  --  4.0   BILIRUBIN TOTAL mg/dL  --  0.8   ALK PHOS IU/L  --  90   ALT IU/L  --  24   AST IU/L  --  28   GLUCOSE mg/dL 200* 148*             Pertinent Imaging  X-RAY CHEST 2 VIEWS    Result Date: 8/14/2023  IMPRESSION: Similar appearance of patchy groundglass opacities within both lungs which may reflect multifocal pneumonia and/or pulmonary edema. COMMENT: Comparison: Chest x-ray and CT chest 8/11/2023. Technique: PA frontal and lateral views of the chest were obtained. FINDINGS: There is similar appearance of patchy groundglass opacities throughout both lungs. Again noted is mild blunting of the right costophrenic angle corresponding to pleural thickening. No significant pleural effusion is seen. There is no pneumothorax. Again seen is a left chest wall dual-lead pacemaker with right atrial right ventricular leads. There is stable enlargement of the cardiac silhouette. There are median sternotomy wires and mediastinal clips. 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 The upper abdomen is unremarkable. There are multilevel degenerative changes of the thoracic spine. There is no acute osseous abnormality.     CT ANGIOGRAPHY CHEST/ABDOMEN/PELVIS WITH AND WITHOUT IV CONTRAST    Result Date: 8/12/2023  IMPRESSION: No evidence of pulmonary embolism. Ectatic ascending aorta is noted, measures 4.2 cm in diameter at the level of right pulmonary artery. Liver contour is mildly nodular, cannot rule out cirrhosis. Scattered consolidation and groundglass opacity seen in lungs, may represent multifocal pneumonia or pulmonary  edema.     X-RAY CHEST 2 VIEWS    Result Date: 8/11/2023  IMPRESSION: Cardiomegaly and mild interstitial edema    CT CHEST WITH AND WITHOUT IV CONTRAST    Result Date: 7/27/2023  IMPRESSION: 1. Nonspecific airway inflammatory disease. 2. Small pulmonary nodules.       OUTPATIENT  FOLLOW-UP / REFERRALS / PENDING TESTS        Outpatient Follow-Up Appointments            In 1 month [facility] Cardiology    In 1 month Yousuf Wolf MD [facility] in [facility]    In 3 months Melanie Chambers DO [facility] in [facility]            Referrals  No orders of the defined types were placed in this encounter.      Test Results Pending at Discharge  Unresulted Labs (From admission, onward)      None            Important Issues to Address in Follow-Up  Patient is instructed to follow-up with his PCP    DISCHARGE DISPOSITION      Disposition: Home     Code Status At Discharge: Full Code    Time for discharge including coordination of care with Case management, social work, specialist, written discharge instructions, medication reconciliation and counseling to patient and family = 25 min    Physician Order for Life-Sustaining Treatment Document Status        No documents found                    Stew Gold DO  8/15/2023

## 2023-08-15 NOTE — PLAN OF CARE
Care Coordination Discharge Plan Note     Discharge Needs Assessment  Concerns to be Addressed: discharge planning  Current Discharge Risk:      Anticipated Discharge Plan  Anticipated Discharge Disposition: home with home health, home with assistance       Patient Choice  Offered/Gave Vendor List: no  Patient's Choice of Community Agency(s):           ---------------------------------------------------------------------------------------------------------------------    Interdisciplinary Discharge Plan Review:  Participants:     Concerns Comments:      Discharge Plan:   Disposition/Destination:   /    Discharge Facility:    Community Resources:      Discharge Transportation:  Is Out of Hospital DNR needed at Discharge: no  Does patient need discharge transport?        Spoke to patient's daughter Jovana and she is aware of patient's discharge plan.

## 2023-08-15 NOTE — PLAN OF CARE
Plan of Care Review  Plan of Care Reviewed With: patient  Progress: improving  Outcome Evaluation: AOx4, AVSS. Pt reports no SOB and no respiratory distress observed. C/o intermittent mod-severe back pain. Pt using the urinal without issues. Resting in bed overnight, bed alarm on, call bell within reach.

## 2023-08-16 ENCOUNTER — PATIENT OUTREACH (OUTPATIENT)
Dept: CASE MANAGEMENT | Facility: CLINIC | Age: 83
End: 2023-08-16
Payer: COMMERCIAL

## 2023-08-16 NOTE — PROGRESS NOTES
RN attempted a transition of care phone call. Left voicemail with my title and call back phone number.    Silvia HALLN,RN  Ambulatory Care Manager  [phone]

## 2023-08-17 ENCOUNTER — TELEPHONE (OUTPATIENT)
Dept: PRIMARY CARE | Facility: CLINIC | Age: 83
End: 2023-08-17
Payer: COMMERCIAL

## 2023-08-17 LAB
BACTERIA BLD CULT: NORMAL
BACTERIA BLD CULT: NORMAL

## 2023-08-17 RX ORDER — SERTRALINE HYDROCHLORIDE 50 MG/1
50 TABLET, FILM COATED ORAL DAILY
Qty: 90 TABLET | Refills: 1 | Status: SHIPPED
Start: 2023-08-17 | End: 2023-09-07

## 2023-08-17 RX ORDER — CLONAZEPAM 0.5 MG/1
0.5 TABLET ORAL NIGHTLY PRN
Qty: 30 TABLET | Refills: 0 | Status: SHIPPED | OUTPATIENT
Start: 2023-08-19 | End: 2023-09-18

## 2023-08-17 RX ORDER — PANTOPRAZOLE SODIUM 40 MG/1
40 TABLET, DELAYED RELEASE ORAL 2 TIMES DAILY
Qty: 180 TABLET | Refills: 1 | Status: SHIPPED | OUTPATIENT
Start: 2023-08-17 | End: 2024-02-13

## 2023-08-17 NOTE — PROGRESS NOTES
NAME: Cecil Morris    MRN: [id number]    YOB: 1940    Event Review:    Initial TCM Patient Outreach Date: 08/16/23    Assessment completed with: Family Member     Discharge Diagnosis: Pneumonia    Patient readmitted in the last 30 days: No  Discharging Facility: [facility]  Date of Last Admission: 08/11/23  Date of Last Discharge: 08/15/23          Two outreach calls were made within 1-2 business days of discharge; unable to reach patient. If patient is seen in the office within 14 calendar days, you may bill for TCM visit.    Silvia Arias RN

## 2023-08-17 NOTE — TELEPHONE ENCOUNTER
Ann needs to reschedule the patient's HFU appt, was scheduled for 8/17, she is unable to bring him at that time. Next available in Sept, discharged 8/15.  Please call Ann to reschedule.    [phone]

## 2023-08-17 NOTE — PROGRESS NOTES
Transition of care call #2. Spoke briefly with the patient daughter Jovana who stated to give a call to the patient caregiver Ann. Unable to leave voice mail for Ann at this time, voice mail box is full.

## 2023-08-18 ENCOUNTER — HOSPITAL ENCOUNTER (EMERGENCY)
Facility: HOSPITAL | Age: 83
Discharge: HOME | End: 2023-08-19
Attending: EMERGENCY MEDICINE
Payer: COMMERCIAL

## 2023-08-18 ENCOUNTER — PATIENT OUTREACH (OUTPATIENT)
Dept: CASE MANAGEMENT | Facility: CLINIC | Age: 83
End: 2023-08-18
Payer: COMMERCIAL

## 2023-08-18 ENCOUNTER — APPOINTMENT (EMERGENCY)
Dept: RADIOLOGY | Facility: HOSPITAL | Age: 83
End: 2023-08-18
Payer: COMMERCIAL

## 2023-08-18 DIAGNOSIS — R06.02 SHORTNESS OF BREATH: Primary | ICD-10-CM

## 2023-08-18 LAB
ALBUMIN SERPL-MCNC: 3.9 G/DL (ref 3.5–5.7)
ALP SERPL-CCNC: 77 IU/L (ref 34–125)
ALT SERPL-CCNC: 20 IU/L (ref 7–52)
ANION GAP SERPL CALC-SCNC: 9 MEQ/L (ref 3–15)
AST SERPL-CCNC: 22 IU/L (ref 13–39)
BASOPHILS # BLD: 0 K/UL (ref 0.01–0.1)
BASOPHILS NFR BLD: 0 %
BILIRUB SERPL-MCNC: 0.9 MG/DL (ref 0.3–1.2)
BNP SERPL-MCNC: 296 PG/ML
BUN SERPL-MCNC: 18 MG/DL (ref 7–25)
BURR CELLS BLD QL SMEAR: ABNORMAL
CALCIUM SERPL-MCNC: 9.1 MG/DL (ref 8.6–10.3)
CHLORIDE SERPL-SCNC: 102 MEQ/L (ref 98–107)
CO2 SERPL-SCNC: 29 MEQ/L (ref 21–31)
CREAT SERPL-MCNC: 1.1 MG/DL (ref 0.7–1.3)
DIFFERENTIAL METHOD BLD: ABNORMAL
EOSINOPHIL # BLD: 0.39 K/UL (ref 0.04–0.54)
EOSINOPHIL NFR BLD: 2 %
ERYTHROCYTE [DISTWIDTH] IN BLOOD BY AUTOMATED COUNT: 13.7 % (ref 11.6–14.4)
GFR SERPL CREATININE-BSD FRML MDRD: >60 ML/MIN/1.73M*2
GLUCOSE SERPL-MCNC: 149 MG/DL (ref 70–99)
HCT VFR BLDCO AUTO: 36.2 % (ref 40.1–51)
HGB BLD-MCNC: 11.2 G/DL (ref 13.7–17.5)
HYPOCHROMIA BLD QL SMEAR: ABNORMAL
LYMPHOCYTES # BLD: 8.42 K/UL (ref 1.2–3.5)
LYMPHOCYTES NFR BLD: 43 %
MANUAL DIF COMMENT BLD-IMP: ABNORMAL
MCH RBC QN AUTO: 28.4 PG (ref 28–33.2)
MCHC RBC AUTO-ENTMCNC: 30.9 G/DL (ref 32.2–36.5)
MCV RBC AUTO: 91.9 FL (ref 83–98)
MONOCYTES # BLD: 0.98 K/UL (ref 0.3–1)
MONOCYTES NFR BLD: 5 %
NEUTS BAND # BLD: 9.79 K/UL (ref 1.7–7)
NEUTS SEG NFR BLD: 50 %
OVALOCYTES BLD QL SMEAR: ABNORMAL
PDW BLD AUTO: 8.7 FL (ref 9.4–12.4)
PLATELET # BLD AUTO: 336 K/UL (ref 150–350)
PLATELET # BLD EST: ABNORMAL 10*3/UL
PLATELET CLUMP BLD QL SMEAR: PRESENT
POLYCHROMASIA BLD QL SMEAR: ABNORMAL
POTASSIUM SERPL-SCNC: 4.1 MEQ/L (ref 3.5–5.1)
PROT SERPL-MCNC: 7.5 G/DL (ref 6–8.2)
RBC # BLD AUTO: 3.94 M/UL (ref 4.5–5.8)
SMUDGE CELLS BLD QL SMEAR: ABNORMAL
SODIUM SERPL-SCNC: 140 MEQ/L (ref 136–145)
TROPONIN I SERPL HS-MCNC: 15.4 PG/ML
TROPONIN I SERPL HS-MCNC: 15.7 PG/ML
WBC # BLD AUTO: 19.58 K/UL (ref 3.8–10.5)

## 2023-08-18 PROCEDURE — 93005 ELECTROCARDIOGRAM TRACING: CPT | Performed by: EMERGENCY MEDICINE

## 2023-08-18 PROCEDURE — 99283 EMERGENCY DEPT VISIT LOW MDM: CPT | Mod: 25

## 2023-08-18 PROCEDURE — 84484 ASSAY OF TROPONIN QUANT: CPT | Performed by: EMERGENCY MEDICINE

## 2023-08-18 PROCEDURE — 93005 ELECTROCARDIOGRAM TRACING: CPT

## 2023-08-18 PROCEDURE — 84484 ASSAY OF TROPONIN QUANT: CPT | Mod: 91 | Performed by: EMERGENCY MEDICINE

## 2023-08-18 PROCEDURE — 71045 X-RAY EXAM CHEST 1 VIEW: CPT

## 2023-08-18 PROCEDURE — 63700000 HC SELF-ADMINISTRABLE DRUG

## 2023-08-18 PROCEDURE — 83880 ASSAY OF NATRIURETIC PEPTIDE: CPT | Performed by: EMERGENCY MEDICINE

## 2023-08-18 PROCEDURE — 36415 COLL VENOUS BLD VENIPUNCTURE: CPT | Performed by: EMERGENCY MEDICINE

## 2023-08-18 PROCEDURE — 85025 COMPLETE CBC W/AUTO DIFF WBC: CPT | Performed by: EMERGENCY MEDICINE

## 2023-08-18 PROCEDURE — 82247 BILIRUBIN TOTAL: CPT | Performed by: EMERGENCY MEDICINE

## 2023-08-18 RX ORDER — CLONAZEPAM 0.5 MG/1
0.5 TABLET ORAL ONCE
Status: COMPLETED | OUTPATIENT
Start: 2023-08-18 | End: 2023-08-18

## 2023-08-18 RX ORDER — IBUPROFEN/PSEUDOEPHEDRINE HCL 200MG-30MG
3 TABLET ORAL NIGHTLY
Status: DISCONTINUED | OUTPATIENT
Start: 2023-08-18 | End: 2023-08-19 | Stop reason: HOSPADM

## 2023-08-18 RX ORDER — ACETAMINOPHEN 325 MG/1
975 TABLET ORAL ONCE
Status: COMPLETED | OUTPATIENT
Start: 2023-08-18 | End: 2023-08-18

## 2023-08-18 RX ORDER — LIDOCAINE 560 MG/1
1 PATCH PERCUTANEOUS; TOPICAL; TRANSDERMAL ONCE
Status: COMPLETED | OUTPATIENT
Start: 2023-08-18 | End: 2023-08-19

## 2023-08-18 RX ADMIN — CLONAZEPAM 0.5 MG: 0.5 TABLET ORAL at 21:54

## 2023-08-18 RX ADMIN — ACETAMINOPHEN 975 MG: 325 TABLET ORAL at 18:17

## 2023-08-18 RX ADMIN — Medication 3 MG: at 21:54

## 2023-08-18 RX ADMIN — LIDOCAINE 1 PATCH: 4 PATCH TOPICAL at 21:54

## 2023-08-18 NOTE — PROGRESS NOTES
Patient daughter called and stated the patient is short of breath, crackle sounding and anxious. RN advised patient daughter to call 911 and have the patient return to emergency department.

## 2023-08-18 NOTE — DISCHARGE INSTRUCTIONS
You were seen in the ED for shortness of breath. Your oxygen levels were good on the 2L of oxygen you were discharged from the hospital on. Your xray did not show a pneumonia.

## 2023-08-18 NOTE — ED ATTESTATION NOTE
I have personally seen and examined Cecil Morris. I reviewed and agree with resident's assessment and plan of care, with any exceptions as documented below.       My focused history, examination, assessment, and plan of care of Cecil Morris is as follows:    Brief History:  HPI  83-year-old male who was recently hospitalized with pneumonia and was discharged on home oxygen presents with persisting shortness of breath.      Focused Physical Exam:  Physical Exam  Patient is awake alert in no acute distress.  Patient's oxygen saturation was low however his oxygen tank had run out of oxygen and was empty.      Assessment / Plan / MDM:  MDM  Lab work was obtained.  Troponins x2 are stable.  BNP is not significantly elevated.  His white count is elevated at 19.6.  Chest x-ray was obtained and shows mild vascular congestion similar to previous.  No consolidation was noted.  Patient was placed on nasal cannula oxygen and is O2 sat on 1-1/2 L was 96%.  Patient is likely having persisting symptoms of shortness of breath from his pneumonia.  Patient has no worsening or new consolidation on his x-ray.  Patient was discharged.             Anahi Sharma MD  08/18/23 7662

## 2023-08-18 NOTE — ED PROVIDER NOTES
Emergency Medicine Note  HPI   HISTORY OF PRESENT ILLNESS     HPI     Patient is an 84 yo M with PMH CAD w/ stents, RBBB, HTN, PPM, Afib on AC, Dementia, HLD, hypothyroidism, recent admission 8/11-8/15 for pneumonia, presenting with shortness of breath. Patient had similar symptoms before recent admission, was treated with CTX for bacterial pneumonia and discharged on ceftin. Patient states since leaving the hospital, he was newly discharged on 2L O2 and developed a new cough and worsening shortness of breath. Denies chest pain, Leg swelling, fevers.  Upon discharge, home health aide stated that she was concerned that patient was not using his oxygen properly at home.  Patient History   PAST HISTORY     Reviewed from Nursing Triage:       Past Medical History:   Diagnosis Date   • A-fib (CMS/[facility])    • Acute on chronic combined systolic and diastolic congestive heart failure (CMS/[facility]ly [facility])    • Anxiety 4/11/2023   • CHF (congestive heart failure), NYHA class I, acute, systolic (CMS/[facility])    • Depression    • Hyperthyroidism    • Hypoxia    • Near syncope    • NSVT (nonsustained ventricular tachycardia) (CMS/[facility])    • Orthostatic hypotension    • Presence of cardiac pacemaker    • Pulmonary embolism (CMS/[facility])    • Sepsis (CMS/[facility]ly [facility]) 05/16/2022       Past Surgical History:   Procedure Laterality Date   • CARDIAC CATHETERIZATION  11/19/2020    CATH PLACE/CORON ANGIO, IMG SUPER/INTERP,R&L HRT CATH, L HRT VENTRIC   • CARDIAC CATHETERIZATION Bilateral 2005   • CORONARY ARTERY BYPASS GRAFT  2005       Family History   Problem Relation Age of Onset   • Thyroid cancer Biological Mother    • ALS Biological Mother    • Parkinsonism Biological Mother    • Heart attack Biological Father    • Liver cancer Maternal Grandmother        Social History     Tobacco Use   • Smoking status: Former     Years: 40.00     Types: Cigarettes     Quit date: 11/1/2012     Years since quitting: 10.8   • Smokeless tobacco: Never   Vaping Use   • Vaping  Use: Never used   Substance Use Topics   • Alcohol use: Not Currently     Alcohol/week: 9.0 standard drinks of alcohol     Types: 9 Shots of liquor per week     Comment: vodka stopped late in winter   • Drug use: Never         Review of Systems   REVIEW OF SYSTEMS     Review of Systems      VITALS     ED Vitals    Date/Time Temp Pulse Resp BP SpO2 [facility]   08/18/23 1306 36.8 °C (98.3 °F) 65 22 108/60 93 % HMS                       Physical Exam   PHYSICAL EXAM     Physical Exam  Constitutional:       Appearance: He is not ill-appearing.   Cardiovascular:      Rate and Rhythm: Normal rate.   Pulmonary:      Effort: Pulmonary effort is normal.      Breath sounds: Normal breath sounds. No decreased breath sounds.   Abdominal:      Palpations: Abdomen is soft.      Tenderness: There is no abdominal tenderness.   Musculoskeletal:      Right lower leg: No tenderness. No edema.      Left lower leg: No tenderness. No edema.   Skin:     General: Skin is warm and dry.   Neurological:      Mental Status: He is alert.           PROCEDURES     Procedures     DATA     Results     None          Imaging Results    None         No orders to display       Scoring tools                                  ED Course & MDM   MDM / ED COURSE / CLINICAL IMPRESSION / DISPO     MDM  Patient presenting after admission for pneumonia, just finished course of antibiotics yesterday.  Patient was discharged on 2 L of oxygen, presents with normal oxygenation on 2 L.  Does not look to be in any respiratory distress.  Chest x-ray does not show pneumonia.  Patient is afebrile.  Thus, recurrent pneumonia unlikely, and patient seems to be at his baseline when discharged previously.  However, noted that patient may not be compliant with his oxygen at home, thus we will hold patient for case management evaluation for possible 24-hour home health care or SNF.  ED Course as of 08/18/23 2037   Fri Aug 18, 2023   1353 WBC(!): 19.58 [SD]   1535 X-RAY CHEST 1  VIEW  Mildly vascular congestion, similar to 8/13/2023.  Left basilar atelectasis suspected. No definite consolidation [SD]   2036 Patient's caretaker showed up to pick her up and reports the family has told her she cannot take the patient home.  They had the patient sent in because he is not able to manage his oxygen at home alone and they would like him placed in a facility.  Patient will be held in the ED for case management [SS]      ED Course User Index  [SD] Dar Tolliver MD  [SS] Anahi Sharma MD     Clinical Impression      None               Dar Tolliver MD  Resident  08/18/23 [id number]

## 2023-08-19 VITALS
SYSTOLIC BLOOD PRESSURE: 153 MMHG | TEMPERATURE: 98.3 F | OXYGEN SATURATION: 96 % | HEIGHT: 68 IN | RESPIRATION RATE: 18 BRPM | DIASTOLIC BLOOD PRESSURE: 70 MMHG | BODY MASS INDEX: 30.16 KG/M2 | HEART RATE: 68 BPM | WEIGHT: 199 LBS

## 2023-08-19 LAB
ATRIAL RATE: 35
QRS DURATION: 192
QT INTERVAL: 504
QTC CALCULATION(BAZETT): 524
R AXIS: 267
T WAVE AXIS: 77
VENTRICULAR RATE: 65

## 2023-08-19 PROCEDURE — 63700000 HC SELF-ADMINISTRABLE DRUG: Performed by: EMERGENCY MEDICINE

## 2023-08-19 RX ORDER — ACETAMINOPHEN 325 MG/1
650 TABLET ORAL ONCE
Status: COMPLETED | OUTPATIENT
Start: 2023-08-19 | End: 2023-08-19

## 2023-08-19 RX ADMIN — ACETAMINOPHEN 650 MG: 325 TABLET ORAL at 12:02

## 2023-08-21 ENCOUNTER — OFFICE VISIT (OUTPATIENT)
Dept: PRIMARY CARE | Facility: CLINIC | Age: 83
End: 2023-08-21
Payer: COMMERCIAL

## 2023-08-21 VITALS
SYSTOLIC BLOOD PRESSURE: 110 MMHG | OXYGEN SATURATION: 97 % | HEIGHT: 68 IN | TEMPERATURE: 97.3 F | WEIGHT: 191 LBS | HEART RATE: 65 BPM | BODY MASS INDEX: 28.95 KG/M2 | RESPIRATION RATE: 19 BRPM | DIASTOLIC BLOOD PRESSURE: 68 MMHG

## 2023-08-21 DIAGNOSIS — Z76.89 ENCOUNTER FOR SUPPORT AND COORDINATION OF TRANSITION OF CARE: Primary | ICD-10-CM

## 2023-08-21 DIAGNOSIS — Z99.81 OXYGEN DEPENDENT: ICD-10-CM

## 2023-08-21 DIAGNOSIS — J18.9 PNEUMONIA DUE TO INFECTIOUS ORGANISM, UNSPECIFIED LATERALITY, UNSPECIFIED PART OF LUNG: ICD-10-CM

## 2023-08-21 PROCEDURE — 3074F SYST BP LT 130 MM HG: CPT | Performed by: FAMILY MEDICINE

## 2023-08-21 PROCEDURE — 3078F DIAST BP <80 MM HG: CPT | Performed by: FAMILY MEDICINE

## 2023-08-21 PROCEDURE — 1111F DSCHRG MED/CURRENT MED MERGE: CPT | Performed by: FAMILY MEDICINE

## 2023-08-21 PROCEDURE — 3008F BODY MASS INDEX DOCD: CPT | Performed by: FAMILY MEDICINE

## 2023-08-21 PROCEDURE — 99496 TRANSJ CARE MGMT HIGH F2F 7D: CPT | Performed by: FAMILY MEDICINE

## 2023-08-21 RX ORDER — FUROSEMIDE 20 MG/1
TABLET ORAL
COMMUNITY
Start: 2023-06-17 | End: 2023-08-29 | Stop reason: HOSPADM

## 2023-08-21 NOTE — ASSESSMENT & PLAN NOTE
Healing  Antibiotic completed  Suspect his oxygen need is due to recent pneumonia  Do not think that he will necessarily need oxygen long-term  To have pulmonary function test and CT chest to determine if postobstructive causes for his recent pneumonia versus persistent pneumonia although lungs clear to auscultation today  PFTs to be completed to rule out underlying COPD or emphysema

## 2023-08-21 NOTE — PATIENT INSTRUCTIONS
You did not need oxygen until you were diagnosed with pneumonia   You did have a CT chest which showed airway disease  I recommend having breathing tests to determine if underlying COPD or lung disease and to determine if oxygen

## 2023-08-21 NOTE — ASSESSMENT & PLAN NOTE
PFTs to assess for underlying chronic lung disease  May need inhalers to help with breathing  Do not think you will need oxygen long-term

## 2023-08-21 NOTE — PROGRESS NOTES
[facility] IN [facility]       Reason for visit: Follow-up (Hospitalization )    HPI:  Cecil Morris is a 83 y.o. male presenting for follow-up after hospital discharge.    Patient readmitted in the last 30 days: No    Discharge Diagnosis: Pneumonia  Discharging Facility: [facility]  Date of Last Admission: 08/11/23  Date of Last Discharge: 08/15/23                    Initial TCM Patient Outreach Date: 08/16/23    Summary of Hospital Course:    Cecil Morris is a 83 y.o. male with a past medical history of CAD, RBBB, HTN, PPM, CAD, stents, Afib on AC, Dementia, HLD, hypothyroidism, who presented to  with SOB and coughing for 2 week found to have PNA.  Patient was treated with IV ceftriaxone and supplemental oxygen.  Patient did have leukocytosis of 19 on presentation which improved to 12.  Patient is discharged home on oral dosing of cefuroxime in order to complete a total of 7 days of antibiotics.  Patient did still need 1 L of oxygen as oxygen saturations decreased with ambulation.  This was set up at time of discharge.    Went to emergency department on 8/18 for shortness of breath   Now needs O2 2L     Medications prescribed at discharge reconciled with current ambulatory medication list: Yes.    Inpatient testing (labs, imaging, cardiovascular) requiring follow-up: oxygen needs     History since hospital discharge: using oxygen and feels well today, daughters going to get him into a nursing home near [place]      Advance Care Planning Documents     Document Type Status Effective Date Expiration Date Received On Description    Advance Directives and Living Will Not Received        Power of  Not Received              Patient Active Problem List   Diagnosis   • Anemia   • Ascending aortic aneurysm (CMS/HCC)   • Bilateral carotid bruits   • Chronic combined systolic and diastolic heart failure (CMS/HCC)   • RBBB   • Coronary artery disease involving native  coronary artery   • Depression   • Essential hypertension   • S/P TAVR (transcatheter aortic valve replacement)   • Hypercholesteremia   • Acquired hypothyroidism   • Mitral regurgitation   • Nonrheumatic aortic valve stenosis   • NSVT (nonsustained ventricular tachycardia) (CMS/HCC)   • A-fib (CMS/HCC)   • Presence of cardiac pacemaker   • Pulmonary hypertension (CMS/[facility])   • S/P angioplasty with stent   • SSS (sick sinus syndrome) (CMS/HCC)   • Type 2 diabetes mellitus without complication, without long-term current use of insulin (CMS/HCC)   • CHF (congestive heart failure), NYHA class I, acute, systolic (CMS/HCC)   • Alzheimer's dementia (CMS/HCC)   • Anxiety   • Lymphocytosis   • Debility   • Pneumonia due to infectious organism, unspecified laterality, unspecified part of lung   • Oxygen dependent     Past Medical History:   Diagnosis Date   • A-fib (CMS/HCC)    • Acute on chronic combined systolic and diastolic congestive heart failure (CMS/HCC)    • Anxiety 4/11/2023   • CHF (congestive heart failure), NYHA class I, acute, systolic (CMS/HCC)    • Depression    • Hyperthyroidism    • Hypoxia    • Near syncope    • NSVT (nonsustained ventricular tachycardia) (CMS/HCC)    • Orthostatic hypotension    • Presence of cardiac pacemaker    • Pulmonary embolism (CMS/HCC)    • Sepsis (CMS/HCC) 05/16/2022     Past Surgical History:   Procedure Laterality Date   • CARDIAC CATHETERIZATION  11/19/2020    CATH PLACE/CORON ANGIO, IMG SUPER/INTERP,R&L HRT CATH, L HRT VENTRIC   • CARDIAC CATHETERIZATION Bilateral 2005   • CORONARY ARTERY BYPASS GRAFT  2005     Social History     Socioeconomic History   • Marital status:      Spouse name: Not on file   • Number of children: 2   • Years of education: Not on file   • Highest education level: Not on file   Occupational History   • Occupation: retired     Comment: [employer] in [place]   Tobacco Use   • Smoking status: Former     Years: 40.00     Types: Cigarettes     Quit  date: 11/1/2012     Years since quitting: 10.8   • Smokeless tobacco: Never   Vaping Use   • Vaping Use: Never used   Substance and Sexual Activity   • Alcohol use: Not Currently     Alcohol/week: 9.0 standard drinks of alcohol     Types: 9 Shots of liquor per week     Comment: vodka stopped late in winter   • Drug use: Never   • Sexual activity: Not Currently   Other Topics Concern   • Not on file   Social History Narrative    Lives alone, and has help for meal prep and med management     Social Determinants of Health     Financial Resource Strain: Low Risk  (8/13/2023)    Overall Financial Resource Strain (CARDIA)    • Difficulty of Paying Living Expenses: Not hard at all   Food Insecurity: No Food Insecurity (8/18/2023)    Hunger Vital Sign    • Worried About Running Out of Food in the Last Year: Never true    • Ran Out of Food in the Last Year: Never true   Transportation Needs: No Transportation Needs (8/13/2023)    PRAPARE - Transportation    • Lack of Transportation (Medical): No    • Lack of Transportation (Non-Medical): No   Physical Activity: Not on file   Stress: Not on file   Social Connections: Socially Isolated (6/26/2023)    Social Connection and Isolation Panel [NHANES]    • Frequency of Communication with Friends and Family: More than three times a week    • Frequency of Social Gatherings with Friends and Family: Once a week    • Attends Adventism Services: Never    • Active Member of Clubs or Organizations: No    • Attends Club or Organization Meetings: Patient refused    • Marital Status:    Intimate Partner Violence: Not on file   Housing Stability: Low Risk  (8/13/2023)    Housing Stability Vital Sign    • Unable to Pay for Housing in the Last Year: No    • Number of Places Lived in the Last Year: 1    • Unstable Housing in the Last Year: No     Family History   Problem Relation Age of Onset   • Thyroid cancer Biological Mother    • ALS Biological Mother    • Parkinsonism Biological  Mother    • Heart attack Biological Father    • Liver cancer Maternal Grandmother      Patient has no known allergies.     Current Outpatient Medications   Medication Sig Dispense Refill   • acetaminophen (TYLENOL) 325 mg tablet Take 650 mg by mouth as needed.     • albuterol 2.5 mg /3 mL (0.083 %) nebulizer solution TAKE 3 ML (2.5 MG TOTAL) BY NEBULIZATION 4 (FOUR) TIMES A DAY. 150 mL 3   • albuterol HFA 90 mcg/actuation inhaler Inhale 2 puffs every 6 (six) hours as needed for wheezing. 18 g 1   • atorvastatin (LIPITOR) 20 mg tablet Take 1 tablet (20 mg total) by mouth daily. Pill pack 90 tablet 1   • busPIRone (BUSPAR) 5 mg tablet Take 1 tablet (5 mg total) by mouth 2 (two) times a day. Pill pack 180 tablet 1   • clonazePAM (klonoPIN) 0.5 mg tablet Take 1 tablet (0.5 mg total) by mouth nightly as needed for anxiety. 30 tablet 0   • diclofenac sodium (VOLTAREN) 1 % topical gel Apply 1 g topically as needed.     • donepeziL (ARICEPT) 10 mg tablet Take 1 tablet (10 mg total) by mouth nightly. 90 tablet 1   • empagliflozin (JARDIANCE) 25 mg tablet Take 1 tablet (25 mg total) by mouth daily. 90 tablet 3   • ferrous sulfate 325 mg (65 mg iron) tablet Take 1 tablet (325 mg total) by mouth daily with breakfast. 90 tablet 1   • fluticasone-umeclidinium-vilanterol (TRELEGY ELLIPTA) 100-62.5-25 mcg blister with device powder for inhalation Inhale 1 puff daily. 3 each 0   • folic acid (FOLVITE) 1 mg tablet Take 1 tablet (1 mg total) by mouth daily. 90 tablet 1   • levothyroxine (SYNTHROID) 112 mcg tablet Take 1 tablet (112 mcg total) by mouth daily. Pill pack 90 tablet 1   • lidocaine (ASPERCREME) 4 % adhesive patch,medicated topical patch Apply 1 patch topically daily. 30 patch 0   • loratadine (CLARITIN) 10 mg tablet Take 10 mg by mouth daily.     • magnesium oxide (MAG-OX) 400 mg (241.3 mg magnesium) tablet Take 1 tablet (400 mg total) by mouth 2 (two) times a day. 180 tablet 3   • medical marijuana Take 1 each by mouth as  "needed.     • melatonin 5 mg capsule Take 5 mg by mouth nightly. Hs prn     • metoprolol succinate XL (TOPROL-XL) 25 mg 24 hr tablet TAKE 1/2 OF A TABLET BY MOUTH TWICE DAILY 90 tablet 2   • multivitamin tablet Take 1 tablet by mouth daily.     • nebulizers misc 1 each 4 (four) times a day as needed (sob or wheeze). 1 each 0   • pantoprazole (PROTONIX) 40 mg EC tablet Take 1 tablet (40 mg total) by mouth 2 (two) times a day. Pill pack 180 tablet 1   • rivaroxaban (XARELTO) 20 mg tablet Take 1 tablet (20 mg total) by mouth daily with dinner. 90 tablet 2   • sertraline (ZOLOFT) 50 mg tablet Take 1 tablet (50 mg total) by mouth daily. Pill pack 90 tablet 1   • spironolactone (ALDACTONE) 25 mg tablet Take 1 tablet (25 mg total) by mouth daily. 90 tablet 1   • thiamine 100 mg tablet Take 1 tablet (100 mg total) by mouth daily. 90 tablet 1   • furosemide (LASIX) 20 mg tablet        No current facility-administered medications for this visit.     ROS:  Review of Systems   All other systems reviewed and are negative.      Vitals:    08/21/23 1101   BP: 110/68   BP Location: Left upper arm   Patient Position: Sitting   Pulse: 65   Resp: 19   Temp: 36.3 °C (97.3 °F)   TempSrc: Temporal   SpO2: 97%   Weight: 86.6 kg (191 lb)   Height: 1.727 m (5' 8\")       EXAM:  Physical Exam  Vitals reviewed.   Constitutional:       Appearance: He is well-developed.   HENT:      Head: Normocephalic and atraumatic.      Right Ear: External ear normal.      Left Ear: External ear normal.      Nose: Nose normal.   Eyes:      Conjunctiva/sclera: Conjunctivae normal.      Pupils: Pupils are equal, round, and reactive to light.   Cardiovascular:      Rate and Rhythm: Normal rate and regular rhythm.      Heart sounds: Normal heart sounds.   Pulmonary:      Effort: Pulmonary effort is normal.      Breath sounds: Normal breath sounds.   Musculoskeletal:      Right lower leg: No edema.      Left lower leg: No edema.   Skin:     General: Skin is warm " and dry.   Neurological:      General: No focal deficit present.      Mental Status: He is alert. Mental status is at baseline.      Comments: forgetful   Psychiatric:         Mood and Affect: Mood normal.         Speech: Speech normal.         Behavior: Behavior normal.         Lab Results   Component Value Date    WBC 19.58 (H) 08/18/2023    HGB 11.2 (L) 08/18/2023    HCT 36.2 (L) 08/18/2023     08/18/2023    CHOL 138 11/17/2022    TRIG 71 11/17/2022    HDL 64 11/17/2022    ALT 20 08/18/2023    AST 22 08/18/2023     08/18/2023    K 4.1 08/18/2023     08/18/2023    CREATININE 1.1 08/18/2023    BUN 18 08/18/2023    CO2 29 08/18/2023    TSH 3.06 04/21/2023    HGBA1C 8.1 (H) 07/11/2023    MICROALBUR 0.2 07/11/2023         ASSESSMENT/PLAN:  Diagnoses and all orders for this visit:    Encounter for support and coordination of transition of care (Primary)  Comments:  83-year-old male who presents for hospital follow-up  Now needing oxygen    Pneumonia due to infectious organism, unspecified laterality, unspecified part of lung  Assessment & Plan:  Healing  Antibiotic completed  Suspect his oxygen need is due to recent pneumonia  Do not think that he will necessarily need oxygen long-term  To have pulmonary function test and CT chest to determine if postobstructive causes for his recent pneumonia versus persistent pneumonia although lungs clear to auscultation today  PFTs to be completed to rule out underlying COPD or emphysema    Orders:  -     Pulmonary function tests Spirometry before and after bronchodilator, Six minute walk, Diffusing capacity  -     CT CHEST WITHOUT IV CONTRAST; Future    Oxygen dependent  Assessment & Plan:  PFTs to assess for underlying chronic lung disease  May need inhalers to help with breathing  Do not think you will need oxygen long-term    Orders:  -     Pulmonary function tests Spirometry before and after bronchodilator, Six minute walk, Diffusing capacity  -     CT CHEST  WITHOUT IV CONTRAST; Future        Melanie Chambers, DO  8/21/2023

## 2023-08-22 ENCOUNTER — PATIENT OUTREACH (OUTPATIENT)
Dept: CASE MANAGEMENT | Facility: CLINIC | Age: 83
End: 2023-08-22
Payer: COMMERCIAL

## 2023-08-22 NOTE — PROGRESS NOTES
No care management call was placed yesterday due to patient in PCP office for a follow up appt. RN will reach out at a later time.

## 2023-08-25 ENCOUNTER — TELEPHONE (OUTPATIENT)
Dept: PRIMARY CARE | Facility: CLINIC | Age: 83
End: 2023-08-25
Payer: COMMERCIAL

## 2023-08-25 ENCOUNTER — PATIENT OUTREACH (OUTPATIENT)
Dept: CASE MANAGEMENT | Facility: CLINIC | Age: 83
End: 2023-08-25
Payer: COMMERCIAL

## 2023-08-25 DIAGNOSIS — J44.1 COPD EXACERBATION (CMS/HCC): Primary | ICD-10-CM

## 2023-08-25 RX ORDER — ALBUTEROL SULFATE 90 UG/1
2 INHALANT RESPIRATORY (INHALATION) EVERY 6 HOURS PRN
Qty: 18 G | Refills: 1 | Status: SHIPPED | OUTPATIENT
Start: 2023-08-25 | End: 2024-02-28 | Stop reason: SDUPTHER

## 2023-08-25 NOTE — PROGRESS NOTES
JONATHON follow up call made to patient today. Unable to reach patient today. Left a voicemail and my phone number for a return call. If no return call, RN will follow up at a later time.    Received an incoming call from caregiver Ann. Ann states patient has not been doing well from a mental status stand point. She feels his depression and anxieyty is worse. Appetite decreased. Feels his dementia is also getting worse. She states patient is not suicidal. She states he is stable overall and does not need to return to a hospital.    Ann explained patient does have 2 daughters. One is [place] and the other one is more local.. Patient has another private paid caregiver besides Ann but neither of them are with patient 24 hrs a day.  She explains he is now homebound and on oxygen at all times. Patient had been going out of his home prior to hospitalization. Now that he is more confined she feels this is adding to his depression. She plans to pack up a portable tank and take him out for dinner tonight to see if this helps.   Ann has been calling [employer] his insurance to find him help with a therapist. She states she has called multiple times and is not getting anywhere. She did find out patient can go outside of the network. Ann requesting a call from the  to assist with resources for mental health.   Message sent to  Lindsay Heaton and Dr Chambers.      Informed Ann Albuterol was sent to pharmacy.   She feels patient's pneumonia is much improved.   Has home care PT and nurse.   Instructed to use incentive spirometer to help expand the lungs.   Agreeable for RN to follow up in a few days. Instructed to call into the office for any concerns or questions.   Instructed the office PHI form needs to be updated to Ann. Ann will discuss with daughter.

## 2023-08-25 NOTE — TELEPHONE ENCOUNTER
Medication Request   Patient PCP: Melanie Chambers DO  Next Office Visit: 11/17/2023  Has this provider prescribed this medication before?: no  Medication Name:   albuterol HFA (VENTOLIN HFA) 90 mcg/actuation inhaler  albuterol 2.5 mg /3 mL (0.083 %) nebulizer solution    Medication Dose:   Medication Frequency:   Preferred Pharmacy:   [facility] [place] [id number] [facility]  [facility] [facility] [address]  Phone: [phone] Fax: [phone]      Please allow 2 business days for your provider to send your medication request or to reach out to discuss.

## 2023-08-26 ENCOUNTER — HOSPITAL ENCOUNTER (INPATIENT)
Facility: HOSPITAL | Age: 83
LOS: 3 days | Discharge: HOME HEALTH | DRG: 292 | End: 2023-08-29
Attending: EMERGENCY MEDICINE | Admitting: INTERNAL MEDICINE
Payer: COMMERCIAL

## 2023-08-26 ENCOUNTER — APPOINTMENT (EMERGENCY)
Dept: RADIOLOGY | Facility: HOSPITAL | Age: 83
DRG: 292 | End: 2023-08-26
Payer: COMMERCIAL

## 2023-08-26 DIAGNOSIS — R53.81 DEBILITY: ICD-10-CM

## 2023-08-26 DIAGNOSIS — I50.9 CONGESTIVE HEART FAILURE, UNSPECIFIED HF CHRONICITY, UNSPECIFIED HEART FAILURE TYPE (CMS/HCC): ICD-10-CM

## 2023-08-26 DIAGNOSIS — R06.09 DYSPNEA ON EXERTION: ICD-10-CM

## 2023-08-26 DIAGNOSIS — R79.89 ELEVATED TROPONIN: ICD-10-CM

## 2023-08-26 DIAGNOSIS — R05.9 COUGH, UNSPECIFIED TYPE: Primary | ICD-10-CM

## 2023-08-26 PROBLEM — D72.829 LEUKOCYTOSIS: Status: ACTIVE | Noted: 2023-04-14

## 2023-08-26 LAB
ALBUMIN SERPL-MCNC: 3.8 G/DL (ref 3.5–5.7)
ALP SERPL-CCNC: 88 IU/L (ref 34–125)
ALT SERPL-CCNC: 16 IU/L (ref 7–52)
ANION GAP SERPL CALC-SCNC: 7 MEQ/L (ref 3–15)
AST SERPL-CCNC: 18 IU/L (ref 13–39)
BASOPHILS # BLD: 0.04 K/UL (ref 0.01–0.1)
BASOPHILS NFR BLD: 0.2 %
BILIRUB SERPL-MCNC: 0.8 MG/DL (ref 0.3–1.2)
BNP SERPL-MCNC: 420 PG/ML
BUN SERPL-MCNC: 16 MG/DL (ref 7–25)
CALCIUM SERPL-MCNC: 9.1 MG/DL (ref 8.6–10.3)
CHLORIDE SERPL-SCNC: 102 MEQ/L (ref 98–107)
CO2 SERPL-SCNC: 29 MEQ/L (ref 21–31)
CREAT SERPL-MCNC: 1.1 MG/DL (ref 0.7–1.3)
DIFFERENTIAL METHOD BLD: ABNORMAL
EOSINOPHIL # BLD: 0.11 K/UL (ref 0.04–0.54)
EOSINOPHIL NFR BLD: 0.7 %
ERYTHROCYTE [DISTWIDTH] IN BLOOD BY AUTOMATED COUNT: 14.3 % (ref 11.6–14.4)
FLUAV RNA SPEC QL NAA+PROBE: NEGATIVE
FLUBV RNA SPEC QL NAA+PROBE: NEGATIVE
GFR SERPL CREATININE-BSD FRML MDRD: >60 ML/MIN/1.73M*2
GLUCOSE BLD-MCNC: 176 MG/DL (ref 70–99)
GLUCOSE SERPL-MCNC: 153 MG/DL (ref 70–99)
HCT VFR BLDCO AUTO: 35 % (ref 40.1–51)
HGB BLD-MCNC: 10.3 G/DL (ref 13.7–17.5)
IMM GRANULOCYTES # BLD AUTO: 0.08 K/UL (ref 0–0.08)
IMM GRANULOCYTES NFR BLD AUTO: 0.5 %
LYMPHOCYTES # BLD: 4.03 K/UL (ref 1.2–3.5)
LYMPHOCYTES NFR BLD: 24.2 %
MCH RBC QN AUTO: 27.3 PG (ref 28–33.2)
MCHC RBC AUTO-ENTMCNC: 29.4 G/DL (ref 32.2–36.5)
MCV RBC AUTO: 92.8 FL (ref 83–98)
MONOCYTES # BLD: 0.99 K/UL (ref 0.3–1)
MONOCYTES NFR BLD: 5.9 %
NEUTROPHILS # BLD: 11.41 K/UL (ref 1.7–7)
NEUTS SEG NFR BLD: 68.5 %
NRBC BLD-RTO: 0 %
PDW BLD AUTO: 8.6 FL (ref 9.4–12.4)
PLATELET # BLD AUTO: 315 K/UL (ref 150–350)
POCT TEST: ABNORMAL
POTASSIUM SERPL-SCNC: 4.6 MEQ/L (ref 3.5–5.1)
PROT SERPL-MCNC: 7.1 G/DL (ref 6–8.2)
RBC # BLD AUTO: 3.77 M/UL (ref 4.5–5.8)
RSV RNA SPEC QL NAA+PROBE: NEGATIVE
SARS-COV-2 RNA RESP QL NAA+PROBE: NEGATIVE
SODIUM SERPL-SCNC: 138 MEQ/L (ref 136–145)
TROPONIN I SERPL HS-MCNC: 180.4 PG/ML
TROPONIN I SERPL HS-MCNC: 214.9 PG/ML
WBC # BLD AUTO: 16.66 K/UL (ref 3.8–10.5)

## 2023-08-26 PROCEDURE — 94640 AIRWAY INHALATION TREATMENT: CPT

## 2023-08-26 PROCEDURE — 80053 COMPREHEN METABOLIC PANEL: CPT | Performed by: PHYSICIAN ASSISTANT

## 2023-08-26 PROCEDURE — 21400000 HC ROOM AND CARE CCU/INTERMEDIATE

## 2023-08-26 PROCEDURE — 36415 COLL VENOUS BLD VENIPUNCTURE: CPT

## 2023-08-26 PROCEDURE — 87637 SARSCOV2&INF A&B&RSV AMP PRB: CPT | Performed by: PHYSICIAN ASSISTANT

## 2023-08-26 PROCEDURE — 84484 ASSAY OF TROPONIN QUANT: CPT | Performed by: PHYSICIAN ASSISTANT

## 2023-08-26 PROCEDURE — 96365 THER/PROPH/DIAG IV INF INIT: CPT

## 2023-08-26 PROCEDURE — 25800000 HC PHARMACY IV SOLUTIONS: Performed by: INTERNAL MEDICINE

## 2023-08-26 PROCEDURE — 93005 ELECTROCARDIOGRAM TRACING: CPT | Performed by: EMERGENCY MEDICINE

## 2023-08-26 PROCEDURE — 63700000 HC SELF-ADMINISTRABLE DRUG: Performed by: INTERNAL MEDICINE

## 2023-08-26 PROCEDURE — 99223 1ST HOSP IP/OBS HIGH 75: CPT | Performed by: INTERNAL MEDICINE

## 2023-08-26 PROCEDURE — 63600000 HC DRUGS/DETAIL CODE: Performed by: SPEECH-LANGUAGE PATHOLOGIST

## 2023-08-26 PROCEDURE — 85025 COMPLETE CBC W/AUTO DIFF WBC: CPT | Performed by: PHYSICIAN ASSISTANT

## 2023-08-26 PROCEDURE — 25000000 HC PHARMACY GENERAL: Performed by: INTERNAL MEDICINE

## 2023-08-26 PROCEDURE — 63600000 HC DRUGS/DETAIL CODE: Performed by: INTERNAL MEDICINE

## 2023-08-26 PROCEDURE — 99285 EMERGENCY DEPT VISIT HI MDM: CPT | Mod: 25

## 2023-08-26 PROCEDURE — 25800000 HC PHARMACY IV SOLUTIONS: Performed by: PHYSICIAN ASSISTANT

## 2023-08-26 PROCEDURE — 63700000 HC SELF-ADMINISTRABLE DRUG: Performed by: SPEECH-LANGUAGE PATHOLOGIST

## 2023-08-26 PROCEDURE — 71046 X-RAY EXAM CHEST 2 VIEWS: CPT

## 2023-08-26 PROCEDURE — 96375 TX/PRO/DX INJ NEW DRUG ADDON: CPT

## 2023-08-26 PROCEDURE — 63600000 HC DRUGS/DETAIL CODE: Mod: JZ | Performed by: PHYSICIAN ASSISTANT

## 2023-08-26 PROCEDURE — 83880 ASSAY OF NATRIURETIC PEPTIDE: CPT | Performed by: PHYSICIAN ASSISTANT

## 2023-08-26 RX ORDER — ATORVASTATIN CALCIUM 20 MG/1
20 TABLET, FILM COATED ORAL
Status: DISCONTINUED | OUTPATIENT
Start: 2023-08-26 | End: 2023-08-29 | Stop reason: HOSPADM

## 2023-08-26 RX ORDER — BUSPIRONE HYDROCHLORIDE 10 MG/1
5 TABLET ORAL 2 TIMES DAILY
Status: DISCONTINUED | OUTPATIENT
Start: 2023-08-26 | End: 2023-08-29 | Stop reason: HOSPADM

## 2023-08-26 RX ORDER — FUROSEMIDE 10 MG/ML
40 INJECTION INTRAMUSCULAR; INTRAVENOUS ONCE
Status: COMPLETED | OUTPATIENT
Start: 2023-08-26 | End: 2023-08-26

## 2023-08-26 RX ORDER — METOPROLOL SUCCINATE 25 MG/1
25 TABLET, EXTENDED RELEASE ORAL DAILY
Status: DISCONTINUED | OUTPATIENT
Start: 2023-08-26 | End: 2023-08-29 | Stop reason: HOSPADM

## 2023-08-26 RX ORDER — CLONAZEPAM 0.5 MG/1
0.5 TABLET ORAL NIGHTLY PRN
Status: DISCONTINUED | OUTPATIENT
Start: 2023-08-26 | End: 2023-08-29 | Stop reason: HOSPADM

## 2023-08-26 RX ORDER — FUROSEMIDE 10 MG/ML
60 INJECTION INTRAMUSCULAR; INTRAVENOUS DAILY
Status: DISCONTINUED | OUTPATIENT
Start: 2023-08-27 | End: 2023-08-28

## 2023-08-26 RX ORDER — PANTOPRAZOLE SODIUM 40 MG/1
40 TABLET, DELAYED RELEASE ORAL 2 TIMES DAILY
Status: DISCONTINUED | OUTPATIENT
Start: 2023-08-26 | End: 2023-08-29 | Stop reason: HOSPADM

## 2023-08-26 RX ORDER — INSULIN LISPRO 100 [IU]/ML
3-5 INJECTION, SOLUTION INTRAVENOUS; SUBCUTANEOUS
Status: DISCONTINUED | OUTPATIENT
Start: 2023-08-26 | End: 2023-08-29 | Stop reason: HOSPADM

## 2023-08-26 RX ORDER — ALBUTEROL SULFATE 0.83 MG/ML
2.5 SOLUTION RESPIRATORY (INHALATION)
Status: DISCONTINUED | OUTPATIENT
Start: 2023-08-26 | End: 2023-08-29 | Stop reason: HOSPADM

## 2023-08-26 RX ORDER — MORPHINE SULFATE 2 MG/ML
0.5 INJECTION, SOLUTION INTRAMUSCULAR; INTRAVENOUS ONCE AS NEEDED
Status: COMPLETED | OUTPATIENT
Start: 2023-08-26 | End: 2023-08-26

## 2023-08-26 RX ORDER — DONEPEZIL HYDROCHLORIDE 10 MG/1
10 TABLET, FILM COATED ORAL NIGHTLY
Status: DISCONTINUED | OUTPATIENT
Start: 2023-08-26 | End: 2023-08-29 | Stop reason: HOSPADM

## 2023-08-26 RX ORDER — THIAMINE HCL 100 MG
100 TABLET ORAL DAILY
Status: DISCONTINUED | OUTPATIENT
Start: 2023-08-27 | End: 2023-08-29 | Stop reason: HOSPADM

## 2023-08-26 RX ORDER — FERROUS SULFATE 325(65) MG
325 TABLET ORAL
Status: DISCONTINUED | OUTPATIENT
Start: 2023-08-27 | End: 2023-08-29 | Stop reason: HOSPADM

## 2023-08-26 RX ORDER — LIDOCAINE 560 MG/1
1 PATCH PERCUTANEOUS; TOPICAL; TRANSDERMAL
Status: DISCONTINUED | OUTPATIENT
Start: 2023-08-26 | End: 2023-08-27

## 2023-08-26 RX ORDER — SERTRALINE HYDROCHLORIDE 50 MG/1
50 TABLET, FILM COATED ORAL DAILY
Status: DISCONTINUED | OUTPATIENT
Start: 2023-08-26 | End: 2023-08-29 | Stop reason: HOSPADM

## 2023-08-26 RX ORDER — FOLIC ACID 1 MG/1
1 TABLET ORAL DAILY
Status: DISCONTINUED | OUTPATIENT
Start: 2023-08-27 | End: 2023-08-29 | Stop reason: HOSPADM

## 2023-08-26 RX ORDER — LEVOTHYROXINE SODIUM 112 UG/1
112 TABLET ORAL
Status: DISCONTINUED | OUTPATIENT
Start: 2023-08-27 | End: 2023-08-29 | Stop reason: HOSPADM

## 2023-08-26 RX ORDER — SPIRONOLACTONE 25 MG/1
25 TABLET ORAL DAILY
Status: DISCONTINUED | OUTPATIENT
Start: 2023-08-27 | End: 2023-08-29 | Stop reason: HOSPADM

## 2023-08-26 RX ORDER — ACETAMINOPHEN 325 MG/1
650 TABLET ORAL EVERY 8 HOURS PRN
Status: DISCONTINUED | OUTPATIENT
Start: 2023-08-26 | End: 2023-08-29 | Stop reason: HOSPADM

## 2023-08-26 RX ADMIN — ALBUTEROL SULFATE 2.5 MG: 2.5 SOLUTION RESPIRATORY (INHALATION) at 20:38

## 2023-08-26 RX ADMIN — DONEPEZIL HYDROCHLORIDE 10 MG: 10 TABLET ORAL at 21:35

## 2023-08-26 RX ADMIN — CEFTRIAXONE SODIUM 1 G: 1 INJECTION, POWDER, FOR SOLUTION INTRAMUSCULAR; INTRAVENOUS at 15:02

## 2023-08-26 RX ADMIN — LIDOCAINE 1 PATCH: 4 PATCH TOPICAL at 21:35

## 2023-08-26 RX ADMIN — SERTRALINE HYDROCHLORIDE 50 MG: 50 TABLET ORAL at 17:57

## 2023-08-26 RX ADMIN — ATORVASTATIN CALCIUM 20 MG: 20 TABLET, FILM COATED ORAL at 17:57

## 2023-08-26 RX ADMIN — CLONAZEPAM 0.5 MG: 0.5 TABLET ORAL at 21:35

## 2023-08-26 RX ADMIN — RIVAROXABAN 20 MG: 20 TABLET, FILM COATED ORAL at 17:57

## 2023-08-26 RX ADMIN — METOPROLOL SUCCINATE 25 MG: 25 TABLET, FILM COATED, EXTENDED RELEASE ORAL at 17:57

## 2023-08-26 RX ADMIN — ACETAMINOPHEN 650 MG: 325 TABLET ORAL at 20:35

## 2023-08-26 RX ADMIN — MORPHINE SULFATE 0.5 MG: 2 INJECTION, SOLUTION INTRAMUSCULAR; INTRAVENOUS at 23:14

## 2023-08-26 RX ADMIN — PANTOPRAZOLE SODIUM 40 MG: 40 TABLET, DELAYED RELEASE ORAL at 20:36

## 2023-08-26 RX ADMIN — FUROSEMIDE 40 MG: 10 INJECTION, SOLUTION INTRAMUSCULAR; INTRAVENOUS at 14:01

## 2023-08-26 RX ADMIN — BUSPIRONE HYDROCHLORIDE 5 MG: 10 TABLET ORAL at 20:35

## 2023-08-26 RX ADMIN — DOXYCYCLINE 100 MG: 100 INJECTION, POWDER, LYOPHILIZED, FOR SOLUTION INTRAVENOUS at 17:55

## 2023-08-26 ASSESSMENT — COGNITIVE AND FUNCTIONAL STATUS - GENERAL
WALKING IN HOSPITAL ROOM: 3 - A LITTLE
CLIMB 3 TO 5 STEPS WITH RAILING: 2 - A LOT
CLIMB 3 TO 5 STEPS WITH RAILING: 3 - A LITTLE
WALKING IN HOSPITAL ROOM: 3 - A LITTLE
MOVING TO AND FROM BED TO CHAIR: 4 - NONE
STANDING UP FROM CHAIR USING ARMS: 3 - A LITTLE
MOVING TO AND FROM BED TO CHAIR: 3 - A LITTLE
STANDING UP FROM CHAIR USING ARMS: 4 - NONE

## 2023-08-26 ASSESSMENT — ENCOUNTER SYMPTOMS
FEVER: 0
RHINORRHEA: 1
HEADACHES: 0
ABDOMINAL PAIN: 0
SLEEP DISTURBANCE: 1
NAUSEA: 0
SHORTNESS OF BREATH: 0
COUGH: 1

## 2023-08-26 NOTE — ASSESSMENT & PLAN NOTE
Discovered to have an elevated troponin of 214. DDx include acute myocardial injury vs CHF vs other ischemic injury. EKG showed V-paced rhythm without acute changes    Cardiology on board  Echocardiogram pending

## 2023-08-26 NOTE — H&P
Hospital Medicine Service -  History & Physical        CHIEF COMPLAINT   Dyspnea on exertion, cough     HISTORY OF PRESENT ILLNESS      Cecil Morris is a 83 y.o. male w/PMHx significant for dementia, combined systolic and diastolic heart failure, coronary artery disease status post angioplasty, atrial fibrillation on Xarelto, abdominal aortic aneurysm, type 2 diabetes mellitus, hypertension, hyperlipidemia, anemia, hypothyroidism, sick sinus syndrome status post pacemaker placement, RBBB and pulmonary hypertension who presented to [facility] with shortness of breath.    He states that he was in his usual state of health until 1 to 2 weeks ago when he began experiencing shortness of breath without associated chest pain.  He states that when he walks short distances, he will start to feel dyspneic and also dizziness.  He denies lower extremity edema, orthopnea, abdominal pain, nausea, vomiting, headaches.  He states that he is compliant with all his medications and actually has a pill bottle at home.  He has a nursing aide that checks on him every day or every other day and today when the nursing aide came by, they discovered that he was dyspneic and advised that he go to the emergency department.    He was recently admitted to the hospital from August 11, 2023 to August 15, 2023 with pneumonia and was treated with IV ceftriaxone subsequently discharged on cefuroxime.      ED Course:  Vitals:  Afebrile, pulse 60s, BP 110s-120s/50s, SPO2 91% on room air placed on 2 L nasal cannula  Labs:  WBC 16, ANC 11, absolute lymphocyte 4, hemoglobin 10.3, hematocrit 35, MCV 92, platelet 315, , COVID, influenza and RSV negative, high-sensitivity troponin to 14 with repeat pending  Imaging:  Chest x-ray showed enlarged cardiopericardial silhouette  Interventions: IV Lasix 40 and Ceftriaxone      PAST MEDICAL AND SURGICAL HISTORY      Past Medical History:   Diagnosis Date    A-fib (CMS/[facility])     Acute on  chronic combined systolic and diastolic congestive heart failure (CMS/[facility])     Anxiety 4/11/2023    CHF (congestive heart failure), NYHA class I, acute, systolic (CMS/[facility])     Depression     Hyperthyroidism     Hypoxia     Near syncope     NSVT (nonsustained ventricular tachycardia) (CMS/[facility])     Orthostatic hypotension     Presence of cardiac pacemaker     Pulmonary embolism (CMS/[facility])     Sepsis (CMS/[facility]) 05/16/2022       Past Surgical History:   Procedure Laterality Date    CARDIAC CATHETERIZATION  11/19/2020    CATH PLACE/CORON ANGIO, IMG SUPER/INTERP,R&L HRT CATH, L HRT VENTRIC    CARDIAC CATHETERIZATION Bilateral 2005    CORONARY ARTERY BYPASS GRAFT  2005       MEDICATIONS      Prior to Admission medications    Medication Sig Start Date End Date Taking? Authorizing Provider   acetaminophen (TYLENOL) 325 mg tablet Take 650 mg by mouth as needed.    Provider, Arturo Hawkins MD   albuterol 2.5 mg /3 mL (0.083 %) nebulizer solution TAKE 3 ML (2.5 MG TOTAL) BY NEBULIZATION 4 (FOUR) TIMES A DAY. 8/11/23   Karina Bernstein PA C   albuterol HFA 90 mcg/actuation inhaler Inhale 2 puffs every 6 (six) hours as needed for wheezing. 8/25/23 9/24/23  Melanie Chambers DO   atorvastatin (LIPITOR) 20 mg tablet Take 1 tablet (20 mg total) by mouth daily. Pill pack 5/5/23 11/1/23  Melanie Chambers DO   busPIRone (BUSPAR) 5 mg tablet Take 1 tablet (5 mg total) by mouth 2 (two) times a day. Pill pack 5/5/23 11/1/23  Melanie Chambers DO   clonazePAM (klonoPIN) 0.5 mg tablet Take 1 tablet (0.5 mg total) by mouth nightly as needed for anxiety. 8/19/23 9/18/23  Melanie Chambers DO   diclofenac sodium (VOLTAREN) 1 % topical gel Apply 1 g topically as needed.    Provider, Arturo Hawkins MD   donepeziL (ARICEPT) 10 mg tablet Take 1 tablet (10 mg total) by mouth nightly. 3/26/23 9/22/23  Morgan Barrientos MD   empagliflozin (JARDIANCE) 25 mg tablet Take 1 tablet (25 mg total) by mouth daily. 7/17/23   Yousuf Wolf MD    ferrous sulfate 325 mg (65 mg iron) tablet Take 1 tablet (325 mg total) by mouth daily with breakfast. 5/5/23 11/1/23  Melanie Chambers DO   fluticasone-umeclidinium-vilanterol (TRELEGY ELLIPTA) 100-62.5-25 mcg blister with device powder for inhalation Inhale 1 puff daily. 5/5/23 8/21/23  Melanie Chambers DO   folic acid (FOLVITE) 1 mg tablet Take 1 tablet (1 mg total) by mouth daily. 5/5/23 11/1/23  Melanie Chambers DO   furosemide (LASIX) 20 mg tablet  6/17/23   Provider, Arturo Hawkins MD   levothyroxine (SYNTHROID) 112 mcg tablet Take 1 tablet (112 mcg total) by mouth daily. Pill pack 5/5/23 11/1/23  Melanie Chambers DO   lidocaine (ASPERCREME) 4 % adhesive patch,medicated topical patch Apply 1 patch topically daily. 8/15/23 9/14/23  Stew Gold DO   loratadine (CLARITIN) 10 mg tablet Take 10 mg by mouth daily.    Provider, Arturo Hawkins MD   magnesium oxide (MAG-OX) 400 mg (241.3 mg magnesium) tablet Take 1 tablet (400 mg total) by mouth 2 (two) times a day. 6/27/23 6/26/24  Yousuf Wolf MD   medical marijuana Take 1 each by mouth as needed.    Provider, Arturo Hawkins MD   melatonin 5 mg capsule Take 5 mg by mouth nightly. Hs prn    ProviderArturo MD   metoprolol succinate XL (TOPROL-XL) 25 mg 24 hr tablet TAKE 1/2 OF A TABLET BY MOUTH TWICE DAILY 5/5/23 5/5/24  Yousuf Wolf MD   multivitamin tablet Take 1 tablet by mouth daily.    Provider, Arturo Hawkins MD   nebulizers misc 1 each 4 (four) times a day as needed (sob or wheeze). 1/6/23   Karina Bernstein PA C   pantoprazole (PROTONIX) 40 mg EC tablet Take 1 tablet (40 mg total) by mouth 2 (two) times a day. Pill pack 8/17/23 2/13/24  Melanie Chambers DO   rivaroxaban (XARELTO) 20 mg tablet Take 1 tablet (20 mg total) by mouth daily with dinner. 5/5/23 11/1/23  Yousuf Wolf MD   sertraline (ZOLOFT) 50 mg tablet Take 1 tablet (50 mg total) by mouth daily. Pill pack 8/17/23 2/13/24  Melanie Chambers DO   spironolactone (ALDACTONE) 25 mg  tablet Take 1 tablet (25 mg total) by mouth daily. 6/27/23 12/24/23  Yousuf Wolf MD   thiamine 100 mg tablet Take 1 tablet (100 mg total) by mouth daily. 5/5/23 11/1/23  Melanie Chambers DO       ALLERGIES      Patient has no known allergies.    FAMILY HISTORY      Family History   Problem Relation Age of Onset    Thyroid cancer Biological Mother     ALS Biological Mother     Parkinsonism Biological Mother     Heart attack Biological Father     Liver cancer Maternal Grandmother        SOCIAL HISTORY      Social History     Socioeconomic History    Marital status:      Spouse name: None    Number of children: 2    Years of education: None    Highest education level: None   Occupational History    Occupation: retired     Comment: [employer] in [place]   Tobacco Use    Smoking status: Former     Years: 40.00     Types: Cigarettes     Quit date: 11/1/2012     Years since quitting: 10.8    Smokeless tobacco: Never   Vaping Use    Vaping Use: Never used   Substance and Sexual Activity    Alcohol use: Not Currently     Alcohol/week: 9.0 standard drinks of alcohol     Types: 9 Shots of liquor per week     Comment: vodka stopped late in winter    Drug use: Never    Sexual activity: Not Currently   Social History Narrative    Lives alone, and has help for meal prep and med management     Social Determinants of Health     Financial Resource Strain: Low Risk  (8/13/2023)    Overall Financial Resource Strain (CARDIA)     Difficulty of Paying Living Expenses: Not hard at all   Food Insecurity: No Food Insecurity (8/18/2023)    Hunger Vital Sign     Worried About Running Out of Food in the Last Year: Never true     Ran Out of Food in the Last Year: Never true   Transportation Needs: No Transportation Needs (8/13/2023)    PRAPARE - Transportation     Lack of Transportation (Medical): No     Lack of Transportation (Non-Medical): No   Social Connections: Socially Isolated (6/26/2023)    Social Connection and  Isolation Panel [NHANES]     Frequency of Communication with Friends and Family: More than three times a week     Frequency of Social Gatherings with Friends and Family: Once a week     Attends Confucianist Services: Never     Active Member of Clubs or Organizations: No     Attends Club or Organization Meetings: Patient refused     Marital Status:    Housing Stability: Low Risk  (8/13/2023)    Housing Stability Vital Sign     Unable to Pay for Housing in the Last Year: No     Number of Places Lived in the Last Year: 1     Unstable Housing in the Last Year: No       REVIEW OF SYSTEMS      All other systems reviewed and negative except as noted in HPI    PHYSICAL EXAMINATION        General: Alert, comfortable, well appearing overall.  Neck: Supple.  Cardio: Regular rate, (+)S1/S2, no murmurs. Good, equal radial pulse bilat.  Pulm: Good chest expansion. Minimal bibasilar crackles on auscultation posteriorly.   GI: (+)BS. Abd soft, NT, ND.  Neuro: AAOx3.   Skin: Normal appearing. No lesions/rashes.  Lymphatics: No LE edema bilat.  Psych: Normal mood and affect.    LABS / IMAGING / EKG          - Old/Prior available records reviewed.  - Labs personally reviewed.  - EKG and imaging personally reviewed.    ASSESSMENT AND PLAN           * Dyspnea on exertion  Assessment & Plan  Most likely secondary to CHF exacerbation given his elevated BNP in the 400s and CXR showing enlarged cardiopericardial silhouette. EKG showed V-paced rhythm. Doubt he has pneumophonia but has received IV CTX and doxycycline in the ED. He is s/p IV Lasix 40mg    Plan:   -Continue IV Lasix 60 mg daily  -Daily weights with strict intake and output  -Follow up Echocardiogram    Elevated troponin  Assessment & Plan  Discovered to have an elevated troponin of 214. DDx include acute myocardial injury vs CHF vs other ischemic injury. EKG showed V-paced rhythm without acute changes    Plan:  -Follow-up repeat troponin  -Will consult cardiology  (Follows with Dr. Wolf)    Type 2 diabetes mellitus without complication, without long-term current use of insulin (CMS/[facility])  Assessment & Plan  A1C was 8.1% about a month ago    Plan:  -Sliding scale insulin  -TID POC glucose check with meals    A-fib (CMS/[facility])  Assessment & Plan  Remains in SR. Continue Metoprolol and Xarelto     Acquired hypothyroidism  Assessment & Plan  Continue Synthroid 112mcg     Hypercholesteremia  Assessment & Plan  Continue Lipitor    Essential hypertension  Assessment & Plan  Continue Metoprolol and resume Spironolactone in the morning     Depression  Assessment & Plan  Continue Buspar, Sertraline     Coronary artery disease involving native coronary artery  Assessment & Plan  Continue Lipitor and Metoprolol     Anemia  Assessment & Plan  Normocytic anemia with baseline Hgb of ~11. His ferritin 1 month ago was 47. He likely has a component of DONALD in addition to anemia of chronic inflammation.     Plan:  -Continue home multivitamin, iron      VTE ppx  - Xarelto     VTE Assessment: [place] VTE Score: 4  VTE Prophylaxis Plan: See noted above.   Code Status: Full Code  Estimated Discharge Date: 8/29/2023  Disposition Planning: 3 days    Total time spent providing patient care is 75 min (prep work, reviewing old records, reviewing new/recent results, obtaining history, performing exam, discussing results/care with patient and family, providing counseling/education, ordering tests/labs/medications, discussing w/other providers, coordinating care, documenting).     Hansel Adhikari MD  8/26/2023

## 2023-08-26 NOTE — ASSESSMENT & PLAN NOTE
A1C was 8.1% about a month ago    Plan:  -Sliding scale insulin  -TID POC glucose check with meals

## 2023-08-26 NOTE — ED ATTESTATION NOTE
I have personally seen and examined the patient.  I have performed the key components of the encounter and provided a substantive portion of the care and medical decision making for the patient.     I reviewed and agree with resident / physician assistant / nurse practitioners assessment and plan of care, with any exceptions as documented below.     My focused history, examination, assessment, and plan of care of  Cecil Morris is as follows:       Vital Signs Review: Vital signs have been reviewed. The oxygen saturation is  SpO2: (!) 91 % which is  hypoxic.    The patient is an 83-year-old male with past medical history of depression, orthostatic hypotension, atrial fibrillation, sepsis, pacemaker, CHF, PE, hypoxia, who presented to the emergency department for evaluation of cough.  Patient reports that he had recent discharge from hospital approximately 1 week ago.  The patient was recently hospitalized for pneumonia.  The patient has been using nasal cannula oxygen at home but cannot tell me the amount of oxygen he is using.  The patient denies fever, chills, chest pain, abdominal pain, lower extremity pain, edema, or rash.  Patient has had a nonproductive cough has been persistent.  The patient's home health aide was concerned the patient was not using oxygen properly at home.      Physical exam: Vital signs reviewed.  General: Patient appears comfortable  HEENT: NCAT, EOMI, MMM.  Neck: Supple, nontender, no JVD.  Chest: Lungs sitting up in bed.  With occasional mild rhonchi at bases.  No increased work of breathing.  Mild decreased air entry bilaterally.  No wheezing.  Abdomen: Soft, nontender, nondistended, no guarding, no rebound.  Obese.  Extremities: No cyanosis, clubbing, edema, or calf tenderness.  Skin: Warm and dry, no rash.  Neuro: GCS 15.  Affect: Normal.    Plan: Check labs, chest x-ray.    Labs Reviewed   CBC AND DIFF - Abnormal       Result Value    WBC 16.66 (*)     RBC 3.77 (*)      Hemoglobin 10.3 (*)     Hematocrit 35.0 (*)     MCV 92.8      MCH 27.3 (*)     MCHC 29.4 (*)     RDW 14.3      Platelets 315      MPV 8.6 (*)     Differential Type Auto      nRBC 0.0      Immature Granulocytes 0.5      Neutrophils 68.5      Lymphocytes 24.2      Monocytes 5.9      Eosinophils 0.7      Basophils 0.2      Immature Granulocytes, Absolute 0.08      Neutrophils, Absolute 11.41 (*)     Lymphocytes, Absolute 4.03 (*)     Monocytes, Absolute 0.99      Eosinophils, Absolute 0.11      Basophils, Absolute 0.04     COMPREHENSIVE METABOLIC PANEL - Abnormal    Sodium 138      Potassium 4.6      Chloride 102      CO2 29      BUN 16      Creatinine 1.1      Glucose 153 (*)     Calcium 9.1      AST (SGOT) 18      ALT (SGPT) 16      Alkaline Phosphatase 88      Total Protein 7.1      Albumin 3.8      Bilirubin, Total 0.8      eGFR >60.0      Anion Gap 7     B-TYPE NATRIURETIC PEPTIDE - Abnormal     (*)    SARS-COV-2 (COVID-19)/ FLU A/B, AND RSV, PCR - Normal    SARS-CoV-2 (COVID-19) Negative      Influenza A Negative      Influenza B Negative      Respiratory Syncytial Virus Negative      Narrative:     Testing performed using real-time PCR for detection of COVID-19. EUA approved validation studies performed on site.    SARS-COV-2 (COVID 19), PCR    Narrative:     The following orders were created for panel order SARS-CoV-2 (COVID-19), PCR Nasopharynx.  Procedure                               Abnormality         Status                     ---------                               -----------         ------                     SARS-COV-2 (COVID-19)/ F...[[id number]]  Normal              Final result                 Please view results for these tests on the individual orders.   RAINBOW DRAW PANEL    Narrative:     The following orders were created for panel order [place] Draw Panel.  Procedure                               Abnormality         Status                     ---------                                -----------         ------                     RAINBOW RED[[id number]]                                      In process                 RAINBOW LAVENDER[[id number]]                                 In process                 RAINBOW LT BLUE[[id number]]                                  In process                 RAINBOW GOLD[[id number]]                                     In process                   Please view results for these tests on the individual orders.   EXTRA TUBES    Narrative:     The following orders were created for panel order Extra Tubes.  Procedure                               Abnormality         Status                     ---------                               -----------         ------                     Extra Tube Lt Green[[id number]]                              In process                   Please view results for these tests on the individual orders.   HIGH SENSITIVE TROPONIN I (BASELINE - REFLEX 2HR)   RAINBOW RED   RAINBOW LAVENDER   RAINBOW LT BLUE   RAINBOW GOLD   EXTUB LT GREEN     X-RAY CHEST 2 VIEWS   Final Result   IMPRESSION: See comment.               MDM: Patient test results reviewed.  No significant change in chest x-ray.  Leukocytosis present.  Plan IV antibiotics.  Patient was hypoxic on arrival.  There is concern the patient is noncompliant with medications at home.  Patient does have elevated troponin.  Plan hospitalize patient.  Patient's elevated troponin likely related to hypoxia.      Impression: Acute cough.  No acute pneumonia.  Elevated troponin rule out non-STEMI.  Acute hypoxia.      I was physically present for the key/critical portions of the following procedures: None    This document was created using Dragon dictation software.  There may be some typographical errors due to this technology.     Markus Plascencia MD  08/26/23 [id number]

## 2023-08-26 NOTE — ASSESSMENT & PLAN NOTE
Most likely secondary to CHF exacerbation given his elevated BNP in the 400s and CXR showing enlarged cardiopericardial silhouette. EKG showed V-paced rhythm. Doubt he has pneumophonia but has received IV CTX and doxycycline in the ED. He is s/p IV Lasix 40mg    Plan:   -Continue IV Lasix 60 mg daily  -Daily weights with strict intake and output  -Follow up Echocardiogram- pending

## 2023-08-26 NOTE — ASSESSMENT & PLAN NOTE
Normocytic anemia with baseline Hgb of ~11. His ferritin 1 month ago was 47. He likely has a component of DONALD in addition to anemia of chronic inflammation.     Plan:  -Continue home multivitamin, iron

## 2023-08-26 NOTE — ED PROVIDER NOTES
Emergency Medicine Note  HPI   HISTORY OF PRESENT ILLNESS     83 year old male with past medical history of dementia, CHF, CAD s/p angioplasty, A-fib on Xarelto, AAA, Type 2 DM, HTN, HLD, anemia and hyperthyroidism presents to ED for a dry cough onset a few days ago. Patient further reports some difficulty breathing and rinorrhea. He notes walking does not worsen symptoms. He denies chest pain, abdominal pain, headache, fever, sore throat, nausea and leg swelling. Patient was recently discharged from a visit for SOB with 2L of O2. He notes these symptoms feel similar to his recent episode of pneumonia. No known sick contacts.       History provided by:  Patient   used: No          Patient History   PAST HISTORY     Reviewed from Nursing Triage:       Past Medical History:   Diagnosis Date    A-fib (CMS/[facility])     Acute on chronic combined systolic and diastolic congestive heart failure (CMS/[facility])     Anxiety 4/11/2023    CHF (congestive heart failure), NYHA class I, acute, systolic (CMS/[facility])     Depression     Hyperthyroidism     Hypoxia     Near syncope     NSVT (nonsustained ventricular tachycardia) (CMS/[facility])     Orthostatic hypotension     Presence of cardiac pacemaker     Pulmonary embolism (CMS/[facility])     Sepsis (CMS/HCC) 05/16/2022       Past Surgical History:   Procedure Laterality Date    CARDIAC CATHETERIZATION  11/19/2020    CATH PLACE/CORON ANGIO, IMG SUPER/INTERP,R&L HRT CATH, L HRT VENTRIC    CARDIAC CATHETERIZATION Bilateral 2005    CORONARY ARTERY BYPASS GRAFT  2005       Family History   Problem Relation Age of Onset    Thyroid cancer Biological Mother     ALS Biological Mother     Parkinsonism Biological Mother     Heart attack Biological Father     Liver cancer Maternal Grandmother        Social History     Tobacco Use    Smoking status: Former     Years: 40.00     Types: Cigarettes     Quit date: 11/1/2012     Years since quitting: 10.8    Smokeless tobacco:  Never   Vaping Use    Vaping Use: Never used   Substance Use Topics    Alcohol use: Not Currently     Alcohol/week: 9.0 standard drinks of alcohol     Types: 9 Shots of liquor per week     Comment: vodka stopped late in winter    Drug use: Never         Review of Systems   REVIEW OF SYSTEMS     Review of Systems   Constitutional: Negative for fever.   HENT: Positive for rhinorrhea.    Respiratory: Positive for cough. Negative for shortness of breath.    Cardiovascular: Negative for chest pain and leg swelling.   Gastrointestinal: Negative for abdominal pain and nausea.   Neurological: Negative for headaches.   Psychiatric/Behavioral: Positive for sleep disturbance.         VITALS     ED Vitals    Date/Time Temp Pulse Resp BP SpO2 [facility]   08/26/23 1403 -- 64 20 120/58 97 % PB   08/26/23 1130 -- -- -- -- 97 % MG   08/26/23 1126 -- 65 18 116/59 91 % MG        Pulse Ox %: 97 % (08/26/23 1205)  Pulse Ox Interpretation: Normal (08/26/23 1205)           Physical Exam   PHYSICAL EXAM     Physical Exam  Vitals and nursing note reviewed.   Constitutional:       General: He is not in acute distress.     Appearance: Normal appearance.   HENT:      Head: Atraumatic.   Eyes:      Conjunctiva/sclera: Conjunctivae normal.   Cardiovascular:      Rate and Rhythm: Normal rate and regular rhythm.   Pulmonary:      Effort: Pulmonary effort is normal.      Breath sounds: Rales present.   Abdominal:      General: Bowel sounds are normal.      Palpations: Abdomen is soft.      Tenderness: There is no abdominal tenderness.   Musculoskeletal:      Cervical back: Normal range of motion.      Right lower leg: No edema.      Left lower leg: No edema.   Skin:     General: Skin is warm and dry.      Capillary Refill: Capillary refill takes less than 2 seconds.   Neurological:      Mental Status: He is alert and oriented to person, place, and time. Mental status is at baseline.           PROCEDURES     Procedures     DATA     Results      Procedure Component Value Units Date/Time    HS Troponin (with 2 hour reflex) [[id number]]  (Abnormal) Collected: 08/26/23 1351    Specimen: Blood, Venous Updated: 08/26/23 1502     High Sens Troponin I 214.9 pg/mL     SARS-CoV-2 (COVID-19), PCR Nasopharynx [[id number]]  (Normal) Collected: 08/26/23 1218    Specimen: Nasopharyngeal Swab from Nasopharynx Updated: 08/26/23 1314    Narrative:      The following orders were created for panel order SARS-CoV-2 (COVID-19), PCR Nasopharynx.  Procedure                               Abnormality         Status                     ---------                               -----------         ------                     SARS-COV-2 (COVID-19)/ F...[[id number]]  Normal              Final result                 Please view results for these tests on the individual orders.    SARS-COV-2 (COVID-19)/ FLU A/B, AND RSV, PCR Nasopharynx [[id number]]  (Normal) Collected: 08/26/23 1218    Specimen: Nasopharyngeal Swab from Nasopharynx Updated: 08/26/23 1314     SARS-CoV-2 (COVID-19) Negative     Influenza A Negative     Influenza B Negative     Respiratory Syncytial Virus Negative    Narrative:      Testing performed using real-time PCR for detection of COVID-19. EUA approved validation studies performed on site.     B-type natriuretic peptide [[id number]]  (Abnormal) Collected: 08/26/23 1132    Specimen: Blood, Venous Updated: 08/26/23 1309      pg/mL     Comprehensive metabolic panel [[id number]]  (Abnormal) Collected: 08/26/23 1132    Specimen: Blood, Venous Updated: 08/26/23 1228     Sodium 138 mEQ/L      Potassium 4.6 mEQ/L      Comment: Results obtained on plasma. Plasma Potassium values may be up to 0.4 mEQ/L less than serum values. The differences may be greater for patients with high platelet or white cell counts.        Chloride 102 mEQ/L      CO2 29 mEQ/L      BUN 16 mg/dL      Creatinine 1.1 mg/dL      Glucose 153 mg/dL      Calcium 9.1 mg/dL      AST (SGOT) 18 IU/L      ALT  (SGPT) 16 IU/L      Alkaline Phosphatase 88 IU/L      Total Protein 7.1 g/dL      Comment: Test performed on plasma which typically contains approximately 0.4 g/dL more protein than serum.        Albumin 3.8 g/dL      Bilirubin, Total 0.8 mg/dL      eGFR >60.0 mL/min/1.73m*2      Anion Gap 7 mEQ/L     CBC and differential [[id number]]  (Abnormal) Collected: 08/26/23 1132    Specimen: Blood, Venous Updated: 08/26/23 1207     WBC 16.66 K/uL      RBC 3.77 M/uL      Hemoglobin 10.3 g/dL      Hematocrit 35.0 %      MCV 92.8 fL      MCH 27.3 pg      MCHC 29.4 g/dL      RDW 14.3 %      Platelets 315 K/uL      MPV 8.6 fL      Differential Type Auto     nRBC 0.0 %      Immature Granulocytes 0.5 %      Neutrophils 68.5 %      Lymphocytes 24.2 %      Monocytes 5.9 %      Eosinophils 0.7 %      Basophils 0.2 %      Immature Granulocytes, Absolute 0.08 K/uL      Neutrophils, Absolute 11.41 K/uL      Lymphocytes, Absolute 4.03 K/uL      Monocytes, Absolute 0.99 K/uL      Eosinophils, Absolute 0.11 K/uL      Basophils, Absolute 0.04 K/uL     RAINBOW LAVENDER [[id number]] Collected: 08/26/23 1132    Specimen: Blood, Venous Updated: 08/26/23 1136    RAINBOW RED [[id number]] Collected: 08/26/23 1132    Specimen: Blood, Venous Updated: 08/26/23 1136    Extra Tubes [[id number]] Collected: 08/26/23 1132    Specimen: Blood, Venous Updated: 08/26/23 1135    Narrative:      The following orders were created for panel order Extra Tubes.  Procedure                               Abnormality         Status                     ---------                               -----------         ------                     Extra Tube Lt Green[[id number]]                              In process                   Please view results for these tests on the individual orders.    Extra Tube Lt Green [[id number]] Collected: 08/26/23 1132    Specimen: Blood, Venous Updated: 08/26/23 1135    RAINBOW GOLD [[id number]] Collected: 08/26/23 1132    Specimen: Blood, Venous  Updated: 08/26/23 1135    Rudd Draw Panel [[id number]] Collected: 08/26/23 1132    Specimen: Blood, Venous Updated: 08/26/23 1135    Narrative:      The following orders were created for panel order Rudd Draw Panel.  Procedure                               Abnormality         Status                     ---------                               -----------         ------                     RAINBOW RED[[id number]]                                      In process                 RAINBOW LAVENDER[[id number]]                                 In process                 RAINBOW LT BLUE[[id number]]                                  In process                 RAINBOW GOLD[[id number]]                                     In process                   Please view results for these tests on the individual orders.    RAINBOW LT BLUE [[id number]] Collected: 08/26/23 1132    Specimen: Blood, Venous Updated: 08/26/23 1135          Imaging Results          X-RAY CHEST 2 VIEWS (Final result)  Result time 08/26/23 12:13:55    Final result                 Impression:    IMPRESSION: See comment.               Narrative:    CLINICAL HISTORY: cough    COMPARISON:Chest x-ray 8/18/2023    COMMENT: 2 views the chest are obtained.    Left chest wall CIED. Median sternotomy wires in place. Prosthetic cardiac valve  noted. Stable patchy bilateral airspace opacities. The cardiopericardial  silhouette is enlarged, stable. There are degenerative changes in the spine.                                  No orders to display       Scoring tools                                  ED Course & MDM   MDM / ED COURSE / CLINICAL IMPRESSION / DISPO     MDM    ED Course as of 08/26/23 1541   Sat Aug 26, 2023   1204 Impression: cough and sob x couple days with rhinorrhea    On 2L NC daily     Plan: labs, cxr, covid, reeval  Discussed pt and plan with attending who agrees  [DB]   1208 WBC(!): 16.66  Will cover for pna [DB]   1208 Hemoglobin(!): 10.3  Down from 11  [DB]    1215 X-ray(s) visualized by me, my independent interpretation: increase in pulmonary edema  [DB]   1220 X-RAY CHEST 2 VIEWS  Stable patchy bilateral airspace opacities [DB]   1309 BNP(!): 420  Increased  [DB]   1400 Talked with patient's daughter who just left to go back home to [place] 2 days ago.  Patient gets home from the hospital and gets extremely anxious and used to abuse substances but no longer has access to them.  He is not using his nebulizer oxygen properly.  He is a caretaker daily for 2 to 3 hours.  She is concerned for him at home and does not feel that he is safe at home by himself.  They are thinking about moving him to an assisted living but have not cross that bridge yet.  Patient's cardiologist is Dr. Wolf [DB]   1415 Dr. Wolf's note from June reports EF Of 42%  [DB]   1445 Lab called with a preliminary elevated troponin.  Waiting on the official result.  This could be because patient is not wearing his oxygen at home and is probably hypoxic at home [DB]   1446 Read through the nurses note from home care today and patient is missing some of his medications including his inhalers.  It also looks like furosemide is on his list but she cannot find the bottle and it is not in his medication packs.  Believe patient is not taking good care of himself at home [DB]   1503 High Sens Troponin I(!!): 214.9 [DB]   1527 Case was discussed with hospitalist.  Reviewed patient's presentation, ED course, and relevant data.  Hospitalist accepts patient on their service and will see / admit pt.    [DB]      ED Course User Index  [DB] Tyra De La Cruz PA C     Clinical Impression      Cough, unspecified type   Congestive heart failure, unspecified HF chronicity, unspecified heart failure type (CMS/HCC)   Elevated troponin     _________________     ED Disposition   Admit / Observation                By signing my name below, I, Marcio Redding, attest that this documentation has been prepared under the  direction and in the presence of Tyra De La Cruz PA-C.    I, Tyra De La Cruz PA-C, personally performed the services described in this documentation, as documented by the scribe in my presence, and it is both accurate and complete.     Marcio Redding  08/26/23 1224       Tyra De La Cruz PA C  08/26/23 [id number]

## 2023-08-27 LAB
ANION GAP SERPL CALC-SCNC: 8 MEQ/L (ref 3–15)
BASOPHILS # BLD: 0.05 K/UL (ref 0.01–0.1)
BASOPHILS NFR BLD: 0.4 %
BUN SERPL-MCNC: 19 MG/DL (ref 7–25)
CALCIUM SERPL-MCNC: 8.8 MG/DL (ref 8.6–10.3)
CHLORIDE SERPL-SCNC: 100 MEQ/L (ref 98–107)
CO2 SERPL-SCNC: 30 MEQ/L (ref 21–31)
CREAT SERPL-MCNC: 1.1 MG/DL (ref 0.7–1.3)
DIFFERENTIAL METHOD BLD: ABNORMAL
EOSINOPHIL # BLD: 0.26 K/UL (ref 0.04–0.54)
EOSINOPHIL NFR BLD: 1.9 %
ERYTHROCYTE [DISTWIDTH] IN BLOOD BY AUTOMATED COUNT: 14.3 % (ref 11.6–14.4)
GFR SERPL CREATININE-BSD FRML MDRD: >60 ML/MIN/1.73M*2
GLUCOSE BLD-MCNC: 148 MG/DL (ref 70–99)
GLUCOSE BLD-MCNC: 238 MG/DL (ref 70–99)
GLUCOSE BLD-MCNC: 257 MG/DL (ref 70–99)
GLUCOSE SERPL-MCNC: 147 MG/DL (ref 70–99)
HCT VFR BLDCO AUTO: 34.4 % (ref 40.1–51)
HGB BLD-MCNC: 10.4 G/DL (ref 13.7–17.5)
IMM GRANULOCYTES # BLD AUTO: 0.08 K/UL (ref 0–0.08)
IMM GRANULOCYTES NFR BLD AUTO: 0.6 %
LYMPHOCYTES # BLD: 4.58 K/UL (ref 1.2–3.5)
LYMPHOCYTES NFR BLD: 33.1 %
MAGNESIUM SERPL-MCNC: 2 MG/DL (ref 1.8–2.5)
MCH RBC QN AUTO: 27.7 PG (ref 28–33.2)
MCHC RBC AUTO-ENTMCNC: 30.2 G/DL (ref 32.2–36.5)
MCV RBC AUTO: 91.5 FL (ref 83–98)
MONOCYTES # BLD: 1.21 K/UL (ref 0.3–1)
MONOCYTES NFR BLD: 8.7 %
NEUTROPHILS # BLD: 7.65 K/UL (ref 1.7–7)
NEUTS SEG NFR BLD: 55.3 %
NRBC BLD-RTO: 0 %
PDW BLD AUTO: 8.9 FL (ref 9.4–12.4)
PLATELET # BLD AUTO: 300 K/UL (ref 150–350)
POCT TEST: ABNORMAL
POTASSIUM SERPL-SCNC: 3.9 MEQ/L (ref 3.5–5.1)
RBC # BLD AUTO: 3.76 M/UL (ref 4.5–5.8)
SODIUM SERPL-SCNC: 138 MEQ/L (ref 136–145)
WBC # BLD AUTO: 13.83 K/UL (ref 3.8–10.5)

## 2023-08-27 PROCEDURE — 25800000 HC PHARMACY IV SOLUTIONS: Performed by: INTERNAL MEDICINE

## 2023-08-27 PROCEDURE — 99233 SBSQ HOSP IP/OBS HIGH 50: CPT | Performed by: STUDENT IN AN ORGANIZED HEALTH CARE EDUCATION/TRAINING PROGRAM

## 2023-08-27 PROCEDURE — 63600000 HC DRUGS/DETAIL CODE: Mod: JW | Performed by: INTERNAL MEDICINE

## 2023-08-27 PROCEDURE — 83735 ASSAY OF MAGNESIUM: CPT | Performed by: STUDENT IN AN ORGANIZED HEALTH CARE EDUCATION/TRAINING PROGRAM

## 2023-08-27 PROCEDURE — 21400000 HC ROOM AND CARE CCU/INTERMEDIATE

## 2023-08-27 PROCEDURE — 36415 COLL VENOUS BLD VENIPUNCTURE: CPT | Performed by: INTERNAL MEDICINE

## 2023-08-27 PROCEDURE — 99222 1ST HOSP IP/OBS MODERATE 55: CPT | Performed by: STUDENT IN AN ORGANIZED HEALTH CARE EDUCATION/TRAINING PROGRAM

## 2023-08-27 PROCEDURE — 85025 COMPLETE CBC W/AUTO DIFF WBC: CPT | Performed by: INTERNAL MEDICINE

## 2023-08-27 PROCEDURE — 63700000 HC SELF-ADMINISTRABLE DRUG: Performed by: STUDENT IN AN ORGANIZED HEALTH CARE EDUCATION/TRAINING PROGRAM

## 2023-08-27 PROCEDURE — 63700000 HC SELF-ADMINISTRABLE DRUG: Performed by: INTERNAL MEDICINE

## 2023-08-27 PROCEDURE — 25000000 HC PHARMACY GENERAL: Performed by: INTERNAL MEDICINE

## 2023-08-27 PROCEDURE — 80048 BASIC METABOLIC PNL TOTAL CA: CPT | Performed by: INTERNAL MEDICINE

## 2023-08-27 RX ORDER — GUAIFENESIN 100 MG/5ML
200 SOLUTION ORAL EVERY 4 HOURS PRN
Status: DISCONTINUED | OUTPATIENT
Start: 2023-08-27 | End: 2023-08-29 | Stop reason: HOSPADM

## 2023-08-27 RX ORDER — LIDOCAINE 560 MG/1
1 PATCH PERCUTANEOUS; TOPICAL; TRANSDERMAL
Status: DISCONTINUED | OUTPATIENT
Start: 2023-08-27 | End: 2023-08-29 | Stop reason: HOSPADM

## 2023-08-27 RX ORDER — TRAMADOL HYDROCHLORIDE 50 MG/1
50 TABLET ORAL EVERY 6 HOURS PRN
Status: DISCONTINUED | OUTPATIENT
Start: 2023-08-27 | End: 2023-08-29 | Stop reason: HOSPADM

## 2023-08-27 RX ADMIN — PANTOPRAZOLE SODIUM 40 MG: 40 TABLET, DELAYED RELEASE ORAL at 20:50

## 2023-08-27 RX ADMIN — ALBUTEROL SULFATE 2.5 MG: 2.5 SOLUTION RESPIRATORY (INHALATION) at 10:19

## 2023-08-27 RX ADMIN — ACETAMINOPHEN 650 MG: 325 TABLET ORAL at 08:34

## 2023-08-27 RX ADMIN — TRAMADOL HYDROCHLORIDE 50 MG: 50 TABLET, COATED ORAL at 20:50

## 2023-08-27 RX ADMIN — FUROSEMIDE 60 MG: 10 INJECTION, SOLUTION INTRAMUSCULAR; INTRAVENOUS at 08:39

## 2023-08-27 RX ADMIN — FERROUS SULFATE TAB 325 MG (65 MG ELEMENTAL FE) 325 MG: 325 (65 FE) TAB at 08:35

## 2023-08-27 RX ADMIN — METOPROLOL SUCCINATE 25 MG: 25 TABLET, FILM COATED, EXTENDED RELEASE ORAL at 08:34

## 2023-08-27 RX ADMIN — FOLIC ACID 1 MG: 1 TABLET ORAL at 08:34

## 2023-08-27 RX ADMIN — ACETAMINOPHEN 650 MG: 325 TABLET ORAL at 15:50

## 2023-08-27 RX ADMIN — DOXYCYCLINE 100 MG: 100 INJECTION, POWDER, LYOPHILIZED, FOR SOLUTION INTRAVENOUS at 15:51

## 2023-08-27 RX ADMIN — ALBUTEROL SULFATE 2.5 MG: 2.5 SOLUTION RESPIRATORY (INHALATION) at 13:01

## 2023-08-27 RX ADMIN — DONEPEZIL HYDROCHLORIDE 10 MG: 10 TABLET ORAL at 20:49

## 2023-08-27 RX ADMIN — THIAMINE HCL TAB 100 MG 100 MG: 100 TAB at 08:34

## 2023-08-27 RX ADMIN — DOXYCYCLINE 100 MG: 100 INJECTION, POWDER, LYOPHILIZED, FOR SOLUTION INTRAVENOUS at 05:30

## 2023-08-27 RX ADMIN — GUAIFENESIN 200 MG: 200 SOLUTION ORAL at 13:01

## 2023-08-27 RX ADMIN — PANTOPRAZOLE SODIUM 40 MG: 40 TABLET, DELAYED RELEASE ORAL at 08:35

## 2023-08-27 RX ADMIN — ALBUTEROL SULFATE 2.5 MG: 2.5 SOLUTION RESPIRATORY (INHALATION) at 20:53

## 2023-08-27 RX ADMIN — INSULIN LISPRO 3 UNITS: 100 INJECTION, SOLUTION INTRAVENOUS; SUBCUTANEOUS at 13:01

## 2023-08-27 RX ADMIN — INSULIN LISPRO 3 UNITS: 100 INJECTION, SOLUTION INTRAVENOUS; SUBCUTANEOUS at 17:36

## 2023-08-27 RX ADMIN — ATORVASTATIN CALCIUM 20 MG: 20 TABLET, FILM COATED ORAL at 17:36

## 2023-08-27 RX ADMIN — BUSPIRONE HYDROCHLORIDE 5 MG: 10 TABLET ORAL at 08:35

## 2023-08-27 RX ADMIN — LIDOCAINE 1 PATCH: 4 PATCH TOPICAL at 13:01

## 2023-08-27 RX ADMIN — LEVOTHYROXINE SODIUM 112 MCG: 0.11 TABLET ORAL at 05:33

## 2023-08-27 RX ADMIN — BUSPIRONE HYDROCHLORIDE 5 MG: 10 TABLET ORAL at 20:49

## 2023-08-27 RX ADMIN — SPIRONOLACTONE 25 MG: 25 TABLET ORAL at 08:34

## 2023-08-27 RX ADMIN — RIVAROXABAN 20 MG: 20 TABLET, FILM COATED ORAL at 17:36

## 2023-08-27 RX ADMIN — SERTRALINE HYDROCHLORIDE 50 MG: 50 TABLET ORAL at 08:35

## 2023-08-27 RX ADMIN — MULTIPLE VITAMINS W/ MINERALS TAB 1 TABLET: TAB at 08:34

## 2023-08-27 ASSESSMENT — ENCOUNTER SYMPTOMS
BLOATING: 0
WEAKNESS: 1
DYSPNEA ON EXERTION: 1
COUGH: 1
BLURRED VISION: 0
ANOREXIA: 0
FEVER: 0
PSYCHIATRIC NEGATIVE: 1
DYSURIA: 0
MUSCLE CRAMPS: 1
ABDOMINAL PAIN: 0
SPUTUM PRODUCTION: 0
DIAPHORESIS: 0
CHILLS: 0
DECREASED APPETITE: 0

## 2023-08-27 ASSESSMENT — COGNITIVE AND FUNCTIONAL STATUS - GENERAL
WALKING IN HOSPITAL ROOM: 3 - A LITTLE
STANDING UP FROM CHAIR USING ARMS: 4 - NONE
CLIMB 3 TO 5 STEPS WITH RAILING: 3 - A LITTLE
CLIMB 3 TO 5 STEPS WITH RAILING: 3 - A LITTLE
MOVING TO AND FROM BED TO CHAIR: 4 - NONE
WALKING IN HOSPITAL ROOM: 3 - A LITTLE
STANDING UP FROM CHAIR USING ARMS: 4 - NONE
MOVING TO AND FROM BED TO CHAIR: 4 - NONE

## 2023-08-27 NOTE — ASSESSMENT & PLAN NOTE
-180  History of CAD/CABG/Stents  ECG afib-Vpaced    Likely demand in the setting of CHF   Monitor, no chest pain currently

## 2023-08-27 NOTE — ASSESSMENT & PLAN NOTE
He remains volume overloaded  Echo- EF 40%, Severe MR, progressive Mitral Stenosis. IVC bit visualized well but appears dilated    Continue with IV lasix 60mg daily   I &Os  Daily weight   BMP in AM     Updates 8/29/2023:  -- Agree with transition to oral Lasix at increased dose  -- Patient will need outpatient follow up with primary cardiologist, Dr Wolf, within 2 weeks of discharge

## 2023-08-27 NOTE — CONSULTS
Cardiology Consult Note        Subjective     Cecil Morris is a 83 y.o. male who was admitted for shortness of breath    Cardiology consulted for elevated troponin.   -->180      Received 40 Mg IV Lasix  -1400      Previously seen by Dr Wolf in office 06/27/23    PMHx significant for dementia, combined systolic and diastolic heart failure, coronary artery disease status post CABG and stenting, atrial fibrillation on Xarelto, abdominal aortic aneurysm, type 2 diabetes mellitus, hypertension, hyperlipidemia, anemia, hypothyroidism, sick sinus syndrome status post pacemaker placement, RBBB and pulmonary hypertension .  TAVR [facility]      He states that he was in his usual state of health until 1 to 2 weeks ago when he began experiencing shortness of breath without associated chest pain.  He states that when he walks short distances, he will start to feel dyspneic and also dizziness.  He denies lower extremity edema, orthopnea, abdominal pain, nausea, vomiting, headaches.  He states that he is compliant with all his medications and actually has a pill bottle at home.  He has a nursing aide that checks on him every day or every other day and today when the nursing aide came by, they discovered that he was dyspneic and advised that he go to the emergency department.     He was recently admitted to the hospital from August 11, 2023 to August 15, 2023 with pneumonia and was treated with IV ceftriaxone subsequently discharged on cefuroxime.      Medical History:   Past Medical History:   Diagnosis Date    A-fib (CMS/[facility])     Acute on chronic combined systolic and diastolic congestive heart failure (CMS/HCC)     Anxiety 4/11/2023    CHF (congestive heart failure), NYHA class I, acute, systolic (CMS/[facility])     Depression     Hyperthyroidism     Hypoxia     Near syncope     NSVT (nonsustained ventricular tachycardia) (CMS/[facility])     Orthostatic hypotension     Presence of cardiac pacemaker      Pulmonary embolism ([facility])     Sepsis (CMS/[facility]) 05/16/2022       Surgical History:   Past Surgical History:   Procedure Laterality Date    CARDIAC CATHETERIZATION  11/19/2020    CATH PLACE/CORON ANGIO, IMG SUPER/INTERP,R&L HRT CATH, L HRT VENTRIC    CARDIAC CATHETERIZATION Bilateral 2005    CORONARY ARTERY BYPASS GRAFT  2005       Social History:   Social History     Social History Narrative    Lives alone, and has help for meal prep and med management      Social History     Tobacco Use   Smoking Status Former    Years: 40.00    Types: Cigarettes    Quit date: 11/1/2012    Years since quitting: 10.8   Smokeless Tobacco Never       Family History: @FAMHX:@     Allergies: @Allergies: ALG@    Current Facility-Administered Medications   Medication Dose Route Frequency Provider Last Rate Last Admin    acetaminophen (TYLENOL) tablet 650 mg  650 mg oral q8h PRN Hansel Adhikari MD   650 mg at 08/27/23 0834    albuterol nebulizer solution 2.5 mg  2.5 mg nebulization QID (8a, 12p, 4p, 8p) Hansel Adhikari MD   2.5 mg at 08/27/23 1301    atorvastatin (LIPITOR) tablet 20 mg  20 mg oral Daily (6p) Hansel Adhikari MD   20 mg at 08/26/23 1757    busPIRone (BUSPAR) tablet 5 mg  5 mg oral BID Hansel Adhikari MD   5 mg at 08/27/23 0835    clonazePAM (klonoPIN) tablet 0.5 mg  0.5 mg oral Nightly PRN Hansel Adhikari MD   0.5 mg at 08/26/23 2135    donepeziL (ARICEPT) tablet 10 mg  10 mg oral Nightly Hansel Adhikari MD   10 mg at 08/26/23 2135    doxycycline (VIBRAMYCIN) 100 mg in sodium chloride 0.9 % 100 mL IVPB  100 mg intravenous q12h INT Hansel Adhikari MD   Stopped at 08/27/23 0655    ferrous sulfate tablet 325 mg  325 mg oral Daily with breakfast Hansel Adhikari MD   325 mg at 08/27/23 0835    folic acid (FOLVITE) tablet 1 mg  1 mg oral Daily Hansel Adhikari MD   1 mg at 08/27/23 0834    furosemide (LASIX) injection 60 mg  60 mg intravenous Daily Hansel Adhikari MD   60 mg at  08/27/23 0839    guaiFENesin (ROBITUSSIN) 100 mg/5 mL liquid 200 mg  200 mg oral q4h PRN Catarino Miguel MD   200 mg at 08/27/23 1301    insulin lispro U-100 (HumaLOG) pen 3-5 Units  3-5 Units subcutaneous TID with meals Hansel Adhikari MD   3 Units at 08/27/23 1301    levothyroxine (SYNTHROID) tablet 112 mcg  112 mcg oral Daily (6:30a) Hansel Adhikari MD   112 mcg at 08/27/23 0533    lidocaine (ASPERCREME) 4 % topical patch 1 patch  1 patch Topical q24h INT Catarino Miguel MD   1 patch at 08/27/23 1301    metoprolol succinate XL (TOPROL-XL) 24 hr ER tablet 25 mg  25 mg oral Daily Hansel Adhikari MD   25 mg at 08/27/23 0834    multivitamin tablet 1 tablet  1 tablet oral Daily Hansel Adhikari MD   1 tablet at 08/27/23 0834    pantoprazole (PROTONIX) tablet,delayed release (DR/EC) 40 mg  40 mg oral BID Hansel Adhikari MD   40 mg at 08/27/23 0835    rivaroxaban (XARELTO) tablet 20 mg  20 mg oral Daily with dinner Hansel Adhikari MD   20 mg at 08/26/23 1757    sertraline (ZOLOFT) tablet 50 mg  50 mg oral Daily Hansel Adhikari MD   50 mg at 08/27/23 0835    spironolactone (ALDACTONE) tablet 25 mg  25 mg oral Daily Hansel Adhikari MD   25 mg at 08/27/23 0834    thiamine (VITAMIN B1) tablet 100 mg  100 mg oral Daily Hansel Adhikari MD   100 mg at 08/27/23 0834         Review of Systems   Constitutional: Negative for chills, decreased appetite, diaphoresis and fever.   HENT: Negative for congestion and ear discharge.    Eyes: Negative for blurred vision.   Cardiovascular: Positive for dyspnea on exertion. Negative for chest pain.   Respiratory: Positive for cough. Negative for sputum production.    Skin: Negative.    Musculoskeletal: Positive for muscle cramps.        Bilateral calf pain   Gastrointestinal: Negative for bloating, abdominal pain and anorexia.   Genitourinary: Negative for dysuria.   Neurological: Positive for weakness.   Psychiatric/Behavioral: Negative.   "        Objective       Visit Vitals  BP (!) 111/55 (BP Location: Right upper arm, Patient Position: Sitting)   Pulse 65   Temp 36.7 °C (98 °F) (Oral)   Resp 18   Ht 1.727 m (5' 8\")   Wt 83 kg (183 lb)   SpO2 96%   BMI 27.83 kg/m²       Physical Exam  Constitutional:       Appearance: Normal appearance.   HENT:      Head: Normocephalic.      Mouth/Throat:      Mouth: Mucous membranes are moist.   Eyes:      Pupils: Pupils are equal, round, and reactive to light.   Cardiovascular:      Rate and Rhythm: Normal rate and regular rhythm.      Pulses: Normal pulses.   Pulmonary:      Effort: Pulmonary effort is normal.      Breath sounds: Examination of the right-lower field reveals rales. Rales present.   Abdominal:      Palpations: Abdomen is soft.   Musculoskeletal:         General: No swelling.   Skin:     General: Skin is warm and dry.   Neurological:      General: No focal deficit present.      Mental Status: He is alert.   Psychiatric:         Mood and Affect: Mood normal.          Labs   Lab Results   Component Value Date    WBC 13.83 (H) 08/27/2023    HGB 10.4 (L) 08/27/2023    HCT 34.4 (L) 08/27/2023     08/27/2023    CHOL 138 11/17/2022    TRIG 71 11/17/2022    HDL 64 11/17/2022    LDLCALC 59 11/17/2022    ALT 16 08/26/2023    AST 18 08/26/2023     08/27/2023    K 3.9 08/27/2023     08/27/2023    CREATININE 1.1 08/27/2023    BUN 19 08/27/2023    CO2 30 08/27/2023    TSH 3.06 04/21/2023    GLUCOSE 147 (H) 08/27/2023    HGBA1C 8.1 (H) 07/11/2023    MICROALBUR 0.2 07/11/2023       Imaging- Chest XRAY 08/26/23  Left chest wall CIED. Median sternotomy wires in place. Prosthetic cardiac valve  noted. Stable patchy bilateral airspace opacities. The cardiopericardial  silhouette is enlarged, stable. There are degenerative changes in the spine.      ECG       Telemetry        CATH 2020 pre TAVR at [facility]    LM: MLI   Cx: 90% mid-Cx which jeopardizes moderate sized PL distribution   LAD: Mid occlusion "   RCA: Small dominant vessel. Severe diffuse distal disease.   SVG to OM: Patent. Fills isolated OM/Cx distribution. Does not communicate   with true Cx.   Radial graft to PDA: Patent   LIMA to LAD: Patent. Diffusely diseased LAD.      stent mid-Cx lesion. performed      Assessment & Plan    * Dyspnea on exertion  Assessment & Plan  Most likely secondary to CHF exacerbation      Agree with IV diuresis     Would change to 40 Mg daily in am  - I &O's  - Daily weights   - BMP to monitor creat.  - replete K and mag        Elevated troponin  Assessment & Plan  -180    · History of CAD/CABG/Stents  · No again  · ECG afib-Vpaced    · Is on mono therapy with Xarelto (afib)    A-fib (CMS/HCC)  Assessment & Plan  · Rate controlled Torprol XL 25 mg and has  PPM  · V-paced   · continue AC with Xarelto  · HG is stable in 10 range        MIGUEL Wolff  8/27/2023  1:20 PM

## 2023-08-27 NOTE — PROGRESS NOTES
"Chart reviewed; pt is a readmit.  Pt lives alone, dtr went back to [place] about 2 days ago.  Met with pt, who introduced himself as \"Liu-Pro\",  at bedside; CM role explained.  Pt lives alone in a mobile home with 5 or 6 LARISSA.  DME: Walker and home oxygen; pt does not know name of supplier.  HHC: Pt had a visiting nurse but isn't sure which agency.  Has a HHA that comes almost daily.  PCP: Dr. Melanie Chambers  Pharmacy: [facility]  Pt wants an ambulance ride home, \"they brought me here\".     DCP:  Home with HHC vs SNF; pt will need BLS transport.      "

## 2023-08-27 NOTE — PROGRESS NOTES
Hospital Medicine Service -  Daily Progress Note       SUBJECTIVE   Interval History: Patient c/o cough, non productive. On 2L NC. SOB with exertion. C/o of muscle spasms in leg.     OBJECTIVE      Vital signs in last 24 hours:  Temp:  [36.3 °C (97.4 °F)-36.7 °C (98 °F)] 36.7 °C (98 °F)  Heart Rate:  [61-71] 65  Resp:  [16-20] 18  BP: (111-141)/(47-80) 111/55    Intake/Output Summary (Last 24 hours) at 8/27/2023 1353  Last data filed at 8/27/2023 1200  Gross per 24 hour   Intake 340 ml   Output 2100 ml   Net -1760 ml       PHYSICAL EXAMINATION      Gen: Appears stated age, not in any distress  Head: atraumatic, normocephalic  Eyes: PERRLA, EOMI, no pallor  Neck: supple, no Lymph nodes, no Thyromegaly, no JVD  CVS: RRR, No M/G, no JVD  Pulm: CTA B/l, no wheezing or rhonchi, no rales  Abd: Soft, NT, ND, normal bowel sounds  Extremities:no edema, no cyanosis   MSK: no DJD, no joint swellings, no joint tenderness   Neuro: AAO x3     LINES, CATHETERS, DRAINS, AIRWAYS, AND WOUNDS   Lines, Drains, Airways, Wounds:  Peripheral IV (Adult) 08/27/23 Left;Posterior Hand (Active)   Number of days: 0       Comments:      LABS / IMAGING / TELE      Labs  Results from last 7 days   Lab Units 08/27/23  0302   WBC K/uL 13.83*   HEMOGLOBIN g/dL 10.4*   HEMATOCRIT % 34.4*   PLATELETS K/uL 300     Results from last 7 days   Lab Units 08/27/23  0302   SODIUM mEQ/L 138   POTASSIUM mEQ/L 3.9   CHLORIDE mEQ/L 100   CO2 mEQ/L 30   BUN mg/dL 19   CREATININE mg/dL 1.1   GLUCOSE mg/dL 147*   CALCIUM mg/dL 8.8         Micro  Microbiology Results     Procedure Component Value Units Date/Time    SARS-CoV-2 (COVID-19), PCR Nasopharynx [[id number]]  (Normal) Collected: 08/26/23 1218    Specimen: Nasopharyngeal Swab from Nasopharynx Updated: 08/26/23 1314    Narrative:      The following orders were created for panel order SARS-CoV-2 (COVID-19), PCR Nasopharynx.  Procedure                               Abnormality         Status                      ---------                               -----------         ------                     SARS-COV-2 (COVID-19)/ F...[[id number]]  Normal              Final result                 Please view results for these tests on the individual orders.    SARS-COV-2 (COVID-19)/ FLU A/B, AND RSV, PCR Nasopharynx [[id number]]  (Normal) Collected: 08/26/23 1218    Specimen: Nasopharyngeal Swab from Nasopharynx Updated: 08/26/23 1314     SARS-CoV-2 (COVID-19) Negative     Influenza A Negative     Influenza B Negative     Respiratory Syncytial Virus Negative    Narrative:      Testing performed using real-time PCR for detection of COVID-19. EUA approved validation studies performed on site.     Blood Culture Blood, Venous [[id number]]  (Normal) Collected: 08/11/23 2355    Specimen: Blood, Venous Updated: 08/17/23 0401     Culture No growth at 120 hours    SARS-CoV-2 (COVID-19), PCR Nasopharynx [[id number]]  (Normal) Collected: 08/11/23 1913    Specimen: Nasopharyngeal Swab from Nasopharynx Updated: 08/11/23 2024    Narrative:      The following orders were created for panel order SARS-CoV-2 (COVID-19), PCR Nasopharynx.  Procedure                               Abnormality         Status                     ---------                               -----------         ------                     SARS-COV-2 (COVID-19)/ F...[[id number]]  Normal              Final result                 Please view results for these tests on the individual orders.    SARS-COV-2 (COVID-19)/ FLU A/B, AND RSV, PCR Nasopharynx [[id number]]  (Normal) Collected: 08/11/23 1913    Specimen: Nasopharyngeal Swab from Nasopharynx Updated: 08/11/23 2024     SARS-CoV-2 (COVID-19) Negative     Influenza A Negative     Influenza B Negative     Respiratory Syncytial Virus Negative    Narrative:      Testing performed using real-time PCR for detection of COVID-19. EUA approved validation studies performed on site.     Blood Culture Blood, Venous [[id number]]  (Normal) Collected:  08/11/23 1913    Specimen: Blood, Venous Updated: 08/17/23 0401     Culture No growth at 120 hours          Imaging  X-RAY CHEST 2 VIEWS    Result Date: 8/26/2023  Narrative: CLINICAL HISTORY: cough COMPARISON:Chest x-ray 8/18/2023 COMMENT: 2 views the chest are obtained. Left chest wall CIED. Median sternotomy wires in place. Prosthetic cardiac valve noted. Stable patchy bilateral airspace opacities. The cardiopericardial silhouette is enlarged, stable. There are degenerative changes in the spine.     Impression: IMPRESSION: See comment.     X-RAY CHEST 1 VIEW    Result Date: 8/18/2023  Narrative: CLINICAL HISTORY:  Dyspnoea     Impression: IMPRESSION:  Mildly vascular congestion, similar to 8/13/2023. Left basilar atelectasis suspected. No definite consolidation. COMPARISON: Chest studies from 7/26/2023 through 8/13/2023.. COMMENT:  AP upright portable imaging of the chest completed 14. 3 hours. Mild congestive changes are similar to the August 13, 2023 2 view chest study.. Mild stable cardiac enlargement noted. Pacemaker on the left, 2 leads in stable position. Stable hilar and mediastinal contours. No definite congestive changes or visible pleural fluid. Minor volume loss at the left base.    X-RAY CHEST 2 VIEWS    Result Date: 8/14/2023  Narrative: CLINICAL HISTORY: dyspnea     Impression: IMPRESSION: Similar appearance of patchy groundglass opacities within both lungs which may reflect multifocal pneumonia and/or pulmonary edema. COMMENT: Comparison: Chest x-ray and CT chest 8/11/2023. Technique: PA frontal and lateral views of the chest were obtained. FINDINGS: There is similar appearance of patchy groundglass opacities throughout both lungs. Again noted is mild blunting of the right costophrenic angle corresponding to pleural thickening. No significant pleural effusion is seen. There is no pneumothorax. Again seen is a left chest wall dual-lead pacemaker with right atrial right ventricular leads. There is  stable enlargement of the cardiac silhouette. There are median sternotomy wires and mediastinal clips. 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 The upper abdomen is unremarkable. There are multilevel degenerative changes of the thoracic spine. There is no acute osseous abnormality.     CT ANGIOGRAPHY CHEST/ABDOMEN/PELVIS WITH AND WITHOUT IV CONTRAST    Result Date: 8/12/2023  Narrative: CLINICAL HISTORY: Aortic aneurysm, known or suspected Pulmonary Embolism (PE) suspected, Aortic dissection suspected (PE/Dissection study) COMMENT: COMPARISON: CT chest on 7/26/2023. TECHNIQUE:  CTA of the chest was performed with intravenous contrast. CT abdomen and pelvis with contrast was performed.  Coronal and sagittal reconstructed images were obtained. ADMINISTERED IV CONTRAST AND DOSE: 100mL of iohexoL (OMNIPAQUE 350) 350 mg iodine/mL solution 100 mL 3D MIP and/or volume rendered reconstructions performed and reviewed. CT DOSE:  One or more dose reduction techniques (e.g. automated exposure control, adjustment of the mA and/or kV according to patient size, use of iterative reconstruction technique) utilized for this examination. FINDINGS: No evidence of pulmonary embolism. Ectatic ascending aorta is noted, measures 4.2 cm in diameter at the level of right pulmonary artery. Lines/tubes: Left chest cardiopacer leads are noted. Lower neck: Limited thyroid: Thyroid gland is atrophic or surgically absent. Limited supraclavicular region: Normal. Lungs, airways and pleura: Pleura: No pleural effusion. Lungs: Scattered consolidation and groundglass opacity seen in lungs. Airways: Normal. Mediastinum: Heart: Heart is mildly enlarged.  Severe coronary artery calcification. The patient is status post CABG. Prosthetic aortic valve is noted. Pericardium: No pericardial effusion. Hilar and mediastinal lymph node stations: No lymphadenopathy. Esophagus: Small hiatal hernia is noted. Vessels: Main pulmonary artery is at the upper  limits of normal, measures 3.2 cm in diameter. Axilla: Normal. Bones and soft tissues: Soft tissues: Unremarkable. Bones: Unremarkable. Abdomen and Pelvis: Hepatobiliary: Liver: No focal hepatic mass. Liver contour is mildly nodular, cannot rule out cirrhosis. Gallbladder: Cholelithiasis is noted. Bile Ducts: No intrahepatic or extrahepatic biliary ductal dilatation Spleen: Punctate calcification seen in spleen, likely due to sequela of granulomatous disease. Pancreas: No focal mass or ductal dilatation. Adrenal glands: Normal in appearance. Kidneys: No hydronephrosis or solid lesions. GI Tract: GE junction and stomach:  Small hiatal hernia is noted. Small bowel:  Unremarkable. Appendix:  Unremarkable. Colon and rectum:  Colonic diverticulosis is noted. Pelvic organs and bladder: Unremarkable. Peritoneum/retroperitoneum: No free air or free fluid. Lymph nodes: No lymphadenopathy. Vessels: Moderate calcified plaque seen in aorta and iliac arteries. Bones and soft tissues: Soft tissues: Unremarkable. Bones: Unremarkable.     Impression: IMPRESSION: No evidence of pulmonary embolism. Ectatic ascending aorta is noted, measures 4.2 cm in diameter at the level of right pulmonary artery. Liver contour is mildly nodular, cannot rule out cirrhosis. Scattered consolidation and groundglass opacity seen in lungs, may represent multifocal pneumonia or pulmonary edema.     X-RAY CHEST 2 VIEWS    Result Date: 8/11/2023  Narrative: CLINICAL HISTORY: Cough / wheeze. COMMENT: Two views of the chest are submitted for review and compared to prior from 4/21/2023. Cardiomegaly. Dual-chamber pacer wires are seen in place. The mediastinal silhouette is unremarkable. Stable elevated left hemidiaphragm. Diffuse pulmonary vascular congestion. No consolidation, effusion, or pneumothorax.     Impression: IMPRESSION: Cardiomegaly and mild interstitial edema          ASSESSMENT AND PLAN      Elevated troponin  Assessment & Plan  Discovered to  have an elevated troponin of 214. DDx include acute myocardial injury vs CHF vs other ischemic injury. EKG showed V-paced rhythm without acute changes    Cardiology on board  Echocardiogram pending      Type 2 diabetes mellitus without complication, without long-term current use of insulin (CMS/[facility])  Assessment & Plan  A1C was 8.1% about a month ago    Plan:  -Sliding scale insulin  -TID POC glucose check with meals    A-fib (CMS/[facility])  Assessment & Plan  Remains in SR. Continue Metoprolol and Xarelto     Acquired hypothyroidism  Assessment & Plan  Continue Synthroid 112mcg     Hypercholesteremia  Assessment & Plan  Continue Lipitor    Essential hypertension  Assessment & Plan  Continue Metoprolol and spirinolactone    Depression  Assessment & Plan  Continue Buspar, Sertraline     Coronary artery disease involving native coronary artery  Assessment & Plan  Continue Lipitor and Metoprolol     Anemia  Assessment & Plan  Normocytic anemia with baseline Hgb of ~11. His ferritin 1 month ago was 47. He likely has a component of DONALD in addition to anemia of chronic inflammation.     Plan:  -Continue home multivitamin, iron    * Dyspnea on exertion  Assessment & Plan  Most likely secondary to CHF exacerbation given his elevated BNP in the 400s and CXR showing enlarged cardiopericardial silhouette. EKG showed V-paced rhythm. Doubt he has pneumophonia but has received IV CTX and doxycycline in the ED. He is s/p IV Lasix 40mg    Plan:   -Continue IV Lasix 60 mg daily  -Daily weights with strict intake and output  -Follow up Echocardiogram- pending         VTE Assessment: Padua VTE Score: 4  VTE Prophylaxis Plan: eliquis  Code Status: Full Code  Estimated Discharge Date: 8/29/2023  Disposition Planning: pending improvement      Catarino Miguel MD  8/27/2023

## 2023-08-27 NOTE — PLAN OF CARE
Plan of Care Review  Plan of Care Reviewed With: patient  Progress: no change  Outcome Evaluation: Patient c/o of back pain PRN tylenol and lidocaine patch applied. C/o of shortness of breath oxygen saturation within normal between 95-97%. IV lasix given per order. Patient encouraged and reminded to use the urinal. Needs reinforcement, forgetul at times with repeated requests. IV abx given. PRN robitussin added for cough. Fall bundle intact. Bed alarm in place.

## 2023-08-28 ENCOUNTER — APPOINTMENT (INPATIENT)
Dept: CARDIOLOGY | Facility: HOSPITAL | Age: 83
DRG: 292 | End: 2023-08-28
Attending: INTERNAL MEDICINE
Payer: COMMERCIAL

## 2023-08-28 ENCOUNTER — APPOINTMENT (INPATIENT)
Dept: RADIOLOGY | Facility: HOSPITAL | Age: 83
DRG: 292 | End: 2023-08-28
Attending: STUDENT IN AN ORGANIZED HEALTH CARE EDUCATION/TRAINING PROGRAM
Payer: COMMERCIAL

## 2023-08-28 PROBLEM — I50.23 ACUTE ON CHRONIC SYSTOLIC HEART FAILURE (CMS/HCC): Status: ACTIVE | Noted: 2023-08-26

## 2023-08-28 LAB
ANION GAP SERPL CALC-SCNC: 6 MEQ/L (ref 3–15)
AORTIC VALVE MEAN VELOCITY: 1.32 M/S
AORTIC VALVE VELOCITY TIME INTEGRAL: 37.2 CM
ASCENDING AORTA: 3.8 CM
AV MEAN GRADIENT: 8 MMHG
AV PEAK GRADIENT: 16 MMHG
AV PEAK VELOCITY-S: 2.01 M/S
AV VALVE AREA INDEX: 0.7
AV VALVE AREA: 1.07 CM2
AV VELOCITY RATIO: 0.37
AVA (VTI): 1.41 CM2
BASOPHILS # BLD: 0.05 K/UL (ref 0.01–0.1)
BASOPHILS NFR BLD: 0.4 %
BSA FOR ECHO PROCEDURE: 2.02 M2
BUN SERPL-MCNC: 21 MG/DL (ref 7–25)
CALCIUM SERPL-MCNC: 8.4 MG/DL (ref 8.6–10.3)
CHLORIDE SERPL-SCNC: 102 MEQ/L (ref 98–107)
CO2 SERPL-SCNC: 31 MEQ/L (ref 21–31)
CREAT SERPL-MCNC: 1 MG/DL (ref 0.7–1.3)
DIFFERENTIAL METHOD BLD: ABNORMAL
DOP CALC LVOT STROKE VOLUME: 52.43 CM3
E WAVE DECELERATION TIME: 404 MS
E/E' RATIO: 32.2
E/LAT E' RATIO: 30.8
EDV (BP): 278 CM3
EF (A4C): 39.9 %
EF A2C: 43.7 %
EJECTION FRACTION: 39.9 %
EOSINOPHIL # BLD: 0.2 K/UL (ref 0.04–0.54)
EOSINOPHIL NFR BLD: 1.5 %
ERYTHROCYTE [DISTWIDTH] IN BLOOD BY AUTOMATED COUNT: 14.2 % (ref 11.6–14.4)
ESV (BP): 167 CM3
FRACTIONAL SHORTENING: 27.41 %
GFR SERPL CREATININE-BSD FRML MDRD: >60 ML/MIN/1.73M*2
GLUCOSE BLD-MCNC: 181 MG/DL (ref 70–99)
GLUCOSE BLD-MCNC: 189 MG/DL (ref 70–99)
GLUCOSE BLD-MCNC: 195 MG/DL (ref 70–99)
GLUCOSE BLD-MCNC: 200 MG/DL (ref 70–99)
GLUCOSE SERPL-MCNC: 173 MG/DL (ref 70–99)
HCT VFR BLDCO AUTO: 33.1 % (ref 40.1–51)
HGB BLD-MCNC: 10.1 G/DL (ref 13.7–17.5)
IMM GRANULOCYTES # BLD AUTO: 0.14 K/UL (ref 0–0.08)
IMM GRANULOCYTES NFR BLD AUTO: 1.1 %
INTERVENTRICULAR SEPTUM: 1.15 CM
LA ESV (BP): 156 CM3
LA ESV INDEX (A2C): 85.64 CM3/M2
LA ESV INDEX (BP): 77.23 CM3/M2
LAAS-AP2: 38.3 CM2
LAAS-AP4: 35.1 CM2
LAD 2D: 6.1 CM
LALD A4C: 6.9 CM
LALD A4C: 7.44 CM
LAV-S: 173 CM3
LEFT ATRIUM VOLUME INDEX: 65.84 CM3/M2
LEFT ATRIUM VOLUME: 133 CM3
LEFT INTERNAL DIMENSION IN SYSTOLE: 3.76 CM (ref 2.88–4.36)
LEFT VENTRICLE DIASTOLIC VOLUME INDEX: 120.3 CM3/M2
LEFT VENTRICLE DIASTOLIC VOLUME: 243 CM3
LEFT VENTRICLE SYSTOLIC VOLUME INDEX: 72.28 CM3/M2
LEFT VENTRICLE SYSTOLIC VOLUME: 146 CM3
LEFT VENTRICULAR INTERNAL DIMENSION IN DIASTOLE: 5.18 CM (ref 4.9–6.8)
LEFT VENTRICULAR POSTERIOR WALL IN END DIASTOLE: 1.16 CM (ref 0.64–1.18)
LV DIASTOLIC VOLUME: 285 CM3
LV ESV (APICAL 2 CHAMBER): 159 CM3
LVAD-AP2: 59.5 CM2
LVAD-AP4: 51.8 CM2
LVAS-AP2: 43.9 CM2
LVAS-AP4: 38.2 CM2
LVCI: 3.7 L/MIN/M2
LVCO: 7.3 L/MIN
LVEDVI(A2C): 141.09 CM3/M2
LVEDVI(BP): 137.62 CM3/M2
LVESVI(A2C): 78.71 CM3/M2
LVESVI(BP): 82.67 CM3/M2
LVLD-AP2: 10.2 CM
LVLD-AP4: 9.11 CM
LVLS-AP2: 9.97 CM
LVLS-AP4: 8.26 CM
LVOT 2D: 2.2 CM
LVOT A: 3.8 CM2
LVOT MG: 1 MMHG
LVOT MV: 0.43 M/S
LVOT PEAK VELOCITY: 0.72 M/S
LVOT PG: 2 MMHG
LVOT STROKE VOLUME INDEX: 25.96 ML/M2
LVOT VTI: 13.8 CM
LYMPHOCYTES # BLD: 4.09 K/UL (ref 1.2–3.5)
LYMPHOCYTES NFR BLD: 31.5 %
MCH RBC QN AUTO: 27.9 PG (ref 28–33.2)
MCHC RBC AUTO-ENTMCNC: 30.5 G/DL (ref 32.2–36.5)
MCV RBC AUTO: 91.4 FL (ref 83–98)
MITRAL VALVE MEAN INFLOW VELOCITY: 3.83 M/S
MLH CV ECHO AVA INDEX VELOCITY RATIO: 0.5
MONOCYTES # BLD: 1.01 K/UL (ref 0.3–1)
MONOCYTES NFR BLD: 7.8 %
MR FLOW: 3.82 ML/S
MR PISA EROA: 0.71 CM2
MR VELOCITY: 5.36 M/S
MR VTI: 170 CM
MV E'TISSUE VEL-LAT: 0.06 M/S
MV E'TISSUE VEL-MED: 0.06 M/S
MV MEAN GRADIENT: 5 MMHG
MV PEAK E VEL: 1.79 M/S
MV PEAK GRADIENT: 16 MMHG
MV REGURGITANT VOLUME: 121.02 CM3
MV VALVE AREA BY CONTINUITY EQUATION: 0.99 CM2
MV VTI: 53 CM
MVA (PISA): 12.31 CM2
NEUTROPHILS # BLD: 7.49 K/UL (ref 1.7–7)
NEUTS SEG NFR BLD: 57.7 %
NRBC BLD-RTO: 0 %
PDW BLD AUTO: 8.6 FL (ref 9.4–12.4)
PISA ALIAS VEL MV: 0.31 M/S
PISA RADIUS: 1.4 CM
PLATELET # BLD AUTO: 280 K/UL (ref 150–350)
POCT TEST: ABNORMAL
POSTERIOR WALL: 1.16 CM
POTASSIUM SERPL-SCNC: 3.7 MEQ/L (ref 3.5–5.1)
RBC # BLD AUTO: 3.62 M/UL (ref 4.5–5.8)
SODIUM SERPL-SCNC: 139 MEQ/L (ref 136–145)
TAPSE: 1.91 CM
TR MAX PG: 27.88 MMHG
TRICUSPID VALVE PEAK REGURGITATION VELOCITY: 2.64 M/S
WBC # BLD AUTO: 12.98 K/UL (ref 3.8–10.5)
Z-SCORE OF LEFT VENTRICULAR DIMENSION IN END DIASTOLE: -1.08
Z-SCORE OF LEFT VENTRICULAR DIMENSION IN END SYSTOLE: 0.47
Z-SCORE OF LEFT VENTRICULAR POSTERIOR WALL IN END DIASTOLE: 1.52

## 2023-08-28 PROCEDURE — 80048 BASIC METABOLIC PNL TOTAL CA: CPT | Performed by: INTERNAL MEDICINE

## 2023-08-28 PROCEDURE — 36415 COLL VENOUS BLD VENIPUNCTURE: CPT | Performed by: INTERNAL MEDICINE

## 2023-08-28 PROCEDURE — 94640 AIRWAY INHALATION TREATMENT: CPT

## 2023-08-28 PROCEDURE — 93970 EXTREMITY STUDY: CPT

## 2023-08-28 PROCEDURE — 99233 SBSQ HOSP IP/OBS HIGH 50: CPT | Performed by: STUDENT IN AN ORGANIZED HEALTH CARE EDUCATION/TRAINING PROGRAM

## 2023-08-28 PROCEDURE — 25000000 HC PHARMACY GENERAL: Performed by: INTERNAL MEDICINE

## 2023-08-28 PROCEDURE — 21400000 HC ROOM AND CARE CCU/INTERMEDIATE

## 2023-08-28 PROCEDURE — 63700000 HC SELF-ADMINISTRABLE DRUG: Performed by: INTERNAL MEDICINE

## 2023-08-28 PROCEDURE — C8929 TTE W OR WO FOL WCON,DOPPLER: HCPCS

## 2023-08-28 PROCEDURE — 63700000 HC SELF-ADMINISTRABLE DRUG: Performed by: STUDENT IN AN ORGANIZED HEALTH CARE EDUCATION/TRAINING PROGRAM

## 2023-08-28 PROCEDURE — 25500000 HC DRUGS/INCIDENT RAD: Mod: JZ | Performed by: STUDENT IN AN ORGANIZED HEALTH CARE EDUCATION/TRAINING PROGRAM

## 2023-08-28 PROCEDURE — 85025 COMPLETE CBC W/AUTO DIFF WBC: CPT | Performed by: INTERNAL MEDICINE

## 2023-08-28 PROCEDURE — 25000000 HC PHARMACY GENERAL: Performed by: STUDENT IN AN ORGANIZED HEALTH CARE EDUCATION/TRAINING PROGRAM

## 2023-08-28 PROCEDURE — 25800000 HC PHARMACY IV SOLUTIONS: Performed by: INTERNAL MEDICINE

## 2023-08-28 PROCEDURE — 93306 TTE W/DOPPLER COMPLETE: CPT | Mod: 26 | Performed by: STUDENT IN AN ORGANIZED HEALTH CARE EDUCATION/TRAINING PROGRAM

## 2023-08-28 PROCEDURE — 63600000 HC DRUGS/DETAIL CODE: Performed by: INTERNAL MEDICINE

## 2023-08-28 RX ORDER — ACETAMINOPHEN 500 MG
10 TABLET ORAL ONCE
Status: COMPLETED | OUTPATIENT
Start: 2023-08-28 | End: 2023-08-28

## 2023-08-28 RX ORDER — FUROSEMIDE 40 MG/1
40 TABLET ORAL DAILY
Status: DISCONTINUED | OUTPATIENT
Start: 2023-08-29 | End: 2023-08-29 | Stop reason: HOSPADM

## 2023-08-28 RX ADMIN — SERTRALINE HYDROCHLORIDE 50 MG: 50 TABLET ORAL at 08:26

## 2023-08-28 RX ADMIN — ALBUTEROL SULFATE 2.5 MG: 2.5 SOLUTION RESPIRATORY (INHALATION) at 08:31

## 2023-08-28 RX ADMIN — ALBUTEROL SULFATE 2.5 MG: 2.5 SOLUTION RESPIRATORY (INHALATION) at 16:33

## 2023-08-28 RX ADMIN — INSULIN LISPRO 3 UNITS: 100 INJECTION, SOLUTION INTRAVENOUS; SUBCUTANEOUS at 13:09

## 2023-08-28 RX ADMIN — SODIUM CHLORIDE: 9 INJECTION INTRAMUSCULAR; INTRAVENOUS; SUBCUTANEOUS at 13:41

## 2023-08-28 RX ADMIN — TRAMADOL HYDROCHLORIDE 50 MG: 50 TABLET, COATED ORAL at 10:06

## 2023-08-28 RX ADMIN — ACETAMINOPHEN 650 MG: 325 TABLET ORAL at 05:37

## 2023-08-28 RX ADMIN — INSULIN LISPRO 3 UNITS: 100 INJECTION, SOLUTION INTRAVENOUS; SUBCUTANEOUS at 08:27

## 2023-08-28 RX ADMIN — BUSPIRONE HYDROCHLORIDE 5 MG: 10 TABLET ORAL at 08:25

## 2023-08-28 RX ADMIN — METOPROLOL SUCCINATE 25 MG: 25 TABLET, FILM COATED, EXTENDED RELEASE ORAL at 08:25

## 2023-08-28 RX ADMIN — RIVAROXABAN 20 MG: 20 TABLET, FILM COATED ORAL at 16:33

## 2023-08-28 RX ADMIN — ACETAMINOPHEN 650 MG: 325 TABLET ORAL at 21:05

## 2023-08-28 RX ADMIN — FOLIC ACID 1 MG: 1 TABLET ORAL at 08:26

## 2023-08-28 RX ADMIN — ALBUTEROL SULFATE 2.5 MG: 2.5 SOLUTION RESPIRATORY (INHALATION) at 13:09

## 2023-08-28 RX ADMIN — FERROUS SULFATE TAB 325 MG (65 MG ELEMENTAL FE) 325 MG: 325 (65 FE) TAB at 08:26

## 2023-08-28 RX ADMIN — MULTIPLE VITAMINS W/ MINERALS TAB 1 TABLET: TAB at 08:25

## 2023-08-28 RX ADMIN — INSULIN LISPRO 3 UNITS: 100 INJECTION, SOLUTION INTRAVENOUS; SUBCUTANEOUS at 17:16

## 2023-08-28 RX ADMIN — SPIRONOLACTONE 25 MG: 25 TABLET ORAL at 08:26

## 2023-08-28 RX ADMIN — LEVOTHYROXINE SODIUM 112 MCG: 0.11 TABLET ORAL at 05:37

## 2023-08-28 RX ADMIN — LIDOCAINE 1 PATCH: 4 PATCH TOPICAL at 11:31

## 2023-08-28 RX ADMIN — ATORVASTATIN CALCIUM 20 MG: 20 TABLET, FILM COATED ORAL at 16:33

## 2023-08-28 RX ADMIN — FUROSEMIDE 60 MG: 10 INJECTION, SOLUTION INTRAMUSCULAR; INTRAVENOUS at 08:23

## 2023-08-28 RX ADMIN — CLONAZEPAM 0.5 MG: 0.5 TABLET ORAL at 22:23

## 2023-08-28 RX ADMIN — BUSPIRONE HYDROCHLORIDE 5 MG: 10 TABLET ORAL at 20:41

## 2023-08-28 RX ADMIN — PANTOPRAZOLE SODIUM 40 MG: 40 TABLET, DELAYED RELEASE ORAL at 20:41

## 2023-08-28 RX ADMIN — TRAMADOL HYDROCHLORIDE 50 MG: 50 TABLET, COATED ORAL at 18:59

## 2023-08-28 RX ADMIN — DONEPEZIL HYDROCHLORIDE 10 MG: 10 TABLET ORAL at 20:41

## 2023-08-28 RX ADMIN — DOXYCYCLINE 100 MG: 100 INJECTION, POWDER, LYOPHILIZED, FOR SOLUTION INTRAVENOUS at 04:15

## 2023-08-28 RX ADMIN — PANTOPRAZOLE SODIUM 40 MG: 40 TABLET, DELAYED RELEASE ORAL at 08:26

## 2023-08-28 RX ADMIN — Medication 10 MG: at 20:59

## 2023-08-28 RX ADMIN — ALBUTEROL SULFATE 2.5 MG: 2.5 SOLUTION RESPIRATORY (INHALATION) at 20:47

## 2023-08-28 RX ADMIN — THIAMINE HCL TAB 100 MG 100 MG: 100 TAB at 08:26

## 2023-08-28 RX ADMIN — ACETAMINOPHEN 650 MG: 325 TABLET ORAL at 11:39

## 2023-08-28 ASSESSMENT — COGNITIVE AND FUNCTIONAL STATUS - GENERAL
STANDING UP FROM CHAIR USING ARMS: 4 - NONE
MOVING TO AND FROM BED TO CHAIR: 4 - NONE
MOVING TO AND FROM BED TO CHAIR: 4 - NONE
CLIMB 3 TO 5 STEPS WITH RAILING: 3 - A LITTLE
WALKING IN HOSPITAL ROOM: 3 - A LITTLE
STANDING UP FROM CHAIR USING ARMS: 4 - NONE
CLIMB 3 TO 5 STEPS WITH RAILING: 3 - A LITTLE
WALKING IN HOSPITAL ROOM: 4 - NONE

## 2023-08-28 NOTE — PROGRESS NOTES
Hospital Medicine Service -  Daily Progress Note       SUBJECTIVE   Interval History: He is doing well. Denies any worsening of SOB, cough    OBJECTIVE      Vital signs in last 24 hours:  Temp:  [36.3 °C (97.4 °F)-36.7 °C (98.1 °F)] 36.5 °C (97.7 °F)  Heart Rate:  [64-69] 64  Resp:  [18-19] 18  BP: (104-123)/(53-61) 104/53    Intake/Output Summary (Last 24 hours) at 8/28/2023 1526  Last data filed at 8/28/2023 1300  Gross per 24 hour   Intake 240 ml   Output 775 ml   Net -535 ml       PHYSICAL EXAMINATION      Gen: Appears stated age, not in any distress  Head: atraumatic, normocephalic  Eyes: PERRLA, EOMI, no pallor  Neck: supple, no Lymph nodes, no Thyromegaly, no JVD  CVS: RRR, No M/G, no JVD  Pulm: CTA B/l, no wheezing or rhonchi, no rales  Abd: Soft, NT, ND, normal bowel sounds  Extremities:no edema, no cyanosis   MSK: no DJD, no joint swellings, no joint tenderness   Neuro: AAO x3     LINES, CATHETERS, DRAINS, AIRWAYS, AND WOUNDS   Lines, Drains, Airways, Wounds:  Peripheral IV (Adult) 08/27/23 Left;Posterior Hand (Active)   Number of days: 0       Comments:      LABS / IMAGING / TELE      Labs  Results from last 7 days   Lab Units 08/28/23  0527   WBC K/uL 12.98*   HEMOGLOBIN g/dL 10.1*   HEMATOCRIT % 33.1*   PLATELETS K/uL 280     Results from last 7 days   Lab Units 08/28/23  0527   SODIUM mEQ/L 139   POTASSIUM mEQ/L 3.7   CHLORIDE mEQ/L 102   CO2 mEQ/L 31   BUN mg/dL 21   CREATININE mg/dL 1.0   GLUCOSE mg/dL 173*   CALCIUM mg/dL 8.4*         Micro  Microbiology Results     Procedure Component Value Units Date/Time    SARS-CoV-2 (COVID-19), PCR Nasopharynx [[id number]]  (Normal) Collected: 08/26/23 1218    Specimen: Nasopharyngeal Swab from Nasopharynx Updated: 08/26/23 1314    Narrative:      The following orders were created for panel order SARS-CoV-2 (COVID-19), PCR Nasopharynx.  Procedure                               Abnormality         Status                     ---------                                -----------         ------                     SARS-COV-2 (COVID-19)/ F...[[id number]]  Normal              Final result                 Please view results for these tests on the individual orders.    SARS-COV-2 (COVID-19)/ FLU A/B, AND RSV, PCR Nasopharynx [[id number]]  (Normal) Collected: 08/26/23 1218    Specimen: Nasopharyngeal Swab from Nasopharynx Updated: 08/26/23 1314     SARS-CoV-2 (COVID-19) Negative     Influenza A Negative     Influenza B Negative     Respiratory Syncytial Virus Negative    Narrative:      Testing performed using real-time PCR for detection of COVID-19. EUA approved validation studies performed on site.     Blood Culture Blood, Venous [[id number]]  (Normal) Collected: 08/11/23 2355    Specimen: Blood, Venous Updated: 08/17/23 0401     Culture No growth at 120 hours    SARS-CoV-2 (COVID-19), PCR Nasopharynx [[id number]]  (Normal) Collected: 08/11/23 1913    Specimen: Nasopharyngeal Swab from Nasopharynx Updated: 08/11/23 2024    Narrative:      The following orders were created for panel order SARS-CoV-2 (COVID-19), PCR Nasopharynx.  Procedure                               Abnormality         Status                     ---------                               -----------         ------                     SARS-COV-2 (COVID-19)/ F...[[id number]]  Normal              Final result                 Please view results for these tests on the individual orders.    SARS-COV-2 (COVID-19)/ FLU A/B, AND RSV, PCR Nasopharynx [[id number]]  (Normal) Collected: 08/11/23 1913    Specimen: Nasopharyngeal Swab from Nasopharynx Updated: 08/11/23 2024     SARS-CoV-2 (COVID-19) Negative     Influenza A Negative     Influenza B Negative     Respiratory Syncytial Virus Negative    Narrative:      Testing performed using real-time PCR for detection of COVID-19. EUA approved validation studies performed on site.     Blood Culture Blood, Venous [[id number]]  (Normal) Collected: 08/11/23 1913    Specimen: Blood, Venous  Updated: 08/17/23 0401     Culture No growth at 120 hours          Imaging  X-RAY CHEST 2 VIEWS    Result Date: 8/26/2023  Narrative: CLINICAL HISTORY: cough COMPARISON:Chest x-ray 8/18/2023 COMMENT: 2 views the chest are obtained. Left chest wall CIED. Median sternotomy wires in place. Prosthetic cardiac valve noted. Stable patchy bilateral airspace opacities. The cardiopericardial silhouette is enlarged, stable. There are degenerative changes in the spine.     Impression: IMPRESSION: See comment.     X-RAY CHEST 1 VIEW    Result Date: 8/18/2023  Narrative: CLINICAL HISTORY:  Dyspnoea     Impression: IMPRESSION:  Mildly vascular congestion, similar to 8/13/2023. Left basilar atelectasis suspected. No definite consolidation. COMPARISON: Chest studies from 7/26/2023 through 8/13/2023.. COMMENT:  AP upright portable imaging of the chest completed 14. 3 hours. Mild congestive changes are similar to the August 13, 2023 2 view chest study.. Mild stable cardiac enlargement noted. Pacemaker on the left, 2 leads in stable position. Stable hilar and mediastinal contours. No definite congestive changes or visible pleural fluid. Minor volume loss at the left base.    X-RAY CHEST 2 VIEWS    Result Date: 8/14/2023  Narrative: CLINICAL HISTORY: dyspnea     Impression: IMPRESSION: Similar appearance of patchy groundglass opacities within both lungs which may reflect multifocal pneumonia and/or pulmonary edema. COMMENT: Comparison: Chest x-ray and CT chest 8/11/2023. Technique: PA frontal and lateral views of the chest were obtained. FINDINGS: There is similar appearance of patchy groundglass opacities throughout both lungs. Again noted is mild blunting of the right costophrenic angle corresponding to pleural thickening. No significant pleural effusion is seen. There is no pneumothorax. Again seen is a left chest wall dual-lead pacemaker with right atrial right ventricular leads. There is stable enlargement of the cardiac  silhouette. There are median sternotomy wires and mediastinal clips. 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 The upper abdomen is unremarkable. There are multilevel degenerative changes of the thoracic spine. There is no acute osseous abnormality.     CT ANGIOGRAPHY CHEST/ABDOMEN/PELVIS WITH AND WITHOUT IV CONTRAST    Result Date: 8/12/2023  Narrative: CLINICAL HISTORY: Aortic aneurysm, known or suspected Pulmonary Embolism (PE) suspected, Aortic dissection suspected (PE/Dissection study) COMMENT: COMPARISON: CT chest on 7/26/2023. TECHNIQUE:  CTA of the chest was performed with intravenous contrast. CT abdomen and pelvis with contrast was performed.  Coronal and sagittal reconstructed images were obtained. ADMINISTERED IV CONTRAST AND DOSE: 100mL of iohexoL (OMNIPAQUE 350) 350 mg iodine/mL solution 100 mL 3D MIP and/or volume rendered reconstructions performed and reviewed. CT DOSE:  One or more dose reduction techniques (e.g. automated exposure control, adjustment of the mA and/or kV according to patient size, use of iterative reconstruction technique) utilized for this examination. FINDINGS: No evidence of pulmonary embolism. Ectatic ascending aorta is noted, measures 4.2 cm in diameter at the level of right pulmonary artery. Lines/tubes: Left chest cardiopacer leads are noted. Lower neck: Limited thyroid: Thyroid gland is atrophic or surgically absent. Limited supraclavicular region: Normal. Lungs, airways and pleura: Pleura: No pleural effusion. Lungs: Scattered consolidation and groundglass opacity seen in lungs. Airways: Normal. Mediastinum: Heart: Heart is mildly enlarged.  Severe coronary artery calcification. The patient is status post CABG. Prosthetic aortic valve is noted. Pericardium: No pericardial effusion. Hilar and mediastinal lymph node stations: No lymphadenopathy. Esophagus: Small hiatal hernia is noted. Vessels: Main pulmonary artery is at the upper limits of normal, measures 3.2 cm in  diameter. Axilla: Normal. Bones and soft tissues: Soft tissues: Unremarkable. Bones: Unremarkable. Abdomen and Pelvis: Hepatobiliary: Liver: No focal hepatic mass. Liver contour is mildly nodular, cannot rule out cirrhosis. Gallbladder: Cholelithiasis is noted. Bile Ducts: No intrahepatic or extrahepatic biliary ductal dilatation Spleen: Punctate calcification seen in spleen, likely due to sequela of granulomatous disease. Pancreas: No focal mass or ductal dilatation. Adrenal glands: Normal in appearance. Kidneys: No hydronephrosis or solid lesions. GI Tract: GE junction and stomach:  Small hiatal hernia is noted. Small bowel:  Unremarkable. Appendix:  Unremarkable. Colon and rectum:  Colonic diverticulosis is noted. Pelvic organs and bladder: Unremarkable. Peritoneum/retroperitoneum: No free air or free fluid. Lymph nodes: No lymphadenopathy. Vessels: Moderate calcified plaque seen in aorta and iliac arteries. Bones and soft tissues: Soft tissues: Unremarkable. Bones: Unremarkable.     Impression: IMPRESSION: No evidence of pulmonary embolism. Ectatic ascending aorta is noted, measures 4.2 cm in diameter at the level of right pulmonary artery. Liver contour is mildly nodular, cannot rule out cirrhosis. Scattered consolidation and groundglass opacity seen in lungs, may represent multifocal pneumonia or pulmonary edema.     X-RAY CHEST 2 VIEWS    Result Date: 8/11/2023  Narrative: CLINICAL HISTORY: Cough / wheeze. COMMENT: Two views of the chest are submitted for review and compared to prior from 4/21/2023. Cardiomegaly. Dual-chamber pacer wires are seen in place. The mediastinal silhouette is unremarkable. Stable elevated left hemidiaphragm. Diffuse pulmonary vascular congestion. No consolidation, effusion, or pneumothorax.     Impression: IMPRESSION: Cardiomegaly and mild interstitial edema          ASSESSMENT AND PLAN      Elevated troponin  Assessment & Plan  Demand ischemia   No concern for ACS at this time       Type 2 diabetes mellitus without complication, without long-term current use of insulin (CMS/[facility])  Assessment & Plan  A1C was 8.1% about a month ago     Plan:  -Sliding scale insulin  -TID POC glucose check with meals     A-fib (CMS/[facility])  Assessment & Plan  Remains in SR. Continue Metoprolol and Xarelto      Acquired hypothyroidism  Assessment & Plan  Continue Synthroid 112mcg      Hypercholesteremia  Assessment & Plan  Continue Lipitor     Essential hypertension  Assessment & Plan  Continue Metoprolol and spirinolactone     Depression  Assessment & Plan  Continue Buspar, Sertraline      Coronary artery disease involving native coronary artery  Assessment & Plan  Continue Lipitor and Metoprolol      Anemia  Assessment & Plan  Normocytic anemia with baseline Hgb of ~11. His ferritin 1 month ago was 47. He likely has a component of DONALD in addition to anemia of chronic inflammation.      Plan:  -Continue home multivitamin, iron     * Dyspnea on exertion  Assessment & Plan  Most likely secondary to CHF exacerbation given his elevated BNP in the 400s and CXR showing enlarged cardiopericardial silhouette. EKG showed V-paced rhythm     Plan:   -Transitioned to oral lasix  -Daily weights with strict intake and output  -Follow up Echocardiogram- reviewed        VTE Assessment: Padua VTE Score: 4  VTE Prophylaxis Plan: eliquis  Code Status: Full Code  Estimated Discharge Date: 8/28/2023  Disposition Planning: pending improvement      Catarino Miguel MD  8/28/2023

## 2023-08-28 NOTE — ASSESSMENT & PLAN NOTE
Hx of CAD with CABG and Stenting   Elevated troponin likely in the setting of volume overload    Continue Torpol XL, Statin and on Xarelto

## 2023-08-28 NOTE — PROGRESS NOTES
.     Cardiology Daily Progress Note       Subjective    Cecil Morris is a 83 y.o. male dementia, combined systolic and diastolic heart failure, coronary artery disease status post CABG and stenting, atrial fibrillation on Xarelto, abdominal aortic aneurysm, type 2 diabetes mellitus, hypertension, hyperlipidemia, anemia, hypothyroidism, sick sinus syndrome status post pacemaker placement, RBBB and pulmonary hypertension .TAVR 2021 [facility]    Overview: He presented with c/o of Sob and Chest pain. His visiting nursing aide found him to be dyspneic and advised to go to ED. He is receiving diuresis for volume overload     Interval history: He is on 1L NC, denies any chest pain or overt SOB, was working with PT a few mins earlier.         Patient has no known allergies.  Current Facility-Administered Medications   Medication Dose Route Frequency Provider Last Rate Last Admin    acetaminophen (TYLENOL) tablet 650 mg  650 mg oral q8h PRN Hansel Adhikari MD   650 mg at 08/28/23 1139    albuterol nebulizer solution 2.5 mg  2.5 mg nebulization QID (8a, 12p, 4p, 8p) Hansel Adhikari MD   2.5 mg at 08/28/23 1309    atorvastatin (LIPITOR) tablet 20 mg  20 mg oral Daily (6p) Hansel Adhikari MD   20 mg at 08/27/23 1736    busPIRone (BUSPAR) tablet 5 mg  5 mg oral BID Hansel Adhikari MD   5 mg at 08/28/23 0825    clonazePAM (klonoPIN) tablet 0.5 mg  0.5 mg oral Nightly PRN Hansel Adhikari MD   0.5 mg at 08/26/23 2135    donepeziL (ARICEPT) tablet 10 mg  10 mg oral Nightly Hansel Adhikari MD   10 mg at 08/27/23 2049    ferrous sulfate tablet 325 mg  325 mg oral Daily with breakfast Hansel Adhikari MD   325 mg at 08/28/23 0826    folic acid (FOLVITE) tablet 1 mg  1 mg oral Daily Hansel Adhikari MD   1 mg at 08/28/23 0826    [START ON 8/29/2023] furosemide (LASIX) tablet 40 mg  40 mg oral Daily Catarino Miguel MD        guaiFENesin (ROBITUSSIN) 100 mg/5 mL liquid 200 mg  200 mg oral q4h PRN  "Catarino Miguel MD   200 mg at 08/27/23 1301    insulin lispro U-100 (HumaLOG) pen 3-5 Units  3-5 Units subcutaneous TID with meals Hansel Adhikari MD   3 Units at 08/28/23 1309    levothyroxine (SYNTHROID) tablet 112 mcg  112 mcg oral Daily (6:30a) Hansel Adhikari MD   112 mcg at 08/28/23 0537    lidocaine (ASPERCREME) 4 % topical patch 1 patch  1 patch Topical q24h INT Catarino Miguel MD   1 patch at 08/28/23 1131    metoprolol succinate XL (TOPROL-XL) 24 hr ER tablet 25 mg  25 mg oral Daily Hansel Adhikari MD   25 mg at 08/28/23 0825    multivitamin tablet 1 tablet  1 tablet oral Daily Hansel Adhikari MD   1 tablet at 08/28/23 0825    pantoprazole (PROTONIX) tablet,delayed release (DR/EC) 40 mg  40 mg oral BID Hansel Adhikari MD   40 mg at 08/28/23 0826    rivaroxaban (XARELTO) tablet 20 mg  20 mg oral Daily with dinner Hansel Adhikari MD   20 mg at 08/27/23 1736    sertraline (ZOLOFT) tablet 50 mg  50 mg oral Daily Hansel Adhikari MD   50 mg at 08/28/23 0826    spironolactone (ALDACTONE) tablet 25 mg  25 mg oral Daily Hansel Adhikari MD   25 mg at 08/28/23 0826    thiamine (VITAMIN B1) tablet 100 mg  100 mg oral Daily Hansel Adhikari MD   100 mg at 08/28/23 0826    traMADoL (ULTRAM) tablet 50 mg  50 mg oral q6h PRN Catarino Miguel MD   50 mg at 08/28/23 1006         Objective   Visit Vitals  BP (!) 104/53 (BP Location: Left upper arm, Patient Position: Sitting)   Pulse 64   Temp 36.5 °C (97.7 °F) (Oral)   Resp 18   Ht 1.727 m (5' 8\")   Wt 84.8 kg (187 lb)   SpO2 100%   BMI 28.43 kg/m²         Intake/Output Summary (Last 24 hours) at 8/28/2023 1530  Last data filed at 8/28/2023 1300  Gross per 24 hour   Intake 240 ml   Output 775 ml   Net -535 ml       Physical Exam  Constitutional:       Appearance: Normal appearance.   HENT:      Head: Normocephalic and atraumatic.   Cardiovascular:      Rate and Rhythm: Normal rate and regular rhythm.      Pulses: Normal pulses.      Heart " sounds: Normal heart sounds.   Pulmonary:      Effort: Pulmonary effort is normal.      Breath sounds: Wheezing present.   Musculoskeletal:         General: Normal range of motion.      Right lower leg: No edema.      Left lower leg: No edema.   Skin:     General: Skin is warm and dry.   Neurological:      General: No focal deficit present.      Mental Status: He is alert.   Psychiatric:         Mood and Affect: Mood normal.         Labs   Lab Results   Component Value Date    WBC 12.98 (H) 08/28/2023    HGB 10.1 (L) 08/28/2023    HCT 33.1 (L) 08/28/2023     08/28/2023    CHOL 138 11/17/2022    TRIG 71 11/17/2022    HDL 64 11/17/2022    LDLCALC 59 11/17/2022    ALT 16 08/26/2023    AST 18 08/26/2023     08/28/2023    K 3.7 08/28/2023     08/28/2023    CREATININE 1.0 08/28/2023    BUN 21 08/28/2023    CO2 31 08/28/2023    TSH 3.06 04/21/2023    GLUCOSE 173 (H) 08/28/2023    HGBA1C 8.1 (H) 07/11/2023         Cardiac Imaging    TRANSTHORACIC ECHO (TTE) COMPLETE 08/28/2023    Interpretation Summary  Technically difficult study.  Definity used for endocardial enhancement.    Mild concentric left ventricular hypertrophy and severely dilated cavity size.  Mildly reduced systolic function.  Estimated EF 40%.  The entire apex is akinetic.  Unable to estimate diastolic function.  Moderately dilated right ventricular cavity size with preserved systolic function.  Pacemaker wire seen in right-sided structures.  Severely dilated left atrium.  Mildly dilated right atrium.  A transcatheter bioprosthetic aortic valve present.  Peak velocity of 2.0 m/s and mean gradient of 8 mmHg.  No transvalvular or paravalvular aortic regurgitation.  Ascending aorta measures 3.8 cm.  Severe, posterior, mitral annular calcification.  Severe mitral regurgitation, directed central-posteriorly, likely in the setting of a restricted posterior leaflet for mitral annular calcification.  Calculated effective regurgitant orifice of 0.4  cm and regurgitant volume of 57 mL calculated by the Pisa method (Pisa 1 cm).  Progressive mitral stenosis with a mean transmitral gradient of 5 mmHg at 65 bpm.  Mild tricuspid regurgitation with calculated RVSP of 43 mmHg plus right atrial pressure..  Pulmonic valve not well visualized.  Trace pulmonic insufficiency.  No pericardial effusion.  IVC is suboptimally visualized, but appears dilated in size. Cannot assess respiratory variation.      Telemetry  V PACED, short run of NSVT and atrial tachycardia     Assessment & Plan    Elevated troponin  Assessment & Plan  -180  History of CAD/CABG/Stents  ECG afib-Vpaced    Likely demand in the setting of CHF   Monitor, no chest pain currently     SSS (sick sinus syndrome) (CMS/[facility])  Assessment & Plan  S/p pacemaker     Paced rhythm on telemetry     A-fib (CMS/[facility])  Assessment & Plan  · Rate controlled with Torprol XL 25 mg  · Cont Xarelto     Mitral regurgitation  Assessment & Plan  Echo with severe MR     Continue volume management with IV diuresis     S/P TAVR (transcatheter aortic valve replacement)  Assessment & Plan  S/p TAVR 2021  Echo- no transvalvular or paravalvular AI     Essential hypertension  Assessment & Plan  Soft but stable   Monitor with diuresis  Cont Spironolactone, Toprol XL     Coronary artery disease involving native coronary artery  Assessment & Plan  Hx of CAD with CABG and Stenting   Elevated troponin likely in the setting of volume overload    Continue Torpol XL, Statin and on Xarelto     * Acute on chronic systolic heart failure (CMS/[facility])  Assessment & Plan  He remains volume overloaded  Echo- EF 40%, Severe MR, progressive Mitral Stenosis. IVC bit visualized well but appears dilated    Continue with IV lasix 60mg daily   I &Os  Daily weight   BMP in AM              MIGUEL Ramos  8/28/2023  3:30 PM     This patient note has been dictated using speech recognition software. Inadvertent speech recognition errors should be disregarded.  Please do not hesitate to call my office for clarifications.

## 2023-08-28 NOTE — PLAN OF CARE
Problem: Adult Inpatient Plan of Care  Goal: Plan of Care Review  Outcome: Progressing  Flowsheets (Taken 8/28/2023 4231)  Progress: improving  Outcome Evaluation: Awaiting PT/OT eval for DC.  Switch to oral abx and lasix. Patient OOB and up in chair, ambulated in hallways.  Fall bundle intact, VSS.  Will continue to monitor.  Plan of Care Reviewed With: patient

## 2023-08-29 ENCOUNTER — TELEPHONE (OUTPATIENT)
Dept: SCHEDULING | Facility: CLINIC | Age: 83
End: 2023-08-29
Payer: COMMERCIAL

## 2023-08-29 ENCOUNTER — TELEPHONE (OUTPATIENT)
Dept: PRIMARY CARE | Facility: CLINIC | Age: 83
End: 2023-08-29
Payer: COMMERCIAL

## 2023-08-29 ENCOUNTER — TELEPHONE (OUTPATIENT)
Dept: PRIMARY CARE | Facility: CLINIC | Age: 83
End: 2023-08-29

## 2023-08-29 VITALS
HEIGHT: 68 IN | OXYGEN SATURATION: 97 % | SYSTOLIC BLOOD PRESSURE: 113 MMHG | DIASTOLIC BLOOD PRESSURE: 63 MMHG | BODY MASS INDEX: 28.49 KG/M2 | HEART RATE: 65 BPM | WEIGHT: 188 LBS | TEMPERATURE: 98.1 F | RESPIRATION RATE: 18 BRPM

## 2023-08-29 LAB
ANION GAP SERPL CALC-SCNC: 7 MEQ/L (ref 3–15)
BASOPHILS # BLD: 0.05 K/UL (ref 0.01–0.1)
BASOPHILS NFR BLD: 0.4 %
BUN SERPL-MCNC: 23 MG/DL (ref 7–25)
CALCIUM SERPL-MCNC: 9.2 MG/DL (ref 8.6–10.3)
CHLORIDE SERPL-SCNC: 100 MEQ/L (ref 98–107)
CO2 SERPL-SCNC: 32 MEQ/L (ref 21–31)
CREAT SERPL-MCNC: 1 MG/DL (ref 0.7–1.3)
DIFFERENTIAL METHOD BLD: ABNORMAL
EOSINOPHIL # BLD: 0.19 K/UL (ref 0.04–0.54)
EOSINOPHIL NFR BLD: 1.6 %
ERYTHROCYTE [DISTWIDTH] IN BLOOD BY AUTOMATED COUNT: 14.2 % (ref 11.6–14.4)
GFR SERPL CREATININE-BSD FRML MDRD: >60 ML/MIN/1.73M*2
GLUCOSE BLD-MCNC: 182 MG/DL (ref 70–99)
GLUCOSE BLD-MCNC: 215 MG/DL (ref 70–99)
GLUCOSE SERPL-MCNC: 181 MG/DL (ref 70–99)
HCT VFR BLDCO AUTO: 33.6 % (ref 40.1–51)
HGB BLD-MCNC: 10.2 G/DL (ref 13.7–17.5)
IMM GRANULOCYTES # BLD AUTO: 0.09 K/UL (ref 0–0.08)
IMM GRANULOCYTES NFR BLD AUTO: 0.8 %
LYMPHOCYTES # BLD: 4.14 K/UL (ref 1.2–3.5)
LYMPHOCYTES NFR BLD: 35.8 %
MCH RBC QN AUTO: 27.7 PG (ref 28–33.2)
MCHC RBC AUTO-ENTMCNC: 30.4 G/DL (ref 32.2–36.5)
MCV RBC AUTO: 91.3 FL (ref 83–98)
MONOCYTES # BLD: 0.9 K/UL (ref 0.3–1)
MONOCYTES NFR BLD: 7.8 %
NEUTROPHILS # BLD: 6.18 K/UL (ref 1.7–7)
NEUTS SEG NFR BLD: 53.6 %
NRBC BLD-RTO: 0 %
PDW BLD AUTO: 8.5 FL (ref 9.4–12.4)
PLATELET # BLD AUTO: 279 K/UL (ref 150–350)
POCT TEST: ABNORMAL
POCT TEST: ABNORMAL
POTASSIUM SERPL-SCNC: 3.7 MEQ/L (ref 3.5–5.1)
RBC # BLD AUTO: 3.68 M/UL (ref 4.5–5.8)
SODIUM SERPL-SCNC: 139 MEQ/L (ref 136–145)
WBC # BLD AUTO: 11.55 K/UL (ref 3.8–10.5)

## 2023-08-29 PROCEDURE — 99238 HOSP IP/OBS DSCHRG MGMT 30/<: CPT | Performed by: STUDENT IN AN ORGANIZED HEALTH CARE EDUCATION/TRAINING PROGRAM

## 2023-08-29 PROCEDURE — 97166 OT EVAL MOD COMPLEX 45 MIN: CPT | Mod: GO

## 2023-08-29 PROCEDURE — 99232 SBSQ HOSP IP/OBS MODERATE 35: CPT | Performed by: STUDENT IN AN ORGANIZED HEALTH CARE EDUCATION/TRAINING PROGRAM

## 2023-08-29 PROCEDURE — 63700000 HC SELF-ADMINISTRABLE DRUG: Performed by: INTERNAL MEDICINE

## 2023-08-29 PROCEDURE — 85025 COMPLETE CBC W/AUTO DIFF WBC: CPT | Performed by: INTERNAL MEDICINE

## 2023-08-29 PROCEDURE — 25000000 HC PHARMACY GENERAL: Performed by: INTERNAL MEDICINE

## 2023-08-29 PROCEDURE — 80048 BASIC METABOLIC PNL TOTAL CA: CPT | Performed by: INTERNAL MEDICINE

## 2023-08-29 PROCEDURE — 36415 COLL VENOUS BLD VENIPUNCTURE: CPT | Performed by: INTERNAL MEDICINE

## 2023-08-29 PROCEDURE — 97535 SELF CARE MNGMENT TRAINING: CPT | Mod: GO

## 2023-08-29 PROCEDURE — 63700000 HC SELF-ADMINISTRABLE DRUG: Performed by: STUDENT IN AN ORGANIZED HEALTH CARE EDUCATION/TRAINING PROGRAM

## 2023-08-29 PROCEDURE — 97162 PT EVAL MOD COMPLEX 30 MIN: CPT | Mod: GP

## 2023-08-29 RX ORDER — METOPROLOL SUCCINATE 25 MG/1
25 TABLET, EXTENDED RELEASE ORAL DAILY
Qty: 30 TABLET | Refills: 0 | Status: SHIPPED | OUTPATIENT
Start: 2023-08-30

## 2023-08-29 RX ORDER — FUROSEMIDE 40 MG/1
40 TABLET ORAL DAILY
Qty: 30 TABLET | Refills: 0 | Status: SHIPPED
Start: 2023-08-30 | End: 2023-10-18 | Stop reason: DRUGHIGH

## 2023-08-29 RX ORDER — LIDOCAINE 560 MG/1
1 PATCH PERCUTANEOUS; TOPICAL; TRANSDERMAL DAILY
Status: DISCONTINUED | OUTPATIENT
Start: 2023-08-29 | End: 2023-08-29 | Stop reason: HOSPADM

## 2023-08-29 RX ADMIN — MULTIPLE VITAMINS W/ MINERALS TAB 1 TABLET: TAB at 08:46

## 2023-08-29 RX ADMIN — ALBUTEROL SULFATE 2.5 MG: 2.5 SOLUTION RESPIRATORY (INHALATION) at 08:46

## 2023-08-29 RX ADMIN — Medication: at 04:36

## 2023-08-29 RX ADMIN — ALBUTEROL SULFATE 2.5 MG: 2.5 SOLUTION RESPIRATORY (INHALATION) at 12:12

## 2023-08-29 RX ADMIN — TRAMADOL HYDROCHLORIDE 50 MG: 50 TABLET, COATED ORAL at 03:56

## 2023-08-29 RX ADMIN — SPIRONOLACTONE 25 MG: 25 TABLET ORAL at 08:46

## 2023-08-29 RX ADMIN — THIAMINE HCL TAB 100 MG 100 MG: 100 TAB at 08:46

## 2023-08-29 RX ADMIN — FOLIC ACID 1 MG: 1 TABLET ORAL at 08:46

## 2023-08-29 RX ADMIN — FUROSEMIDE 40 MG: 40 TABLET ORAL at 08:51

## 2023-08-29 RX ADMIN — INSULIN LISPRO 3 UNITS: 100 INJECTION, SOLUTION INTRAVENOUS; SUBCUTANEOUS at 08:46

## 2023-08-29 RX ADMIN — METOPROLOL SUCCINATE 25 MG: 25 TABLET, FILM COATED, EXTENDED RELEASE ORAL at 08:46

## 2023-08-29 RX ADMIN — SERTRALINE HYDROCHLORIDE 50 MG: 50 TABLET ORAL at 08:46

## 2023-08-29 RX ADMIN — FERROUS SULFATE TAB 325 MG (65 MG ELEMENTAL FE) 325 MG: 325 (65 FE) TAB at 08:46

## 2023-08-29 RX ADMIN — INSULIN LISPRO 3 UNITS: 100 INJECTION, SOLUTION INTRAVENOUS; SUBCUTANEOUS at 12:12

## 2023-08-29 RX ADMIN — LEVOTHYROXINE SODIUM 112 MCG: 0.11 TABLET ORAL at 08:46

## 2023-08-29 RX ADMIN — PANTOPRAZOLE SODIUM 40 MG: 40 TABLET, DELAYED RELEASE ORAL at 08:46

## 2023-08-29 RX ADMIN — BUSPIRONE HYDROCHLORIDE 5 MG: 10 TABLET ORAL at 08:46

## 2023-08-29 RX ADMIN — LIDOCAINE 1 PATCH: 4 PATCH TOPICAL at 10:50

## 2023-08-29 ASSESSMENT — COGNITIVE AND FUNCTIONAL STATUS - GENERAL
MOVING TO AND FROM BED TO CHAIR: 4 - NONE
DRESSING REGULAR UPPER BODY CLOTHING: 4 - NONE
HELP NEEDED FOR PERSONAL GROOMING: 4 - NONE
EATING MEALS: 4 - NONE
AFFECT: WFL
DRESSING REGULAR LOWER BODY CLOTHING: 4 - NONE
TOILETING: 4 - NONE
WALKING IN HOSPITAL ROOM: 4 - NONE
HELP NEEDED FOR BATHING: 4 - NONE
WALKING IN HOSPITAL ROOM: 4 - NONE
MOVING TO AND FROM BED TO CHAIR: 4 - NONE
CLIMB 3 TO 5 STEPS WITH RAILING: 4 - NONE
AFFECT: WFL
STANDING UP FROM CHAIR USING ARMS: 4 - NONE
CLIMB 3 TO 5 STEPS WITH RAILING: 3 - A LITTLE
STANDING UP FROM CHAIR USING ARMS: 4 - NONE

## 2023-08-29 ASSESSMENT — GAIT ASSESSMENTS
STEP SYMMETRY: RIGHT AND LEFT STEP APPEAR EQUAL
RIGHT STEP LENGTH AND HEIGHT: RIGHT FOOT COMPLETE CLEARS FLOOR
INITIATION OF GAIT SCORE: NO HESITANCY
GAIT SCORE: 10
TRUNK: NO SWAY BUT FLEXION OF KNEES OR BACK OR SPREADS ARMS OUT WHILE WALKING
RIGHT STEP LENGTH AND HEIGHT: PASSES LEFT STANCE FOOT
LEFT STEP LENGTH AND HEIGHT: PASSES RIGHT STANCE FOOT
TINETTI GAIT ASSESSMENT: CONTINUOUS
LEFT STEP LENGTH AND HEIGHT: LEFT FOOT COMPLETE CLEARS FLOOR
PATH: STRAIGHT WITHOUT WALKING AID
STEP CONTINUITY: STEPS APPEAR CONTINUOUS
WALKING TIME: HEELS APART

## 2023-08-29 ASSESSMENT — BALANCE ASSESSMENTS
SITTING BALANCE: STEADY BUT WIDE STANCE, USES CANE OR OTHER SUPPORT
ARISES: ABLE, USES ARMS TO HELP
EYES CLOSED SCORE: STEADY
NUDGED: STEADY WITHOUT WALKER OR OTHER SUPPORT
TURNING 360 DEGREES STEADINESS: STEADY
SITTING DOWN: USES ARMS OR NOT A SMOOTH MOTION
IMMEDIATE STANDING BALANCE FIRST 5 SECONDS: STEADY WITHOUT WALKER OR OTHER SUPPORT
BALANCE SCORE: 13
TOTAL SCORE: 23
SITTING BALANCE: STEADY, SAFE
ATTEMPTS TO ARISE: ABLE TO ARISE, ONE ATTEMPT

## 2023-08-29 NOTE — TELEPHONE ENCOUNTER
Pts daughter would like to speak with Dr. Chambers about her father, home care and the direction of his health. She states that he is back in the hospital.

## 2023-08-29 NOTE — TELEPHONE ENCOUNTER
Please see telephone with same date - VM received from pts daughter earlier today. Dr. Chambers was out of the office until this afternoon.

## 2023-08-29 NOTE — TELEPHONE ENCOUNTER
Patient's daughter Monique is requesting a call back concerning her father's care. He was re hospitalized for pneumonia and Monique would like to talk to Dr Chambers before she sees him again.

## 2023-08-29 NOTE — TELEPHONE ENCOUNTER
L/M with daughter Ruthann to call and schedule appt. For her father.  Care-giver suggested we call her.

## 2023-08-29 NOTE — TELEPHONE ENCOUNTER
Called Monique to discuss concerns with dad living on his own. She thinks it is time for assisted living situation. She would like me to discuss this situation with Liu in the office. Will have office staff contact him to schedule a follow up appointment within the next one week.     Melanie Chambers, DO

## 2023-08-29 NOTE — TELEPHONE ENCOUNTER
Patient's caretaker called back to advise that the appt that has been scheduled for Hospital Follow up need to be cancelled due to lack of transportation.    Patient will only have transportation Monday, Wednesday or Thursday from 9:00 AM - 12:00 PM.  Please call back with a different appt offering time.

## 2023-08-29 NOTE — DISCHARGE SUMMARY
.     Hospital Medicine Service -  Inpatient Discharge Summary        BRIEF OVERVIEW   Patient: Cecil Morris (1940)    Admitting Provider: Hansel Adhikari MD  Attending Provider: Catarino Miguel MD Attending phys phone: [phone]    PCP: Melanie Chambers [phone]    Admission Date: 8/26/2023  Discharge Date: 8/29/2023     DISCHARGE DIAGNOSES      Primary Discharge Diagnosis  Acute on chronic systolic heart failure (CMS/HCC)    Secondary Discharge Diagnoses  Active Hospital Problems    Diagnosis Date Noted    Acute on chronic systolic heart failure (CMS/[facility]) 08/26/2023    Elevated troponin 08/26/2023    Leukocytosis 04/14/2023    Acquired hypothyroidism 11/10/2022    Depression 11/10/2022    Mitral regurgitation 12/10/2020    A-fib (CMS/HCC) 11/13/2020    Anemia 11/05/2020    SSS (sick sinus syndrome) (CMS/HCC) 12/09/2019    Coronary artery disease involving native coronary artery 11/11/2019    Essential hypertension 11/11/2019    Type 2 diabetes mellitus without complication, without long-term current use of insulin (CMS/HCC) 11/11/2019    Hypercholesteremia 11/11/2019    S/P TAVR (transcatheter aortic valve replacement) 11/11/2019      Resolved Hospital Problems   No resolved problems to display.       Problem List on Day of Discharge  No new Assessment & Plan notes have been filed under this hospital service since the last note was generated.  Service: Hospitalist      SUMMARY OF HOSPITALIZATION      Presenting Problem/History of Present Illness  This is a 83 y.o. year-old male admitted on 8/26/2023 with Acute on chronic systolic heart failure (CMS/[facility]).    See H&P    Hospital Course  83 year old male with past medical history of dementia, CHF, CAD s/p angioplasty, A-fib on Xarelto, AAA, Type 2 DM, HTN, HLD, anemia and hyperthyroidism, S/P CABG, TAVR 2021 presents to [facility] with dyspnea with exertion and cough. He was evaluated by Cardiology ans was diuresed with IV Lasix  and he will transition to an increased dose of oral lasix on discharge and continue home oxygen and home care services.      Exam on Day of Discharge  Physical Exam  Vitals and nursing note reviewed.   Constitutional:       Appearance: Normal appearance.   HENT:      Head: Normocephalic and atraumatic.      Mouth/Throat:      Mouth: Mucous membranes are moist.   Eyes:      Conjunctiva/sclera: Conjunctivae normal.   Cardiovascular:      Rate and Rhythm: Normal rate. Rhythm irregular.      Heart sounds: Normal heart sounds.   Pulmonary:      Effort: Pulmonary effort is normal.      Comments: Diminished   Abdominal:      General: Bowel sounds are normal.      Palpations: Abdomen is soft.   Musculoskeletal:         General: Normal range of motion.      Cervical back: Normal range of motion and neck supple.   Skin:     General: Skin is warm and dry.   Neurological:      General: No focal deficit present.      Mental Status: He is alert and oriented to person, place, and time.   Psychiatric:         Mood and Affect: Mood normal.         Behavior: Behavior normal.         Consults During Admission  IP CONSULT TO SOCIAL WORK  IP CONSULT TO CARDIOLOGY    DISCHARGE MEDICATIONS               Medication List      CHANGE how you take these medications    furosemide 40 mg tablet  Commonly known as: LASIX  Start taking on: August 30, 2023  Take 1 tablet (40 mg total) by mouth daily.  Dose: 40 mg  What changed:   · medication strength  · how much to take  · how to take this  · when to take this     metoprolol succinate XL 25 mg 24 hr tablet  Commonly known as: TOPROL-XL  Start taking on: August 30, 2023  Take 1 tablet (25 mg total) by mouth daily.  Dose: 25 mg  What changed:   · how much to take  · how to take this  · when to take this  · additional instructions        CONTINUE taking these medications    acetaminophen 325 mg tablet  Commonly known as: TYLENOL  Take 650 mg by mouth as needed.  Dose: 650 mg     * albuterol 2.5 mg /3  mL (0.083 %) nebulizer solution  TAKE 3 ML (2.5 MG TOTAL) BY NEBULIZATION 4 (FOUR) TIMES A DAY.  Dose: 2.5 mg     * albuterol HFA 90 mcg/actuation inhaler  Inhale 2 puffs every 6 (six) hours as needed for wheezing.  Dose: 2 puff     atorvastatin 20 mg tablet  Commonly known as: LIPITOR  Take 1 tablet (20 mg total) by mouth daily. Pill pack  Dose: 20 mg     busPIRone 5 mg tablet  Commonly known as: BUSPAR  Take 1 tablet (5 mg total) by mouth 2 (two) times a day. Pill pack  Dose: 5 mg     clonazePAM 0.5 mg tablet  Commonly known as: klonoPIN  Take 1 tablet (0.5 mg total) by mouth nightly as needed for anxiety.  Dose: 0.5 mg     diclofenac sodium 1 % topical gel  Commonly known as: VOLTAREN  Apply 1 g topically as needed.  Dose: 1 g     donepeziL 10 mg tablet  Commonly known as: ARICEPT  Take 1 tablet (10 mg total) by mouth nightly.  Dose: 10 mg     empagliflozin 25 mg tablet  Commonly known as: JARDIANCE  Take 1 tablet (25 mg total) by mouth daily.  Dose: 25 mg     ferrous sulfate 325 mg (65 mg iron) tablet  Take 1 tablet (325 mg total) by mouth daily with breakfast.  Dose: 325 mg     fluticasone-umeclidinium-vilanterol 100-62.5-25 mcg blister with device powder for inhalation  Commonly known as: TRELEGY ELLIPTA  Inhale 1 puff daily.  Dose: 1 puff     folic acid 1 mg tablet  Commonly known as: FOLVITE  Take 1 tablet (1 mg total) by mouth daily.  Dose: 1 mg     levothyroxine 112 mcg tablet  Commonly known as: SYNTHROID  Take 1 tablet (112 mcg total) by mouth daily. Pill pack  Dose: 112 mcg     lidocaine 4 % adhesive patch,medicated topical patch  Commonly known as: ASPERCREME  Apply 1 patch topically daily.  Dose: 1 patch     loratadine 10 mg tablet  Commonly known as: CLARITIN  Take 10 mg by mouth daily.  Dose: 10 mg     magnesium oxide 400 mg (241.3 mg magnesium) tablet  Commonly known as: MAG-OX  Take 1 tablet (400 mg total) by mouth 2 (two) times a day.  Dose: 400 mg     medical marijuana  Take 1 each by mouth as  needed.  Dose: 1 each     melatonin 5 mg capsule  Take 5 mg by mouth nightly. Hs prn  Dose: 5 mg     multivitamin tablet  Take 1 tablet by mouth daily.  Dose: 1 tablet     nebulizers misc  1 each 4 (four) times a day as needed (sob or wheeze).  Dose: 1 each     pantoprazole 40 mg EC tablet  Commonly known as: PROTONIX  Take 1 tablet (40 mg total) by mouth 2 (two) times a day. Pill pack  Dose: 40 mg     rivaroxaban 20 mg tablet  Commonly known as: XARELTO  Take 1 tablet (20 mg total) by mouth daily with dinner.  Dose: 20 mg     sertraline 50 mg tablet  Commonly known as: ZOLOFT  Take 1 tablet (50 mg total) by mouth daily. Pill pack  Dose: 50 mg     spironolactone 25 mg tablet  Commonly known as: ALDACTONE  Take 1 tablet (25 mg total) by mouth daily.  Dose: 25 mg     thiamine 100 mg tablet  Take 1 tablet (100 mg total) by mouth daily.  Dose: 100 mg         * This list has 2 medication(s) that are the same as other medications prescribed for you. Read the directions carefully, and ask your doctor or other care provider to review them with you.                 Instructions for after discharge     Call provider for:  difficulty breathing, headache or visual disturbances      Call provider for:  extreme fatigue      Call provider for:  persistent dizziness or light-headedness      Call provider for:  persistent nausea or vomiting      Call provider for:  rash      Call provider for:  severe uncontrolled pain      Call provider for:  temperature >100.4      Discharge diet      Diet Type / Texture:  Regular  Cardiac (Low Sodium, Low Fat)       Follow-up with primary physician (PCP)      1 week for review of hospital management    Follow-up with provider      Follow up 7-10 days    Yousuf Wolf MD   [phone]    [address] [place] [id number]       Post-Discharge Activity: Normal activity as tolerated.      Normal activity as tolerated.             PROCEDURES / LABS / IMAGING        Pertinent Labs  .  Results  from last 7 days   Lab Units 08/29/23  0254   WBC K/uL 11.55*   HEMOGLOBIN g/dL 10.2*   HEMATOCRIT % 33.6*   PLATELETS K/uL 279   .  Results from last 7 days   Lab Units 08/29/23  0254   SODIUM mEQ/L 139   POTASSIUM mEQ/L 3.7   CHLORIDE mEQ/L 100   CO2 mEQ/L 32*   BUN mg/dL 23   CREATININE mg/dL 1.0   GLUCOSE mg/dL 181*   CALCIUM mg/dL 9.2         Pertinent Imaging  Ultrasound venous leg bilateral    Result Date: 8/28/2023  IMPRESSION: No deep venous thrombosis in the lower extremities.    X-RAY CHEST 2 VIEWS    Result Date: 8/26/2023  IMPRESSION: See comment.     X-RAY CHEST 1 VIEW    Result Date: 8/18/2023  IMPRESSION:  Mildly vascular congestion, similar to 8/13/2023. Left basilar atelectasis suspected. No definite consolidation. COMPARISON: Chest studies from 7/26/2023 through 8/13/2023.. COMMENT:  AP upright portable imaging of the chest completed 14. 3 hours. Mild congestive changes are similar to the August 13, 2023 2 view chest study.. Mild stable cardiac enlargement noted. Pacemaker on the left, 2 leads in stable position. Stable hilar and mediastinal contours. No definite congestive changes or visible pleural fluid. Minor volume loss at the left base.    X-RAY CHEST 2 VIEWS    Result Date: 8/14/2023  IMPRESSION: Similar appearance of patchy groundglass opacities within both lungs which may reflect multifocal pneumonia and/or pulmonary edema. COMMENT: Comparison: Chest x-ray and CT chest 8/11/2023. Technique: PA frontal and lateral views of the chest were obtained. FINDINGS: There is similar appearance of patchy groundglass opacities throughout both lungs. Again noted is mild blunting of the right costophrenic angle corresponding to pleural thickening. No significant pleural effusion is seen. There is no pneumothorax. Again seen is a left chest wall dual-lead pacemaker with right atrial right ventricular leads. There is stable enlargement of the cardiac silhouette. There are median sternotomy wires and  mediastinal clips. 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 The upper abdomen is unremarkable. There are multilevel degenerative changes of the thoracic spine. There is no acute osseous abnormality.     CT ANGIOGRAPHY CHEST/ABDOMEN/PELVIS WITH AND WITHOUT IV CONTRAST    Result Date: 8/12/2023  IMPRESSION: No evidence of pulmonary embolism. Ectatic ascending aorta is noted, measures 4.2 cm in diameter at the level of right pulmonary artery. Liver contour is mildly nodular, cannot rule out cirrhosis. Scattered consolidation and groundglass opacity seen in lungs, may represent multifocal pneumonia or pulmonary edema.     X-RAY CHEST 2 VIEWS    Result Date: 8/11/2023  IMPRESSION: Cardiomegaly and mild interstitial edema      OUTPATIENT  FOLLOW-UP / REFERRALS / PENDING TESTS        Outpatient Follow-Up Appointments            In 3 weeks [facility]    In 1 month Yousuf Wolf MD [facility] in [facility]    In 2 months Melanie Chambers DO [facility] in [facility]          Referrals  No orders of the defined types were placed in this encounter.      Test Results Pending at Discharge  Unresulted Labs (From admission, onward)     Start     Ordered    08/27/23 0600  CBC and differential  Daily      Question:  Release to patient  Answer:  Immediate   Start Status   08/30/23 0600 Scheduled   08/31/23 0600 Scheduled   09/01/23 0600 Scheduled   09/02/23 0600 Scheduled       08/26/23 1856    08/27/23 0600  Basic metabolic panel  Daily      Question:  Release to patient  Answer:  Immediate   Start Status   08/30/23 0600 Scheduled   08/31/23 0600 Scheduled   09/01/23 0600 Scheduled   09/02/23 0600 Scheduled       08/26/23 1856                Important Issues to Address in Follow-Up      DISCHARGE DISPOSITION AND DESTINATION      Disposition: Home   Destination:                              Code Status At Discharge: Full Code    Physician Order for  Life-Sustaining Treatment Document Status      No documents found

## 2023-08-29 NOTE — PROGRESS NOTES
08/29/23 1152   Hospital to Home   Patient Enrolled in [facility]? Yes   [facility] Coordinator Comment Spoke w/ pt. to discuss Hospital to Home program. He is agreeable to a phone visit. Telemed appt. with [facility] provider scheduled for Friday, Sept. 1, 2023 at 10am. He declined assistance making other follow-up appt's.

## 2023-08-29 NOTE — CONSULTS
Transitions of Care - Heart Failure  Pharmacy Counseling Note      SUMMARY   Met with Cecil Morris for Heart Failure/medication education. The patient states He is NOT familiar with his medications. He doesn't know what he takes. His pharmacy provides weekly pill boxes for him (with AM and PM) and delivers on Fridays.     Medications:  Reviewed with patient hat 2 of his medication changes - increase in metoprolol and furosemide.        Discussed the importance of compliance. The patient states compliance. Pt states he never forgets to take his medications.     Low sodium diet: briefly reviewed low sodium diet. He follows low salt, low sugar diet.    Weight Management: discussed the importance of daily weights. Discussed recording weight each morning after voiding, but prior to taking diuretic.  Discussed contacting doctor if 2-3 lb weight gain in a day or 5 lb weight gain in a week.  Pt does NOT weigh himself daily. When a visiting RN comes, they weigh him. He does state that he has a scale at home. Discussed starting to track his daily weights as a way to monitor his fluid status. Discussed when to call his MD. Patient was provided with a [facility] Heart Health education packet including a calendar to track daily weights. Reviewed at the bottom of each month on the calendar it states when to call MD with weight gain.      MEDICATIONS     Medication List   albuterol  2.5 mg nebulization QID (8a, 12p, 4p, 8p)    atorvastatin  20 mg oral Daily (6p)    busPIRone  5 mg oral BID    camphor-methyl salicyl-menthoL   Topical BID    donepeziL  10 mg oral Nightly    ferrous sulfate  325 mg oral Daily with breakfast    folic acid  1 mg oral Daily    furosemide  40 mg oral Daily    insulin lispro U-100  3-5 Units subcutaneous TID with meals    levothyroxine  112 mcg oral Daily (6:30a)    lidocaine  1 patch Topical q24h INT    lidocaine  1 patch Topical Daily    metoprolol succinate XL  25 mg oral Daily     multivitamin  1 tablet oral Daily    pantoprazole  40 mg oral BID    rivaroxaban  20 mg oral Daily with dinner    sertraline  50 mg oral Daily    spironolactone  25 mg oral Daily    thiamine  100 mg oral Daily       acetaminophen    clonazePAM    guaiFENesin    traMADoL          Bria Matthews, PharmD,    Transitions of Care Clinical Pharmacist  (Time Spent: 15 minutes)  8/29/2023

## 2023-08-29 NOTE — TELEPHONE ENCOUNTER
Hospital Follow Up     Name of patient: Cecil Morris    Caller Name: Ann    Relationship to patient Healthcare     New or Established: Est    Reason for visit: CHF, pt was put back on 40mg lasix and metoprolol was increasews was increased, pt is now on oxygen     Previous Cardiologist: Cecilia    PCP: Melanie Chambers DO    Insurance name: [facility]    Doctor requested:     Name of hospital pt was admitted:     Timeframe instructed to follow-up within: 7-10 Days     Best contact number: [phone]

## 2023-08-29 NOTE — PLAN OF CARE
Problem: Adult Inpatient Plan of Care  Goal: Plan of Care Review  Outcome: Progressing  Flowsheets (Taken 8/29/2023 1111)  Progress: improving  Outcome Evaluation: PT eval completed, AMPAC 24, mod I txs and ambulation without AD with VSS on 2L o2 via n.c. anticipate d/c to home with baseline supports, no additional acute PT needs identified.  Plan of Care Reviewed With: patient

## 2023-08-29 NOTE — PROGRESS NOTES
Cardiology Daily Progress Note       Subjective      Overview:Cecil Morris is a 83 y.o. male dementia, combined systolic and diastolic heart failure, coronary artery disease status post CABG and stenting, atrial fibrillation on Xarelto, abdominal aortic aneurysm, type 2 diabetes mellitus, hypertension, hyperlipidemia, anemia, hypothyroidism, sick sinus syndrome status post pacemaker placement, RBBB and pulmonary hypertension .[facility]      He presented with c/o of Sob and Chest pain. His visiting nursing aide found him to be dyspneic and advised to go to ED. He is receiving diuresis for volume overload      Interval history:  Patient resting comfortably out of bed on 1 L nasal cannula. He was transitioned to oral Lasix today and reports feeling well. Specifically, he denies chest pain, shortness of breath, palpitations, fluttering, PND or orthopnea. He notes that he is to be discharged home today.          Patient has no known allergies.  Current Facility-Administered Medications   Medication Dose Route Frequency Provider Last Rate Last Admin    acetaminophen (TYLENOL) tablet 650 mg  650 mg oral q8h PRN Hansel Adhikari MD   650 mg at 08/28/23 2105    albuterol nebulizer solution 2.5 mg  2.5 mg nebulization QID (8a, 12p, 4p, 8p) Hansel Adhikari MD   2.5 mg at 08/29/23 1212    atorvastatin (LIPITOR) tablet 20 mg  20 mg oral Daily (6p) Hansel Adhikari MD   20 mg at 08/28/23 1633    busPIRone (BUSPAR) tablet 5 mg  5 mg oral BID Hansel Adhikari MD   5 mg at 08/29/23 0846    camphor-methyl salicyl-menthoL (MUSCLE RUB) cream   Topical BID Tarik Kerns MD   Given at 08/29/23 0436    clonazePAM (klonoPIN) tablet 0.5 mg  0.5 mg oral Nightly PRN Hansel Adhikari MD   0.5 mg at 08/28/23 2223    donepeziL (ARICEPT) tablet 10 mg  10 mg oral Nightly Hansel Adhikari MD   10 mg at 08/28/23 2041    ferrous sulfate tablet 325 mg  325 mg oral Daily with breakfast Hansel Adhikari  "MD   325 mg at 08/29/23 0846    folic acid (FOLVITE) tablet 1 mg  1 mg oral Daily Hansel Adhikari MD   1 mg at 08/29/23 0846    furosemide (LASIX) tablet 40 mg  40 mg oral Daily Catarino Miguel MD   40 mg at 08/29/23 0851    guaiFENesin (ROBITUSSIN) 100 mg/5 mL liquid 200 mg  200 mg oral q4h PRN Catarino Miguel MD   200 mg at 08/27/23 1301    insulin lispro U-100 (HumaLOG) pen 3-5 Units  3-5 Units subcutaneous TID with meals Hansel Adhikari MD   3 Units at 08/29/23 1212    levothyroxine (SYNTHROID) tablet 112 mcg  112 mcg oral Daily (6:30a) Hansel Adihkari MD   112 mcg at 08/29/23 0846    lidocaine (ASPERCREME) 4 % topical patch 1 patch  1 patch Topical q24h INT Catarino Miguel MD   1 patch at 08/28/23 1131    lidocaine (ASPERCREME) 4 % topical patch 1 patch  1 patch Topical Daily Catarino Miguel MD   1 patch at 08/29/23 1050    metoprolol succinate XL (TOPROL-XL) 24 hr ER tablet 25 mg  25 mg oral Daily Hansel Adhikari MD   25 mg at 08/29/23 0846    multivitamin tablet 1 tablet  1 tablet oral Daily Hansel Adhikari MD   1 tablet at 08/29/23 0846    pantoprazole (PROTONIX) tablet,delayed release (DR/EC) 40 mg  40 mg oral BID Hansel Adhikari MD   40 mg at 08/29/23 0846    rivaroxaban (XARELTO) tablet 20 mg  20 mg oral Daily with dinner Hansel Adhikari MD   20 mg at 08/28/23 1633    sertraline (ZOLOFT) tablet 50 mg  50 mg oral Daily Hansel Adhikari MD   50 mg at 08/29/23 0846    spironolactone (ALDACTONE) tablet 25 mg  25 mg oral Daily Hansel Adhikari MD   25 mg at 08/29/23 0846    thiamine (VITAMIN B1) tablet 100 mg  100 mg oral Daily Hansel Adhikari MD   100 mg at 08/29/23 0846    traMADoL (ULTRAM) tablet 50 mg  50 mg oral q6h PRN Catarino Miguel MD   50 mg at 08/29/23 0356         Objective   Visit Vitals  /63 (BP Location: Right upper arm, Patient Position: Sitting)   Pulse 65   Temp 36.7 °C (98.1 °F) (Oral)   Resp 18   Ht 1.727 m (5' 8\")   Wt 85.3 kg (188 lb) "   SpO2 97%   BMI 28.59 kg/m²         Intake/Output Summary (Last 24 hours) at 8/29/2023 1332  Last data filed at 8/29/2023 1047  Gross per 24 hour   Intake 1070 ml   Output 400 ml   Net 670 ml       Physical Exam  Constitutional:       General: He is not in acute distress.     Appearance: He is well-developed. He is not diaphoretic.   HENT:      Head: Normocephalic.   Cardiovascular:      Rate and Rhythm: Normal rate.      Heart sounds: Normal heart sounds.      Comments: paced  Pulmonary:      Effort: No respiratory distress.      Breath sounds: Wheezing present.   Chest:      Chest wall: No tenderness.   Abdominal:      General: There is no distension.      Palpations: Abdomen is soft.      Tenderness: There is no abdominal tenderness.   Musculoskeletal:         General: Normal range of motion.      Cervical back: Normal range of motion.      Right lower leg: No edema.      Left lower leg: No edema.   Skin:     General: Skin is warm and dry.   Neurological:      Mental Status: He is alert and oriented to person, place, and time.         Labs   Lab Results   Component Value Date    WBC 11.55 (H) 08/29/2023    HGB 10.2 (L) 08/29/2023    HCT 33.6 (L) 08/29/2023     08/29/2023    CHOL 138 11/17/2022    TRIG 71 11/17/2022    HDL 64 11/17/2022    LDLCALC 59 11/17/2022    ALT 16 08/26/2023    AST 18 08/26/2023     08/29/2023    K 3.7 08/29/2023     08/29/2023    CREATININE 1.0 08/29/2023    BUN 23 08/29/2023    CO2 32 (H) 08/29/2023    TSH 3.06 04/21/2023    GLUCOSE 181 (H) 08/29/2023    HGBA1C 8.1 (H) 07/11/2023       Imaging    Imaging- Chest XRAY 08/26/23  Left chest wall CIED. Median sternotomy wires in place. Prosthetic cardiac valve  noted. Stable patchy bilateral airspace opacities. The cardiopericardial  silhouette is enlarged, stable. There are degenerative changes in the spine.    Cardiac Imaging    TRANSTHORACIC ECHO (TTE) COMPLETE 08/28/2023    Interpretation Summary  Technically difficult  study.  Definity used for endocardial enhancement.    Mild concentric left ventricular hypertrophy and severely dilated cavity size.  Mildly reduced systolic function.  Estimated EF 40%.  The entire apex is akinetic.  Unable to estimate diastolic function.  Moderately dilated right ventricular cavity size with preserved systolic function.  Pacemaker wire seen in right-sided structures.  Severely dilated left atrium.  Mildly dilated right atrium.  A transcatheter bioprosthetic aortic valve present.  Peak velocity of 2.0 m/s and mean gradient of 8 mmHg.  No transvalvular or paravalvular aortic regurgitation.  Ascending aorta measures 3.8 cm.  Severe, posterior, mitral annular calcification.  Severe mitral regurgitation, directed central-posteriorly, likely in the setting of a restricted posterior leaflet for mitral annular calcification.  Calculated effective regurgitant orifice of 0.4 cm and regurgitant volume of 57 mL calculated by the Pisa method (Pisa 1 cm).  Progressive mitral stenosis with a mean transmitral gradient of 5 mmHg at 65 bpm.  Mild tricuspid regurgitation with calculated RVSP of 43 mmHg plus right atrial pressure..  Pulmonic valve not well visualized.  Trace pulmonic insufficiency.  No pericardial effusion.  IVC is suboptimally visualized, but appears dilated in size. Cannot assess respiratory variation.    ECG         Telemetry  Paced     Assessment & Plan    Elevated troponin  Assessment & Plan  -180  History of CAD/CABG/Stents  ECG afib-Vpaced    Likely demand in the setting of CHF   Monitor, no chest pain currently     SSS (sick sinus syndrome) (CMS/[facility])  Assessment & Plan  S/p pacemaker     Paced rhythm on telemetry     A-fib (CMS/[facility])  Assessment & Plan  · Rate controlled with Torprol XL 25 mg  · Cont Xarelto     Mitral regurgitation  Assessment & Plan  Echo with severe MR     Continue volume management with IV diuresis     S/P TAVR (transcatheter aortic valve replacement)  Assessment &  Plan  S/p TAVR 2021  Echo- no transvalvular or paravalvular AI     Essential hypertension  Assessment & Plan  Soft but stable   Monitor with diuresis  Cont Spironolactone, Toprol XL     Coronary artery disease involving native coronary artery  Assessment & Plan  Hx of CAD with CABG and Stenting   Elevated troponin likely in the setting of volume overload    Continue Torpol XL, Statin and on Xarelto     * Acute on chronic systolic heart failure (CMS/HCC)  Assessment & Plan  He remains volume overloaded  Echo- EF 40%, Severe MR, progressive Mitral Stenosis. IVC bit visualized well but appears dilated    Continue with IV lasix 60mg daily   I &Os  Daily weight   BMP in AM     Updates 8/29/2023:  -- Agree with transition to oral Lasix at increased dose  -- Patient will need outpatient follow up with primary cardiologist, Dr Wolf, within 2 weeks of discharge           MIGUEL Winkler  8/29/2023  1:32 PM     This patient note has been dictated using speech recognition software. Inadvertent speech recognition errors should be disregarded. Please do not hesitate to call my office for clarifications.

## 2023-08-29 NOTE — PLAN OF CARE
Care Coordination Discharge Plan Note     Discharge Needs Assessment  Concerns to be Addressed: care coordination/care conferences, discharge planning  Current Discharge Risk: lives alone, lack of support system/caregiver    Anticipated Discharge Plan  Anticipated Discharge Disposition: home with home health  Type of Home Care Services: home PT, nursing        Patient Choice  Offered/Gave Vendor List: yes  Patient's Choice of Community Agency(s): Patient open with [facility]    Patient and/or patient guardian/advocate was made aware of their right to choose a provider. A list of eligible providers was presented and reviewed with the patient and/or patient guardian/advocate in written and/or verbal form. The list delineates providers in the patients desired geographic area who are participating in the Medicare program and/or providers contracted with the patients primary insurance. The Medicare list and quality ratings were obtained from the Medicare.gov [medicare.gov] website.    ---------------------------------------------------------------------------------------------------------------------    Interdisciplinary Discharge Plan Review:  Participants:    Concerns Comments: Patient needs assistance getting up 6 steps and in to mobile home.    Discharge Plan:   Disposition/Destination: Home  / Home  Discharge Facility:    Community Resources:      Discharge Transportation:  Is Out of Hospital DNR needed at Discharge:    Does patient need discharge transport? Yes  Discharge Transportation Vendor: other (see comment) ([facility]  [phone])  Type of Transportation: basic life support  What day is the transport expected?: 08/29/23  What time is the transport expected?: 1430       Patient recommended for home care post discharge. TONYA sent resumption of care to [facility] via [employer].   The patient reported that he needs an O2 tank to go home with. TONYA contacted [facility] who confirmed that SW can provide O2  tank to patient at discharge.     The patient requested transport and stated that he needed assistance getting in to his mobile home. TONYA set up transport with [facility] for 2:30pm.      SW  Provided patient with a portable O2 tank  prior to discharge.     TONYA updated MITCHELL Walls and MD Miguel. TONYA left voice message for the patients daughter Jovana [phone]     TONYA met the patients HHA, Ann who reported that the patient has 2 HHA that are privately paid and not affiliated with an agency. Per Ann there are two private HHA that visit daily for 2-4 hours per day.     SW to remain available as needed

## 2023-08-29 NOTE — TELEPHONE ENCOUNTER
[facility] Appointment Request   Provider: Reyes  Appointment Type: Hospital Follow Up  Reason for Visit: PH d/c 8/29 for pneumonia, COPD, CHF   Available Day and Time: hospital orders state that he should be seen within 1 week of discharge  Best Contact Number: [phone]  The practice will reach out to schedule your appointment within the next 2 business days.

## 2023-08-29 NOTE — PLAN OF CARE
Problem: Adult Inpatient Plan of Care  Goal: Plan of Care Review  Outcome: Progressing  Flowsheets (Taken 8/29/2023 1017)  Progress: improving  Outcome Evaluation: OT evaluation complete. [facility] 24 . Pt demonstrated independence with ADLs (UB/ LB dressing, toileting, grooming) and functional transfers using no AD with good safety awareness post education being provided.  Skilled intervention no longer required at this time post education. Pt agreed no further skilled needs required. Recommend home when medically stable  Plan of Care Reviewed With: patient

## 2023-08-29 NOTE — PROGRESS NOTES
Occupational Therapy -  Initial Evaluation     Patient: Cecil Morris  Location: [address] [address]  MRN: [id number]  Today's date: 8/29/2023    HISTORY OF PRESENT ILLNESS     Cecil is a 83 y.o. male admitted on 8/26/2023 with Dyspnea on exertion [R06.09]  Elevated troponin [R77.8]  Congestive heart failure, unspecified HF chronicity, unspecified heart failure type (CMS/HCC) [I50.9]  Cough, unspecified type [R05.9]. Principal problem is Acute on chronic systolic heart failure (CMS/HCC).    Past Medical History  Cecil has a past medical history of A-fib (CMS/[facility]), Acute on chronic combined systolic and diastolic congestive heart failure (CMS/HCC), Anxiety (4/11/2023), CHF (congestive heart failure), NYHA class I, acute, systolic (CMS/[facility]), Depression, Hyperthyroidism, Hypoxia, Near syncope, NSVT (nonsustained ventricular tachycardia) (CMS/[facility]), Orthostatic hypotension, Presence of cardiac pacemaker, Pulmonary embolism (CMS/[facility]), and Sepsis (CMS/[facility]) (05/16/2022).    History of Present Illness   He states that he was in his usual state of health until 1 to 2 weeks ago when he began experiencing shortness of breath without associated chest pain.  He states that when he walks short distances, he will start to feel dyspneic and also dizziness.  He denies lower extremity edema, orthopnea, abdominal pain, nausea, vomiting, headaches.  He states that he is compliant with all his medications and actually has a pill bottle at home.  He has a nursing aide that checks on him every day or every other day and today when the nursing aide came by, they discovered that he was dyspneic and advised that he go to the emergency department.    PRIOR LEVEL OF FUNCTION AND LIVING ENVIRONMENT     Prior Level of Function    Flowsheet Row Most Recent Value   Dominant Hand right   Ambulation independent   Transferring independent   Toileting independent   Bathing independent   Dressing independent   Eating independent    IADLs independent   Driving/Transportation friends/family   Communication understands/communicates without difficulty   Prior Level of Function Comment indep PLOF using no AD   Assistive Device Currently Used at Home walker, standard, oxygen        Prior Living Environment    Flowsheet Row Most Recent Value   People in Home alone   Current Living Arrangements home   Home Accessibility not wheelchair accessible   Living Environment Comment Mobile home, 4-5 [place] with handrail, walkin shower with no AD in bathroom        Occupational Profile    Flowsheet Row Most Recent Value   Successful Occupations indep PLOF        VITALS AND PAIN     OT Vitals    Date/Time Pulse HR Source SpO2 O2 Therapy O2 Del Method O2 Flow Rate BP BP Location BP Method Pt Position [facility]   08/29/23 0954 62 Monitor 94 % Supplemental oxygen Nasal cannula 2 L/min 109/55 Right upper arm Automatic Sitting CLG   08/29/23 1005 70 Monitor 92 % Supplemental oxygen Nasal cannula 2 L/min 102/56 Right upper arm Automatic Sitting CLG      OT Pain    Date/Time Pain Type Side/Orientation Location Rating: Rest Rating: Activity Rating: Rest Rating: Activity [facility]   08/29/23 0954 Pain Assessment lower back -- -- 2 - mild pain 2 - mild pain CLG   08/29/23 1005 Pain Reassessment -- -- 0 0 -- -- CLG           Objective   OBJECTIVE     Start time:  0951  End time:  1015  Session Length: 24 min  Mode of Treatment: individual therapy, occupational therapy    General Observations  Patient received upright, in chair. He was agreeable to therapy, no issues or concerns identified by nurse prior to session.      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        Sensory Assessment    Sensory Assessment sensation intact, upper extremities        Upper Extremity Assessment    UE Assessment ROM and Strength WFL        Motor Skills    Coordination WNL;bilateral;upper extremity        Mobility Belt    Mobility Belt Used for All Out of Bed Activity no     Reason Mobility Belt Not Used patient refused        Sit/Stand Transfer    Surface chair without arm rests     Cass independent     Assistive Device none        Toilet Transfer    Transfer Technique sit/stand     Cass modified independence     Assistive Device grab bars/safety frame        Functional Mobility    Distance in room/bathroom     Functional Mobility Cass modified independence     Assistive Device none     Functional Mobility Comments able to open/ close bathroom door. OD I due tosafety with O2 tubing. pt stated he has longer tubing at home and stated no conerns.        Upper Body Dressing    Tasks don;hospital gown     Cass independent     Adaptive Equipment none        Lower Body Dressing    Tasks don;socks     Cass modified independence     Safety/Cues increased time to complete     Position supported sitting     Adaptive Equipment none     Comment figure four positioning.        Grooming    Tasks washes, rinses and dries face;washes, rinses and dries hands;brushes/farrar hair     Cass independent     Position sink side;unsupported standing     Adaptive Equipment none        Toileting    Tasks adjust/manage clothing;perform bladder hygiene;perform bowel hygiene     Cass independent     Adaptive Equipment none     Comment + urine.        Balance    Static Sitting Balance WFL     Dynamic Sitting Balance WFL     Sit to Stand Dynamic Balance WFL     Static Standing Balance WFL     Dynamic  Standing Balance WFL        Upper Extremity (Therapeutic Exercise)    Range of Motion Exercises shoulder abduction/adduction;shoulder flexion/extension;elbow flexion/extension;forearm supination/pronation     Reps and Sets 10X1     Comment HEP handout given                               Education Documentation  Self-Care, taught by Esther Eisenberg OT at 8/29/2023 10:15 AM.  Learner: Patient  Readiness: Acceptance  Method: Explanation  Response: Verbalizes Understanding, Demonstrated Understanding  Comment: role of OT,BADLS, energy conervation, HEP for UB ROM/ therex        Session Outcome  Patient upright, in chair at end of session, chair alarm on, all needs met, call light in reach, personal items in reach. Nursing notified about patient's performance and patient's position.    AM-PAC - ADL (Current Function)     Putting on/taking off regular lower body clothing 4 - None   Bathing 4 - None   Toileting 4 - None   Putting on/taking off regular upper body clothing 4 - None   Help for taking care of personal grooming 4 - None   Eating meals 4 - None   AM-PAC ADL Score 24      ASSESSMENT AND PLAN     Progress Summary    OT evaluation complete. [facility] 24 . Pt demonstrated independence with ADLs (UB/ LB dressing, toileting, grooming) and functional transfers using no AD with good safety awareness post education being provided.  Skilled intervention no longer required at this time post education. Pt agreed no further skilled needs required. Recommend home when medically stable.       Patient/Family Therapy Goal Statement: go home    OT Plan    Flowsheet Row Most Recent Value   Therapy Frequency evaluation only at 08/29/2023 0952          OT Discharge Recommendations    Flowsheet Row Most Recent Value   OT Recommended Discharge Disposition home with home health, home with assistance at 08/29/2023 0952   Anticipated Equipment Needs At Discharge (OT) none at 08/29/2023 0952

## 2023-08-29 NOTE — PLAN OF CARE
Plan of Care Review  Plan of Care Reviewed With: patient  Progress: improving  Outcome Evaluation: VSS. Paced on monitor. Back and forth from bed to chair overnight. Complaints of back and leg pain. Bed alarm set and call bell within reach.

## 2023-08-29 NOTE — HOSPITAL COURSE
Cecil is a 83 y.o. male admitted on 8/26/2023 with Dyspnea on exertion [R06.09]  Elevated troponin [R77.8]  Congestive heart failure, unspecified HF chronicity, unspecified heart failure type (CMS/HCC) [I50.9]  Cough, unspecified type [R05.9]. Principal problem is Acute on chronic systolic heart failure (CMS/HCC).    Past Medical History  Cecil has a past medical history of A-fib (CMS/[facility]), Acute on chronic combined systolic and diastolic congestive heart failure (CMS/[facility]), Anxiety (4/11/2023), CHF (congestive heart failure), NYHA class I, acute, systolic (CMS/[facility]), Depression, Hyperthyroidism, Hypoxia, Near syncope, NSVT (nonsustained ventricular tachycardia) (CMS/[facility]), Orthostatic hypotension, Presence of cardiac pacemaker, Pulmonary embolism (CMS/[facility]), and Sepsis (CMS/[facility]) (05/16/2022).    History of Present Illness   He states that he was in his usual state of health until 1 to 2 weeks ago when he began experiencing shortness of breath without associated chest pain.  He states that when he walks short distances, he will start to feel dyspneic and also dizziness.  He denies lower extremity edema, orthopnea, abdominal pain, nausea, vomiting, headaches.  He states that he is compliant with all his medications and actually has a pill bottle at home.  He has a nursing aide that checks on him every day or every other day and today when the nursing aide came by, they discovered that he was dyspneic and advised that he go to the emergency department.

## 2023-08-30 ENCOUNTER — PATIENT OUTREACH (OUTPATIENT)
Dept: CASE MANAGEMENT | Facility: CLINIC | Age: 83
End: 2023-08-30
Payer: COMMERCIAL

## 2023-08-30 LAB
ATRIAL RATE: 110
QRS DURATION: 194
QT INTERVAL: 492
QTC CALCULATION(BAZETT): 511
R AXIS: 266
T WAVE AXIS: 64
VENTRICULAR RATE: 65

## 2023-08-30 ASSESSMENT — ENCOUNTER SYMPTOMS
CARDIOVASCULAR NEGATIVE: 1
GASTROINTESTINAL NEGATIVE: 1

## 2023-08-30 NOTE — TELEPHONE ENCOUNTER
Already spoke to Monique, daughter she stated that she would discuss follow-up visit with the caretaker.

## 2023-08-30 NOTE — TELEPHONE ENCOUNTER
I called Ann (Caregiver) yesterday afternoon and scheduled an appointment for Liu for next Thursday. Then she called the contact center back and canceled stating they needed certain days and times. I tried to call back to r/s but reached her VM I left a message for her to call back, I will try again this morning.

## 2023-08-30 NOTE — PROGRESS NOTES
NAME: Cecil Morris    MRN: [id number]    YOB: 1940    Event Review:    Initial TCM Patient Outreach Date: 08/30/23    Assessment completed with: Paid Caregiver     Discharge Diagnosis: Acute on Chronic CHF    Patient readmitted in the last 30 days: (!) Yes  Discharging Facility: [facility]  Date of Last Admission: 08/26/23  Date of Last Discharge: 08/29/23    Patient's perception of their health status since discharge: Returned to baseline/stable    HPI: Spoke with patient and HHA Marina; Patient presented to the hospital with shortness of breath.  Labs included , WBC 16.66, High Sens Troponin 214.9.    PMH includes dementia, combined systolic and diastolic heart failure, coronary artery disease status post angioplasty, atrial fibrillation on Xarelto, abdominal aortic aneurysm, type 2 diabetes mellitus, hypertension, hyperlipidemia, anemia, hypothyroidism, sick sinus syndrome status post pacemaker placement, RBBB and pulmonary hypertension  Patient received IV Lasix 60 mg., and was transitioned to increased dose oral lasix on discharge ( 40 mg daily) .  Patient was discharged to home with continuation of [facility] SN and PT; with private HHA daily.    Patient reported that he is doing well; shortness of breath has resolved, noting that he using supplemental oxygen continuously.  Spoke with both patient and HHA.  Reviewed medication changes; AVS.  HHA weighs patient daily, however did not have a weight yet for today. Patient weight on discharge was 188lb.  Instructed HHA to weigh patient every morning before breakfast after voiding, report weight gain of 3 lbs in 24 hrs or 5 lbs in one week to clinician.   Sent message to office to assist with scheduling TCM visit for patient.    Review of Systems   Respiratory:        Using continuous oxygen   Cardiovascular: Negative.    Gastrointestinal: Negative.    Genitourinary: Negative.    Medication Review:  Medication Review: Yes  Reported by:  Paid Caregiver  Any new medications prescribed at discharge?: No  Is the patient having any side effects they believe may be caused by any medication additions or changes?: No  Do you have enough of your regularly prescribed medications?: Yes     Medication adherence problem?: No  Was a medication discrepancy indentified?: No  Reconciled the current and discharge medications: Yes  Reviewed AVS (Discharge Instructions)?: Yes  Diet/Nutrition:  Type of Diet: Cardiac 2mg sodium, Low Cholesterol/low fat  Home Care Services:  Home Care Agency: [facility]  Type of Home Care Services: Home PT, Home Nursing  Home Care Interventions: No intervention required     Post-Discharge Durable Medical Equipment::  Durable Medical Equipment: Front wheeled walker, Oxygen  Does patient's condition require a scale?: Yes  Working scale at home?: Yes  Oxygen Use: Yes  Oxygen Rate: 2  Oxygen Supplier: [employer]  DME Interventions: No intervention required    Home Management:  Living Arrangement: Alone, Paid Caregiver  Support System:: Paid Caregiver, Family  Type of Residence: 1 story house, Other (Mobile home 5 steps to enter)  Home Monitoring: Pulse Oximetry, Weight  Any patient reported falls in the last 3 months?: No  Religion or spiritual beliefs that impact treatment?: No    Appointment Scheduling:  PCP appointment scheduled: No  Patient Scheduling Dispositions: No availability on schedule/message sent to office to assist with scheduling     Follow-Ups:  Relevant Specialist Follow-ups: Cardidology  Interventions/ Care Coordination:  General Education: CHF     Reviewed signs/symptoms of worsening condition or complication that necessitate a call to the Physician's office.  Educated patient on access to care.  RN phone number given for future care management.    Freda Perez RN

## 2023-08-31 ENCOUNTER — PATIENT OUTREACH (OUTPATIENT)
Dept: CASE MANAGEMENT | Facility: CLINIC | Age: 83
End: 2023-08-31
Payer: COMMERCIAL

## 2023-08-31 NOTE — TELEPHONE ENCOUNTER
Called and spoke with Ann (caretaker) to offer TCM/Hospital follow up for next Friday 9/7/23 @ 11:00 AM she declined appointment stating that you need to contact his daughter.

## 2023-08-31 NOTE — TELEPHONE ENCOUNTER
Call Monique, his daughter and advised her that you have been trying to schedule follow-up appointments and the caretaker has been declining the follow-up visits.  Monique was concerned that he needs a visit and has not been able to get in and be seen.  She needs to know that the caretakers are not scheduling a follow-up.

## 2023-09-01 ENCOUNTER — TELEMEDICINE (OUTPATIENT)
Dept: HOSPITALIST | Facility: CLINIC | Age: 83
End: 2023-09-01
Payer: COMMERCIAL

## 2023-09-01 ENCOUNTER — PATIENT OUTREACH (OUTPATIENT)
Dept: CASE MANAGEMENT | Facility: CLINIC | Age: 83
End: 2023-09-01
Payer: COMMERCIAL

## 2023-09-01 DIAGNOSIS — F03.90 DEMENTIA, UNSPECIFIED DEMENTIA SEVERITY, UNSPECIFIED DEMENTIA TYPE, UNSPECIFIED WHETHER BEHAVIORAL, PSYCHOTIC, OR MOOD DISTURBANCE OR ANXIETY (CMS/HCC): ICD-10-CM

## 2023-09-01 DIAGNOSIS — I50.43 SYSTOLIC AND DIASTOLIC CHF, ACUTE ON CHRONIC (CMS/HCC): Primary | ICD-10-CM

## 2023-09-01 DIAGNOSIS — I34.2 NONRHEUMATIC MITRAL VALVE STENOSIS WITH INSUFFICIENCY: ICD-10-CM

## 2023-09-01 DIAGNOSIS — I34.0 NONRHEUMATIC MITRAL VALVE STENOSIS WITH INSUFFICIENCY: ICD-10-CM

## 2023-09-01 DIAGNOSIS — I48.0 PAF (PAROXYSMAL ATRIAL FIBRILLATION) (CMS/HCC): ICD-10-CM

## 2023-09-01 NOTE — TELEPHONE ENCOUNTER
Left Monique a detailed message regarding having a hard time getting her dad scheduled for a hospital follow up. I also asked her to have the caretaker or HHA call to get him scheduled.

## 2023-09-01 NOTE — PROGRESS NOTES
"       Hospital to Home Program Visit       Hospital to Home Program Visit:  Audio Only Encounter       DISCHARGE INFORMATION   Assessment completed with: Patient (spoke with patient and daughter Monique [phone])  Discharge Diagnosis: CHF  Discharging Facility: [facility]  Date of Last Discharge: 08/29/23  Discharge Disposition: Home  Patient's perception of their health status since discharge: Same    PCP: Melanie Chambers, DO       HPI / OVERVIEW OF VISIT      Cecil Morris is a chronically ill 83 y.o. male with a PMH of TAVR, severe MR, CHF, Afib, CAD, SSS with PPM and dementia. He resides at home with the help of caretakers and local daughter. Patient presented to PMH of 8/26 with cough and progressive NELSON. He was hypoxic and placed on supportive O2. Patient appeared volume overloaded and had a brisk response to IV diuresis. Cardiology consulted. Echo performed showed EF 40% with sever MR and MAC with progressive mitral stenosis. Patient was felt not to be a candidate for mitral clip. He was DC to home on supportive O2 and  increased doses of Lasix and Toprol.         A tele health visit was completed today. I first spoke to Liu who noted he \"feels fine.\" Patient was slightly NELSON at the start of the conversation, he notes he had just walked across the room to answer the phone. He denied any edema or chest pain. Patient was poor historian. He says he has been getting weighted daily but does not currently know his weight, no aide was present at the time of this phone call. I did try to contact caregivers Ann and Marina, both had full voice mails.       I was able to connect with daughter Monique. She is understandably overwhelmed. She feels dad is \"hanging in there and doing ok.\" Monique is concerned he may be depressed, TONYA and  provided her with services for further evaluation. She has not observed any prolonged NELSON, notes he quickly recovers and this has been a chronic issue. We had a long " "discussion regarding progressive valvular heart failure. I stressed the need for low sodium diet, fluid restriction and compliance with all meds. Weight on  lbs. If weight reaches 191 lbs OR if patient notes any SOB, worsening NELSON, edema, increased abdominal girth- take an extra dose of Lasix and contact Dr Wolf. She would share with on the \"group text\" with his care takers.  All O2 supplies were delivered via [employer]. Home care via [facility], she is aware that palliative services are available through that program.        Patient high risk of readmission given his progressive, severe valvular disease. May benefit from ongoing Palliative discussions.          What would cause you to go back to the hospital?      Concern for decompensated CHF     Who would you call if you had a problem?  . If weight reaches 191 lbs OR if patient notes any SOB, worsening NELSON, edema, increased abdominal girth- take an extra dose of Lasix and contact Dr Wolf.    Overall, how was your care that was provided and do you have any suggestions for improvement?     Satisfied by the care provided by [facility] from 8/26- 8/29    Above Average -       PROBLEMS      Patient Active Problem List   Diagnosis    Anemia    Ascending aortic aneurysm (CMS/[facility])    Bilateral carotid bruits    Chronic combined systolic and diastolic heart failure (CMS/[facility])    RBBB    Coronary artery disease involving native coronary artery    Depression    Essential hypertension    S/P TAVR (transcatheter aortic valve replacement)    Hypercholesteremia    Acquired hypothyroidism    Mitral regurgitation    Nonrheumatic aortic valve stenosis    NSVT (nonsustained ventricular tachycardia) (CMS/[facility])    A-fib (CMS/HCC)    Presence of cardiac pacemaker    Pulmonary hypertension (CMS/[facility])    S/P angioplasty with stent    SSS (sick sinus syndrome) (CMS/HCC)    Type 2 diabetes mellitus without complication, without long-term current use of insulin (CMS/HCC)    CHF " (congestive heart failure), NYHA class I, acute, systolic (CMS/[facility])    Alzheimer's dementia (CMS/[facility])    Anxiety    Leukocytosis    Debility    Pneumonia due to infectious organism, unspecified laterality, unspecified part of lung    Oxygen dependent    Acute on chronic systolic heart failure (CMS/[facility])    Elevated troponin         MEDICATIONS      Reconciled the current and discharge medications: Yes      Current Outpatient Medications:     acetaminophen (TYLENOL) 325 mg tablet, Take 650 mg by mouth as needed., Disp: , Rfl:     albuterol 2.5 mg /3 mL (0.083 %) nebulizer solution, TAKE 3 ML (2.5 MG TOTAL) BY NEBULIZATION 4 (FOUR) TIMES A DAY., Disp: 150 mL, Rfl: 3    albuterol HFA 90 mcg/actuation inhaler, Inhale 2 puffs every 6 (six) hours as needed for wheezing., Disp: 18 g, Rfl: 1    atorvastatin (LIPITOR) 20 mg tablet, Take 1 tablet (20 mg total) by mouth daily. Pill pack, Disp: 90 tablet, Rfl: 1    busPIRone (BUSPAR) 5 mg tablet, Take 1 tablet (5 mg total) by mouth 2 (two) times a day. Pill pack, Disp: 180 tablet, Rfl: 1    clonazePAM (klonoPIN) 0.5 mg tablet, Take 1 tablet (0.5 mg total) by mouth nightly as needed for anxiety., Disp: 30 tablet, Rfl: 0    diclofenac sodium (VOLTAREN) 1 % topical gel, Apply 1 g topically as needed., Disp: , Rfl:     donepeziL (ARICEPT) 10 mg tablet, Take 1 tablet (10 mg total) by mouth nightly., Disp: 90 tablet, Rfl: 1    empagliflozin (JARDIANCE) 25 mg tablet, Take 1 tablet (25 mg total) by mouth daily., Disp: 90 tablet, Rfl: 3    ferrous sulfate 325 mg (65 mg iron) tablet, Take 1 tablet (325 mg total) by mouth daily with breakfast., Disp: 90 tablet, Rfl: 1    fluticasone-umeclidinium-vilanterol (TRELEGY ELLIPTA) 100-62.5-25 mcg blister with device powder for inhalation, Inhale 1 puff daily., Disp: 3 each, Rfl: 0    folic acid (FOLVITE) 1 mg tablet, Take 1 tablet (1 mg total) by mouth daily., Disp: 90 tablet, Rfl: 1    furosemide (LASIX) 40 mg tablet, Take 1  tablet (40 mg total) by mouth daily., Disp: 30 tablet, Rfl: 0    levothyroxine (SYNTHROID) 112 mcg tablet, Take 1 tablet (112 mcg total) by mouth daily. Pill pack, Disp: 90 tablet, Rfl: 1    lidocaine (ASPERCREME) 4 % adhesive patch,medicated topical patch, Apply 1 patch topically daily., Disp: 30 patch, Rfl: 0    loratadine (CLARITIN) 10 mg tablet, Take 10 mg by mouth daily., Disp: , Rfl:     magnesium oxide (MAG-OX) 400 mg (241.3 mg magnesium) tablet, Take 1 tablet (400 mg total) by mouth 2 (two) times a day., Disp: 180 tablet, Rfl: 3    medical marijuana, Take 1 each by mouth as needed., Disp: , Rfl:     melatonin 5 mg capsule, Take 5 mg by mouth nightly. Hs prn, Disp: , Rfl:     metoprolol succinate XL (TOPROL-XL) 25 mg 24 hr tablet, Take 1 tablet (25 mg total) by mouth daily., Disp: 30 tablet, Rfl: 0    multivitamin tablet, Take 1 tablet by mouth daily., Disp: , Rfl:     nebulizers misc, 1 each 4 (four) times a day as needed (sob or wheeze)., Disp: 1 each, Rfl: 0    pantoprazole (PROTONIX) 40 mg EC tablet, Take 1 tablet (40 mg total) by mouth 2 (two) times a day. Pill pack, Disp: 180 tablet, Rfl: 1    rivaroxaban (XARELTO) 20 mg tablet, Take 1 tablet (20 mg total) by mouth daily with dinner., Disp: 90 tablet, Rfl: 2    sertraline (ZOLOFT) 50 mg tablet, Take 1 tablet (50 mg total) by mouth daily. Pill pack, Disp: 90 tablet, Rfl: 1    spironolactone (ALDACTONE) 25 mg tablet, Take 1 tablet (25 mg total) by mouth daily., Disp: 90 tablet, Rfl: 1    thiamine 100 mg tablet, Take 1 tablet (100 mg total) by mouth daily., Disp: 90 tablet, Rfl: 1    I am having Cecil Alexandra maintain his diclofenac sodium, acetaminophen, melatonin, multivitamin, medical marijuana, loratadine, nebulizers, donepeziL, rivaroxaban, atorvastatin, busPIRone, ferrous sulfate, fluticasone-umeclidinium-vilanterol, folic acid, levothyroxine, thiamine, spironolactone, magnesium oxide, empagliflozin, albuterol, sertraline,  pantoprazole, clonazePAM, lidocaine, albuterol HFA, furosemide, and metoprolol succinate XL.    REVIEW OF SYSTEMS      All other systems reviewed and negative except as noted in HPI.      POST DISCHARGE MEDICAL EQUIPMENT      DME Interventions: No intervention required  Oxygen Use: Yes  Oxygen Rate: 2  Has all Durable Medical Equipment (DME) been delivered?:  (All O2 supplies via [employer])    FOLLOW-UP APPOINTMENTS      Appointment Provider: Please contact Dr Chambers for hospital follow up appt     Dr Wolf 10/3      INTERVENTIONS   Interventions needed: updated medication listing, communicated with another interdisciplinary provider            PATIENT INSTRUCTIONS      There are no Patient Instructions on file for this visit.           MIGUEL Stapleton  9/1/2023

## 2023-09-01 NOTE — Clinical Note
S/p stay for CHF secondary to progressive MV disease. Home now on O2. Family feels he may be depressed. [facility], may benefit from Palliative discussions. Not felt to be surgical candidate

## 2023-09-01 NOTE — PATIENT INSTRUCTIONS
YOU WERE HOSPITALIZED FOR HEART FAILURE SECONDARY TO PROGRESSIVE MITRAL VALVE DISEASE    Continue meds as directed    Toprol and Lasix doses have been increased     Daily weights, weight on  lbs    If weight reaches 191 lbs OR if patient notes any SOB, worsening NELSON, edema, increased abdominal girth- take an extra dose of Lasix and contact Dr Wolf.    Low sodium diet     O2 supplies via [employer]    Home Care via [facility] , may need to consider Palliative discussions     Contact Dr Chambers to set up post hospital follow up appt    Appt with Dr Wolf 10/3

## 2023-09-06 ENCOUNTER — TELEPHONE (OUTPATIENT)
Dept: PRIMARY CARE | Facility: CLINIC | Age: 83
End: 2023-09-06
Payer: COMMERCIAL

## 2023-09-06 NOTE — TELEPHONE ENCOUNTER
Rylee from [facility] called in asking to speak with Dr. Chambers about pt. She stated she had some important information she wanted to tell her before pt's appointment. Call transferred to Karina due to  seeing patients.

## 2023-09-06 NOTE — TELEPHONE ENCOUNTER
Intermittent caregiver Rylee who is known patient for a long time showed up to give care yesterday, patient did not recall her at all reports his memory is worse than ever  Concern is his daily caregiver is going away for a month, the family has known this and has not gotten a replacement for the month that she will be gone, the other intermittent caregiver is not reliable as she no-shows or no calls, family requested yesterday that Rylee give them information to get a daily caregiver for the month that the daily normal caregiver(Ann) is gone, rylee reporting  that there is no way they will be able to get a caregiver in enough time to be with Liu for a month/  She does not believe the patient should be left home alone, would like to make sure that  addresses this at the time of his visit tomorrow with his caregiver Ann and the family who will be on the phone at the time of his visit 9/7/2023  Karina

## 2023-09-07 ENCOUNTER — OFFICE VISIT (OUTPATIENT)
Dept: PRIMARY CARE | Facility: CLINIC | Age: 83
End: 2023-09-07
Payer: COMMERCIAL

## 2023-09-07 ENCOUNTER — TRANSCRIBE ORDERS (OUTPATIENT)
Dept: SCHEDULING | Age: 83
End: 2023-09-07

## 2023-09-07 VITALS
SYSTOLIC BLOOD PRESSURE: 114 MMHG | HEIGHT: 68 IN | BODY MASS INDEX: 28.49 KG/M2 | WEIGHT: 188 LBS | HEART RATE: 65 BPM | OXYGEN SATURATION: 94 % | TEMPERATURE: 97 F | DIASTOLIC BLOOD PRESSURE: 80 MMHG

## 2023-09-07 DIAGNOSIS — I50.42 CHRONIC COMBINED SYSTOLIC AND DIASTOLIC HEART FAILURE (CMS/HCC): ICD-10-CM

## 2023-09-07 DIAGNOSIS — Z76.89 ENCOUNTER FOR SUPPORT AND COORDINATION OF TRANSITION OF CARE: Primary | ICD-10-CM

## 2023-09-07 DIAGNOSIS — J18.9 PNEUMONIA, UNSPECIFIED ORGANISM: ICD-10-CM

## 2023-09-07 DIAGNOSIS — F02.B0 MODERATE ALZHEIMER'S DEMENTIA, UNSPECIFIED TIMING OF DEMENTIA ONSET, UNSPECIFIED WHETHER BEHAVIORAL, PSYCHOTIC, OR MOOD DISTURBANCE OR ANXIETY (CMS/HCC): ICD-10-CM

## 2023-09-07 DIAGNOSIS — G30.9 MODERATE ALZHEIMER'S DEMENTIA, UNSPECIFIED TIMING OF DEMENTIA ONSET, UNSPECIFIED WHETHER BEHAVIORAL, PSYCHOTIC, OR MOOD DISTURBANCE OR ANXIETY (CMS/HCC): ICD-10-CM

## 2023-09-07 DIAGNOSIS — Z99.81 OXYGEN DEPENDENT: ICD-10-CM

## 2023-09-07 DIAGNOSIS — F41.9 ANXIETY: ICD-10-CM

## 2023-09-07 DIAGNOSIS — Z99.81 DEPENDENCE ON SUPPLEMENTAL OXYGEN: Primary | ICD-10-CM

## 2023-09-07 PROCEDURE — 3074F SYST BP LT 130 MM HG: CPT | Performed by: FAMILY MEDICINE

## 2023-09-07 PROCEDURE — 3079F DIAST BP 80-89 MM HG: CPT | Performed by: FAMILY MEDICINE

## 2023-09-07 PROCEDURE — 1111F DSCHRG MED/CURRENT MED MERGE: CPT | Performed by: FAMILY MEDICINE

## 2023-09-07 PROCEDURE — 3008F BODY MASS INDEX DOCD: CPT | Performed by: FAMILY MEDICINE

## 2023-09-07 PROCEDURE — 99495 TRANSJ CARE MGMT MOD F2F 14D: CPT | Performed by: FAMILY MEDICINE

## 2023-09-07 RX ORDER — SERTRALINE HYDROCHLORIDE 100 MG/1
100 TABLET, FILM COATED ORAL DAILY
Qty: 90 TABLET | Refills: 0 | Status: SHIPPED | OUTPATIENT
Start: 2023-09-07 | End: 2023-11-24

## 2023-09-07 NOTE — ASSESSMENT & PLAN NOTE
Persistent anxiety depression  Will increase sertraline to 100 mg  Discussed that in assisted living he would have more interaction with others which may be of benefit to his mental health

## 2023-09-07 NOTE — ASSESSMENT & PLAN NOTE
Increased dose of Lasix  Weight stable  Discussed reduction of processed foods and excess salt in diet  Consider meal prepping

## 2023-09-07 NOTE — PROGRESS NOTES
[facility] IN [facility]       Reason for visit: Hospital Discharge Follow-up (Seen in  on 08/26/8/29 for Acute on chronic systolic heart failure/- o2 -- 2 liters/-SOB, trouble sleeping, restless legs, off balance, confusion ), Depression, and Anxiety    HPI:  Cecil Morris is a 83 y.o. male presenting for follow-up after hospital discharge.    Patient readmitted in the last 30 days: (!) Yes    Discharge Diagnosis: CHF  Discharging Facility: [facility] [address]  Date of Last Admission: 08/26/23  Date of Last Discharge: 08/29/23               Relevant Specialist Follow-ups: Cardidology    Initial TCM Patient Outreach Date: 08/30/23    Summary of Hospital Course:  83 year old male with past medical history of dementia, CHF, CAD s/p angioplasty, A-fib on Xarelto, AAA, Type 2 DM, HTN, HLD, anemia and hyperthyroidism, S/P CABG, TAVR 2021 presents to [facility] [address] with dyspnea with exertion and cough. He was evaluated by Cardiology ans was diuresed with IV Lasix and he will transition to an increased dose of oral lasix on discharge and continue home oxygen and home care services.    Medications prescribed at discharge reconciled with current ambulatory medication list: Yes.    Inpatient testing (labs, imaging, cardiovascular) requiring follow-up: none     History since hospital discharge:     Ann, caretaker feels that his memory has worsened  When bringing him to the office today he asked multiple times where they are going and keeps forgetting  Also has been more anxious and depressed at home  Not sleeping well  Using oxygen regularly  Does use his nebulizer but for sometimes forgets to put medication in the nebulizer before using it  Lives in a 2 bedroom mobile home which has recently had a bee issue  2 daughters help to manage his home and caretakers  Came admits that a lot of times he is eating processed foods with salt added  Weight has been stable since his discharge  Checks weight  daily  Both daughters are involved but he is unable to live with them  They are interested in getting him into an assisted living situation but he is resistant      Advance Care Planning Documents     Document Type Status Effective Date Expiration Date Received On Description    Advance Directives and Living Will Not Received        Power of  Not Received              Patient Active Problem List   Diagnosis    Anemia    Ascending aortic aneurysm (CMS/[facility])    Bilateral carotid bruits    Chronic combined systolic and diastolic heart failure (CMS/[facility])    RBBB    Coronary artery disease involving native coronary artery    Depression    Essential hypertension    S/P TAVR (transcatheter aortic valve replacement)    Hypercholesteremia    Acquired hypothyroidism    Mitral regurgitation    Nonrheumatic aortic valve stenosis    NSVT (nonsustained ventricular tachycardia) (CMS/HCC)    A-fib (CMS/HCC)    Presence of cardiac pacemaker    Pulmonary hypertension (CMS/HCC)    S/P angioplasty with stent    SSS (sick sinus syndrome) (CMS/HCC)    Type 2 diabetes mellitus without complication, without long-term current use of insulin (CMS/HCC)    CHF (congestive heart failure), NYHA class I, acute, systolic (CMS/HCC)    Alzheimer's dementia (CMS/HCC)    Anxiety    Leukocytosis    Debility    Pneumonia due to infectious organism, unspecified laterality, unspecified part of lung    Oxygen dependent    Acute on chronic systolic heart failure (CMS/HCC)    Elevated troponin     Past Medical History:   Diagnosis Date    A-fib (CMS/HCC)     Acute on chronic combined systolic and diastolic congestive heart failure (CMS/HCC)     Anxiety 4/11/2023    CHF (congestive heart failure), NYHA class I, acute, systolic (CMS/HCC)     Depression     Hyperthyroidism     Hypoxia     Near syncope     NSVT (nonsustained ventricular tachycardia) (CMS/HCC)     Orthostatic hypotension     Presence of cardiac  pacemaker     Pulmonary embolism (CMS/[facility])     Sepsis (CMS/HCC) 05/16/2022     Past Surgical History:   Procedure Laterality Date    CARDIAC CATHETERIZATION  11/19/2020    CATH PLACE/CORON ANGIO, IMG SUPER/INTERP,R&L HRT CATH, L HRT VENTRIC    CARDIAC CATHETERIZATION Bilateral 2005    CORONARY ARTERY BYPASS GRAFT  2005     Social History     Socioeconomic History    Marital status:      Spouse name: Not on file    Number of children: 2    Years of education: Not on file    Highest education level: Not on file   Occupational History    Occupation: retired     Comment: [employer] in [place]   Tobacco Use    Smoking status: Former     Years: 40.00     Types: Cigarettes     Quit date: 11/1/2012     Years since quitting: 10.8    Smokeless tobacco: Never   Vaping Use    Vaping Use: Never used   Substance and Sexual Activity    Alcohol use: Not Currently     Alcohol/week: 9.0 standard drinks of alcohol     Types: 9 Shots of liquor per week     Comment: vodka stopped late in winter    Drug use: Never    Sexual activity: Not Currently   Other Topics Concern    Not on file   Social History Narrative    Lives alone, and has help for meal prep and med management     Social Determinants of Health     Financial Resource Strain: Low Risk  (8/27/2023)    Overall Financial Resource Strain (CARDIA)     Difficulty of Paying Living Expenses: Not hard at all   Food Insecurity: No Food Insecurity (8/18/2023)    Hunger Vital Sign     Worried About Running Out of Food in the Last Year: Never true     Ran Out of Food in the Last Year: Never true   Transportation Needs: No Transportation Needs (8/27/2023)    PRAPARE - Transportation     Lack of Transportation (Medical): No     Lack of Transportation (Non-Medical): No   Physical Activity: Not on file   Stress: Not on file   Social Connections: Socially Isolated (6/26/2023)    Social Connection and Isolation Panel [NHANES]     Frequency of Communication with Friends  and Family: More than three times a week     Frequency of Social Gatherings with Friends and Family: Once a week     Attends Restorationist Services: Never     Active Member of Clubs or Organizations: No     Attends Club or Organization Meetings: Patient refused     Marital Status:    Intimate Partner Violence: Not on file   Housing Stability: Low Risk  (8/27/2023)    Housing Stability Vital Sign     Unable to Pay for Housing in the Last Year: No     Number of Places Lived in the Last Year: 1     Unstable Housing in the Last Year: No     Family History   Problem Relation Age of Onset    Thyroid cancer Biological Mother     ALS Biological Mother     Parkinsonism Biological Mother     Heart attack Biological Father     Liver cancer Maternal Grandmother      Patient has no known allergies.  Current Outpatient Medications   Medication Sig Dispense Refill    acetaminophen (TYLENOL) 325 mg tablet Take 650 mg by mouth as needed.      albuterol 2.5 mg /3 mL (0.083 %) nebulizer solution TAKE 3 ML (2.5 MG TOTAL) BY NEBULIZATION 4 (FOUR) TIMES A DAY. 150 mL 3    albuterol HFA 90 mcg/actuation inhaler Inhale 2 puffs every 6 (six) hours as needed for wheezing. 18 g 1    atorvastatin (LIPITOR) 20 mg tablet Take 1 tablet (20 mg total) by mouth daily. Pill pack 90 tablet 1    busPIRone (BUSPAR) 5 mg tablet Take 1 tablet (5 mg total) by mouth 2 (two) times a day. Pill pack 180 tablet 1    clonazePAM (klonoPIN) 0.5 mg tablet Take 1 tablet (0.5 mg total) by mouth nightly as needed for anxiety. 30 tablet 0    diclofenac sodium (VOLTAREN) 1 % topical gel Apply 1 g topically as needed.      donepeziL (ARICEPT) 10 mg tablet Take 1 tablet (10 mg total) by mouth nightly. 90 tablet 1    empagliflozin (JARDIANCE) 25 mg tablet Take 1 tablet (25 mg total) by mouth daily. 90 tablet 3    ferrous sulfate 325 mg (65 mg iron) tablet Take 1 tablet (325 mg total) by mouth daily with breakfast. 90 tablet 1     fluticasone-umeclidinium-vilanterol (TRELEGY ELLIPTA) 100-62.5-25 mcg blister with device powder for inhalation Inhale 1 puff daily. 3 each 0    folic acid (FOLVITE) 1 mg tablet Take 1 tablet (1 mg total) by mouth daily. 90 tablet 1    furosemide (LASIX) 40 mg tablet Take 1 tablet (40 mg total) by mouth daily. 30 tablet 0    levothyroxine (SYNTHROID) 112 mcg tablet Take 1 tablet (112 mcg total) by mouth daily. Pill pack 90 tablet 1    lidocaine (ASPERCREME) 4 % adhesive patch,medicated topical patch Apply 1 patch topically daily. 30 patch 0    loratadine (CLARITIN) 10 mg tablet Take 10 mg by mouth daily.      magnesium oxide (MAG-OX) 400 mg (241.3 mg magnesium) tablet Take 1 tablet (400 mg total) by mouth 2 (two) times a day. 180 tablet 3    medical marijuana Take 1 each by mouth as needed.      melatonin 5 mg capsule Take 5 mg by mouth nightly. Hs prn      metoprolol succinate XL (TOPROL-XL) 25 mg 24 hr tablet Take 1 tablet (25 mg total) by mouth daily. 30 tablet 0    multivitamin tablet Take 1 tablet by mouth daily.      nebulizers misc 1 each 4 (four) times a day as needed (sob or wheeze). 1 each 0    pantoprazole (PROTONIX) 40 mg EC tablet Take 1 tablet (40 mg total) by mouth 2 (two) times a day. Pill pack 180 tablet 1    rivaroxaban (XARELTO) 20 mg tablet Take 1 tablet (20 mg total) by mouth daily with dinner. 90 tablet 2    sertraline (ZOLOFT) 100 mg tablet Take 1 tablet (100 mg total) by mouth daily. 90 tablet 0    spironolactone (ALDACTONE) 25 mg tablet Take 1 tablet (25 mg total) by mouth daily. 90 tablet 1    thiamine 100 mg tablet Take 1 tablet (100 mg total) by mouth daily. 90 tablet 1     No current facility-administered medications for this visit.       ROS:  Review of Systems   All other systems reviewed and are negative.      Vitals:    09/07/23 1110   BP: 114/80   BP Location: Left upper arm   Patient Position: Sitting   Pulse: 65   Temp: 36.1 °C (97 °F)   TempSrc: Temporal   SpO2:  "94%   Weight: 85.3 kg (188 lb)   Height: 1.727 m (5' 8\")     Wt Readings from Last 3 Encounters:   09/07/23 85.3 kg (188 lb)   08/29/23 85.3 kg (188 lb)   08/21/23 86.6 kg (191 lb)     EXAM:  Physical Exam  Constitutional:       General: He is not in acute distress.     Appearance: Normal appearance. He is well-developed.   HENT:      Head: Normocephalic and atraumatic.   Cardiovascular:      Rate and Rhythm: Normal rate and regular rhythm.      Heart sounds: Normal heart sounds.   Pulmonary:      Effort: Pulmonary effort is normal. No respiratory distress.      Breath sounds: Normal breath sounds. No wheezing or rhonchi.   Musculoskeletal:      Right lower leg: No edema.      Left lower leg: No edema.   Skin:     General: Skin is warm and dry.   Neurological:      Mental Status: He is alert and oriented to person, place, and time. Mental status is at baseline.   Psychiatric:         Mood and Affect: Mood normal.         Behavior: Behavior normal.         Lab Results   Component Value Date    WBC 11.55 (H) 08/29/2023    HGB 10.2 (L) 08/29/2023    HCT 33.6 (L) 08/29/2023     08/29/2023    CHOL 138 11/17/2022    TRIG 71 11/17/2022    HDL 64 11/17/2022    ALT 16 08/26/2023    AST 18 08/26/2023     08/29/2023    K 3.7 08/29/2023     08/29/2023    CREATININE 1.0 08/29/2023    BUN 23 08/29/2023    CO2 32 (H) 08/29/2023    TSH 3.06 04/21/2023    HGBA1C 8.1 (H) 07/11/2023    MICROALBUR 0.2 07/11/2023     ASSESSMENT/PLAN:  Diagnoses and all orders for this visit:    Encounter for support and coordination of transition of care (Primary)  Comments:  83-year-old male who presents for hospital follow-up  He presents with his caretaker, Ann    Moderate Alzheimer's dementia, unspecified timing of dementia onset, unspecified whether behavioral, psychotic, or mood disturbance or anxiety (CMS/[facility])  Assessment & Plan:  Worsening symptoms   Not safe to live on his own much longer  Discussed living situation with " patient and caretaker, Ann  Advised for Liu to remain part of the conversation regarding his future living situation  He will discuss with his daughters      Oxygen dependent  Assessment & Plan:  Now on oxygen  Advised to have PFTs completed      Chronic combined systolic and diastolic heart failure (CMS/HCC)  Assessment & Plan:  Increased dose of Lasix  Weight stable  Discussed reduction of processed foods and excess salt in diet  Consider meal prepping      Anxiety  Assessment & Plan:  Persistent anxiety depression  Will increase sertraline to 100 mg  Discussed that in assisted living he would have more interaction with others which may be of benefit to his mental health    Orders:  -     sertraline (ZOLOFT) 100 mg tablet; Take 1 tablet (100 mg total) by mouth daily.        Melanie Chambers, DO  9/7/2023

## 2023-09-07 NOTE — PATIENT INSTRUCTIONS
Work on planning to find an assisted living due to health concerns with living on your own    Increase sertraline

## 2023-09-07 NOTE — ASSESSMENT & PLAN NOTE
Worsening symptoms   Not safe to live on his own much longer  Discussed living situation with patient and caretaker, Ann  Advised for Liu to remain part of the conversation regarding his future living situation  He will discuss with his daughters

## 2023-09-12 NOTE — PATIENT INSTRUCTIONS
Start the antibiotics- take both the augmentin and the zithromax when you leave and get one more dose before bed of augmentin tonight  Then take the augmentin and zithromax in the morning and the augmentin at night  Do not drink vodka, take a valium if you need it  Follow up here on Friday at 10am  Continue doing the nebulizer 4x's a day   right ear- wears hearing aides

## 2023-09-15 ENCOUNTER — HOSPITAL ENCOUNTER (OUTPATIENT)
Dept: PULMONOLOGY | Facility: HOSPITAL | Age: 83
Discharge: HOME | End: 2023-09-15
Attending: FAMILY MEDICINE
Payer: COMMERCIAL

## 2023-09-15 DIAGNOSIS — F32.89 OTHER DEPRESSION: ICD-10-CM

## 2023-09-15 DIAGNOSIS — Z99.81 DEPENDENCE ON SUPPLEMENTAL OXYGEN: ICD-10-CM

## 2023-09-15 DIAGNOSIS — J18.9 PNEUMONIA OF RIGHT UPPER LOBE DUE TO INFECTIOUS ORGANISM: ICD-10-CM

## 2023-09-15 DIAGNOSIS — J18.9 PNEUMONIA, UNSPECIFIED ORGANISM: ICD-10-CM

## 2023-09-15 PROCEDURE — 94618 PULMONARY STRESS TESTING: CPT

## 2023-09-15 PROCEDURE — 94060 EVALUATION OF WHEEZING: CPT

## 2023-09-15 PROCEDURE — 63700000 HC SELF-ADMINISTRABLE DRUG: Performed by: FAMILY MEDICINE

## 2023-09-15 PROCEDURE — 94729 DIFFUSING CAPACITY: CPT

## 2023-09-15 PROCEDURE — 94726 PLETHYSMOGRAPHY LUNG VOLUMES: CPT

## 2023-09-15 RX ORDER — ALBUTEROL SULFATE 90 UG/1
4 INHALANT RESPIRATORY (INHALATION) ONCE
Status: COMPLETED | OUTPATIENT
Start: 2023-09-15 | End: 2023-09-15

## 2023-09-15 RX ADMIN — ALBUTEROL SULFATE 4 PUFF: 90 AEROSOL, METERED RESPIRATORY (INHALATION) at 10:51

## 2023-09-18 ENCOUNTER — TELEPHONE (OUTPATIENT)
Dept: PRIMARY CARE | Facility: CLINIC | Age: 83
End: 2023-09-18
Payer: COMMERCIAL

## 2023-09-18 RX ORDER — DONEPEZIL HYDROCHLORIDE 10 MG/1
TABLET, FILM COATED ORAL
Qty: 90 TABLET | Refills: 1 | Status: SHIPPED | OUTPATIENT
Start: 2023-09-18

## 2023-09-18 RX ORDER — CLONAZEPAM 0.5 MG/1
0.5 TABLET ORAL NIGHTLY PRN
Qty: 30 TABLET | Refills: 0 | Status: SHIPPED | OUTPATIENT
Start: 2023-09-18 | End: 2023-10-09 | Stop reason: SDUPTHER

## 2023-09-18 NOTE — RESULT ENCOUNTER NOTE
Normal 6 minute walk    Call and advise daughters that his 6-minute walk was normal meaning that he could try stopping the use of oxygen.  Someone would need to be there to monitor his pulse ox and make sure he does not go below 90%

## 2023-09-18 NOTE — TELEPHONE ENCOUNTER
----- Message from Melanie Chambers DO sent at 9/18/2023  7:57 AM EDT -----  Normal 6 minute walk    Call and advise daughters that his 6-minute walk was normal meaning that he could try stopping the use of oxygen.  Someone would need to be there to monitor his pulse ox and make sure he does not go below 90%

## 2023-09-19 ENCOUNTER — TELEPHONE (OUTPATIENT)
Dept: PRIMARY CARE | Facility: CLINIC | Age: 83
End: 2023-09-19
Payer: COMMERCIAL

## 2023-09-19 DIAGNOSIS — R39.9 URINARY SYMPTOM OR SIGN: Primary | ICD-10-CM

## 2023-09-19 NOTE — TELEPHONE ENCOUNTER
Spoke with Rylee @ [facility]  Concerns for UTI vs disease progression  Rylee reports incontinent of Urine x2 which is abnormal for Liu. Caregiver reports that Liu is more confused then typical.     Pended UA with [facility].     Caregiver will drop off urine tomorrow AM to [facility].

## 2023-09-19 NOTE — PROGRESS NOTES
Cecil Morris is a 83 y.o. male is present in the office today for 3 month follow up     8/26/2023 PH follow up admitted for 3 days     PMHX of dementia, CHF, CAD s/p angioplasty, A-fib on Xarelto, AAA, Type 2 DM, HTN, HLD, anemia and hyperthyroidism, S/P CABG, TAVR 2021 presents to [facility] with dyspnea with exertion and cough.  He was evaluated by Cardiology ans was diuresed with IV Lasix and he will transition to an increased dose of oral lasix on discharge and continue home oxygen and home care services.    Does have some dizziness when changes position but intermittent     Does get sob d/t fluid overload    Was on OX but was recently d/d but pts ox has been good      The BP is doing well at home.  Weight is good and Sats are 95%.  He only drinks tea and not water at home.  I brought him a cup of water.    Past Medical History:   Diagnosis Date    A-fib (CMS/[facility])     Acute on chronic combined systolic and diastolic congestive heart failure (CMS/HCC)     Anxiety 4/11/2023    CHF (congestive heart failure), NYHA class I, acute, systolic (CMS/[facility])     Depression     Hyperthyroidism     Hypoxia     Near syncope     NSVT (nonsustained ventricular tachycardia) (CMS/[facility])     Orthostatic hypotension     Presence of cardiac pacemaker     Pulmonary embolism (CMS/[facility])     Sepsis (CMS/[facility]) 05/16/2022       Past Surgical History:   Procedure Laterality Date    CARDIAC CATHETERIZATION  11/19/2020    CATH PLACE/CORON ANGIO, IMG SUPER/INTERP,R&L HRT CATH, L HRT VENTRIC    CARDIAC CATHETERIZATION Bilateral 2005    CORONARY ARTERY BYPASS GRAFT  2005        Social History     Socioeconomic History    Marital status:      Spouse name: None    Number of children: 2    Years of education: None    Highest education level: None   Occupational History    Occupation: retired     Comment: [employer] in [place]   Tobacco Use    Smoking status: Former     Years: 40     Types: Cigarettes     Quit date:  11/1/2012     Years since quitting: 10.9    Smokeless tobacco: Never   Vaping Use    Vaping Use: Never used   Substance and Sexual Activity    Alcohol use: Not Currently     Alcohol/week: 9.0 standard drinks of alcohol     Types: 9 Shots of liquor per week     Comment: vodka stopped late in winter    Drug use: Never    Sexual activity: Not Currently   Social History Narrative    Lives alone, and has help for meal prep and med management     Social Determinants of Health     Financial Resource Strain: Low Risk  (8/27/2023)    Overall Financial Resource Strain (CARDIA)     Difficulty of Paying Living Expenses: Not hard at all   Food Insecurity: No Food Insecurity (8/18/2023)    Hunger Vital Sign     Worried About Running Out of Food in the Last Year: Never true     Ran Out of Food in the Last Year: Never true   Transportation Needs: No Transportation Needs (8/27/2023)    PRAPARE - Transportation     Lack of Transportation (Medical): No     Lack of Transportation (Non-Medical): No   Social Connections: Socially Isolated (6/26/2023)    Social Connection and Isolation Panel [NHANES]     Frequency of Communication with Friends and Family: More than three times a week     Frequency of Social Gatherings with Friends and Family: Once a week     Attends Rastafari Services: Never     Active Member of Clubs or Organizations: No     Attends Club or Organization Meetings: Patient refused     Marital Status:    Housing Stability: Low Risk  (8/27/2023)    Housing Stability Vital Sign     Unable to Pay for Housing in the Last Year: No     Number of Places Lived in the Last Year: 1     Unstable Housing in the Last Year: No       Family History   Problem Relation Age of Onset    Thyroid cancer Biological Mother     ALS Biological Mother     Parkinsonism Biological Mother     Heart attack Biological Father     Liver cancer Maternal Grandmother        Patient has no known allergies.    Current Outpatient  Medications   Medication Sig Dispense Refill    acetaminophen (TYLENOL) 325 mg tablet Take 650 mg by mouth as needed.      albuterol 2.5 mg /3 mL (0.083 %) nebulizer solution TAKE 3 ML (2.5 MG TOTAL) BY NEBULIZATION 4 (FOUR) TIMES A DAY. 150 mL 3    albuterol HFA 90 mcg/actuation inhaler Inhale 2 puffs every 6 (six) hours as needed for wheezing. 18 g 1    atorvastatin (LIPITOR) 20 mg tablet Take 1 tablet (20 mg total) by mouth daily. Pill pack 90 tablet 1    busPIRone (BUSPAR) 5 mg tablet Take 1 tablet (5 mg total) by mouth 2 (two) times a day. Pill pack 180 tablet 1    clonazePAM (klonoPIN) 0.5 mg tablet TAKE 1 TABLET (0.5 MG TOTAL) BY MOUTH NIGHTLY AS NEEDED FOR ANXIETY. 30 tablet 0    diclofenac sodium (VOLTAREN) 1 % topical gel Apply 1 g topically as needed.      donepeziL (ARICEPT) 10 mg tablet TAKE ONE TABLET BY MOUTH ONE TIME DAILY IN THE EVENING 90 tablet 1    empagliflozin (JARDIANCE) 25 mg tablet Take 1 tablet (25 mg total) by mouth daily. 90 tablet 3    ferrous sulfate 325 mg (65 mg iron) tablet Take 1 tablet (325 mg total) by mouth daily with breakfast. 90 tablet 1    fluticasone-umeclidinium-vilanterol (TRELEGY ELLIPTA) 100-62.5-25 mcg blister with device powder for inhalation Inhale 1 puff daily. 3 each 0    folic acid (FOLVITE) 1 mg tablet Take 1 tablet (1 mg total) by mouth daily. 90 tablet 1    furosemide (LASIX) 40 mg tablet Take 1 tablet (40 mg total) by mouth daily. 30 tablet 0    levothyroxine (SYNTHROID) 112 mcg tablet Take 1 tablet (112 mcg total) by mouth daily. Pill pack 90 tablet 1    lidocaine (ASPERCREME) 4 % adhesive patch,medicated topical patch Apply 1 patch topically daily. 30 patch 0    loratadine (CLARITIN) 10 mg tablet Take 10 mg by mouth daily.      magnesium oxide (MAG-OX) 400 mg (241.3 mg magnesium) tablet Take 1 tablet (400 mg total) by mouth 2 (two) times a day. 180 tablet 3    medical marijuana Take 1 each by mouth as needed.      melatonin 5 mg capsule  Take 10 mg by mouth nightly. Hs prn      metoprolol succinate XL (TOPROL-XL) 25 mg 24 hr tablet Take 1 tablet (25 mg total) by mouth daily. 30 tablet 0    multivitamin tablet Take 1 tablet by mouth daily.      nebulizers misc 1 each 4 (four) times a day as needed (sob or wheeze). 1 each 0    pantoprazole (PROTONIX) 40 mg EC tablet Take 1 tablet (40 mg total) by mouth 2 (two) times a day. Pill pack 180 tablet 1    rivaroxaban (XARELTO) 20 mg tablet Take 1 tablet (20 mg total) by mouth daily with dinner. 90 tablet 2    sertraline (ZOLOFT) 100 mg tablet Take 1 tablet (100 mg total) by mouth daily. 90 tablet 0    spironolactone (ALDACTONE) 25 mg tablet Take 1 tablet (25 mg total) by mouth daily. 90 tablet 1    thiamine 100 mg tablet Take 1 tablet (100 mg total) by mouth daily. 90 tablet 1     No current facility-administered medications for this visit.       Review of Systems   Cardiovascular: Negative for chest pain, claudication, cyanosis, dyspnea on exertion, irregular heartbeat, leg swelling, near-syncope, orthopnea, palpitations, paroxysmal nocturnal dyspnea and syncope.   Neurological: Positive for disturbances in coordination, dizziness and light-headedness. Negative for loss of balance.          Vitals:    10/03/23 1301   BP: 104/62   Pulse: 65   Resp: 16   SpO2: 97%       Body mass index is 27.67 kg/m².    No JVD, dry skin, tenting and minimal edema- he is drier side of euvolemic.    Physical Exam  Constitutional:       Appearance: Normal appearance. He is normal weight.   HENT:      Head: Normocephalic.      Mouth/Throat:      Mouth: Mucous membranes are moist.      Pharynx: No posterior oropharyngeal erythema.   Eyes:      Extraocular Movements: Extraocular movements intact.   Cardiovascular:      Rate and Rhythm: Normal rate and regular rhythm.      Chest Wall: PMI is not displaced.      Pulses: Normal pulses.      Heart sounds: S1 normal and S2 normal. No murmur heard.     No gallop. No S3 or S4  sounds.   Pulmonary:      Effort: Pulmonary effort is normal.      Breath sounds: Normal breath sounds. No rales.   Abdominal:      General: Abdomen is flat. Bowel sounds are normal. There is no distension.      Tenderness: There is no abdominal tenderness.   Musculoskeletal:      Cervical back: Neck supple.      Comments: Functional status is:  Good   Skin:     General: Skin is warm.   Neurological:      Mental Status: He is alert and oriented to person, place, and time. Mental status is at baseline.   Psychiatric:         Attention and Perception: Attention normal.         Mood and Affect: Mood and affect normal.         Speech: Speech normal.      demented and inappropriate with my nurse/mild sexual harrassment.      Lab Results   Component Value Date    WBC 11.55 (H) 08/29/2023    RBC 3.68 (L) 08/29/2023    HGB 10.2 (L) 08/29/2023    HCT 33.6 (L) 08/29/2023    MCV 91.3 08/29/2023    MCH 27.7 (L) 08/29/2023    MCHC 30.4 (L) 08/29/2023    RDW 14.2 08/29/2023     08/29/2023        Lab Results   Component Value Date    GLUCOSE 3+ (A) 09/20/2023    BUN 23 08/29/2023    CREATININE 1.0 08/29/2023    EGFR >60.0 08/29/2023     08/29/2023    K 3.7 08/29/2023     08/29/2023    CO2 32 (H) 08/29/2023    CALCIUM 9.2 08/29/2023    PROTEIN 7.1 08/26/2023    ALBUMIN 3.8 08/26/2023    GLOB 2.6 07/11/2023    AGRATIO 1.6 07/11/2023    BILITOT 0.8 08/26/2023    ALKPHOS 88 08/26/2023    AST 18 08/26/2023    ALT 16 08/26/2023        Lab Results   Component Value Date    HGBA1C 8.1 (H) 07/11/2023        Lab Results   Component Value Date    ALBUMIN 3.8 08/26/2023        Lab Results   Component Value Date    CHOL 138 11/17/2022    TRIG 71 11/17/2022    HDL 64 11/17/2022    LDLCALC 59 11/17/2022        Cardiac Imaging    TRANSTHORACIC ECHO (TTE) COMPLETE 08/28/2023    Interpretation Summary  Technically difficult study.  Definity used for endocardial enhancement.    Mild concentric left ventricular hypertrophy and  severely dilated cavity size.  Mildly reduced systolic function.  Estimated EF 40%.  The entire apex is akinetic.  Unable to estimate diastolic function.  Moderately dilated right ventricular cavity size with preserved systolic function.  Pacemaker wire seen in right-sided structures.  Severely dilated left atrium.  Mildly dilated right atrium.  A transcatheter bioprosthetic aortic valve present.  Peak velocity of 2.0 m/s and mean gradient of 8 mmHg.  No transvalvular or paravalvular aortic regurgitation.  Ascending aorta measures 3.8 cm.  Severe, posterior, mitral annular calcification.  Severe mitral regurgitation, directed central-posteriorly, likely in the setting of a restricted posterior leaflet for mitral annular calcification.  Calculated effective regurgitant orifice of 0.4 cm and regurgitant volume of 57 mL calculated by the Pisa method (Pisa 1 cm).  Progressive mitral stenosis with a mean transmitral gradient of 5 mmHg at 65 bpm.  Mild tricuspid regurgitation with calculated RVSP of 43 mmHg plus right atrial pressure..  Pulmonic valve not well visualized.  Trace pulmonic insufficiency.  No pericardial effusion.  IVC is suboptimally visualized, but appears dilated in size. Cannot assess respiratory variation.      Results for orders placed during the hospital encounter of 08/26/23    Transthoracic Echo (TTE) Complete    Interpretation Summary  Technically difficult study.  Definity used for endocardial enhancement.    Mild concentric left ventricular hypertrophy and severely dilated cavity size.  Mildly reduced systolic function.  Estimated EF 40%.  The entire apex is akinetic.  Unable to estimate diastolic function.  Moderately dilated right ventricular cavity size with preserved systolic function.  Pacemaker wire seen in right-sided structures.  Severely dilated left atrium.  Mildly dilated right atrium.  A transcatheter bioprosthetic aortic valve present.  Peak velocity of 2.0 m/s and mean gradient of 8  mmHg.  No transvalvular or paravalvular aortic regurgitation.  Ascending aorta measures 3.8 cm.  Severe, posterior, mitral annular calcification.  Severe mitral regurgitation, directed central-posteriorly, likely in the setting of a restricted posterior leaflet for mitral annular calcification.  Calculated effective regurgitant orifice of 0.4 cm and regurgitant volume of 57 mL calculated by the Pisa method (Pisa 1 cm).  Progressive mitral stenosis with a mean transmitral gradient of 5 mmHg at 65 bpm.  Mild tricuspid regurgitation with calculated RVSP of 43 mmHg plus right atrial pressure..  Pulmonic valve not well visualized.  Trace pulmonic insufficiency.  No pericardial effusion.  IVC is suboptimally visualized, but appears dilated in size. Cannot assess respiratory variation.               Assessment/Plan        EKG: afib Vpaced      CHF (congestive heart failure), NYHA class I, acute, systolic (CMS/HCC)  Class IIIC    Euvolemic to dry- recommend no more hungry man meals and low sodium, drink more water to stay out of the hospital.    Education on meds, drinking water and eating less salt.      Bilateral carotid bruits  Stay hydrated and no stroke with good prevention and control of BP.    Ascending aortic aneurysm (CMS/HCC)  Continue GDMT and BP Control    A-fib (CMS/HCC)  Permanent, continue rate control and anticoagulation.    Acute on chronic systolic heart failure (CMS/HCC)  Stable to improved.      Chronic combined systolic and diastolic heart failure (CMS/HCC)  Eats Hungry Man meals in his home.  Meals on wheels will be better but he needs help.    Essential hypertension  Improved control, watch sodium, drink water.     Nonrheumatic aortic valve stenosis  Not severe, will do surveillance imaging.    NSVT (nonsustained ventricular tachycardia)  Continue suppressive meds.  Doesn't want device.      I spent 45min on exam, interview, records review, care coordination, CHF education.     Referral to PCM and  psychologist.      No orders of the defined types were placed in this encounter.      There are no discontinued medications.     Orders Placed This Encounter   Procedures    Ambulatory referral to Cardiac Electrophysiology     Device management, BiV possible as 100% paced and in CHF with multiple admissions.  Permanent Afib.     Standing Status:   Future     Number of Occurrences:   1     Standing Expiration Date:   4/3/2024     Referral Priority:   Routine     Referral Type:   Consultation     Referral Reason:   Specialty Services Required     Referred to Provider:   Pete Gongora MD     Requested Specialty:   Cardiology     Number of Visits Requested:   10    [facility] MUSE ECG 12 lead (clinic performed)     Scheduling Instructions:      PLEASE USE THIS ORDER FOR ECG'S PERFORMED IN PHYSICIAN OFFICES     Order Specific Question:   Release to patient     Answer:   Immediate [1]      I spent 45min on exam, interview, documentation, counseling, records review, written counseling an care coordination on the date of service.  See EP physician RE device management and see the Psychologist who now works here in this clinic.      Work with Dr. Chambers on depression.    DRINK MORE WATER AND DON'T EAT HUNGRY MAN MEALS WITH LOTS OF SODIUM

## 2023-09-21 ENCOUNTER — TELEPHONE (OUTPATIENT)
Dept: PRIMARY CARE | Facility: CLINIC | Age: 83
End: 2023-09-21
Payer: COMMERCIAL

## 2023-09-21 NOTE — TELEPHONE ENCOUNTER
----- Message from AMARILIS Jmaes sent at 9/21/2023 11:28 AM EDT -----  Please call his caregivers and let them know that he does have some white cells on his urine but not indicative necessarily of an infection we will wait for the culture  He is very dehydrated likely due to his Lasix and his sugar and not drinking enough fluids  Please make sure they know to hold his Lasix for 2 days, increase his fluids make sure he is drinking enough water keep us posted on any new or changing symptoms and as soon as the culture is back we will let them now  Discussed this with  who has recommended above  Karina

## 2023-09-21 NOTE — RESULT ENCOUNTER NOTE
Please call his caregivers and let them know that he does have some white cells on his urine but not indicative necessarily of an infection we will wait for the culture  He is very dehydrated likely due to his Lasix and his sugar and not drinking enough fluids  Please make sure they know to hold his Lasix for 2 days, increase his fluids make sure he is drinking enough water keep us posted on any new or changing symptoms and as soon as the culture is back we will let them now  Discussed this with  who has recommended above  Karina

## 2023-09-23 LAB
APPEARANCE UR: CLEAR
BACTERIA #/AREA URNS HPF: ABNORMAL /HPF
BACTERIA UR CULT: ABNORMAL
BACTERIA UR CULT: NORMAL
BILIRUB UR QL STRIP: NEGATIVE
COLOR UR: YELLOW
GLUCOSE UR QL STRIP: ABNORMAL
HGB UR QL STRIP: NEGATIVE
HYALINE CASTS #/AREA URNS LPF: ABNORMAL /LPF
KETONES UR QL STRIP: NEGATIVE
LEUKOCYTE ESTERASE UR QL STRIP: ABNORMAL
NITRITE UR QL STRIP: NEGATIVE
PH UR STRIP: 7.5 [PH] (ref 5–8)
PROT UR QL STRIP: NEGATIVE
RBC #/AREA URNS HPF: ABNORMAL /HPF
SERVICE CMNT-IMP: ABNORMAL
SP GR UR STRIP: 1.04 (ref 1–1.03)
SQUAMOUS #/AREA URNS HPF: ABNORMAL /HPF
WBC #/AREA URNS HPF: ABNORMAL /HPF

## 2023-09-25 ENCOUNTER — TELEPHONE (OUTPATIENT)
Dept: PRIMARY CARE | Facility: CLINIC | Age: 83
End: 2023-09-25
Payer: COMMERCIAL

## 2023-09-25 DIAGNOSIS — N30.00 ACUTE CYSTITIS WITHOUT HEMATURIA: Primary | ICD-10-CM

## 2023-09-25 RX ORDER — NITROFURANTOIN 25; 75 MG/1; MG/1
100 CAPSULE ORAL 2 TIMES DAILY
Qty: 10 CAPSULE | Refills: 0 | Status: SHIPPED | OUTPATIENT
Start: 2023-09-25 | End: 2023-09-30

## 2023-09-25 NOTE — TELEPHONE ENCOUNTER
Left detailed msg for Bebeto Mckee with results of Urine culture and treatment.   Attempted to contact patient's caregiver Ann but her VM box was full.

## 2023-09-25 NOTE — TELEPHONE ENCOUNTER
----- Message from Melanie Chambers DO sent at 9/25/2023  7:40 AM EDT -----  E faecalis in urine  Will treat with Macrobid    Call and discuss with caretaker or daughter

## 2023-10-03 ENCOUNTER — OFFICE VISIT (OUTPATIENT)
Dept: CARDIOLOGY | Facility: CLINIC | Age: 83
End: 2023-10-03
Payer: COMMERCIAL

## 2023-10-03 VITALS
DIASTOLIC BLOOD PRESSURE: 62 MMHG | HEIGHT: 68 IN | WEIGHT: 182 LBS | HEART RATE: 65 BPM | BODY MASS INDEX: 27.58 KG/M2 | RESPIRATION RATE: 16 BRPM | SYSTOLIC BLOOD PRESSURE: 104 MMHG | OXYGEN SATURATION: 97 %

## 2023-10-03 DIAGNOSIS — I49.5 SSS (SICK SINUS SYNDROME) (CMS/HCC): ICD-10-CM

## 2023-10-03 DIAGNOSIS — I48.0 PAROXYSMAL ATRIAL FIBRILLATION (CMS/HCC): ICD-10-CM

## 2023-10-03 DIAGNOSIS — I34.0 MITRAL VALVE INSUFFICIENCY, UNSPECIFIED ETIOLOGY: ICD-10-CM

## 2023-10-03 DIAGNOSIS — I71.21 ANEURYSM OF ASCENDING AORTA WITHOUT RUPTURE (CMS/HCC): ICD-10-CM

## 2023-10-03 DIAGNOSIS — I50.23 ACUTE ON CHRONIC SYSTOLIC HEART FAILURE (CMS/HCC): ICD-10-CM

## 2023-10-03 DIAGNOSIS — I50.21 CHF (CONGESTIVE HEART FAILURE), NYHA CLASS I, ACUTE, SYSTOLIC (CMS/HCC): ICD-10-CM

## 2023-10-03 DIAGNOSIS — I50.42 CHRONIC COMBINED SYSTOLIC AND DIASTOLIC HEART FAILURE (CMS/HCC): Primary | ICD-10-CM

## 2023-10-03 DIAGNOSIS — R09.89 BILATERAL CAROTID BRUITS: ICD-10-CM

## 2023-10-03 DIAGNOSIS — I27.20 PULMONARY HYPERTENSION (CMS/HCC): ICD-10-CM

## 2023-10-03 DIAGNOSIS — I25.10 CORONARY ARTERY DISEASE INVOLVING NATIVE CORONARY ARTERY OF NATIVE HEART WITHOUT ANGINA PECTORIS: ICD-10-CM

## 2023-10-03 DIAGNOSIS — I47.29 NSVT (NONSUSTAINED VENTRICULAR TACHYCARDIA) (CMS/HCC): ICD-10-CM

## 2023-10-03 DIAGNOSIS — Z95.0 PRESENCE OF CARDIAC PACEMAKER: ICD-10-CM

## 2023-10-03 DIAGNOSIS — I48.21 PERMANENT ATRIAL FIBRILLATION (CMS/HCC): ICD-10-CM

## 2023-10-03 DIAGNOSIS — I10 ESSENTIAL HYPERTENSION: ICD-10-CM

## 2023-10-03 DIAGNOSIS — Z95.2 S/P TAVR (TRANSCATHETER AORTIC VALVE REPLACEMENT): ICD-10-CM

## 2023-10-03 DIAGNOSIS — E78.00 HYPERCHOLESTEREMIA: ICD-10-CM

## 2023-10-03 DIAGNOSIS — Z95.820 S/P ANGIOPLASTY WITH STENT: ICD-10-CM

## 2023-10-03 DIAGNOSIS — I35.0 NONRHEUMATIC AORTIC VALVE STENOSIS: ICD-10-CM

## 2023-10-03 DIAGNOSIS — I45.10 RBBB: ICD-10-CM

## 2023-10-03 LAB
ATRIAL RATE: 77
QRS DURATION: 200
QT INTERVAL: 514
QTC CALCULATION(BAZETT): 534
R AXIS: 264
T WAVE AXIS: 75
VENTRICULAR RATE: 65

## 2023-10-03 PROCEDURE — 3078F DIAST BP <80 MM HG: CPT | Performed by: INTERNAL MEDICINE

## 2023-10-03 PROCEDURE — 93000 ELECTROCARDIOGRAM COMPLETE: CPT | Performed by: INTERNAL MEDICINE

## 2023-10-03 PROCEDURE — 3074F SYST BP LT 130 MM HG: CPT | Performed by: INTERNAL MEDICINE

## 2023-10-03 PROCEDURE — 99215 OFFICE O/P EST HI 40 MIN: CPT | Performed by: INTERNAL MEDICINE

## 2023-10-03 PROCEDURE — 3008F BODY MASS INDEX DOCD: CPT | Performed by: INTERNAL MEDICINE

## 2023-10-03 ASSESSMENT — ENCOUNTER SYMPTOMS
DISTURBANCES IN COORDINATION: 1
CLAUDICATION: 0
LOSS OF BALANCE: 0
DIZZINESS: 1
IRREGULAR HEARTBEAT: 0
PND: 0
PALPITATIONS: 0
NEAR-SYNCOPE: 0
LIGHT-HEADEDNESS: 1
SYNCOPE: 0
DYSPNEA ON EXERTION: 0
ORTHOPNEA: 0

## 2023-10-03 NOTE — PATIENT INSTRUCTIONS
See EP physician RE device management and see the Psychologist who now works here in this clinic.      Work with Dr. Chambers on depression.    DRINK MORE WATER AND DON'T EAT HUNGRY MAN MEALS WITH LOTS OF SODIUM.  Fresh fruits and veggies.      Heart failure is worse with salt, when gaining weight you're either eating too much salt or not taking enough diuretics so call me and we'll make a plan.      Would avoid dehydration and avoid fluid overload.

## 2023-10-03 NOTE — ASSESSMENT & PLAN NOTE
Class IIIC    Euvolemic to dry- recommend no more hungry man meals and low sodium, drink more water to stay out of the hospital.    Education on meds, drinking water and eating less salt.

## 2023-10-04 ENCOUNTER — TELEPHONE (OUTPATIENT)
Dept: PRIMARY CARE | Facility: CLINIC | Age: 83
End: 2023-10-04
Payer: COMMERCIAL

## 2023-10-04 NOTE — TELEPHONE ENCOUNTER
I have created a letter stating that he no longer needs oxygen therapy.  Could you fax this over please?    Melanie Chambers, DO

## 2023-10-04 NOTE — TELEPHONE ENCOUNTER
Rylee from [facility] called requesting an order be faxed to [facility] to request the removal of pt's oxygen tank and concentrator. She stated that he is not using them and he has multiple tanks in the house. She requested the sooner the better because the pt vapes and turns the gas stove on. Any questions Rylee can be reached at [phone]    [facility] Phone: [phone] or [phone]  Fax Number: [phone].

## 2023-10-04 NOTE — LETTER
10/04/23    Cecil Morris  [address]  [facility] [address]  [phone]    To whom it may concern:    Cecil Morris,  1940, had a 6 minute walking test which did not show significant drop in oxygen level. He does not use oxygen therapy at home and does not need to continue oxygen therapy at this time. Please remove all equipment from the home as patient has dementia and could pose a danger to him.  Please feel free to call my office with any questions or concerns.    Sincerely,        Melanie Chambers, DO

## 2023-10-09 DIAGNOSIS — F32.89 OTHER DEPRESSION: ICD-10-CM

## 2023-10-09 RX ORDER — CLONAZEPAM 0.5 MG/1
0.5 TABLET ORAL NIGHTLY PRN
Qty: 30 TABLET | Refills: 0 | Status: SHIPPED | OUTPATIENT
Start: 2023-10-09 | End: 2023-11-06

## 2023-10-09 NOTE — TELEPHONE ENCOUNTER
Medicine Refill Request    Last Office Visit: 9/7/2023   Last Consult Visit: Visit date not found  Last Telemedicine Visit: Visit date not found    Next Appointment: 11/17/2023      Current Outpatient Medications:     acetaminophen (TYLENOL) 325 mg tablet, Take 650 mg by mouth as needed., Disp: , Rfl:     albuterol 2.5 mg /3 mL (0.083 %) nebulizer solution, TAKE 3 ML (2.5 MG TOTAL) BY NEBULIZATION 4 (FOUR) TIMES A DAY., Disp: 150 mL, Rfl: 3    albuterol HFA 90 mcg/actuation inhaler, Inhale 2 puffs every 6 (six) hours as needed for wheezing., Disp: 18 g, Rfl: 1    atorvastatin (LIPITOR) 20 mg tablet, Take 1 tablet (20 mg total) by mouth daily. Pill pack, Disp: 90 tablet, Rfl: 1    busPIRone (BUSPAR) 5 mg tablet, Take 1 tablet (5 mg total) by mouth 2 (two) times a day. Pill pack, Disp: 180 tablet, Rfl: 1    clonazePAM (klonoPIN) 0.5 mg tablet, TAKE 1 TABLET (0.5 MG TOTAL) BY MOUTH NIGHTLY AS NEEDED FOR ANXIETY., Disp: 30 tablet, Rfl: 0    diclofenac sodium (VOLTAREN) 1 % topical gel, Apply 1 g topically as needed., Disp: , Rfl:     donepeziL (ARICEPT) 10 mg tablet, TAKE ONE TABLET BY MOUTH ONE TIME DAILY IN THE EVENING, Disp: 90 tablet, Rfl: 1    empagliflozin (JARDIANCE) 25 mg tablet, Take 1 tablet (25 mg total) by mouth daily., Disp: 90 tablet, Rfl: 3    ferrous sulfate 325 mg (65 mg iron) tablet, Take 1 tablet (325 mg total) by mouth daily with breakfast., Disp: 90 tablet, Rfl: 1    fluticasone-umeclidinium-vilanterol (TRELEGY ELLIPTA) 100-62.5-25 mcg blister with device powder for inhalation, Inhale 1 puff daily., Disp: 3 each, Rfl: 0    folic acid (FOLVITE) 1 mg tablet, Take 1 tablet (1 mg total) by mouth daily., Disp: 90 tablet, Rfl: 1    furosemide (LASIX) 40 mg tablet, Take 1 tablet (40 mg total) by mouth daily., Disp: 30 tablet, Rfl: 0    levothyroxine (SYNTHROID) 112 mcg tablet, Take 1 tablet (112 mcg total) by mouth daily. Pill pack, Disp: 90 tablet, Rfl: 1    lidocaine (ASPERCREME) 4 %  adhesive patch,medicated topical patch, Apply 1 patch topically daily., Disp: 30 patch, Rfl: 0    loratadine (CLARITIN) 10 mg tablet, Take 10 mg by mouth daily., Disp: , Rfl:     magnesium oxide (MAG-OX) 400 mg (241.3 mg magnesium) tablet, Take 1 tablet (400 mg total) by mouth 2 (two) times a day., Disp: 180 tablet, Rfl: 3    medical marijuana, Take 1 each by mouth as needed., Disp: , Rfl:     melatonin 5 mg capsule, Take 10 mg by mouth nightly. Hs prn, Disp: , Rfl:     metoprolol succinate XL (TOPROL-XL) 25 mg 24 hr tablet, Take 1 tablet (25 mg total) by mouth daily., Disp: 30 tablet, Rfl: 0    multivitamin tablet, Take 1 tablet by mouth daily., Disp: , Rfl:     nebulizers misc, 1 each 4 (four) times a day as needed (sob or wheeze)., Disp: 1 each, Rfl: 0    pantoprazole (PROTONIX) 40 mg EC tablet, Take 1 tablet (40 mg total) by mouth 2 (two) times a day. Pill pack, Disp: 180 tablet, Rfl: 1    rivaroxaban (XARELTO) 20 mg tablet, Take 1 tablet (20 mg total) by mouth daily with dinner., Disp: 90 tablet, Rfl: 2    sertraline (ZOLOFT) 100 mg tablet, Take 1 tablet (100 mg total) by mouth daily., Disp: 90 tablet, Rfl: 0    spironolactone (ALDACTONE) 25 mg tablet, Take 1 tablet (25 mg total) by mouth daily., Disp: 90 tablet, Rfl: 1    thiamine 100 mg tablet, Take 1 tablet (100 mg total) by mouth daily., Disp: 90 tablet, Rfl: 1      BP Readings from Last 3 Encounters:   10/03/23 104/62   09/07/23 114/80   08/29/23 113/63       Recent Lab results:  Lab Results   Component Value Date    CHOL 138 11/17/2022   ,   Lab Results   Component Value Date    HDL 64 11/17/2022   ,   Lab Results   Component Value Date    LDLCALC 59 11/17/2022   ,   Lab Results   Component Value Date    TRIG 71 11/17/2022        Lab Results   Component Value Date    GLUCOSE 3+ (A) 09/20/2023   ,   Lab Results   Component Value Date    HGBA1C 8.1 (H) 07/11/2023         Lab Results   Component Value Date    CREATININE 1.0 08/29/2023       Lab  Results   Component Value Date    TSH 3.06 04/21/2023           Lab Results   Component Value Date    HGBA1C 8.1 (H) 07/11/2023

## 2023-10-10 ENCOUNTER — TELEPHONE (OUTPATIENT)
Dept: PRIMARY CARE | Facility: CLINIC | Age: 83
End: 2023-10-10
Payer: COMMERCIAL

## 2023-10-10 DIAGNOSIS — I50.42 CHRONIC COMBINED SYSTOLIC AND DIASTOLIC HEART FAILURE (CMS/HCC): ICD-10-CM

## 2023-10-10 DIAGNOSIS — I50.21 CHF (CONGESTIVE HEART FAILURE), NYHA CLASS I, ACUTE, SYSTOLIC (CMS/HCC): Primary | ICD-10-CM

## 2023-10-10 NOTE — TELEPHONE ENCOUNTER
Pts caregiver Ann called to let us know that Liu had a significant weight change in a short period of time. His weight today was 186.2 and on Sun it was 181. She reports he has no other sx. Thanks!

## 2023-10-10 NOTE — TELEPHONE ENCOUNTER
Reviewed with Dr Wolf. He advises pt take extra 40mg lasix dose until weight stabilizes. Pt should not be eating high salt food, and should try and increase water intake.     Attempted to call Ann back. Unable to lvm. Sent [employer] message with instructions and will attempt to call her again tomorrow.

## 2023-10-11 ENCOUNTER — TELEPHONE (OUTPATIENT)
Dept: PRIMARY CARE | Facility: CLINIC | Age: 83
End: 2023-10-11
Payer: COMMERCIAL

## 2023-10-11 RX ORDER — FUROSEMIDE 40 MG/1
TABLET ORAL
Qty: 30 TABLET | Refills: 1 | Status: SHIPPED | OUTPATIENT
Start: 2023-10-11

## 2023-10-11 NOTE — TELEPHONE ENCOUNTER
Spoke with Rylee (visiting nurse) [phone] and notified the increase in lasix until pts 5 lb weight gain has come down    See portal message     She states that he is eating frozen corn dogs    She will keep an eye on him and will see him again on Friday      AE

## 2023-10-11 NOTE — TELEPHONE ENCOUNTER
Rylee from [facility] called stating that she was placing Liu back on Home Care for a few more weeks and would be sending orders over for you to sign off on. She stated that Liu is starting to gain weight due to him eating hot dogs & Dr. Wolf increasing his Lasix. Any questions Rylee can be reached at [phone]

## 2023-10-11 NOTE — TELEPHONE ENCOUNTER
S/w Ann. Reviewed instructions to take extra dose of lasix for a few days until weight stabilizes. Pt uses pill packs so he needs additional pills sent to pharmacy.    Denies cp and SOB.     Ann is going to pt's house this afternoon. Visiting RN weighing pt this AM.      Pt continues to be non-compliant with diet. They went grocery shopping this weekend for low salt healthy foods, however the pt lives alone most of the time and Ann cannot always ensure he's adhering to proper diet. They also bought a case of water. Encouraged increasing hydration.    Ann will call back if weight continues to be elevated or if he develops SOB.

## 2023-10-17 ENCOUNTER — TELEPHONE (OUTPATIENT)
Dept: PRIMARY CARE | Facility: CLINIC | Age: 83
End: 2023-10-17
Payer: COMMERCIAL

## 2023-10-17 NOTE — TELEPHONE ENCOUNTER
"Rylee from [facility] called to report Liu's BP 70/40 and HR 60-70s; pulse ox 98%, lungs clear bilaterally.    C/O dizziness, worse after neb treatment.   Denies CP   Weight today 183.2#    Unable to confirm if he took his AM lasix or if he has still been taking an extra dose.   Note left from caregiver stated \"no lasix needed\"   We are unable to verify when his last extra dose was.     Rylee gave patient a bottle of water and asked him to drink it as it appears patient has not been drinking enough water.  Upon rechecking his systolic pressure was 78    I requested that Rylee check orthostatic BP on patient; upon standing his BP was 60/38 he had no c/o dizziness upon standing.     Patient admits to drinking a lot of ice tea today and \"peeing\" a lot; not drinking water.    Patient's dghtr reports to Rylee that Liu \"wasn't his usual giddy self this AM\"     Patient lives a lone and no caregiver would be present this afternoon.     Rylee will call 911.   Dr Chambers made aware.   "

## 2023-10-18 ENCOUNTER — OFFICE VISIT (OUTPATIENT)
Dept: CARDIOLOGY | Facility: CLINIC | Age: 83
End: 2023-10-18
Payer: COMMERCIAL

## 2023-10-18 VITALS
HEART RATE: 65 BPM | RESPIRATION RATE: 16 BRPM | OXYGEN SATURATION: 97 % | SYSTOLIC BLOOD PRESSURE: 90 MMHG | HEIGHT: 68 IN | WEIGHT: 187 LBS | BODY MASS INDEX: 28.34 KG/M2 | DIASTOLIC BLOOD PRESSURE: 62 MMHG

## 2023-10-18 DIAGNOSIS — G30.9 MODERATE ALZHEIMER'S DEMENTIA, UNSPECIFIED TIMING OF DEMENTIA ONSET, UNSPECIFIED WHETHER BEHAVIORAL, PSYCHOTIC, OR MOOD DISTURBANCE OR ANXIETY (CMS/HCC): ICD-10-CM

## 2023-10-18 DIAGNOSIS — I50.23 ACUTE ON CHRONIC SYSTOLIC HEART FAILURE (CMS/HCC): ICD-10-CM

## 2023-10-18 DIAGNOSIS — I50.42 CHRONIC COMBINED SYSTOLIC AND DIASTOLIC HEART FAILURE (CMS/HCC): Primary | ICD-10-CM

## 2023-10-18 DIAGNOSIS — I25.10 CORONARY ARTERY DISEASE INVOLVING NATIVE CORONARY ARTERY OF NATIVE HEART WITHOUT ANGINA PECTORIS: ICD-10-CM

## 2023-10-18 DIAGNOSIS — I48.0 PAROXYSMAL ATRIAL FIBRILLATION (CMS/HCC): ICD-10-CM

## 2023-10-18 DIAGNOSIS — I45.10 RBBB: ICD-10-CM

## 2023-10-18 DIAGNOSIS — F02.B0 MODERATE ALZHEIMER'S DEMENTIA, UNSPECIFIED TIMING OF DEMENTIA ONSET, UNSPECIFIED WHETHER BEHAVIORAL, PSYCHOTIC, OR MOOD DISTURBANCE OR ANXIETY (CMS/HCC): ICD-10-CM

## 2023-10-18 DIAGNOSIS — I50.21 CHF (CONGESTIVE HEART FAILURE), NYHA CLASS I, ACUTE, SYSTOLIC (CMS/HCC): ICD-10-CM

## 2023-10-18 DIAGNOSIS — I47.29 NSVT (NONSUSTAINED VENTRICULAR TACHYCARDIA) (CMS/HCC): ICD-10-CM

## 2023-10-18 DIAGNOSIS — Z95.2 S/P TAVR (TRANSCATHETER AORTIC VALVE REPLACEMENT): ICD-10-CM

## 2023-10-18 DIAGNOSIS — I34.0 MITRAL VALVE INSUFFICIENCY, UNSPECIFIED ETIOLOGY: ICD-10-CM

## 2023-10-18 DIAGNOSIS — E78.00 HYPERCHOLESTEREMIA: ICD-10-CM

## 2023-10-18 DIAGNOSIS — I49.5 SSS (SICK SINUS SYNDROME) (CMS/HCC): ICD-10-CM

## 2023-10-18 DIAGNOSIS — I71.21 ANEURYSM OF ASCENDING AORTA WITHOUT RUPTURE (CMS/HCC): ICD-10-CM

## 2023-10-18 DIAGNOSIS — I35.0 NONRHEUMATIC AORTIC VALVE STENOSIS: ICD-10-CM

## 2023-10-18 PROCEDURE — 3074F SYST BP LT 130 MM HG: CPT | Performed by: INTERNAL MEDICINE

## 2023-10-18 PROCEDURE — 3078F DIAST BP <80 MM HG: CPT | Performed by: INTERNAL MEDICINE

## 2023-10-18 PROCEDURE — 99214 OFFICE O/P EST MOD 30 MIN: CPT | Performed by: INTERNAL MEDICINE

## 2023-10-18 PROCEDURE — 3008F BODY MASS INDEX DOCD: CPT | Performed by: INTERNAL MEDICINE

## 2023-10-18 ASSESSMENT — ENCOUNTER SYMPTOMS
DIZZINESS: 0
FOCAL WEAKNESS: 0
NEAR-SYNCOPE: 0
PALPITATIONS: 0
IRREGULAR HEARTBEAT: 0
DYSPNEA ON EXERTION: 0
SYNCOPE: 0
ORTHOPNEA: 0
CLAUDICATION: 0
PND: 0

## 2023-10-18 NOTE — PATIENT INSTRUCTIONS
Drink more water, weight has been improved and BP has been down.      Lasix 40mg is now just as needed not every day.    Continue the Toprol, Aldactone, Jardiance to keep the heart happy.      IF eating Marcelo Jatinder or high salt and retain fluid take the Lasix.    Drink more water goal 20-30oz a day minimum please.      Rehydration if BP low and no symptoms, not going to the hospital.

## 2023-10-18 NOTE — PROGRESS NOTES
Cecil Morris is a 83 y.o. male is present in the office today for follow up     PT was in ED at [facility] d/t hypotension    Note from visiting nurse:  Rylee from [facility] called to report Liu's BP 70/40 and HR 60-70s; pulse ox 98%, lungs clear bilaterally.     C/O dizziness, worse after neb treatment.   Denies CP   Weight today 183.2    Pt was taking 40 mg daily and PRN extra 40 mg until 5 lb wt gain decreased     By the time pt got to ED the BP raised up to     Drinking 1 glass water daily    Coffee and Gatorade tea     Only took the extra 40 mg of lasix 1x since given the orders d/t quickly dropping wt (it was given on 10/11/2023) was 185 lbs at that time   Over the next few days pts wt dropped to 181 lbs    Weight has been good      Wt 183.2 yesterday    10/12/2023 wt 184.2    Care giver does seem to think that dizziness might related to neb tx since that's when pt states he's having ss      He wants to get hot dogs / corn dogs.    Past Medical History:   Diagnosis Date    A-fib (CMS/HCC)     Acute on chronic combined systolic and diastolic congestive heart failure (CMS/HCC)     Anxiety 4/11/2023    CHF (congestive heart failure), NYHA class I, acute, systolic (CMS/HCC)     Depression     Hyperthyroidism     Hypoxia     Near syncope     NSVT (nonsustained ventricular tachycardia) (CMS/HCC)     Orthostatic hypotension     Presence of cardiac pacemaker     Pulmonary embolism (CMS/HCC)     Sepsis (CMS/HCC) 05/16/2022       Past Surgical History:   Procedure Laterality Date    CARDIAC CATHETERIZATION  11/19/2020    CATH PLACE/CORON ANGIO, IMG SUPER/INTERP,R&L HRT CATH, L HRT VENTRIC    CARDIAC CATHETERIZATION Bilateral 2005    CORONARY ARTERY BYPASS GRAFT  2005        Social History     Socioeconomic History    Marital status:      Spouse name: None    Number of children: 2    Years of education: None    Highest education level: None   Occupational History    Occupation: retired      Comment: [place] in [place]   Tobacco Use    Smoking status: Former     Years: 40     Types: Cigarettes     Quit date: 11/1/2012     Years since quitting: 10.9    Smokeless tobacco: Never   Vaping Use    Vaping Use: Never used   Substance and Sexual Activity    Alcohol use: Not Currently     Alcohol/week: 9.0 standard drinks of alcohol     Types: 9 Shots of liquor per week     Comment: vodka stopped late in winter    Drug use: Never    Sexual activity: Not Currently   Social History Narrative    Lives alone, and has help for meal prep and med management     Social Determinants of Health     Financial Resource Strain: Low Risk  (8/27/2023)    Overall Financial Resource Strain (CARDIA)     Difficulty of Paying Living Expenses: Not hard at all   Food Insecurity: No Food Insecurity (8/18/2023)    Hunger Vital Sign     Worried About Running Out of Food in the Last Year: Never true     Ran Out of Food in the Last Year: Never true   Transportation Needs: No Transportation Needs (8/27/2023)    PRAPARE - Transportation     Lack of Transportation (Medical): No     Lack of Transportation (Non-Medical): No   Social Connections: Socially Isolated (6/26/2023)    Social Connection and Isolation Panel [NHANES]     Frequency of Communication with Friends and Family: More than three times a week     Frequency of Social Gatherings with Friends and Family: Once a week     Attends Catholic Services: Never     Active Member of Clubs or Organizations: No     Attends Club or Organization Meetings: Patient refused     Marital Status:    Housing Stability: Low Risk  (8/27/2023)    Housing Stability Vital Sign     Unable to Pay for Housing in the Last Year: No     Number of Places Lived in the Last Year: 1     Unstable Housing in the Last Year: No       Family History   Problem Relation Age of Onset    Thyroid cancer Biological Mother     ALS Biological Mother     Parkinsonism Biological Mother     Heart  attack Biological Father     Liver cancer Maternal Grandmother        Patient has no known allergies.    Current Outpatient Medications   Medication Sig Dispense Refill    acetaminophen (TYLENOL) 325 mg tablet Take 650 mg by mouth as needed.      albuterol 2.5 mg /3 mL (0.083 %) nebulizer solution TAKE 3 ML (2.5 MG TOTAL) BY NEBULIZATION 4 (FOUR) TIMES A DAY. 150 mL 3    albuterol HFA 90 mcg/actuation inhaler Inhale 2 puffs every 6 (six) hours as needed for wheezing. 18 g 1    atorvastatin (LIPITOR) 20 mg tablet Take 1 tablet (20 mg total) by mouth daily. Pill pack 90 tablet 1    busPIRone (BUSPAR) 5 mg tablet Take 1 tablet (5 mg total) by mouth 2 (two) times a day. Pill pack 180 tablet 1    clonazePAM (klonoPIN) 0.5 mg tablet Take 1 tablet (0.5 mg total) by mouth nightly as needed for anxiety. 30 tablet 0    diclofenac sodium (VOLTAREN) 1 % topical gel Apply 1 g topically as needed.      donepeziL (ARICEPT) 10 mg tablet TAKE ONE TABLET BY MOUTH ONE TIME DAILY IN THE EVENING 90 tablet 1    empagliflozin (JARDIANCE) 25 mg tablet Take 1 tablet (25 mg total) by mouth daily. 90 tablet 3    ferrous sulfate 325 mg (65 mg iron) tablet Take 1 tablet (325 mg total) by mouth daily with breakfast. 90 tablet 1    fluticasone-umeclidinium-vilanterol (TRELEGY ELLIPTA) 100-62.5-25 mcg blister with device powder for inhalation Inhale 1 puff daily. 3 each 0    folic acid (FOLVITE) 1 mg tablet Take 1 tablet (1 mg total) by mouth daily. 90 tablet 1    furosemide (LASIX) 40 mg tablet Take 1 tablet (40mg) in afternoon as needed when gaining weight. Continue to take morning dose of lasix in pill pack. 30 tablet 1    levothyroxine (SYNTHROID) 112 mcg tablet Take 1 tablet (112 mcg total) by mouth daily. Pill pack 90 tablet 1    lidocaine (ASPERCREME) 4 % adhesive patch,medicated topical patch Apply 1 patch topically daily. 30 patch 0    loratadine (CLARITIN) 10 mg tablet Take 10 mg by mouth daily.      magnesium oxide  (MAG-OX) 400 mg (241.3 mg magnesium) tablet Take 1 tablet (400 mg total) by mouth 2 (two) times a day. 180 tablet 3    medical marijuana Take 1 each by mouth as needed.      melatonin 5 mg capsule Take 10 mg by mouth nightly. Hs prn      metoprolol succinate XL (TOPROL-XL) 25 mg 24 hr tablet Take 1 tablet (25 mg total) by mouth daily. 30 tablet 0    multivitamin tablet Take 1 tablet by mouth daily.      nebulizers misc 1 each 4 (four) times a day as needed (sob or wheeze). 1 each 0    pantoprazole (PROTONIX) 40 mg EC tablet Take 1 tablet (40 mg total) by mouth 2 (two) times a day. Pill pack 180 tablet 1    rivaroxaban (XARELTO) 20 mg tablet Take 1 tablet (20 mg total) by mouth daily with dinner. 90 tablet 2    sertraline (ZOLOFT) 100 mg tablet Take 1 tablet (100 mg total) by mouth daily. 90 tablet 0    spironolactone (ALDACTONE) 25 mg tablet Take 1 tablet (25 mg total) by mouth daily. 90 tablet 1    thiamine 100 mg tablet Take 1 tablet (100 mg total) by mouth daily. 90 tablet 1     No current facility-administered medications for this visit.       Review of Systems   Cardiovascular: Negative for chest pain, claudication, cyanosis, dyspnea on exertion, irregular heartbeat, leg swelling, near-syncope, orthopnea, palpitations, paroxysmal nocturnal dyspnea and syncope.   Neurological: Negative for dizziness and focal weakness.          Vitals:    10/18/23 1306   BP: 90/62   Pulse: 65   Resp: 16   SpO2: 97%       Body mass index is 28.43 kg/m².      Physical Exam  Constitutional:       Appearance: Normal appearance. He is normal weight.   HENT:      Head: Normocephalic.      Mouth/Throat:      Mouth: Mucous membranes are moist.      Pharynx: No posterior oropharyngeal erythema.   Eyes:      Extraocular Movements: Extraocular movements intact.   Neck:      Vascular: No JVD.   Cardiovascular:      Rate and Rhythm: Normal rate and regular rhythm.      Chest Wall: PMI is not displaced.      Pulses: Normal pulses.       Heart sounds: S1 normal and S2 normal. Murmur heard.      No gallop. No S3 or S4 sounds.   Pulmonary:      Effort: Pulmonary effort is normal.      Breath sounds: Normal breath sounds. No rales.   Abdominal:      General: Abdomen is flat. Bowel sounds are normal. There is no distension.      Tenderness: There is no abdominal tenderness.   Musculoskeletal:      Cervical back: Neck supple.      Right lower leg: No edema.      Left lower leg: No edema.      Comments: Functional status is:  good   Skin:     General: Skin is warm and dry.   Neurological:      Mental Status: He is alert and oriented to person, place, and time. Mental status is at baseline.   Psychiatric:         Attention and Perception: Attention normal.         Mood and Affect: Mood and affect normal.         Speech: Speech normal.        Appears dry lips skin and tenting with mild dehydration.  Lab Results   Component Value Date    WBC 11.55 (H) 08/29/2023    RBC 3.68 (L) 08/29/2023    HGB 10.2 (L) 08/29/2023    HCT 33.6 (L) 08/29/2023    MCV 91.3 08/29/2023    MCH 27.7 (L) 08/29/2023    MCHC 30.4 (L) 08/29/2023    RDW 14.2 08/29/2023     08/29/2023        Lab Results   Component Value Date    GLUCOSE 3+ (A) 09/20/2023    BUN 23 08/29/2023    CREATININE 1.0 08/29/2023    EGFR >60.0 08/29/2023     08/29/2023    K 3.7 08/29/2023     08/29/2023    CO2 32 (H) 08/29/2023    CALCIUM 9.2 08/29/2023    PROTEIN 7.1 08/26/2023    ALBUMIN 3.8 08/26/2023    GLOB 2.6 07/11/2023    AGRATIO 1.6 07/11/2023    BILITOT 0.8 08/26/2023    ALKPHOS 88 08/26/2023    AST 18 08/26/2023    ALT 16 08/26/2023        Lab Results   Component Value Date    HGBA1C 8.1 (H) 07/11/2023        Lab Results   Component Value Date    ALBUMIN 3.8 08/26/2023        Lab Results   Component Value Date    CHOL 138 11/17/2022    TRIG 71 11/17/2022    HDL 64 11/17/2022    LDLCALC 59 11/17/2022        Cardiac Imaging    TRANSTHORACIC ECHO (TTE) COMPLETE  08/28/2023    Interpretation Summary  Technically difficult study.  Definity used for endocardial enhancement.    Mild concentric left ventricular hypertrophy and severely dilated cavity size.  Mildly reduced systolic function.  Estimated EF 40%.  The entire apex is akinetic.  Unable to estimate diastolic function.  Moderately dilated right ventricular cavity size with preserved systolic function.  Pacemaker wire seen in right-sided structures.  Severely dilated left atrium.  Mildly dilated right atrium.  A transcatheter bioprosthetic aortic valve present.  Peak velocity of 2.0 m/s and mean gradient of 8 mmHg.  No transvalvular or paravalvular aortic regurgitation.  Ascending aorta measures 3.8 cm.  Severe, posterior, mitral annular calcification.  Severe mitral regurgitation, directed central-posteriorly, likely in the setting of a restricted posterior leaflet for mitral annular calcification.  Calculated effective regurgitant orifice of 0.4 cm and regurgitant volume of 57 mL calculated by the Pisa method (Pisa 1 cm).  Progressive mitral stenosis with a mean transmitral gradient of 5 mmHg at 65 bpm.  Mild tricuspid regurgitation with calculated RVSP of 43 mmHg plus right atrial pressure..  Pulmonic valve not well visualized.  Trace pulmonic insufficiency.  No pericardial effusion.  IVC is suboptimally visualized, but appears dilated in size. Cannot assess respiratory variation.      Results for orders placed during the hospital encounter of 08/26/23    Transthoracic Echo (TTE) Complete    Interpretation Summary  Technically difficult study.  Definity used for endocardial enhancement.    Mild concentric left ventricular hypertrophy and severely dilated cavity size.  Mildly reduced systolic function.  Estimated EF 40%.  The entire apex is akinetic.  Unable to estimate diastolic function.  Moderately dilated right ventricular cavity size with preserved systolic function.  Pacemaker wire seen in right-sided  structures.  Severely dilated left atrium.  Mildly dilated right atrium.  A transcatheter bioprosthetic aortic valve present.  Peak velocity of 2.0 m/s and mean gradient of 8 mmHg.  No transvalvular or paravalvular aortic regurgitation.  Ascending aorta measures 3.8 cm.  Severe, posterior, mitral annular calcification.  Severe mitral regurgitation, directed central-posteriorly, likely in the setting of a restricted posterior leaflet for mitral annular calcification.  Calculated effective regurgitant orifice of 0.4 cm and regurgitant volume of 57 mL calculated by the Pisa method (Pisa 1 cm).  Progressive mitral stenosis with a mean transmitral gradient of 5 mmHg at 65 bpm.  Mild tricuspid regurgitation with calculated RVSP of 43 mmHg plus right atrial pressure..  Pulmonic valve not well visualized.  Trace pulmonic insufficiency.  No pericardial effusion.  IVC is suboptimally visualized, but appears dilated in size. Cannot assess respiratory variation.               Assessment/Plan        EKG: Afib with RV pacing, low risk EKG      Acute on chronic systolic heart failure (CMS/HCC)  Continue Jardiance, Toprol Aldactone and do Lasix just as needed.     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  In fact he appears quite dry at present and would stop the Lasix and now make only as needed.  Continue the other 2 diuretics.    Ascending aortic aneurysm (CMS/[facility])  Continue blood pressure control and observation as the size is not high risk.    Alzheimer's dementia (CMS/[facility])  Definitely affects his use of medications and he will likely need to enter a facility for full-time care.    Coronary artery disease involving native coronary artery  Doing well would continue secondary prevention medicines.    S/P TAVR (transcatheter aortic valve replacement)  On Xarelto for anticoagulation and thus not also on aspirin due to  bleeding risk.  Valve hemodynamics appear stable on most recent echo.       No orders of the defined types were placed in this encounter.      Medications Discontinued During This Encounter   Medication Reason    furosemide (LASIX) 40 mg tablet Dose adjustment        No orders of the defined types were placed in this encounter.     Continues to not drink enough water.

## 2023-10-18 NOTE — TELEPHONE ENCOUNTER
Pt was discharged home from ED yesterday. He is scheduled for an appt this afternoon with Dr oWlf.

## 2023-10-19 NOTE — ASSESSMENT & PLAN NOTE
Definitely affects his use of medications and he will likely need to enter a facility for full-time care.

## 2023-10-19 NOTE — ASSESSMENT & PLAN NOTE
The patient is doing better on multiple diuretics and has stayed out of the hospital.  In fact he appears quite dry at present and would stop the Lasix and now make only as needed.  Continue the other 2 diuretics.

## 2023-10-19 NOTE — ASSESSMENT & PLAN NOTE
On Xarelto for anticoagulation and thus not also on aspirin due to bleeding risk.  Valve hemodynamics appear stable on most recent echo.

## 2023-10-23 ENCOUNTER — OFFICE VISIT (OUTPATIENT)
Dept: BEHAVIORAL HEALTH | Facility: CLINIC | Age: 83
End: 2023-10-23
Payer: COMMERCIAL

## 2023-10-23 DIAGNOSIS — F32.89 OTHER DEPRESSION: Primary | ICD-10-CM

## 2023-10-23 DIAGNOSIS — F41.9 ANXIETY: ICD-10-CM

## 2023-10-23 PROCEDURE — 90791 PSYCH DIAGNOSTIC EVALUATION: CPT | Performed by: SOCIAL WORKER

## 2023-10-23 ASSESSMENT — COGNITIVE AND FUNCTIONAL STATUS - GENERAL
ORIENTATION: FULLY ORIENTED
EST. PREMORBID INTELLIGENCE: AVERAGE
PSYCHOMOTOR FUNCTIONING: WNL
RECENT MEMORY: WNL
IMPULSE CONTROL: INTACT
INSIGHT: INTACT
CONCENTRATION: WNL
DELUSIONS: NONE OR AGE APPROPRIATE
APPEARANCE: WELL GROOMED
AFFECT: FULL RANGE
EYE_CONTACT: WNL
THOUGHT_CONTENT: GUARDED
SLEEP_WAKE_CYCLE: NO CHANGE
LIBIDO: NON-CONTRIBUTORY
PERCEPTUAL FUNCTION: NORMAL
APPETITE: NO CHANGE
REMOTE MEMORY: WNL
MOOD: EUTHYMIC (NORMAL)
AROUSAL LEVEL: ALERT
THOUGHT_PROCESS: WNL
ATTENTION: WNL
SPEECH: REGULAR

## 2023-10-23 NOTE — PROGRESS NOTES
Integrated Behavioral Health Outpatient Initial Visit    Visit Type Performed: In-office     Cecil Morris presented today for a behavioral health visit.    Clinician confirmed identification of patient by name and birthdate.      Informed Consent/Confidentiality:  Pt was explained the model of primary care behavioral health we provide at [facility], including the model of care, documentation visibility, and confidentiality:     Model of Care: This is a low-intensity model of care (we provide 8-10 visits of cognitive-behavioral therapy), and the patient has the right to other options of behavioral health care that are indicated for more severe conditions (i.e.: Traditional Outpatient psychotherapy, Intensive Outpatient Programs, Partial Hospitalization Programs, and Inpatient services).     Documentation: The psychologist/licensed behavioral health provider collaborates regularly with the pts PCP regarding the pts treatment. The integrated behavioral health progress notes are visible to the physicians and advanced practitioners in the practice where the pt is being seen, [facility] ([facility]) for continuity of care if pts choose to pursue medium-term therapy through [facility], in addition to [facility]'s billing and compliance departments, as needed.     The visit diagnosis and appointment/scheduling information is visible to the patient's EPIC chart, to provide continuity of care across the [facility] system. The pt will also have access to view their notes via [employer] (pt portal).     Confidentiality: Also discussed were confidentiality and the limits of confidentiality. Information shared by the patient with the undersigned provider are kept confidential, unless the patient makes an informed written request for to have their information shared with a specific party, or if a  mandates the release of information via a court order. If the patient is at imminent risk of suicide or homicide healthcare providers  (including psychologists and therapists) may need to break confidentiality to ensure the safety of the patient or others (ie: to engage emergency services). If child, elder, or other vulnerable population abuse or neglect is reported, your healthcare providers must follow mandated reporting requirements.     Patient was given the opportunity to ask clarifying questions, and they expressed understanding and consent: Yes     SUBJECTIVE     History (as relevant to visit diagnosis, presenting problem, or treatment)    Liu presents with his caregiver today to address his mood and anxiety. Stressors seems to be aging, health, and loneliness as he lives alone.     History of Behavioral Health Treatment  Previous treatment: Liu has been taking zoloft and buspar to address mood and anxiety. He also reports that medical marijuana and melatonin helps with sleep.   His caregiver reports that increase in zoloft in September seems to have benefits.     Substance Use History  Liu has stopped drinking alcohol. He does have a medical marijuana prescription that he uses nightly. He also drinks caffeine daily.     Social History  Important people in pt's life/Support network: Liu is isolated but enjoys interacting with others when shopping. He does have support from his children and caregivers.   Lives with: alone  Cultural practices/Sabianist/Spiritual beliefs pertinent to treatment: Liu reports a relationship with God   Hobbies/Interests: solitare, tv, cooking , shopping         Reported Symptoms      Depression symptoms: depressed mood, lack of motivation, fatigue/lack of energy and feelings of worthlessness/guilt  Anxiety symptoms: moderate anxiety/worry    OBJECTIVE     Mental Status Exam  Appearance: Well Groomed  Speech: Regular  Psychomotor Functioning: WNL  Eye Contact: WNL  Est. Premorbid Intelligence: Average  Orientation: Fully oriented  Attention: WNL  Concentration: WNL  Recent Memory: WNL (hx of dementia dx but no  impairment today)  Remote Memory: WNL  Thought Content: Guarded  Thought Process: WNL  Insight: Intact  Perceptual Function: Normal  Delusions: None or age appropriate  Sleeping: No Change  Appetite: No Change  Libido: Non-Contributory  Affect: Full Range  Mood: Euthymic (normal)      ASSESSMENT     Psychotropic medications: no known adherence challenges, effective   Current Outpatient Medications   Medication Sig Dispense Refill    acetaminophen (TYLENOL) 325 mg tablet Take 650 mg by mouth as needed.      albuterol 2.5 mg /3 mL (0.083 %) nebulizer solution TAKE 3 ML (2.5 MG TOTAL) BY NEBULIZATION 4 (FOUR) TIMES A DAY. 150 mL 3    albuterol HFA 90 mcg/actuation inhaler Inhale 2 puffs every 6 (six) hours as needed for wheezing. 18 g 1    atorvastatin (LIPITOR) 20 mg tablet Take 1 tablet (20 mg total) by mouth daily. Pill pack 90 tablet 1    busPIRone (BUSPAR) 5 mg tablet Take 1 tablet (5 mg total) by mouth 2 (two) times a day. Pill pack 180 tablet 1    clonazePAM (klonoPIN) 0.5 mg tablet Take 1 tablet (0.5 mg total) by mouth nightly as needed for anxiety. 30 tablet 0    diclofenac sodium (VOLTAREN) 1 % topical gel Apply 1 g topically as needed.      donepeziL (ARICEPT) 10 mg tablet TAKE ONE TABLET BY MOUTH ONE TIME DAILY IN THE EVENING 90 tablet 1    empagliflozin (JARDIANCE) 25 mg tablet Take 1 tablet (25 mg total) by mouth daily. 90 tablet 3    ferrous sulfate 325 mg (65 mg iron) tablet Take 1 tablet (325 mg total) by mouth daily with breakfast. 90 tablet 1    fluticasone-umeclidinium-vilanterol (TRELEGY ELLIPTA) 100-62.5-25 mcg blister with device powder for inhalation Inhale 1 puff daily. 3 each 0    folic acid (FOLVITE) 1 mg tablet Take 1 tablet (1 mg total) by mouth daily. 90 tablet 1    furosemide (LASIX) 40 mg tablet Take 1 tablet (40mg) in afternoon as needed when gaining weight. Continue to take morning dose of lasix in pill pack. 30 tablet 1    levothyroxine (SYNTHROID) 112 mcg tablet Take 1  tablet (112 mcg total) by mouth daily. Pill pack 90 tablet 1    lidocaine (ASPERCREME) 4 % adhesive patch,medicated topical patch Apply 1 patch topically daily. 30 patch 0    loratadine (CLARITIN) 10 mg tablet Take 10 mg by mouth daily.      magnesium oxide (MAG-OX) 400 mg (241.3 mg magnesium) tablet Take 1 tablet (400 mg total) by mouth 2 (two) times a day. 180 tablet 3    medical marijuana Take 1 each by mouth as needed.      melatonin 5 mg capsule Take 10 mg by mouth nightly. Hs prn      metoprolol succinate XL (TOPROL-XL) 25 mg 24 hr tablet Take 1 tablet (25 mg total) by mouth daily. 30 tablet 0    multivitamin tablet Take 1 tablet by mouth daily.      nebulizers misc 1 each 4 (four) times a day as needed (sob or wheeze). 1 each 0    pantoprazole (PROTONIX) 40 mg EC tablet Take 1 tablet (40 mg total) by mouth 2 (two) times a day. Pill pack 180 tablet 1    rivaroxaban (XARELTO) 20 mg tablet Take 1 tablet (20 mg total) by mouth daily with dinner. 90 tablet 2    sertraline (ZOLOFT) 100 mg tablet Take 1 tablet (100 mg total) by mouth daily. 90 tablet 0    spironolactone (ALDACTONE) 25 mg tablet Take 1 tablet (25 mg total) by mouth daily. 90 tablet 1    thiamine 100 mg tablet Take 1 tablet (100 mg total) by mouth daily. 90 tablet 1     No current facility-administered medications for this visit.         Suicidal Ideation/Homicidal Ideation Risk Assessment  Risk Factors: Presence of a Mood Disorder and Loss (relational, social, work, or financial)  Protective factors: Connectedness to individuals, family, community, and social institutions and Contacts with caregivers    Suicidal Ideation: Not Present   Self Injurious Behavior:  Not Present  Homicidal Ideation: Not Present  Estimate of Current Risk: Minimal risk    Plan for Safety-   N/A:  Risk is assessed to be minimal; therefore, developing a safety plan is not indicated at this time.      ASSESSMENT / IMPRESSIONS    Cecil Morris seems to be  experiencing depression and anxiety related to age related health concerns and isolation. He will benefit from therapy related to behavior activation to increase social connection and daily structure. He was somewhat guarded today about worries. Therapist will continue to explore.     Intervention:     Therapist assisted in identifying ways to increase daily structure. Liu was hestitant to follow through but local [facility] information was provided, he can call his friend Jose for a meal, and will remain connected with family.       PLAN     Goals:  To improve mood and sense of connection    Recommendations for treatment: Individual Therapy, 30 minutes 1-2 times monthly    Recommendations for Interventions: Acceptance and Adjustment, Collaborative Problem Solving, Empathic Listening and Validation and Goal Setting    Next visit plan  Follow up on ways to improve social connection    I spent 60 minutes on this date of service performing the following activities: obtaining history and providing counseling and education.

## 2023-10-25 DIAGNOSIS — F32.89 OTHER DEPRESSION: ICD-10-CM

## 2023-10-25 RX ORDER — CLONAZEPAM 0.5 MG/1
0.5 TABLET ORAL NIGHTLY PRN
Qty: 30 TABLET | Refills: 0 | OUTPATIENT
Start: 2023-10-25 | End: 2023-11-24

## 2023-10-25 NOTE — TELEPHONE ENCOUNTER
PDMP reviewed. Refill not approved as just filled on 10/9/2023. Will not be due for refill until 11/9. Can have sooner appointment to discuss med use/anxiety if needed.     Next visit is on: 11/17/2023    Melanie Chambers, DO

## 2023-10-30 DIAGNOSIS — F32.89 OTHER DEPRESSION: ICD-10-CM

## 2023-10-30 NOTE — TELEPHONE ENCOUNTER
Medicine Refill Request    Last Office Visit: 9/7/2023   Last Consult Visit: Visit date not found  Last Telemedicine Visit: Visit date not found    Next Appointment: 11/17/2023      Current Outpatient Medications:     acetaminophen (TYLENOL) 325 mg tablet, Take 650 mg by mouth as needed., Disp: , Rfl:     albuterol 2.5 mg /3 mL (0.083 %) nebulizer solution, TAKE 3 ML (2.5 MG TOTAL) BY NEBULIZATION 4 (FOUR) TIMES A DAY., Disp: 150 mL, Rfl: 3    albuterol HFA 90 mcg/actuation inhaler, Inhale 2 puffs every 6 (six) hours as needed for wheezing., Disp: 18 g, Rfl: 1    atorvastatin (LIPITOR) 20 mg tablet, Take 1 tablet (20 mg total) by mouth daily. Pill pack, Disp: 90 tablet, Rfl: 1    busPIRone (BUSPAR) 5 mg tablet, Take 1 tablet (5 mg total) by mouth 2 (two) times a day. Pill pack, Disp: 180 tablet, Rfl: 1    clonazePAM (klonoPIN) 0.5 mg tablet, Take 1 tablet (0.5 mg total) by mouth nightly as needed for anxiety., Disp: 30 tablet, Rfl: 0    diclofenac sodium (VOLTAREN) 1 % topical gel, Apply 1 g topically as needed., Disp: , Rfl:     donepeziL (ARICEPT) 10 mg tablet, TAKE ONE TABLET BY MOUTH ONE TIME DAILY IN THE EVENING, Disp: 90 tablet, Rfl: 1    empagliflozin (JARDIANCE) 25 mg tablet, Take 1 tablet (25 mg total) by mouth daily., Disp: 90 tablet, Rfl: 3    ferrous sulfate 325 mg (65 mg iron) tablet, Take 1 tablet (325 mg total) by mouth daily with breakfast., Disp: 90 tablet, Rfl: 1    fluticasone-umeclidinium-vilanterol (TRELEGY ELLIPTA) 100-62.5-25 mcg blister with device powder for inhalation, Inhale 1 puff daily., Disp: 3 each, Rfl: 0    folic acid (FOLVITE) 1 mg tablet, Take 1 tablet (1 mg total) by mouth daily., Disp: 90 tablet, Rfl: 1    furosemide (LASIX) 40 mg tablet, Take 1 tablet (40mg) in afternoon as needed when gaining weight. Continue to take morning dose of lasix in pill pack., Disp: 30 tablet, Rfl: 1    levothyroxine (SYNTHROID) 112 mcg tablet, Take 1 tablet (112 mcg total) by mouth  daily. Pill pack, Disp: 90 tablet, Rfl: 1    lidocaine (ASPERCREME) 4 % adhesive patch,medicated topical patch, Apply 1 patch topically daily., Disp: 30 patch, Rfl: 0    loratadine (CLARITIN) 10 mg tablet, Take 10 mg by mouth daily., Disp: , Rfl:     magnesium oxide (MAG-OX) 400 mg (241.3 mg magnesium) tablet, Take 1 tablet (400 mg total) by mouth 2 (two) times a day., Disp: 180 tablet, Rfl: 3    medical marijuana, Take 1 each by mouth as needed., Disp: , Rfl:     melatonin 5 mg capsule, Take 10 mg by mouth nightly. Hs prn, Disp: , Rfl:     metoprolol succinate XL (TOPROL-XL) 25 mg 24 hr tablet, Take 1 tablet (25 mg total) by mouth daily., Disp: 30 tablet, Rfl: 0    multivitamin tablet, Take 1 tablet by mouth daily., Disp: , Rfl:     nebulizers misc, 1 each 4 (four) times a day as needed (sob or wheeze)., Disp: 1 each, Rfl: 0    pantoprazole (PROTONIX) 40 mg EC tablet, Take 1 tablet (40 mg total) by mouth 2 (two) times a day. Pill pack, Disp: 180 tablet, Rfl: 1    rivaroxaban (XARELTO) 20 mg tablet, Take 1 tablet (20 mg total) by mouth daily with dinner., Disp: 90 tablet, Rfl: 2    sertraline (ZOLOFT) 100 mg tablet, Take 1 tablet (100 mg total) by mouth daily., Disp: 90 tablet, Rfl: 0    spironolactone (ALDACTONE) 25 mg tablet, Take 1 tablet (25 mg total) by mouth daily., Disp: 90 tablet, Rfl: 1    thiamine 100 mg tablet, Take 1 tablet (100 mg total) by mouth daily., Disp: 90 tablet, Rfl: 1      BP Readings from Last 3 Encounters:   10/18/23 90/62   10/03/23 104/62   09/07/23 114/80       Recent Lab results:  Lab Results   Component Value Date    CHOL 138 11/17/2022   ,   Lab Results   Component Value Date    HDL 64 11/17/2022   ,   Lab Results   Component Value Date    LDLCALC 59 11/17/2022   ,   Lab Results   Component Value Date    TRIG 71 11/17/2022        Lab Results   Component Value Date    GLUCOSE 3+ (A) 09/20/2023   ,   Lab Results   Component Value Date    HGBA1C 8.1 (H) 07/11/2023         Lab  Results   Component Value Date    CREATININE 1.0 08/29/2023       Lab Results   Component Value Date    TSH 3.06 04/21/2023           Lab Results   Component Value Date    HGBA1C 8.1 (H) 07/11/2023

## 2023-10-31 RX ORDER — CLONAZEPAM 0.5 MG/1
0.5 TABLET ORAL NIGHTLY PRN
Qty: 30 TABLET | Refills: 0 | OUTPATIENT
Start: 2023-10-31 | End: 2023-11-30

## 2023-11-06 DIAGNOSIS — F32.89 OTHER DEPRESSION: ICD-10-CM

## 2023-11-06 RX ORDER — CLONAZEPAM 0.5 MG/1
0.5 TABLET ORAL NIGHTLY PRN
Qty: 30 TABLET | Refills: 0 | Status: SHIPPED | OUTPATIENT
Start: 2023-11-06 | End: 2023-12-04 | Stop reason: SDUPTHER

## 2023-11-06 NOTE — TELEPHONE ENCOUNTER
Medicine Refill Request    Last Office Visit: 9/7/2023   Last Consult Visit: Visit date not found  Last Telemedicine Visit: Visit date not found    Next Appointment: 11/17/2023      Current Outpatient Medications:     acetaminophen (TYLENOL) 325 mg tablet, Take 650 mg by mouth as needed., Disp: , Rfl:     albuterol 2.5 mg /3 mL (0.083 %) nebulizer solution, TAKE 3 ML (2.5 MG TOTAL) BY NEBULIZATION 4 (FOUR) TIMES A DAY., Disp: 150 mL, Rfl: 3    albuterol HFA 90 mcg/actuation inhaler, Inhale 2 puffs every 6 (six) hours as needed for wheezing., Disp: 18 g, Rfl: 1    atorvastatin (LIPITOR) 20 mg tablet, Take 1 tablet (20 mg total) by mouth daily. Pill pack, Disp: 90 tablet, Rfl: 1    busPIRone (BUSPAR) 5 mg tablet, Take 1 tablet (5 mg total) by mouth 2 (two) times a day. Pill pack, Disp: 180 tablet, Rfl: 1    clonazePAM (klonoPIN) 0.5 mg tablet, Take 1 tablet (0.5 mg total) by mouth nightly as needed for anxiety., Disp: 30 tablet, Rfl: 0    diclofenac sodium (VOLTAREN) 1 % topical gel, Apply 1 g topically as needed., Disp: , Rfl:     donepeziL (ARICEPT) 10 mg tablet, TAKE ONE TABLET BY MOUTH ONE TIME DAILY IN THE EVENING, Disp: 90 tablet, Rfl: 1    empagliflozin (JARDIANCE) 25 mg tablet, Take 1 tablet (25 mg total) by mouth daily., Disp: 90 tablet, Rfl: 3    ferrous sulfate 325 mg (65 mg iron) tablet, Take 1 tablet (325 mg total) by mouth daily with breakfast., Disp: 90 tablet, Rfl: 1    fluticasone-umeclidinium-vilanterol (TRELEGY ELLIPTA) 100-62.5-25 mcg blister with device powder for inhalation, Inhale 1 puff daily., Disp: 3 each, Rfl: 0    folic acid (FOLVITE) 1 mg tablet, Take 1 tablet (1 mg total) by mouth daily., Disp: 90 tablet, Rfl: 1    furosemide (LASIX) 40 mg tablet, Take 1 tablet (40mg) in afternoon as needed when gaining weight. Continue to take morning dose of lasix in pill pack., Disp: 30 tablet, Rfl: 1    levothyroxine (SYNTHROID) 112 mcg tablet, Take 1 tablet (112 mcg total) by mouth daily. Pill pack,  Disp: 90 tablet, Rfl: 1    lidocaine (ASPERCREME) 4 % adhesive patch,medicated topical patch, Apply 1 patch topically daily., Disp: 30 patch, Rfl: 0    loratadine (CLARITIN) 10 mg tablet, Take 10 mg by mouth daily., Disp: , Rfl:     magnesium oxide (MAG-OX) 400 mg (241.3 mg magnesium) tablet, Take 1 tablet (400 mg total) by mouth 2 (two) times a day., Disp: 180 tablet, Rfl: 3    medical marijuana, Take 1 each by mouth as needed., Disp: , Rfl:     melatonin 5 mg capsule, Take 10 mg by mouth nightly. Hs prn, Disp: , Rfl:     metoprolol succinate XL (TOPROL-XL) 25 mg 24 hr tablet, Take 1 tablet (25 mg total) by mouth daily., Disp: 30 tablet, Rfl: 0    multivitamin tablet, Take 1 tablet by mouth daily., Disp: , Rfl:     nebulizers misc, 1 each 4 (four) times a day as needed (sob or wheeze)., Disp: 1 each, Rfl: 0    pantoprazole (PROTONIX) 40 mg EC tablet, Take 1 tablet (40 mg total) by mouth 2 (two) times a day. Pill pack, Disp: 180 tablet, Rfl: 1    rivaroxaban (XARELTO) 20 mg tablet, Take 1 tablet (20 mg total) by mouth daily with dinner., Disp: 90 tablet, Rfl: 2    sertraline (ZOLOFT) 100 mg tablet, Take 1 tablet (100 mg total) by mouth daily., Disp: 90 tablet, Rfl: 0    spironolactone (ALDACTONE) 25 mg tablet, Take 1 tablet (25 mg total) by mouth daily., Disp: 90 tablet, Rfl: 1    thiamine 100 mg tablet, Take 1 tablet (100 mg total) by mouth daily., Disp: 90 tablet, Rfl: 1      BP Readings from Last 3 Encounters:   10/18/23 90/62   10/03/23 104/62   09/07/23 114/80       Recent Lab results:  Lab Results   Component Value Date    CHOL 138 11/17/2022   ,   Lab Results   Component Value Date    HDL 64 11/17/2022   ,   Lab Results   Component Value Date    LDLCALC 59 11/17/2022   ,   Lab Results   Component Value Date    TRIG 71 11/17/2022        Lab Results   Component Value Date    GLUCOSE 3+ (A) 09/20/2023   ,   Lab Results   Component Value Date    HGBA1C 8.1 (H) 07/11/2023         Lab Results   Component Value Date     CREATININE 1.0 08/29/2023       Lab Results   Component Value Date    TSH 3.06 04/21/2023           Lab Results   Component Value Date    HGBA1C 8.1 (H) 07/11/2023

## 2023-11-13 ENCOUNTER — OFFICE VISIT (OUTPATIENT)
Dept: BEHAVIORAL HEALTH | Facility: CLINIC | Age: 83
End: 2023-11-13
Payer: COMMERCIAL

## 2023-11-13 DIAGNOSIS — F32.89 OTHER DEPRESSION: Primary | ICD-10-CM

## 2023-11-13 PROCEDURE — 90834 PSYTX W PT 45 MINUTES: CPT | Performed by: SOCIAL WORKER

## 2023-11-13 NOTE — PROGRESS NOTES
Integrated Behavioral Health Follow-up Visit Note  Visit number: 1     Visit Type Performed: In-office     Cecil Morris presented today for a behavioral health visit.    Cecil's home health aid was also present for session with his permission.       SUBJECTIVE     Symptoms  Depression symptoms: depressed mood, fatigue/lack of energy and feelings of worthlessness/guilt  Anxiety symptoms: mild anxiety/worry    Isolation    OBJECTIVE     Mental Status Evaluation  Patient's mood and affect were consistent with the context, and consistent with their baseline: Yes   Comments:  calm and pleasant, awake and alert; oriented to person, place, and time    Suicidal Ideation/Homicidal Ideation Risk Assessment: not assessed. If not assessed, reason:  Assessment of SI/HI is not indicated at this time, based on prior assessments (pt has denied SI/HI in all visits with the undersigned provider, pt presents with no significant risk factors, pt does exhibit significant protective factors)    Interventions  Goal Setting and Social Skills    Cecil presented with some progress by attending [facility] twice since last visit. He also visited with his cousin. However, he continues to spend a significant amount of time alone. Therapist worked to highlight benefits of senior center and social connection. Therapist encouraged Cecil to consider attending senior center via county transportation on days his home health aid doesn't visit. He was resistant but will consider. Benefits of being active to sleep and physical health were identified.     Therapist also encouraged movement and good diet choices to promote health. Cecil will stretch more to manage back pain and was also open to trying yogurt as a healthier option for breakfast. He does enjoy cooking.     Psychotropic medications: no known adherence challenges, effective   Current Outpatient Medications   Medication Sig Dispense Refill    acetaminophen (TYLENOL) 325 mg tablet  Take 650 mg by mouth as needed.      albuterol 2.5 mg /3 mL (0.083 %) nebulizer solution TAKE 3 ML (2.5 MG TOTAL) BY NEBULIZATION 4 (FOUR) TIMES A DAY. 150 mL 3    albuterol HFA 90 mcg/actuation inhaler Inhale 2 puffs every 6 (six) hours as needed for wheezing. 18 g 1    atorvastatin (LIPITOR) 20 mg tablet Take 1 tablet (20 mg total) by mouth daily. Pill pack 90 tablet 1    busPIRone (BUSPAR) 5 mg tablet Take 1 tablet (5 mg total) by mouth 2 (two) times a day. Pill pack 180 tablet 1    clonazePAM (klonoPIN) 0.5 mg tablet Take 1 tablet (0.5 mg total) by mouth nightly as needed for anxiety. 30 tablet 0    diclofenac sodium (VOLTAREN) 1 % topical gel Apply 1 g topically as needed.      donepeziL (ARICEPT) 10 mg tablet TAKE ONE TABLET BY MOUTH ONE TIME DAILY IN THE EVENING 90 tablet 1    empagliflozin (JARDIANCE) 25 mg tablet Take 1 tablet (25 mg total) by mouth daily. 90 tablet 3    ferrous sulfate 325 mg (65 mg iron) tablet Take 1 tablet (325 mg total) by mouth daily with breakfast. 90 tablet 1    fluticasone-umeclidinium-vilanterol (TRELEGY ELLIPTA) 100-62.5-25 mcg blister with device powder for inhalation Inhale 1 puff daily. 3 each 0    folic acid (FOLVITE) 1 mg tablet Take 1 tablet (1 mg total) by mouth daily. 90 tablet 1    furosemide (LASIX) 40 mg tablet Take 1 tablet (40mg) in afternoon as needed when gaining weight. Continue to take morning dose of lasix in pill pack. 30 tablet 1    levothyroxine (SYNTHROID) 112 mcg tablet Take 1 tablet (112 mcg total) by mouth daily. Pill pack 90 tablet 1    lidocaine (ASPERCREME) 4 % adhesive patch,medicated topical patch Apply 1 patch topically daily. 30 patch 0    loratadine (CLARITIN) 10 mg tablet Take 10 mg by mouth daily.      magnesium oxide (MAG-OX) 400 mg (241.3 mg magnesium) tablet Take 1 tablet (400 mg total) by mouth 2 (two) times a day. 180 tablet 3    medical marijuana Take 1 each by mouth as needed.      melatonin 5 mg capsule Take 10 mg by  mouth nightly. Hs prn      metoprolol succinate XL (TOPROL-XL) 25 mg 24 hr tablet Take 1 tablet (25 mg total) by mouth daily. 30 tablet 0    multivitamin tablet Take 1 tablet by mouth daily.      nebulizers misc 1 each 4 (four) times a day as needed (sob or wheeze). 1 each 0    pantoprazole (PROTONIX) 40 mg EC tablet Take 1 tablet (40 mg total) by mouth 2 (two) times a day. Pill pack 180 tablet 1    rivaroxaban (XARELTO) 20 mg tablet Take 1 tablet (20 mg total) by mouth daily with dinner. 90 tablet 2    sertraline (ZOLOFT) 100 mg tablet Take 1 tablet (100 mg total) by mouth daily. 90 tablet 0    spironolactone (ALDACTONE) 25 mg tablet Take 1 tablet (25 mg total) by mouth daily. 90 tablet 1    thiamine 100 mg tablet Take 1 tablet (100 mg total) by mouth daily. 90 tablet 1     No current facility-administered medications for this visit.       ASSESSMENT       Progress  Patient's progress toward their goals is generally improving as he has attended [facility] and visited with his cousin.     PLAN     Goals:  To improve mood and sense of connection    Recommendations  Individual Therapy  30 minutes 1-2 times monthly    Next visit plan:  Monitor social activities     I spent 40 minutes on this date of service performing the following activities: providing counseling and education.

## 2023-11-14 DIAGNOSIS — E05.90 HYPERTHYROIDISM: ICD-10-CM

## 2023-11-14 DIAGNOSIS — F41.9 ANXIETY: ICD-10-CM

## 2023-11-15 ENCOUNTER — TELEPHONE (OUTPATIENT)
Dept: PRIMARY CARE | Facility: CLINIC | Age: 83
End: 2023-11-15
Payer: COMMERCIAL

## 2023-11-15 RX ORDER — LEVOTHYROXINE SODIUM 112 UG/1
112 TABLET ORAL DAILY
Qty: 90 TABLET | Refills: 0 | Status: SHIPPED | OUTPATIENT
Start: 2023-11-15 | End: 2024-02-12

## 2023-11-15 RX ORDER — BUSPIRONE HYDROCHLORIDE 5 MG/1
5 TABLET ORAL 2 TIMES DAILY
Qty: 180 TABLET | Refills: 0 | Status: SHIPPED | OUTPATIENT
Start: 2023-11-15 | End: 2024-02-12

## 2023-11-15 NOTE — TELEPHONE ENCOUNTER
Medicine Refill Request    Last Office Visit: 9/7/2023   Last Consult Visit: Visit date not found  Last Telemedicine Visit: Visit date not found    Next Appointment: 12/12/2023      Current Outpatient Medications:     acetaminophen (TYLENOL) 325 mg tablet, Take 650 mg by mouth as needed., Disp: , Rfl:     albuterol 2.5 mg /3 mL (0.083 %) nebulizer solution, TAKE 3 ML (2.5 MG TOTAL) BY NEBULIZATION 4 (FOUR) TIMES A DAY., Disp: 150 mL, Rfl: 3    albuterol HFA 90 mcg/actuation inhaler, Inhale 2 puffs every 6 (six) hours as needed for wheezing., Disp: 18 g, Rfl: 1    atorvastatin (LIPITOR) 20 mg tablet, Take 1 tablet (20 mg total) by mouth daily. Pill pack, Disp: 90 tablet, Rfl: 1    busPIRone (BUSPAR) 5 mg tablet, Take 1 tablet (5 mg total) by mouth 2 (two) times a day. Pill pack, Disp: 180 tablet, Rfl: 1    clonazePAM (klonoPIN) 0.5 mg tablet, Take 1 tablet (0.5 mg total) by mouth nightly as needed for anxiety., Disp: 30 tablet, Rfl: 0    diclofenac sodium (VOLTAREN) 1 % topical gel, Apply 1 g topically as needed., Disp: , Rfl:     donepeziL (ARICEPT) 10 mg tablet, TAKE ONE TABLET BY MOUTH ONE TIME DAILY IN THE EVENING, Disp: 90 tablet, Rfl: 1    empagliflozin (JARDIANCE) 25 mg tablet, Take 1 tablet (25 mg total) by mouth daily., Disp: 90 tablet, Rfl: 3    ferrous sulfate 325 mg (65 mg iron) tablet, Take 1 tablet (325 mg total) by mouth daily with breakfast., Disp: 90 tablet, Rfl: 1    fluticasone-umeclidinium-vilanterol (TRELEGY ELLIPTA) 100-62.5-25 mcg blister with device powder for inhalation, Inhale 1 puff daily., Disp: 3 each, Rfl: 0    folic acid (FOLVITE) 1 mg tablet, Take 1 tablet (1 mg total) by mouth daily., Disp: 90 tablet, Rfl: 1    furosemide (LASIX) 40 mg tablet, Take 1 tablet (40mg) in afternoon as needed when gaining weight. Continue to take morning dose of lasix in pill pack., Disp: 30 tablet, Rfl: 1    levothyroxine (SYNTHROID) 112 mcg tablet, Take 1 tablet (112 mcg total) by mouth daily. Pill pack,  Disp: 90 tablet, Rfl: 1    lidocaine (ASPERCREME) 4 % adhesive patch,medicated topical patch, Apply 1 patch topically daily., Disp: 30 patch, Rfl: 0    loratadine (CLARITIN) 10 mg tablet, Take 10 mg by mouth daily., Disp: , Rfl:     magnesium oxide (MAG-OX) 400 mg (241.3 mg magnesium) tablet, Take 1 tablet (400 mg total) by mouth 2 (two) times a day., Disp: 180 tablet, Rfl: 3    medical marijuana, Take 1 each by mouth as needed., Disp: , Rfl:     melatonin 5 mg capsule, Take 10 mg by mouth nightly. Hs prn, Disp: , Rfl:     metoprolol succinate XL (TOPROL-XL) 25 mg 24 hr tablet, Take 1 tablet (25 mg total) by mouth daily., Disp: 30 tablet, Rfl: 0    multivitamin tablet, Take 1 tablet by mouth daily., Disp: , Rfl:     nebulizers misc, 1 each 4 (four) times a day as needed (sob or wheeze)., Disp: 1 each, Rfl: 0    pantoprazole (PROTONIX) 40 mg EC tablet, Take 1 tablet (40 mg total) by mouth 2 (two) times a day. Pill pack, Disp: 180 tablet, Rfl: 1    rivaroxaban (XARELTO) 20 mg tablet, Take 1 tablet (20 mg total) by mouth daily with dinner., Disp: 90 tablet, Rfl: 2    sertraline (ZOLOFT) 100 mg tablet, Take 1 tablet (100 mg total) by mouth daily., Disp: 90 tablet, Rfl: 0    spironolactone (ALDACTONE) 25 mg tablet, Take 1 tablet (25 mg total) by mouth daily., Disp: 90 tablet, Rfl: 1    thiamine 100 mg tablet, Take 1 tablet (100 mg total) by mouth daily., Disp: 90 tablet, Rfl: 1      BP Readings from Last 3 Encounters:   10/18/23 90/62   10/03/23 104/62   09/07/23 114/80       Recent Lab results:  Lab Results   Component Value Date    CHOL 138 11/17/2022   ,   Lab Results   Component Value Date    HDL 64 11/17/2022   ,   Lab Results   Component Value Date    LDLCALC 59 11/17/2022   ,   Lab Results   Component Value Date    TRIG 71 11/17/2022        Lab Results   Component Value Date    GLUCOSE 3+ (A) 09/20/2023   ,   Lab Results   Component Value Date    HGBA1C 8.1 (H) 07/11/2023         Lab Results   Component Value Date     CREATININE 1.0 08/29/2023       Lab Results   Component Value Date    TSH 3.06 04/21/2023           Lab Results   Component Value Date    HGBA1C 8.1 (H) 07/11/2023

## 2023-11-15 NOTE — TELEPHONE ENCOUNTER
Request for Medical Advice (NON-URGENT)   Patient PCP: Melanie Chambers, DO  New or Existing Issue: New  Question or Concern: Pt's caretaker called, Ann. She wants to know what immunizations does PCP recommend for pt.   [facility] Pharmacy:   [facility] [place] - [address]  [address] [id number]  Phone: [phone] Fax: [phone]      The practice will reach out to discuss your Medical Question or Concern within 2 business days.

## 2023-11-15 NOTE — TELEPHONE ENCOUNTER
He could have COVID and flu today.  Consider RSV and pneumonia vaccine at his follow-up visit in December in the office

## 2023-11-24 DIAGNOSIS — F10.10 ALCOHOL ABUSE: ICD-10-CM

## 2023-11-24 DIAGNOSIS — D50.8 IRON DEFICIENCY ANEMIA SECONDARY TO INADEQUATE DIETARY IRON INTAKE: ICD-10-CM

## 2023-11-24 DIAGNOSIS — F41.9 ANXIETY: ICD-10-CM

## 2023-11-24 RX ORDER — SERTRALINE HYDROCHLORIDE 100 MG/1
100 TABLET, FILM COATED ORAL DAILY
Qty: 90 TABLET | Refills: 0 | Status: SHIPPED | OUTPATIENT
Start: 2023-11-24 | End: 2024-02-19

## 2023-11-24 RX ORDER — FERROUS SULFATE 325(65) MG
325 TABLET ORAL
Qty: 90 TABLET | Refills: 0 | Status: SHIPPED | OUTPATIENT
Start: 2023-11-24 | End: 2024-02-19

## 2023-11-24 RX ORDER — THIAMINE HCL 100 MG
100 TABLET ORAL DAILY
Qty: 90 TABLET | Refills: 0 | Status: SHIPPED | OUTPATIENT
Start: 2023-11-24 | End: 2024-02-19

## 2023-11-24 RX ORDER — LORATADINE 10 MG/1
10 TABLET ORAL DAILY
Qty: 30 TABLET | Refills: 0 | Status: SHIPPED | OUTPATIENT
Start: 2023-11-24 | End: 2023-12-18

## 2023-11-24 NOTE — TELEPHONE ENCOUNTER
Medicine Refill Request    Last Office Visit: 9/7/2023   Last Consult Visit: Visit date not found  Last Telemedicine Visit: Visit date not found    Next Appointment: 12/12/2023      Current Outpatient Medications:     acetaminophen (TYLENOL) 325 mg tablet, Take 650 mg by mouth as needed., Disp: , Rfl:     albuterol 2.5 mg /3 mL (0.083 %) nebulizer solution, TAKE 3 ML (2.5 MG TOTAL) BY NEBULIZATION 4 (FOUR) TIMES A DAY., Disp: 150 mL, Rfl: 3    albuterol HFA 90 mcg/actuation inhaler, Inhale 2 puffs every 6 (six) hours as needed for wheezing., Disp: 18 g, Rfl: 1    atorvastatin (LIPITOR) 20 mg tablet, Take 1 tablet (20 mg total) by mouth daily. Pill pack, Disp: 90 tablet, Rfl: 1    busPIRone (BUSPAR) 5 mg tablet, Take 1 tablet (5 mg total) by mouth 2 (two) times a day. Pill pack, Disp: 180 tablet, Rfl: 0    clonazePAM (klonoPIN) 0.5 mg tablet, Take 1 tablet (0.5 mg total) by mouth nightly as needed for anxiety., Disp: 30 tablet, Rfl: 0    diclofenac sodium (VOLTAREN) 1 % topical gel, Apply 1 g topically as needed., Disp: , Rfl:     donepeziL (ARICEPT) 10 mg tablet, TAKE ONE TABLET BY MOUTH ONE TIME DAILY IN THE EVENING, Disp: 90 tablet, Rfl: 1    empagliflozin (JARDIANCE) 25 mg tablet, Take 1 tablet (25 mg total) by mouth daily., Disp: 90 tablet, Rfl: 3    FeroSuL 325 mg (65 mg iron) tablet, Take 1 tablet (325 mg total) by mouth daily with breakfast., Disp: 90 tablet, Rfl: 0    fluticasone-umeclidinium-vilanterol (TRELEGY ELLIPTA) 100-62.5-25 mcg blister with device powder for inhalation, Inhale 1 puff daily., Disp: 3 each, Rfl: 0    folic acid (FOLVITE) 1 mg tablet, Take 1 tablet (1 mg total) by mouth daily., Disp: 90 tablet, Rfl: 1    furosemide (LASIX) 40 mg tablet, Take 1 tablet (40mg) in afternoon as needed when gaining weight. Continue to take morning dose of lasix in pill pack., Disp: 30 tablet, Rfl: 1    levothyroxine (SYNTHROID) 112 mcg tablet, Take 1 tablet (112 mcg total) by mouth daily. Pill pack, Disp: 90  tablet, Rfl: 0    lidocaine (ASPERCREME) 4 % adhesive patch,medicated topical patch, Apply 1 patch topically daily., Disp: 30 patch, Rfl: 0    loratadine (CLARITIN) 10 mg tablet, Take 10 mg by mouth daily., Disp: , Rfl:     magnesium oxide (MAG-OX) 400 mg (241.3 mg magnesium) tablet, Take 1 tablet (400 mg total) by mouth 2 (two) times a day., Disp: 180 tablet, Rfl: 3    medical marijuana, Take 1 each by mouth as needed., Disp: , Rfl:     melatonin 5 mg capsule, Take 10 mg by mouth nightly. Hs prn, Disp: , Rfl:     metoprolol succinate XL (TOPROL-XL) 25 mg 24 hr tablet, Take 1 tablet (25 mg total) by mouth daily., Disp: 30 tablet, Rfl: 0    multivitamin tablet, Take 1 tablet by mouth daily., Disp: , Rfl:     nebulizers misc, 1 each 4 (four) times a day as needed (sob or wheeze)., Disp: 1 each, Rfl: 0    pantoprazole (PROTONIX) 40 mg EC tablet, Take 1 tablet (40 mg total) by mouth 2 (two) times a day. Pill pack, Disp: 180 tablet, Rfl: 1    rivaroxaban (XARELTO) 20 mg tablet, Take 1 tablet (20 mg total) by mouth daily with dinner., Disp: 90 tablet, Rfl: 2    sertraline (ZOLOFT) 100 mg tablet, Take 1 tablet (100 mg total) by mouth daily., Disp: 90 tablet, Rfl: 0    spironolactone (ALDACTONE) 25 mg tablet, Take 1 tablet (25 mg total) by mouth daily., Disp: 90 tablet, Rfl: 1    vitamin B-1 100 mg tablet, Take 1 tablet (100 mg total) by mouth daily., Disp: 90 tablet, Rfl: 0      BP Readings from Last 3 Encounters:   10/18/23 90/62   10/03/23 104/62   09/07/23 114/80       Recent Lab results:  Lab Results   Component Value Date    CHOL 138 11/17/2022   ,   Lab Results   Component Value Date    HDL 64 11/17/2022   ,   Lab Results   Component Value Date    LDLCALC 59 11/17/2022   ,   Lab Results   Component Value Date    TRIG 71 11/17/2022        Lab Results   Component Value Date    GLUCOSE 3+ (A) 09/20/2023   ,   Lab Results   Component Value Date    HGBA1C 8.1 (H) 07/11/2023         Lab Results   Component Value Date     CREATININE 1.0 08/29/2023       Lab Results   Component Value Date    TSH 3.06 04/21/2023           Lab Results   Component Value Date    HGBA1C 8.1 (H) 07/11/2023

## 2023-11-24 NOTE — TELEPHONE ENCOUNTER
Medicine Refill Request    Last Office Visit: 9/7/2023   Last Consult Visit: Visit date not found  Last Telemedicine Visit: Visit date not found    Next Appointment: 12/12/2023      Current Outpatient Medications:     acetaminophen (TYLENOL) 325 mg tablet, Take 650 mg by mouth as needed., Disp: , Rfl:     albuterol 2.5 mg /3 mL (0.083 %) nebulizer solution, TAKE 3 ML (2.5 MG TOTAL) BY NEBULIZATION 4 (FOUR) TIMES A DAY., Disp: 150 mL, Rfl: 3    albuterol HFA 90 mcg/actuation inhaler, Inhale 2 puffs every 6 (six) hours as needed for wheezing., Disp: 18 g, Rfl: 1    atorvastatin (LIPITOR) 20 mg tablet, Take 1 tablet (20 mg total) by mouth daily. Pill pack, Disp: 90 tablet, Rfl: 1    busPIRone (BUSPAR) 5 mg tablet, Take 1 tablet (5 mg total) by mouth 2 (two) times a day. Pill pack, Disp: 180 tablet, Rfl: 0    clonazePAM (klonoPIN) 0.5 mg tablet, Take 1 tablet (0.5 mg total) by mouth nightly as needed for anxiety., Disp: 30 tablet, Rfl: 0    diclofenac sodium (VOLTAREN) 1 % topical gel, Apply 1 g topically as needed., Disp: , Rfl:     donepeziL (ARICEPT) 10 mg tablet, TAKE ONE TABLET BY MOUTH ONE TIME DAILY IN THE EVENING, Disp: 90 tablet, Rfl: 1    empagliflozin (JARDIANCE) 25 mg tablet, Take 1 tablet (25 mg total) by mouth daily., Disp: 90 tablet, Rfl: 3    ferrous sulfate 325 mg (65 mg iron) tablet, Take 1 tablet (325 mg total) by mouth daily with breakfast., Disp: 90 tablet, Rfl: 1    fluticasone-umeclidinium-vilanterol (TRELEGY ELLIPTA) 100-62.5-25 mcg blister with device powder for inhalation, Inhale 1 puff daily., Disp: 3 each, Rfl: 0    folic acid (FOLVITE) 1 mg tablet, Take 1 tablet (1 mg total) by mouth daily., Disp: 90 tablet, Rfl: 1    furosemide (LASIX) 40 mg tablet, Take 1 tablet (40mg) in afternoon as needed when gaining weight. Continue to take morning dose of lasix in pill pack., Disp: 30 tablet, Rfl: 1    levothyroxine (SYNTHROID) 112 mcg tablet, Take 1 tablet (112 mcg total) by mouth daily. Pill pack,  Disp: 90 tablet, Rfl: 0    lidocaine (ASPERCREME) 4 % adhesive patch,medicated topical patch, Apply 1 patch topically daily., Disp: 30 patch, Rfl: 0    loratadine (CLARITIN) 10 mg tablet, Take 10 mg by mouth daily., Disp: , Rfl:     magnesium oxide (MAG-OX) 400 mg (241.3 mg magnesium) tablet, Take 1 tablet (400 mg total) by mouth 2 (two) times a day., Disp: 180 tablet, Rfl: 3    medical marijuana, Take 1 each by mouth as needed., Disp: , Rfl:     melatonin 5 mg capsule, Take 10 mg by mouth nightly. Hs prn, Disp: , Rfl:     metoprolol succinate XL (TOPROL-XL) 25 mg 24 hr tablet, Take 1 tablet (25 mg total) by mouth daily., Disp: 30 tablet, Rfl: 0    multivitamin tablet, Take 1 tablet by mouth daily., Disp: , Rfl:     nebulizers misc, 1 each 4 (four) times a day as needed (sob or wheeze)., Disp: 1 each, Rfl: 0    pantoprazole (PROTONIX) 40 mg EC tablet, Take 1 tablet (40 mg total) by mouth 2 (two) times a day. Pill pack, Disp: 180 tablet, Rfl: 1    rivaroxaban (XARELTO) 20 mg tablet, Take 1 tablet (20 mg total) by mouth daily with dinner., Disp: 90 tablet, Rfl: 2    sertraline (ZOLOFT) 100 mg tablet, Take 1 tablet (100 mg total) by mouth daily., Disp: 90 tablet, Rfl: 0    spironolactone (ALDACTONE) 25 mg tablet, Take 1 tablet (25 mg total) by mouth daily., Disp: 90 tablet, Rfl: 1    thiamine 100 mg tablet, Take 1 tablet (100 mg total) by mouth daily., Disp: 90 tablet, Rfl: 1      BP Readings from Last 3 Encounters:   10/18/23 90/62   10/03/23 104/62   09/07/23 114/80       Recent Lab results:  Lab Results   Component Value Date    CHOL 138 11/17/2022   ,   Lab Results   Component Value Date    HDL 64 11/17/2022   ,   Lab Results   Component Value Date    LDLCALC 59 11/17/2022   ,   Lab Results   Component Value Date    TRIG 71 11/17/2022        Lab Results   Component Value Date    GLUCOSE 3+ (A) 09/20/2023   ,   Lab Results   Component Value Date    HGBA1C 8.1 (H) 07/11/2023         Lab Results   Component Value Date     CREATININE 1.0 08/29/2023       Lab Results   Component Value Date    TSH 3.06 04/21/2023           Lab Results   Component Value Date    HGBA1C 8.1 (H) 07/11/2023

## 2023-11-30 DIAGNOSIS — F10.10 ALCOHOL ABUSE: ICD-10-CM

## 2023-11-30 RX ORDER — FOLIC ACID 1 MG/1
1 TABLET ORAL DAILY
Qty: 90 TABLET | Refills: 1 | Status: SHIPPED | OUTPATIENT
Start: 2023-11-30 | End: 2024-05-28

## 2023-12-04 DIAGNOSIS — F32.89 OTHER DEPRESSION: ICD-10-CM

## 2023-12-04 RX ORDER — CLONAZEPAM 0.5 MG/1
0.5 TABLET ORAL NIGHTLY PRN
Qty: 30 TABLET | Refills: 0 | Status: SHIPPED | OUTPATIENT
Start: 2023-12-04 | End: 2024-01-05

## 2023-12-08 DIAGNOSIS — I34.0 MITRAL VALVE INSUFFICIENCY, UNSPECIFIED ETIOLOGY: ICD-10-CM

## 2023-12-08 DIAGNOSIS — I10 ESSENTIAL HYPERTENSION: ICD-10-CM

## 2023-12-11 RX ORDER — SPIRONOLACTONE 25 MG/1
25 TABLET ORAL DAILY
Qty: 90 TABLET | Refills: 3 | Status: SHIPPED | OUTPATIENT
Start: 2023-12-11

## 2023-12-11 NOTE — TELEPHONE ENCOUNTER
Rx request for spironolactone 25mg received from [facility].    Medicine Refill Request    Last Office Visit: 10/18/2023   Last Consult Visit: Visit date not found  Last Telemedicine Visit: Visit date not found    Next Appointment: 12/19/2023      Current Outpatient Medications:   •  acetaminophen (TYLENOL) 325 mg tablet, Take 650 mg by mouth as needed., Disp: , Rfl:   •  albuterol 2.5 mg /3 mL (0.083 %) nebulizer solution, TAKE 3 ML (2.5 MG TOTAL) BY NEBULIZATION 4 (FOUR) TIMES A DAY., Disp: 150 mL, Rfl: 3  •  albuterol HFA 90 mcg/actuation inhaler, Inhale 2 puffs every 6 (six) hours as needed for wheezing., Disp: 18 g, Rfl: 1  •  atorvastatin (LIPITOR) 20 mg tablet, Take 1 tablet (20 mg total) by mouth daily. Pill pack, Disp: 90 tablet, Rfl: 1  •  busPIRone (BUSPAR) 5 mg tablet, Take 1 tablet (5 mg total) by mouth 2 (two) times a day. Pill pack, Disp: 180 tablet, Rfl: 0  •  clonazePAM (klonoPIN) 0.5 mg tablet, Take 1 tablet (0.5 mg total) by mouth nightly as needed for anxiety., Disp: 30 tablet, Rfl: 0  •  diclofenac sodium (VOLTAREN) 1 % topical gel, Apply 1 g topically as needed., Disp: , Rfl:   •  donepeziL (ARICEPT) 10 mg tablet, TAKE ONE TABLET BY MOUTH ONE TIME DAILY IN THE EVENING, Disp: 90 tablet, Rfl: 1  •  empagliflozin (JARDIANCE) 25 mg tablet, Take 1 tablet (25 mg total) by mouth daily., Disp: 90 tablet, Rfl: 3  •  FeroSuL 325 mg (65 mg iron) tablet, Take 1 tablet (325 mg total) by mouth daily with breakfast., Disp: 90 tablet, Rfl: 0  •  fluticasone-umeclidinium-vilanterol (TRELEGY ELLIPTA) 100-62.5-25 mcg blister with device powder for inhalation, Inhale 1 puff daily., Disp: 3 each, Rfl: 0  •  folic acid (FOLVITE) 1 mg tablet, Take 1 tablet (1 mg total) by mouth daily., Disp: 90 tablet, Rfl: 1  •  furosemide (LASIX) 40 mg tablet, Take 1 tablet (40mg) in afternoon as needed when gaining weight. Continue to take morning dose of lasix in pill pack., Disp: 30 tablet, Rfl: 1  •  levothyroxine  (SYNTHROID) 112 mcg tablet, Take 1 tablet (112 mcg total) by mouth daily. Pill pack, Disp: 90 tablet, Rfl: 0  •  lidocaine (ASPERCREME) 4 % adhesive patch,medicated topical patch, Apply 1 patch topically daily., Disp: 30 patch, Rfl: 0  •  loratadine (CLARITIN) 10 mg tablet, Take one tablet by mouth one time daily, Disp: 30 tablet, Rfl: 0  •  magnesium oxide (MAG-OX) 400 mg (241.3 mg magnesium) tablet, Take 1 tablet (400 mg total) by mouth 2 (two) times a day., Disp: 180 tablet, Rfl: 3  •  medical marijuana, Take 1 each by mouth as needed., Disp: , Rfl:   •  melatonin 5 mg capsule, Take 10 mg by mouth nightly. Hs prn, Disp: , Rfl:   •  metoprolol succinate XL (TOPROL-XL) 25 mg 24 hr tablet, Take 1 tablet (25 mg total) by mouth daily., Disp: 30 tablet, Rfl: 0  •  multivitamin tablet, Take 1 tablet by mouth daily., Disp: , Rfl:   •  nebulizers misc, 1 each 4 (four) times a day as needed (sob or wheeze)., Disp: 1 each, Rfl: 0  •  pantoprazole (PROTONIX) 40 mg EC tablet, Take 1 tablet (40 mg total) by mouth 2 (two) times a day. Pill pack, Disp: 180 tablet, Rfl: 1  •  rivaroxaban (XARELTO) 20 mg tablet, Take 1 tablet (20 mg total) by mouth daily with dinner., Disp: 90 tablet, Rfl: 2  •  sertraline (ZOLOFT) 100 mg tablet, Take 1 tablet (100 mg total) by mouth daily., Disp: 90 tablet, Rfl: 0  •  spironolactone (ALDACTONE) 25 mg tablet, Take 1 tablet (25 mg total) by mouth daily., Disp: 90 tablet, Rfl: 1  •  vitamin B-1 100 mg tablet, Take 1 tablet (100 mg total) by mouth daily., Disp: 90 tablet, Rfl: 0      BP Readings from Last 3 Encounters:   10/18/23 90/62   10/03/23 104/62   09/07/23 114/80       Recent Lab results:  Lab Results   Component Value Date    CHOL 138 11/17/2022   ,   Lab Results   Component Value Date    HDL 64 11/17/2022   ,   Lab Results   Component Value Date    LDLCALC 59 11/17/2022   ,   Lab Results   Component Value Date    TRIG 71 11/17/2022        Lab Results   Component Value Date    GLUCOSE 3+ (A)  09/20/2023   ,   Lab Results   Component Value Date    HGBA1C 8.1 (H) 07/11/2023         Lab Results   Component Value Date    CREATININE 1.0 08/29/2023       Lab Results   Component Value Date    TSH 3.06 04/21/2023           Lab Results   Component Value Date    HGBA1C 8.1 (H) 07/11/2023

## 2023-12-12 ENCOUNTER — TELEPHONE (OUTPATIENT)
Dept: PRIMARY CARE | Facility: CLINIC | Age: 83
End: 2023-12-12

## 2023-12-12 NOTE — TELEPHONE ENCOUNTER
[facility] Appointment Request   Provider: Dr. Melanie Chambers  Appointment Type: 30 Minute OV  Reason for Visit: Pt had appointment scheduled for 12/15 to be seen for shortness of breath. Pt had to reschedule his appointment. First available appointment with PCP is 3/11/2024. Pt's daughter Ann called and explained the PCP may want to see pt sooner.   Available Day and Time: best days are Monday, Tuesday and Wednesdays  Best Contact Number: [phone]    The practice will reach out to schedule your appointment within the next 2 business days.

## 2023-12-12 NOTE — TELEPHONE ENCOUNTER
Left detailed vm for Ann letting her know Liu is due in March for a 3 month fup and the wait list is something he opted in through my chart. No need to respond to the wait list alerts if there isn't a need to come in sooner. Thanks!

## 2023-12-17 DIAGNOSIS — I27.20 PULMONARY HYPERTENSION (CMS/HCC): ICD-10-CM

## 2023-12-17 DIAGNOSIS — J41.8 MIXED SIMPLE AND MUCOPURULENT CHRONIC BRONCHITIS (CMS/HCC): ICD-10-CM

## 2023-12-18 RX ORDER — LORATADINE 10 MG/1
10 TABLET ORAL DAILY
Qty: 30 TABLET | Refills: 0 | Status: SHIPPED | OUTPATIENT
Start: 2023-12-18

## 2023-12-18 RX ORDER — ALBUTEROL SULFATE 0.83 MG/ML
2.5 SOLUTION RESPIRATORY (INHALATION)
Qty: 150 ML | Refills: 0 | Status: SHIPPED | OUTPATIENT
Start: 2023-12-18 | End: 2024-02-28 | Stop reason: SDUPTHER

## 2023-12-18 NOTE — TELEPHONE ENCOUNTER
Medicine Refill Request    Last Office Visit: 9/7/2023   Last Consult Visit: Visit date not found  Last Telemedicine Visit: Visit date not found    Next Appointment: 3/11/2024      Current Outpatient Medications:     acetaminophen (TYLENOL) 325 mg tablet, Take 650 mg by mouth as needed., Disp: , Rfl:     albuterol 2.5 mg /3 mL (0.083 %) nebulizer solution, TAKE 3 ML (2.5 MG TOTAL) BY NEBULIZATION 4 (FOUR) TIMES A DAY., Disp: 150 mL, Rfl: 3    albuterol HFA 90 mcg/actuation inhaler, Inhale 2 puffs every 6 (six) hours as needed for wheezing., Disp: 18 g, Rfl: 1    atorvastatin (LIPITOR) 20 mg tablet, Take 1 tablet (20 mg total) by mouth daily. Pill pack, Disp: 90 tablet, Rfl: 1    busPIRone (BUSPAR) 5 mg tablet, Take 1 tablet (5 mg total) by mouth 2 (two) times a day. Pill pack, Disp: 180 tablet, Rfl: 0    clonazePAM (klonoPIN) 0.5 mg tablet, Take 1 tablet (0.5 mg total) by mouth nightly as needed for anxiety., Disp: 30 tablet, Rfl: 0    diclofenac sodium (VOLTAREN) 1 % topical gel, Apply 1 g topically as needed., Disp: , Rfl:     donepeziL (ARICEPT) 10 mg tablet, TAKE ONE TABLET BY MOUTH ONE TIME DAILY IN THE EVENING, Disp: 90 tablet, Rfl: 1    empagliflozin (JARDIANCE) 25 mg tablet, Take 1 tablet (25 mg total) by mouth daily., Disp: 90 tablet, Rfl: 3    FeroSuL 325 mg (65 mg iron) tablet, Take 1 tablet (325 mg total) by mouth daily with breakfast., Disp: 90 tablet, Rfl: 0    fluticasone-umeclidinium-vilanterol (TRELEGY ELLIPTA) 100-62.5-25 mcg blister with device powder for inhalation, Inhale 1 puff daily., Disp: 3 each, Rfl: 0    folic acid (FOLVITE) 1 mg tablet, Take 1 tablet (1 mg total) by mouth daily., Disp: 90 tablet, Rfl: 1    furosemide (LASIX) 40 mg tablet, Take 1 tablet (40mg) in afternoon as needed when gaining weight. Continue to take morning dose of lasix in pill pack., Disp: 30 tablet, Rfl: 1    levothyroxine (SYNTHROID) 112 mcg tablet, Take 1 tablet (112 mcg total) by mouth daily. Pill pack, Disp: 90  tablet, Rfl: 0    lidocaine (ASPERCREME) 4 % adhesive patch,medicated topical patch, Apply 1 patch topically daily., Disp: 30 patch, Rfl: 0    loratadine (CLARITIN) 10 mg tablet, Take one tablet by mouth one time daily, Disp: 30 tablet, Rfl: 0    magnesium oxide (MAG-OX) 400 mg (241.3 mg magnesium) tablet, Take 1 tablet (400 mg total) by mouth 2 (two) times a day., Disp: 180 tablet, Rfl: 3    medical marijuana, Take 1 each by mouth as needed., Disp: , Rfl:     melatonin 5 mg capsule, Take 10 mg by mouth nightly. Hs prn, Disp: , Rfl:     metoprolol succinate XL (TOPROL-XL) 25 mg 24 hr tablet, Take 1 tablet (25 mg total) by mouth daily., Disp: 30 tablet, Rfl: 0    multivitamin tablet, Take 1 tablet by mouth daily., Disp: , Rfl:     nebulizers misc, 1 each 4 (four) times a day as needed (sob or wheeze)., Disp: 1 each, Rfl: 0    pantoprazole (PROTONIX) 40 mg EC tablet, Take 1 tablet (40 mg total) by mouth 2 (two) times a day. Pill pack, Disp: 180 tablet, Rfl: 1    rivaroxaban (XARELTO) 20 mg tablet, Take 1 tablet (20 mg total) by mouth daily with dinner., Disp: 90 tablet, Rfl: 2    sertraline (ZOLOFT) 100 mg tablet, Take 1 tablet (100 mg total) by mouth daily., Disp: 90 tablet, Rfl: 0    spironolactone (ALDACTONE) 25 mg tablet, Take one tablet by mouth one time daily, Disp: 90 tablet, Rfl: 3    vitamin B-1 100 mg tablet, Take 1 tablet (100 mg total) by mouth daily., Disp: 90 tablet, Rfl: 0      BP Readings from Last 3 Encounters:   10/18/23 90/62   10/03/23 104/62   09/07/23 114/80       Recent Lab results:  Lab Results   Component Value Date    CHOL 138 11/17/2022   ,   Lab Results   Component Value Date    HDL 64 11/17/2022   ,   Lab Results   Component Value Date    LDLCALC 59 11/17/2022   ,   Lab Results   Component Value Date    TRIG 71 11/17/2022        Lab Results   Component Value Date    GLUCOSE 3+ (A) 09/20/2023   ,   Lab Results   Component Value Date    HGBA1C 8.1 (H) 07/11/2023         Lab Results    Component Value Date    CREATININE 1.0 08/29/2023       Lab Results   Component Value Date    TSH 3.06 04/21/2023           Lab Results   Component Value Date    HGBA1C 8.1 (H) 07/11/2023

## 2023-12-27 ENCOUNTER — TELEPHONE (OUTPATIENT)
Dept: PRIMARY CARE | Facility: CLINIC | Age: 83
End: 2023-12-27
Payer: COMMERCIAL

## 2023-12-27 NOTE — TELEPHONE ENCOUNTER
Caregiver called and LVM reporting that patient is c/o dizziness assoc with his nebulizer use. He is using 4 times daily. They would like to know if it is possible to decrease use.

## 2023-12-28 NOTE — TELEPHONE ENCOUNTER
LMOM with details to decrease use to 2x daily and monitor symptoms   Provided my callback number should she have any questions.

## 2024-01-04 DIAGNOSIS — F32.89 OTHER DEPRESSION: ICD-10-CM

## 2024-01-05 RX ORDER — CLONAZEPAM 0.5 MG/1
0.5 TABLET ORAL NIGHTLY PRN
Qty: 30 TABLET | Refills: 0 | Status: SHIPPED | OUTPATIENT
Start: 2024-01-05 | End: 2024-01-22

## 2024-01-05 NOTE — TELEPHONE ENCOUNTER
Medicine Refill Request    Last Office Visit: 9/7/2023   Last Consult Visit: Visit date not found  Last Telemedicine Visit: Visit date not found    Next Appointment: 3/11/2024      Current Outpatient Medications:     acetaminophen (TYLENOL) 325 mg tablet, Take 650 mg by mouth as needed., Disp: , Rfl:     albuterol 2.5 mg /3 mL (0.083 %) nebulizer solution, Take 3 ml (2.5 mg total) by nebulization 4 (four) times a day., Disp: 150 mL, Rfl: 0    albuterol HFA 90 mcg/actuation inhaler, Inhale 2 puffs every 6 (six) hours as needed for wheezing., Disp: 18 g, Rfl: 1    atorvastatin (LIPITOR) 20 mg tablet, Take 1 tablet (20 mg total) by mouth daily. Pill pack, Disp: 90 tablet, Rfl: 1    busPIRone (BUSPAR) 5 mg tablet, Take 1 tablet (5 mg total) by mouth 2 (two) times a day. Pill pack, Disp: 180 tablet, Rfl: 0    clonazePAM (klonoPIN) 0.5 mg tablet, Take 1 tablet (0.5 mg total) by mouth nightly as needed for anxiety., Disp: 30 tablet, Rfl: 0    diclofenac sodium (VOLTAREN) 1 % topical gel, Apply 1 g topically as needed., Disp: , Rfl:     donepeziL (ARICEPT) 10 mg tablet, TAKE ONE TABLET BY MOUTH ONE TIME DAILY IN THE EVENING, Disp: 90 tablet, Rfl: 1    empagliflozin (JARDIANCE) 25 mg tablet, Take 1 tablet (25 mg total) by mouth daily., Disp: 90 tablet, Rfl: 3    FeroSuL 325 mg (65 mg iron) tablet, Take 1 tablet (325 mg total) by mouth daily with breakfast., Disp: 90 tablet, Rfl: 0    fluticasone-umeclidinium-vilanterol (TRELEGY ELLIPTA) 100-62.5-25 mcg blister with device powder for inhalation, Inhale 1 puff daily., Disp: 3 each, Rfl: 0    folic acid (FOLVITE) 1 mg tablet, Take 1 tablet (1 mg total) by mouth daily., Disp: 90 tablet, Rfl: 1    furosemide (LASIX) 40 mg tablet, Take 1 tablet (40mg) in afternoon as needed when gaining weight. Continue to take morning dose of lasix in pill pack., Disp: 30 tablet, Rfl: 1    levothyroxine (SYNTHROID) 112 mcg tablet, Take 1 tablet (112 mcg total) by mouth daily. Pill pack, Disp: 90  tablet, Rfl: 0    lidocaine (ASPERCREME) 4 % adhesive patch,medicated topical patch, Apply 1 patch topically daily., Disp: 30 patch, Rfl: 0    loratadine (CLARITIN) 10 mg tablet, Take one tablet by mouth one time daily, Disp: 30 tablet, Rfl: 0    magnesium oxide (MAG-OX) 400 mg (241.3 mg magnesium) tablet, Take 1 tablet (400 mg total) by mouth 2 (two) times a day., Disp: 180 tablet, Rfl: 3    medical marijuana, Take 1 each by mouth as needed., Disp: , Rfl:     melatonin 5 mg capsule, Take 10 mg by mouth nightly. Hs prn, Disp: , Rfl:     metoprolol succinate XL (TOPROL-XL) 25 mg 24 hr tablet, Take 1 tablet (25 mg total) by mouth daily., Disp: 30 tablet, Rfl: 0    multivitamin tablet, Take 1 tablet by mouth daily., Disp: , Rfl:     nebulizers misc, 1 each 4 (four) times a day as needed (sob or wheeze)., Disp: 1 each, Rfl: 0    pantoprazole (PROTONIX) 40 mg EC tablet, Take 1 tablet (40 mg total) by mouth 2 (two) times a day. Pill pack, Disp: 180 tablet, Rfl: 1    rivaroxaban (XARELTO) 20 mg tablet, Take 1 tablet (20 mg total) by mouth daily with dinner., Disp: 90 tablet, Rfl: 2    sertraline (ZOLOFT) 100 mg tablet, Take 1 tablet (100 mg total) by mouth daily., Disp: 90 tablet, Rfl: 0    spironolactone (ALDACTONE) 25 mg tablet, Take one tablet by mouth one time daily, Disp: 90 tablet, Rfl: 3    vitamin B-1 100 mg tablet, Take 1 tablet (100 mg total) by mouth daily., Disp: 90 tablet, Rfl: 0      BP Readings from Last 3 Encounters:   10/18/23 90/62   10/03/23 104/62   09/07/23 114/80       Recent Lab results:  Lab Results   Component Value Date    CHOL 138 11/17/2022   ,   Lab Results   Component Value Date    HDL 64 11/17/2022   ,   Lab Results   Component Value Date    LDLCALC 59 11/17/2022   ,   Lab Results   Component Value Date    TRIG 71 11/17/2022        Lab Results   Component Value Date    GLUCOSE 3+ (A) 09/20/2023   ,   Lab Results   Component Value Date    HGBA1C 8.1 (H) 07/11/2023         Lab Results    Component Value Date    CREATININE 1.0 08/29/2023       Lab Results   Component Value Date    TSH 3.06 04/21/2023           Lab Results   Component Value Date    HGBA1C 8.1 (H) 07/11/2023

## 2024-01-08 NOTE — PROGRESS NOTES
Cecil Morris is a 83 y.o. male is present in the office today for follow up    PMHX of dementia, CHF, CAD s/p angioplasty, A-fib on Xarelto, AAA, Type 2 DM, HTN, HLD, anemia and hyperthyroidism, S/P CABG, TAVR      Does have some dizziness when changes position but intermittent states every once in a while     Still having trouble sleeping     Scheduled 2024 for ECHO     Off and on lasix but hasn't needed too much wt has been stable     Diet is the same but the medications are working.  Some bruising but not bleeding, purpura on arms.            Past Medical History:   Diagnosis Date   • A-fib (CMS/[facility])    • Acute on chronic combined systolic and diastolic congestive heart failure (CMS/HCC)    • Anxiety 2023   • CHF (congestive heart failure), NYHA class I, acute, systolic (CMS/[facility])    • Depression    • Hyperthyroidism    • Hypoxia    • Near syncope    • NSVT (nonsustained ventricular tachycardia) (CMS/[facility])    • Orthostatic hypotension    • Presence of cardiac pacemaker    • Pulmonary embolism (CMS/[facility])    • Sepsis (CMS/[facility]) 2022       Past Surgical History:   Procedure Laterality Date   • CARDIAC CATHETERIZATION  2020    CATH PLACE/CORON ANGIO, IMG SUPER/INTERP,R&L HRT CATH, L HRT VENTRIC   • CARDIAC CATHETERIZATION Bilateral    • CORONARY ARTERY BYPASS GRAFT          Social History     Socioeconomic History   • Marital status:      Spouse name: None   • Number of children: 2   • Years of education: None   • Highest education level: None   Occupational History   • Occupation: retired     Comment: [employer] in [place]   Tobacco Use   • Smoking status: Former     Years: 40     Types: Cigarettes     Quit date: 2012     Years since quittin.1   • Smokeless tobacco: Never   Vaping Use   • Vaping Use: Never used   Substance and Sexual Activity   • Alcohol use: Not Currently     Alcohol/week: 9.0 standard drinks of alcohol     Types: 9 Shots of liquor per week      Comment: vodka stopped late in winter   • Drug use: Never   • Sexual activity: Not Currently   Social History Narrative    Lives alone, and has help for meal prep and med management     Social Determinants of Health     Financial Resource Strain: Low Risk  (8/27/2023)    Overall Financial Resource Strain (CARDIA)    • Difficulty of Paying Living Expenses: Not hard at all   Food Insecurity: No Food Insecurity (8/18/2023)    Hunger Vital Sign    • Worried About Running Out of Food in the Last Year: Never true    • Ran Out of Food in the Last Year: Never true   Transportation Needs: No Transportation Needs (8/27/2023)    PRAPARE - Transportation    • Lack of Transportation (Medical): No    • Lack of Transportation (Non-Medical): No   Social Connections: Socially Isolated (6/26/2023)    Social Connection and Isolation Panel [NHANES]    • Frequency of Communication with Friends and Family: More than three times a week    • Frequency of Social Gatherings with Friends and Family: Once a week    • Attends Latter-day Services: Never    • Active Member of Clubs or Organizations: No    • Attends Club or Organization Meetings: Patient declined    • Marital Status:    Housing Stability: Low Risk  (8/27/2023)    Housing Stability Vital Sign    • Unable to Pay for Housing in the Last Year: No    • Number of Places Lived in the Last Year: 1    • Unstable Housing in the Last Year: No       Family History   Problem Relation Age of Onset   • Thyroid cancer Biological Mother    • ALS Biological Mother    • Parkinsonism Biological Mother    • Heart attack Biological Father    • Liver cancer Maternal Grandmother        Patient has no known allergies.    Current Outpatient Medications   Medication Sig Dispense Refill   • acetaminophen (TYLENOL) 325 mg tablet Take 650 mg by mouth as needed.     • albuterol 2.5 mg /3 mL (0.083 %) nebulizer solution Take 3 ml (2.5 mg total) by nebulization 4 (four) times a day. 150 mL 0   • albuterol  HFA 90 mcg/actuation inhaler Inhale 2 puffs every 6 (six) hours as needed for wheezing. 18 g 1   • atorvastatin (LIPITOR) 20 mg tablet Take 1 tablet (20 mg total) by mouth daily. Pill pack 90 tablet 1   • busPIRone (BUSPAR) 5 mg tablet Take 1 tablet (5 mg total) by mouth 2 (two) times a day. Pill pack 180 tablet 0   • clonazePAM (klonoPIN) 0.5 mg tablet Take 1 tablet (0.5 mg total) by mouth nightly as needed for anxiety. 30 tablet 0   • diclofenac sodium (VOLTAREN) 1 % topical gel Apply 1 g topically as needed.     • donepeziL (ARICEPT) 10 mg tablet TAKE ONE TABLET BY MOUTH ONE TIME DAILY IN THE EVENING 90 tablet 1   • empagliflozin (JARDIANCE) 25 mg tablet Take 1 tablet (25 mg total) by mouth daily. 90 tablet 3   • FeroSuL 325 mg (65 mg iron) tablet Take 1 tablet (325 mg total) by mouth daily with breakfast. 90 tablet 0   • fluticasone-umeclidinium-vilanterol (TRELEGY ELLIPTA) 100-62.5-25 mcg blister with device powder for inhalation Inhale 1 puff daily. 3 each 0   • folic acid (FOLVITE) 1 mg tablet Take 1 tablet (1 mg total) by mouth daily. 90 tablet 1   • furosemide (LASIX) 40 mg tablet Take 1 tablet (40mg) in afternoon as needed when gaining weight. Continue to take morning dose of lasix in pill pack. 30 tablet 1   • levothyroxine (SYNTHROID) 112 mcg tablet Take 1 tablet (112 mcg total) by mouth daily. Pill pack 90 tablet 0   • loratadine (CLARITIN) 10 mg tablet Take one tablet by mouth one time daily 30 tablet 0   • magnesium oxide (MAG-OX) 400 mg (241.3 mg magnesium) tablet Take 1 tablet (400 mg total) by mouth 2 (two) times a day. 180 tablet 3   • medical marijuana Take 1 each by mouth as needed.     • melatonin 5 mg capsule Take 10 mg by mouth nightly. Hs prn     • metoprolol succinate XL (TOPROL-XL) 25 mg 24 hr tablet Take 1 tablet (25 mg total) by mouth daily. 30 tablet 0   • multivitamin tablet Take 1 tablet by mouth daily.     • nebulizers misc 1 each 4 (four) times a day as needed (sob or wheeze). 1  each 0   • pantoprazole (PROTONIX) 40 mg EC tablet Take 1 tablet (40 mg total) by mouth 2 (two) times a day. Pill pack 180 tablet 1   • rivaroxaban (XARELTO) 20 mg tablet Take 1 tablet (20 mg total) by mouth daily with dinner. 90 tablet 2   • sertraline (ZOLOFT) 100 mg tablet Take 1 tablet (100 mg total) by mouth daily. 90 tablet 0   • spironolactone (ALDACTONE) 25 mg tablet Take one tablet by mouth one time daily 90 tablet 3   • vitamin B-1 100 mg tablet Take 1 tablet (100 mg total) by mouth daily. 90 tablet 0   • lidocaine (ASPERCREME) 4 % adhesive patch,medicated topical patch Apply 1 patch topically daily. 30 patch 0     No current facility-administered medications for this visit.       Review of Systems   Cardiovascular: Negative for chest pain, claudication, cyanosis, dyspnea on exertion, irregular heartbeat, leg swelling, near-syncope, orthopnea, palpitations, paroxysmal nocturnal dyspnea and syncope.          Vitals:    01/09/24 1247   BP: 130/72   Pulse: 72   Resp: 16   SpO2: 96%       Body mass index is 28.13 kg/m².      Physical Exam  Constitutional:       Appearance: Normal appearance. He is normal weight.   HENT:      Head: Normocephalic.      Mouth/Throat:      Mouth: Mucous membranes are moist.      Pharynx: No posterior oropharyngeal erythema.   Eyes:      Extraocular Movements: Extraocular movements intact.   Cardiovascular:      Rate and Rhythm: Normal rate and regular rhythm.      Chest Wall: PMI is not displaced.      Pulses: Normal pulses.      Heart sounds: S1 normal and S2 normal. Murmur heard.      No gallop. No S3 or S4 sounds.   Pulmonary:      Effort: Pulmonary effort is normal.      Breath sounds: Normal breath sounds.   Abdominal:      General: Abdomen is flat. Bowel sounds are normal. There is no distension.      Tenderness: There is no abdominal tenderness.   Musculoskeletal:      Cervical back: Neck supple.      Right lower leg: No edema.      Left lower leg: No edema.      Comments:  Functional status is:  good   Skin:     General: Skin is warm.      Coloration: Skin is not pale.      Findings: Bruising present. No rash.   Neurological:      Mental Status: He is alert and oriented to person, place, and time. Mental status is at baseline.   Psychiatric:         Attention and Perception: Attention normal.         Mood and Affect: Mood and affect normal.         Speech: Speech normal.          Lab Results   Component Value Date    WBC 11.55 (H) 08/29/2023    RBC 3.68 (L) 08/29/2023    HGB 10.2 (L) 08/29/2023    HCT 33.6 (L) 08/29/2023    MCV 91.3 08/29/2023    MCH 27.7 (L) 08/29/2023    MCHC 30.4 (L) 08/29/2023    RDW 14.2 08/29/2023     08/29/2023        Lab Results   Component Value Date    GLUCOSE 3+ (A) 09/20/2023    BUN 23 08/29/2023    CREATININE 1.0 08/29/2023    EGFR >60.0 08/29/2023     08/29/2023    K 3.7 08/29/2023     08/29/2023    CO2 32 (H) 08/29/2023    CALCIUM 9.2 08/29/2023    PROTEIN 7.1 08/26/2023    ALBUMIN 3.8 08/26/2023    GLOB 2.6 07/11/2023    AGRATIO 1.6 07/11/2023    BILITOT 0.8 08/26/2023    ALKPHOS 88 08/26/2023    AST 18 08/26/2023    ALT 16 08/26/2023        Lab Results   Component Value Date    HGBA1C 8.1 (H) 07/11/2023        Lab Results   Component Value Date    ALBUMIN 3.8 08/26/2023        Lab Results   Component Value Date    CHOL 138 11/17/2022    TRIG 71 11/17/2022    HDL 64 11/17/2022    LDLCALC 59 11/17/2022        Cardiac Imaging    TRANSTHORACIC ECHO (TTE) COMPLETE 08/28/2023    Interpretation Summary  Technically difficult study.  Definity used for endocardial enhancement.    Mild concentric left ventricular hypertrophy and severely dilated cavity size.  Mildly reduced systolic function.  Estimated EF 40%.  The entire apex is akinetic.  Unable to estimate diastolic function.  Moderately dilated right ventricular cavity size with preserved systolic function.  Pacemaker wire seen in right-sided structures.  Severely dilated left  atrium.  Mildly dilated right atrium.  A transcatheter bioprosthetic aortic valve present.  Peak velocity of 2.0 m/s and mean gradient of 8 mmHg.  No transvalvular or paravalvular aortic regurgitation.  Ascending aorta measures 3.8 cm.  Severe, posterior, mitral annular calcification.  Severe mitral regurgitation, directed central-posteriorly, likely in the setting of a restricted posterior leaflet for mitral annular calcification.  Calculated effective regurgitant orifice of 0.4 cm and regurgitant volume of 57 mL calculated by the Pisa method (Pisa 1 cm).  Progressive mitral stenosis with a mean transmitral gradient of 5 mmHg at 65 bpm.  Mild tricuspid regurgitation with calculated RVSP of 43 mmHg plus right atrial pressure..  Pulmonic valve not well visualized.  Trace pulmonic insufficiency.  No pericardial effusion.  IVC is suboptimally visualized, but appears dilated in size. Cannot assess respiratory variation.      Results for orders placed during the hospital encounter of 08/26/23    Transthoracic Echo (TTE) Complete    Interpretation Summary  Technically difficult study.  Definity used for endocardial enhancement.    Mild concentric left ventricular hypertrophy and severely dilated cavity size.  Mildly reduced systolic function.  Estimated EF 40%.  The entire apex is akinetic.  Unable to estimate diastolic function.  Moderately dilated right ventricular cavity size with preserved systolic function.  Pacemaker wire seen in right-sided structures.  Severely dilated left atrium.  Mildly dilated right atrium.  A transcatheter bioprosthetic aortic valve present.  Peak velocity of 2.0 m/s and mean gradient of 8 mmHg.  No transvalvular or paravalvular aortic regurgitation.  Ascending aorta measures 3.8 cm.  Severe, posterior, mitral annular calcification.  Severe mitral regurgitation, directed central-posteriorly, likely in the setting of a restricted posterior leaflet for mitral annular calcification.  Calculated  effective regurgitant orifice of 0.4 cm and regurgitant volume of 57 mL calculated by the Pisa method (Pisa 1 cm).  Progressive mitral stenosis with a mean transmitral gradient of 5 mmHg at 65 bpm.  Mild tricuspid regurgitation with calculated RVSP of 43 mmHg plus right atrial pressure..  Pulmonic valve not well visualized.  Trace pulmonic insufficiency.  No pericardial effusion.  IVC is suboptimally visualized, but appears dilated in size. Cannot assess respiratory variation.               Assessment/Plan        EKG: 10/17/3965-[id number] rhythm     A-fib (CMS/HCC)  Regular vpaced rhythm at present.      Chronic combined systolic and diastolic heart failure (CMS/HCC)  Well compensated on good medical therapy with Toprol, Aldactone, Jardiance and not on ACE/arb due to BP being low in the past.  Has PRN lasix available but not needing at present.    Coronary artery disease involving native coronary artery  Stable without symptoms, continue Beta blocker, statin, and on NOAC instead of aspirin.    Mitral regurgitation  Would repeat TTE for followup to be done on 24th of January.      SSS (sick sinus syndrome) (CMS/[facility])  Has device therapy and no issues.    Hypercholesteremia  Doing well continue lipitor.         No orders of the defined types were placed in this encounter.      There are no discontinued medications.     No orders of the defined types were placed in this encounter.

## 2024-01-09 ENCOUNTER — OFFICE VISIT (OUTPATIENT)
Dept: CARDIOLOGY | Facility: CLINIC | Age: 84
End: 2024-01-09
Payer: COMMERCIAL

## 2024-01-09 ENCOUNTER — TELEPHONE (OUTPATIENT)
Dept: PRIMARY CARE | Facility: CLINIC | Age: 84
End: 2024-01-09

## 2024-01-09 VITALS
OXYGEN SATURATION: 96 % | HEIGHT: 68 IN | HEART RATE: 72 BPM | WEIGHT: 185 LBS | DIASTOLIC BLOOD PRESSURE: 72 MMHG | SYSTOLIC BLOOD PRESSURE: 130 MMHG | BODY MASS INDEX: 28.04 KG/M2 | RESPIRATION RATE: 16 BRPM

## 2024-01-09 DIAGNOSIS — I47.29 NSVT (NONSUSTAINED VENTRICULAR TACHYCARDIA) (CMS/HCC): ICD-10-CM

## 2024-01-09 DIAGNOSIS — I25.10 CORONARY ARTERY DISEASE INVOLVING NATIVE CORONARY ARTERY OF NATIVE HEART WITHOUT ANGINA PECTORIS: ICD-10-CM

## 2024-01-09 DIAGNOSIS — E78.00 HYPERCHOLESTEREMIA: ICD-10-CM

## 2024-01-09 DIAGNOSIS — I48.0 PAROXYSMAL ATRIAL FIBRILLATION (CMS/HCC): ICD-10-CM

## 2024-01-09 DIAGNOSIS — I50.42 CHRONIC COMBINED SYSTOLIC AND DIASTOLIC HEART FAILURE (CMS/HCC): Primary | ICD-10-CM

## 2024-01-09 DIAGNOSIS — I49.5 SSS (SICK SINUS SYNDROME) (CMS/HCC): ICD-10-CM

## 2024-01-09 DIAGNOSIS — I34.0 MITRAL VALVE INSUFFICIENCY, UNSPECIFIED ETIOLOGY: ICD-10-CM

## 2024-01-09 DIAGNOSIS — I50.23 ACUTE ON CHRONIC SYSTOLIC HEART FAILURE (CMS/HCC): ICD-10-CM

## 2024-01-09 DIAGNOSIS — I45.10 RBBB: ICD-10-CM

## 2024-01-09 DIAGNOSIS — I35.0 NONRHEUMATIC AORTIC VALVE STENOSIS: ICD-10-CM

## 2024-01-09 PROCEDURE — 3075F SYST BP GE 130 - 139MM HG: CPT | Performed by: INTERNAL MEDICINE

## 2024-01-09 PROCEDURE — 3078F DIAST BP <80 MM HG: CPT | Performed by: INTERNAL MEDICINE

## 2024-01-09 PROCEDURE — 99214 OFFICE O/P EST MOD 30 MIN: CPT | Performed by: INTERNAL MEDICINE

## 2024-01-09 PROCEDURE — 3008F BODY MASS INDEX DOCD: CPT | Performed by: INTERNAL MEDICINE

## 2024-01-09 ASSESSMENT — ENCOUNTER SYMPTOMS
PND: 0
DYSPNEA ON EXERTION: 0
IRREGULAR HEARTBEAT: 0
SYNCOPE: 0
NEAR-SYNCOPE: 0
PALPITATIONS: 0
ORTHOPNEA: 0
CLAUDICATION: 0

## 2024-01-09 NOTE — PATIENT INSTRUCTIONS
stay hydrated    Take Lasix 40mg if weight increases to 187-188lb.      Repeat echo is pending.

## 2024-01-09 NOTE — LETTER
January 9, 2024     Melanie Chambers DO  [address]  [facility] [address]    Patient: Cecil Morris  YOB: 1940  Date of Visit: 1/9/2024      Dear Dr. Chambers:    Thank you for referring Cecil Morris to me for evaluation. Below are my notes for this consultation.    If you have questions, please do not hesitate to call me. I look forward to following your patient along with you.     Well compensated doing well from a CHF perspective.      Sincerely,        Yousuf Wolf MD        CC: No Recipients    Yousuf Wolf MD  1/9/2024  1:20 PM  Sign when Signing Visit  Cecil Morris is a 83 y.o. male is present in the office today for follow up    PMHX of dementia, CHF, CAD s/p angioplasty, A-fib on Xarelto, AAA, Type 2 DM, HTN, HLD, anemia and hyperthyroidism, S/P CABG, TAVR 2021     Does have some dizziness when changes position but intermittent states every once in a while     Still having trouble sleeping     Scheduled 1/24/2024 for ECHO     Off and on lasix but hasn't needed too much wt has been stable     Diet is the same but the medications are working.  Some bruising but not bleeding, purpura on arms.            Past Medical History:   Diagnosis Date   • A-fib (CMS/HCC)    • Acute on chronic combined systolic and diastolic congestive heart failure (CMS/HCC)    • Anxiety 4/11/2023   • CHF (congestive heart failure), NYHA class I, acute, systolic (CMS/HCC)    • Depression    • Hyperthyroidism    • Hypoxia    • Near syncope    • NSVT (nonsustained ventricular tachycardia) (CMS/HCC)    • Orthostatic hypotension    • Presence of cardiac pacemaker    • Pulmonary embolism (CMS/HCC)    • Sepsis (CMS/HCC) 05/16/2022       Past Surgical History:   Procedure Laterality Date   • CARDIAC CATHETERIZATION  11/19/2020    CATH PLACE/CORON ANGIO, IMG SUPER/INTERP,R&L HRT CATH, L HRT VENTRIC   • CARDIAC CATHETERIZATION Bilateral 2005   • CORONARY ARTERY BYPASS GRAFT  2005        Social History  Spoke with Patient regarding post discharge from hospital.    Incision status: no drainage, odor, or redness noted per patient    Edema/Swelling:  :none    Wearing Teds: no, take a break    Pain level and status: worse at night     Use of pain medications: norco ; Patient stated they are taking their pain medication as prescribed. Reminded patient due to state law, we will be unable to refill pain pills early, so takes smallest dose possible and call the office if prescription refill is needed past 7 days. Use of ice therapy: Yes     Blood thinner: ELIQUIS ; Verified with patient that they are taking their anticoagulant as prescribed 2 a day. Bowels: No: \"will drink milk that always helps me go\"     [address] active: yes; [facility] coming this afternoo . Outpatient therapy: N/A    Do you have all of your medications: Yes    Changes in medications: no    Using Incentive Spirometer?: Yes    Did you Attend Pre-hab? No    Ortho Vitals: no    How was your care while you were in the hospital? good     Reminded patient that they will be getting a survey in the mail and we appreciate their feedback and taking the time to answer all the questions and return. No other questions/concerns at this time. Follow up appointment confirmed. Encouraged patient to call Orthopedic Nurse [facility] (#[phone]) or Orthopedic office if has any questions/concerns. Follow up appointments:    No future appointments.     Socioeconomic History   • Marital status:      Spouse name: None   • Number of children: 2   • Years of education: None   • Highest education level: None   Occupational History   • Occupation: retired     Comment: [employer] in [place]   Tobacco Use   • Smoking status: Former     Years: 40     Types: Cigarettes     Quit date: 2012     Years since quittin.1   • Smokeless tobacco: Never   Vaping Use   • Vaping Use: Never used   Substance and Sexual Activity   • Alcohol use: Not Currently     Alcohol/week: 9.0 standard drinks of alcohol     Types: 9 Shots of liquor per week     Comment: vodka stopped late in winter   • Drug use: Never   • Sexual activity: Not Currently   Social History Narrative    Lives alone, and has help for meal prep and med management     Social Determinants of Health     Financial Resource Strain: Low Risk  (2023)    Overall Financial Resource Strain (CARDIA)    • Difficulty of Paying Living Expenses: Not hard at all   Food Insecurity: No Food Insecurity (2023)    Hunger Vital Sign    • Worried About Running Out of Food in the Last Year: Never true    • Ran Out of Food in the Last Year: Never true   Transportation Needs: No Transportation Needs (2023)    PRAPARE - Transportation    • Lack of Transportation (Medical): No    • Lack of Transportation (Non-Medical): No   Social Connections: Socially Isolated (2023)    Social Connection and Isolation Panel [NHANES]    • Frequency of Communication with Friends and Family: More than three times a week    • Frequency of Social Gatherings with Friends and Family: Once a week    • Attends Yarsani Services: Never    • Active Member of Clubs or Organizations: No    • Attends Club or Organization Meetings: Patient declined    • Marital Status:    Housing Stability: Low Risk  (2023)    Housing Stability Vital Sign    • Unable to Pay for Housing in the Last Year: No    • Number of Places Lived in the Last  Year: 1    • Unstable Housing in the Last Year: No       Family History   Problem Relation Age of Onset   • Thyroid cancer Biological Mother    • ALS Biological Mother    • Parkinsonism Biological Mother    • Heart attack Biological Father    • Liver cancer Maternal Grandmother        Patient has no known allergies.    Current Outpatient Medications   Medication Sig Dispense Refill   • acetaminophen (TYLENOL) 325 mg tablet Take 650 mg by mouth as needed.     • albuterol 2.5 mg /3 mL (0.083 %) nebulizer solution Take 3 ml (2.5 mg total) by nebulization 4 (four) times a day. 150 mL 0   • albuterol HFA 90 mcg/actuation inhaler Inhale 2 puffs every 6 (six) hours as needed for wheezing. 18 g 1   • atorvastatin (LIPITOR) 20 mg tablet Take 1 tablet (20 mg total) by mouth daily. Pill pack 90 tablet 1   • busPIRone (BUSPAR) 5 mg tablet Take 1 tablet (5 mg total) by mouth 2 (two) times a day. Pill pack 180 tablet 0   • clonazePAM (klonoPIN) 0.5 mg tablet Take 1 tablet (0.5 mg total) by mouth nightly as needed for anxiety. 30 tablet 0   • diclofenac sodium (VOLTAREN) 1 % topical gel Apply 1 g topically as needed.     • donepeziL (ARICEPT) 10 mg tablet TAKE ONE TABLET BY MOUTH ONE TIME DAILY IN THE EVENING 90 tablet 1   • empagliflozin (JARDIANCE) 25 mg tablet Take 1 tablet (25 mg total) by mouth daily. 90 tablet 3   • FeroSuL 325 mg (65 mg iron) tablet Take 1 tablet (325 mg total) by mouth daily with breakfast. 90 tablet 0   • fluticasone-umeclidinium-vilanterol (TRELEGY ELLIPTA) 100-62.5-25 mcg blister with device powder for inhalation Inhale 1 puff daily. 3 each 0   • folic acid (FOLVITE) 1 mg tablet Take 1 tablet (1 mg total) by mouth daily. 90 tablet 1   • furosemide (LASIX) 40 mg tablet Take 1 tablet (40mg) in afternoon as needed when gaining weight. Continue to take morning dose of lasix in pill pack. 30 tablet 1   • levothyroxine (SYNTHROID) 112 mcg tablet Take 1 tablet (112 mcg total) by mouth daily. Pill pack 90  tablet 0   • loratadine (CLARITIN) 10 mg tablet Take one tablet by mouth one time daily 30 tablet 0   • magnesium oxide (MAG-OX) 400 mg (241.3 mg magnesium) tablet Take 1 tablet (400 mg total) by mouth 2 (two) times a day. 180 tablet 3   • medical marijuana Take 1 each by mouth as needed.     • melatonin 5 mg capsule Take 10 mg by mouth nightly. Hs prn     • metoprolol succinate XL (TOPROL-XL) 25 mg 24 hr tablet Take 1 tablet (25 mg total) by mouth daily. 30 tablet 0   • multivitamin tablet Take 1 tablet by mouth daily.     • nebulizers misc 1 each 4 (four) times a day as needed (sob or wheeze). 1 each 0   • pantoprazole (PROTONIX) 40 mg EC tablet Take 1 tablet (40 mg total) by mouth 2 (two) times a day. Pill pack 180 tablet 1   • rivaroxaban (XARELTO) 20 mg tablet Take 1 tablet (20 mg total) by mouth daily with dinner. 90 tablet 2   • sertraline (ZOLOFT) 100 mg tablet Take 1 tablet (100 mg total) by mouth daily. 90 tablet 0   • spironolactone (ALDACTONE) 25 mg tablet Take one tablet by mouth one time daily 90 tablet 3   • vitamin B-1 100 mg tablet Take 1 tablet (100 mg total) by mouth daily. 90 tablet 0   • lidocaine (ASPERCREME) 4 % adhesive patch,medicated topical patch Apply 1 patch topically daily. 30 patch 0     No current facility-administered medications for this visit.       Review of Systems   Cardiovascular: Negative for chest pain, claudication, cyanosis, dyspnea on exertion, irregular heartbeat, leg swelling, near-syncope, orthopnea, palpitations, paroxysmal nocturnal dyspnea and syncope.          Vitals:    01/09/24 1247   BP: 130/72   Pulse: 72   Resp: 16   SpO2: 96%       Body mass index is 28.13 kg/m².      Physical Exam  Constitutional:       Appearance: Normal appearance. He is normal weight.   HENT:      Head: Normocephalic.      Mouth/Throat:      Mouth: Mucous membranes are moist.      Pharynx: No posterior oropharyngeal erythema.   Eyes:      Extraocular Movements: Extraocular movements  intact.   Cardiovascular:      Rate and Rhythm: Normal rate and regular rhythm.      Chest Wall: PMI is not displaced.      Pulses: Normal pulses.      Heart sounds: S1 normal and S2 normal. Murmur heard.      No gallop. No S3 or S4 sounds.   Pulmonary:      Effort: Pulmonary effort is normal.      Breath sounds: Normal breath sounds.   Abdominal:      General: Abdomen is flat. Bowel sounds are normal. There is no distension.      Tenderness: There is no abdominal tenderness.   Musculoskeletal:      Cervical back: Neck supple.      Right lower leg: No edema.      Left lower leg: No edema.      Comments: Functional status is:  good   Skin:     General: Skin is warm.      Coloration: Skin is not pale.      Findings: Bruising present. No rash.   Neurological:      Mental Status: He is alert and oriented to person, place, and time. Mental status is at baseline.   Psychiatric:         Attention and Perception: Attention normal.         Mood and Affect: Mood and affect normal.         Speech: Speech normal.          Lab Results   Component Value Date    WBC 11.55 (H) 08/29/2023    RBC 3.68 (L) 08/29/2023    HGB 10.2 (L) 08/29/2023    HCT 33.6 (L) 08/29/2023    MCV 91.3 08/29/2023    MCH 27.7 (L) 08/29/2023    MCHC 30.4 (L) 08/29/2023    RDW 14.2 08/29/2023     08/29/2023        Lab Results   Component Value Date    GLUCOSE 3+ (A) 09/20/2023    BUN 23 08/29/2023    CREATININE 1.0 08/29/2023    EGFR >60.0 08/29/2023     08/29/2023    K 3.7 08/29/2023     08/29/2023    CO2 32 (H) 08/29/2023    CALCIUM 9.2 08/29/2023    PROTEIN 7.1 08/26/2023    ALBUMIN 3.8 08/26/2023    GLOB 2.6 07/11/2023    AGRATIO 1.6 07/11/2023    BILITOT 0.8 08/26/2023    ALKPHOS 88 08/26/2023    AST 18 08/26/2023    ALT 16 08/26/2023        Lab Results   Component Value Date    HGBA1C 8.1 (H) 07/11/2023        Lab Results   Component Value Date    ALBUMIN 3.8 08/26/2023        Lab Results   Component Value Date    CHOL 138 11/17/2022     TRIG 71 11/17/2022    HDL 64 11/17/2022    LDLCALC 59 11/17/2022        Cardiac Imaging    TRANSTHORACIC ECHO (TTE) COMPLETE 08/28/2023    Interpretation Summary  Technically difficult study.  Definity used for endocardial enhancement.    Mild concentric left ventricular hypertrophy and severely dilated cavity size.  Mildly reduced systolic function.  Estimated EF 40%.  The entire apex is akinetic.  Unable to estimate diastolic function.  Moderately dilated right ventricular cavity size with preserved systolic function.  Pacemaker wire seen in right-sided structures.  Severely dilated left atrium.  Mildly dilated right atrium.  A transcatheter bioprosthetic aortic valve present.  Peak velocity of 2.0 m/s and mean gradient of 8 mmHg.  No transvalvular or paravalvular aortic regurgitation.  Ascending aorta measures 3.8 cm.  Severe, posterior, mitral annular calcification.  Severe mitral regurgitation, directed central-posteriorly, likely in the setting of a restricted posterior leaflet for mitral annular calcification.  Calculated effective regurgitant orifice of 0.4 cm and regurgitant volume of 57 mL calculated by the Pisa method (Pisa 1 cm).  Progressive mitral stenosis with a mean transmitral gradient of 5 mmHg at 65 bpm.  Mild tricuspid regurgitation with calculated RVSP of 43 mmHg plus right atrial pressure..  Pulmonic valve not well visualized.  Trace pulmonic insufficiency.  No pericardial effusion.  IVC is suboptimally visualized, but appears dilated in size. Cannot assess respiratory variation.      Results for orders placed during the hospital encounter of 08/26/23    Transthoracic Echo (TTE) Complete    Interpretation Summary  Technically difficult study.  Definity used for endocardial enhancement.    Mild concentric left ventricular hypertrophy and severely dilated cavity size.  Mildly reduced systolic function.  Estimated EF 40%.  The entire apex is akinetic.  Unable to estimate diastolic  function.  Moderately dilated right ventricular cavity size with preserved systolic function.  Pacemaker wire seen in right-sided structures.  Severely dilated left atrium.  Mildly dilated right atrium.  A transcatheter bioprosthetic aortic valve present.  Peak velocity of 2.0 m/s and mean gradient of 8 mmHg.  No transvalvular or paravalvular aortic regurgitation.  Ascending aorta measures 3.8 cm.  Severe, posterior, mitral annular calcification.  Severe mitral regurgitation, directed central-posteriorly, likely in the setting of a restricted posterior leaflet for mitral annular calcification.  Calculated effective regurgitant orifice of 0.4 cm and regurgitant volume of 57 mL calculated by the Pisa method (Pisa 1 cm).  Progressive mitral stenosis with a mean transmitral gradient of 5 mmHg at 65 bpm.  Mild tricuspid regurgitation with calculated RVSP of 43 mmHg plus right atrial pressure..  Pulmonic valve not well visualized.  Trace pulmonic insufficiency.  No pericardial effusion.  IVC is suboptimally visualized, but appears dilated in size. Cannot assess respiratory variation.               Assessment/Plan        EKG: 10/17/7370-[id number] rhythm     A-fib (CMS/HCC)  Regular vpaced rhythm at present.      Chronic combined systolic and diastolic heart failure (CMS/HCC)  Well compensated on good medical therapy with Toprol, Aldactone, Jardiance and not on ACE/arb due to BP being low in the past.  Has PRN lasix available but not needing at present.    Coronary artery disease involving native coronary artery  Stable without symptoms, continue Beta blocker, statin, and on NOAC instead of aspirin.    Mitral regurgitation  Would repeat TTE for followup to be done on 24th of January.      SSS (sick sinus syndrome) (CMS/[facility])  Has device therapy and no issues.    Hypercholesteremia  Doing well continue lipitor.         No orders of the defined types were placed in this encounter.      There are no discontinued  medications.     No orders of the defined types were placed in this encounter.

## 2024-01-09 NOTE — TELEPHONE ENCOUNTER
Patient's caregiver, Ann, called that pt complaints of R arm drooping, but no other symptoms. Call transferred to [facility] for triage.

## 2024-01-09 NOTE — ASSESSMENT & PLAN NOTE
Well compensated on good medical therapy with Toprol, Aldactone, Jardiance and not on ACE/arb due to BP being low in the past.  Has PRN lasix available but not needing at present.

## 2024-01-09 NOTE — TELEPHONE ENCOUNTER
"Call transferred from    caregiver reports \"hanging right arm\"   Patient rushed into the house carrying a heavy bag \"and yelped out\" after sitting in his chair.   Ann assessed the patient and he has no additional stroke signs.   His arms are equal when raised to the side and the front. Gripe strength is equal  Smile is equal, no facial drooping.   Ann states that patient's arm has improved since the call.     Liu will call a neighbor or 911 for worsening symptoms tonight   Caregiver will callback with any signs of shoulder or arm concerns.     "

## 2024-01-10 NOTE — TELEPHONE ENCOUNTER
James Juarez the caretaker and she stated she thinks that Liu is fine and if he needed to be seen she would call

## 2024-01-20 DIAGNOSIS — F32.89 OTHER DEPRESSION: ICD-10-CM

## 2024-01-22 RX ORDER — CLONAZEPAM 0.5 MG/1
0.5 TABLET ORAL NIGHTLY PRN
Qty: 30 TABLET | Refills: 0 | Status: SHIPPED | OUTPATIENT
Start: 2024-01-22 | End: 2024-01-31 | Stop reason: SDUPTHER

## 2024-01-22 NOTE — TELEPHONE ENCOUNTER
Medicine Refill Request    Last Office Visit: 9/7/2023   Last Consult Visit: Visit date not found  Last Telemedicine Visit: Visit date not found    Next Appointment: 3/11/2024      Current Outpatient Medications:     acetaminophen (TYLENOL) 325 mg tablet, Take 650 mg by mouth as needed., Disp: , Rfl:     albuterol 2.5 mg /3 mL (0.083 %) nebulizer solution, Take 3 ml (2.5 mg total) by nebulization 4 (four) times a day., Disp: 150 mL, Rfl: 0    albuterol HFA 90 mcg/actuation inhaler, Inhale 2 puffs every 6 (six) hours as needed for wheezing., Disp: 18 g, Rfl: 1    atorvastatin (LIPITOR) 20 mg tablet, Take 1 tablet (20 mg total) by mouth daily. Pill pack, Disp: 90 tablet, Rfl: 1    busPIRone (BUSPAR) 5 mg tablet, Take 1 tablet (5 mg total) by mouth 2 (two) times a day. Pill pack, Disp: 180 tablet, Rfl: 0    clonazePAM (klonoPIN) 0.5 mg tablet, Take 1 tablet (0.5 mg total) by mouth nightly as needed for anxiety., Disp: 30 tablet, Rfl: 0    diclofenac sodium (VOLTAREN) 1 % topical gel, Apply 1 g topically as needed., Disp: , Rfl:     donepeziL (ARICEPT) 10 mg tablet, TAKE ONE TABLET BY MOUTH ONE TIME DAILY IN THE EVENING, Disp: 90 tablet, Rfl: 1    empagliflozin (JARDIANCE) 25 mg tablet, Take 1 tablet (25 mg total) by mouth daily., Disp: 90 tablet, Rfl: 3    FeroSuL 325 mg (65 mg iron) tablet, Take 1 tablet (325 mg total) by mouth daily with breakfast., Disp: 90 tablet, Rfl: 0    fluticasone-umeclidinium-vilanterol (TRELEGY ELLIPTA) 100-62.5-25 mcg blister with device powder for inhalation, Inhale 1 puff daily., Disp: 3 each, Rfl: 0    folic acid (FOLVITE) 1 mg tablet, Take 1 tablet (1 mg total) by mouth daily., Disp: 90 tablet, Rfl: 1    furosemide (LASIX) 40 mg tablet, Take 1 tablet (40mg) in afternoon as needed when gaining weight. Continue to take morning dose of lasix in pill pack., Disp: 30 tablet, Rfl: 1    levothyroxine (SYNTHROID) 112 mcg tablet, Take 1 tablet (112 mcg total) by mouth daily. Pill pack, Disp: 90  tablet, Rfl: 0    lidocaine (ASPERCREME) 4 % adhesive patch,medicated topical patch, Apply 1 patch topically daily., Disp: 30 patch, Rfl: 0    loratadine (CLARITIN) 10 mg tablet, Take one tablet by mouth one time daily, Disp: 30 tablet, Rfl: 0    magnesium oxide (MAG-OX) 400 mg (241.3 mg magnesium) tablet, Take 1 tablet (400 mg total) by mouth 2 (two) times a day., Disp: 180 tablet, Rfl: 3    medical marijuana, Take 1 each by mouth as needed., Disp: , Rfl:     melatonin 5 mg capsule, Take 10 mg by mouth nightly. Hs prn, Disp: , Rfl:     metoprolol succinate XL (TOPROL-XL) 25 mg 24 hr tablet, Take 1 tablet (25 mg total) by mouth daily., Disp: 30 tablet, Rfl: 0    multivitamin tablet, Take 1 tablet by mouth daily., Disp: , Rfl:     nebulizers misc, 1 each 4 (four) times a day as needed (sob or wheeze)., Disp: 1 each, Rfl: 0    pantoprazole (PROTONIX) 40 mg EC tablet, Take 1 tablet (40 mg total) by mouth 2 (two) times a day. Pill pack, Disp: 180 tablet, Rfl: 1    rivaroxaban (XARELTO) 20 mg tablet, Take 1 tablet (20 mg total) by mouth daily with dinner., Disp: 90 tablet, Rfl: 2    sertraline (ZOLOFT) 100 mg tablet, Take 1 tablet (100 mg total) by mouth daily., Disp: 90 tablet, Rfl: 0    spironolactone (ALDACTONE) 25 mg tablet, Take one tablet by mouth one time daily, Disp: 90 tablet, Rfl: 3    vitamin B-1 100 mg tablet, Take 1 tablet (100 mg total) by mouth daily., Disp: 90 tablet, Rfl: 0      BP Readings from Last 3 Encounters:   01/09/24 130/72   10/18/23 90/62   10/03/23 104/62       Recent Lab results:  Lab Results   Component Value Date    CHOL 138 11/17/2022   ,   Lab Results   Component Value Date    HDL 64 11/17/2022   ,   Lab Results   Component Value Date    LDLCALC 59 11/17/2022   ,   Lab Results   Component Value Date    TRIG 71 11/17/2022        Lab Results   Component Value Date    GLUCOSE 3+ (A) 09/20/2023   ,   Lab Results   Component Value Date    HGBA1C 8.1 (H) 07/11/2023         Lab Results    Component Value Date    CREATININE 1.0 08/29/2023       Lab Results   Component Value Date    TSH 3.06 04/21/2023           Lab Results   Component Value Date    HGBA1C 8.1 (H) 07/11/2023

## 2024-01-24 ENCOUNTER — HOSPITAL ENCOUNTER (OUTPATIENT)
Dept: CARDIOLOGY | Facility: HOSPITAL | Age: 84
Discharge: HOME | End: 2024-01-24
Attending: INTERNAL MEDICINE
Payer: COMMERCIAL

## 2024-01-24 VITALS
BODY MASS INDEX: 28.04 KG/M2 | WEIGHT: 185 LBS | SYSTOLIC BLOOD PRESSURE: 111 MMHG | HEIGHT: 68 IN | DIASTOLIC BLOOD PRESSURE: 68 MMHG

## 2024-01-24 DIAGNOSIS — I50.21 CHF (CONGESTIVE HEART FAILURE), NYHA CLASS I, ACUTE, SYSTOLIC (CMS/HCC): ICD-10-CM

## 2024-01-24 DIAGNOSIS — I49.5 SSS (SICK SINUS SYNDROME) (CMS/HCC): ICD-10-CM

## 2024-01-24 DIAGNOSIS — Z95.2 S/P TAVR (TRANSCATHETER AORTIC VALVE REPLACEMENT): ICD-10-CM

## 2024-01-24 DIAGNOSIS — I27.20 PULMONARY HYPERTENSION (CMS/HCC): ICD-10-CM

## 2024-01-24 PROCEDURE — C8929 TTE W OR WO FOL WCON,DOPPLER: HCPCS

## 2024-01-24 PROCEDURE — 93306 TTE W/DOPPLER COMPLETE: CPT | Mod: 26 | Performed by: STUDENT IN AN ORGANIZED HEALTH CARE EDUCATION/TRAINING PROGRAM

## 2024-01-24 PROCEDURE — 25500000 HC DRUGS/INCIDENT RAD: Mod: JZ | Performed by: INTERNAL MEDICINE

## 2024-01-24 PROCEDURE — 25000000 HC PHARMACY GENERAL: Performed by: INTERNAL MEDICINE

## 2024-01-24 RX ADMIN — SODIUM CHLORIDE: 9 INJECTION INTRAMUSCULAR; INTRAVENOUS; SUBCUTANEOUS at 14:36

## 2024-01-25 LAB
AORTIC VALVE MEAN VELOCITY: 1.18 M/S
AORTIC VALVE VELOCITY TIME INTEGRAL: 30.7 CM
ASCENDING AORTA: 3.2 CM
AV MEAN GRADIENT: 6 MMHG
AV PEAK GRADIENT: 12 MMHG
AV PEAK VELOCITY-S: 1.71 M/S
AV VALVE AREA INDEX: 0.69
AV VALVE AREA: 1.08 CM2
AV VELOCITY RATIO: 0.44
AVA (VTI): 1.39 CM2
BSA FOR ECHO PROCEDURE: 2.01 M2
DOP CALC LVOT STROKE VOLUME: 42.7 CM3
E WAVE DECELERATION TIME: 264 MS
EDV (BP): 173 CM3
EF (A4C): 43 %
EF A2C: 43 %
EJECTION FRACTION: 42.8 %
EST RIGHT VENT SYSTOLIC PRESSURE BY TRICUSPID REGURGITATION JET: 39 MMHG
ESV (BP): 98.9 CM3
FRACTIONAL SHORTENING: 33.78 %
INTERVENTRICULAR SEPTUM: 0.87 CM
LA ESV (BP): 111 CM3
LA ESV INDEX (A2C): 43.78 CM3/M2
LA ESV INDEX (BP): 55.22 CM3/M2
LAAS-AP2: 27.1 CM2
LAAS-AP4: 34 CM2
LAD 2D: 6 CM
LALD A4C: 6.75 CM
LALD A4C: 8.4 CM
LAV-S: 88 CM3
LEFT ATRIUM VOLUME INDEX: 55.72 CM3/M2
LEFT ATRIUM VOLUME: 112 CM3
LEFT INTERNAL DIMENSION IN SYSTOLE: 3.98 CM (ref 2.86–4.32)
LEFT VENTRICLE DIASTOLIC VOLUME INDEX: 85.57 CM3/M2
LEFT VENTRICLE DIASTOLIC VOLUME: 172 CM3
LEFT VENTRICLE SYSTOLIC VOLUME INDEX: 48.81 CM3/M2
LEFT VENTRICLE SYSTOLIC VOLUME: 98.1 CM3
LEFT VENTRICULAR INTERNAL DIMENSION IN DIASTOLE: 6.01 CM (ref 4.85–6.74)
LEFT VENTRICULAR POSTERIOR WALL IN END DIASTOLE: 0.88 CM (ref 0.63–1.17)
LV DIASTOLIC VOLUME: 170 CM3
LV ESV (APICAL 2 CHAMBER): 96.9 CM3
LVAD-AP2: 43.6 CM2
LVAD-AP4: 44.4 CM2
LVAS-AP2: 31.5 CM2
LVAS-AP4: 32.2 CM2
LVEDVI(A2C): 84.58 CM3/M2
LVEDVI(BP): 86.07 CM3/M2
LVESVI(A2C): 48.21 CM3/M2
LVESVI(BP): 49.2 CM3/M2
LVLD-AP2: 9.11 CM
LVLD-AP4: 9.36 CM
LVLS-AP2: 8.19 CM
LVLS-AP4: 8.48 CM
LVOT 2D: 2 CM
LVOT A: 3.14 CM2
LVOT MG: 1 MMHG
LVOT MV: 0.51 M/S
LVOT PEAK VELOCITY: 0.75 M/S
LVOT PG: 2 MMHG
LVOT STROKE VOLUME INDEX: 21.25 ML/M2
LVOT VTI: 13.6 CM
MITRAL VALVE MEAN INFLOW VELOCITY: 3.75 M/S
MLH CV ECHO AVA INDEX VELOCITY RATIO: 0.5
MR FLOW: 0.31 ML/S
MR PISA EROA: 0.06 CM2
MR VELOCITY: 5.12 M/S
MR VTI: 174 CM
MV MEAN GRADIENT: 4 MMHG
MV PEAK E VEL: 1.72 M/S
MV PEAK GRADIENT: 13 MMHG
MV REGURGITANT VOLUME: 10.59 CM3
MV STENOSIS PRESSURE HALF TIME: 77 MS
MV VALVE AREA BY CONTINUITY EQUATION: 0.93 CM2
MV VALVE AREA P 1/2 METHOD: 2.86 CM2
MV VTI: 46 CM
MVA (PISA): 1 CM2
PISA ALIAS VEL MV: 0.31 M/S
PISA RADIUS: 0.4 CM
POSTERIOR WALL: 0.88 CM
PULMONARY REGURGITATION LATE DIASTOLIC VELOCITY: 1.82 M/S
RAP: 3 MMHG
TAPSE: 1.89 CM
TR MAX PG: 36.48 MMHG
TRICUSPID VALVE PEAK REGURGITATION VELOCITY: 3.02 M/S
Z-SCORE OF LEFT VENTRICULAR DIMENSION IN END DIASTOLE: 0.5
Z-SCORE OF LEFT VENTRICULAR DIMENSION IN END SYSTOLE: 0.99
Z-SCORE OF LEFT VENTRICULAR POSTERIOR WALL IN END DIASTOLE: 0.13

## 2024-01-31 DIAGNOSIS — F32.89 OTHER DEPRESSION: ICD-10-CM

## 2024-01-31 RX ORDER — CLONAZEPAM 0.5 MG/1
0.5 TABLET ORAL NIGHTLY PRN
Qty: 30 TABLET | Refills: 0 | Status: SHIPPED | OUTPATIENT
Start: 2024-01-31 | End: 2024-03-07 | Stop reason: SDUPTHER

## 2024-01-31 NOTE — TELEPHONE ENCOUNTER
Medicine Refill Request    Last Office Visit: 9/7/2023   Last Consult Visit: Visit date not found  Last Telemedicine Visit: Visit date not found    Next Appointment: 3/11/2024      Current Outpatient Medications:   •  acetaminophen (TYLENOL) 325 mg tablet, Take 650 mg by mouth as needed., Disp: , Rfl:   •  albuterol 2.5 mg /3 mL (0.083 %) nebulizer solution, Take 3 ml (2.5 mg total) by nebulization 4 (four) times a day., Disp: 150 mL, Rfl: 0  •  albuterol HFA 90 mcg/actuation inhaler, Inhale 2 puffs every 6 (six) hours as needed for wheezing., Disp: 18 g, Rfl: 1  •  atorvastatin (LIPITOR) 20 mg tablet, Take 1 tablet (20 mg total) by mouth daily. Pill pack, Disp: 90 tablet, Rfl: 1  •  busPIRone (BUSPAR) 5 mg tablet, Take 1 tablet (5 mg total) by mouth 2 (two) times a day. Pill pack, Disp: 180 tablet, Rfl: 0  •  clonazePAM (klonoPIN) 0.5 mg tablet, Take 1 tablet (0.5 mg total) by mouth nightly as needed for anxiety., Disp: 30 tablet, Rfl: 0  •  diclofenac sodium (VOLTAREN) 1 % topical gel, Apply 1 g topically as needed., Disp: , Rfl:   •  donepeziL (ARICEPT) 10 mg tablet, TAKE ONE TABLET BY MOUTH ONE TIME DAILY IN THE EVENING, Disp: 90 tablet, Rfl: 1  •  empagliflozin (JARDIANCE) 25 mg tablet, Take 1 tablet (25 mg total) by mouth daily., Disp: 90 tablet, Rfl: 3  •  FeroSuL 325 mg (65 mg iron) tablet, Take 1 tablet (325 mg total) by mouth daily with breakfast., Disp: 90 tablet, Rfl: 0  •  fluticasone-umeclidinium-vilanterol (TRELEGY ELLIPTA) 100-62.5-25 mcg blister with device powder for inhalation, Inhale 1 puff daily., Disp: 3 each, Rfl: 0  •  folic acid (FOLVITE) 1 mg tablet, Take 1 tablet (1 mg total) by mouth daily., Disp: 90 tablet, Rfl: 1  •  furosemide (LASIX) 40 mg tablet, Take 1 tablet (40mg) in afternoon as needed when gaining weight. Continue to take morning dose of lasix in pill pack., Disp: 30 tablet, Rfl: 1  •  levothyroxine (SYNTHROID) 112 mcg tablet, Take 1 tablet (112 mcg total) by mouth daily. Pill  pack, Disp: 90 tablet, Rfl: 0  •  lidocaine (ASPERCREME) 4 % adhesive patch,medicated topical patch, Apply 1 patch topically daily., Disp: 30 patch, Rfl: 0  •  loratadine (CLARITIN) 10 mg tablet, Take one tablet by mouth one time daily, Disp: 30 tablet, Rfl: 0  •  magnesium oxide (MAG-OX) 400 mg (241.3 mg magnesium) tablet, Take 1 tablet (400 mg total) by mouth 2 (two) times a day., Disp: 180 tablet, Rfl: 3  •  medical marijuana, Take 1 each by mouth as needed., Disp: , Rfl:   •  melatonin 5 mg capsule, Take 10 mg by mouth nightly. Hs prn, Disp: , Rfl:   •  metoprolol succinate XL (TOPROL-XL) 25 mg 24 hr tablet, Take 1 tablet (25 mg total) by mouth daily., Disp: 30 tablet, Rfl: 0  •  multivitamin tablet, Take 1 tablet by mouth daily., Disp: , Rfl:   •  nebulizers misc, 1 each 4 (four) times a day as needed (sob or wheeze)., Disp: 1 each, Rfl: 0  •  pantoprazole (PROTONIX) 40 mg EC tablet, Take 1 tablet (40 mg total) by mouth 2 (two) times a day. Pill pack, Disp: 180 tablet, Rfl: 1  •  rivaroxaban (XARELTO) 20 mg tablet, Take 1 tablet (20 mg total) by mouth daily with dinner., Disp: 90 tablet, Rfl: 2  •  sertraline (ZOLOFT) 100 mg tablet, Take 1 tablet (100 mg total) by mouth daily., Disp: 90 tablet, Rfl: 0  •  spironolactone (ALDACTONE) 25 mg tablet, Take one tablet by mouth one time daily, Disp: 90 tablet, Rfl: 3  •  vitamin B-1 100 mg tablet, Take 1 tablet (100 mg total) by mouth daily., Disp: 90 tablet, Rfl: 0      BP Readings from Last 3 Encounters:   01/24/24 111/68   01/09/24 130/72   10/18/23 90/62       Recent Lab results:  Lab Results   Component Value Date    CHOL 138 11/17/2022   ,   Lab Results   Component Value Date    HDL 64 11/17/2022   ,   Lab Results   Component Value Date    LDLCALC 59 11/17/2022   ,   Lab Results   Component Value Date    TRIG 71 11/17/2022        Lab Results   Component Value Date    GLUCOSE 3+ (A) 09/20/2023   ,   Lab Results   Component Value Date    HGBA1C 8.1 (H) 07/11/2023          Lab Results   Component Value Date    CREATININE 1.0 08/29/2023       Lab Results   Component Value Date    TSH 3.06 04/21/2023           Lab Results   Component Value Date    HGBA1C 8.1 (H) 07/11/2023

## 2024-02-05 ENCOUNTER — OFFICE VISIT (OUTPATIENT)
Dept: PRIMARY CARE | Facility: CLINIC | Age: 84
End: 2024-02-05
Payer: COMMERCIAL

## 2024-02-05 ENCOUNTER — DOCUMENTATION (OUTPATIENT)
Dept: BEHAVIORAL HEALTH | Facility: CLINIC | Age: 84
End: 2024-02-05
Payer: COMMERCIAL

## 2024-02-05 VITALS
HEART RATE: 65 BPM | HEIGHT: 68 IN | DIASTOLIC BLOOD PRESSURE: 70 MMHG | SYSTOLIC BLOOD PRESSURE: 110 MMHG | TEMPERATURE: 96.9 F | OXYGEN SATURATION: 98 % | WEIGHT: 190 LBS | BODY MASS INDEX: 28.79 KG/M2

## 2024-02-05 DIAGNOSIS — D17.0 LIPOMA OF HEAD: ICD-10-CM

## 2024-02-05 DIAGNOSIS — I48.0 PAROXYSMAL ATRIAL FIBRILLATION (CMS/HCC): ICD-10-CM

## 2024-02-05 DIAGNOSIS — G89.29 CHRONIC LEFT-SIDED LOW BACK PAIN WITH LEFT-SIDED SCIATICA: ICD-10-CM

## 2024-02-05 DIAGNOSIS — M54.42 CHRONIC LEFT-SIDED LOW BACK PAIN WITH LEFT-SIDED SCIATICA: ICD-10-CM

## 2024-02-05 DIAGNOSIS — E11.9 TYPE 2 DIABETES MELLITUS WITHOUT COMPLICATION, WITHOUT LONG-TERM CURRENT USE OF INSULIN (CMS/HCC): ICD-10-CM

## 2024-02-05 DIAGNOSIS — I71.21 ANEURYSM OF ASCENDING AORTA WITHOUT RUPTURE (CMS/HCC): ICD-10-CM

## 2024-02-05 DIAGNOSIS — F02.B0 MODERATE ALZHEIMER'S DEMENTIA, UNSPECIFIED TIMING OF DEMENTIA ONSET, UNSPECIFIED WHETHER BEHAVIORAL, PSYCHOTIC, OR MOOD DISTURBANCE OR ANXIETY (CMS/HCC): Primary | ICD-10-CM

## 2024-02-05 DIAGNOSIS — R53.81 DEBILITY: ICD-10-CM

## 2024-02-05 DIAGNOSIS — G30.9 MODERATE ALZHEIMER'S DEMENTIA, UNSPECIFIED TIMING OF DEMENTIA ONSET, UNSPECIFIED WHETHER BEHAVIORAL, PSYCHOTIC, OR MOOD DISTURBANCE OR ANXIETY (CMS/HCC): Primary | ICD-10-CM

## 2024-02-05 DIAGNOSIS — E03.9 ACQUIRED HYPOTHYROIDISM: ICD-10-CM

## 2024-02-05 DIAGNOSIS — F32.89 OTHER DEPRESSION: ICD-10-CM

## 2024-02-05 PROBLEM — R58 BLEEDING: Status: ACTIVE | Noted: 2024-01-18

## 2024-02-05 PROBLEM — F02.80 ALZHEIMER'S DEMENTIA (CMS/HCC): Status: RESOLVED | Noted: 2023-03-03 | Resolved: 2024-02-05

## 2024-02-05 PROBLEM — J18.9 PNEUMONIA DUE TO INFECTIOUS ORGANISM, UNSPECIFIED LATERALITY, UNSPECIFIED PART OF LUNG: Status: RESOLVED | Noted: 2023-08-12 | Resolved: 2024-02-05

## 2024-02-05 PROBLEM — I50.23 ACUTE ON CHRONIC SYSTOLIC HEART FAILURE (CMS/HCC): Status: RESOLVED | Noted: 2023-08-26 | Resolved: 2024-02-05

## 2024-02-05 PROBLEM — R58 BLEEDING: Status: RESOLVED | Noted: 2024-01-18 | Resolved: 2024-02-05

## 2024-02-05 PROBLEM — I35.0 NONRHEUMATIC AORTIC VALVE STENOSIS: Status: RESOLVED | Noted: 2019-11-11 | Resolved: 2024-02-05

## 2024-02-05 PROBLEM — I50.21 CHF (CONGESTIVE HEART FAILURE), NYHA CLASS I, ACUTE, SYSTOLIC (CMS/HCC): Status: RESOLVED | Noted: 2022-11-11 | Resolved: 2024-02-05

## 2024-02-05 PROBLEM — Z99.81 OXYGEN DEPENDENT: Status: RESOLVED | Noted: 2023-08-21 | Resolved: 2024-02-05

## 2024-02-05 PROCEDURE — 3074F SYST BP LT 130 MM HG: CPT | Performed by: FAMILY MEDICINE

## 2024-02-05 PROCEDURE — 99214 OFFICE O/P EST MOD 30 MIN: CPT | Performed by: FAMILY MEDICINE

## 2024-02-05 PROCEDURE — 99999 PR OFFICE/OUTPT VISIT,PROCEDURE ONLY: CPT | Performed by: SOCIAL WORKER

## 2024-02-05 PROCEDURE — 3008F BODY MASS INDEX DOCD: CPT | Performed by: FAMILY MEDICINE

## 2024-02-05 PROCEDURE — 3078F DIAST BP <80 MM HG: CPT | Performed by: FAMILY MEDICINE

## 2024-02-05 NOTE — ASSESSMENT & PLAN NOTE
Per caretaker he has not been depressed lately and she does feel that sertraline 100 mg is a good dose for him  Continue medication as prescribed

## 2024-02-05 NOTE — PROGRESS NOTES
[facility] in [facility]  [address]  [facility] [address]  Phone: [phone]  Fax: [phone]       CHIEF COMPLAINT   Chief Complaint   Patient presents with   • Follow-up     4 month  - Discuss assisted living   -weight gain  -dizziness with using neb treatment- not taking   • Fall     2 weeks ago - cut L arm just stopped bleeding   • Leg Problem     L leg aches at night when he is laying down   • Mass     L side of his head      HISTORY OF PRESENT ILLNESS      This is a 83 y.o. male who presents for   Chief Complaint   Patient presents with   • Follow-up     4 month  - Discuss assisted living   -weight gain  -dizziness with using neb treatment- not taking   • Fall     2 weeks ago - cut L arm just stopped bleeding   • Leg Problem     L leg aches at night when he is laying down   • Mass     L side of his head     Fall out of bed 2 weeks ago  Has a wound on L forearm which is now healing    Nebs are no longer being used  Breathing fine  Weight is up here and stable at home  Weight at home 184 lbs yesterday    L leg pain for long time  States that this wakes him at night  Does also have chronic back pain  Has not seen orthopedic surgeon in the past  No recent imaging    Increased sertraline to 100 mg daily  Caretaker Ann says that he has not been down or depressed recently so she feels this medication is helping him    PAST MEDICAL AND SURGICAL HISTORY      Past Medical History:   Diagnosis Date   • A-fib (CMS/[facility])    • Acute on chronic combined systolic and diastolic congestive heart failure (CMS/HCC)    • Anxiety 4/11/2023   • CHF (congestive heart failure), NYHA class I, acute, systolic (CMS/HCC)    • Depression    • Hyperthyroidism    • Hypoxia    • Near syncope    • NSVT (nonsustained ventricular tachycardia) (CMS/HCC)    • Orthostatic hypotension    • Presence of cardiac pacemaker    • Pulmonary embolism (CMS/HCC)    • Sepsis (CMS/HCC) 05/16/2022     Past Surgical History:    Procedure Laterality Date   • CARDIAC CATHETERIZATION  11/19/2020    CATH PLACE/CORON ANGIO, IMG SUPER/INTERP,R&L HRT CATH, L HRT VENTRIC   • CARDIAC CATHETERIZATION Bilateral 2005   • CORONARY ARTERY BYPASS GRAFT  2005   • WRIST SURGERY Left      MEDICATIONS        Current Outpatient Medications:   •  acetaminophen (TYLENOL) 325 mg tablet, Take 650 mg by mouth as needed., Disp: , Rfl:   •  albuterol HFA 90 mcg/actuation inhaler, Inhale 2 puffs every 6 (six) hours as needed for wheezing., Disp: 18 g, Rfl: 1  •  atorvastatin (LIPITOR) 20 mg tablet, Take 1 tablet (20 mg total) by mouth daily. Pill pack, Disp: 90 tablet, Rfl: 1  •  busPIRone (BUSPAR) 5 mg tablet, Take 1 tablet (5 mg total) by mouth 2 (two) times a day. Pill pack, Disp: 180 tablet, Rfl: 0  •  clonazePAM (klonoPIN) 0.5 mg tablet, Take 1 tablet (0.5 mg total) by mouth nightly as needed for anxiety., Disp: 30 tablet, Rfl: 0  •  diclofenac sodium (VOLTAREN) 1 % topical gel, Apply 1 g topically as needed., Disp: , Rfl:   •  donepeziL (ARICEPT) 10 mg tablet, TAKE ONE TABLET BY MOUTH ONE TIME DAILY IN THE EVENING, Disp: 90 tablet, Rfl: 1  •  empagliflozin (JARDIANCE) 25 mg tablet, Take 1 tablet (25 mg total) by mouth daily., Disp: 90 tablet, Rfl: 3  •  FeroSuL 325 mg (65 mg iron) tablet, Take 1 tablet (325 mg total) by mouth daily with breakfast., Disp: 90 tablet, Rfl: 0  •  fluticasone-umeclidinium-vilanterol (TRELEGY ELLIPTA) 100-62.5-25 mcg blister with device powder for inhalation, Inhale 1 puff daily., Disp: 3 each, Rfl: 0  •  folic acid (FOLVITE) 1 mg tablet, Take 1 tablet (1 mg total) by mouth daily., Disp: 90 tablet, Rfl: 1  •  furosemide (LASIX) 40 mg tablet, Take 1 tablet (40mg) in afternoon as needed when gaining weight. Continue to take morning dose of lasix in pill pack., Disp: 30 tablet, Rfl: 1  •  levothyroxine (SYNTHROID) 112 mcg tablet, Take 1 tablet (112 mcg total) by mouth daily. Pill pack, Disp: 90 tablet, Rfl: 0  •  lidocaine  (ASPERCREME) 4 % adhesive patch,medicated topical patch, Apply 1 patch topically daily., Disp: 30 patch, Rfl: 0  •  loratadine (CLARITIN) 10 mg tablet, Take one tablet by mouth one time daily, Disp: 30 tablet, Rfl: 0  •  magnesium oxide (MAG-OX) 400 mg (241.3 mg magnesium) tablet, Take 1 tablet (400 mg total) by mouth 2 (two) times a day., Disp: 180 tablet, Rfl: 3  •  medical marijuana, Take 1 each by mouth as needed., Disp: , Rfl:   •  melatonin 5 mg capsule, Take 10 mg by mouth nightly. Hs prn, Disp: , Rfl:   •  metoprolol succinate XL (TOPROL-XL) 25 mg 24 hr tablet, Take 1 tablet (25 mg total) by mouth daily., Disp: 30 tablet, Rfl: 0  •  multivitamin tablet, Take 1 tablet by mouth daily., Disp: , Rfl:   •  nebulizers misc, 1 each 4 (four) times a day as needed (sob or wheeze)., Disp: 1 each, Rfl: 0  •  pantoprazole (PROTONIX) 40 mg EC tablet, Take 1 tablet (40 mg total) by mouth 2 (two) times a day. Pill pack, Disp: 180 tablet, Rfl: 1  •  rivaroxaban (XARELTO) 20 mg tablet, Take 1 tablet (20 mg total) by mouth daily with dinner., Disp: 90 tablet, Rfl: 2  •  sertraline (ZOLOFT) 100 mg tablet, Take 1 tablet (100 mg total) by mouth daily., Disp: 90 tablet, Rfl: 0  •  spironolactone (ALDACTONE) 25 mg tablet, Take one tablet by mouth one time daily, Disp: 90 tablet, Rfl: 3  •  vitamin B-1 100 mg tablet, Take 1 tablet (100 mg total) by mouth daily., Disp: 90 tablet, Rfl: 0  •  albuterol 2.5 mg /3 mL (0.083 %) nebulizer solution, Take 3 ml (2.5 mg total) by nebulization 4 (four) times a day. (Patient not taking: Reported on 2/5/2024), Disp: 150 mL, Rfl: 0    ALLERGIES      Patient has no known allergies.    FAMILY HISTORY      Family History   Problem Relation Age of Onset   • Thyroid cancer Biological Mother    • ALS Biological Mother    • Parkinsonism Biological Mother    • Heart attack Biological Father    • Liver cancer Maternal Grandmother      SOCIAL HISTORY      Social History     Socioeconomic History   •  Marital status:      Spouse name: None   • Number of children: 2   • Years of education: None   • Highest education level: None   Occupational History   • Occupation: retired     Comment: [employer] in [place]   Tobacco Use   • Smoking status: Former     Years: 40     Types: Cigarettes     Quit date: 2012     Years since quittin.2   • Smokeless tobacco: Never   Vaping Use   • Vaping Use: Never used   Substance and Sexual Activity   • Alcohol use: Not Currently     Alcohol/week: 9.0 standard drinks of alcohol     Types: 9 Shots of liquor per week     Comment: vodka stopped late in winter   • Drug use: Never   • Sexual activity: Not Currently   Social History Narrative    Lives alone, and has help for meal prep and med management     Social Determinants of Health     Financial Resource Strain: Low Risk  (2023)    Overall Financial Resource Strain (CARDIA)    • Difficulty of Paying Living Expenses: Not hard at all   Food Insecurity: No Food Insecurity (2023)    Hunger Vital Sign    • Worried About Running Out of Food in the Last Year: Never true    • Ran Out of Food in the Last Year: Never true   Transportation Needs: No Transportation Needs (2023)    PRAPARE - Transportation    • Lack of Transportation (Medical): No    • Lack of Transportation (Non-Medical): No   Social Connections: Socially Isolated (2023)    Social Connection and Isolation Panel [NHANES]    • Frequency of Communication with Friends and Family: More than three times a week    • Frequency of Social Gatherings with Friends and Family: Once a week    • Attends Sikh Services: Never    • Active Member of Clubs or Organizations: No    • Attends Club or Organization Meetings: Patient declined    • Marital Status:    Housing Stability: Low Risk  (2023)    Housing Stability Vital Sign    • Unable to Pay for Housing in the Last Year: No    • Number of Places Lived in the Last Year: 1    • Unstable Housing in  "the Last Year: No       REVIEW OF SYSTEMS      Review of Systems   All other systems reviewed and are negative.    DEPRESSION/ANXIETY SCREENING   PHQ 2 to 9:  No data recorded  No data recorded  No data recorded  No data recorded  No data recorded  No data recorded  No data recorded  No data recorded  No data recorded  No data recorded  No data recorded  No data recorded  No data recorded  No data recorded    AURELIA-7  No data recorded  No data recorded  No data recorded  No data recorded  No data recorded  No data recorded  No data recorded    No data recorded    No data recorded    PHYSICAL EXAMINATION      Vitals:    02/05/24 1020   BP: 110/70   BP Location: Left upper arm   Patient Position: Sitting   Pulse: 65   Temp: (!) 36.1 °C (96.9 °F)   TempSrc: Temporal   SpO2: 98%   Weight: 86.2 kg (190 lb)   Height: 1.727 m (5' 8\")     Body mass index is 28.89 kg/m².     BP Readings from Last 3 Encounters:   02/05/24 110/70   01/24/24 111/68   01/09/24 130/72     Wt Readings from Last 3 Encounters:   02/05/24 86.2 kg (190 lb)   01/24/24 83.9 kg (185 lb)   01/09/24 83.9 kg (185 lb)     Ht Readings from Last 3 Encounters:   02/05/24 1.727 m (5' 8\")   01/24/24 1.727 m (5' 8\")   01/09/24 1.727 m (5' 8\")     Physical Exam  Vitals reviewed.   Constitutional:       General: He is not in acute distress.     Appearance: Normal appearance.   HENT:      Head: Normocephalic and atraumatic.   Eyes:      Conjunctiva/sclera: Conjunctivae normal.   Cardiovascular:      Rate and Rhythm: Normal rate and regular rhythm.   Pulmonary:      Effort: Pulmonary effort is normal. No respiratory distress.      Breath sounds: Normal breath sounds. No wheezing, rhonchi or rales.   Skin:     General: Skin is warm and dry.      Findings: Lesion (Large mobile round lesion at base of left skull) present.   Neurological:      Mental Status: He is alert and oriented to person, place, and time. Mental status is at baseline.   Psychiatric:         Mood and " Affect: Mood normal.         Behavior: Behavior normal.         LABS / IMAGING / STUDIES        Labs    Lab Results   Component Value Date    CREATININE 1.0 08/29/2023     Lab Results   Component Value Date    WBC 11.55 (H) 08/29/2023    HGB 10.2 (L) 08/29/2023    HCT 33.6 (L) 08/29/2023    MCV 91.3 08/29/2023     08/29/2023     Lab Results   Component Value Date    HGBA1C 8.1 (H) 07/11/2023     Lab Results   Component Value Date    MICROALBUR 0.2 07/11/2023     HEALTH MAINTENANCE              ASSESSMENT AND PLAN   Problem List Items Addressed This Visit        Nervous    Moderate Alzheimer's dementia, unspecified timing of dementia onset, unspecified whether behavioral, psychotic, or mood disturbance or anxiety (CMS/[facility]) - Primary     Discussed assisted living with patient  He is open to considering this  Is worried about all of his items which are located in sheds  He will discuss these items with his daughters         Relevant Orders    TSH w reflex FT4    Comprehensive metabolic panel    CBC and Differential    Hemoglobin A1c    Microalbumin/Creatinine Ur Random       Circulatory    Ascending aortic aneurysm (CMS/HCC)     Blood pressure acceptable today  Will continue to monitor in follow up  Continue follow-up with cardiology         Relevant Orders    TSH w reflex FT4    Comprehensive metabolic panel    CBC and Differential    Hemoglobin A1c    Microalbumin/Creatinine Ur Random    A-fib (CMS/HCC)     Rate controlled  Continue follow-up with cardiology         Relevant Orders    TSH w reflex FT4    Comprehensive metabolic panel    CBC and Differential    Hemoglobin A1c    Microalbumin/Creatinine Ur Random       Endocrine/Metabolic    Acquired hypothyroidism     Due for repeat thyroid function test soon  Ordered today         Relevant Orders    TSH w reflex FT4    Comprehensive metabolic panel    CBC and Differential    Hemoglobin A1c    Microalbumin/Creatinine Ur Random    Type 2 diabetes mellitus  without complication, without long-term current use of insulin (CMS/[facility])     Due for routine labs at this time  Ordered today         Relevant Orders    TSH w reflex FT4    Comprehensive metabolic panel    CBC and Differential    Hemoglobin A1c    Microalbumin/Creatinine Ur Random       Dermatologic    Lipoma of head     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  Patient states this has been present for some time and does not bother him at all  Offered follow-up with dermatology which she declines today            Mental Health    Depression     Per caretaker he has not been depressed lately and she does feel that sertraline 100 mg is a good dose for him  Continue medication as prescribed            Other    Debility     Did have a recent fall which he declines in the office  Caretaker says that fall occurred 2 weeks ago  Does have a healing abrasion on left forearm today  Encouraged transition to assisted living  He will discuss with his daughters         Relevant Orders    Ambulatory referral to Orthopedic Surgery    Chronic left-sided low back pain with left-sided sciatica     Chronic left back pain  Left leg pain does wake him at night  Will have x-ray of lumbar spine to assess for arthritis  Encouraged follow-up with orthopedic surgeon to consider advanced imaging which is most likely going to be needed  Will continue to monitor in follow up         Relevant Orders    X-RAY LUMBAR SPINE COMPLETE 4+ VIEWS    Ambulatory referral to Orthopedic Surgery     Return in about 3 months (around 5/5/2024) for AWV, Lab review.       This note was written with the assistance of voice recognition software, please excuse errors associated with voice recognition software.      Melanie Chambers, DO  02/05/24

## 2024-02-05 NOTE — ASSESSMENT & PLAN NOTE
Discussed assisted living with patient  He is open to considering this  Is worried about all of his items which are located in sheds  He will discuss these items with his daughters

## 2024-02-05 NOTE — ASSESSMENT & PLAN NOTE
Chronic left back pain  Left leg pain does wake him at night  Will have x-ray of lumbar spine to assess for arthritis  Encouraged follow-up with orthopedic surgeon to consider advanced imaging which is most likely going to be needed  Will continue to monitor in follow up

## 2024-02-05 NOTE — ASSESSMENT & PLAN NOTE
Blood pressure acceptable today  Will continue to monitor in follow up  Continue follow-up with cardiology

## 2024-02-05 NOTE — ASSESSMENT & PLAN NOTE
Did have a recent fall which he declines in the office  Caretaker says that fall occurred 2 weeks ago  Does have a healing abrasion on left forearm today  Encouraged transition to assisted living  He will discuss with his daughters

## 2024-02-05 NOTE — ASSESSMENT & PLAN NOTE
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

## 2024-02-06 ENCOUNTER — HOSPITAL ENCOUNTER (OUTPATIENT)
Dept: RADIOLOGY | Age: 84
Discharge: HOME | End: 2024-02-06
Attending: FAMILY MEDICINE
Payer: COMMERCIAL

## 2024-02-06 DIAGNOSIS — M54.42 CHRONIC LEFT-SIDED LOW BACK PAIN WITH LEFT-SIDED SCIATICA: ICD-10-CM

## 2024-02-06 DIAGNOSIS — G89.29 CHRONIC LEFT-SIDED LOW BACK PAIN WITH LEFT-SIDED SCIATICA: ICD-10-CM

## 2024-02-06 LAB
CREAT ?TM UR-SCNC: 29 UMOL/L
EXT ALBUMIN URINE RANDOM: 0.2
HBA1C MFR BLD HPLC: 8 %

## 2024-02-06 PROCEDURE — 72110 X-RAY EXAM L-2 SPINE 4/>VWS: CPT

## 2024-02-07 ENCOUNTER — TELEPHONE (OUTPATIENT)
Dept: PRIMARY CARE | Facility: CLINIC | Age: 84
End: 2024-02-07
Payer: COMMERCIAL

## 2024-02-07 LAB
ALBUMIN SERPL-MCNC: 4.5 G/DL (ref 3.6–5.1)
ALBUMIN/CREAT UR: NORMAL MCG/MG CREAT
ALBUMIN/GLOB SERPL: 1.7 (CALC) (ref 1–2.5)
ALP SERPL-CCNC: 149 U/L (ref 35–144)
ALT SERPL-CCNC: 17 U/L (ref 9–46)
AST SERPL-CCNC: 20 U/L (ref 10–35)
BASOPHILS # BLD AUTO: 37 CELLS/UL (ref 0–200)
BASOPHILS NFR BLD AUTO: 0.3 %
BILIRUB SERPL-MCNC: 0.9 MG/DL (ref 0.2–1.2)
BUN SERPL-MCNC: 20 MG/DL (ref 7–25)
BUN/CREAT SERPL: ABNORMAL (CALC) (ref 6–22)
CALCIUM SERPL-MCNC: 9.3 MG/DL (ref 8.6–10.3)
CHLORIDE SERPL-SCNC: 97 MMOL/L (ref 98–110)
CO2 SERPL-SCNC: 36 MMOL/L (ref 20–32)
CREAT SERPL-MCNC: 1.14 MG/DL (ref 0.7–1.22)
CREAT UR-MCNC: 29 MG/DL (ref 20–320)
EGFRCR SERPLBLD CKD-EPI 2021: 63 ML/MIN/1.73M2
EOSINOPHIL # BLD AUTO: 61 CELLS/UL (ref 15–500)
EOSINOPHIL NFR BLD AUTO: 0.5 %
ERYTHROCYTE [DISTWIDTH] IN BLOOD BY AUTOMATED COUNT: 13.6 % (ref 11–15)
GLOBULIN SER CALC-MCNC: 2.6 G/DL (CALC) (ref 1.9–3.7)
GLUCOSE SERPL-MCNC: 232 MG/DL (ref 65–99)
HBA1C MFR BLD: 8 % OF TOTAL HGB
HCT VFR BLD AUTO: 40.5 % (ref 38.5–50)
HGB BLD-MCNC: 13.3 G/DL (ref 13.2–17.1)
LYMPHOCYTES # BLD AUTO: 5600 CELLS/UL (ref 850–3900)
LYMPHOCYTES NFR BLD AUTO: 45.9 %
MCH RBC QN AUTO: 30 PG (ref 27–33)
MCHC RBC AUTO-ENTMCNC: 32.8 G/DL (ref 32–36)
MCV RBC AUTO: 91.4 FL (ref 80–100)
MICROALBUMIN UR-MCNC: <0.2 MG/DL
MONOCYTES # BLD AUTO: 891 CELLS/UL (ref 200–950)
MONOCYTES NFR BLD AUTO: 7.3 %
NEUTROPHILS # BLD AUTO: 5612 CELLS/UL (ref 1500–7800)
NEUTROPHILS NFR BLD AUTO: 46 %
PLATELET # BLD AUTO: 228 THOUSAND/UL (ref 140–400)
PMV BLD REES-ECKER: 9.4 FL (ref 7.5–12.5)
POTASSIUM SERPL-SCNC: 4.1 MMOL/L (ref 3.5–5.3)
PROT SERPL-MCNC: 7.1 G/DL (ref 6.1–8.1)
RBC # BLD AUTO: 4.43 MILLION/UL (ref 4.2–5.8)
SODIUM SERPL-SCNC: 141 MMOL/L (ref 135–146)
T4 FREE SERPL-MCNC: 1.1 NG/DL (ref 0.8–1.8)
TSH SERPL-ACNC: 9.77 MIU/L (ref 0.4–4.5)
WBC # BLD AUTO: 12.2 THOUSAND/UL (ref 3.8–10.8)

## 2024-02-07 NOTE — TELEPHONE ENCOUNTER
Ann called that Dr. Chambers wrote a referral for Orthopedic Surgery for the pt. She would like to know if she has some recommendations for Orthopedic Surgeons that they can reach out to.     [facility] phone# [phone]

## 2024-02-08 NOTE — TELEPHONE ENCOUNTER
Left detailed number for Ann  Provided contact information for both [facility] and [facility]  Advised to callback with any additional questions.

## 2024-02-08 NOTE — RESULT ENCOUNTER NOTE
TSH elevated consistent with poorly controlled thyroid function  Unsure if he is taking his medication  CMP with impaired fasting glucose at 232  Alkaline phosphatase elevated  CBC with elevated white blood cell count which is lymphocyte predominant  Hemoglobin A1c at 8.0% which is consistent with reasonably controlled diabetes  No microalbuminuria    Repeat cmp and cbc in one month  Taking levo?

## 2024-02-09 ENCOUNTER — TELEPHONE (OUTPATIENT)
Dept: PRIMARY CARE | Facility: CLINIC | Age: 84
End: 2024-02-09
Payer: COMMERCIAL

## 2024-02-09 DIAGNOSIS — E11.9 TYPE 2 DIABETES MELLITUS WITHOUT COMPLICATION, WITHOUT LONG-TERM CURRENT USE OF INSULIN (CMS/HCC): Primary | ICD-10-CM

## 2024-02-09 DIAGNOSIS — E03.9 ACQUIRED HYPOTHYROIDISM: ICD-10-CM

## 2024-02-09 DIAGNOSIS — I50.21 CHF (CONGESTIVE HEART FAILURE), NYHA CLASS I, ACUTE, SYSTOLIC (CMS/HCC): ICD-10-CM

## 2024-02-09 NOTE — TELEPHONE ENCOUNTER
----- Message from Melanie Chambers DO sent at 2/8/2024 12:33 PM EST -----  TSH elevated consistent with poorly controlled thyroid function  Unsure if he is taking his medication  CMP with impaired fasting glucose at 232  Alkaline phosphatase elevated  CBC with elevated white blood cell count which is lymphocyte predominant  Hemoglobin A1c at 8.0% which is consistent with reasonably controlled diabetes  No microalbuminuria    Repeat cmp and cbc in one month  Taking levo?

## 2024-02-09 NOTE — TELEPHONE ENCOUNTER
Spoke with caregiver Ann   She reports that patient DOES NOT take his levothyroxine appropriately.   He takes it with the rest of his AM meds and eats  Due to his decline he is unable to manage taking these separately.   Ann will discuss with family and continue the conversation that med management would be best at assisted living.     She is agreeable to repeat CMP and CBC in 1 month.

## 2024-02-09 NOTE — TELEPHONE ENCOUNTER
Ann returned our Ann RNs call. She states if you can call her back within the next 45 min she can definitely answer. Thanks!

## 2024-02-12 ENCOUNTER — TRANSCRIBE ORDERS (OUTPATIENT)
Dept: SCHEDULING | Age: 84
End: 2024-02-12

## 2024-02-12 DIAGNOSIS — M54.16 RADICULOPATHY, LUMBAR REGION: Primary | ICD-10-CM

## 2024-02-28 DIAGNOSIS — I27.20 PULMONARY HYPERTENSION (CMS/HCC): ICD-10-CM

## 2024-02-28 DIAGNOSIS — J44.1 COPD EXACERBATION (CMS/HCC): ICD-10-CM

## 2024-02-28 DIAGNOSIS — F41.9 ANXIETY: ICD-10-CM

## 2024-02-28 DIAGNOSIS — J41.8 MIXED SIMPLE AND MUCOPURULENT CHRONIC BRONCHITIS (CMS/HCC): ICD-10-CM

## 2024-02-28 RX ORDER — SERTRALINE HYDROCHLORIDE 100 MG/1
100 TABLET, FILM COATED ORAL DAILY
Qty: 90 TABLET | Refills: 0 | Status: SHIPPED | OUTPATIENT
Start: 2024-02-28 | End: 2024-05-28

## 2024-02-28 RX ORDER — ALBUTEROL SULFATE 0.83 MG/ML
2.5 SOLUTION RESPIRATORY (INHALATION)
Qty: 150 ML | Refills: 0 | Status: SHIPPED | OUTPATIENT
Start: 2024-02-28

## 2024-02-28 RX ORDER — ALBUTEROL SULFATE 90 UG/1
2 INHALANT RESPIRATORY (INHALATION) EVERY 6 HOURS PRN
Qty: 18 G | Refills: 1 | Status: SHIPPED | OUTPATIENT
Start: 2024-02-28 | End: 2024-03-29

## 2024-02-28 NOTE — TELEPHONE ENCOUNTER
Pharmacy requesting refill.   They will fax request for Clonazepam due to prescription issues with controlled substances.     Medicine Refill Request    Last Office Visit: 2/5/2024   Last Consult Visit: Visit date not found  Last Telemedicine Visit: Visit date not found    Next Appointment: 5/22/2024      Current Outpatient Medications:   •  albuterol 2.5 mg /3 mL (0.083 %) nebulizer solution, Take 3 mL (2.5 mg total) by nebulization 4 (four) times a day., Disp: 150 mL, Rfl: 0  •  albuterol HFA 90 mcg/actuation inhaler, Inhale 2 puffs every 6 (six) hours as needed for wheezing., Disp: 18 g, Rfl: 1  •  fluticasone-umeclidinium-vilanterol (TRELEGY ELLIPTA) 100-62.5-25 mcg blister with device powder for inhalation, Inhale 1 puff daily., Disp: 3 each, Rfl: 0  •  acetaminophen (TYLENOL) 325 mg tablet, Take 650 mg by mouth as needed., Disp: , Rfl:   •  atorvastatin (LIPITOR) 20 mg tablet, Take 1 tablet (20 mg total) by mouth daily. Pill pack, Disp: 90 tablet, Rfl: 1  •  busPIRone (BUSPAR) 5 mg tablet, Take 1 tablet (5 mg total) by mouth 2 (two) times a day. Pill pack, Disp: 180 tablet, Rfl: 0  •  clonazePAM (klonoPIN) 0.5 mg tablet, Take 1 tablet (0.5 mg total) by mouth nightly as needed for anxiety., Disp: 30 tablet, Rfl: 0  •  diclofenac sodium (VOLTAREN) 1 % topical gel, Apply 1 g topically as needed., Disp: , Rfl:   •  donepeziL (ARICEPT) 10 mg tablet, TAKE ONE TABLET BY MOUTH ONE TIME DAILY IN THE EVENING, Disp: 90 tablet, Rfl: 1  •  empagliflozin (JARDIANCE) 25 mg tablet, Take 1 tablet (25 mg total) by mouth daily., Disp: 90 tablet, Rfl: 3  •  FeroSuL 325 mg (65 mg iron) tablet, Take 1 tablet (325 mg total) by mouth daily with breakfast., Disp: 90 tablet, Rfl: 0  •  folic acid (FOLVITE) 1 mg tablet, Take 1 tablet (1 mg total) by mouth daily., Disp: 90 tablet, Rfl: 1  •  furosemide (LASIX) 40 mg tablet, Take 1 tablet (40mg) in afternoon as needed when gaining weight. Continue to take morning dose of lasix in pill  pack., Disp: 30 tablet, Rfl: 1  •  levothyroxine (SYNTHROID) 112 mcg tablet, Take 1 tablet (112 mcg total) by mouth daily. Pill pack, Disp: 90 tablet, Rfl: 0  •  lidocaine (ASPERCREME) 4 % adhesive patch,medicated topical patch, Apply 1 patch topically daily., Disp: 30 patch, Rfl: 0  •  loratadine (CLARITIN) 10 mg tablet, Take one tablet by mouth one time daily, Disp: 30 tablet, Rfl: 0  •  magnesium oxide (MAG-OX) 400 mg (241.3 mg magnesium) tablet, Take 1 tablet (400 mg total) by mouth 2 (two) times a day., Disp: 180 tablet, Rfl: 3  •  medical marijuana, Take 1 each by mouth as needed., Disp: , Rfl:   •  melatonin 5 mg capsule, Take 10 mg by mouth nightly. Hs prn, Disp: , Rfl:   •  metoprolol succinate XL (TOPROL-XL) 25 mg 24 hr tablet, Take 1 tablet (25 mg total) by mouth daily., Disp: 30 tablet, Rfl: 0  •  multivitamin tablet, Take 1 tablet by mouth daily., Disp: , Rfl:   •  nebulizers misc, 1 each 4 (four) times a day as needed (sob or wheeze)., Disp: 1 each, Rfl: 0  •  pantoprazole (PROTONIX) 40 mg EC tablet, Take 1 tablet (40 mg total) by mouth 2 (two) times a day. Pill pack, Disp: 180 tablet, Rfl: 1  •  sertraline (ZOLOFT) 100 mg tablet, Take 1 tablet (100 mg total) by mouth daily., Disp: 90 tablet, Rfl: 0  •  spironolactone (ALDACTONE) 25 mg tablet, Take one tablet by mouth one time daily, Disp: 90 tablet, Rfl: 3  •  vitamin B-1 100 mg tablet, Take 1 tablet (100 mg total) by mouth daily., Disp: 90 tablet, Rfl: 0  •  XARELTO 20 mg tablet, Take 1 tablet (20 mg total) by mouth daily with dinner., Disp: 90 tablet, Rfl: 1      BP Readings from Last 3 Encounters:   02/05/24 110/70   01/24/24 111/68   01/09/24 130/72       Recent Lab results:  Lab Results   Component Value Date    CHOL 138 11/17/2022   ,   Lab Results   Component Value Date    HDL 64 11/17/2022   ,   Lab Results   Component Value Date    LDLCALC 59 11/17/2022   ,   Lab Results   Component Value Date    TRIG 71 11/17/2022        Lab Results    Component Value Date    GLUCOSE 232 (H) 02/06/2024   ,   Lab Results   Component Value Date    HGBA1C 8.0 (H) 02/06/2024         Lab Results   Component Value Date    CREATININE 1.14 02/06/2024       Lab Results   Component Value Date    TSH 9.77 (H) 02/06/2024           Lab Results   Component Value Date    HGBA1C 8.0 (H) 02/06/2024

## 2024-02-28 NOTE — TELEPHONE ENCOUNTER
Medicine Refill Request    Last Office Visit: 2/5/2024   Last Consult Visit: Visit date not found  Last Telemedicine Visit: Visit date not found    Next Appointment: 5/22/2024      Current Outpatient Medications:   •  acetaminophen (TYLENOL) 325 mg tablet, Take 650 mg by mouth as needed., Disp: , Rfl:   •  albuterol 2.5 mg /3 mL (0.083 %) nebulizer solution, Take 3 mL (2.5 mg total) by nebulization 4 (four) times a day., Disp: 150 mL, Rfl: 0  •  albuterol HFA 90 mcg/actuation inhaler, Inhale 2 puffs every 6 (six) hours as needed for wheezing., Disp: 18 g, Rfl: 1  •  atorvastatin (LIPITOR) 20 mg tablet, Take 1 tablet (20 mg total) by mouth daily. Pill pack, Disp: 90 tablet, Rfl: 1  •  busPIRone (BUSPAR) 5 mg tablet, Take 1 tablet (5 mg total) by mouth 2 (two) times a day. Pill pack, Disp: 180 tablet, Rfl: 0  •  clonazePAM (klonoPIN) 0.5 mg tablet, Take 1 tablet (0.5 mg total) by mouth nightly as needed for anxiety., Disp: 30 tablet, Rfl: 0  •  diclofenac sodium (VOLTAREN) 1 % topical gel, Apply 1 g topically as needed., Disp: , Rfl:   •  donepeziL (ARICEPT) 10 mg tablet, TAKE ONE TABLET BY MOUTH ONE TIME DAILY IN THE EVENING, Disp: 90 tablet, Rfl: 1  •  empagliflozin (JARDIANCE) 25 mg tablet, Take 1 tablet (25 mg total) by mouth daily., Disp: 90 tablet, Rfl: 3  •  FeroSuL 325 mg (65 mg iron) tablet, Take 1 tablet (325 mg total) by mouth daily with breakfast., Disp: 90 tablet, Rfl: 0  •  fluticasone-umeclidinium-vilanterol (TRELEGY ELLIPTA) 100-62.5-25 mcg blister with device powder for inhalation, Inhale 1 puff daily., Disp: 3 each, Rfl: 0  •  folic acid (FOLVITE) 1 mg tablet, Take 1 tablet (1 mg total) by mouth daily., Disp: 90 tablet, Rfl: 1  •  furosemide (LASIX) 40 mg tablet, Take 1 tablet (40mg) in afternoon as needed when gaining weight. Continue to take morning dose of lasix in pill pack., Disp: 30 tablet, Rfl: 1  •  levothyroxine (SYNTHROID) 112 mcg tablet, Take 1 tablet (112 mcg total) by mouth daily. Pill  pack, Disp: 90 tablet, Rfl: 0  •  lidocaine (ASPERCREME) 4 % adhesive patch,medicated topical patch, Apply 1 patch topically daily., Disp: 30 patch, Rfl: 0  •  loratadine (CLARITIN) 10 mg tablet, Take one tablet by mouth one time daily, Disp: 30 tablet, Rfl: 0  •  magnesium oxide (MAG-OX) 400 mg (241.3 mg magnesium) tablet, Take 1 tablet (400 mg total) by mouth 2 (two) times a day., Disp: 180 tablet, Rfl: 3  •  medical marijuana, Take 1 each by mouth as needed., Disp: , Rfl:   •  melatonin 5 mg capsule, Take 10 mg by mouth nightly. Hs prn, Disp: , Rfl:   •  metoprolol succinate XL (TOPROL-XL) 25 mg 24 hr tablet, Take 1 tablet (25 mg total) by mouth daily., Disp: 30 tablet, Rfl: 0  •  multivitamin tablet, Take 1 tablet by mouth daily., Disp: , Rfl:   •  nebulizers misc, 1 each 4 (four) times a day as needed (sob or wheeze)., Disp: 1 each, Rfl: 0  •  pantoprazole (PROTONIX) 40 mg EC tablet, Take 1 tablet (40 mg total) by mouth 2 (two) times a day. Pill pack, Disp: 180 tablet, Rfl: 1  •  sertraline (ZOLOFT) 100 mg tablet, Take 1 tablet (100 mg total) by mouth daily., Disp: 90 tablet, Rfl: 0  •  spironolactone (ALDACTONE) 25 mg tablet, Take one tablet by mouth one time daily, Disp: 90 tablet, Rfl: 3  •  vitamin B-1 100 mg tablet, Take 1 tablet (100 mg total) by mouth daily., Disp: 90 tablet, Rfl: 0  •  XARELTO 20 mg tablet, Take 1 tablet (20 mg total) by mouth daily with dinner., Disp: 90 tablet, Rfl: 1      BP Readings from Last 3 Encounters:   02/05/24 110/70   01/24/24 111/68   01/09/24 130/72       Recent Lab results:  Lab Results   Component Value Date    CHOL 138 11/17/2022   ,   Lab Results   Component Value Date    HDL 64 11/17/2022   ,   Lab Results   Component Value Date    LDLCALC 59 11/17/2022   ,   Lab Results   Component Value Date    TRIG 71 11/17/2022        Lab Results   Component Value Date    GLUCOSE 232 (H) 02/06/2024   ,   Lab Results   Component Value Date    HGBA1C 8.0 (H) 02/06/2024         Lab  Results   Component Value Date    CREATININE 1.14 02/06/2024       Lab Results   Component Value Date    TSH 9.77 (H) 02/06/2024           Lab Results   Component Value Date    HGBA1C 8.0 (H) 02/06/2024

## 2024-03-07 ENCOUNTER — TELEPHONE (OUTPATIENT)
Dept: PRIMARY CARE | Facility: CLINIC | Age: 84
End: 2024-03-07
Payer: COMMERCIAL

## 2024-03-07 DIAGNOSIS — R39.9 URINARY SYMPTOM OR SIGN: Primary | ICD-10-CM

## 2024-03-07 DIAGNOSIS — F32.89 OTHER DEPRESSION: ICD-10-CM

## 2024-03-07 RX ORDER — CLONAZEPAM 0.5 MG/1
0.5 TABLET ORAL NIGHTLY PRN
Qty: 30 TABLET | Refills: 0 | Status: SHIPPED | OUTPATIENT
Start: 2024-03-07 | End: 2024-04-06

## 2024-03-07 NOTE — TELEPHONE ENCOUNTER
Left message with staff  Per patient's dghtr patient's care was to be transferred to Dr Golden.   Patient has order for Clonazepam NOT Diazepam

## 2024-03-07 NOTE — TELEPHONE ENCOUNTER
"Spoke with Milli from [facility].   They report that since patient is so new to them they are unaware of his baseline mental status.   He has been confused since arriving.   They are requesting UA/CNS to ensure that they aren't missing anything.  Fax order to [phone]    Requesting refill of Klonopin - patient did not bring when he moved in and has not had since he moved.    Patient has not established care with Dr Lambert yet. Family has been encouraged to establish care immediately.     Milli reports that patient \"rolled out of bed\" last night and has a skin tear to his left upper bicep. Staff will monitor for signs of infection   Denies hitting his had or LOC   Vital signs all stable.   "

## 2024-03-07 NOTE — TELEPHONE ENCOUNTER
Milli from [facility] [place]. ( patient is a new resident at this skilled nursing facility) Is requesting an order for A Urine Culture to test for poss UTI and also a prescription for Diazepam. ( do not see this medication in patient's med list) Please fax these to [phone] attn Milli. Her contact phone is [phone]

## 2024-03-07 NOTE — TELEPHONE ENCOUNTER
Glad to hear he is in a living situation.  I have ordered a UA and sent refill of clonazepam although he did receive a refill 1 week ago per PDMP.  Will provide another course though as he is now living in an assisted living situation so should be monitored.    Melanie Chambers, DO

## 2024-03-08 ENCOUNTER — LAB REQUISITION (OUTPATIENT)
Dept: LAB | Facility: HOSPITAL | Age: 84
End: 2024-03-08

## 2024-03-08 DIAGNOSIS — R39.9 UNSPECIFIED SYMPTOMS AND SIGNS INVOLVING THE GENITOURINARY SYSTEM: ICD-10-CM

## 2024-03-08 LAB
BACTERIA UR QL AUTO: ABNORMAL /HPF
BILIRUB UR QL STRIP: NEGATIVE
BUDDING YEAST: PRESENT
CLARITY UR: CLEAR
COLOR UR: ABNORMAL
GLUCOSE UR STRIP-MCNC: ABNORMAL MG/DL
HGB UR QL STRIP.AUTO: NEGATIVE
KETONES UR STRIP-MCNC: NEGATIVE MG/DL
LEUKOCYTE ESTERASE UR QL STRIP: ABNORMAL
NITRITE UR QL STRIP: NEGATIVE
NON-SQ EPI CELLS URNS QL MICRO: ABNORMAL /HPF
PH UR STRIP.AUTO: 6 [PH]
PROT UR STRIP-MCNC: NEGATIVE MG/DL
RBC #/AREA URNS AUTO: ABNORMAL /HPF
SP GR UR STRIP.AUTO: 1.03 (ref 1–1.03)
UROBILINOGEN UR STRIP-ACNC: <2 MG/DL
WBC #/AREA URNS AUTO: ABNORMAL /HPF

## 2024-03-08 PROCEDURE — 87086 URINE CULTURE/COLONY COUNT: CPT | Performed by: STUDENT IN AN ORGANIZED HEALTH CARE EDUCATION/TRAINING PROGRAM

## 2024-03-08 PROCEDURE — 81001 URINALYSIS AUTO W/SCOPE: CPT | Performed by: STUDENT IN AN ORGANIZED HEALTH CARE EDUCATION/TRAINING PROGRAM

## 2024-03-09 LAB — BACTERIA UR CULT: NORMAL

## 2024-03-11 DIAGNOSIS — G30.9 MODERATE ALZHEIMER'S DEMENTIA, UNSPECIFIED TIMING OF DEMENTIA ONSET, UNSPECIFIED WHETHER BEHAVIORAL, PSYCHOTIC, OR MOOD DISTURBANCE OR ANXIETY (CMS/HCC): Primary | ICD-10-CM

## 2024-03-11 DIAGNOSIS — F02.B0 MODERATE ALZHEIMER'S DEMENTIA, UNSPECIFIED TIMING OF DEMENTIA ONSET, UNSPECIFIED WHETHER BEHAVIORAL, PSYCHOTIC, OR MOOD DISTURBANCE OR ANXIETY (CMS/HCC): Primary | ICD-10-CM

## 2024-03-11 NOTE — PROGRESS NOTES
Placed PT and OT orders for eval and treat per assisted living recommendation    Melanie Chambers DO

## 2024-03-11 NOTE — PROGRESS NOTES
Assessment/Plan:    No problem-specific Assessment & Plan notes found for this encounter.       There are no diagnoses linked to this encounter.      Subjective:      Patient ID: Trevor Lyons is a 83 y.o. male.      HPI    Patient presents for follow-up of chronic conditions as detailed in the assessment and plan.      The following portions of the patient's history were reviewed and updated as appropriate: current medications, past family history, past medical history, past social history, past surgical history and problem list.    Review of Systems   Constitutional: Negative.  Negative for activity change, appetite change, chills, fatigue and fever.   HENT: Negative.  Negative for congestion, ear pain, postnasal drip and sinus pain.    Eyes: Negative.    Respiratory: Negative.  Negative for cough and shortness of breath.    Cardiovascular: Negative.  Negative for chest pain and leg swelling.   Gastrointestinal: Negative.  Negative for constipation and diarrhea.   Endocrine: Negative.    Genitourinary: Negative.  Negative for dysuria.   Musculoskeletal: Negative.    Skin: Negative.    Allergic/Immunologic: Negative.  Negative for immunocompromised state.   Neurological: Negative.  Negative for dizziness and light-headedness.   Hematological: Negative.    Psychiatric/Behavioral: Negative.           Objective:    There were no vitals taken for this visit.     Physical Exam  Vitals and nursing note reviewed.   Constitutional:       Appearance: Normal appearance.   HENT:      Head: Normocephalic and atraumatic.      Right Ear: Tympanic membrane, ear canal and external ear normal.      Left Ear: Tympanic membrane, ear canal and external ear normal.      Nose: Nose normal.      Mouth/Throat:      Mouth: Mucous membranes are moist.      Pharynx: Oropharynx is clear.   Eyes:      Extraocular Movements: Extraocular movements intact.      Conjunctiva/sclera: Conjunctivae normal.      Pupils: Pupils are equal, round,  and reactive to light.   Cardiovascular:      Rate and Rhythm: Normal rate and regular rhythm.      Pulses: Normal pulses.      Heart sounds: Normal heart sounds.   Pulmonary:      Effort: Pulmonary effort is normal.      Breath sounds: Normal breath sounds.   Abdominal:      General: Bowel sounds are normal.      Palpations: Abdomen is soft.   Musculoskeletal:         General: Normal range of motion.      Cervical back: Normal range of motion and neck supple.   Skin:     General: Skin is warm and dry.   Neurological:      General: No focal deficit present.      Mental Status: He is alert and oriented to person, place, and time.   Psychiatric:         Mood and Affect: Mood normal.         Behavior: Behavior normal.         Thought Content: Thought content normal.         Judgment: Judgment normal.             SAMIRA John

## 2024-03-12 ENCOUNTER — OFFICE VISIT (OUTPATIENT)
Dept: FAMILY MEDICINE CLINIC | Facility: HOSPITAL | Age: 84
End: 2024-03-12
Payer: COMMERCIAL

## 2024-03-12 ENCOUNTER — TELEPHONE (OUTPATIENT)
Dept: FAMILY MEDICINE CLINIC | Facility: HOSPITAL | Age: 84
End: 2024-03-12

## 2024-03-12 VITALS
OXYGEN SATURATION: 98 % | WEIGHT: 183 LBS | DIASTOLIC BLOOD PRESSURE: 68 MMHG | SYSTOLIC BLOOD PRESSURE: 122 MMHG | HEART RATE: 65 BPM

## 2024-03-12 DIAGNOSIS — F02.80 ALZHEIMER DISEASE (HCC): ICD-10-CM

## 2024-03-12 DIAGNOSIS — F41.9 ANXIETY: Primary | ICD-10-CM

## 2024-03-12 DIAGNOSIS — E07.9 THYROID DISEASE: ICD-10-CM

## 2024-03-12 DIAGNOSIS — Z76.89 ESTABLISHING CARE WITH NEW DOCTOR, ENCOUNTER FOR: Primary | ICD-10-CM

## 2024-03-12 DIAGNOSIS — G30.9 ALZHEIMER DISEASE (HCC): ICD-10-CM

## 2024-03-12 DIAGNOSIS — F41.9 ANXIETY: ICD-10-CM

## 2024-03-12 DIAGNOSIS — E11.9 TYPE 2 DIABETES MELLITUS WITHOUT COMPLICATION, WITHOUT LONG-TERM CURRENT USE OF INSULIN (HCC): ICD-10-CM

## 2024-03-12 DIAGNOSIS — I50.42 CHRONIC COMBINED SYSTOLIC AND DIASTOLIC CHF (CONGESTIVE HEART FAILURE) (HCC): ICD-10-CM

## 2024-03-12 DIAGNOSIS — I48.0 PAROXYSMAL ATRIAL FIBRILLATION (HCC): ICD-10-CM

## 2024-03-12 PROBLEM — H91.92 DEAFNESS IN LEFT EAR: Status: ACTIVE | Noted: 2024-03-12

## 2024-03-12 PROBLEM — M85.89 OSTEOPENIA OF MULTIPLE SITES: Status: ACTIVE | Noted: 2024-03-12

## 2024-03-12 PROBLEM — I26.99 PULMONARY EMBOLISM (HCC): Status: ACTIVE | Noted: 2024-03-12

## 2024-03-12 PROBLEM — M41.80 DEXTROSCOLIOSIS: Status: ACTIVE | Noted: 2024-03-12

## 2024-03-12 PROBLEM — I49.5 SICK SINUS SYNDROME (HCC): Status: ACTIVE | Noted: 2024-03-12

## 2024-03-12 PROBLEM — I47.29 NSVT (NONSUSTAINED VENTRICULAR TACHYCARDIA) (HCC): Status: ACTIVE | Noted: 2024-03-12

## 2024-03-12 PROBLEM — G89.29 CHRONIC LEFT-SIDED LOW BACK PAIN WITH LEFT-SIDED SCIATICA: Status: ACTIVE | Noted: 2024-03-12

## 2024-03-12 PROBLEM — I71.21 ANEURYSM OF ASCENDING AORTA WITHOUT RUPTURE (HCC): Status: ACTIVE | Noted: 2024-03-12

## 2024-03-12 PROBLEM — E78.2 MIXED HYPERLIPIDEMIA: Status: ACTIVE | Noted: 2024-03-12

## 2024-03-12 PROBLEM — Z95.0 CARDIAC PACEMAKER: Status: ACTIVE | Noted: 2024-03-12

## 2024-03-12 PROBLEM — I27.20 PULMONARY HYPERTENSION (HCC): Status: ACTIVE | Noted: 2024-03-12

## 2024-03-12 PROBLEM — I48.91 A-FIB (HCC): Status: ACTIVE | Noted: 2024-03-12

## 2024-03-12 PROBLEM — M54.42 CHRONIC LEFT-SIDED LOW BACK PAIN WITH LEFT-SIDED SCIATICA: Status: ACTIVE | Noted: 2024-03-12

## 2024-03-12 PROBLEM — I95.1 ORTHOSTATIC HYPOTENSION: Status: ACTIVE | Noted: 2024-03-12

## 2024-03-12 PROCEDURE — 99204 OFFICE O/P NEW MOD 45 MIN: CPT | Performed by: NURSE PRACTITIONER

## 2024-03-12 RX ORDER — SPIRONOLACTONE 25 MG/1
25 TABLET ORAL DAILY
COMMUNITY
Start: 2023-12-11

## 2024-03-12 RX ORDER — LIDOCAINE 4 G/G
1 PATCH TOPICAL DAILY PRN
COMMUNITY

## 2024-03-12 RX ORDER — FOLIC ACID 1 MG/1
1 TABLET ORAL DAILY
COMMUNITY
Start: 2023-11-30 | End: 2024-05-28

## 2024-03-12 RX ORDER — ALBUTEROL SULFATE 2.5 MG/3ML
2.5 SOLUTION RESPIRATORY (INHALATION) 4 TIMES DAILY
COMMUNITY
Start: 2024-02-28

## 2024-03-12 RX ORDER — ACETAMINOPHEN 325 MG/1
650 TABLET ORAL EVERY 4 HOURS PRN
COMMUNITY

## 2024-03-12 RX ORDER — BUSPIRONE HYDROCHLORIDE 5 MG/1
5 TABLET ORAL 2 TIMES DAILY
COMMUNITY
Start: 2024-02-12 | End: 2024-03-12 | Stop reason: DRUGHIGH

## 2024-03-12 RX ORDER — FUROSEMIDE 40 MG/1
20 TABLET ORAL DAILY
COMMUNITY
Start: 2023-10-11 | End: 2024-03-19 | Stop reason: DRUGHIGH

## 2024-03-12 RX ORDER — ALBUTEROL SULFATE 90 UG/1
2 AEROSOL, METERED RESPIRATORY (INHALATION) EVERY 6 HOURS PRN
COMMUNITY
Start: 2024-02-28 | End: 2024-03-29

## 2024-03-12 RX ORDER — METOPROLOL SUCCINATE 25 MG/1
TABLET, EXTENDED RELEASE ORAL
COMMUNITY

## 2024-03-12 RX ORDER — DONEPEZIL HYDROCHLORIDE 10 MG/1
1 TABLET, FILM COATED ORAL EVERY EVENING
COMMUNITY
Start: 2023-09-18

## 2024-03-12 RX ORDER — CLONAZEPAM 0.5 MG/1
0.5 TABLET ORAL
COMMUNITY
Start: 2024-03-07 | End: 2024-03-12 | Stop reason: SDUPTHER

## 2024-03-12 RX ORDER — BUSPIRONE HYDROCHLORIDE 7.5 MG/1
7.5 TABLET ORAL 2 TIMES DAILY
Qty: 60 TABLET | Refills: 5 | Status: SHIPPED | OUTPATIENT
Start: 2024-03-12

## 2024-03-12 RX ORDER — CLONAZEPAM 0.5 MG/1
0.5 TABLET ORAL
Qty: 30 TABLET | Refills: 0 | Status: SHIPPED | OUTPATIENT
Start: 2024-03-12

## 2024-03-12 RX ORDER — LEVOTHYROXINE SODIUM 112 UG/1
112 TABLET ORAL
COMMUNITY
Start: 2024-02-12

## 2024-03-12 RX ORDER — ATORVASTATIN CALCIUM 20 MG/1
20 TABLET, FILM COATED ORAL DAILY
COMMUNITY

## 2024-03-12 RX ORDER — SERTRALINE HYDROCHLORIDE 100 MG/1
100 TABLET, FILM COATED ORAL DAILY
COMMUNITY
Start: 2024-02-28 | End: 2024-05-28

## 2024-03-12 RX ORDER — PANTOPRAZOLE SODIUM 40 MG/1
40 TABLET, DELAYED RELEASE ORAL DAILY
COMMUNITY

## 2024-03-12 RX ORDER — LORATADINE 10 MG/1
10 TABLET ORAL DAILY
COMMUNITY
Start: 2023-12-18

## 2024-03-12 NOTE — TELEPHONE ENCOUNTER
Hi this is Brien calling from [employer] [facility], [place]. I'm calling in regards to one of our residents that was just in to see the CRNP Halina gets Trevor. Cache goodness you have to let me spell it. PRZYCHODZ i.e. N is this last name, date of birth, Is she? It's not on here. Hold on, sorry about that. Anyway, he was just in to see her and I just needed somebody to send me a actual script for his Kolonopin. It is for 0.5 milligram tablets and I'm going to need refill sent for all of his meds over to [facility] as well if somebody could take care of that. If you have any questions feel free to give me a call [phone]. My name is Brien again this is in regards to Trevor and his last name is PRPRZYCH ODZ i.e. N. He needs refills and a actual script for his Klonopin sent to [facility]. Any questions feel free to give us a call. Thanks a lot. Bye, bye.  You received a voice mail from [employer].

## 2024-03-12 NOTE — PROGRESS NOTES
[facility]   Nupur HOGAN    Assessment/Plan:   1. Establishing care with new doctor, encounter for    2. Chronic combined systolic and diastolic CHF (congestive heart failure) (HCC)  Assessment & Plan:  Wt Readings from Last 3 Encounters:   03/12/24 83 kg (183 lb)     BENNIE 1/24/24: AV size/wall thickness normal. Mod decreased systolic function. Estimated EF 35-40%. Moderate  hypokinesis of apical septal and apex. Unable to assess diastolic filling  pattern.  ·  Right Atrium: Moderately dilated atrium.  ·  Left Atrium: Severely dilated atrium.  ·  Right Ventricle: Normal ventricle size. Pacemaker/ICD lead present.  Normal systolic function.  ·  Aortic Valve: A transcatheter bioprosthetic aortic valve present. No  regurgitation. Peak velocity of 1.71 m/s and mean gradient of 6 mmHg.  Dimensionless index 0.44.  No transvalvular or paravalvular aortic  regurgitation.  ·  Mitral Valve: Normal leaflet structure. Moderate posterior leaflet  restriction. Moderate mitral annular calcification. Moderate  regurgitation. The jet is centrally directed. Mild stenosis. Peak gradient  = 13.00 mmHg. Mean gradient = 4.00. HR of 65bpm.  ·  Tricuspid Valve: Normal structure. Mild regurgitation. The  regurgitation jet is central. Mild pulmonary hypertension with estimated  RVSP = 39 mmHg. No significant stenosis.    Was followed by [facility] Cardiology, requesting [facility] Cardiology consult now that Mr. Lyons is living at  [facility]  Continue spironolactone 25mg daily, Toprol XL 25mg daiy, Jardiance 25mg daily, Lasix 40mg daily with daily weights in place.  Was taking additional 40mg lasix if weight >3lb/day.            Orders:  -     Ambulatory Referral to Cardiology; Future    3. Paroxysmal atrial fibrillation (HCC)  Assessment & Plan:  HX PAF, currently in SR.  Toprol 25mg daily, Xarelto 20mg daily.    Concerns for increased fall risk and DOAC.   Consult Cardiology      4. Alzheimer disease  ([facility])  Assessment & Plan:  Moderate, late onset with mood disturbance associated.  Recently moved to [facility] last week d/t increased needs with ADLs and progressing AD with isolation since spouse passed 4 yrs ago.   Increased anxiety per family members.  Discussed transition time with verbalized understanding.  PT/OT consult in place.  Establish daily routine and sleep hygiene.  Increased socialization.  Optimize hydration/nutrition  Aricept 10mg daily.       5. Anxiety  Assessment & Plan:  Recent increase of Zoloft to 100mg daily with buspar 5mg BID, 0.5 clonazepam at HS.  Increased anxiety r/t moving to [facility], associated moderate AD  Discussed transition time, increase buspar to 7.5mg po BID  Continue PT/OT, socialization, optimize nutrition/hydration/sleep hygiene.     Orders:  -     busPIRone (BUSPAR) 7.5 mg tablet; Take 1 tablet (7.5 mg total) by mouth 2 (two) times a day    6. Type 2 diabetes mellitus without complication, without long-term current use of insulin ([facility])  Assessment & Plan:  A1C 8.0 on 2/6/24  Currently on empagliflozin 25mg daily as well as diet controlled  AD-associated, unaware of s/s hypoglycemia.   Continue jardiance, Lipitor, monitor A1C.      7. Thyroid disease  Assessment & Plan:  2/6/24 TSH 9.77 with FT 1.1  Continue levothyroxine 112mcg daily.  Now in [facility] should have consistent medication administration/timing.    Recheck TSH with FT4 with next labwork.          Return in about 4 weeks (around 4/9/2024) for Recheck.  Patient may call or return to office with any questions or concerns.     ______________________________________________________________________  Subjective:     Patient ID: Trevor Lyons is a 83 y.o. male.  Trevor Lyons  Chief Complaint   Patient presents with    Establish Care       Here to establish care.  Recently moved to  last week, from [place] where he had HHA in trailer home.  Was with [facility], Dr. Heide Cisneros.  PMH: CHF, PAF, SSS s/p Pacer,  S/P TVAR and CABG, AD, DM2, Anxiety/depression, AAA, aquired hypothyroidism.   Chronic lower back pain with sciatica, can cause sleep disturbance.  Recent imaging +dextroscoliosis with degenerative changes.      AD x2 years ago.  Isolated after stepmom passed away 4 yrs ago.  ST memory impaired, LT memory somewhat intake.   No longer drives.   Dr. Cisneros did cog testing    Continent of bowel/bladder  +appetite/fluid.  Family reports prn extra lasix dose if weight increased a certain lb/day as he struggled with diet/hydration living alone.     Fell second night at IC with skin tears x2 YOKO.  Was walking independently prior to IC however admits to holding on to furniture for balance.  PT/OT eval in place. .      Recent labwork completed 2/6/24                 The following portions of the patient's history were reviewed and updated as appropriate: allergies, current medications, past family history, past medical history, past social history, past surgical history, and problem list.    Review of Systems   Constitutional: Negative.  Negative for activity change, appetite change, chills, fatigue and fever.   HENT:  Positive for hearing loss. Negative for congestion, ear pain, postnasal drip and sinus pain.    Eyes: Negative.    Respiratory: Negative.  Negative for cough and shortness of breath.    Cardiovascular: Negative.  Negative for chest pain and leg swelling.   Gastrointestinal: Negative.  Negative for constipation and diarrhea.   Endocrine: Negative.    Genitourinary: Negative.  Negative for difficulty urinating and dysuria.   Musculoskeletal:  Positive for arthralgias, back pain and gait problem.   Skin:  Positive for wound.   Allergic/Immunologic: Negative.  Negative for immunocompromised state.   Neurological:  Negative for dizziness and light-headedness.   Hematological:  Bruises/bleeds easily.   Psychiatric/Behavioral:  Positive for sleep disturbance.          Objective:      Vitals:    03/12/24 1119   BP:  122/68   Pulse: 65   SpO2: 98%      Physical Exam  Vitals and nursing note reviewed.   Constitutional:       Appearance: Normal appearance.      Comments: frail   HENT:      Head: Normocephalic and atraumatic.      Right Ear: Tympanic membrane, ear canal and external ear normal.      Left Ear: Tympanic membrane, ear canal and external ear normal.      Nose: Nose normal.      Mouth/Throat:      Mouth: Mucous membranes are moist.      Pharynx: Oropharynx is clear.   Eyes:      Extraocular Movements: Extraocular movements intact.      Conjunctiva/sclera: Conjunctivae normal.      Pupils: Pupils are equal, round, and reactive to light.   Cardiovascular:      Rate and Rhythm: Normal rate and regular rhythm.      Pulses: Normal pulses.      Heart sounds: Murmur heard.   Pulmonary:      Effort: Pulmonary effort is normal.      Breath sounds: Normal breath sounds.   Abdominal:      General: Bowel sounds are normal.      Palpations: Abdomen is soft.   Musculoskeletal:         General: Deformity present. Normal range of motion.      Cervical back: Normal range of motion and neck supple.   Skin:     General: Skin is warm and dry.      Findings: Bruising present.      Comments: Scabbed left forearm skin tear  YOKO skin tear with xeroform/DSD. No s/s infection present.  See media   Neurological:      General: No focal deficit present.      Mental Status: He is alert. He is disoriented.      GCS: GCS eye subscore is 4. GCS verbal subscore is 4. GCS motor subscore is 6.      Sensory: Sensory deficit present.      Motor: Weakness present.      Gait: Gait abnormal.   Psychiatric:         Mood and Affect: Mood normal.         Speech: Speech normal.         Behavior: Behavior is slowed. Behavior is cooperative.         Cognition and Memory: He exhibits impaired recent memory.         Judgment: Judgment is inappropriate.           Portions of the record may have been created with voice recognition software. Occasional wrong word or  "\"sound alike\" substitutions may have occurred due to the inherent limitations of voice recognition software. Please review the chart carefully and recognize, using context, where substitutions/typographical errors may have occurred.       "

## 2024-03-12 NOTE — ASSESSMENT & PLAN NOTE
Moderate, late onset with mood disturbance associated.  Recently moved to [facility] last week d/t increased needs with ADLs and progressing AD with isolation since spouse passed 4 yrs ago.   Increased anxiety per family members.  Discussed transition time with verbalized understanding.  PT/OT consult in place.  Establish daily routine and sleep hygiene.  Increased socialization.  Optimize hydration/nutrition  Aricept 10mg daily.

## 2024-03-12 NOTE — ASSESSMENT & PLAN NOTE
HX PAF, currently in SR.  Toprol 25mg daily, Xarelto 20mg daily.    Concerns for increased fall risk and DOAC.   Consult Cardiology

## 2024-03-12 NOTE — ASSESSMENT & PLAN NOTE
Recent increase of Zoloft to 100mg daily with buspar 5mg BID, 0.5 clonazepam at HS.  Increased anxiety r/t moving to [place], associated moderate AD  Discussed transition time, increase buspar to 7.5mg po BID  Continue PT/OT, socialization, optimize nutrition/hydration/sleep hygiene.

## 2024-03-12 NOTE — ASSESSMENT & PLAN NOTE
2/6/24 TSH 9.77 with FT 1.1  Continue levothyroxine 112mcg daily.  Now in [facility] should have consistent medication administration/timing.    Recheck TSH with FT4 with next labwork.

## 2024-03-12 NOTE — ASSESSMENT & PLAN NOTE
Wt Readings from Last 3 Encounters:   03/12/24 83 kg (183 lb)     BENNIE 1/24/24: AV size/wall thickness normal. Mod decreased systolic function. Estimated EF 35-40%. Moderate  hypokinesis of apical septal and apex. Unable to assess diastolic filling  pattern.  ·  Right Atrium: Moderately dilated atrium.  ·  Left Atrium: Severely dilated atrium.  ·  Right Ventricle: Normal ventricle size. Pacemaker/ICD lead present.  Normal systolic function.  ·  Aortic Valve: A transcatheter bioprosthetic aortic valve present. No  regurgitation. Peak velocity of 1.71 m/s and mean gradient of 6 mmHg.  Dimensionless index 0.44.  No transvalvular or paravalvular aortic  regurgitation.  ·  Mitral Valve: Normal leaflet structure. Moderate posterior leaflet  restriction. Moderate mitral annular calcification. Moderate  regurgitation. The jet is centrally directed. Mild stenosis. Peak gradient  = 13.00 mmHg. Mean gradient = 4.00. HR of 65bpm.  ·  Tricuspid Valve: Normal structure. Mild regurgitation. The  regurgitation jet is central. Mild pulmonary hypertension with estimated  RVSP = 39 mmHg. No significant stenosis.    Was followed by [facility], requesting [facility] consult now that Mr. Lyons is living at  intermediate  Continue spironolactone 25mg daily, Toprol XL 25mg liss, Jardiance 25mg daily, Lasix 40mg daily with daily weights in place.  Was taking additional 40mg lasix if weight >3lb/day.

## 2024-03-12 NOTE — ASSESSMENT & PLAN NOTE
A1C 8.0 on 2/6/24  Currently on empagliflozin 25mg daily as well as diet controlled  AD-associated, unaware of s/s hypoglycemia.   Continue jardiance, Lipitor, monitor A1C.

## 2024-03-13 ENCOUNTER — TELEPHONE (OUTPATIENT)
Dept: PRIMARY CARE | Facility: CLINIC | Age: 84
End: 2024-03-13
Payer: COMMERCIAL

## 2024-03-13 ENCOUNTER — TELEPHONE (OUTPATIENT)
Dept: FAMILY MEDICINE CLINIC | Facility: HOSPITAL | Age: 84
End: 2024-03-13

## 2024-03-13 DIAGNOSIS — E11.9 TYPE 2 DIABETES MELLITUS WITHOUT COMPLICATION, WITHOUT LONG-TERM CURRENT USE OF INSULIN (HCC): Primary | ICD-10-CM

## 2024-03-13 NOTE — TELEPHONE ENCOUNTER
Patient is currently taking Jardian.    His daughter called and asked if there an alternative he could take instead?    She stated that Jardian is too expensive @ $800/ month.    Please call Marisa to advise

## 2024-03-13 NOTE — TELEPHONE ENCOUNTER
S/w pharmacist and provided clarification   Macrobid 100mg BID for 5 days   Single dose Fluconazole 150mg

## 2024-03-13 NOTE — TELEPHONE ENCOUNTER
Vernon from [facility] called stating that macrobid & fluconazole was sent in for pt but the directions are not clear. She would like someone to call [facility] at [phone] to give instructions on the meds or fax [phone]. Any questions Vernon can be reached at [phone].

## 2024-03-13 NOTE — TELEPHONE ENCOUNTER
Per family request [address] asking for diabetic testing supplies to be sent to the pharm - needs everything - meter, safety lancets, strips, alcohol pads.    [address] asking for once daily testing.    Pls send to [facility].

## 2024-03-14 DIAGNOSIS — E11.9 TYPE 2 DIABETES MELLITUS WITHOUT COMPLICATION, WITHOUT LONG-TERM CURRENT USE OF INSULIN (HCC): Primary | ICD-10-CM

## 2024-03-14 RX ORDER — LANCETS 33 GAUGE
EACH MISCELLANEOUS
Qty: 100 EACH | Refills: 3 | Status: SHIPPED | OUTPATIENT
Start: 2024-03-14

## 2024-03-14 RX ORDER — DIPHENHYDRAMINE HCL 25 MG
TABLET ORAL DAILY
Qty: 1 KIT | Refills: 0 | Status: SHIPPED | OUTPATIENT
Start: 2024-03-14

## 2024-03-14 RX ORDER — CALCIUM CITRATE/VITAMIN D3 200MG-6.25
TABLET ORAL
Qty: 100 STRIP | Refills: 5 | Status: SHIPPED | OUTPATIENT
Start: 2024-03-14

## 2024-03-14 RX ORDER — DAPAGLIFLOZIN 10 MG/1
10 TABLET, FILM COATED ORAL DAILY
Qty: 30 TABLET | Refills: 5 | Status: SHIPPED | OUTPATIENT
Start: 2024-03-14

## 2024-03-14 NOTE — TELEPHONE ENCOUNTER
Called PT's insurance - other alternatives are Invokana - 100 mg - 30 pill $ 75.00 - Farxiga 10 mg's 30 pills - $40.00 - they don't cover the generic - need to be Brand

## 2024-03-16 ENCOUNTER — APPOINTMENT (EMERGENCY)
Dept: CT IMAGING | Facility: HOSPITAL | Age: 84
End: 2024-03-16
Payer: COMMERCIAL

## 2024-03-16 ENCOUNTER — HOSPITAL ENCOUNTER (EMERGENCY)
Facility: HOSPITAL | Age: 84
Discharge: HOME | End: 2024-03-16
Attending: EMERGENCY MEDICINE
Payer: COMMERCIAL

## 2024-03-16 VITALS
RESPIRATION RATE: 21 BRPM | HEART RATE: 64 BPM | WEIGHT: 187.39 LBS | OXYGEN SATURATION: 93 % | DIASTOLIC BLOOD PRESSURE: 64 MMHG | TEMPERATURE: 97.8 F | SYSTOLIC BLOOD PRESSURE: 131 MMHG

## 2024-03-16 DIAGNOSIS — S41.112A SKIN TEAR OF LEFT UPPER EXTREMITY: ICD-10-CM

## 2024-03-16 DIAGNOSIS — S09.90XA HEAD INJURY: Primary | ICD-10-CM

## 2024-03-16 PROCEDURE — 99284 EMERGENCY DEPT VISIT MOD MDM: CPT

## 2024-03-16 PROCEDURE — 70450 CT HEAD/BRAIN W/O DYE: CPT

## 2024-03-16 PROCEDURE — 72125 CT NECK SPINE W/O DYE: CPT

## 2024-03-16 PROCEDURE — 99284 EMERGENCY DEPT VISIT MOD MDM: CPT | Performed by: EMERGENCY MEDICINE

## 2024-03-16 NOTE — ED PROVIDER NOTES
Emergency Department Trauma Note  Trevor Lyons 83 y.o. male MRN: [id number]  Unit/Bed#: ED 02/ED 02 Encounter: [id number]      Trauma Alert: Trauma Acuity: Trauma Evaluation  Model of Arrival: Mode of Arrival: BLS via Trauma Squad Name and Number: REGAN [id number]  Trauma Team: Current Providers  Attending Provider: Trevor Copeland DO  Registered Nurse: Steve Soto RN  Consultants:     None      History of Present Illness     Chief Complaint:   Chief Complaint   Patient presents with    Fall     EMS from [facility]; pt was unwitnessed fall, pt states he rolled out of bed and fell onto floor, denies loss of consciousness, + head strike, bit lip, L arm skin tear, on Xarelto     HPI:  Trevor Lyons is a 83 y.o. male who presents with head injury on Xarelto trauma evaluation called also skin tear to the left forearm and lower lip abrasion.  Mechanism:Details of Incident: Unwitnessed fall, pt states he rolled and fell out of bed, denies loss of consciousness, + head strike, bit lower lip, L elbow skin tear, on Xarelto Injury Date: 03/16/24 Injury Time: 0545 Injury Occurence Location - Specify County: [place]    83-year-old male rolled out of bed this morning has a skin tear to the left forearm and abrasion to the lower lip with some bleeding in addition to head injury cervical collar in place he is on Xarelto therefore trauma evaluation was called      History provided by:  Patient and EMS personnel  Medical Problem  Location:  Head  Quality:  Injury  Severity:  Moderate  Onset quality:  Sudden  Duration:  15 minutes  Chronicity:  New  Context:  Fell off bed this morning    Review of Systems   Skin:  Positive for wound.   All other systems reviewed and are negative.      Historical Information     Immunizations:   Immunization History   Administered Date(s) Administered    COVID-19 Moderna mRNA Vaccine 12 Yr+ 50 mcg/0.5 mL (Spikevax) 11/15/2023    COVID-19 PFIZER VACCINE 0.3 ML IM 09/26/2021,  10/18/2021    COVID-19 Pfizer Vac BIVALENT Shadi-sucrose 12 Yr+ IM 10/29/2022       Past Medical History:   Diagnosis Date    Alzheimer disease (HCC)     Anemia     Aneurysm of ascending aorta without rupture (HCC)     Anxiety     Atrial fibrillation (HCC)     CHF (congestive heart failure) (HCC)     Depression     Diabetes mellitus (HCC)     Disease of thyroid gland     Pulmonary hypertension (HCC)     Sciatica     Sick sinus syndrome (HCC)        Family History   Problem Relation Age of Onset    Dementia Mother     Heart disease Father     Stroke Father     Thyroid disease Daughter     Thyroid disease Daughter      Past Surgical History:   Procedure Laterality Date    CARDIAC PACEMAKER PLACEMENT       Social History     Tobacco Use    Smoking status: Former     Types: Cigarettes    Smokeless tobacco: Never   Vaping Use    Vaping status: Never Used   Substance Use Topics    Alcohol use: Not Currently    Drug use: Never     E-Cigarette/Vaping    E-Cigarette Use Never User      E-Cigarette/Vaping Substances       Family History: non-contributory    Meds/Allergies   Prior to Admission Medications   Prescriptions Last Dose Informant Patient Reported? Taking?   Blood Glucose Monitoring Suppl (True Metrix Air Glucose Meter) w/Device KIT   No No   Sig: Use daily   Diclofenac Sodium (VOLTAREN) 1 %   Yes No   Sig: Place 1 g on the skin   Farxiga 10 MG tablet   No No   Sig: Take 1 tablet (10 mg total) by mouth daily   Lancets 33G MISC   No No   Sig: Testing 1 time daily   Lidocaine 4 % PTCH   Yes No   Sig: Place 1 patch on the skin daily as needed   Melatonin 5 MG CAPS   Yes No   Sig: Take 10 mg by mouth daily   acetaminophen (TYLENOL) 325 mg tablet   Yes No   Sig: Take 650 mg by mouth every 4 (four) hours as needed   albuterol (2.5 mg/3 mL) 0.083 % nebulizer solution   Yes No   Sig: Inhale 2.5 mg 4 (four) times a day   albuterol (PROVENTIL HFA,VENTOLIN HFA) 90 mcg/act inhaler   Yes No   Sig: Inhale 2 puffs every 6 (six)  hours as needed   atorvastatin (LIPITOR) 20 mg tablet   Yes No   Sig: Take 20 mg by mouth daily   busPIRone (BUSPAR) 7.5 mg tablet   No No   Sig: Take 1 tablet (7.5 mg total) by mouth 2 (two) times a day   clonazePAM (KlonoPIN) 0.5 mg tablet   No No   Sig: Take 1 tablet (0.5 mg total) by mouth daily at bedtime as needed for anxiety   donepezil (ARICEPT) 10 mg tablet   Yes No   Sig: Take 1 tablet by mouth every evening   fluticasone-umeclidinium-vilanterol 100-62.5-25 mcg/actuation inhaler   Yes No   Sig: Inhale 1 puff daily   folic acid (FOLVITE) 1 mg tablet   Yes No   Sig: Take 1 mg by mouth daily   furosemide (LASIX) 40 mg tablet   Yes No   Sig: Take 40 mg by mouth daily   glucose blood (True Metrix Blood Glucose Test) test strip   No No   Sig: Testing 1 time daily   levothyroxine 112 mcg tablet   Yes No   Sig: Take 112 mcg by mouth   loratadine (CLARITIN) 10 mg tablet   Yes No   Sig: Take 10 mg by mouth daily   metoprolol succinate (TOPROL-XL) 25 mg 24 hr tablet   Yes No   Sig: Oral for 30 Days   pantoprazole (PROTONIX) 40 mg tablet   Yes No   Sig: Take 40 mg by mouth daily   rivaroxaban (XARELTO) 20 mg tablet   Yes No   Sig: Take 20 mg by mouth   sertraline (ZOLOFT) 100 mg tablet   Yes No   Sig: Take 100 mg by mouth daily   spironolactone (ALDACTONE) 25 mg tablet   Yes No   Sig: Take 25 mg by mouth daily      Facility-Administered Medications: None       No Known Allergies    PHYSICAL EXAM    PE limited by: Nothing    Objective   Vitals:   First set: Temperature: 97.8 °F (36.6 °C) (03/16/24 0620)  Pulse: 65 (03/16/24 0620)  Respirations: 16 (03/16/24 0620)  Blood Pressure: 134/65 (03/16/24 0620)  SpO2: 97 % (03/16/24 0620)    Primary Survey:   (A) Airway: Patent  (B) Breathing: Clear bilateral  (C) Circulation: Pulses:   normal  (D) Disabliity:  GCS Total:  15  (E) Expose:  Completed    Secondary Survey: (Click on Physical Exam tab above)  Physical Exam  Vitals and nursing note reviewed.   Constitutional:        General: He is not in acute distress.     Appearance: He is not toxic-appearing.   HENT:      Head: Normocephalic.      Comments: Forehead contusion     Right Ear: Tympanic membrane, ear canal and external ear normal.      Left Ear: Tympanic membrane, ear canal and external ear normal.   Eyes:      Extraocular Movements: Extraocular movements intact.      Pupils: Pupils are equal, round, and reactive to light.   Neck:      Comments: Cervical collar in place  Cardiovascular:      Rate and Rhythm: Normal rate.      Pulses: Normal pulses.      Heart sounds: No murmur heard.     No friction rub. No gallop.   Pulmonary:      Effort: Pulmonary effort is normal. No respiratory distress.      Breath sounds: No stridor. No wheezing or rales.   Abdominal:      General: There is no distension.      Palpations: Abdomen is soft.      Tenderness: There is no abdominal tenderness.   Musculoskeletal:         General: Tenderness and signs of injury present.      Comments: Forehead contusion   Skin:     General: Skin is warm and dry.      Coloration: Skin is not jaundiced.      Findings: Bruising present.      Comments: Skin tear to the left forearm abrasion to the lower lip   Neurological:      General: No focal deficit present.      Mental Status: He is alert and oriented to person, place, and time.      Cranial Nerves: No cranial nerve deficit.      Coordination: Coordination normal.   Psychiatric:         Mood and Affect: Mood normal.         Cervical spine cleared by clinical criteria? No (imaging required)      Invasive Devices       None                   Lab Results:   Results Reviewed       None                   Imaging Studies:   Direct to CT: Yes  TRAUMA - CT head wo contrast   Final Result by Virginia Junior MD (03/16 0714)   Addendum (preliminary) 1 of 1 by Virginia Junior MD (03/16 0714)   ADDENDUM:      The study was marked in EPIC for immediate notification.      Final      No acute intracranial hemorrhage.                   Workstation performed: [id number]         TRAUMA - CT spine cervical wo contrast   Final Result by Virginia Junior MD (03/16 0714)      No acute cervical spine fracture or traumatic malalignment.      The study was marked in EPIC for immediate notification.                  Workstation performed: [id number]               Procedures  Procedures         ED Course           Medical Decision Making  Fall from bed on Xarelto trauma evaluation,    Amount and/or Complexity of Data Reviewed  Radiology: ordered.                Disposition  [facility] Transfer: No  Final diagnoses:   Head injury   Skin tear of left upper extremity     Time reflects when diagnosis was documented in both MDM as applicable and the Disposition within this note       Time User Action Codes Description Comment    3/16/2024  6:24 AM Trevor Copeland [S09.90XA] Head injury     3/16/2024  6:24 AM Trevor Copleand [S41.112A] Skin tear of left upper extremity           ED Disposition       ED Disposition   Discharge    Condition   Stable    Date/Time   Sat Mar 16, 2024  7:15 AM    Comment   Trevor Lyons discharge to home/self care.                   Follow-up Information       Follow up With Specialties Details Why Contact Info Additional Information    [facility]   [facility] [facility]  [address]  [facility] [address]  [phone]        [facility] Emergency Department Emergency Medicine  As needed, If symptoms worsen [address] [address] [phone] [facility] Emergency Department, [address], [place] [address]          Patient's Medications   Discharge Prescriptions    No medications on file     No discharge procedures on file.    PDMP Review         Value Time User    PDMP Reviewed  Yes 3/12/2024  6:13 PM Matt Gordon MD            ED Provider  Electronically Signed by           Trevor Copeland DO  03/16/24  7214

## 2024-03-18 ENCOUNTER — TELEPHONE (OUTPATIENT)
Dept: FAMILY MEDICINE CLINIC | Facility: HOSPITAL | Age: 84
End: 2024-03-18

## 2024-03-18 NOTE — TELEPHONE ENCOUNTER
Fameghnaga was sent to replace jardiance.    [facility] needs a d/c order for the jardiance.    Pls fax to [phone].

## 2024-03-18 NOTE — PROGRESS NOTES
[facility]   Nupur HOGAN    Assessment/Plan:   1. Alzheimer disease (HCC)  Assessment & Plan:  Mod late onset  Moved to  [place] 2 weeks ago, 2 falls since move.  Family reported increased anxiety.  Has not started PT/OT but is set up to start through [facility].   Concerns for clonazepam at HS (historical med) as this meets BEERS criteria and could be contributing to falls.   Continue to establish daily routine, optimize nutrition/hydration and socialization.  Establish sleep routine.    Aricept 10mg daily.       2. Chronic combined systolic and diastolic CHF (congestive heart failure) ([facility])  Assessment & Plan:  Wt Readings from Last 3 Encounters:   03/19/24 84.8 kg (187 lb)   03/16/24 85 kg (187 lb 6.3 oz)   03/12/24 83 kg (183 lb)     Weight up 4lb/week without s/s fluid overload.   On lasix 40mg daily with 40mg qd prn >3lb wt gain.  No records of Bp/weights from  today. Asked Mr. Lyons's chaperone from  to obtain these records.  For now split lasix to 20mg po BID with hold parameters as Bp soft in office today, possibly contributing to increased falls.  Continue spironolactone 25mg daily, Toprol 25mg daily, Jardiance 25mg daily.            Orders:  -     CBC and differential; Future  -     Basic metabolic panel; Future  -     furosemide (LASIX) 20 mg tablet; Take 1 tablet (20 mg total) by mouth 2 (two) times a day Hold medication if systolic is less than 110    3. Paroxysmal atrial fibrillation (HCC)  Assessment & Plan:  Hx PAF, remains in SR.   Toprol 25mg daily, Xarelto 20mg daily.  Follow up with Cardiology 4/17/24.    Concerns for increased fall risk with DOAC.      Orders:  -     CBC and differential; Future  -     Basic metabolic panel; Future    4. Orthostatic hypotension  Assessment & Plan:  Vitals:    03/19/24 1323 03/19/24 1351 03/19/24 1353   BP: 102/60 110/58 90/54   BP Location:  Left arm Left arm   Patient Position:  Sitting Standing   Pulse: 65 60 60   SpO2:  98%     Weight: 84.8 kg (187 lb)      Bp consistently low in office today with possible orthostatics as Mr. Lyons did experience dizziness/lightheadedness with position change.    Adjust lasix to 20mg po BID with hold parameters.    Check B12        Orders:  -     CBC and differential; Future  -     Basic metabolic panel; Future    5. Recurrent falls  Assessment & Plan:  Multi-factorial:  recent move to [facility], dementia with anxiety, orthostatic hypotension, chronic LBP with sciatica.    Bp low in office today  On clonazepam, could be contributing   PT/OT set up but not started as of yet.  Impaired gait with grabbing onto walls/grab bars noted in office.      Orders:  -     CBC and differential; Future  -     Basic metabolic panel; Future    6. Vitamin B12 deficiency  -     Vitamin B12; Future        IC staff to send blood sugar log and blood pressure log.   Change lasix to 20mg po BID with hold SBp <110  Recheck CBCD/BMP/B12  Start PT/OT through [facility] nursing.   No follow-ups on file.  Patient may call or return to office with any questions or concerns.     ______________________________________________________________________  Subjective:     Patient ID: Trevor Lyons is a 83 y.o. male.  Trevor Lyons  Chief Complaint   Patient presents with    Follow-up     ER follow up       Here for f/u s/p ER visit on 3/16/24 for fall at NH on Xarelto.  He is accompanied by [facility] staff.  Fall unwitnessed, reports he rolled out of bed overnight with +head strike without LOC; bit lip.  Multiple skin tears and facial contusion present.  This is the second fall since moving to [facility] a few weeks ago.    Has not been able to start PT/OT.  Call to daughter Marisa who reports this is set up through [facility] nursing and will be starting soon.  Monthly order report provided at  without Vitals, blood sugars, weights.  Questioning if blood sugars/blood pressure and/or clonazepam contributing.  Recently increased buspar  last week.  Weight up 4lb/week without s/s fluid overload            Depression Screening and Follow-up Plan: Patient was screened for depression during today's encounter. They screened negative with a PHQ-2 score of 2.    Falls Plan of Care: balance, strength, and gait training instructions were provided and referral to physical therapy. Recommended assistive device to help with gait and balance. Patient assessed for orthostatic hypotension. Medications that increase falls were reviewed. Vitamin D supplementation was recommended.         The following portions of the patient's history were reviewed and updated as appropriate: allergies, current medications, past family history, past medical history, past social history, past surgical history, and problem list.    Review of Systems   Unable to perform ROS: Dementia   Constitutional: Negative.  Negative for activity change, appetite change, chills, fatigue and fever.   HENT:  Positive for hearing loss. Negative for congestion, ear pain, postnasal drip and sinus pain.    Eyes: Negative.    Respiratory: Negative.  Negative for cough and shortness of breath.    Cardiovascular: Negative.  Negative for chest pain and leg swelling.   Gastrointestinal: Negative.  Negative for constipation and diarrhea.   Endocrine: Negative.    Genitourinary: Negative.  Negative for dysuria.   Musculoskeletal:  Positive for arthralgias, back pain and gait problem.   Skin:  Positive for wound.   Allergic/Immunologic: Negative.  Negative for immunocompromised state.   Neurological:  Positive for dizziness and light-headedness.        Positional changes +dizziness/lightheadedness   Hematological:  Bruises/bleeds easily.   Psychiatric/Behavioral:  Positive for confusion and sleep disturbance.          Objective:      Vitals:    03/19/24 1353   BP: 90/54   Pulse: 60   SpO2:       Physical Exam  Vitals and nursing note reviewed. Exam conducted with a chaperone present.   Constitutional:        "Appearance: Normal appearance.   HENT:      Head: Normocephalic and atraumatic.      Right Ear: Tympanic membrane, ear canal and external ear normal.      Left Ear: Tympanic membrane, ear canal and external ear normal.      Nose: Nose normal.      Mouth/Throat:      Mouth: Mucous membranes are moist.      Pharynx: Oropharynx is clear.   Eyes:      Extraocular Movements: Extraocular movements intact.      Conjunctiva/sclera: Conjunctivae normal.      Pupils: Pupils are equal, round, and reactive to light.   Cardiovascular:      Rate and Rhythm: Normal rate and regular rhythm.      Pulses: Normal pulses.      Heart sounds: Murmur heard.   Pulmonary:      Effort: Pulmonary effort is normal.      Breath sounds: Normal breath sounds.   Abdominal:      General: Bowel sounds are normal.      Palpations: Abdomen is soft.   Musculoskeletal:         General: Deformity present. Normal range of motion.      Cervical back: Normal range of motion and neck supple.      Right lower leg: No edema.      Left lower leg: No edema.      Comments: Multiple skin tears BUE with DSD  Maxillary contusion   Skin:     General: Skin is warm and dry.      Coloration: Skin is pale.      Findings: Bruising present.   Neurological:      General: No focal deficit present.      Mental Status: He is alert. He is disoriented.      Sensory: Sensory deficit present.      Motor: Weakness present.      Coordination: Coordination abnormal.      Gait: Gait abnormal.   Psychiatric:         Mood and Affect: Mood normal.         Speech: Speech normal.         Behavior: Behavior is slowed. Behavior is cooperative.         Thought Content: Thought content normal.         Cognition and Memory: Memory is impaired.         Judgment: Judgment is impulsive and inappropriate.           Portions of the record may have been created with voice recognition software. Occasional wrong word or \"sound alike\" substitutions may have occurred due to the inherent limitations of " voice recognition software. Please review the chart carefully and recognize, using context, where substitutions/typographical errors may have occurred.

## 2024-03-19 ENCOUNTER — OFFICE VISIT (OUTPATIENT)
Dept: FAMILY MEDICINE CLINIC | Facility: HOSPITAL | Age: 84
End: 2024-03-19
Payer: COMMERCIAL

## 2024-03-19 ENCOUNTER — TELEPHONE (OUTPATIENT)
Dept: ADMINISTRATIVE | Facility: OTHER | Age: 84
End: 2024-03-19

## 2024-03-19 VITALS
HEART RATE: 60 BPM | OXYGEN SATURATION: 98 % | SYSTOLIC BLOOD PRESSURE: 90 MMHG | WEIGHT: 187 LBS | DIASTOLIC BLOOD PRESSURE: 54 MMHG

## 2024-03-19 DIAGNOSIS — G30.9 ALZHEIMER DISEASE (HCC): Primary | ICD-10-CM

## 2024-03-19 DIAGNOSIS — I48.0 PAROXYSMAL ATRIAL FIBRILLATION (HCC): ICD-10-CM

## 2024-03-19 DIAGNOSIS — E53.8 VITAMIN B12 DEFICIENCY: ICD-10-CM

## 2024-03-19 DIAGNOSIS — R29.6 RECURRENT FALLS: ICD-10-CM

## 2024-03-19 DIAGNOSIS — I50.42 CHRONIC COMBINED SYSTOLIC AND DIASTOLIC CHF (CONGESTIVE HEART FAILURE) (HCC): ICD-10-CM

## 2024-03-19 DIAGNOSIS — I95.1 ORTHOSTATIC HYPOTENSION: ICD-10-CM

## 2024-03-19 DIAGNOSIS — F02.80 ALZHEIMER DISEASE (HCC): Primary | ICD-10-CM

## 2024-03-19 PROBLEM — J44.1 COPD WITH ACUTE EXACERBATION (HCC): Status: ACTIVE | Noted: 2024-03-19

## 2024-03-19 PROCEDURE — 99214 OFFICE O/P EST MOD 30 MIN: CPT | Performed by: NURSE PRACTITIONER

## 2024-03-19 RX ORDER — FUROSEMIDE 20 MG/1
20 TABLET ORAL 2 TIMES DAILY
Qty: 60 TABLET | Refills: 2 | Status: SHIPPED | OUTPATIENT
Start: 2024-03-19

## 2024-03-19 NOTE — TELEPHONE ENCOUNTER
----- Message from Raquel Ozuna MA sent at 3/18/2024  4:41 PM EDT -----  Regarding: care gap  03/18/24 4:42 PM    Hello, our patient Trevor Lyons has had Hemoglobin A1c and Urine Albumin/Creatinine Ratio completed/performed. Please assist in updating the patient chart by pulling the document from encounter Tab within Chart Review. The date of service is 02/06/2024.     Thank you,  Raquel Ozuna MA  [facility] [address]

## 2024-03-19 NOTE — ASSESSMENT & PLAN NOTE
Vitals:    03/19/24 1323 03/19/24 1351 03/19/24 1353   BP: 102/60 110/58 90/54   BP Location:  Left arm Left arm   Patient Position:  Sitting Standing   Pulse: 65 60 60   SpO2: 98%     Weight: 84.8 kg (187 lb)      Bp consistently low in office today with possible orthostatics as Mr. Lyons did experience dizziness/lightheadedness with position change.    Adjust lasix to 20mg po BID with hold parameters.    Check B12

## 2024-03-19 NOTE — ASSESSMENT & PLAN NOTE
Hx PAF, remains in SR.   Toprol 25mg daily, Xarelto 20mg daily.  Follow up with Cardiology 4/17/24.    Concerns for increased fall risk with DOAC.

## 2024-03-19 NOTE — ASSESSMENT & PLAN NOTE
Wt Readings from Last 3 Encounters:   03/19/24 84.8 kg (187 lb)   03/16/24 85 kg (187 lb 6.3 oz)   03/12/24 83 kg (183 lb)     Weight up 4lb/week without s/s fluid overload.   On lasix 40mg daily with 40mg qd prn >3lb wt gain.  No records of Bp/weights from IC today. Asked Mr. Lyons's chaperone from IC to obtain these records.  For now split lasix to 20mg po BID with hold parameters as Bp soft in office today, possibly contributing to increased falls.  Continue spironolactone 25mg daily, Toprol 25mg daily, Jardiance 25mg daily.

## 2024-03-19 NOTE — ASSESSMENT & PLAN NOTE
Multi-factorial:  recent move to [place], dementia with anxiety, orthostatic hypotension, chronic LBP with sciatica.    Bp low in office today  On clonazepam, could be contributing   PT/OT set up but not started as of yet.  Impaired gait with grabbing onto walls/grab bars noted in office.

## 2024-03-19 NOTE — ASSESSMENT & PLAN NOTE
Mod late onset  Moved to [facility] 2 weeks ago, 2 falls since move.  Family reported increased anxiety.  Has not started PT/OT but is set up to start through [facility].   Concerns for clonazepam at HS (historical med) as this meets BEERS criteria and could be contributing to falls.   Continue to establish daily routine, optimize nutrition/hydration and socialization.  Establish sleep routine.    Aricept 10mg daily.

## 2024-03-26 ENCOUNTER — TELEPHONE (OUTPATIENT)
Dept: FAMILY MEDICINE CLINIC | Facility: HOSPITAL | Age: 84
End: 2024-03-26

## 2024-03-26 NOTE — TELEPHONE ENCOUNTER
Please send med list to [facility] pharmacy.        Yes, my name is Ninfa Jansen, my father. We just got to be a new patient. It's she started, he started with Cat, but we've moved her over to him, over to Doctor Michi. I was wondering if you're able to send over a complete list of Trevor Arnold  [id number], his medication list to [facility] pharmacy. We're trying to get them to lower the prices from the pharmacy that [facility] uses. If you have any questions, my number is [phone]. Thank you.  You received a voice mail from JIM HADDADILA.

## 2024-03-26 NOTE — TELEPHONE ENCOUNTER
Pt current med list printed. Daughter will p/u list to take to [facility] to see if they can give her pricing. CR

## 2024-03-27 ENCOUNTER — TELEPHONE (OUTPATIENT)
Dept: FAMILY MEDICINE CLINIC | Facility: HOSPITAL | Age: 84
End: 2024-03-27

## 2024-03-27 ENCOUNTER — APPOINTMENT (OUTPATIENT)
Dept: LAB | Facility: HOSPITAL | Age: 84
End: 2024-03-27
Payer: COMMERCIAL

## 2024-03-27 DIAGNOSIS — E53.8 VITAMIN B12 DEFICIENCY: ICD-10-CM

## 2024-03-27 DIAGNOSIS — R29.6 RECURRENT FALLS: ICD-10-CM

## 2024-03-27 DIAGNOSIS — I50.42 CHRONIC COMBINED SYSTOLIC AND DIASTOLIC CHF (CONGESTIVE HEART FAILURE) (HCC): ICD-10-CM

## 2024-03-27 DIAGNOSIS — I48.0 PAROXYSMAL ATRIAL FIBRILLATION (HCC): ICD-10-CM

## 2024-03-27 DIAGNOSIS — I95.1 ORTHOSTATIC HYPOTENSION: ICD-10-CM

## 2024-03-27 LAB
BASOPHILS # BLD AUTO: 0.03 THOUSANDS/ÂΜL (ref 0–0.1)
BASOPHILS NFR BLD AUTO: 0 % (ref 0–1)
EOSINOPHIL # BLD AUTO: 0.05 THOUSAND/ÂΜL (ref 0–0.61)
EOSINOPHIL NFR BLD AUTO: 1 % (ref 0–6)
ERYTHROCYTE [DISTWIDTH] IN BLOOD BY AUTOMATED COUNT: 14.2 % (ref 11.6–15.1)
HCT VFR BLD AUTO: 39.2 % (ref 36.5–49.3)
HGB BLD-MCNC: 12.4 G/DL (ref 12–17)
IMM GRANULOCYTES # BLD AUTO: 0.04 THOUSAND/UL (ref 0–0.2)
IMM GRANULOCYTES NFR BLD AUTO: 0 % (ref 0–2)
LYMPHOCYTES # BLD AUTO: 4.6 THOUSANDS/ÂΜL (ref 0.6–4.47)
LYMPHOCYTES NFR BLD AUTO: 51 % (ref 14–44)
MCH RBC QN AUTO: 30.5 PG (ref 26.8–34.3)
MCHC RBC AUTO-ENTMCNC: 31.6 G/DL (ref 31.4–37.4)
MCV RBC AUTO: 96 FL (ref 82–98)
MONOCYTES # BLD AUTO: 0.62 THOUSAND/ÂΜL (ref 0.17–1.22)
MONOCYTES NFR BLD AUTO: 7 % (ref 4–12)
NEUTROPHILS # BLD AUTO: 3.65 THOUSANDS/ÂΜL (ref 1.85–7.62)
NEUTS SEG NFR BLD AUTO: 41 % (ref 43–75)
NRBC BLD AUTO-RTO: 0 /100 WBCS
PLATELET # BLD AUTO: 196 THOUSANDS/UL (ref 149–390)
PMV BLD AUTO: 9.2 FL (ref 8.9–12.7)
RBC # BLD AUTO: 4.07 MILLION/UL (ref 3.88–5.62)
VIT B12 SERPL-MCNC: 295 PG/ML (ref 180–914)
WBC # BLD AUTO: 8.99 THOUSAND/UL (ref 4.31–10.16)

## 2024-03-27 PROCEDURE — 85025 COMPLETE CBC W/AUTO DIFF WBC: CPT

## 2024-03-27 PROCEDURE — 82607 VITAMIN B-12: CPT

## 2024-03-27 PROCEDURE — 80048 BASIC METABOLIC PNL TOTAL CA: CPT

## 2024-03-27 PROCEDURE — 36415 COLL VENOUS BLD VENIPUNCTURE: CPT

## 2024-03-27 NOTE — TELEPHONE ENCOUNTER
Patient was to have labs today. Patient refused to fast last night, so only cbc and b12 were done today.    BMP will need to be done next week.   There are no Wet Read(s) to document. There is 1 Wet Read(s) to document.

## 2024-03-28 LAB
ANION GAP SERPL CALCULATED.3IONS-SCNC: 11 MMOL/L (ref 4–13)
BUN SERPL-MCNC: 14 MG/DL (ref 5–25)
CALCIUM SERPL-MCNC: 8.4 MG/DL (ref 8.4–10.2)
CHLORIDE SERPL-SCNC: 98 MMOL/L (ref 96–108)
CO2 SERPL-SCNC: 29 MMOL/L (ref 21–32)
CREAT SERPL-MCNC: 0.87 MG/DL (ref 0.6–1.3)
GFR SERPL CREATININE-BSD FRML MDRD: 78 ML/MIN/1.73SQ M
GLUCOSE SERPL-MCNC: 183 MG/DL (ref 65–140)
POTASSIUM SERPL-SCNC: 4 MMOL/L (ref 3.5–5.3)
SODIUM SERPL-SCNC: 138 MMOL/L (ref 135–147)

## 2024-03-28 NOTE — TELEPHONE ENCOUNTER
I called the lab and they were able to add on the BMP to the bloodwork drawn yesterday. [facility] nurse Corrie was notified.

## 2024-04-01 DIAGNOSIS — I71.21 ANEURYSM OF ASCENDING AORTA WITHOUT RUPTURE (HCC): ICD-10-CM

## 2024-04-01 DIAGNOSIS — G30.9 ALZHEIMER DISEASE (HCC): Primary | ICD-10-CM

## 2024-04-01 DIAGNOSIS — I50.42 CHRONIC COMBINED SYSTOLIC AND DIASTOLIC CHF (CONGESTIVE HEART FAILURE) (HCC): ICD-10-CM

## 2024-04-01 DIAGNOSIS — F02.80 ALZHEIMER DISEASE (HCC): Primary | ICD-10-CM

## 2024-04-01 RX ORDER — PANTOPRAZOLE SODIUM 40 MG/1
40 TABLET, DELAYED RELEASE ORAL DAILY
Qty: 30 TABLET | Refills: 3 | Status: SHIPPED | OUTPATIENT
Start: 2024-04-01

## 2024-04-01 RX ORDER — DONEPEZIL HYDROCHLORIDE 10 MG/1
10 TABLET, FILM COATED ORAL EVERY EVENING
Qty: 30 TABLET | Refills: 3 | Status: SHIPPED | OUTPATIENT
Start: 2024-04-01 | End: 2024-04-12 | Stop reason: SDUPTHER

## 2024-04-03 ENCOUNTER — APPOINTMENT (OUTPATIENT)
Dept: LAB | Facility: HOSPITAL | Age: 84
End: 2024-04-03
Attending: STUDENT IN AN ORGANIZED HEALTH CARE EDUCATION/TRAINING PROGRAM
Payer: COMMERCIAL

## 2024-04-03 DIAGNOSIS — I50.42 CHRONIC COMBINED SYSTOLIC AND DIASTOLIC HEART FAILURE (HCC): ICD-10-CM

## 2024-04-03 LAB
ANION GAP SERPL CALCULATED.3IONS-SCNC: 11 MMOL/L (ref 4–13)
BUN SERPL-MCNC: 14 MG/DL (ref 5–25)
CALCIUM SERPL-MCNC: 8.6 MG/DL (ref 8.4–10.2)
CHLORIDE SERPL-SCNC: 100 MMOL/L (ref 96–108)
CO2 SERPL-SCNC: 31 MMOL/L (ref 21–32)
CREAT SERPL-MCNC: 0.93 MG/DL (ref 0.6–1.3)
GFR SERPL CREATININE-BSD FRML MDRD: 74 ML/MIN/1.73SQ M
POTASSIUM SERPL-SCNC: 4.3 MMOL/L (ref 3.5–5.3)
SODIUM SERPL-SCNC: 142 MMOL/L (ref 135–147)

## 2024-04-03 PROCEDURE — 36415 COLL VENOUS BLD VENIPUNCTURE: CPT

## 2024-04-03 PROCEDURE — 80048 BASIC METABOLIC PNL TOTAL CA: CPT

## 2024-04-04 ENCOUNTER — TELEPHONE (OUTPATIENT)
Dept: FAMILY MEDICINE CLINIC | Facility: HOSPITAL | Age: 84
End: 2024-04-04

## 2024-04-04 DIAGNOSIS — M51.37 DDD (DEGENERATIVE DISC DISEASE), LUMBOSACRAL: Primary | ICD-10-CM

## 2024-04-04 PROBLEM — M51.379 DDD (DEGENERATIVE DISC DISEASE), LUMBOSACRAL: Status: ACTIVE | Noted: 2024-04-04

## 2024-04-04 RX ORDER — ACETAMINOPHEN 500 MG
1000 TABLET ORAL EVERY EVENING
Qty: 60 TABLET | Refills: 5 | Status: SHIPPED | OUTPATIENT
Start: 2024-04-04

## 2024-04-04 RX ORDER — LIDOCAINE 4 G/G
1 PATCH TOPICAL 2 TIMES DAILY
Qty: 60 PATCH | Refills: 5 | Status: SHIPPED | OUTPATIENT
Start: 2024-04-04

## 2024-04-04 NOTE — TELEPHONE ENCOUNTER
Message on VM:    Hi, my name is Corrie Yanez. I'm calling from [facility] [place]. I am calling about . I'm going to spell his last name BEULAH i.eKatelyn LYMAN date of birth is [birth year]. I'm calling to see if we could adjust the timing of his lidocaine patch. He's having some pretty significant pain at bedtime and overnight and he gets a patch on in the morning and then takes it off at night. He would really like to have a patch on all the time, so I was hoping to get in order to put one on in the morning and take that one off at night and put a new one on at night. Or if we could adjust, maybe scheduling some Tylenol for him as well. I'm not sure what you would like to do, but he got kind of aggressive last evening with my staff about the pain patch. So I just wanted to discuss with somebody about that. If you can give me a call back, my number is [phone]. Thank you.

## 2024-04-09 NOTE — TELEPHONE ENCOUNTER
Upon review of the In Basket request we were able to locate, review, and update the patient chart as requested for Hemoglobin A1c.    Any additional questions or concerns should be emailed to the Practice Liaisons via the appropriate education email address, please do not reply via In Basket.    Thank you  Jessica Dahl

## 2024-04-09 NOTE — TELEPHONE ENCOUNTER
Upon review of the In Basket request we were able to note that no further action is required. The patient chart is up to date as a result of a previous request.  The Urine Albumin/Creatinine Ratio needs to have all 3 components to qualify for HM.    Any additional questions or concerns should be emailed to the Practice Liaisons via the appropriate education email address, please do not reply via In Basket.    Thank you  Jessica Dahl

## 2024-04-11 ENCOUNTER — TELEPHONE (OUTPATIENT)
Dept: FAMILY MEDICINE CLINIC | Facility: HOSPITAL | Age: 84
End: 2024-04-11

## 2024-04-11 NOTE — TELEPHONE ENCOUNTER
Per nicole @ 440f    Hello, my name is Yusuf. I'm calling from [facility] about Joe Simmons's YOB: 1940. I was calling There are a couple vacations at our pharmacy says we have no refills for and they have not received the script for. I'm going to fax over a medication list for him in the back line and if that can be signed and faxed back to either us or [facility] tomorrow so that we can get these refills in at least by the weekend, that'd be great. Our phone number here is [phone] with any questions. My name is Debora. If not, our fax number is on this sheet and I will write down the pharmacy fax on the sheet also. Thank you.

## 2024-04-12 DIAGNOSIS — F41.9 ANXIETY: ICD-10-CM

## 2024-04-12 DIAGNOSIS — G30.9 ALZHEIMER DISEASE (HCC): ICD-10-CM

## 2024-04-12 DIAGNOSIS — I71.21 ANEURYSM OF ASCENDING AORTA WITHOUT RUPTURE (HCC): ICD-10-CM

## 2024-04-12 DIAGNOSIS — F02.80 ALZHEIMER DISEASE (HCC): ICD-10-CM

## 2024-04-12 RX ORDER — DONEPEZIL HYDROCHLORIDE 10 MG/1
10 TABLET, FILM COATED ORAL EVERY EVENING
Qty: 30 TABLET | Refills: 3 | Status: SHIPPED | OUTPATIENT
Start: 2024-04-12

## 2024-04-12 RX ORDER — BUSPIRONE HYDROCHLORIDE 7.5 MG/1
7.5 TABLET ORAL 2 TIMES DAILY
Qty: 60 TABLET | Refills: 5 | Status: SHIPPED | OUTPATIENT
Start: 2024-04-12

## 2024-04-18 ENCOUNTER — OFFICE VISIT (OUTPATIENT)
Dept: CARDIOLOGY CLINIC | Facility: CLINIC | Age: 84
End: 2024-04-18
Payer: COMMERCIAL

## 2024-04-18 VITALS
DIASTOLIC BLOOD PRESSURE: 58 MMHG | BODY MASS INDEX: 29.29 KG/M2 | HEIGHT: 67 IN | SYSTOLIC BLOOD PRESSURE: 112 MMHG | HEART RATE: 65 BPM

## 2024-04-18 DIAGNOSIS — I49.5 SICK SINUS SYNDROME (HCC): ICD-10-CM

## 2024-04-18 DIAGNOSIS — I48.91 ATRIAL FIBRILLATION, UNSPECIFIED TYPE (HCC): Primary | ICD-10-CM

## 2024-04-18 DIAGNOSIS — E78.2 MIXED HYPERLIPIDEMIA: ICD-10-CM

## 2024-04-18 DIAGNOSIS — I50.42 CHRONIC COMBINED SYSTOLIC AND DIASTOLIC CHF (CONGESTIVE HEART FAILURE) (HCC): ICD-10-CM

## 2024-04-18 DIAGNOSIS — Z95.1 S/P CABG (CORONARY ARTERY BYPASS GRAFT): ICD-10-CM

## 2024-04-18 DIAGNOSIS — I95.1 ORTHOSTATIC HYPOTENSION: ICD-10-CM

## 2024-04-18 DIAGNOSIS — Z95.0 CARDIAC PACEMAKER: ICD-10-CM

## 2024-04-18 DIAGNOSIS — Z95.2 S/P TAVR (TRANSCATHETER AORTIC VALVE REPLACEMENT): ICD-10-CM

## 2024-04-18 DIAGNOSIS — G30.9 ALZHEIMER DISEASE (HCC): ICD-10-CM

## 2024-04-18 DIAGNOSIS — F02.80 ALZHEIMER DISEASE (HCC): ICD-10-CM

## 2024-04-18 PROBLEM — I25.10 CORONARY ARTERY DISEASE INVOLVING NATIVE CORONARY ARTERY OF NATIVE HEART WITHOUT ANGINA PECTORIS: Status: ACTIVE | Noted: 2024-04-18

## 2024-04-18 PROCEDURE — 93000 ELECTROCARDIOGRAM COMPLETE: CPT | Performed by: INTERNAL MEDICINE

## 2024-04-18 PROCEDURE — 99204 OFFICE O/P NEW MOD 45 MIN: CPT | Performed by: INTERNAL MEDICINE

## 2024-04-18 RX ORDER — LANOLIN ALCOHOL/MO/W.PET/CERES
325 CREAM (GRAM) TOPICAL
COMMUNITY

## 2024-04-18 RX ORDER — ALBUTEROL SULFATE 90 UG/1
2 AEROSOL, METERED RESPIRATORY (INHALATION) EVERY 6 HOURS PRN
COMMUNITY

## 2024-04-18 NOTE — PATIENT INSTRUCTIONS
Recommendations:  Continue current medications.  Enroll in device clinic.  Follow up in 4 months.

## 2024-04-18 NOTE — PROGRESS NOTES
Cardiology   Trevor Lyons 83 y.o. male MRN: [id number]        Impression:  CAD s/p CABG 2005 - stable.  AS s/p TAVR 2021 - stable.  PAF s/p PPM - [facility]  Chronic combined systolic and diastolic HF - EF 35-40%.  Predominantly ischemic.  Compensated.  No ACE-I/ARB/ARNI due to relative hypotension.  On b-blockers, SGLT2i, or MRA.   HTN/orthostatic hypotension - occasionally dizzy.    Dyslipidemia  Mitral regurgitation/Mitral stenosis - stable.     Recommendations:  Continue current medications.  Enroll in device clinic.  Follow up in 4 months.       HPI: Trevor Lyons is a 83 y.o. year old male with CAD s/p CABG 2005, paroxysmal atrial fibrillation s/p [facility] PPM, s/p TAVR 2021 Chronic combined systolic and diastolic heart failure, orthostatic hypotension who presents for evaluation.  Was in ED 3/16/24 with a fall. Echo 1/24 - EF 35-40%, ANA CRISTINA, s/p TAVR, mod MR, mild MS, who presents for establishment of a new cardiologist.  No chest pain, shortness of breath, or palpitations.  Occasionally dizzy - possibly orthostasis. Does have some dizziness and gait disturbance.         Review of Systems   Constitutional: Negative.    HENT: Negative.     Eyes: Negative.    Respiratory:  Negative for chest tightness and shortness of breath.    Cardiovascular:  Negative for chest pain, palpitations and leg swelling.   Gastrointestinal: Negative.    Endocrine: Negative.    Genitourinary: Negative.    Musculoskeletal:  Positive for back pain and gait problem.   Skin: Negative.    Allergic/Immunologic: Negative.    Neurological:  Positive for dizziness.   Hematological: Negative.    Psychiatric/Behavioral: Negative.     All other systems reviewed and are negative.        Past Medical History:   Diagnosis Date    Alzheimer disease (HCC)     Anemia     Aneurysm of ascending aorta without rupture (HCC)     Anxiety     Atrial fibrillation (HCC)     CHF (congestive heart failure) (HCC)     Depression     Diabetes mellitus  (HCC)     Disease of thyroid gland     Pulmonary hypertension (HCC)     Sciatica     Sick sinus syndrome (HCC)      Past Surgical History:   Procedure Laterality Date    CARDIAC PACEMAKER PLACEMENT       Social History     Substance and Sexual Activity   Alcohol Use Not Currently     Social History     Substance and Sexual Activity   Drug Use Never     Social History     Tobacco Use   Smoking Status Former    Types: Cigarettes   Smokeless Tobacco Never     Family History   Problem Relation Age of Onset    Dementia Mother     Heart disease Father     Stroke Father     Thyroid disease Daughter     Thyroid disease Daughter        Allergies:  No Known Allergies    Medications:     Current Outpatient Medications:     acetaminophen (TYLENOL) 500 mg tablet, Take 2 tablets (1,000 mg total) by mouth every evening, Disp: 60 tablet, Rfl: 5    albuterol (2.5 mg/3 mL) 0.083 % nebulizer solution, Inhale 2.5 mg 4 (four) times a day, Disp: , Rfl:     albuterol (PROVENTIL HFA,VENTOLIN HFA) 90 mcg/act inhaler, Inhale 2 puffs every 6 (six) hours as needed for wheezing, Disp: , Rfl:     atorvastatin (LIPITOR) 20 mg tablet, Take 20 mg by mouth daily, Disp: , Rfl:     Blood Glucose Monitoring Suppl (True Metrix Air Glucose Meter) w/Device KIT, Use daily, Disp: 1 kit, Rfl: 0    busPIRone (BUSPAR) 7.5 mg tablet, Take 1 tablet (7.5 mg total) by mouth 2 (two) times a day, Disp: 60 tablet, Rfl: 5    clonazePAM (KlonoPIN) 0.5 mg tablet, Take 1 tablet (0.5 mg total) by mouth daily at bedtime as needed for anxiety, Disp: 30 tablet, Rfl: 0    Diclofenac Sodium (VOLTAREN) 1 %, Place 1 g on the skin, Disp: , Rfl:     donepezil (ARICEPT) 10 mg tablet, Take 1 tablet (10 mg total) by mouth every evening, Disp: 30 tablet, Rfl: 3    Farxiga 10 MG tablet, Take 1 tablet (10 mg total) by mouth daily, Disp: 30 tablet, Rfl: 5    ferrous sulfate 325 (65 FE) MG EC tablet, Take 325 mg by mouth daily with breakfast, Disp: , Rfl:      fluticasone-umeclidinium-vilanterol 100-62.5-25 mcg/actuation inhaler, Inhale 1 puff daily, Disp: , Rfl:     folic acid (FOLVITE) 1 mg tablet, Take 1 mg by mouth daily, Disp: , Rfl:     furosemide (LASIX) 20 mg tablet, Take 1 tablet (20 mg total) by mouth 2 (two) times a day Hold medication if systolic is less than 110, Disp: 60 tablet, Rfl: 2    glucose blood (True Metrix Blood Glucose Test) test strip, Testing 1 time daily, Disp: 100 strip, Rfl: 5    Lancets 33G MISC, Testing 1 time daily, Disp: 100 each, Rfl: 3    levothyroxine 112 mcg tablet, Take 112 mcg by mouth, Disp: , Rfl:     Lidocaine 4 % PTCH, Place 1 patch on the skin 2 (two) times a day, Disp: 60 patch, Rfl: 5    loratadine (CLARITIN) 10 mg tablet, Take 10 mg by mouth daily, Disp: , Rfl:     magnesium oxide (MAG-OX) 400 mg, Take by mouth 2 (two) times a day, Disp: , Rfl:     Melatonin 5 MG CAPS, Take 10 mg by mouth daily, Disp: , Rfl:     metoprolol succinate (TOPROL-XL) 25 mg 24 hr tablet, Take 25 mg by mouth daily, Disp: , Rfl:     Multiple Vitamin (DAILY-NACHO MULTIVITAMIN PO), Take by mouth, Disp: , Rfl:     pantoprazole (PROTONIX) 40 mg tablet, Take 1 tablet (40 mg total) by mouth daily, Disp: 30 tablet, Rfl: 3    rivaroxaban (XARELTO) 20 mg tablet, Take 1 tablet (20 mg total) by mouth daily with breakfast, Disp: 30 tablet, Rfl: 3    sertraline (ZOLOFT) 100 mg tablet, Take 100 mg by mouth daily, Disp: , Rfl:     spironolactone (ALDACTONE) 25 mg tablet, Take 25 mg by mouth daily, Disp: , Rfl:       Wt Readings from Last 3 Encounters:   03/19/24 84.8 kg (187 lb)   03/16/24 85 kg (187 lb 6.3 oz)   03/12/24 83 kg (183 lb)     Temp Readings from Last 3 Encounters:   03/16/24 97.8 °F (36.6 °C) (Temporal)     BP Readings from Last 3 Encounters:   04/18/24 112/58   03/19/24 90/54   03/16/24 131/64     Pulse Readings from Last 3 Encounters:   04/18/24 65   03/19/24 60   03/16/24 64         Physical Exam  HENT:      Head: Atraumatic.      Mouth/Throat:       Mouth: Mucous membranes are moist.   Eyes:      Extraocular Movements: Extraocular movements intact.   Cardiovascular:      Rate and Rhythm: Normal rate and regular rhythm.      Heart sounds: Normal heart sounds.   Pulmonary:      Effort: Pulmonary effort is normal.      Breath sounds: Normal breath sounds.   Abdominal:      General: Abdomen is flat.   Musculoskeletal:         General: Normal range of motion.      Cervical back: Normal range of motion.   Skin:     General: Skin is warm.   Neurological:      General: No focal deficit present.      Mental Status: He is alert and oriented to person, place, and time.   Psychiatric:         Mood and Affect: Mood normal.         Behavior: Behavior normal.           Laboratory Studies:  CMP:  Lab Results   Component Value Date    K 4.3 04/03/2024     04/03/2024    CO2 31 04/03/2024    BUN 14 04/03/2024    CREATININE 0.93 04/03/2024    AST 24 10/21/2021    ALT 15 (L) 10/21/2021    EGFR 74 04/03/2024         Cardiac testing:   EKG reviewed personally: V-paced 65

## 2024-04-23 ENCOUNTER — OFFICE VISIT (OUTPATIENT)
Dept: FAMILY MEDICINE CLINIC | Facility: HOSPITAL | Age: 84
End: 2024-04-23
Payer: MEDICARE

## 2024-04-23 VITALS
WEIGHT: 191 LBS | BODY MASS INDEX: 29.98 KG/M2 | SYSTOLIC BLOOD PRESSURE: 122 MMHG | HEIGHT: 67 IN | OXYGEN SATURATION: 97 % | HEART RATE: 66 BPM | DIASTOLIC BLOOD PRESSURE: 60 MMHG

## 2024-04-23 DIAGNOSIS — Z95.2 S/P TAVR (TRANSCATHETER AORTIC VALVE REPLACEMENT): ICD-10-CM

## 2024-04-23 DIAGNOSIS — I50.42 CHRONIC COMBINED SYSTOLIC AND DIASTOLIC CHF (CONGESTIVE HEART FAILURE) (HCC): ICD-10-CM

## 2024-04-23 DIAGNOSIS — Z95.0 CARDIAC PACEMAKER: ICD-10-CM

## 2024-04-23 DIAGNOSIS — E61.1 IRON DEFICIENCY: ICD-10-CM

## 2024-04-23 DIAGNOSIS — E78.2 MIXED HYPERLIPIDEMIA: ICD-10-CM

## 2024-04-23 DIAGNOSIS — J44.1 COPD WITH ACUTE EXACERBATION (HCC): ICD-10-CM

## 2024-04-23 DIAGNOSIS — F41.8 DEPRESSION WITH ANXIETY: ICD-10-CM

## 2024-04-23 DIAGNOSIS — F02.80 ALZHEIMER DISEASE (HCC): ICD-10-CM

## 2024-04-23 DIAGNOSIS — R29.6 RECURRENT FALLS: ICD-10-CM

## 2024-04-23 DIAGNOSIS — R09.89 BILATERAL CAROTID BRUITS: ICD-10-CM

## 2024-04-23 DIAGNOSIS — E03.9 ACQUIRED HYPOTHYROIDISM: Primary | ICD-10-CM

## 2024-04-23 DIAGNOSIS — Z95.1 S/P CABG (CORONARY ARTERY BYPASS GRAFT): ICD-10-CM

## 2024-04-23 DIAGNOSIS — I49.5 SICK SINUS SYNDROME (HCC): ICD-10-CM

## 2024-04-23 DIAGNOSIS — E11.9 TYPE 2 DIABETES MELLITUS WITHOUT COMPLICATION, WITHOUT LONG-TERM CURRENT USE OF INSULIN (HCC): ICD-10-CM

## 2024-04-23 DIAGNOSIS — G30.9 ALZHEIMER DISEASE (HCC): ICD-10-CM

## 2024-04-23 PROBLEM — I48.91 A-FIB (HCC): Status: RESOLVED | Noted: 2024-03-12 | Resolved: 2024-04-23

## 2024-04-23 PROBLEM — F32.A DEPRESSION: Status: RESOLVED | Noted: 2022-11-10 | Resolved: 2024-04-23

## 2024-04-23 PROBLEM — E07.9 THYROID DISEASE: Status: RESOLVED | Noted: 2024-03-12 | Resolved: 2024-04-23

## 2024-04-23 PROBLEM — F32.A DEPRESSION: Status: ACTIVE | Noted: 2022-11-10

## 2024-04-23 PROCEDURE — 99214 OFFICE O/P EST MOD 30 MIN: CPT | Performed by: STUDENT IN AN ORGANIZED HEALTH CARE EDUCATION/TRAINING PROGRAM

## 2024-04-23 RX ORDER — ALBUTEROL SULFATE 2.5 MG/3ML
2.5 SOLUTION RESPIRATORY (INHALATION) 4 TIMES DAILY
Qty: 1 ML | Refills: 0 | Status: SHIPPED | COMMUNITY
Start: 2024-04-23

## 2024-04-23 NOTE — PROGRESS NOTES
[facility]   Ira Borden DO    Assessment/Plan:      Diagnosis ICD-10-CM Associated Orders   1. Acquired hypothyroidism  E03.9 TSH, 3rd generation     T4, free      2. Alzheimer disease (HCC)  G30.9     F02.80       3. Bilateral carotid bruits  R09.89       4. Depression with anxiety  F41.8       5. Recurrent falls  R29.6       6. S/P CABG (coronary artery bypass graft)  Z95.1       7. S/P TAVR (transcatheter aortic valve replacement)  Z95.2       8. Sick sinus syndrome ([facility])  I49.5       9. Type 2 diabetes mellitus without complication, without long-term current use of insulin ([facility])  E11.9 Hemoglobin A1C     Basic metabolic panel      10. Mixed hyperlipidemia  E78.2       11. Cardiac pacemaker  Z95.0       12. Iron deficiency  E61.1 Iron Panel (Includes Ferritin, Iron Sat%, Iron, and TIBC)      13. Chronic combined systolic and diastolic CHF (congestive heart failure) ([facility])  I50.42       14. COPD with acute exacerbation ([facility])  J44.1 albuterol (2.5 mg/3 mL) 0.083 % nebulizer solution        Doing well right now.   States low back pain worse at bedtime, but under control mostly.   Patient's medications were reviewed and reconciled.    Ordered/Re-ordered as above.  NON - FASTING BW  After 5/15/24, any time   Return in about 4 months (around 8/23/2024) for Annual Physical.  Patient may call or return to office with any questions or concerns.   ______________________________________________________________________  Subjective:     Patient ID: Trevor Lyons is a 83 y.o. male.  HPI  Trevor Lyons  Chief Complaint   Patient presents with   • Follow-up     Not ill recently.   Feeling good.     Does get L leg pain while sleeping.     Does get occas headaches.     Using walker rarely at IC.   Uses red wheelchair while out.   Mostly not any canes.     Labs 4/3/24 - K+ back to normal.     No recent falls.     Wt Readings from Last 3 Encounters:   04/23/24 86.6 kg (191 lb)   03/19/24 84.8 kg (187  lb)   03/16/24 85 kg (187 lb 6.3 oz)     Temp Readings from Last 3 Encounters:   03/16/24 97.8 °F (36.6 °C) (Temporal)     BP Readings from Last 3 Encounters:   04/23/24 122/60   04/18/24 112/58   03/19/24 90/54     Pulse Readings from Last 3 Encounters:   04/23/24 66   04/18/24 65   03/19/24 60   Food is good there. Drinks water.        The following portions of the patient's history were reviewed and updated as appropriate: allergies, current medications, past medical history, and problem list.    Review of Systems   Constitutional:  Negative for chills and fever.   Respiratory:  Negative for cough and shortness of breath.    Cardiovascular:  Negative for chest pain, palpitations and leg swelling.   Musculoskeletal:  Positive for back pain and gait problem.   Neurological:  Positive for light-headedness (occas if standing faster) and headaches (occas). Negative for dizziness.       Objective:      Vitals:    04/23/24 1107   BP: 122/60   Pulse: 66   SpO2: 97%      Physical Exam  Vitals and nursing note reviewed.   Constitutional:       General: He is not in acute distress.     Appearance: Normal appearance. He is not ill-appearing.   HENT:      Head: Normocephalic and atraumatic.   Eyes:      General: No scleral icterus.        Right eye: No discharge.         Left eye: No discharge.   Cardiovascular:      Rate and Rhythm: Normal rate and regular rhythm.      Pulses: Normal pulses.      Heart sounds: Normal heart sounds. No murmur heard.  Pulmonary:      Effort: Pulmonary effort is normal. No respiratory distress.      Breath sounds: Normal breath sounds. No stridor. No wheezing.   Musculoskeletal:      Cervical back: Normal range of motion and neck supple. No rigidity or tenderness.      Right lower leg: No edema.      Left lower leg: No edema.   Lymphadenopathy:      Cervical: No cervical adenopathy.   Neurological:      Mental Status: He is alert and oriented to person, place, and time.      Gait: Gait abnormal  "(wheelchair).   Psychiatric:         Mood and Affect: Mood normal.         Behavior: Behavior normal.         Thought Content: Thought content normal.         Judgment: Judgment normal.         Portions of the record may have been created with voice recognition software. Occasional wrong word or \"sound alike\" substitutions may have occurred due to the inherent limitations of voice recognition software. Please review the chart carefully and recognize, using context, where substitutions/typographical errors may have occurred.     "

## 2024-04-24 ENCOUNTER — TELEPHONE (OUTPATIENT)
Dept: ADMINISTRATIVE | Facility: OTHER | Age: 84
End: 2024-04-24

## 2024-04-24 NOTE — TELEPHONE ENCOUNTER
Upon review of the In Basket request we were able to locate, review, and update the patient chart as requested for Microalbumin Creatinine Urine Ratio OR Albumin Creatinine Urine Ratio .    Any additional questions or concerns should be emailed to the Practice Liaisons via the appropriate education email address, please do not reply via In Basket.    Thank you  Guadalupe Pereira MA

## 2024-05-07 DIAGNOSIS — M79.604 PAIN IN BOTH LOWER EXTREMITIES: Primary | ICD-10-CM

## 2024-05-07 DIAGNOSIS — M79.605 PAIN IN BOTH LOWER EXTREMITIES: Primary | ICD-10-CM

## 2024-05-22 ENCOUNTER — APPOINTMENT (OUTPATIENT)
Dept: LAB | Facility: HOSPITAL | Age: 84
End: 2024-05-22
Attending: STUDENT IN AN ORGANIZED HEALTH CARE EDUCATION/TRAINING PROGRAM
Payer: MEDICARE

## 2024-05-22 DIAGNOSIS — E03.9 ACQUIRED HYPOTHYROIDISM: ICD-10-CM

## 2024-05-22 DIAGNOSIS — E61.1 IRON DEFICIENCY: ICD-10-CM

## 2024-05-22 DIAGNOSIS — E11.9 TYPE 2 DIABETES MELLITUS WITHOUT COMPLICATION, WITHOUT LONG-TERM CURRENT USE OF INSULIN (HCC): ICD-10-CM

## 2024-05-22 LAB
ANION GAP SERPL CALCULATED.3IONS-SCNC: 12 MMOL/L (ref 4–13)
BUN SERPL-MCNC: 17 MG/DL (ref 5–25)
CALCIUM SERPL-MCNC: 8.8 MG/DL (ref 8.4–10.2)
CHLORIDE SERPL-SCNC: 97 MMOL/L (ref 96–108)
CO2 SERPL-SCNC: 29 MMOL/L (ref 21–32)
CREAT SERPL-MCNC: 1.02 MG/DL (ref 0.6–1.3)
EST. AVERAGE GLUCOSE BLD GHB EST-MCNC: 255 MG/DL
FERRITIN SERPL-MCNC: 57 NG/ML (ref 24–336)
GFR SERPL CREATININE-BSD FRML MDRD: 66 ML/MIN/1.73SQ M
GLUCOSE P FAST SERPL-MCNC: 272 MG/DL (ref 65–99)
HBA1C MFR BLD: 10.5 %
IRON SATN MFR SERPL: 12 % (ref 15–50)
IRON SERPL-MCNC: 38 UG/DL (ref 50–212)
POTASSIUM SERPL-SCNC: 3.5 MMOL/L (ref 3.5–5.3)
SODIUM SERPL-SCNC: 138 MMOL/L (ref 135–147)
T4 FREE SERPL-MCNC: 0.87 NG/DL (ref 0.61–1.12)
TIBC SERPL-MCNC: 321 UG/DL (ref 250–450)
TSH SERPL DL<=0.05 MIU/L-ACNC: 6.36 UIU/ML (ref 0.45–4.5)
UIBC SERPL-MCNC: 283 UG/DL (ref 155–355)

## 2024-05-22 PROCEDURE — 84443 ASSAY THYROID STIM HORMONE: CPT

## 2024-05-22 PROCEDURE — 83540 ASSAY OF IRON: CPT

## 2024-05-22 PROCEDURE — 83036 HEMOGLOBIN GLYCOSYLATED A1C: CPT

## 2024-05-22 PROCEDURE — 82728 ASSAY OF FERRITIN: CPT

## 2024-05-22 PROCEDURE — 83550 IRON BINDING TEST: CPT

## 2024-05-22 PROCEDURE — 36415 COLL VENOUS BLD VENIPUNCTURE: CPT

## 2024-05-22 PROCEDURE — 84439 ASSAY OF FREE THYROXINE: CPT

## 2024-05-22 PROCEDURE — 80048 BASIC METABOLIC PNL TOTAL CA: CPT

## 2024-06-13 DIAGNOSIS — F41.9 ANXIETY: ICD-10-CM

## 2024-06-13 RX ORDER — CLONAZEPAM 0.5 MG/1
0.5 TABLET ORAL
Qty: 30 TABLET | Refills: 1 | Status: SHIPPED | OUTPATIENT
Start: 2024-06-13

## 2024-06-24 ENCOUNTER — TELEPHONE (OUTPATIENT)
Age: 84
End: 2024-06-24

## 2024-06-24 NOTE — TELEPHONE ENCOUNTER
Patients daughter is calling stating that her father called her saying he is not feeling well, that he feels he is going crazy, she is unsure if his medication (antidepressant) needs to be increased, or any of his other medications need to be adjusted, she is going back to work soon and wants to leave him feeling better  Please review and call daughter to discuss  Thank you

## 2024-06-25 NOTE — TELEPHONE ENCOUNTER
"On recall- daughter feels like pt is very \"antsy\" and anxious and needs something to keep him balanced thru out the day and not just a prn, she doesn't feel he will ask for the prn. Whe Anne Marie saw pt she did not adjust any historical meds and she feels he may need an adjustment. Can call Marisa if you wish to discuss further. CR  "

## 2024-07-01 NOTE — TELEPHONE ENCOUNTER
Daughter notified. Will have front staff call [facility] to arrange sooner transport to office for visit.

## 2024-07-03 ENCOUNTER — IN-CLINIC DEVICE VISIT (OUTPATIENT)
Dept: CARDIOLOGY CLINIC | Facility: CLINIC | Age: 84
End: 2024-07-03
Payer: MEDICARE

## 2024-07-03 ENCOUNTER — TELEPHONE (OUTPATIENT)
Dept: SCHEDULING | Facility: CLINIC | Age: 84
End: 2024-07-03
Payer: COMMERCIAL

## 2024-07-03 DIAGNOSIS — I44.2 COMPLETE HEART BLOCK (HCC): Primary | ICD-10-CM

## 2024-07-03 DIAGNOSIS — I48.91 ATRIAL FIBRILLATION, UNSPECIFIED TYPE (HCC): ICD-10-CM

## 2024-07-03 PROCEDURE — 93280 PM DEVICE PROGR EVAL DUAL: CPT | Performed by: INTERNAL MEDICINE

## 2024-07-03 NOTE — TELEPHONE ENCOUNTER
Ruthann pts daughter calling     Needs pt un enrolled from [facility] and needs him to be enrolled with [facility] [place]     P:[phone]    Monique can be reached at [phone]

## 2024-07-03 NOTE — PROGRESS NOTES
Results for orders placed or performed in visit on 07/03/24   Cardiac EP device report    Narrative    [facility] DC/PM  DEVICE INTERROGATED IN THE [place] OFFICE: PT NEW TO [facility]: BATTERY VOLTAGE ADEQUATE (6 YRS). AP: <1%. : 98% (>40%~CHB). ALL LEAD PARAMETERS WITHIN NORMAL LIMITS. AMS EPISODES W/ AF IN PROGRESS (HX OF SAME) AF BURDEN: >99%. PT TAKES XARELTO, METOPROLOL SUCC. EF: 40% (BENNIE 8/28/23). NO PROGRAMMING CHANGES MADE TO DEVICE PARAMETERS. NORMAL DEVICE FUNCTION. CH

## 2024-07-08 NOTE — TELEPHONE ENCOUNTER
I returned the call to daughter Monique after speaking with [employer] but there was no answer.  Pt. pacemaker was implanted by Dr. Padilla and is followed by [facility].   Patient

## 2024-07-11 ENCOUNTER — TELEPHONE (OUTPATIENT)
Dept: FAMILY MEDICINE CLINIC | Facility: HOSPITAL | Age: 84
End: 2024-07-11

## 2024-07-16 ENCOUNTER — OFFICE VISIT (OUTPATIENT)
Dept: FAMILY MEDICINE CLINIC | Facility: HOSPITAL | Age: 84
End: 2024-07-16
Payer: MEDICARE

## 2024-07-16 VITALS
SYSTOLIC BLOOD PRESSURE: 124 MMHG | HEART RATE: 65 BPM | DIASTOLIC BLOOD PRESSURE: 65 MMHG | OXYGEN SATURATION: 94 % | BODY MASS INDEX: 30.92 KG/M2 | HEIGHT: 67 IN | WEIGHT: 197 LBS

## 2024-07-16 DIAGNOSIS — E61.1 IRON DEFICIENCY: ICD-10-CM

## 2024-07-16 DIAGNOSIS — I50.23 ACUTE ON CHRONIC SYSTOLIC HEART FAILURE (HCC): ICD-10-CM

## 2024-07-16 DIAGNOSIS — G30.9 ALZHEIMER DISEASE (HCC): ICD-10-CM

## 2024-07-16 DIAGNOSIS — F33.9 DEPRESSION, RECURRENT (HCC): ICD-10-CM

## 2024-07-16 DIAGNOSIS — M79.604 PAIN IN BOTH LOWER EXTREMITIES: ICD-10-CM

## 2024-07-16 DIAGNOSIS — F41.9 ANXIETY: ICD-10-CM

## 2024-07-16 DIAGNOSIS — M79.605 PAIN IN BOTH LOWER EXTREMITIES: ICD-10-CM

## 2024-07-16 DIAGNOSIS — I27.82 CHRONIC PULMONARY EMBOLISM, UNSPECIFIED PULMONARY EMBOLISM TYPE, UNSPECIFIED WHETHER ACUTE COR PULMONALE PRESENT (HCC): ICD-10-CM

## 2024-07-16 DIAGNOSIS — E03.9 ACQUIRED HYPOTHYROIDISM: ICD-10-CM

## 2024-07-16 DIAGNOSIS — J44.1 COPD WITH ACUTE EXACERBATION (HCC): ICD-10-CM

## 2024-07-16 DIAGNOSIS — E11.65 TYPE 2 DIABETES MELLITUS WITH HYPERGLYCEMIA, WITHOUT LONG-TERM CURRENT USE OF INSULIN (HCC): Primary | ICD-10-CM

## 2024-07-16 DIAGNOSIS — F02.B0 MODERATE ALZHEIMER'S DEMENTIA, UNSPECIFIED TIMING OF DEMENTIA ONSET, UNSPECIFIED WHETHER BEHAVIORAL, PSYCHOTIC, OR MOOD DISTURBANCE OR ANXIETY (HCC): ICD-10-CM

## 2024-07-16 DIAGNOSIS — G30.9 MODERATE ALZHEIMER'S DEMENTIA, UNSPECIFIED TIMING OF DEMENTIA ONSET, UNSPECIFIED WHETHER BEHAVIORAL, PSYCHOTIC, OR MOOD DISTURBANCE OR ANXIETY (HCC): ICD-10-CM

## 2024-07-16 DIAGNOSIS — F02.80 ALZHEIMER DISEASE (HCC): ICD-10-CM

## 2024-07-16 DIAGNOSIS — I50.42 CHRONIC COMBINED SYSTOLIC AND DIASTOLIC CHF (CONGESTIVE HEART FAILURE) (HCC): ICD-10-CM

## 2024-07-16 LAB
LEFT EYE DIABETIC RETINOPATHY: ABNORMAL
LEFT EYE IMAGE QUALITY: ABNORMAL
LEFT EYE MACULAR EDEMA: ABNORMAL
LEFT EYE OTHER RETINOPATHY: ABNORMAL
RIGHT EYE DIABETIC RETINOPATHY: ABNORMAL
RIGHT EYE IMAGE QUALITY: ABNORMAL
RIGHT EYE MACULAR EDEMA: ABNORMAL
RIGHT EYE OTHER RETINOPATHY: ABNORMAL
SEVERITY (EYE EXAM): ABNORMAL

## 2024-07-16 PROCEDURE — G0439 PPPS, SUBSEQ VISIT: HCPCS | Performed by: STUDENT IN AN ORGANIZED HEALTH CARE EDUCATION/TRAINING PROGRAM

## 2024-07-16 PROCEDURE — 99214 OFFICE O/P EST MOD 30 MIN: CPT | Performed by: STUDENT IN AN ORGANIZED HEALTH CARE EDUCATION/TRAINING PROGRAM

## 2024-07-16 RX ORDER — DULOXETIN HYDROCHLORIDE 30 MG/1
30 CAPSULE, DELAYED RELEASE ORAL DAILY
Qty: 90 CAPSULE | Refills: 3 | Status: SHIPPED | OUTPATIENT
Start: 2024-08-07

## 2024-07-16 RX ORDER — DONEPEZIL HYDROCHLORIDE 10 MG/1
10 TABLET, FILM COATED ORAL EVERY MORNING
Qty: 30 TABLET | Refills: 3 | Status: SHIPPED | OUTPATIENT
Start: 2024-07-16

## 2024-07-16 RX ORDER — CLONAZEPAM 1 MG/1
1 TABLET ORAL
Qty: 30 TABLET | Refills: 1 | Status: SHIPPED | OUTPATIENT
Start: 2024-07-16

## 2024-07-16 RX ORDER — SERTRALINE HYDROCHLORIDE 25 MG/1
TABLET, FILM COATED ORAL
Qty: 42 TABLET | Refills: 0 | Status: SHIPPED | OUTPATIENT
Start: 2024-07-16 | End: 2024-08-06

## 2024-07-16 RX ORDER — METFORMIN HYDROCHLORIDE 500 MG/1
1000 TABLET, EXTENDED RELEASE ORAL
Qty: 200 TABLET | Refills: 1 | Status: SHIPPED | OUTPATIENT
Start: 2024-07-16

## 2024-07-16 NOTE — PROGRESS NOTES
Ambulatory Visit  Name: Trevor Lyons      : 1940      MRN: [id number]  Encounter Provider: Ira Borden DO  Encounter Date: 2024   Encounter department: [facility] [address]ARE [address]    Assessment & Plan   1. Type 2 diabetes mellitus with hyperglycemia, without long-term current use of insulin (HCC)  -     IRIS Diabetic eye exam  -     metFORMIN (GLUCOPHAGE-XR) 500 mg 24 hr tablet; Take 2 tablets (1,000 mg total) by mouth daily with breakfast  -     Hemoglobin A1C; Future  -     Basic metabolic panel; Future  2. Alzheimer disease (HCC)  -     donepezil (ARICEPT) 10 mg tablet; Take 1 tablet (10 mg total) by mouth every morning  3. Anxiety  -     clonazePAM (KlonoPIN) 1 mg tablet; Take 1 tablet (1 mg total) by mouth daily at bedtime as needed for anxiety  4. Acquired hypothyroidism  -     TSH, 3rd generation; Future  -     T4, free; Future  5. Chronic combined systolic and diastolic CHF (congestive heart failure) ([facility])  6. Pain in both lower extremities  7. Acute on chronic systolic heart failure ([facility])  8. Moderate Alzheimer's dementia, unspecified timing of dementia onset, unspecified whether behavioral, psychotic, or mood disturbance or anxiety ([facility])  9. Depression, recurrent (HCC)  -     sertraline (ZOLOFT) 25 mg tablet; Take 3 tablets (75 mg total) by mouth daily for 7 days, THEN 2 tablets (50 mg total) daily for 7 days, THEN 1 tablet (25 mg total) daily for 7 days. Then stop.  -     DULoxetine (CYMBALTA) 30 mg delayed release capsule; Take 1 capsule (30 mg total) by mouth daily Do not start before 2024.  10. Chronic pulmonary embolism, unspecified pulmonary embolism type, unspecified whether acute cor pulmonale present ([facility])  11. COPD with acute exacerbation ([facility])  12. Iron deficiency  -     Iron Panel (Includes Ferritin, Iron Sat%, Iron, and TIBC); Future    Switch aricept from evening to mornings, may be culprit for insomnia.     Will hold buspar to bid 7.5 mg  but may need to increase to 10 mg bid if noth helping enough.     Trial inc klonopin at bedtime 0.5 mg to 1 mg now.    RPT LABS IN AUGUST - A1c, TSH     START XR METFORMIN 500 mg per day, increase if needed at next visit, bring logs.     Will switch from zoloft to cymbalta given his pain & depression worsening.   Wean down zoloft to 75 mg for 1 week, then 50 mg for 1 week, then to 25 mg for 1 week  Start low dose cymbalta - then can increase to 60 mg DR if needed.     Return in about 2 months (around 9/16/2024) for F/U Chronic DX - diabetic foot exam - rvw labs - rvw sugar logs .      Depression Screening and Follow-up Plan: Patient's depression screening was positive with a PHQ-9 score of 11. Patient advised to follow-up with PCP for further management.       Preventive health issues were discussed with patient, and age appropriate screening tests were ordered as noted in patient's After Visit Summary. Personalized health advice and appropriate referrals for health education or preventive services given if needed, as noted in patient's After Visit Summary.    History of Present Illness     HPI   Pt's med tech note mentioned him not being able to sleep.   Legs ache & keep him up.     Also pt reporting needing something for depression.   Doesn't think it helps anymore.   Wakes often to pee.     Here with  today.     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     Long msg from daughter sent.     Glu logs brought in - all above 300 - does snack often, likes cookies.     Patient Care Team:  Ira Borden DO as PCP - General (Family Medicine)    Review of Systems   Constitutional:  Negative for chills and fever.   Psychiatric/Behavioral:  Positive for dysphoric mood.      Medical History Reviewed by provider this encounter:  Tobacco  Allergies  Meds  Problems  Med Hx  Surg Hx  Fam Hx       Annual Wellness Visit Questionnaire   Trevor is here for his Subsequent Wellness visit.     Health Risk Assessment:   Patient rates  overall health as fair. Patient feels that their physical health rating is same. Patient is satisfied with their life. Eyesight was rated as same. Hearing was rated as same. Patient feels that their emotional and mental health rating is same. Patients states they are never, rarely angry. Patient states they are always unusually tired/fatigued. Pain experienced in the last 7 days has been a lot. Patient's pain rating has been 8/10. Patient states that he has experienced no weight loss or gain in last 6 months.     Depression Screening:   PHQ-9 Score: 11      Fall Risk Screening:   In the past year, patient has experienced: no history of falling in past year      Home Safety:  Patient has trouble with stairs inside or outside of their home. Patient has working smoke alarms and has working carbon monoxide detector. Home safety hazards include: none.     Nutrition:   Current diet is Regular.     Medications:   Patient is currently taking over-the-counter supplements. OTC medications include: see medication list. Patient is not able to manage medications.     Activities of Daily Living (ADLs)/Instrumental Activities of Daily Living (IADLs):   Walk and transfer into and out of bed and chair?: Yes  Dress and groom yourself?: Yes    Bathe or shower yourself?: Yes    Feed yourself? Yes  Do your laundry/housekeeping?: No  Manage your money, pay your bills and track your expenses?: No  Make your own meals?: Yes    Do your own shopping?: No    Previous Hospitalizations:   Any hospitalizations or ED visits within the last 12 months?: No      Advance Care Planning:     ACP document given: Yes      Cognitive Screening:   Provider or family/friend/caregiver concerned regarding cognition?: No    PREVENTIVE SCREENINGS      Cardiovascular Screening:    General: Screening Not Indicated and History Lipid Disorder      Diabetes Screening:     General: Screening Not Indicated and History Diabetes      Colorectal Cancer Screening:      General: Screening Not Indicated      Prostate Cancer Screening:    General: Screening Not Indicated      Osteoporosis Screening:    General: Screening Current      Abdominal Aortic Aneurysm (AAA) Screening:    Risk factors include: tobacco use        General: Screening Not Indicated      Lung Cancer Screening:     General: Screening Not Indicated    Screening, Brief Intervention, and Referral to Treatment (SBIRT)    Screening      AUDIT-C Screenin) How often did you have a drink containing alcohol in the past year? never  2) How many drinks did you have on a typical day when you were drinking in the past year? 0  3) How often did you have 6 or more drinks on one occasion in the past year? never    AUDIT-C Score: 0  Interpretation: Score 0-3 (male): Negative screen for alcohol misuse    Single Item Drug Screening:  How often have you used an illegal drug (including marijuana) or a prescription medication for non-medical reasons in the past year? never    Single Item Drug Screen Score: 0  Interpretation: Negative screen for possible drug use disorder    Review of Current Opioid Use    Opioid Risk Tool (ORT) Interpretation: Complete Opioid Risk Tool (ORT)    Other Counseling Topics:   Car/seat belt/driving safety, skin self-exam, sunscreen and calcium and vitamin D intake and regular weightbearing exercise.     Social Determinants of Health     Financial Resource Strain: Low Risk  (2023)    Received from [facility], [facility]    Overall Financial Resource Strain (CARDIA)    • Difficulty of Paying Living Expenses: Not hard at all   Food Insecurity: No Food Insecurity (2024)    Hunger Vital Sign    • Worried About Running Out of Food in the Last Year: Never true    • Ran Out of Food in the Last Year: Never true   Transportation Needs: No Transportation Needs (2024)    PRAPARE - Transportation    • Lack of Transportation (Medical): No    • Lack of Transportation (Non-Medical): No  "  Housing Stability: Low Risk  (7/16/2024)    Housing Stability Vital Sign    • Unable to Pay for Housing in the Last Year: No    • Number of Times Moved in the Last Year: 1    • Homeless in the Last Year: No   Utilities: Not At Risk (7/16/2024)    [facility]    • Threatened with loss of utilities: No     No results found.    Objective     /65   Pulse 65   Ht 5' 7\" (1.702 m)   Wt 89.4 kg (197 lb)   SpO2 94%   BMI 30.85 kg/m²     Physical Exam  Vitals reviewed.   Constitutional:       General: He is not in acute distress.     Appearance: Normal appearance. He is obese. He is not ill-appearing.   HENT:      Head: Normocephalic and atraumatic.   Eyes:      General: No scleral icterus.        Right eye: No discharge.         Left eye: No discharge.   Cardiovascular:      Rate and Rhythm: Normal rate and regular rhythm.      Pulses: Normal pulses.      Heart sounds: Normal heart sounds. No murmur heard.  Pulmonary:      Effort: Pulmonary effort is normal. No respiratory distress.      Breath sounds: Normal breath sounds. No wheezing.   Musculoskeletal:      Cervical back: Normal range of motion and neck supple.      Right lower leg: No edema.      Left lower leg: No edema.   Skin:     Findings: Bruising (easy on arms) present.   Neurological:      Mental Status: He is alert and oriented to person, place, and time.      Gait: Gait abnormal (wheelchair for distance).   Psychiatric:         Mood and Affect: Mood normal.         Behavior: Behavior normal.         Thought Content: Thought content normal.         Judgment: Judgment normal.           "

## 2024-07-16 NOTE — PATIENT INSTRUCTIONS
Switch aricept from evening to mornings, may be culprit for insomnia.     Will hold buspar to bid 7.5 mg but may need to increase to 10 mg bid if noth helping enough.     Trial inc klonopin at bedtime 0.5 mg to 1 mg now.    RPT LABS IN AUGUST, after 22nd - A1c, TSH     START XR METFORMIN 500 mg per day, increase if needed at next visit, bring logs.     Will switch from zoloft to cymbalta given his pain & depression worsening.   Wean down zoloft to 75 mg for 1 week, then 50 mg for 1 week, then to 25 mg for 1 week  Start low dose cymbalta - then can increase to 60 mg DR if needed.

## 2024-08-07 DIAGNOSIS — M51.37 DDD (DEGENERATIVE DISC DISEASE), LUMBOSACRAL: ICD-10-CM

## 2024-08-07 RX ORDER — ACETAMINOPHEN 500 MG
1000 TABLET ORAL
Qty: 180 TABLET | Refills: 1 | Status: SHIPPED | OUTPATIENT
Start: 2024-08-07

## 2024-08-12 NOTE — PROGRESS NOTES
Physical Therapy -  Initial Evaluation     Patient: Cecil Morris  Location: [address] [address]  MRN: [id number]  Today's date: 8/29/2023    HISTORY OF PRESENT ILLNESS     Cecil is a 83 y.o. male admitted on 8/26/2023 with Dyspnea on exertion [R06.09]  Elevated troponin [R77.8]  Congestive heart failure, unspecified HF chronicity, unspecified heart failure type (CMS/HCC) [I50.9]  Cough, unspecified type [R05.9]. Principal problem is Acute on chronic systolic heart failure (CMS/HCC).    Past Medical History  Cecil has a past medical history of A-fib (CMS/[facility]), Acute on chronic combined systolic and diastolic congestive heart failure (CMS/[facility]), Anxiety (4/11/2023), CHF (congestive heart failure), NYHA class I, acute, systolic (CMS/[facility]), Depression, Hyperthyroidism, Hypoxia, Near syncope, NSVT (nonsustained ventricular tachycardia) (CMS/[facility]), Orthostatic hypotension, Presence of cardiac pacemaker, Pulmonary embolism (CMS/[facility]), and Sepsis (CMS/[facility]) (05/16/2022).    History of Present Illness   He states that he was in his usual state of health until 1 to 2 weeks ago when he began experiencing shortness of breath without associated chest pain.  He states that when he walks short distances, he will start to feel dyspneic and also dizziness.  He denies lower extremity edema, orthopnea, abdominal pain, nausea, vomiting, headaches.  He states that he is compliant with all his medications and actually has a pill bottle at home.  He has a nursing aide that checks on him every day or every other day and today when the nursing aide came by, they discovered that he was dyspneic and advised that he go to the emergency department.    PRIOR LEVEL OF FUNCTION AND LIVING ENVIRONMENT     Prior Level of Function    Flowsheet Row Most Recent Value   Dominant Hand right   Ambulation independent   Transferring independent   Toileting independent   Bathing independent   Dressing independent   Eating independent   IADLs  Pft completed       independent   Driving/Transportation friends/family   Communication understands/communicates without difficulty   Prior Level of Function Comment indep PLOF using no AD, scooter at grocery store for distance, no recent falls   Assistive Device Currently Used at Home none  [reports having RW, SPC, O2 at home but does not use]        Prior Living Environment    Flowsheet Row Most Recent Value   People in Home alone   Current Living Arrangements home   Home Accessibility not wheelchair accessible   Living Environment Comment Mobile home, 4-5 [place] with handrail, walkin shower with no AD in bathroom, caregiver approx 3 hours /day x 7 days to assist wtih transportation and grocery shopping etc        VITALS AND PAIN     PT Vitals    Date/Time Pulse HR Source SpO2 Pt Activity O2 Therapy O2 Del Method O2 Flow Rate BP BP Location BP Method Pt Position [facility]   08/29/23 1040 69 Monitor 94 % At rest Supplemental oxygen Nasal cannula 2 L/min 113/65 Right upper arm Automatic Sitting [facility]   08/29/23 1050 83 Monitor 95 % Walking Supplemental oxygen Nasal cannula 2 L/min 103/56 Right upper arm Automatic Sitting [facility]      PT Pain    Date/Time Pain Type Rating: Rest Rating: Activity [facility]   08/29/23 1040 Pain Assessment 0 0 [facility]   08/29/23 1050 Pain Reassessment 0 0 [facility]           Objective   OBJECTIVE     Start time:  1038  End time:  1053  Session Length: 15 min  Mode of Treatment: individual therapy, physical therapy    General Observations  Patient received upright, in chair. He was agreeable to therapy, no issues or concerns identified by nurse prior to session. awake, NAD, O2 n.c. on 2L donned    Precautions: fall, oxygen therapy device and L/min       Limitations/Impairments: hearing      PT Eval and Treat - 08/29/23 1038        Cognition    Orientation Status oriented x 3   v.c. 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        Sensory Assessment    Sensory Assessment sensation intact, lower extremities        Lower Extremity Assessment    LE Assessment ROM and Strength WFL     General Observations assessed seated        Motor Skills    Coordination WFL;bilateral;lower extremity     Muscle Tone WNL        Bed Mobility    Fox not tested     Comment remained OOB t/o encounter, reporting no concerns with mobilty abilities        Mobility Belt    Mobility Belt Used for All Out of Bed Activity no     Reason Mobility Belt Not Used patient refused        Sit/Stand Transfer    Surface chair with arm rests     Fox independent     Assistive Device none        Gait Training    Fox, Gait supervision;modified independence     Assistive Device none     Distance in Feet 160 feet     Pattern step-through     Deviations/Abnormal Patterns gait speed decreased     Comment (Gait/Stairs) steady reciprocal gait with adequate foot floor clearance on smooth level surfaces, O2 tank follow provided        Stairs Training    Fox, Stairs modified independence     Assistive Device railing     Handrail Location (Stairs) left side (ascending);left side (descending)     Number of Stairs 8     Stair Height 6 inches     Ascending Stairs Technique step-to-step     Descending Stairs Technique step-to-step     Comment fwd step up/retro step down portable step stool for stair simulation, good strength and control negotiating with UHR, no SOB reported with exertion        Balance    Static Sitting Balance WFL;sitting in chair     Dynamic Sitting Balance WFL;sitting in chair     Sit to Stand Dynamic Balance WFL;unsupported     Static Standing Balance WFL;unsupported     Dynamic  Standing Balance mild impairment;unsupported     Comment, Balance steady without AD, low falls risk on Tinetti Gait and Balance Assessment        Impairments/Safety Issues    Impairments Affecting Function endurance/activity tolerance     Functional Endurance VSS on 2L O2 via n.c.; RPE 2/10 on Modified Jenny Scale with ambulation in  hallway and step ups     Comment, Safety Issues/Impairments no safety concerns, demonstrates good safety with mobility                      Tinetti  Sitting Balance: Steady, safe  Arises: Able, uses arms to help  Attempts to Arise: Able to arise, one attempt  Immediate Standing Balance (First 5 Seconds): Steady without walker or other support  Standing Balance: Steady but wide stance, uses cane or other support  Nudged: Steady without walker or other support  Eyes Closed: Steady  Turned 360 Degrees: Steadiness: Steady  Turned 360 Degrees: Continuity of Steps: Continuous  Sitting Down: Uses arms or not a smooth motion  Balance Score: 13  Initiation of Gait: No hesitancy  Step Height: R Swing Foot: Right foot complete clears floor  Step Length: R Swing Foot: Passes left stance foot  Step Height: L Swing Foot: Left foot complete clears floor  Step Length: L Swing Foot: Passes right stance foot  Step Symmetry: Right and left step appear equal  Step Continuity: Steps appear continuous  Path: Straight without walking aid  Trunk: No sway but flexion of knees or back or spreads arms out while walking  Walking Time: Heels apart  Gait Score: 10  Total Score: 23        Education Documentation  Activity, taught by Alissa Augustine PT at 8/29/2023 11:12 AM.  Learner: Patient  Readiness: Acceptance  Method: Explanation, Demonstration  Response: Verbalizes Understanding, Demonstrated Understanding  Comment: role of PT, PLB on O2, balance of rest and activity, pacing, use of call bell, encouraged ambulation with [facility] staff, d/c recs        Session Outcome  Patient upright, in chair (MD present) at  end of session, chair alarm on, call light in reach, all needs met, personal items in reach (O2 n.c. donned 2L). Nursing notified about patient's performance, patient's response to therapy/activity, and patient's position.    AM-PAC - Mobility (Current Function)     Turning form your back to your side while in flat bed without using bedrails 4 - None   Moving from lying on your back to sitting on the side of a flat bed without using bedrails 4 - None   Moving to and from a bed to a chair 4 - None   Standing up from a chair using your arms 4 - None   To walk in a hospital room 4 - None   Climbing 3-5 steps with a railing 4 - None   AM-PAC Mobility Score 24      ASSESSMENT AND PLAN     Progress Summary  PT eval completed, [facility] mob 24.  pt admitted from home with SOB.  pt presents to PT with mild deficits in endurance, aerobic capacity and dynamic standing balance impacting functional mobility.  pt scoring low falls risk on Tinetti Gait and balance assessment.  VSS on 2 L O2 via n.c. with RPE of 2/10 on Modified Jenny Scale while ambulating in hallway without AD and completing stair negotiation simulation per home dispo requirements.  pt appears near his functional baseline, anticipate d/c to home with baseline supports in place when medically cleared. no additional acute PT needs identified, will sign off, ambulation with [facility] staff encouraged.    Patient/Family Therapy Goals Statement: home today    PT Plan    Flowsheet Row Most Recent Value   Therapy Frequency evaluation only at 08/29/2023 1038          PT Discharge Recommendations    Flowsheet Row Most Recent Value   PT Recommended Discharge Disposition home with assistance at 08/29/2023 1038   Anticipated Equipment Needs at Discharge (PT) none at 08/29/2023 1038

## 2024-08-28 ENCOUNTER — APPOINTMENT (OUTPATIENT)
Dept: LAB | Facility: HOSPITAL | Age: 84
End: 2024-08-28
Attending: STUDENT IN AN ORGANIZED HEALTH CARE EDUCATION/TRAINING PROGRAM
Payer: MEDICARE

## 2024-08-28 DIAGNOSIS — E03.9 ACQUIRED HYPOTHYROIDISM: ICD-10-CM

## 2024-08-28 DIAGNOSIS — E11.65 TYPE 2 DIABETES MELLITUS WITH HYPERGLYCEMIA, WITHOUT LONG-TERM CURRENT USE OF INSULIN (HCC): ICD-10-CM

## 2024-08-28 LAB
EST. AVERAGE GLUCOSE BLD GHB EST-MCNC: 341 MG/DL
HBA1C MFR BLD: 13.5 %
T4 FREE SERPL-MCNC: 1.14 NG/DL (ref 0.61–1.12)
TSH SERPL DL<=0.05 MIU/L-ACNC: 4.59 UIU/ML (ref 0.45–4.5)

## 2024-08-28 PROCEDURE — 84443 ASSAY THYROID STIM HORMONE: CPT

## 2024-08-28 PROCEDURE — 36415 COLL VENOUS BLD VENIPUNCTURE: CPT

## 2024-08-28 PROCEDURE — 83036 HEMOGLOBIN GLYCOSYLATED A1C: CPT

## 2024-08-28 PROCEDURE — 84439 ASSAY OF FREE THYROXINE: CPT

## 2024-09-04 DIAGNOSIS — I71.21 ANEURYSM OF ASCENDING AORTA WITHOUT RUPTURE (HCC): ICD-10-CM

## 2024-09-04 DIAGNOSIS — E11.65 TYPE 2 DIABETES MELLITUS WITH HYPERGLYCEMIA, WITHOUT LONG-TERM CURRENT USE OF INSULIN (HCC): Primary | ICD-10-CM

## 2024-09-04 DIAGNOSIS — E78.2 MIXED HYPERLIPIDEMIA: ICD-10-CM

## 2024-09-04 DIAGNOSIS — F02.80 ALZHEIMER DISEASE (HCC): ICD-10-CM

## 2024-09-04 DIAGNOSIS — E03.9 ACQUIRED HYPOTHYROIDISM: ICD-10-CM

## 2024-09-04 DIAGNOSIS — H91.92 DEAFNESS IN LEFT EAR: ICD-10-CM

## 2024-09-04 DIAGNOSIS — E61.1 IRON DEFICIENCY: ICD-10-CM

## 2024-09-04 DIAGNOSIS — G30.9 ALZHEIMER DISEASE (HCC): ICD-10-CM

## 2024-09-04 DIAGNOSIS — I50.42 CHRONIC COMBINED SYSTOLIC AND DIASTOLIC CHF (CONGESTIVE HEART FAILURE) (HCC): ICD-10-CM

## 2024-09-04 DIAGNOSIS — Z91.09 ENVIRONMENTAL ALLERGIES: ICD-10-CM

## 2024-09-05 RX ORDER — PANTOPRAZOLE SODIUM 40 MG/1
TABLET, DELAYED RELEASE ORAL
Qty: 60 TABLET | Refills: 5 | Status: SHIPPED | OUTPATIENT
Start: 2024-09-05

## 2024-09-05 RX ORDER — RIVAROXABAN 20 MG/1
TABLET, FILM COATED ORAL
Qty: 30 TABLET | Refills: 5 | Status: SHIPPED | OUTPATIENT
Start: 2024-09-05

## 2024-09-09 ENCOUNTER — OFFICE VISIT (OUTPATIENT)
Dept: CARDIOLOGY CLINIC | Facility: CLINIC | Age: 84
End: 2024-09-09
Payer: MEDICARE

## 2024-09-09 VITALS — SYSTOLIC BLOOD PRESSURE: 114 MMHG | DIASTOLIC BLOOD PRESSURE: 52 MMHG | HEART RATE: 65 BPM

## 2024-09-09 DIAGNOSIS — Z95.1 S/P CABG (CORONARY ARTERY BYPASS GRAFT): ICD-10-CM

## 2024-09-09 DIAGNOSIS — I25.10 CORONARY ARTERY DISEASE INVOLVING NATIVE CORONARY ARTERY OF NATIVE HEART WITHOUT ANGINA PECTORIS: ICD-10-CM

## 2024-09-09 DIAGNOSIS — I44.2 COMPLETE HEART BLOCK (HCC): Primary | ICD-10-CM

## 2024-09-09 DIAGNOSIS — I50.42 CHRONIC COMBINED SYSTOLIC AND DIASTOLIC CHF (CONGESTIVE HEART FAILURE) (HCC): ICD-10-CM

## 2024-09-09 DIAGNOSIS — I48.91 ATRIAL FIBRILLATION, UNSPECIFIED TYPE (HCC): ICD-10-CM

## 2024-09-09 DIAGNOSIS — I95.1 ORTHOSTATIC HYPOTENSION: ICD-10-CM

## 2024-09-09 DIAGNOSIS — I49.5 SICK SINUS SYNDROME (HCC): ICD-10-CM

## 2024-09-09 DIAGNOSIS — Z95.2 S/P TAVR (TRANSCATHETER AORTIC VALVE REPLACEMENT): ICD-10-CM

## 2024-09-09 DIAGNOSIS — Z95.0 CARDIAC PACEMAKER: ICD-10-CM

## 2024-09-09 DIAGNOSIS — I71.21 ANEURYSM OF ASCENDING AORTA WITHOUT RUPTURE (HCC): ICD-10-CM

## 2024-09-09 DIAGNOSIS — E78.2 MIXED HYPERLIPIDEMIA: ICD-10-CM

## 2024-09-09 DIAGNOSIS — I48.0 PAF (PAROXYSMAL ATRIAL FIBRILLATION) (HCC): ICD-10-CM

## 2024-09-09 DIAGNOSIS — M51.37 DDD (DEGENERATIVE DISC DISEASE), LUMBOSACRAL: ICD-10-CM

## 2024-09-09 PROCEDURE — 99214 OFFICE O/P EST MOD 30 MIN: CPT | Performed by: INTERNAL MEDICINE

## 2024-09-09 PROCEDURE — 93000 ELECTROCARDIOGRAM COMPLETE: CPT | Performed by: INTERNAL MEDICINE

## 2024-09-09 RX ORDER — GAUZE BANDAGE 2" X 2"
100 BANDAGE TOPICAL DAILY
COMMUNITY
End: 2024-09-13

## 2024-09-09 RX ORDER — LIDOCAINE 4 G/G
1 PATCH TOPICAL 2 TIMES DAILY PRN
Qty: 60 PATCH | Refills: 5 | Status: SHIPPED | OUTPATIENT
Start: 2024-09-09

## 2024-09-09 NOTE — PROGRESS NOTES
Cardiology   Trevor Lyons 84 y.o. male MRN: [id number]        Impression:  CAD s/p CABG 2005 - stable.  AS s/p TAVR 2021 - stable.  PAF s/p PPM - [facility]  Chronic combined systolic and diastolic HF - EF 35-40%.  Predominantly ischemic.  Compensated.  No ACE-I/ARB/ARNI due to relative hypotension.  On b-blockers, SGLT2i, or MRA.   HTN/orthostatic hypotension - resolved.  Dyslipidemia  Mitral regurgitation/Mitral stenosis - stable.      Recommendations:  Continue current medications.  Follow up in 6 months.         HPI: Trevor Lyons is a 84 y.o. year old male with CAD s/p CABG 2005, paroxysmal atrial fibrillation s/p [facility] PPM, s/p TAVR 2021 Chronic combined systolic and diastolic heart failure, orthostatic hypotension who presents for follow up. Was in ED 3/16/24 with a fall. Echo 1/24 - EF 35-40%, ANA CRISTINA, s/p TAVR, mod MR, mild MS. No chest pain, shortness of breath, or palpitations. Occasionally dizzy - possibly orthostasis. Major complaint is back pain.          Review of Systems   Constitutional: Negative.    HENT: Negative.     Eyes: Negative.    Respiratory:  Negative for chest tightness and shortness of breath.    Cardiovascular:  Negative for chest pain, palpitations and leg swelling.   Gastrointestinal: Negative.    Endocrine: Negative.    Genitourinary: Negative.    Musculoskeletal:  Positive for back pain and gait problem.   Skin: Negative.    Allergic/Immunologic: Negative.    Hematological: Negative.    Psychiatric/Behavioral: Negative.     All other systems reviewed and are negative.        Past Medical History:   Diagnosis Date    Alzheimer disease (HCC)     Anemia     Aneurysm of ascending aorta without rupture (HCC)     Anxiety     Atrial fibrillation (HCC)     CHF (congestive heart failure) (HCC)     Depression     Diabetes mellitus (HCC)     Disease of thyroid gland     Pulmonary hypertension (HCC)     Sciatica     Sick sinus syndrome (HCC)      Past Surgical History:   Procedure  Laterality Date    CARDIAC PACEMAKER PLACEMENT       Social History     Substance and Sexual Activity   Alcohol Use Not Currently     Social History     Substance and Sexual Activity   Drug Use Never     Social History     Tobacco Use   Smoking Status Former    Types: Cigarettes   Smokeless Tobacco Never     Family History   Problem Relation Age of Onset    Dementia Mother     Heart disease Father     Stroke Father     Thyroid disease Daughter     Thyroid disease Daughter        Allergies:  No Known Allergies    Medications:     Current Outpatient Medications:     acetaminophen (TYLENOL) 500 mg tablet, Take 2 tablets (1,000 mg total) by mouth daily at bedtime, Disp: 180 tablet, Rfl: 1    albuterol (2.5 mg/3 mL) 0.083 % nebulizer solution, Take 3 mL (2.5 mg total) by nebulization 4 (four) times a day, Disp: 1 mL, Rfl: 0    albuterol (PROVENTIL HFA,VENTOLIN HFA) 90 mcg/act inhaler, Inhale 2 puffs every 6 (six) hours as needed for wheezing, Disp: , Rfl:     atorvastatin (LIPITOR) 20 mg tablet, Take 20 mg by mouth daily, Disp: , Rfl:     Blood Glucose Monitoring Suppl (True Metrix Air Glucose Meter) w/Device KIT, Use daily, Disp: 1 kit, Rfl: 0    busPIRone (BUSPAR) 7.5 mg tablet, Take 1 tablet (7.5 mg total) by mouth 2 (two) times a day, Disp: 60 tablet, Rfl: 5    clonazePAM (KlonoPIN) 1 mg tablet, Take 1 tablet (1 mg total) by mouth daily at bedtime as needed for anxiety, Disp: 30 tablet, Rfl: 1    donepezil (ARICEPT) 10 mg tablet, Take 1 tablet (10 mg total) by mouth every morning, Disp: 30 tablet, Rfl: 3    DULoxetine (CYMBALTA) 30 mg delayed release capsule, Take 1 capsule (30 mg total) by mouth daily Do not start before August 7, 2024., Disp: 90 capsule, Rfl: 3    Farxiga 10 MG tablet, Take 1 tablet (10 mg total) by mouth daily, Disp: 30 tablet, Rfl: 5    ferrous sulfate 325 (65 FE) MG EC tablet, Take 325 mg by mouth daily with breakfast, Disp: , Rfl:     fluticasone-umeclidinium-vilanterol 100-62.5-25  mcg/actuation inhaler, Inhale 1 puff daily, Disp: , Rfl:     folic acid (FOLVITE) 1 mg tablet, Take 1 mg by mouth daily, Disp: , Rfl:     furosemide (LASIX) 20 mg tablet, Take 1 tablet (20 mg total) by mouth 2 (two) times a day Hold medication if systolic is less than 110, Disp: 60 tablet, Rfl: 2    glucose blood (True Metrix Blood Glucose Test) test strip, Testing 1 time daily, Disp: 100 strip, Rfl: 5    Lancets 33G MISC, Testing 1 time daily, Disp: 100 each, Rfl: 3    levothyroxine 112 mcg tablet, Take 112 mcg by mouth, Disp: , Rfl:     Lidocaine 4 % PTCH, Place 1 patch on the skin 2 (two) times a day, Disp: 60 patch, Rfl: 5    loratadine (CLARITIN) 10 mg tablet, Take 10 mg by mouth daily, Disp: , Rfl:     magnesium oxide (MAG-OX) 400 mg, Take by mouth 2 (two) times a day, Disp: , Rfl:     Melatonin 5 MG CAPS, Take 10 mg by mouth daily, Disp: , Rfl:     metFORMIN (GLUCOPHAGE-XR) 500 mg 24 hr tablet, Take 2 tablets (1,000 mg total) by mouth daily with breakfast, Disp: 200 tablet, Rfl: 1    metoprolol succinate (TOPROL-XL) 25 mg 24 hr tablet, Take 25 mg by mouth daily, Disp: , Rfl:     Multiple Vitamin (DAILY-NACHO MULTIVITAMIN PO), Take by mouth, Disp: , Rfl:     pantoprazole (PROTONIX) 40 mg tablet, 1 TAB ORALLY TWICE DAILY DX: GERD, Disp: 60 tablet, Rfl: 5    rivaroxaban (Xarelto) 20 mg tablet, 1 TAB ORALLY EVERY EVENING WITH DINNER DX: BLOOD CLOT PREVENTION **COLD SEAL**, Disp: 30 tablet, Rfl: 5    spironolactone (ALDACTONE) 25 mg tablet, Take 25 mg by mouth daily, Disp: , Rfl:     Thiamine Mononitrate (VITAMIN B1) 100 mg tablet, Take 100 mg by mouth daily, Disp: , Rfl:     Diclofenac Sodium (VOLTAREN) 1 %, Apply 2 g topically 4 (four) times a day as needed (leg pain) (Patient not taking: Reported on 9/9/2024), Disp: 100 g, Rfl: 5    sertraline (ZOLOFT) 25 mg tablet, Take 3 tablets (75 mg total) by mouth daily for 7 days, THEN 2 tablets (50 mg total) daily for 7 days, THEN 1 tablet (25 mg total) daily for 7 days.  "Then stop. (Patient not taking: Reported on 9/9/2024), Disp: 42 tablet, Rfl: 0      Wt Readings from Last 3 Encounters:   07/16/24 89.4 kg (197 lb)   04/23/24 86.6 kg (191 lb)   03/19/24 84.8 kg (187 lb)     Temp Readings from Last 3 Encounters:   03/16/24 97.8 °F (36.6 °C) (Temporal)     BP Readings from Last 3 Encounters:   09/09/24 114/52   07/16/24 124/65   04/23/24 122/60     Pulse Readings from Last 3 Encounters:   09/09/24 65   07/16/24 65   04/23/24 66         Physical Exam  HENT:      Head: Atraumatic.      Mouth/Throat:      Mouth: Mucous membranes are moist.   Eyes:      Extraocular Movements: Extraocular movements intact.   Cardiovascular:      Rate and Rhythm: Normal rate and regular rhythm.      Heart sounds: Normal heart sounds.   Pulmonary:      Effort: Pulmonary effort is normal.      Breath sounds: Normal breath sounds.   Abdominal:      General: Abdomen is flat.   Musculoskeletal:         General: Normal range of motion.      Cervical back: Normal range of motion.   Skin:     General: Skin is warm.   Neurological:      General: No focal deficit present.      Mental Status: He is alert and oriented to person, place, and time.   Psychiatric:         Mood and Affect: Mood normal.         Behavior: Behavior normal.           Laboratory Studies:  CMP:  Lab Results   Component Value Date    K 3.5 05/22/2024    CL 97 05/22/2024    CO2 29 05/22/2024    BUN 17 05/22/2024    CREATININE 1.02 05/22/2024    AST 24 10/21/2021    ALT 15 (L) 10/21/2021    EGFR 66 05/22/2024       Lipid Profile:   No results found for: \"CHOL\"  No results found for: \"HDL\"  No results found for: \"LDLCALC\"  No results found for: \"TRIG\"    Cardiac testing:   EKG reviewed personally: V-paced 65          "

## 2024-09-13 RX ORDER — FERROUS SULFATE 325(65) MG
TABLET ORAL
Qty: 30 TABLET | Refills: 4 | Status: SHIPPED | OUTPATIENT
Start: 2024-09-13

## 2024-09-13 RX ORDER — ATORVASTATIN CALCIUM 20 MG/1
TABLET, FILM COATED ORAL
Qty: 30 TABLET | Refills: 4 | Status: SHIPPED | OUTPATIENT
Start: 2024-09-13

## 2024-09-13 RX ORDER — LEVOTHYROXINE SODIUM 112 UG/1
112 TABLET ORAL EVERY MORNING
Qty: 30 TABLET | Refills: 4 | Status: SHIPPED | OUTPATIENT
Start: 2024-09-13

## 2024-09-13 RX ORDER — SPIRONOLACTONE 25 MG/1
TABLET ORAL
Qty: 30 TABLET | Refills: 4 | Status: SHIPPED | OUTPATIENT
Start: 2024-09-13

## 2024-09-13 RX ORDER — MULTIVITAMIN WITH FOLIC ACID 400 MCG
1 TABLET ORAL EVERY MORNING
Qty: 30 TABLET | Refills: 4 | Status: SHIPPED | OUTPATIENT
Start: 2024-09-13

## 2024-09-13 RX ORDER — FOLIC ACID 1 MG/1
1000 TABLET ORAL EVERY MORNING
Qty: 30 TABLET | Refills: 4 | Status: SHIPPED | OUTPATIENT
Start: 2024-09-13

## 2024-09-13 RX ORDER — METOPROLOL SUCCINATE 25 MG/1
25 TABLET, EXTENDED RELEASE ORAL EVERY MORNING
Qty: 30 TABLET | Refills: 4 | Status: SHIPPED | OUTPATIENT
Start: 2024-09-13

## 2024-09-13 RX ORDER — GAUZE BANDAGE 2" X 2"
100 BANDAGE TOPICAL DAILY
Qty: 90 TABLET | Refills: 1 | Status: SHIPPED | OUTPATIENT
Start: 2024-09-13

## 2024-09-13 RX ORDER — LANOLIN ALCOHOL/MO/W.PET/CERES
CREAM (GRAM) TOPICAL
Qty: 60 TABLET | Refills: 4 | Status: SHIPPED | OUTPATIENT
Start: 2024-09-13

## 2024-09-13 RX ORDER — LORATADINE 10 MG/1
10 TABLET ORAL EVERY MORNING
Qty: 30 TABLET | Refills: 4 | Status: SHIPPED | OUTPATIENT
Start: 2024-09-13

## 2024-09-18 ENCOUNTER — OFFICE VISIT (OUTPATIENT)
Dept: FAMILY MEDICINE CLINIC | Facility: HOSPITAL | Age: 84
End: 2024-09-18
Payer: MEDICARE

## 2024-09-18 VITALS
OXYGEN SATURATION: 95 % | DIASTOLIC BLOOD PRESSURE: 68 MMHG | BODY MASS INDEX: 31.08 KG/M2 | SYSTOLIC BLOOD PRESSURE: 112 MMHG | HEART RATE: 71 BPM | WEIGHT: 198 LBS | HEIGHT: 67 IN

## 2024-09-18 DIAGNOSIS — E78.2 MIXED HYPERLIPIDEMIA: ICD-10-CM

## 2024-09-18 DIAGNOSIS — G30.9 MODERATE ALZHEIMER'S DEMENTIA, UNSPECIFIED TIMING OF DEMENTIA ONSET, UNSPECIFIED WHETHER BEHAVIORAL, PSYCHOTIC, OR MOOD DISTURBANCE OR ANXIETY (HCC): ICD-10-CM

## 2024-09-18 DIAGNOSIS — F02.B0 MODERATE ALZHEIMER'S DEMENTIA, UNSPECIFIED TIMING OF DEMENTIA ONSET, UNSPECIFIED WHETHER BEHAVIORAL, PSYCHOTIC, OR MOOD DISTURBANCE OR ANXIETY (HCC): ICD-10-CM

## 2024-09-18 DIAGNOSIS — E03.9 ACQUIRED HYPOTHYROIDISM: ICD-10-CM

## 2024-09-18 DIAGNOSIS — E11.65 TYPE 2 DIABETES MELLITUS WITH HYPERGLYCEMIA, WITHOUT LONG-TERM CURRENT USE OF INSULIN (HCC): Primary | ICD-10-CM

## 2024-09-18 PROCEDURE — G2211 COMPLEX E/M VISIT ADD ON: HCPCS | Performed by: STUDENT IN AN ORGANIZED HEALTH CARE EDUCATION/TRAINING PROGRAM

## 2024-09-18 PROCEDURE — 99214 OFFICE O/P EST MOD 30 MIN: CPT | Performed by: STUDENT IN AN ORGANIZED HEALTH CARE EDUCATION/TRAINING PROGRAM

## 2024-09-18 RX ORDER — METFORMIN HCL 500 MG
1000 TABLET, EXTENDED RELEASE 24 HR ORAL 2 TIMES DAILY WITH MEALS
Qty: 400 TABLET | Refills: 1 | Status: SHIPPED | OUTPATIENT
Start: 2024-09-18

## 2024-09-18 NOTE — PROGRESS NOTES
[facility]   Ira Borden DO    Assessment/Plan:      Diagnosis ICD-10-CM Associated Orders   1. Type 2 diabetes mellitus with hyperglycemia, without long-term current use of insulin (HCC)  E11.65 metFORMIN (GLUCOPHAGE-XR) 500 mg 24 hr tablet     glimepiride (AMARYL) 1 mg tablet      2. Moderate Alzheimer's dementia, unspecified timing of dementia onset, unspecified whether behavioral, psychotic, or mood disturbance or anxiety (HCC)  G30.9     F02.B0       3. Acquired hypothyroidism  E03.9       4. Mixed hyperlipidemia  E78.2         DM2 - uncontrolled. Increase metformin from 1000 mg qd to bid.   Will also start amaryl at a low dose of 1 mg per day with breakfast given how high his A1c is at this time. eGFR > 60.   Handout given. DFE done today.    F/W Cardio for his CHF - several of his meds managed by Dr. Hastings.   C/W Aricept for memory. Possible without it that he would get rapidly worse.   Schedule for eye doctor.   No change to levothyroxine dosing.   IC paperwork completed today.   Return in 2 months (on 11/29/2024) for Diabetes Follow-up, A1c in office .  Patient may call or return to office with any questions or concerns.   ______________________________________________________________________  Subjective:     Patient ID: Trevor Lyons is a 84 y.o. male.  HPI  Trevor Lyons  Chief Complaint   Patient presents with    Follow-up     Back pain is chronic.     Notes being pretty drowsy during the day.     A1c was 13.5   TSH 4.588, T4 1.14  Will keep levo dose the same.     Metformin XR 1000 mg qd   Farxiga 10 mg daily.     BP stable on meds.     No recent eye doctor appts.     Just seen by cardio last 2 weeks - no med changes.   /52 & 65 bpm    Wt Readings from Last 3 Encounters:   09/18/24 89.8 kg (198 lb)   07/16/24 89.4 kg (197 lb)   04/23/24 86.6 kg (191 lb)     Temp Readings from Last 3 Encounters:   03/16/24 97.8 °F (36.6 °C) (Temporal)     BP Readings from Last 3  Encounters:   09/18/24 112/68   09/09/24 114/52   07/16/24 124/65     Pulse Readings from Last 3 Encounters:   09/18/24 71   09/09/24 65   07/16/24 65      The following portions of the patient's history were reviewed and updated as appropriate: allergies, current medications, past medical history, and problem list.    Review of Systems   Constitutional:  Negative for chills and fever.   Respiratory:  Negative for cough and shortness of breath.         LOPES +    Cardiovascular:  Negative for chest pain and palpitations.   Musculoskeletal:  Positive for back pain and gait problem (wheelchair often & some steps without it to BR).   Neurological:  Positive for headaches (rare). Negative for dizziness and light-headedness.       Objective:      Vitals:    09/18/24 1137   BP: 112/68   Pulse: 71   SpO2: 95%      Physical Exam  Vitals and nursing note reviewed.   Constitutional:       General: He is not in acute distress.     Appearance: Normal appearance. He is obese. He is not ill-appearing.   HENT:      Head: Normocephalic and atraumatic.   Eyes:      General: No scleral icterus.        Right eye: No discharge.         Left eye: No discharge.   Cardiovascular:      Rate and Rhythm: Normal rate and regular rhythm.      Pulses: Normal pulses. no weak pulses.           Dorsalis pedis pulses are 2+ on the right side and 2+ on the left side.      Heart sounds: Normal heart sounds. No murmur heard.  Pulmonary:      Effort: Pulmonary effort is normal. No respiratory distress.      Breath sounds: Normal breath sounds. No stridor. No wheezing.   Musculoskeletal:      Cervical back: Normal range of motion and neck supple. No rigidity or tenderness.      Right lower leg: No edema.      Left lower leg: No edema.   Feet:      Right foot:      Skin integrity: Callus and dry skin present. No ulcer, skin breakdown, erythema or warmth.      Left foot:      Skin integrity: Callus and dry skin present. No ulcer, skin breakdown, erythema or  "warmth.   Lymphadenopathy:      Cervical: No cervical adenopathy.   Neurological:      Mental Status: He is alert and oriented to person, place, and time.      Gait: Gait abnormal (wheelchair).   Psychiatric:         Mood and Affect: Mood normal.         Behavior: Behavior normal.         Thought Content: Thought content normal.         Judgment: Judgment normal.           Diabetic Foot Exam    Patient's shoes and socks removed.    Right Foot/Ankle   Right Foot Inspection  Skin Exam: skin normal, skin intact, dry skin, callus and callus. No warmth, no erythema, no maceration, no abnormal color, no pre-ulcer and no ulcer.     Toe Exam: ROM and strength within normal limits.     Sensory   Proprioception: intact  Monofilament testing: intact    Vascular  The right DP pulse is 2+.     Left Foot/Ankle  Left Foot Inspection  Skin Exam: skin normal, skin intact, dry skin and callus. No warmth, no erythema, no maceration, normal color, no pre-ulcer and no ulcer.     Toe Exam: ROM and strength within normal limits.     Sensory   Proprioception: intact  Monofilament testing: intact    Vascular  The left DP pulse is 2+.     Assign Risk Category  No deformity present  No loss of protective sensation  No weak pulses  Risk: 0    Portions of the record may have been created with voice recognition software. Occasional wrong word or \"sound alike\" substitutions may have occurred due to the inherent limitations of voice recognition software. Please review the chart carefully and recognize, using context, where substitutions/typographical errors may have occurred.     "

## 2024-09-18 NOTE — PATIENT INSTRUCTIONS
"Patient Education     Type 2 diabetes   The Basics   Written by the doctors and editors at [facility]   What is type 2 diabetes? -- This is a disorder that disrupts the way the body uses sugar. It is sometimes called type 2 diabetes mellitus.  All of the cells in the body need sugar to work normally. Sugar gets into the cells with the help of a hormone called insulin. Insulin is made by the pancreas, an organ in the belly. If there is not enough insulin, or if cells in the body don't respond normally to insulin, sugar builds up in the blood. That is what happens to people with diabetes.  There are 2 different types of diabetes:   In type 1 diabetes, the pancreas makes little or no insulin.   In type 2 diabetes, the pancreas still makes some insulin, but the cells in the body stop responding normally. Eventually, the pancreas cannot make enough insulin to keep up.  Having excess body weight or obesity increases a person's risk of developing type 2 diabetes. But people without excess body weight can get diabetes, too.  What are the symptoms of type 2 diabetes? -- Type 2 diabetes usually causes no symptoms. When symptoms do happen, they include:   Needing to urinate often   Intense thirst   Blurry vision  Can diabetes lead to other health problems? -- Yes. Type 2 diabetes might not make you feel sick. But if it is not managed, it can lead to serious problems over time, such as:   Heart attacks   Strokes   Kidney disease   Vision problems (or even blindness)   Pain or loss of feeling in the hands and feet   Needing to have fingers, toes, or other body parts removed (amputated)  How do I know if I have type 2 diabetes? -- Your doctor or nurse can do a blood test. There are 2 tests that can be used for this. Both involve measuring the amount of sugar in your blood, called your \"blood sugar\" or \"blood glucose\":   One of the tests measures your blood sugar at the time the blood sample is taken. This test is done in the " "morning. You can't eat or drink anything except water for at least 8 hours before the test.   The other test shows what your average blood sugar has been for the past 2 to 3 months. This blood test is called \"hemoglobin A1C\" or just \"A1C.\" It can be checked at any time of the day, even if you have recently eaten.  How is type 2 diabetes treated? -- The goals of treatment are to manage your blood sugar and lower the risk of future problems that can happen in people with diabetes.  Treatment might include:   Lifestyle changes - This is an important part of managing diabetes. It includes eating healthy foods and getting plenty of physical activity.   Medicines - There are a few medicines that help lower blood sugar. Some people need to take pills that help the body make more insulin or that help insulin do its job. Others need insulin shots.  Depending on what medicines you take, you might need to check your blood sugar regularly at home. But not everyone with type 2 diabetes needs to do this. Your doctor or nurse will tell you if you should be checking your blood sugar, and when and how to do this.  Sometimes, people with type 2 diabetes also need medicines to help prevent problems caused by the disease. For instance, medicines used to lower blood pressure can reduce the chances of a heart attack or stroke.   General medical care - It's also important to take care of other areas of your health. This includes watching your blood pressure and cholesterol levels. You should also get certain vaccines, such as vaccines to protect against the flu and coronavirus disease 2019 (\"COVID-19\"). Some people also need a vaccine to prevent pneumonia.  Can type 2 diabetes be prevented? -- Yes. To lower your chances of getting type 2 diabetes, the most important thing you can do is eat a healthy diet and get plenty of physical activity. This can help you lose weight if you are overweight. But eating well and being active are also good " for your overall health. Even gentle activity, like walking, has benefits.  If you smoke, quitting can also lower your risk of type 2 diabetes. Quitting smoking can be difficult, but your doctor or nurse can help.  All topics are updated as new evidence becomes available and our peer review process is complete.  This topic retrieved from [employer] on: Apr 24, 2024.  Topic 83230 Version 23.0  Release: 32.3.2 - C32.113  © 2024 UpToDate, Inc. and/or its affiliates. All rights reserved.  Consumer Information Use and Disclaimer   Disclaimer: This generalized information is a limited summary of diagnosis, treatment, and/or medication information. It is not meant to be comprehensive and should be used as a tool to help the user understand and/or assess potential diagnostic and treatment options. It does NOT include all information about conditions, treatments, medications, side effects, or risks that may apply to a specific patient. It is not intended to be medical advice or a substitute for the medical advice, diagnosis, or treatment of a health care provider based on the health care provider's examination and assessment of a patient's specific and unique circumstances. Patients must speak with a health care provider for complete information about their health, medical questions, and treatment options, including any risks or benefits regarding use of medications. This information does not endorse any treatments or medications as safe, effective, or approved for treating a specific patient. UpToDate, Inc. and its affiliates disclaim any warranty or liability relating to this information or the use thereof.The use of this information is governed by the Terms of Use, available at https://www.wolterskluwer.com/en/know/clinical-effectiveness-terms. 2024© UpToDate, Inc. and its affiliates and/or licensors. All rights reserved.  Copyright   © 2024 UpToDate, Inc. and/or its affiliates. All rights reserved.

## 2024-10-04 ENCOUNTER — TELEPHONE (OUTPATIENT)
Dept: FAMILY MEDICINE CLINIC | Facility: HOSPITAL | Age: 84
End: 2024-10-04

## 2024-10-04 RX ORDER — GLIMEPIRIDE 1 MG/1
1 TABLET ORAL
Qty: 100 TABLET | Refills: 3 | Status: SHIPPED | OUTPATIENT
Start: 2024-10-04

## 2024-10-04 NOTE — TELEPHONE ENCOUNTER
----- Message from Ira Borden DO sent at 10/4/2024 12:29 AM EDT -----  Regarding: Diabetes  Pls call facility and le them know I added amaryl for his very high sugars. We changed his metformin at his appt, but looking into more I want him on amaryl as a low dose also.

## 2024-10-14 ENCOUNTER — TELEPHONE (OUTPATIENT)
Age: 84
End: 2024-10-14

## 2024-10-14 NOTE — TELEPHONE ENCOUNTER
John from facility called. Patient reported that he has had a cough for a few days. They are asking for cough syrup to be called in. Please advise.

## 2024-10-19 ENCOUNTER — APPOINTMENT (EMERGENCY)
Dept: CT IMAGING | Facility: HOSPITAL | Age: 84
DRG: 291 | End: 2024-10-19
Payer: MEDICARE

## 2024-10-19 ENCOUNTER — APPOINTMENT (OUTPATIENT)
Dept: RADIOLOGY | Facility: HOSPITAL | Age: 84
DRG: 291 | End: 2024-10-19
Payer: MEDICARE

## 2024-10-19 ENCOUNTER — HOSPITAL ENCOUNTER (INPATIENT)
Facility: HOSPITAL | Age: 84
LOS: 10 days | Discharge: HOME HEALTH | DRG: 291 | End: 2024-10-29
Attending: EMERGENCY MEDICINE | Admitting: STUDENT IN AN ORGANIZED HEALTH CARE EDUCATION/TRAINING PROGRAM
Payer: MEDICARE

## 2024-10-19 DIAGNOSIS — H91.92 DEAFNESS IN LEFT EAR: ICD-10-CM

## 2024-10-19 DIAGNOSIS — I48.19 PERSISTENT ATRIAL FIBRILLATION (HCC): ICD-10-CM

## 2024-10-19 DIAGNOSIS — I71.21 ANEURYSM OF ASCENDING AORTA WITHOUT RUPTURE (HCC): ICD-10-CM

## 2024-10-19 DIAGNOSIS — J44.1 COPD WITH ACUTE EXACERBATION (HCC): ICD-10-CM

## 2024-10-19 DIAGNOSIS — E03.9 ACQUIRED HYPOTHYROIDISM: ICD-10-CM

## 2024-10-19 DIAGNOSIS — F41.9 ANXIETY: ICD-10-CM

## 2024-10-19 DIAGNOSIS — F41.8 DEPRESSION WITH ANXIETY: ICD-10-CM

## 2024-10-19 DIAGNOSIS — M51.379 DDD (DEGENERATIVE DISC DISEASE), LUMBOSACRAL: ICD-10-CM

## 2024-10-19 DIAGNOSIS — E11.9 TYPE 2 DIABETES MELLITUS WITHOUT COMPLICATION, WITHOUT LONG-TERM CURRENT USE OF INSULIN (HCC): ICD-10-CM

## 2024-10-19 DIAGNOSIS — F33.9 DEPRESSION, RECURRENT (HCC): ICD-10-CM

## 2024-10-19 DIAGNOSIS — G30.9 ALZHEIMER DISEASE (HCC): ICD-10-CM

## 2024-10-19 DIAGNOSIS — J18.9 PNEUMONIA: Primary | ICD-10-CM

## 2024-10-19 DIAGNOSIS — I50.42 CHRONIC COMBINED SYSTOLIC AND DIASTOLIC CHF (CONGESTIVE HEART FAILURE) (HCC): ICD-10-CM

## 2024-10-19 DIAGNOSIS — E11.65 TYPE 2 DIABETES MELLITUS WITH HYPERGLYCEMIA, WITHOUT LONG-TERM CURRENT USE OF INSULIN (HCC): ICD-10-CM

## 2024-10-19 DIAGNOSIS — F02.80 ALZHEIMER DISEASE (HCC): ICD-10-CM

## 2024-10-19 DIAGNOSIS — Z95.2 S/P TAVR (TRANSCATHETER AORTIC VALVE REPLACEMENT): ICD-10-CM

## 2024-10-19 DIAGNOSIS — E78.2 MIXED HYPERLIPIDEMIA: ICD-10-CM

## 2024-10-19 DIAGNOSIS — Z91.09 ENVIRONMENTAL ALLERGIES: ICD-10-CM

## 2024-10-19 DIAGNOSIS — E61.1 IRON DEFICIENCY: ICD-10-CM

## 2024-10-19 DIAGNOSIS — R65.10 SIRS (SYSTEMIC INFLAMMATORY RESPONSE SYNDROME) (HCC): ICD-10-CM

## 2024-10-19 DIAGNOSIS — I50.9 ACUTE EXACERBATION OF CONGESTIVE HEART FAILURE (HCC): ICD-10-CM

## 2024-10-19 PROBLEM — D64.9 ANEMIA: Status: ACTIVE | Noted: 2024-10-19

## 2024-10-19 PROBLEM — R06.89 ACUTE RESPIRATORY INSUFFICIENCY: Status: ACTIVE | Noted: 2024-10-19

## 2024-10-19 PROBLEM — J44.9 COPD (CHRONIC OBSTRUCTIVE PULMONARY DISEASE) (HCC): Status: ACTIVE | Noted: 2024-03-19

## 2024-10-19 LAB
2HR DELTA HS TROPONIN: 0 NG/L
4HR DELTA HS TROPONIN: 3 NG/L
ALBUMIN SERPL BCG-MCNC: 3.9 G/DL (ref 3.5–5)
ALP SERPL-CCNC: 99 U/L (ref 34–104)
ALT SERPL W P-5'-P-CCNC: 16 U/L (ref 7–52)
ANION GAP SERPL CALCULATED.3IONS-SCNC: 8 MMOL/L (ref 4–13)
AST SERPL W P-5'-P-CCNC: 21 U/L (ref 13–39)
ATRIAL RATE: 55 BPM
BASOPHILS # BLD AUTO: 0.04 THOUSANDS/ΜL (ref 0–0.1)
BASOPHILS NFR BLD AUTO: 0 % (ref 0–1)
BILIRUB SERPL-MCNC: 0.66 MG/DL (ref 0.2–1)
BNP SERPL-MCNC: 419 PG/ML (ref 0–100)
BUN SERPL-MCNC: 18 MG/DL (ref 5–25)
CALCIUM SERPL-MCNC: 9 MG/DL (ref 8.4–10.2)
CARDIAC TROPONIN I PNL SERPL HS: 18 NG/L
CARDIAC TROPONIN I PNL SERPL HS: 18 NG/L
CARDIAC TROPONIN I PNL SERPL HS: 21 NG/L
CHLORIDE SERPL-SCNC: 100 MMOL/L (ref 96–108)
CO2 SERPL-SCNC: 28 MMOL/L (ref 21–32)
CREAT SERPL-MCNC: 1.21 MG/DL (ref 0.6–1.3)
EOSINOPHIL # BLD AUTO: 0.1 THOUSAND/ΜL (ref 0–0.61)
EOSINOPHIL NFR BLD AUTO: 1 % (ref 0–6)
ERYTHROCYTE [DISTWIDTH] IN BLOOD BY AUTOMATED COUNT: 14.6 % (ref 11.6–15.1)
FLUAV AG UPPER RESP QL IA.RAPID: NEGATIVE
FLUBV AG UPPER RESP QL IA.RAPID: NEGATIVE
GFR SERPL CREATININE-BSD FRML MDRD: 53 ML/MIN/1.73SQ M
GLUCOSE SERPL-MCNC: 128 MG/DL (ref 65–140)
HCT VFR BLD AUTO: 36.7 % (ref 36.5–49.3)
HGB BLD-MCNC: 11.4 G/DL (ref 12–17)
IMM GRANULOCYTES # BLD AUTO: 0.08 THOUSAND/UL (ref 0–0.2)
IMM GRANULOCYTES NFR BLD AUTO: 1 % (ref 0–2)
LACTATE SERPL-SCNC: 1.2 MMOL/L (ref 0.5–2)
LYMPHOCYTES # BLD AUTO: 4.64 THOUSANDS/ΜL (ref 0.6–4.47)
LYMPHOCYTES NFR BLD AUTO: 37 % (ref 14–44)
MCH RBC QN AUTO: 29.7 PG (ref 26.8–34.3)
MCHC RBC AUTO-ENTMCNC: 31.1 G/DL (ref 31.4–37.4)
MCV RBC AUTO: 96 FL (ref 82–98)
MONOCYTES # BLD AUTO: 1.23 THOUSAND/ΜL (ref 0.17–1.22)
MONOCYTES NFR BLD AUTO: 10 % (ref 4–12)
NEUTROPHILS # BLD AUTO: 6.33 THOUSANDS/ΜL (ref 1.85–7.62)
NEUTS SEG NFR BLD AUTO: 51 % (ref 43–75)
NRBC BLD AUTO-RTO: 0 /100 WBCS
PLATELET # BLD AUTO: 217 THOUSANDS/UL (ref 149–390)
PMV BLD AUTO: 8.8 FL (ref 8.9–12.7)
POTASSIUM SERPL-SCNC: 4.1 MMOL/L (ref 3.5–5.3)
PROCALCITONIN SERPL-MCNC: 0.08 NG/ML
PROT SERPL-MCNC: 6.8 G/DL (ref 6.4–8.4)
QRS AXIS: 193 DEGREES
QRSD INTERVAL: 200 MS
QT INTERVAL: 506 MS
QTC INTERVAL: 526 MS
RBC # BLD AUTO: 3.84 MILLION/UL (ref 3.88–5.62)
SARS-COV+SARS-COV-2 AG RESP QL IA.RAPID: NEGATIVE
SODIUM SERPL-SCNC: 136 MMOL/L (ref 135–147)
T WAVE AXIS: 38 DEGREES
T4 FREE SERPL-MCNC: 0.96 NG/DL (ref 0.61–1.12)
TSH SERPL DL<=0.05 MIU/L-ACNC: 8.36 UIU/ML (ref 0.45–4.5)
VENTRICULAR RATE: 65 BPM
WBC # BLD AUTO: 12.42 THOUSAND/UL (ref 4.31–10.16)

## 2024-10-19 PROCEDURE — 99285 EMERGENCY DEPT VISIT HI MDM: CPT

## 2024-10-19 PROCEDURE — 99223 1ST HOSP IP/OBS HIGH 75: CPT | Performed by: PHYSICIAN ASSISTANT

## 2024-10-19 PROCEDURE — 84439 ASSAY OF FREE THYROXINE: CPT | Performed by: EMERGENCY MEDICINE

## 2024-10-19 PROCEDURE — 84145 PROCALCITONIN (PCT): CPT | Performed by: EMERGENCY MEDICINE

## 2024-10-19 PROCEDURE — 83880 ASSAY OF NATRIURETIC PEPTIDE: CPT | Performed by: EMERGENCY MEDICINE

## 2024-10-19 PROCEDURE — 87040 BLOOD CULTURE FOR BACTERIA: CPT | Performed by: EMERGENCY MEDICINE

## 2024-10-19 PROCEDURE — 85025 COMPLETE CBC W/AUTO DIFF WBC: CPT | Performed by: EMERGENCY MEDICINE

## 2024-10-19 PROCEDURE — 96375 TX/PRO/DX INJ NEW DRUG ADDON: CPT

## 2024-10-19 PROCEDURE — 84443 ASSAY THYROID STIM HORMONE: CPT | Performed by: EMERGENCY MEDICINE

## 2024-10-19 PROCEDURE — 83605 ASSAY OF LACTIC ACID: CPT | Performed by: EMERGENCY MEDICINE

## 2024-10-19 PROCEDURE — 84484 ASSAY OF TROPONIN QUANT: CPT | Performed by: EMERGENCY MEDICINE

## 2024-10-19 PROCEDURE — 96374 THER/PROPH/DIAG INJ IV PUSH: CPT

## 2024-10-19 PROCEDURE — 80053 COMPREHEN METABOLIC PANEL: CPT | Performed by: EMERGENCY MEDICINE

## 2024-10-19 PROCEDURE — 71275 CT ANGIOGRAPHY CHEST: CPT

## 2024-10-19 PROCEDURE — 87804 INFLUENZA ASSAY W/OPTIC: CPT | Performed by: EMERGENCY MEDICINE

## 2024-10-19 PROCEDURE — 71045 X-RAY EXAM CHEST 1 VIEW: CPT

## 2024-10-19 PROCEDURE — 36415 COLL VENOUS BLD VENIPUNCTURE: CPT

## 2024-10-19 PROCEDURE — 99285 EMERGENCY DEPT VISIT HI MDM: CPT | Performed by: EMERGENCY MEDICINE

## 2024-10-19 PROCEDURE — 94640 AIRWAY INHALATION TREATMENT: CPT

## 2024-10-19 PROCEDURE — 93005 ELECTROCARDIOGRAM TRACING: CPT

## 2024-10-19 PROCEDURE — 87811 SARS-COV-2 COVID19 W/OPTIC: CPT | Performed by: EMERGENCY MEDICINE

## 2024-10-19 RX ORDER — INSULIN GLARGINE 100 [IU]/ML
5 INJECTION, SOLUTION SUBCUTANEOUS
Status: DISCONTINUED | OUTPATIENT
Start: 2024-10-20 | End: 2024-10-28

## 2024-10-19 RX ORDER — LANOLIN ALCOHOL/MO/W.PET/CERES
100 CREAM (GRAM) TOPICAL DAILY
Status: DISCONTINUED | OUTPATIENT
Start: 2024-10-20 | End: 2024-10-29 | Stop reason: HOSPADM

## 2024-10-19 RX ORDER — CLONAZEPAM 1 MG/1
1 TABLET ORAL
Status: DISCONTINUED | OUTPATIENT
Start: 2024-10-19 | End: 2024-10-19

## 2024-10-19 RX ORDER — CEFEPIME HYDROCHLORIDE 2 G/50ML
2000 INJECTION, SOLUTION INTRAVENOUS ONCE
Status: COMPLETED | OUTPATIENT
Start: 2024-10-19 | End: 2024-10-19

## 2024-10-19 RX ORDER — METOPROLOL SUCCINATE 25 MG/1
25 TABLET, EXTENDED RELEASE ORAL EVERY MORNING
Status: DISCONTINUED | OUTPATIENT
Start: 2024-10-20 | End: 2024-10-29 | Stop reason: HOSPADM

## 2024-10-19 RX ORDER — CLONAZEPAM 0.5 MG/1
1 TABLET ORAL ONCE
Status: COMPLETED | OUTPATIENT
Start: 2024-10-19 | End: 2024-10-19

## 2024-10-19 RX ORDER — PANTOPRAZOLE SODIUM 40 MG/1
40 TABLET, DELAYED RELEASE ORAL
Status: DISCONTINUED | OUTPATIENT
Start: 2024-10-19 | End: 2024-10-29 | Stop reason: HOSPADM

## 2024-10-19 RX ORDER — FUROSEMIDE 10 MG/ML
60 INJECTION INTRAMUSCULAR; INTRAVENOUS
Status: DISCONTINUED | OUTPATIENT
Start: 2024-10-20 | End: 2024-10-20

## 2024-10-19 RX ORDER — FLUTICASONE FUROATE AND VILANTEROL 100; 25 UG/1; UG/1
1 POWDER RESPIRATORY (INHALATION) DAILY
Status: DISCONTINUED | OUTPATIENT
Start: 2024-10-20 | End: 2024-10-29 | Stop reason: HOSPADM

## 2024-10-19 RX ORDER — LEVOTHYROXINE SODIUM 112 UG/1
112 TABLET ORAL
Status: DISCONTINUED | OUTPATIENT
Start: 2024-10-20 | End: 2024-10-20

## 2024-10-19 RX ORDER — IPRATROPIUM BROMIDE AND ALBUTEROL SULFATE 2.5; .5 MG/3ML; MG/3ML
3 SOLUTION RESPIRATORY (INHALATION) ONCE
Status: COMPLETED | OUTPATIENT
Start: 2024-10-19 | End: 2024-10-19

## 2024-10-19 RX ORDER — FERROUS SULFATE 325(65) MG
325 TABLET ORAL
Status: DISCONTINUED | OUTPATIENT
Start: 2024-10-20 | End: 2024-10-29 | Stop reason: HOSPADM

## 2024-10-19 RX ORDER — INSULIN LISPRO 100 [IU]/ML
5 INJECTION, SOLUTION INTRAVENOUS; SUBCUTANEOUS
Status: DISCONTINUED | OUTPATIENT
Start: 2024-10-20 | End: 2024-10-28

## 2024-10-19 RX ORDER — SPIRONOLACTONE 25 MG/1
25 TABLET ORAL DAILY
Status: DISCONTINUED | OUTPATIENT
Start: 2024-10-20 | End: 2024-10-29 | Stop reason: HOSPADM

## 2024-10-19 RX ORDER — BUSPIRONE HYDROCHLORIDE 15 MG/1
7.5 TABLET ORAL 2 TIMES DAILY
Status: DISCONTINUED | OUTPATIENT
Start: 2024-10-19 | End: 2024-10-29 | Stop reason: HOSPADM

## 2024-10-19 RX ORDER — INSULIN LISPRO 100 [IU]/ML
1-6 INJECTION, SOLUTION INTRAVENOUS; SUBCUTANEOUS
Status: DISCONTINUED | OUTPATIENT
Start: 2024-10-20 | End: 2024-10-19

## 2024-10-19 RX ORDER — LORATADINE 10 MG/1
10 TABLET ORAL EVERY MORNING
Status: DISCONTINUED | OUTPATIENT
Start: 2024-10-20 | End: 2024-10-29 | Stop reason: HOSPADM

## 2024-10-19 RX ORDER — DULOXETIN HYDROCHLORIDE 30 MG/1
30 CAPSULE, DELAYED RELEASE ORAL DAILY
Status: DISCONTINUED | OUTPATIENT
Start: 2024-10-20 | End: 2024-10-26

## 2024-10-19 RX ORDER — CLONAZEPAM 1 MG/1
1 TABLET ORAL
Status: DISCONTINUED | OUTPATIENT
Start: 2024-10-20 | End: 2024-10-29 | Stop reason: HOSPADM

## 2024-10-19 RX ORDER — LANOLIN ALCOHOL/MO/W.PET/CERES
400 CREAM (GRAM) TOPICAL 2 TIMES DAILY
Status: DISCONTINUED | OUTPATIENT
Start: 2024-10-20 | End: 2024-10-29 | Stop reason: HOSPADM

## 2024-10-19 RX ORDER — FUROSEMIDE 10 MG/ML
80 INJECTION INTRAMUSCULAR; INTRAVENOUS ONCE
Status: COMPLETED | OUTPATIENT
Start: 2024-10-19 | End: 2024-10-19

## 2024-10-19 RX ORDER — ATORVASTATIN CALCIUM 20 MG/1
20 TABLET, FILM COATED ORAL
Status: DISCONTINUED | OUTPATIENT
Start: 2024-10-20 | End: 2024-10-19

## 2024-10-19 RX ORDER — ATORVASTATIN CALCIUM 20 MG/1
20 TABLET, FILM COATED ORAL EVERY MORNING
Status: DISCONTINUED | OUTPATIENT
Start: 2024-10-20 | End: 2024-10-29 | Stop reason: HOSPADM

## 2024-10-19 RX ORDER — ACETAMINOPHEN 325 MG/1
650 TABLET ORAL EVERY 4 HOURS PRN
Status: DISCONTINUED | OUTPATIENT
Start: 2024-10-19 | End: 2024-10-29

## 2024-10-19 RX ORDER — INSULIN LISPRO 100 [IU]/ML
1-6 INJECTION, SOLUTION INTRAVENOUS; SUBCUTANEOUS
Status: DISCONTINUED | OUTPATIENT
Start: 2024-10-20 | End: 2024-10-29 | Stop reason: HOSPADM

## 2024-10-19 RX ORDER — INSULIN LISPRO 100 [IU]/ML
1-5 INJECTION, SOLUTION INTRAVENOUS; SUBCUTANEOUS
Status: DISCONTINUED | OUTPATIENT
Start: 2024-10-19 | End: 2024-10-29 | Stop reason: HOSPADM

## 2024-10-19 RX ORDER — DONEPEZIL HYDROCHLORIDE 5 MG/1
10 TABLET, FILM COATED ORAL EVERY MORNING
Status: DISCONTINUED | OUTPATIENT
Start: 2024-10-20 | End: 2024-10-29 | Stop reason: HOSPADM

## 2024-10-19 RX ORDER — FOLIC ACID 1 MG/1
1000 TABLET ORAL EVERY MORNING
Status: DISCONTINUED | OUTPATIENT
Start: 2024-10-20 | End: 2024-10-29 | Stop reason: HOSPADM

## 2024-10-19 RX ADMIN — DOXYCYCLINE 100 MG: 100 INJECTION, POWDER, LYOPHILIZED, FOR SOLUTION INTRAVENOUS at 22:29

## 2024-10-19 RX ADMIN — IPRATROPIUM BROMIDE AND ALBUTEROL SULFATE 3 ML: .5; 3 SOLUTION RESPIRATORY (INHALATION) at 18:29

## 2024-10-19 RX ADMIN — CLONAZEPAM 1 MG: 0.5 TABLET ORAL at 20:50

## 2024-10-19 RX ADMIN — IOHEXOL 85 ML: 350 INJECTION, SOLUTION INTRAVENOUS at 18:55

## 2024-10-19 RX ADMIN — CEFEPIME HYDROCHLORIDE 2000 MG: 2 INJECTION, SOLUTION INTRAVENOUS at 21:28

## 2024-10-19 RX ADMIN — VANCOMYCIN HYDROCHLORIDE 1750 MG: 1 INJECTION, POWDER, LYOPHILIZED, FOR SOLUTION INTRAVENOUS at 22:29

## 2024-10-19 RX ADMIN — FUROSEMIDE 80 MG: 10 INJECTION, SOLUTION INTRAVENOUS at 19:11

## 2024-10-19 NOTE — ED PROVIDER NOTES
Time reflects when diagnosis was documented in both MDM as applicable and the Disposition within this note       Time User Action Codes Description Comment    10/19/2024  9:31 PM Chino Lees Add [J18.9] Pneumonia     10/19/2024  9:31 PM Chino Lees Add [I50.9] Acute exacerbation of congestive heart failure (HCC)           ED Disposition       ED Disposition   Admit    Condition   Stable    Date/Time   Sat Oct 19, 2024  9:30 PM    Comment                  Assessment & Plan       Medical Decision Making  Patient had diminished breath sounds.  Initially was treated for COPD.  However there was not wheezing.  Initial chest x-ray was poor quality due to poor inspiration.  However it did suggest congestive heart failure.  Lasix was ordered.  During ED stay oxygen saturations dropped into the high 80s.  He did require oxygen by nasal cannula.  Oxygen saturations improved.  He had the chest was negative for pulmonary embolism.  Did have groundglass opacities that suggested pneumonia versus asymmetrical pulmonary embolism.  He does have an elevated white count.  He did have a cough.  Patient was cultured and empirically treated for pneumonia.   Consulted with hospitalist for admission.  Patient ruled out for acute myocardial infarction by troponins.    Amount and/or Complexity of Data Reviewed  Labs: ordered. Decision-making details documented in ED Course.  Radiology: ordered and independent interpretation performed. Decision-making details documented in ED Course.  ECG/medicine tests: ordered and independent interpretation performed. Decision-making details documented in ED Course.    Risk  Prescription drug management.  Decision regarding hospitalization.             Medications   cefepime (MAXIPIME) IVPB (premix in dextrose) 2,000 mg 50 mL (2,000 mg Intravenous New Bag 10/19/24 2128)   vancomycin (VANCOCIN) 1,750 mg in sodium chloride 0.9 % 500 mL IVPB (has no administration in time range)   azithromycin  (ZITHROMAX) 500 mg in sodium chloride 0.9% 250mL IVPB 500 mg (has no administration in time range)   ipratropium-albuterol (DUO-NEB) 0.5-2.5 mg/3 mL inhalation solution 3 mL (3 mL Nebulization Given 10/19/24 1829)   furosemide (LASIX) injection 80 mg (80 mg Intravenous Given 10/19/24 1911)   iohexol (OMNIPAQUE) 350 MG/ML injection (MULTI-DOSE) 85 mL (85 mL Intravenous Given 10/19/24 1855)   clonazePAM (KlonoPIN) tablet 1 mg (1 mg Oral Given 10/19/24 2050)       ED Risk Strat Scores                           SBIRT 20yo+      Flowsheet Row Most Recent Value   Initial Alcohol Screen: US AUDIT-C     1. How often do you have a drink containing alcohol? 0 Filed at: 10/19/2024 1636   2. How many drinks containing alcohol do you have on a typical day you are drinking?  0 Filed at: 10/19/2024 1636   3a. Male UNDER 65: How often do you have five or more drinks on one occasion? 0 Filed at: 10/19/2024 1636   3b. FEMALE Any Age, or MALE 65+: How often do you have 4 or more drinks on one occassion? 0 Filed at: 10/19/2024 1636   Audit-C Score 0 Filed at: 10/19/2024 1636   NICHOL: How many times in the past year have you...    Used an illegal drug or used a prescription medication for non-medical reasons? Never Filed at: 10/19/2024 1636                            History of Present Illness       Chief Complaint   Patient presents with    Shortness of Breath     Pt to ED from [facility] for low 02 and SOB. Feels like he has to take a bigger breath to fill lungs. Denies chest pain. Denies abd pain.        Past Medical History:   Diagnosis Date    Alzheimer disease (HCC)     Anemia     Aneurysm of ascending aorta without rupture (HCC)     Anxiety     Atrial fibrillation (HCC)     CHF (congestive heart failure) (HCC)     Depression     Diabetes mellitus (HCC)     Disease of thyroid gland     Pulmonary hypertension (HCC)     Sciatica     Sick sinus syndrome (HCC)       Past Surgical History:   Procedure Laterality Date    CARDIAC  PACEMAKER PLACEMENT        Family History   Problem Relation Age of Onset    Dementia Mother     Heart disease Father     Stroke Father     Thyroid disease Daughter     Thyroid disease Daughter       Social History     Tobacco Use    Smoking status: Former     Types: Cigarettes    Smokeless tobacco: Never   Vaping Use    Vaping status: Never Used   Substance Use Topics    Alcohol use: Not Currently    Drug use: Never      E-Cigarette/Vaping    E-Cigarette Use Never User       E-Cigarette/Vaping Substances      I have reviewed and agree with the history as documented.     Patient is an 84-year-old male.  He has a history of paroxysmal atrial fibrillation and nonsustained ventricular tachycardia.  He has a pacemaker.  He has anxiety.  He has pulmonary embolism on Xarelto.  He is a diabetic.  He has a history of an aneurysm of the ascending aorta.  He has COPD.  He has coronary artery disease status post CABG.  He is status post TAVR.  He has dementia.  The patient has been short of breath for some time but it has gotten worse.  Today he had low oxygen saturations.  No chest pain.  He is complaining of pain to the right upper thoracic spine.  No trauma.  He does have a cough.  No fever or chills.  Denies calf pain or peripheral edema.        Review of Systems   Constitutional:  Negative for chills and fever.   HENT:  Negative for rhinorrhea and sore throat.    Eyes:  Negative for pain, redness and visual disturbance.   Respiratory:  Positive for cough and shortness of breath.    Cardiovascular:  Negative for chest pain and leg swelling.   Gastrointestinal:  Negative for abdominal pain, diarrhea and vomiting.   Endocrine: Negative for polydipsia and polyuria.   Genitourinary:  Negative for dysuria, frequency and hematuria.   Musculoskeletal:  Negative for back pain and neck pain.   Skin:  Negative for rash and wound.   Allergic/Immunologic: Negative for immunocompromised state.   Neurological:  Negative for weakness,  numbness and headaches.   Psychiatric/Behavioral:  Negative for hallucinations and suicidal ideas. The patient is nervous/anxious.    All other systems reviewed and are negative.          Objective       ED Triage Vitals [10/19/24 1634]   Temperature Pulse Blood Pressure Respirations SpO2 Patient Position - Orthostatic VS   97.9 °F (36.6 °C) 64 133/74 20 96 % Sitting      Temp Source Heart Rate Source BP Location FiO2 (%) Pain Score    Temporal Monitor Left arm -- No Pain      Vitals      Date and Time Temp Pulse SpO2 Resp BP Pain Score FACES Pain Rating User   10/19/24 2104 -- 64 94 % 16 -- -- --    10/19/24 2054 -- 64 94 % 16 117/66 -- --    10/19/24 2000 -- 64 96 % 18 157/75 -- --    10/19/24 1930 -- 64 96 % 20 143/72 -- --    10/19/24 1916 -- -- 92 % -- -- -- --    10/19/24 1915 -- 64 87 % 20 135/62 -- --    10/19/24 1830 -- 65 92 % 22 125/78 -- -- RN   10/19/24 1800 -- 61 92 % 21 128/66 -- -- RN   10/19/24 1746 -- -- 92 % -- -- -- -- RN   10/19/24 1745 -- 65 -- 22 126/64 -- -- RN   10/19/24 1634 97.9 °F (36.6 °C) 64 96 % 20 133/74 No Pain -- ML            Physical Exam  Vitals reviewed.   Constitutional:       General: He is not in acute distress.     Appearance: He is obese. He is not toxic-appearing.   HENT:      Head: Normocephalic and atraumatic.      Nose: Nose normal.      Mouth/Throat:      Mouth: Mucous membranes are moist.   Eyes:      General:         Right eye: No discharge.         Left eye: No discharge.      Conjunctiva/sclera: Conjunctivae normal.   Cardiovascular:      Rate and Rhythm: Normal rate and regular rhythm.      Pulses: Normal pulses.      Heart sounds: Normal heart sounds. No murmur heard.     No friction rub. No gallop.   Pulmonary:      Effort: Pulmonary effort is normal. No respiratory distress.      Breath sounds: No stridor. Decreased breath sounds present. No wheezing, rhonchi or rales.   Abdominal:      General: Bowel sounds are normal. There is no distension.       Palpations: Abdomen is soft.      Tenderness: There is no abdominal tenderness. There is no right CVA tenderness, left CVA tenderness, guarding or rebound.   Musculoskeletal:         General: No swelling, tenderness, deformity or signs of injury. Normal range of motion.      Cervical back: Normal range of motion and neck supple. No rigidity.      Right lower leg: No tenderness. No edema.      Left lower leg: No tenderness. No edema.      Comments: No calf pain or unilateral leg swelling   Skin:     General: Skin is warm and dry.      Coloration: Skin is not jaundiced or pale.      Findings: No bruising, erythema or rash.   Neurological:      General: No focal deficit present.      Mental Status: He is alert and oriented to person, place, and time.      Cranial Nerves: No facial asymmetry.      Sensory: No sensory deficit.      Motor: Motor function is intact.   Psychiatric:         Mood and Affect: Mood normal.         Behavior: Behavior normal.         Results Reviewed       Procedure Component Value Units Date/Time    Lactic acid, plasma (w/reflex if result > 2.0) [[id number]] Collected: 10/19/24 2126    Lab Status: No result Specimen: Blood from Arm, Right     Procalcitonin [[id number]] Collected: 10/19/24 2126    Lab Status: No result Specimen: Blood from Arm, Right     HS Troponin I 4hr [[id number]]  (Normal) Collected: 10/19/24 2052    Lab Status: Final result Specimen: Blood from Arm, Right Updated: 10/19/24 2118     hs TnI 4hr 21 ng/L      Delta 4hr hsTnI 3 ng/L     Blood culture #1 [[id number]] Collected: 10/19/24 2116    Lab Status: No result Specimen: Blood from Arm, Right     Blood culture #2 [[id number]] Collected: 10/19/24 2116    Lab Status: No result Specimen: Blood from Arm, Left     TSH, 3rd generation with Free T4 reflex [[id number]]  (Abnormal) Collected: 10/19/24 1837    Lab Status: Final result Specimen: Blood from Arm, Right Updated: 10/19/24 1919     TSH 3RD GENERATON 8.358 uIU/mL     T4, free  [[id number]] Collected: 10/19/24 1837    Lab Status: In process Specimen: Blood from Arm, Right Updated: 10/19/24 1919    HS Troponin I 2hr [[id number]]  (Normal) Collected: 10/19/24 1837    Lab Status: Final result Specimen: Blood from Arm, Right Updated: 10/19/24 1909     hs TnI 2hr 18 ng/L      Delta 2hr hsTnI 0 ng/L     B-Type Natriuretic Peptide(BNP) [[id number]]  (Abnormal) Collected: 10/19/24 1837    Lab Status: Final result Specimen: Blood from Arm, Right Updated: 10/19/24 1909      pg/mL     FLU/COVID Rapid Antigen (30 min. TAT) - Preferred screening test in ED [[id number]]  (Normal) Collected: 10/19/24 1837    Lab Status: Final result Specimen: Nares from Nose Updated: 10/19/24 1905     SARS COV Rapid Antigen Negative     Influenza A Rapid Antigen Negative     Influenza B Rapid Antigen Negative    Narrative:      This test has been performed using the [employer] 2 FLU+SARS Antigen test under the Emergency Use Authorization (EUA). This test has been validated by the  and verified by the performing laboratory. The [employer] uses lateral flow immunofluorescent sandwich assay to detect SARS-COV, Influenza A and Influenza B Antigen.     The [employer] 2 SARS Antigen test does not differentiate between SARS-CoV and SARS-CoV-2.     Negative results are presumptive and may be confirmed with a molecular assay, if necessary, for patient management. Negative results do not rule out SARS-CoV-2 or influenza infection and should not be used as the sole basis for treatment or patient management decisions. A negative test result may occur if the level of antigen in a sample is below the limit of detection of this test.     Positive results are indicative of the presence of viral antigens, but do not rule out bacterial infection or co-infection with other viruses.     All test results should be used as an adjunct to clinical observations and other information available to the provider.    FOR PEDIATRIC  PATIENTS - copy/paste COVID Guidelines URL to browser: [email or web address]    HS Troponin 0hr (reflex protocol) [[id number]]  (Normal) Collected: 10/19/24 1641    Lab Status: Final result Specimen: Blood from Arm, Right Updated: 10/19/24 1721     hs TnI 0hr 18 ng/L     Comprehensive metabolic panel [[id number]] Collected: 10/19/24 1641    Lab Status: Final result Specimen: Blood from Arm, Right Updated: 10/19/24 1718     Sodium 136 mmol/L      Potassium 4.1 mmol/L      Chloride 100 mmol/L      CO2 28 mmol/L      ANION GAP 8 mmol/L      BUN 18 mg/dL      Creatinine 1.21 mg/dL      Glucose 128 mg/dL      Calcium 9.0 mg/dL      AST 21 U/L      ALT 16 U/L      Alkaline Phosphatase 99 U/L      Total Protein 6.8 g/dL      Albumin 3.9 g/dL      Total Bilirubin 0.66 mg/dL      eGFR 53 ml/min/1.73sq m     Narrative:      National Kidney Disease Foundation guidelines for Chronic Kidney Disease (CKD):     Stage 1 with normal or high GFR (GFR > 90 mL/min/1.73 square meters)    Stage 2 Mild CKD (GFR = 60-89 mL/min/1.73 square meters)    Stage 3A Moderate CKD (GFR = 45-59 mL/min/1.73 square meters)    Stage 3B Moderate CKD (GFR = 30-44 mL/min/1.73 square meters)    Stage 4 Severe CKD (GFR = 15-29 mL/min/1.73 square meters)    Stage 5 End Stage CKD (GFR <15 mL/min/1.73 square meters)  Note: GFR calculation is accurate only with a steady state creatinine    CBC and differential [[id number]]  (Abnormal) Collected: 10/19/24 1641    Lab Status: Final result Specimen: Blood from Arm, Right Updated: 10/19/24 1658     WBC 12.42 Thousand/uL      RBC 3.84 Million/uL      Hemoglobin 11.4 g/dL      Hematocrit 36.7 %      MCV 96 fL      MCH 29.7 pg      MCHC 31.1 g/dL      RDW 14.6 %      MPV 8.8 fL      Platelets 217 Thousands/uL      nRBC 0 /100 WBCs      Segmented % 51 %      Immature Grans % 1 %      Lymphocytes % 37 %      Monocytes % 10 %      Eosinophils Relative 1 %      Basophils  Relative 0 %      Absolute Neutrophils 6.33 Thousands/µL      Absolute Immature Grans 0.08 Thousand/uL      Absolute Lymphocytes 4.64 Thousands/µL      Absolute Monocytes 1.23 Thousand/µL      Eosinophils Absolute 0.10 Thousand/µL      Basophils Absolute 0.04 Thousands/µL             CTA ED Chest PE Study   Final Interpretation by Broderick Rosales MD (10/19 2045)         1. No evidence of acute pulmonary embolus or thoracic aortic aneurysm.   2. Small right pleural effusion.   3. Few groundglass opacities in the right lung could represent mild aspiration pneumonitis, atypical pneumonia, or asymmetric pulmonary edema.                  Workstation performed: [id number]         XR chest 1 view portable   ED Interpretation by Chino Lees MD (10/19 1838)   Poor inspiratory effort.  Cardiomegaly.  Pacemaker.  Increased vascular congestion.          ECG 12 Lead Documentation Only    Date/Time: 10/19/2024 6:10 PM    Performed by: Chino Lees MD  Authorized by: Chino Lees MD    ECG reviewed by me, the ED Provider: yes    Patient location:  ED  Comments:      Ventricular paced rhythm at 65 bpm      ED Medication and Procedure Management   Prior to Admission Medications   Prescriptions Last Dose Informant Patient Reported? Taking?   DULoxetine (CYMBALTA) 30 mg delayed release capsule   No No   Sig: Take 1 capsule (30 mg total) by mouth daily Do not start before 2024.   Diclofenac Sodium (VOLTAREN) 1 %   No No   Sig: Apply 2 g topically 4 (four) times a day as needed (leg pain)   Farxiga 10 MG tablet  Outside Facility (Specify) No No   Sig: Take 1 tablet (10 mg total) by mouth daily   FeroSul 325 (65 Fe) MG tablet   No No   Si TAB ORALLY EVERY MORNING WITH BREAKFAST   Lancets 33G MISC  Outside Facility (Specify) No No   Sig: Testing 1 time daily   Lidocaine 4 % PTCH   No No   Sig: Place 1 patch on the skin 2 (two) times a day as needed (as needed)   Melatonin 5 MG CAPS  Outside Facility  (Specify) Yes No   Sig: Take 10 mg by mouth daily   Multiple Vitamin (DAILY-NACHO MULTIVITAMIN PO)  Outside Facility (Specify) Yes No   Sig: Take by mouth   Multiple Vitamin (Daily-Nacho) TABS   No No   Si TAB ORALLY EVERY MORNING DX: SUPPLEMENT   Thiamine Mononitrate (VITAMIN B1) 100 mg tablet   No No   Sig: Take 1 tablet (100 mg total) by mouth daily   acetaminophen (TYLENOL) 500 mg tablet   No No   Sig: Take 2 tablets (1,000 mg total) by mouth daily at bedtime   albuterol (2.5 mg/3 mL) 0.083 % nebulizer solution   No No   Sig: Take 3 mL (2.5 mg total) by nebulization 4 (four) times a day   albuterol (PROVENTIL HFA,VENTOLIN HFA) 90 mcg/act inhaler  Outside Facility (Specify) Yes No   Sig: Inhale 2 puffs every 6 (six) hours as needed for wheezing   atorvastatin (LIPITOR) 20 mg tablet   No No   Si TAB ORALLY EVERY MORNING DX:HYPERLIPIDEMIA   busPIRone (BUSPAR) 7.5 mg tablet  Outside Facility (Specify) No No   Sig: Take 1 tablet (7.5 mg total) by mouth 2 (two) times a day   clonazePAM (KlonoPIN) 1 mg tablet   No No   Sig: Take 1 tablet (1 mg total) by mouth daily at bedtime as needed for anxiety   donepezil (ARICEPT) 10 mg tablet   No No   Sig: Take 1 tablet (10 mg total) by mouth every morning   fluticasone-umeclidinium-vilanterol 100-62.5-25 mcg/actuation inhaler  Outside Facility (Specify) Yes No   Sig: Inhale 1 puff daily   folic acid (FOLVITE) 1 mg tablet   No No   Si TAB ORALLY EVERY MORNING DX: SUPPLEMENT   furosemide (LASIX) 20 mg tablet  Outside Facility (Specify) No No   Sig: Take 1 tablet (20 mg total) by mouth 2 (two) times a day Hold medication if systolic is less than 110   glimepiride (AMARYL) 1 mg tablet   No No   Sig: Take 1 tablet (1 mg total) by mouth daily with breakfast   glucose blood (True Metrix Blood Glucose Test) test strip  Outside Facility (Specify) No No   Sig: Testing 1 time daily   levothyroxine 112 mcg tablet   No No   Si TAB ORALLY EVERY MORNING DX: HYPOTHYROIDISM    loratadine (CLARITIN) 10 mg tablet   No No   Si TAB ORALLY EVERY MORNING DX:ALLERGY   magnesium Oxide (MAG-OX) 400 mg TABS   No No   Si TAB ORALLY TWICE DAILY DX: SUPPLEMENT **COLD SEAL**   magnesium oxide (MAG-OX) 400 mg  Outside Facility (Specify) Yes No   Sig: Take by mouth 2 (two) times a day   metFORMIN (GLUCOPHAGE-XR) 500 mg 24 hr tablet   No No   Sig: Take 2 tablets (1,000 mg total) by mouth 2 (two) times a day with meals   metoprolol succinate (TOPROL-XL) 25 mg 24 hr tablet   No No   Si TAB ORALLY EVERY MORNING DX: HYPERTENSION   pantoprazole (PROTONIX) 40 mg tablet   No No   Si TAB ORALLY TWICE DAILY DX: GERD   rivaroxaban (Xarelto) 20 mg tablet   No No   Si TAB ORALLY EVERY EVENING WITH DINNER DX: BLOOD CLOT PREVENTION **COLD SEAL**   spironolactone (ALDACTONE) 25 mg tablet   No No   Si TAB ORALLY EVERY MORNING DX: EDEMA **NIOSH 2 HAZARDOUS DRUG**      Facility-Administered Medications: None     Patient's Medications   Discharge Prescriptions    No medications on file     No discharge procedures on file.  ED SEPSIS DOCUMENTATION   Time reflects when diagnosis was documented in both MDM as applicable and the Disposition within this note       Time User Action Codes Description Comment    10/19/2024  9:31 PM Chino Lees [J18.9] Pneumonia     10/19/2024  9:31 PM Chino Lees [I50.9] Acute exacerbation of congestive heart failure (HCC)                  Chino Lees MD  10/19/24 8916

## 2024-10-20 PROBLEM — I48.19 PERSISTENT ATRIAL FIBRILLATION (HCC): Status: ACTIVE | Noted: 2024-03-12

## 2024-10-20 LAB
ANION GAP SERPL CALCULATED.3IONS-SCNC: 8 MMOL/L (ref 4–13)
BUN SERPL-MCNC: 19 MG/DL (ref 5–25)
CALCIUM SERPL-MCNC: 8.4 MG/DL (ref 8.4–10.2)
CHLORIDE SERPL-SCNC: 101 MMOL/L (ref 96–108)
CO2 SERPL-SCNC: 31 MMOL/L (ref 21–32)
CREAT SERPL-MCNC: 1.16 MG/DL (ref 0.6–1.3)
ERYTHROCYTE [DISTWIDTH] IN BLOOD BY AUTOMATED COUNT: 14.6 % (ref 11.6–15.1)
GFR SERPL CREATININE-BSD FRML MDRD: 56 ML/MIN/1.73SQ M
GLUCOSE SERPL-MCNC: 113 MG/DL (ref 65–140)
GLUCOSE SERPL-MCNC: 116 MG/DL (ref 65–140)
GLUCOSE SERPL-MCNC: 124 MG/DL (ref 65–140)
GLUCOSE SERPL-MCNC: 138 MG/DL (ref 65–140)
GLUCOSE SERPL-MCNC: 195 MG/DL (ref 65–140)
GLUCOSE SERPL-MCNC: 74 MG/DL (ref 65–140)
HCT VFR BLD AUTO: 35.3 % (ref 36.5–49.3)
HGB BLD-MCNC: 11.3 G/DL (ref 12–17)
MAGNESIUM SERPL-MCNC: 2.2 MG/DL (ref 1.9–2.7)
MCH RBC QN AUTO: 30.5 PG (ref 26.8–34.3)
MCHC RBC AUTO-ENTMCNC: 32 G/DL (ref 31.4–37.4)
MCV RBC AUTO: 95 FL (ref 82–98)
PHOSPHATE SERPL-MCNC: 4 MG/DL (ref 2.3–4.1)
PLATELET # BLD AUTO: 215 THOUSANDS/UL (ref 149–390)
PMV BLD AUTO: 9 FL (ref 8.9–12.7)
POTASSIUM SERPL-SCNC: 3.4 MMOL/L (ref 3.5–5.3)
RBC # BLD AUTO: 3.71 MILLION/UL (ref 3.88–5.62)
SODIUM SERPL-SCNC: 140 MMOL/L (ref 135–147)
WBC # BLD AUTO: 11.34 THOUSAND/UL (ref 4.31–10.16)

## 2024-10-20 PROCEDURE — 94640 AIRWAY INHALATION TREATMENT: CPT

## 2024-10-20 PROCEDURE — 99222 1ST HOSP IP/OBS MODERATE 55: CPT | Performed by: INTERNAL MEDICINE

## 2024-10-20 PROCEDURE — 82948 REAGENT STRIP/BLOOD GLUCOSE: CPT

## 2024-10-20 PROCEDURE — 99233 SBSQ HOSP IP/OBS HIGH 50: CPT | Performed by: STUDENT IN AN ORGANIZED HEALTH CARE EDUCATION/TRAINING PROGRAM

## 2024-10-20 PROCEDURE — 94760 N-INVAS EAR/PLS OXIMETRY 1: CPT

## 2024-10-20 PROCEDURE — 87081 CULTURE SCREEN ONLY: CPT | Performed by: PHYSICIAN ASSISTANT

## 2024-10-20 PROCEDURE — 85027 COMPLETE CBC AUTOMATED: CPT | Performed by: STUDENT IN AN ORGANIZED HEALTH CARE EDUCATION/TRAINING PROGRAM

## 2024-10-20 PROCEDURE — 83735 ASSAY OF MAGNESIUM: CPT | Performed by: STUDENT IN AN ORGANIZED HEALTH CARE EDUCATION/TRAINING PROGRAM

## 2024-10-20 PROCEDURE — 80048 BASIC METABOLIC PNL TOTAL CA: CPT | Performed by: STUDENT IN AN ORGANIZED HEALTH CARE EDUCATION/TRAINING PROGRAM

## 2024-10-20 PROCEDURE — 84100 ASSAY OF PHOSPHORUS: CPT | Performed by: STUDENT IN AN ORGANIZED HEALTH CARE EDUCATION/TRAINING PROGRAM

## 2024-10-20 RX ORDER — FUROSEMIDE 10 MG/ML
80 INJECTION INTRAMUSCULAR; INTRAVENOUS
Status: DISCONTINUED | OUTPATIENT
Start: 2024-10-20 | End: 2024-10-22

## 2024-10-20 RX ORDER — ALBUTEROL SULFATE 0.83 MG/ML
2.5 SOLUTION RESPIRATORY (INHALATION) EVERY 4 HOURS PRN
Status: DISCONTINUED | OUTPATIENT
Start: 2024-10-20 | End: 2024-10-29 | Stop reason: HOSPADM

## 2024-10-20 RX ADMIN — INSULIN GLARGINE 5 UNITS: 100 INJECTION, SOLUTION SUBCUTANEOUS at 08:45

## 2024-10-20 RX ADMIN — FUROSEMIDE 80 MG: 10 INJECTION, SOLUTION INTRAVENOUS at 15:55

## 2024-10-20 RX ADMIN — FUROSEMIDE 60 MG: 10 INJECTION, SOLUTION INTRAMUSCULAR; INTRAVENOUS at 08:47

## 2024-10-20 RX ADMIN — ALBUTEROL SULFATE 2.5 MG: 2.5 SOLUTION RESPIRATORY (INHALATION) at 11:53

## 2024-10-20 RX ADMIN — BUSPIRONE HYDROCHLORIDE 7.5 MG: 15 TABLET ORAL at 08:46

## 2024-10-20 RX ADMIN — PANTOPRAZOLE SODIUM 40 MG: 40 TABLET, DELAYED RELEASE ORAL at 15:55

## 2024-10-20 RX ADMIN — INSULIN LISPRO 5 UNITS: 100 INJECTION, SOLUTION INTRAVENOUS; SUBCUTANEOUS at 11:58

## 2024-10-20 RX ADMIN — Medication 100 MG: at 08:46

## 2024-10-20 RX ADMIN — INSULIN LISPRO 1 UNITS: 100 INJECTION, SOLUTION INTRAVENOUS; SUBCUTANEOUS at 00:26

## 2024-10-20 RX ADMIN — ATORVASTATIN CALCIUM 20 MG: 20 TABLET, FILM COATED ORAL at 08:46

## 2024-10-20 RX ADMIN — FOLIC ACID 1000 MCG: 1 TABLET ORAL at 08:46

## 2024-10-20 RX ADMIN — RIVAROXABAN 20 MG: 20 TABLET, FILM COATED ORAL at 00:22

## 2024-10-20 RX ADMIN — BUSPIRONE HYDROCHLORIDE 7.5 MG: 15 TABLET ORAL at 00:22

## 2024-10-20 RX ADMIN — Medication 400 MG: at 08:46

## 2024-10-20 RX ADMIN — PANTOPRAZOLE SODIUM 40 MG: 40 TABLET, DELAYED RELEASE ORAL at 05:00

## 2024-10-20 RX ADMIN — FLUTICASONE FUROATE AND VILANTEROL TRIFENATATE 1 PUFF: 100; 25 POWDER RESPIRATORY (INHALATION) at 08:49

## 2024-10-20 RX ADMIN — SPIRONOLACTONE 25 MG: 25 TABLET ORAL at 08:46

## 2024-10-20 RX ADMIN — LORATADINE 10 MG: 10 TABLET ORAL at 08:46

## 2024-10-20 RX ADMIN — RIVAROXABAN 20 MG: 20 TABLET, FILM COATED ORAL at 15:55

## 2024-10-20 RX ADMIN — INSULIN LISPRO 5 UNITS: 100 INJECTION, SOLUTION INTRAVENOUS; SUBCUTANEOUS at 08:45

## 2024-10-20 RX ADMIN — PANTOPRAZOLE SODIUM 40 MG: 40 TABLET, DELAYED RELEASE ORAL at 00:22

## 2024-10-20 RX ADMIN — UMECLIDINIUM 1 PUFF: 62.5 AEROSOL, POWDER ORAL at 08:49

## 2024-10-20 RX ADMIN — INSULIN LISPRO 5 UNITS: 100 INJECTION, SOLUTION INTRAVENOUS; SUBCUTANEOUS at 17:26

## 2024-10-20 RX ADMIN — LEVOTHYROXINE SODIUM 125 MCG: 25 TABLET ORAL at 05:00

## 2024-10-20 RX ADMIN — Medication 400 MG: at 17:25

## 2024-10-20 RX ADMIN — FERROUS SULFATE TAB 325 MG (65 MG ELEMENTAL FE) 325 MG: 325 (65 FE) TAB at 08:44

## 2024-10-20 RX ADMIN — ALBUTEROL SULFATE 2.5 MG: 2.5 SOLUTION RESPIRATORY (INHALATION) at 05:14

## 2024-10-20 RX ADMIN — DONEPEZIL HYDROCHLORIDE 10 MG: 5 TABLET ORAL at 08:46

## 2024-10-20 RX ADMIN — BUSPIRONE HYDROCHLORIDE 7.5 MG: 15 TABLET ORAL at 17:26

## 2024-10-20 RX ADMIN — CLONAZEPAM 1 MG: 1 TABLET ORAL at 20:03

## 2024-10-20 RX ADMIN — DULOXETINE HYDROCHLORIDE 30 MG: 30 CAPSULE, DELAYED RELEASE ORAL at 08:46

## 2024-10-20 RX ADMIN — METOPROLOL SUCCINATE 25 MG: 25 TABLET, EXTENDED RELEASE ORAL at 08:46

## 2024-10-20 NOTE — H&P
H&P - Hospitalist   Name: Trevor Lyons 84 y.o. male I MRN: [id number]  Unit/Bed#: -01 I Date of Admission: 10/19/2024   Date of Service: 10/20/2024 I Hospital Day: 1     Assessment & Plan  Acute on chronic systolic heart failure (Formerly [facility])  Wt Readings from Last 3 Encounters:   10/19/24 98.4 kg (217 lb)   09/18/24 89.8 kg (198 lb)   07/16/24 89.4 kg (197 lb)   Presents with worsened dyspnea and low SpO2  Last echo January 2024: LVEF 35-40%.  Severely dilated left atrium.  Mild pulmonary hypertension.  TAVR functioning well.  Home diuretic: lasix 20mg BID   Given Lasix 80 Mg IV x 1 in the ER with robust urine output thus far  Change to lasix 60mg IV BID  Cardiology consulted  Repeat echo ordered  I/os Daily weights  Sodium fluid restriction  Acute respiratory insufficiency  SpO2 87% on room air  Improved on 2 L nasal cannula  Suspect secondary to acute on chronic systolic heart failure  Wean oxygen as able  Anemia  Hgb 11.4 on admit  Baseline Cr 10-12   Trend CBC   SIRS (systemic inflammatory response syndrome) (Formerly [facility])  SIRS criteria: Tachypnea and leukocytosis  Initially treated with antibiotics for possible atypical pneumonia in the ED, however procalcitonin is negative and patient examines more like acute on chronic systolic heart failure and denies any fever or productive cough   Hold on further antibiotics at this time  If morning procalcitonin increases-would start ceftriaxone   Follow-up blood cultures  COPD (chronic obstructive pulmonary disease) (Formerly [facility])  Home regimen: Trelegy daily and albuterol as needed  No expiratory wheezing appreciated on admission, do not suspect acute exacerbation at this time  Continue home regimen  Type 2 diabetes mellitus without complication, without long-term current use of insulin ([facility])  Lab Results   Component Value Date    HGBA1C 13.5 (H) 08/28/2024       Recent Labs     10/20/24  0021   POCGLU 195*       Blood Sugar Average: Last 72 hrs:  (P) 195Home regimen: metformin,  "glipizide, and farxiga  Hold oral medication and place on lantus 5U in AM, humalog 5U TID with meals, SSI AC/HS  Coronary artery disease involving native coronary artery of native heart without angina pectoris  CAD status post PCI and CABG  Continue home beta-blocker, statin, and Xarelto  PAF (paroxysmal atrial fibrillation) (HCC)  RC: Metoprolol succinate 25 Mg daily  AC: Xarelto  Continue  Cardiac pacemaker  S/p SSS   Last device interrogation 07/2024 showed normal device function   Pulmonary embolism (HCC)  Continue home Xarelto   Pulmonary hypertension (HCC)  Most recent echo January 2024 showing mild pulmonary hypertension with RSVP 39 mmHg  Continue home medications  Acquired hypothyroidism  TSH elevated at 8.358  Increase synthroid to 125mcg daily   Recheck TSH in 6-8 weeks   Depression with anxiety  Continue home buspar, cymbalta, and klonopin       VTE Pharmacologic Prophylaxis: VTE Score: 7 High Risk (Score >/= 5) - Pharmacological DVT Prophylaxis Ordered: rivaroxaban (Xarelto). Sequential Compression Devices Ordered.  Code Status: Level 1 - Full Code   Discussion with family: Patient declined call to .     Anticipated Length of Stay: Patient will be admitted on an inpatient basis with an anticipated length of stay of greater than 2 midnights secondary to acute on chronic systolic heart failure.    History of Present Illness   Chief Complaint: \" I was having difficulty breathing\"    Trevor Lyons is a 84 y.o. male with a PMH of systolic HF, pulmonary hypertension, PAF, CAD, hypothyroidism, COPD, history of PE on Xarelto, and HTN who presents with worsening dyspnea and reportedly low pulse ox at nursing facility earlier today.  Denies any chest pain..  Reports intermittent nonproductive cough.  No fevers.  No URI-like symptoms.  Denies abdominal pain, nausea, vomiting, or change in bowel habits.  Reports some improvement in dyspnea after receiving Lasix in the ED with robust urine output " thus far.    Review of Systems   Constitutional:  Negative for chills and fever.   HENT:  Negative for congestion.    Respiratory:  Positive for cough and shortness of breath.    Cardiovascular:  Positive for leg swelling. Negative for chest pain.   Gastrointestinal:  Negative for abdominal pain, constipation, diarrhea, nausea and vomiting.   Genitourinary:  Negative for difficulty urinating, dysuria and hematuria.   Musculoskeletal:  Positive for gait problem (Scooter at baseline).   Neurological:  Negative for weakness and numbness.   All other systems reviewed and are negative.      Historical Information   Past Medical History:   Diagnosis Date    Alzheimer disease (HCC)     Anemia     Aneurysm of ascending aorta without rupture (HCC)     Anxiety     Atrial fibrillation (HCC)     CHF (congestive heart failure) (HCC)     Depression     Diabetes mellitus (HCC)     Disease of thyroid gland     Pulmonary hypertension (HCC)     Sciatica     Sick sinus syndrome (HCC)      Past Surgical History:   Procedure Laterality Date    CARDIAC PACEMAKER PLACEMENT       Social History     Tobacco Use    Smoking status: Former     Types: Cigarettes    Smokeless tobacco: Never   Vaping Use    Vaping status: Never Used   Substance and Sexual Activity    Alcohol use: Not Currently    Drug use: Never    Sexual activity: Not Currently     E-Cigarette/Vaping    E-Cigarette Use Never User      E-Cigarette/Vaping Substances     Family History   Problem Relation Age of Onset    Dementia Mother     Heart disease Father     Stroke Father     Thyroid disease Daughter     Thyroid disease Daughter      Social History:  Marital Status: Unknown   Occupation: Retired  Patient Pre-hospital Living Situation: Assisted Living  Patient Pre-hospital Level of Mobility: power wheelchair  Patient Pre-hospital Diet Restrictions: Carb conscious, sodium fluid restriction    Meds/Allergies   I have reveiwed home medications using records provided by  SNF.  Prior to Admission medications    Medication Sig Start Date End Date Taking? Authorizing Provider   acetaminophen (TYLENOL) 500 mg tablet Take 2 tablets (1,000 mg total) by mouth daily at bedtime 8/7/24  Yes Ira Borden DO   albuterol (2.5 mg/3 mL) 0.083 % nebulizer solution Take 3 mL (2.5 mg total) by nebulization 4 (four) times a day 4/23/24  Yes Ira Borden DO   atorvastatin (LIPITOR) 20 mg tablet 1 TAB ORALLY EVERY MORNING DX:HYPERLIPIDEMIA 9/13/24  Yes Ira Borden DO   busPIRone (BUSPAR) 7.5 mg tablet Take 1 tablet (7.5 mg total) by mouth 2 (two) times a day 4/12/24  Yes Ira Borden DO   clonazePAM (KlonoPIN) 1 mg tablet Take 1 tablet (1 mg total) by mouth daily at bedtime as needed for anxiety 7/16/24  Yes Ira Borden DO   donepezil (ARICEPT) 10 mg tablet Take 1 tablet (10 mg total) by mouth every morning 7/16/24  Yes Ira Borden DO   DULoxetine (CYMBALTA) 30 mg delayed release capsule Take 1 capsule (30 mg total) by mouth daily Do not start before August 7, 2024. 8/7/24  Yes Ira Borden DO   Farxiga 10 MG tablet Take 1 tablet (10 mg total) by mouth daily 3/14/24  Yes SAMIRA Boykin   FeroSul 325 (65 Fe) MG tablet 1 TAB ORALLY EVERY MORNING WITH BREAKFAST 9/13/24  Yes Ira Borden DO   fluticasone-umeclidinium-vilanterol 100-62.5-25 mcg/actuation inhaler Inhale 1 puff daily 2/28/24  Yes Historical Provider, MD   folic acid (FOLVITE) 1 mg tablet 1 TAB ORALLY EVERY MORNING DX: SUPPLEMENT 9/13/24  Yes Ira Borden DO   furosemide (LASIX) 20 mg tablet Take 1 tablet (20 mg total) by mouth 2 (two) times a day Hold medication if systolic is less than 110 3/19/24  Yes SAMIRA Boykin   glimepiride (AMARYL) 1 mg tablet Take 1 tablet (1 mg total) by mouth daily with breakfast 10/4/24  Yes Ira Borden,    levothyroxine 112 mcg tablet 1 TAB ORALLY EVERY MORNING DX: HYPOTHYROIDISM 9/13/24  Yes Ira Borden DO   Lidocaine 4 % PTCH Place 1 patch on the skin 2  (two) times a day as needed (as needed) 9/9/24  Yes Ira Borden DO   loratadine (CLARITIN) 10 mg tablet 1 TAB ORALLY EVERY MORNING DX:ALLERGY 9/13/24  Yes Ira Borden DO   magnesium Oxide (MAG-OX) 400 mg TABS 1 TAB ORALLY TWICE DAILY DX: SUPPLEMENT **COLD SEAL** 9/13/24  Yes Ira Borden DO   Melatonin 5 MG CAPS Take 10 mg by mouth daily   Yes Historical Provider, MD   metFORMIN (GLUCOPHAGE-XR) 500 mg 24 hr tablet Take 2 tablets (1,000 mg total) by mouth 2 (two) times a day with meals 9/18/24  Yes Ira Borden DO   metoprolol succinate (TOPROL-XL) 25 mg 24 hr tablet 1 TAB ORALLY EVERY MORNING DX: HYPERTENSION 9/13/24  Yes Ira Borden DO   Multiple Vitamin (Daily-Nacho) TABS 1 TAB ORALLY EVERY MORNING DX: SUPPLEMENT 9/13/24  Yes Ira Borden DO   pantoprazole (PROTONIX) 40 mg tablet 1 TAB ORALLY TWICE DAILY DX: GERD 9/5/24  Yes Ira Borden DO   rivaroxaban (Xarelto) 20 mg tablet 1 TAB ORALLY EVERY EVENING WITH DINNER DX: BLOOD CLOT PREVENTION **COLD SEAL** 9/5/24  Yes Ira Borden DO   spironolactone (ALDACTONE) 25 mg tablet 1 TAB ORALLY EVERY MORNING DX: EDEMA **NIOSH 2 HAZARDOUS DRUG** 9/13/24  Yes Ira Borden DO   Thiamine Mononitrate (VITAMIN B1) 100 mg tablet Take 1 tablet (100 mg total) by mouth daily 9/13/24  Yes Ira Borden DO   albuterol (PROVENTIL HFA,VENTOLIN HFA) 90 mcg/act inhaler Inhale 2 puffs every 6 (six) hours as needed for wheezing    Historical Provider, MD   Diclofenac Sodium (VOLTAREN) 1 % Apply 2 g topically 4 (four) times a day as needed (leg pain) 5/7/24   Matt Gordon MD   glucose blood (True Metrix Blood Glucose Test) test strip Testing 1 time daily 3/14/24   SAMIRA Boykin   Lancets 33G MISC Testing 1 time daily 3/14/24   SAMIRA Boykin   magnesium oxide (MAG-OX) 400 mg Take by mouth 2 (two) times a day    Historical Provider, MD   Multiple Vitamin (DAILY-NACHO MULTIVITAMIN PO) Take by mouth    Historical Provider, MD     No Known  Allergies    Objective :  Temp:  [97.7 °F (36.5 °C)-97.9 °F (36.6 °C)] 97.7 °F (36.5 °C)  HR:  [61-65] 65  BP: (117-157)/(62-84) 117/84  Resp:  [16-22] 16  SpO2:  [81 %-96 %] 81 %  O2 Device: Nasal cannula  Nasal Cannula O2 Flow Rate (L/min):  [2 L/min] 2 L/min    Physical Exam  Vitals and nursing note reviewed.   Constitutional:       General: He is not in acute distress.     Appearance: Normal appearance. He is not ill-appearing.   HENT:      Head: Normocephalic.      Nose: Nose normal.      Mouth/Throat:      Mouth: Mucous membranes are moist.   Eyes:      Conjunctiva/sclera: Conjunctivae normal.   Cardiovascular:      Rate and Rhythm: Normal rate and regular rhythm.      Pulses: Normal pulses.   Pulmonary:      Breath sounds: No wheezing or rhonchi.      Comments: Mild tachypnea with exertion.  Rales in right base.  Abdominal:      General: Abdomen is flat.      Palpations: Abdomen is soft.      Tenderness: There is no abdominal tenderness.   Musculoskeletal:         General: Normal range of motion.      Cervical back: Normal range of motion.      Comments: 1-2 plus pitting edema to B/L LE   Skin:     General: Skin is warm and dry.      Coloration: Skin is not pale.   Neurological:      General: No focal deficit present.      Mental Status: He is alert.      Comments: Oriented to self, place, month, year, and president   Psychiatric:         Mood and Affect: Mood normal.         Thought Content: Thought content normal.          Lines/Drains:            Lab Results: I have reviewed the following results:  Results from last 7 days   Lab Units 10/19/24  1641   WBC Thousand/uL 12.42*   HEMOGLOBIN g/dL 11.4*   HEMATOCRIT % 36.7   PLATELETS Thousands/uL 217   SEGS PCT % 51   LYMPHO PCT % 37   MONO PCT % 10   EOS PCT % 1     Results from last 7 days   Lab Units 10/19/24  1641   SODIUM mmol/L 136   POTASSIUM mmol/L 4.1   CHLORIDE mmol/L 100   CO2 mmol/L 28   BUN mg/dL 18   CREATININE mg/dL 1.21   ANION GAP mmol/L 8    CALCIUM mg/dL 9.0   ALBUMIN g/dL 3.9   TOTAL BILIRUBIN mg/dL 0.66   ALK PHOS U/L 99   ALT U/L 16   AST U/L 21   GLUCOSE RANDOM mg/dL 128         Results from last 7 days   Lab Units 10/20/24  0021   POC GLUCOSE mg/dl 195*     Lab Results   Component Value Date    HGBA1C 13.5 (H) 08/28/2024    HGBA1C 10.5 (H) 05/22/2024    HGBA1C 8.0 02/06/2024     Results from last 7 days   Lab Units 10/19/24  2126   LACTIC ACID mmol/L 1.2   PROCALCITONIN ng/ml 0.08       Imaging Results Review: I reviewed radiology reports from this admission including: CT chest.  Other Study Results Review: EKG was personally reviewed and my interpretation is: Ventricularly placed rhythm..    Administrative Statements       ** Please Note: This note has been constructed using a voice recognition system. **

## 2024-10-20 NOTE — ASSESSMENT & PLAN NOTE
Wt Readings from Last 3 Encounters:   10/19/24 98.4 kg (217 lb)   09/18/24 89.8 kg (198 lb)   07/16/24 89.4 kg (197 lb)   Presents with worsened dyspnea and low SpO2  Last echo January 2024: LVEF 35-40%.  Severely dilated left atrium.  Mild pulmonary hypertension.  TAVR functioning well.  Home diuretic: lasix 20mg BID   Given Lasix 80 Mg IV x 1 in the ER with robust urine output thus far  Change to lasix 60mg IV BID  Cardiology consulted  Repeat echo ordered  I/os Daily weights  Sodium fluid restriction

## 2024-10-20 NOTE — ASSESSMENT & PLAN NOTE
Home regimen: Trelegy daily and albuterol as needed  No expiratory wheezing appreciated on admission, do not suspect acute exacerbation at this time  Continue home regimen

## 2024-10-20 NOTE — PROGRESS NOTES
Progress Note - Hospitalist   Name: Trevor Lyons 84 y.o. male I MRN: [id number]  Unit/Bed#: -01 I Date of Admission: 10/19/2024   Date of Service: 10/20/2024 I Hospital Day: 1    Assessment & Plan  Acute on chronic systolic heart failure (HCC)  Wt Readings from Last 3 Encounters:   10/20/24 97.8 kg (215 lb 8 oz)   09/18/24 89.8 kg (198 lb)   07/16/24 89.4 kg (197 lb)   Presents from nursing home with worsened dyspnea and low SpO2 long with 20 pound weight gain  Last echo January 2024: LVEF 35-40%.  Severely dilated left atrium.  Mild pulmonary hypertension.  TAVR functioning well.  Home diuretic: lasix 20mg BID and spironolactone 25 mg  Continue Lasix 80 mg IV twice daily  Wean oxygen as tolerated  1500cc fluid restriction  Cardiology consulted  Repeat echo ordered  I/os Daily weights  Sodium fluid restriction  Acute respiratory insufficiency  SpO2 87% on room air  Improved on 2 L nasal cannula  Suspect secondary to acute on chronic systolic heart failure  Wean oxygen as able  Anemia  Hgb 11.4 on admit  Baseline Cr 10-12   Trend CBC   SIRS (systemic inflammatory response syndrome) ([facility])  SIRS criteria: Tachypnea and leukocytosis  Initially treated with antibiotics for possible atypical pneumonia in the ED, however procalcitonin is negative and patient examines more like acute on chronic systolic heart failure and denies any fever or productive cough   Hold on further antibiotics at this time  If morning procalcitonin increases-would start ceftriaxone   Follow-up blood cultures  COPD (chronic obstructive pulmonary disease) ([facility])  Home regimen: Trelegy daily and albuterol as needed  No expiratory wheezing appreciated on admission, do not suspect acute exacerbation at this time  Continue home regimen  Type 2 diabetes mellitus without complication, without long-term current use of insulin ([facility])  Lab Results   Component Value Date    HGBA1C 13.5 (H) 08/28/2024       Recent Labs     10/20/24  0021  10/20/24  0800 10/20/24  1127   POCGLU 195* 113 124       Blood Sugar Average: Last 72 hrs:  (P) 144Home regimen: metformin, glipizide, and farxiga  Hold oral medication and place on lantus 5U in AM, humalog 5U TID with meals, SSI AC/HS  Coronary artery disease involving native coronary artery of native heart without angina pectoris  CAD status post PCI and CABG  Continue home beta-blocker, statin, and Xarelto  Persistent atrial fibrillation (HCC)  RC: Metoprolol succinate 25 Mg daily  AC: Xarelto  Rate controlled, v paced  Cardiac pacemaker  [place] s/p SSS   Last device interrogation 07/2024 showed normal device function   Pulmonary embolism (HCC)  Continue home Xarelto   CTA yesterday with no signs of PE  Pulmonary hypertension (HCC)  Most recent echo January 2024 showing mild pulmonary hypertension with RSVP 39 mmHg  Continue home medications  Acquired hypothyroidism  TSH elevated at 8.358  Increase synthroid to 125mcg daily   Recheck TSH in 6-8 weeks   Depression with anxiety  Continue home buspar, cymbalta, and klonopin     VTE Pharmacologic Prophylaxis: VTE Score: 7 High Risk (Score >/= 5) - Pharmacological DVT Prophylaxis Ordered: rivaroxaban (Xarelto). Sequential Compression Devices Ordered.    Mobility:   Basic Mobility Inpatient Raw Score: 20  JH-HLM Goal: 6: Walk 10 steps or more  JH-HLM Achieved: 6: Walk 10 steps or more  JH-HLM Goal NOT achieved. Continue with multidisciplinary rounding and encourage appropriate mobility to improve upon JH-HLM goals.    Patient Centered Rounds: I performed bedside rounds with nursing staff today.   Discussions with Specialists or Other Care Team Provider: cardiology    Education and Discussions with Family / Patient: Patient declined call to .     Current Length of Stay: 1 day(s)  Current Patient Status: Inpatient   Certification Statement: The patient will continue to require additional inpatient hospital stay due to ongoing diuresis for CHF  Discharge  Plan: Anticipate discharge in 48 hrs to nursing home    Code Status: Level 1 - Full Code    Subjective   Says his breathing has improved but continues to require 2-3L NC  Will continue to wean as tolerated  Eating breakfast  No other acute complaints at this time    Objective :  Temp:  [97.7 °F (36.5 °C)-98 °F (36.7 °C)] 98 °F (36.7 °C)  HR:  [61-66] 66  BP: (117-157)/(62-84) 121/78  Resp:  [16-22] 18  SpO2:  [81 %-97 %] 97 %  O2 Device: Nasal cannula  Nasal Cannula O2 Flow Rate (L/min):  [2 L/min] 2 L/min    Body mass index is 32.77 kg/m².     Input and Output Summary (last 24 hours):     Intake/Output Summary (Last 24 hours) at 10/20/2024 1404  Last data filed at 10/20/2024 1036  Gross per 24 hour   Intake 1032 ml   Output 3125 ml   Net -2093 ml       Physical Exam  Vitals and nursing note reviewed.   Constitutional:       General: He is not in acute distress.     Appearance: He is well-developed.      Comments: elderly male resting in bed   HENT:      Head: Normocephalic and atraumatic.   Eyes:      Conjunctiva/sclera: Conjunctivae normal.   Cardiovascular:      Rate and Rhythm: Normal rate and regular rhythm.      Heart sounds: No murmur heard.  Pulmonary:      Effort: Pulmonary effort is normal. No respiratory distress.      Breath sounds: Normal breath sounds.   Abdominal:      Palpations: Abdomen is soft.      Tenderness: There is no abdominal tenderness.   Musculoskeletal:         General: No swelling.      Cervical back: Neck supple.      Right lower leg: Edema present.      Left lower leg: Edema present.   Skin:     General: Skin is warm and dry.      Capillary Refill: Capillary refill takes less than 2 seconds.   Neurological:      Mental Status: He is alert.   Psychiatric:         Mood and Affect: Mood normal.           Lines/Drains:        Telemetry:  Telemetry Orders (From admission, onward)               24 Hour Telemetry Monitoring  Continuous x 24 Hours (Telem)        Question:  Reason for 24 Hour  Telemetry  Answer:  Decompensated CHF- and any one of the following: continuous diuretic infusion or total diuretic dose >200 mg daily, associated electrolyte derangement (I.e. K < 3.0), ionotropic drip (continuous infusion), hx of ventricular arrhythmia, or new EF < 35%                     Telemetry Reviewed: AF Hr 66  Indication for Continued Telemetry Use: Arrthymias requiring medical therapy               Lab Results: I have reviewed the following results:   Results from last 7 days   Lab Units 10/20/24  0438 10/19/24  1641   WBC Thousand/uL 11.34* 12.42*   HEMOGLOBIN g/dL 11.3* 11.4*   HEMATOCRIT % 35.3* 36.7   PLATELETS Thousands/uL 215 217   SEGS PCT %  --  51   LYMPHO PCT %  --  37   MONO PCT %  --  10   EOS PCT %  --  1     Results from last 7 days   Lab Units 10/20/24  0438 10/19/24  1641   SODIUM mmol/L 140 136   POTASSIUM mmol/L 3.4* 4.1   CHLORIDE mmol/L 101 100   CO2 mmol/L 31 28   BUN mg/dL 19 18   CREATININE mg/dL 1.16 1.21   ANION GAP mmol/L 8 8   CALCIUM mg/dL 8.4 9.0   ALBUMIN g/dL  --  3.9   TOTAL BILIRUBIN mg/dL  --  0.66   ALK PHOS U/L  --  99   ALT U/L  --  16   AST U/L  --  21   GLUCOSE RANDOM mg/dL 116 128         Results from last 7 days   Lab Units 10/20/24  1127 10/20/24  0800 10/20/24  0021   POC GLUCOSE mg/dl 124 113 195*         Results from last 7 days   Lab Units 10/19/24  2126   LACTIC ACID mmol/L 1.2   PROCALCITONIN ng/ml 0.08       Recent Cultures (last 7 days):   Results from last 7 days   Lab Units 10/19/24  2116   BLOOD CULTURE  Received in Microbiology Lab. Culture in Progress.  Received in Microbiology Lab. Culture in Progress.             Last 24 Hours Medication List:     Current Facility-Administered Medications:     acetaminophen (TYLENOL) tablet 650 mg, Q4H PRN    albuterol inhalation solution 2.5 mg, Q4H PRN    atorvastatin (LIPITOR) tablet 20 mg, QAM    busPIRone (BUSPAR) tablet 7.5 mg, BID    clonazePAM (KlonoPIN) tablet 1 mg, HS PRN    donepezil (ARICEPT) tablet 10  mg, QAM    DULoxetine (CYMBALTA) delayed release capsule 30 mg, Daily    ferrous sulfate tablet 325 mg, Daily With Breakfast    Fluticasone Furoate-Vilanterol 100-25 mcg/actuation 1 puff, Daily **AND** umeclidinium 62.5 mcg/actuation inhaler AEPB 1 puff, Daily    folic acid (FOLVITE) tablet 1,000 mcg, QAM    furosemide (LASIX) injection 80 mg, BID (diuretic)    influenza vaccine, high-dose (Fluzone High-Dose) IM injection 0.5 mL, Prior to discharge    insulin glargine (LANTUS) subcutaneous injection 5 Units 0.05 mL, Daily With Breakfast    insulin lispro (HumALOG/ADMELOG) 100 units/mL subcutaneous injection 1-5 Units, HS    insulin lispro (HumALOG/ADMELOG) 100 units/mL subcutaneous injection 1-6 Units, TID AC **AND** Fingerstick Glucose (POCT), 4x Daily AC and at bedtime    insulin lispro (HumALOG/ADMELOG) 100 units/mL subcutaneous injection 5 Units, TID With Meals    levothyroxine tablet 125 mcg, Early Morning    loratadine (CLARITIN) tablet 10 mg, QAM    magnesium Oxide (MAG-OX) tablet 400 mg, BID    metoprolol succinate (TOPROL-XL) 24 hr tablet 25 mg, QAM    pantoprazole (PROTONIX) EC tablet 40 mg, BID AC    rivaroxaban (XARELTO) tablet 20 mg, Daily With Dinner    spironolactone (ALDACTONE) tablet 25 mg, Daily    thiamine tablet 100 mg, Daily    Administrative Statements   Today, Patient Was Seen By: Missy Crane DO      **Please Note: This note may have been constructed using a voice recognition system.**

## 2024-10-20 NOTE — ASSESSMENT & PLAN NOTE
Most recent echo January 2024 showing mild pulmonary hypertension with RSVP 39 mmHg  Continue home medications

## 2024-10-20 NOTE — ASSESSMENT & PLAN NOTE
SpO2 87% on room air  Improved on 2 L nasal cannula  Suspect secondary to acute on chronic systolic heart failure  Wean oxygen as able

## 2024-10-20 NOTE — ASSESSMENT & PLAN NOTE
With prior CABG/stenting  LAD mid occlusion, RCA with severe diffuse disease, left circumflex stent to a 90% lesion 2020  LIMA to LAD patent, radial graft to PDA patent, SVG to OM patent in 2020

## 2024-10-20 NOTE — ASSESSMENT & PLAN NOTE
Wt Readings from Last 3 Encounters:   10/20/24 97.8 kg (215 lb 8 oz)   09/18/24 89.8 kg (198 lb)   07/16/24 89.4 kg (197 lb)   Has an ischemic cardiomyopathy with EF 35 to 40% as of echo 1/24  Home diuretic furosemide 20 mg twice a day and spironolactone 25 mg daily  Presented with shortness of breath, hypoxemia, 20 pound weight gain  Lives in a nursing facility  Initiated on intravenous diuretics, 2.6 L of urine output in the last 24 hours

## 2024-10-20 NOTE — ED NOTES
"Upon shift assessment, gave patient urinal after Lasix administration, patient states \"Well my peepee is all shriveled up\". Educated how to use urinal, and stated staff can help if needed. Patient continues to talk about his \"peepee\" Told patient it is innapropriate to talk about his peepee and that when he needs assistance we will help, but to please stop talking about how \"shriveled his peepee\" is unless there is a medical question. Patient then states \"well I bet you talk about your husbands peepee\" This RN stated that this conversation is innapropriate and to please stop talking about his genitals unless there is a medical question. Patient continues to say \"its not inappropriate you DO talk about your husbands peepee\" Told patient that we are no longer talking about genitals and if he needs something to hit the call bell. Patient resting comfortably at this time.       Jennyfer Burk RN  10/19/24 2029    "

## 2024-10-20 NOTE — CONSULTS
Consultation - Cardiology   Trevor Lyons 84 y.o. male MRN: [id number]  Unit/Bed#: -01 Encounter: [id number]        Assessment & Plan  Acute on chronic systolic heart failure (HCC)  Wt Readings from Last 3 Encounters:   10/20/24 97.8 kg (215 lb 8 oz)   09/18/24 89.8 kg (198 lb)   07/16/24 89.4 kg (197 lb)   Has an ischemic cardiomyopathy with EF 35 to 40% as of echo 1/24  Home diuretic furosemide 20 mg twice a day and spironolactone 25 mg daily  Presented with shortness of breath, hypoxemia, 20 pound weight gain  Lives in a nursing facility  Initiated on intravenous diuretics, 2.6 L of urine output in the last 24 hours  Pulmonary hypertension (HCC)  Mild by last echo  Persistent atrial fibrillation (HCC)  Has been in A-fib based on EKGs in April, July, and now  No high rate episodes per recent device check 7/24  Primarily V-paced  Anticoagulation is Xarelto  Also on metoprolol succinate 25 mg daily  Pulmonary embolism (HCC)  Old   CTA yesterday negative for acute PE  Cardiac pacemaker  [employer] dual-chamber permanent pacemaker with 98% ventricular pacing as of 7/24.  Type 2 diabetes mellitus without complication, without long-term current use of insulin ([facility])  Lab Results   Component Value Date    HGBA1C 13.5 (H) 08/28/2024       Recent Labs     10/20/24  0021 10/20/24  0800   POCGLU 195* 113       Blood Sugar Average: Last 72 hrs:  (P) 154  Severely uncontrolled diabetes  COPD (chronic obstructive pulmonary disease) (HCC)    Coronary artery disease involving native coronary artery of native heart without angina pectoris  With prior CABG/stenting  LAD mid occlusion, RCA with severe diffuse disease, left circumflex stent to a 90% lesion 2020  LIMA to LAD patent, radial graft to PDA patent, SVG to OM patent in 2020  Anemia  Mild, hemoglobin 11.3       Plan:   Continue intravenous furosemide, but recommend 80 mg twice daily  1500 mg sodium restriction  1.5 L fluid restriction  Decompensated on  furosemide 20 mg twice daily and spironolactone 25 mg daily, will need up titration of home meds.    History of Present Illness   Physician Requesting Consult: Missy Crane DO  Reason for Consult / Principal Problem: Acute on chronic systolic CHF  HPI: Trevor Lyons is a 84 y.o. year old male who presents with TAVR, CAD, CABG, left circumflex stenting, moderate ischemic cardiomyopathy with EF 35 to 40%, severely uncontrolled type 2 diabetes, hypertension, hypercholesterolemia, permanent pacemaker with pacemaker dependency and 98% ventricular pacing based on device check 7/24, in persistent atrial fibrillation without rapid rates, on chronic Xarelto, low-dose metoprolol, furosemide 20 mg twice daily and spironolactone 25 mg daily and living in a nursing home presenting with acute CHF.  20 pound weight gain, CT chest negative for PE, small right pleural effusion noted with groundglass opacities upon my personal review.  Chest x-ray upon my personal review shows cardiomegaly, pleural effusions, pulmonary venous congestion, elevated left hemidiaphragm.  EKG personally reviewed shows A-fib with ventricular paced rhythm    Review of Systems   Constitutional:  Positive for activity change and unexpected weight change.   HENT: Negative.     Eyes: Negative.    Respiratory:  Positive for shortness of breath.    Cardiovascular:  Positive for leg swelling.   Gastrointestinal: Negative.    Endocrine: Negative.    Genitourinary: Negative.    Musculoskeletal: Negative.    Skin: Negative.    Neurological: Negative.    Hematological: Negative.    Psychiatric/Behavioral: Negative.         Historical Information   Past Medical History:   Diagnosis Date    Alzheimer disease (HCC)     Anemia     Aneurysm of ascending aorta without rupture (HCC)     Anxiety     Atrial fibrillation (HCC)     CHF (congestive heart failure) (HCC)     Depression     Diabetes mellitus (HCC)     Disease of thyroid gland     Pulmonary hypertension (HCC)      Sciatica     Sick sinus syndrome (HCC)      Past Surgical History:   Procedure Laterality Date    CARDIAC PACEMAKER PLACEMENT       Social History     Substance and Sexual Activity   Alcohol Use Not Currently     Social History     Substance and Sexual Activity   Drug Use Never     Social History     Tobacco Use   Smoking Status Former    Types: Cigarettes   Smokeless Tobacco Never     Family History   Problem Relation Age of Onset    Dementia Mother     Heart disease Father     Stroke Father     Thyroid disease Daughter     Thyroid disease Daughter        Allergies:  No Known Allergies    Medications:   Scheduled Meds:  Current Facility-Administered Medications   Medication Dose Route Frequency Provider Last Rate    acetaminophen  650 mg Oral Q4H PRN Usha Quinn PA-C      albuterol  2.5 mg Nebulization Q4H PRN Savanah Garcia MD      atorvastatin  20 mg Oral QAM Usha Quinn PA-C      busPIRone  7.5 mg Oral BID Usha Quinn PA-C      clonazePAM  1 mg Oral HS PRN Usha Quinn PA-C      donepezil  10 mg Oral QAM Usha Quinn PA-C      DULoxetine  30 mg Oral Daily Usha Quinn PA-C      ferrous sulfate  325 mg Oral Daily With Breakfast Usha Quinn PA-C      Fluticasone Furoate-Vilanterol  1 puff Inhalation Daily Usha Quinn PA-C      And    umeclidinium  1 puff Inhalation Daily Usha Quinn PA-C      folic acid  1,000 mcg Oral QAM Usha Quinn PA-C      furosemide  60 mg Intravenous BID (diuretic) Usha Quinn PA-C      influenza vaccine  0.5 mL Intramuscular Prior to discharge Savanah Garcia MD      insulin glargine  5 Units Subcutaneous Daily With Breakfast Usha Quinn PA-C      insulin lispro  1-5 Units Subcutaneous HS Usha Quinn PA-C      insulin lispro  1-6 Units Subcutaneous TID AC Usha Quinn PA-C      insulin lispro  5 Units Subcutaneous TID With Meals Usha Quinn PA-C      levothyroxine  125 mcg Oral Early  Morning Ushalee ann Quinn PA-C      loratadine  10 mg Oral QAM Usha Quinn PA-C      magnesium Oxide  400 mg Oral BID Usha Quinn PA-C      metoprolol succinate  25 mg Oral QAM Usha Quinn PA-C      pantoprazole  40 mg Oral BID AC Ushalee ann Quinn PA-C      rivaroxaban  20 mg Oral Daily With Dinner Usha Quinn PA-C      spironolactone  25 mg Oral Daily Usha Quinn PA-C      Thiamine Mononitrate  100 mg Oral Daily Usha Quinn PA-C       Continuous Infusions:   PRN Meds:  acetaminophen, 650 mg, Q4H PRN  albuterol, 2.5 mg, Q4H PRN  clonazePAM, 1 mg, HS PRN  influenza vaccine, 0.5 mL, Prior to discharge        Objective:   Vitals:   Vitals:    10/20/24 0731   BP: 121/78   Pulse: 65   Resp: 18   Temp: 98 °F (36.7 °C)   SpO2: 96%     Body mass index is 32.77 kg/m².  Orthostatic Blood Pressures      Flowsheet Row Most Recent Value   Blood Pressure 121/78 filed at 10/20/2024 0731   Patient Position - Orthostatic VS Lying filed at 10/19/2024 2212          Systolic (24hrs), Av , Min:117 , Max:157     Diastolic (24hrs), Av, Min:62, Max:84      Intake/Output Summary (Last 24 hours) at 10/20/2024 0934  Last data filed at 10/20/2024 0931  Gross per 24 hour   Intake 1032 ml   Output 2925 ml   Net -1893 ml     Weight (last 2 days)       Date/Time Weight    10/20/24 0300 97.8 (215.5)    10/19/24 22:12:48 98.4 (217)    10/19/24 2104 98.4 (217)    10/19/24 1634 100 (221)          Invasive Devices       Peripheral Intravenous Line  Duration             Peripheral IV 10/19/24 Dorsal (posterior);Right Wrist <1 day    Peripheral IV 10/19/24 Right Antecubital <1 day                    Physical Exam  Constitutional:       General: He is not in acute distress.     Appearance: Normal appearance. He is well-developed. He is not diaphoretic.   HENT:      Head: Normocephalic and atraumatic.   Eyes:      General: No scleral icterus.     Conjunctiva/sclera: Conjunctivae normal.      Pupils:  "Pupils are equal, round, and reactive to light.   Neck:      Thyroid: No thyromegaly.      Vascular: JVD present.   Cardiovascular:      Rate and Rhythm: Normal rate and regular rhythm.      Heart sounds: Murmur heard.      No friction rub. No gallop.   Pulmonary:      Effort: Pulmonary effort is normal. No respiratory distress.      Breath sounds: Rales present. No wheezing.   Abdominal:      General: Bowel sounds are normal. There is no distension.      Palpations: Abdomen is soft.      Tenderness: There is no abdominal tenderness.   Musculoskeletal:         General: No deformity. Normal range of motion.      Cervical back: Normal range of motion and neck supple.      Right lower leg: Edema present.      Left lower leg: Edema present.   Skin:     General: Skin is warm and dry.      Findings: No erythema or rash.   Neurological:      General: No focal deficit present.      Mental Status: He is alert and oriented to person, place, and time.      Cranial Nerves: No cranial nerve deficit.      Motor: No weakness.         Labs:       CBC with diff:   Results from last 7 days   Lab Units 10/20/24  0438 10/19/24  1641   WBC Thousand/uL 11.34* 12.42*   HEMOGLOBIN g/dL 11.3* 11.4*   HEMATOCRIT % 35.3* 36.7   MCV fL 95 96   PLATELETS Thousands/uL 215 217   RBC Million/uL 3.71* 3.84*   MCH pg 30.5 29.7   MCHC g/dL 32.0 31.1*   RDW % 14.6 14.6   MPV fL 9.0 8.8*   NRBC AUTO /100 WBCs  --  0     CMP:  Results from last 7 days   Lab Units 10/20/24  0438 10/19/24  1641   SODIUM mmol/L 140 136   POTASSIUM mmol/L 3.4* 4.1   CHLORIDE mmol/L 101 100   CO2 mmol/L 31 28   BUN mg/dL 19 18   CREATININE mg/dL 1.16 1.21   CALCIUM mg/dL 8.4 9.0   AST U/L  --  21   ALT U/L  --  16   ALK PHOS U/L  --  99   EGFR ml/min/1.73sq m 56 53     NT-proBNP: No results found for: \"NTBNP\"   Magnesium:  Results from last 7 days   Lab Units 10/20/24  0438   MAGNESIUM mg/dL 2.2     Coags:    TSH:   Results from last 7 days   Lab Units 10/19/24  1837   TSH " "3RD GENERATON uIU/mL 8.358*   FREE T4 ng/dL 0.96     Hemoglobin A1C:    Lipid Profile:   No results found for: \"CHOL\"  No results found for: \"HDL\"  No results found for: \"LDLCALC\"  No results found for: \"TRIG\"    Imaging & Testing   Cardiac testing:   EKG reviewed personally: A-fib with ventricular paced rhythm  Telemetry reviewed personally: Ventricular paced rhythm  No results found for this or any previous visit.    No results found for this or any previous visit.    No results found for this or any previous visit.    No results found for this or any previous visit.      Imaging:   Results Review Statement: I personally reviewed the following image studies in PACS and associated radiology reports: CT chest. My interpretation of the radiology images/reports is: Noted above in the HPI.  CTA ED Chest PE Study    Result Date: 10/19/2024  Narrative: CTA - CHEST WITH IV CONTRAST - PULMONARY ANGIOGRAM INDICATION: Shortness of breath. COMPARISON: 10/19/2024. TECHNIQUE: CTA examination of the chest was performed using angiographic technique according to a protocol specifically tailored to evaluate for pulmonary embolism. Multiplanar 2D reformatted images were created from the source data. In addition, coronal  3D MIP postprocessing was performed on the acquisition scanner. Radiation dose length product (DLP) for this visit: 519.01 mGy-cm . This examination, like all CT scans performed in the [facility], was performed utilizing techniques to minimize radiation dose exposure, including the use of iterative reconstruction and automated exposure control. IV Contrast: 85 mL of iohexol (OMNIPAQUE) FINDINGS: PULMONARY ARTERIAL TREE:  No pulmonary embolus. LUNGS: Respiratory motion artifact limits evaluation. Subsegmental atelectasis at the right lung base and base of the left lung. Few groundglass opacities in the right lung. Compression of the mainstem bronchi resulting in severe narrowing. No endobronchial " "lesion or fluid. PLEURA: Small right effusion. HEART/GREAT VESSELS: Cardiomegaly. TAVR. CABG. No thoracic aortic aneurysm.. MEDIASTINUM AND AR: No lymphadenopathy or hematoma. CHEST WALL AND LOWER NECK: Unremarkable. VISUALIZED STRUCTURES IN THE UPPER ABDOMEN: Unremarkable. OSSEOUS STRUCTURES: No acute fracture or destructive osseous lesion.     Impression: 1. No evidence of acute pulmonary embolus or thoracic aortic aneurysm. 2. Small right pleural effusion. 3. Few groundglass opacities in the right lung could represent mild aspiration pneumonitis, atypical pneumonia, or asymmetric pulmonary edema. Workstation performed: [id number]       Willis Mitchell MD    Portions of the record may have been created with voice recognition software.  Occasional wrong word or \"sound a like\" substitutions may have occurred due to the inherent limitations of voice recognition software.  Read the chart carefully and recognize, using context, where substitutions have occurred.    " Yes

## 2024-10-20 NOTE — ASSESSMENT & PLAN NOTE
Lab Results   Component Value Date    HGBA1C 13.5 (H) 08/28/2024       Recent Labs     10/20/24  0021 10/20/24  0800   POCGLU 195* 113       Blood Sugar Average: Last 72 hrs:  (P) 154  Severely uncontrolled diabetes

## 2024-10-20 NOTE — PLAN OF CARE

## 2024-10-20 NOTE — ED NOTES
Patient continues to hit call bell reporting back pain from sitting in stretcher and that he is hungry. Apologized that he cannot eat at this time as we are still waiting on testing, and repositioned patient for comfort again.     Patient again states that stretcher is uncomfortable, moved into recliner with assistance.      Jennyfer Burk RN  10/19/24 6443

## 2024-10-20 NOTE — ASSESSMENT & PLAN NOTE
Wt Readings from Last 3 Encounters:   10/20/24 97.8 kg (215 lb 8 oz)   09/18/24 89.8 kg (198 lb)   07/16/24 89.4 kg (197 lb)   Presents from nursing home with worsened dyspnea and low SpO2 long with 20 pound weight gain  Last echo January 2024: LVEF 35-40%.  Severely dilated left atrium.  Mild pulmonary hypertension.  TAVR functioning well.  Home diuretic: lasix 20mg BID and spironolactone 25 mg  Continue Lasix 80 mg IV twice daily  Wean oxygen as tolerated  1500cc fluid restriction  Cardiology consulted  Repeat echo ordered  I/os Daily weights  Sodium fluid restriction

## 2024-10-20 NOTE — ASSESSMENT & PLAN NOTE
Lab Results   Component Value Date    HGBA1C 13.5 (H) 08/28/2024       Recent Labs     10/20/24  0021 10/20/24  0800 10/20/24  1127   POCGLU 195* 113 124       Blood Sugar Average: Last 72 hrs:  (P) 144Home regimen: metformin, glipizide, and farxiga  Hold oral medication and place on lantus 5U in AM, humalog 5U TID with meals, SSI AC/HS

## 2024-10-20 NOTE — ASSESSMENT & PLAN NOTE
Has been in A-fib based on EKGs in April, July, and now  No high rate episodes per recent device check 7/24  Primarily V-paced  Anticoagulation is Xarelto  Also on metoprolol succinate 25 mg daily

## 2024-10-20 NOTE — ASSESSMENT & PLAN NOTE
SIRS criteria: Tachypnea and leukocytosis  Initially treated with antibiotics for possible atypical pneumonia in the ED, however procalcitonin is negative and patient examines more like acute on chronic systolic heart failure and denies any fever or productive cough   Hold on further antibiotics at this time  If morning procalcitonin increases-would start ceftriaxone   Follow-up blood cultures

## 2024-10-20 NOTE — ASSESSMENT & PLAN NOTE
Lab Results   Component Value Date    HGBA1C 13.5 (H) 08/28/2024       Recent Labs     10/20/24  0021   POCGLU 195*       Blood Sugar Average: Last 72 hrs:  (P) 195Home regimen: metformin, glipizide, and farxiga  Hold oral medication and place on lantus 5U in AM, humalog 5U TID with meals, SSI AC/HS

## 2024-10-21 ENCOUNTER — APPOINTMENT (INPATIENT)
Dept: NON INVASIVE DIAGNOSTICS | Facility: HOSPITAL | Age: 84
DRG: 291 | End: 2024-10-21
Payer: MEDICARE

## 2024-10-21 LAB
ANION GAP SERPL CALCULATED.3IONS-SCNC: 8 MMOL/L (ref 4–13)
AORTIC ROOT: 3.6 CM
AORTIC VALVE MEAN VELOCITY: 12.2 M/S
ASCENDING AORTA: 3.4 CM
ATRIAL RATE: 55 BPM
AV AREA BY CONTINUOUS VTI: 3.7 CM2
AV AREA PEAK VELOCITY: 3.5 CM2
AV LVOT MEAN GRADIENT: 3 MMHG
AV LVOT PEAK GRADIENT: 5 MMHG
AV MEAN GRADIENT: 7 MMHG
AV PEAK GRADIENT: 14 MMHG
AV VALVE AREA: 3.71 CM2
BSA FOR ECHO PROCEDURE: 2.12 M2
BUN SERPL-MCNC: 25 MG/DL (ref 5–25)
CALCIUM SERPL-MCNC: 8.4 MG/DL (ref 8.4–10.2)
CHLORIDE SERPL-SCNC: 97 MMOL/L (ref 96–108)
CO2 SERPL-SCNC: 34 MMOL/L (ref 21–32)
CREAT SERPL-MCNC: 0.95 MG/DL (ref 0.6–1.3)
DOP CALC AO VTI: 35.3 CM
DOP CALC LVOT AREA: 5.72
DOP CALC LVOT CARDIAC OUTPUT: 8.5 L/MIN
DOP CALC LVOT DIAMETER: 2.7 CM
DOP CALC LVOT PEAK VEL VTI: 22.9 CM
DOP CALC LVOT PEAK VEL: 1.14 M/S
DOP CALC LVOT STROKE INDEX: 62.7 ML/M2
DOP CALC LVOT STROKE VOLUME: 131.05
E WAVE DECELERATION TIME: 325 MS
ERYTHROCYTE [DISTWIDTH] IN BLOOD BY AUTOMATED COUNT: 14.6 % (ref 11.6–15.1)
FRACTIONAL SHORTENING: 34 (ref 28–44)
GFR SERPL CREATININE-BSD FRML MDRD: 72 ML/MIN/1.73SQ M
GLUCOSE SERPL-MCNC: 101 MG/DL (ref 65–140)
GLUCOSE SERPL-MCNC: 106 MG/DL (ref 65–140)
GLUCOSE SERPL-MCNC: 118 MG/DL (ref 65–140)
GLUCOSE SERPL-MCNC: 122 MG/DL (ref 65–140)
GLUCOSE SERPL-MCNC: 80 MG/DL (ref 65–140)
HCT VFR BLD AUTO: 37.7 % (ref 36.5–49.3)
HGB BLD-MCNC: 11.7 G/DL (ref 12–17)
INTERVENTRICULAR SEPTUM IN DIASTOLE (PARASTERNAL SHORT AXIS VIEW): 0.8 CM
INTERVENTRICULAR SEPTUM: 0.8 CM (ref 0.6–1.1)
LA/AORTA RATIO 2D: 1.81
LAAS-AP2: 38.2 CM2
LAAS-AP4: 32.4 CM2
LEFT ATRIUM SIZE: 6.5 CM
LEFT ATRIUM VOLUME (MOD BIPLANE): 165 ML
LEFT ATRIUM VOLUME INDEX (MOD BIPLANE): 78.9 ML/M2
LEFT INTERNAL DIMENSION IN SYSTOLE: 4.4 CM (ref 2.1–4)
LEFT VENTRICLE DIASTOLIC VOLUME (MOD BIPLANE): 187 ML
LEFT VENTRICLE DIASTOLIC VOLUME INDEX (MOD BIPLANE): 88.2 ML/M2
LEFT VENTRICLE SYSTOLIC VOLUME (MOD BIPLANE): 91 ML
LEFT VENTRICLE SYSTOLIC VOLUME INDEX (MOD BIPLANE): 42.9 ML/M2
LEFT VENTRICULAR INTERNAL DIMENSION IN DIASTOLE: 6.7 CM (ref 3.5–6)
LEFT VENTRICULAR POSTERIOR WALL IN END DIASTOLE: 0.9 CM
LEFT VENTRICULAR STROKE VOLUME: 142 ML
LVSV (TEICH): 142 ML
MCH RBC QN AUTO: 29.3 PG (ref 26.8–34.3)
MCHC RBC AUTO-ENTMCNC: 31 G/DL (ref 31.4–37.4)
MCV RBC AUTO: 95 FL (ref 82–98)
MITRAL REGURGITATION PEAK VELOCITY: 4.48 M/S
MITRAL VALVE MEAN INFLOW VELOCITY: 3.33 M/S
MITRAL VALVE REGURGITANT PEAK GRADIENT: 80 MMHG
MRSA NOSE QL CULT: NORMAL
MV E'TISSUE VEL-LAT: 7 CM/S
MV E'TISSUE VEL-SEP: 7 CM/S
MV PEAK E VEL: 69 CM/S
PLATELET # BLD AUTO: 207 THOUSANDS/UL (ref 149–390)
PMV BLD AUTO: 8.4 FL (ref 8.9–12.7)
POTASSIUM SERPL-SCNC: 3.4 MMOL/L (ref 3.5–5.3)
QRS AXIS: 193 DEGREES
QRSD INTERVAL: 200 MS
QT INTERVAL: 506 MS
QTC INTERVAL: 526 MS
RA PRESSURE ESTIMATED: 3 MMHG
RBC # BLD AUTO: 3.99 MILLION/UL (ref 3.88–5.62)
RIGHT VENTRICLE ID DIMENSION: 4.5 CM
RV PSP: 52
SL CV DOP CALC MV VTI RETROGRADE: 135 CM
SL CV LV EF: 40
SL CV MV MEAN GRADIENT RETROGRADE: 50 MMHG
SL CV PED ECHO LEFT VENTRICLE DIASTOLIC VOLUME (MOD BIPLANE) 2D: 231 ML
SL CV PED ECHO LEFT VENTRICLE SYSTOLIC VOLUME (MOD BIPLANE) 2D: 89 ML
SODIUM SERPL-SCNC: 139 MMOL/L (ref 135–147)
T WAVE AXIS: 38 DEGREES
TR MAX PG: 49 MMHG
TR PEAK VELOCITY: 3.5 M/S
TRICUSPID ANNULAR PLANE SYSTOLIC EXCURSION: 1.7 CM
TRICUSPID VALVE PEAK REGURGITATION VELOCITY: 3.5 M/S
VENTRICULAR RATE: 65 BPM
WBC # BLD AUTO: 13.36 THOUSAND/UL (ref 4.31–10.16)

## 2024-10-21 PROCEDURE — 85027 COMPLETE CBC AUTOMATED: CPT | Performed by: STUDENT IN AN ORGANIZED HEALTH CARE EDUCATION/TRAINING PROGRAM

## 2024-10-21 PROCEDURE — 93306 TTE W/DOPPLER COMPLETE: CPT

## 2024-10-21 PROCEDURE — 94640 AIRWAY INHALATION TREATMENT: CPT

## 2024-10-21 PROCEDURE — 99232 SBSQ HOSP IP/OBS MODERATE 35: CPT | Performed by: STUDENT IN AN ORGANIZED HEALTH CARE EDUCATION/TRAINING PROGRAM

## 2024-10-21 PROCEDURE — 80048 BASIC METABOLIC PNL TOTAL CA: CPT | Performed by: STUDENT IN AN ORGANIZED HEALTH CARE EDUCATION/TRAINING PROGRAM

## 2024-10-21 PROCEDURE — 93010 ELECTROCARDIOGRAM REPORT: CPT | Performed by: INTERNAL MEDICINE

## 2024-10-21 PROCEDURE — 94760 N-INVAS EAR/PLS OXIMETRY 1: CPT

## 2024-10-21 PROCEDURE — 82948 REAGENT STRIP/BLOOD GLUCOSE: CPT

## 2024-10-21 PROCEDURE — 93306 TTE W/DOPPLER COMPLETE: CPT | Performed by: INTERNAL MEDICINE

## 2024-10-21 PROCEDURE — 99232 SBSQ HOSP IP/OBS MODERATE 35: CPT | Performed by: INTERNAL MEDICINE

## 2024-10-21 RX ORDER — LIDOCAINE 50 MG/G
1 PATCH TOPICAL DAILY
Status: DISCONTINUED | OUTPATIENT
Start: 2024-10-21 | End: 2024-10-26

## 2024-10-21 RX ADMIN — FOLIC ACID 1000 MCG: 1 TABLET ORAL at 09:37

## 2024-10-21 RX ADMIN — INSULIN GLARGINE 5 UNITS: 100 INJECTION, SOLUTION SUBCUTANEOUS at 09:41

## 2024-10-21 RX ADMIN — Medication 400 MG: at 17:43

## 2024-10-21 RX ADMIN — SPIRONOLACTONE 25 MG: 25 TABLET ORAL at 09:37

## 2024-10-21 RX ADMIN — FERROUS SULFATE TAB 325 MG (65 MG ELEMENTAL FE) 325 MG: 325 (65 FE) TAB at 09:37

## 2024-10-21 RX ADMIN — PANTOPRAZOLE SODIUM 40 MG: 40 TABLET, DELAYED RELEASE ORAL at 17:43

## 2024-10-21 RX ADMIN — METOPROLOL SUCCINATE 25 MG: 25 TABLET, EXTENDED RELEASE ORAL at 09:37

## 2024-10-21 RX ADMIN — LEVOTHYROXINE SODIUM 125 MCG: 25 TABLET ORAL at 05:54

## 2024-10-21 RX ADMIN — BUSPIRONE HYDROCHLORIDE 7.5 MG: 15 TABLET ORAL at 09:37

## 2024-10-21 RX ADMIN — DONEPEZIL HYDROCHLORIDE 10 MG: 5 TABLET ORAL at 09:36

## 2024-10-21 RX ADMIN — BUSPIRONE HYDROCHLORIDE 7.5 MG: 15 TABLET ORAL at 17:43

## 2024-10-21 RX ADMIN — UMECLIDINIUM 1 PUFF: 62.5 AEROSOL, POWDER ORAL at 09:38

## 2024-10-21 RX ADMIN — LIDOCAINE 5% 1 PATCH: 700 PATCH TOPICAL at 10:38

## 2024-10-21 RX ADMIN — ATORVASTATIN CALCIUM 20 MG: 20 TABLET, FILM COATED ORAL at 09:36

## 2024-10-21 RX ADMIN — FUROSEMIDE 80 MG: 10 INJECTION, SOLUTION INTRAVENOUS at 09:36

## 2024-10-21 RX ADMIN — ALBUTEROL SULFATE 2.5 MG: 2.5 SOLUTION RESPIRATORY (INHALATION) at 06:45

## 2024-10-21 RX ADMIN — INSULIN LISPRO 5 UNITS: 100 INJECTION, SOLUTION INTRAVENOUS; SUBCUTANEOUS at 18:07

## 2024-10-21 RX ADMIN — INSULIN LISPRO 5 UNITS: 100 INJECTION, SOLUTION INTRAVENOUS; SUBCUTANEOUS at 09:41

## 2024-10-21 RX ADMIN — LORATADINE 10 MG: 10 TABLET ORAL at 09:37

## 2024-10-21 RX ADMIN — Medication 400 MG: at 09:36

## 2024-10-21 RX ADMIN — CLONAZEPAM 1 MG: 1 TABLET ORAL at 20:01

## 2024-10-21 RX ADMIN — RIVAROXABAN 20 MG: 20 TABLET, FILM COATED ORAL at 17:43

## 2024-10-21 RX ADMIN — DULOXETINE HYDROCHLORIDE 30 MG: 30 CAPSULE, DELAYED RELEASE ORAL at 09:37

## 2024-10-21 RX ADMIN — ACETAMINOPHEN 650 MG: 325 TABLET, FILM COATED ORAL at 13:36

## 2024-10-21 RX ADMIN — INSULIN LISPRO 5 UNITS: 100 INJECTION, SOLUTION INTRAVENOUS; SUBCUTANEOUS at 12:56

## 2024-10-21 RX ADMIN — PANTOPRAZOLE SODIUM 40 MG: 40 TABLET, DELAYED RELEASE ORAL at 05:54

## 2024-10-21 RX ADMIN — FLUTICASONE FUROATE AND VILANTEROL TRIFENATATE 1 PUFF: 100; 25 POWDER RESPIRATORY (INHALATION) at 09:38

## 2024-10-21 RX ADMIN — FUROSEMIDE 80 MG: 10 INJECTION, SOLUTION INTRAVENOUS at 17:44

## 2024-10-21 RX ADMIN — Medication 100 MG: at 09:36

## 2024-10-21 NOTE — PROGRESS NOTES
Progress Note - Cardiology   Name: Trevor Lyons 84 y.o. male I MRN: [id number]  Unit/Bed#: -01 I Date of Admission: 10/19/2024   Date of Service: 10/21/2024 I Hospital Day: 2     Assessment & Plan  Acute on chronic systolic heart failure (HCC)  Wt Readings from Last 3 Encounters:   10/21/24 95.8 kg (211 lb 1.6 oz)   09/18/24 89.8 kg (198 lb)   07/16/24 89.4 kg (197 lb)   Has an ischemic cardiomyopathy with EF 35 to 40% as of echo 1/24  Home diuretic furosemide 20 mg twice a day and spironolactone 25 mg daily  Presented with shortness of breath, hypoxemia, 20 pound weight gain  Lives in a nursing facility  Initiated on intravenous diuretics, good response thus far.   Pulmonary hypertension (HCC)  Mild by last echo  Persistent atrial fibrillation (HCC)  Has been in A-fib based on EKGs in April, July, and now  No high rate episodes per recent device check 7/24  Primarily V-paced  Anticoagulation is Xarelto  Also on metoprolol succinate 25 mg daily  Pulmonary embolism (HCC)  Old   CTA this admission negative for acute PE  Cardiac pacemaker  [employer] dual-chamber permanent pacemaker with 98% ventricular pacing as of 7/24.  Type 2 diabetes mellitus without complication, without long-term current use of insulin ([facility])  Lab Results   Component Value Date    HGBA1C 13.5 (H) 08/28/2024       Recent Labs     10/20/24  1127 10/20/24  1556 10/20/24  2050 10/21/24  0801   POCGLU 124 138 74 80       Blood Sugar Average: Last 72 hrs:  (P) [id number]  Severely uncontrolled diabetes  COPD (chronic obstructive pulmonary disease) (HCC)    Coronary artery disease involving native coronary artery of native heart without angina pectoris  With prior CABG/stenting  LAD mid occlusion, RCA with severe diffuse disease, left circumflex stent to a 90% lesion 2020  LIMA to LAD patent, radial graft to PDA patent, SVG to OM patent in 2020  Anemia  Mild, hemoglobin 11.3    Plan:   Continue Lasix 80 mg IV twice  daily.  Continue Xarelto 20mg QD. If he needs to be taken off of Xarelto for any reason even if temporary, he should be started on Aspirin.   1500 mg sodium restriction  1.5 L fluid restriction  Transition to p.o. torsemide instead of Lasix upon discharge given acute decompensation on his outpatient diuretic regimen.    Subjective     Patient seen and evaluated.  Breathing gradually improved, remains volume overloaded.  Good diuretic response thus far.    Objective :  Temp:  [97.7 °F (36.5 °C)-98.6 °F (37 °C)] 97.8 °F (36.6 °C)  HR:  [59-65] 65  BP: (120-134)/(69-71) 132/69  Resp:  [17-21] 21  SpO2:  [85 %-97 %] 96 %  O2 Device: Nasal cannula  Nasal Cannula O2 Flow Rate (L/min):  [2 L/min] 2 L/min  Orthostatic Blood Pressures      Flowsheet Row Most Recent Value   Blood Pressure 132/69 filed at 10/21/2024 0805   Patient Position - Orthostatic VS Lying filed at 10/20/2024 2052          First Weight: Weight - Scale: 100 kg (221 lb) (10/19/24 1634)  Vitals:    10/20/24 0300 10/21/24 0553   Weight: 97.8 kg (215 lb 8 oz) 95.8 kg (211 lb 1.6 oz)     Physical Exam  Vitals reviewed.   Constitutional:       General: He is not in acute distress.     Appearance: Normal appearance. He is not diaphoretic.   HENT:      Head: Normocephalic and atraumatic.   Eyes:      Conjunctiva/sclera: Conjunctivae normal.   Neck:      Vascular: No carotid bruit or JVD.   Cardiovascular:      Rate and Rhythm: Normal rate and regular rhythm.      Pulses: Normal pulses.      Heart sounds: Normal heart sounds. No murmur heard.     No friction rub. No gallop.   Pulmonary:      Effort: Pulmonary effort is normal.      Breath sounds: Rales present. No wheezing or rhonchi.   Abdominal:      General: Abdomen is flat. Bowel sounds are normal. There is no distension.      Palpations: Abdomen is soft.   Musculoskeletal:      Right lower leg: Edema present.      Left lower leg: Edema present.   Skin:     General: Skin is warm and dry.   Neurological:       General: No focal deficit present.      Mental Status: He is alert and oriented to person, place, and time.   Psychiatric:         Mood and Affect: Mood normal.         Behavior: Behavior normal.           Lab Results: I have reviewed the following results:  Results from last 7 days   Lab Units 10/21/24  1145 10/20/24  0438 10/19/24  1641   WBC Thousand/uL 13.36* 11.34* 12.42*   HEMOGLOBIN g/dL 11.7* 11.3* 11.4*   HEMATOCRIT % 37.7 35.3* 36.7   PLATELETS Thousands/uL 207 215 217     Results from last 7 days   Lab Units 10/21/24  1145 10/20/24  0438 10/19/24  1641   POTASSIUM mmol/L 3.4* 3.4* 4.1   CHLORIDE mmol/L 97 101 100   CO2 mmol/L 34* 31 28   BUN mg/dL 25 19 18   CREATININE mg/dL 0.95 1.16 1.21   CALCIUM mg/dL 8.4 8.4 9.0         Lab Results   Component Value Date    HGBA1C 13.5 (H) 08/28/2024     Lab Results   Component Value Date    TROPONINI 0.04 10/21/2021       Beatriz Molina MD

## 2024-10-21 NOTE — OCCUPATIONAL THERAPY NOTE
"  Occupational Therapy Screen Note     Patient Name: Trevor Lyons  Today's Date: 10/21/2024  Problem List  Principal Problem:    Acute on chronic systolic heart failure (HCC)  Active Problems:    Persistent atrial fibrillation (HCC)    Depression with anxiety    Pulmonary embolism (HCC)    Cardiac pacemaker    Pulmonary hypertension (HCC)    Type 2 diabetes mellitus without complication, without long-term current use of insulin (HCC)    COPD (chronic obstructive pulmonary disease) (HCC)    Coronary artery disease involving native coronary artery of native heart without angina pectoris    Acquired hypothyroidism    Acute respiratory insufficiency    Anemia    SIRS (systemic inflammatory response syndrome) (HCC)              10/21/24 1425   OT Last Visit   OT Visit Date 10/21/24   Note Type   Note type Screen   Additional Comments OT orders received, chart reviewed. Per Pablito LOPEZ, pt from [facility]. Per Pablito: \"He can ambulate independently and at time does so in his room, but since his dementia, he primarily uses a WC to self-propel. Nursing encourages him to use a RW, but he declines, opting for WC...stand by assist for ADLS.\" Per chart, pt has AMPAC score of 24 for daily, 20 for basic, & is documented as achieving HLM of 6 & ambulating as a standby assist. At this time, pt with no acute OT needs, DC OT orders.       Sandra Nuñez, OTR/L           "

## 2024-10-21 NOTE — NURSING NOTE
CHF packet was given and provided pt education on the packet. Pt lives at a facility and does not need a scale he states.

## 2024-10-21 NOTE — ASSESSMENT & PLAN NOTE
Wt Readings from Last 3 Encounters:   10/21/24 95.8 kg (211 lb 1.6 oz)   09/18/24 89.8 kg (198 lb)   07/16/24 89.4 kg (197 lb)   Has an ischemic cardiomyopathy with EF 35 to 40% as of echo 1/24  Home diuretic furosemide 20 mg twice a day and spironolactone 25 mg daily  Presented with shortness of breath, hypoxemia, 20 pound weight gain  Lives in a nursing facility  Initiated on intravenous diuretics, good response thus far.

## 2024-10-21 NOTE — CASE MANAGEMENT
Case Management Assessment & Discharge Planning Note    Patient name Trevor Lyons  Location /-01 MRN [id number]  : 1940 Date 10/21/2024       Current Admission Date: 10/19/2024  Current Admission Diagnosis:Acute on chronic systolic heart failure ([facility])   Patient Active Problem List    Diagnosis Date Noted Date Diagnosed    Acute respiratory insufficiency 10/19/2024     Anemia 10/19/2024     SIRS (systemic inflammatory response syndrome) ([facility]) 10/19/2024     Acute on chronic systolic heart failure ([facility]) 2024     Moderate Alzheimer's dementia, unspecified timing of dementia onset, unspecified whether behavioral, psychotic, or mood disturbance or anxiety ([facility]) 2024     Depression, recurrent ([facility]) 2024     Coronary artery disease involving native coronary artery of native heart without angina pectoris 2024     S/P CABG (coronary artery bypass graft) 2024     S/P TAVR (transcatheter aortic valve replacement) 2024     DDD (degenerative disc disease), lumbosacral 2024     COPD (chronic obstructive pulmonary disease) ([facility]) 2024     Recurrent falls 2024     Persistent atrial fibrillation ([facility]) 2024     Chronic combined systolic and diastolic CHF (congestive heart failure) ([facility]) 2024     Depression with anxiety 2024     NSVT (nonsustained ventricular tachycardia) ([facility]) 2024     Orthostatic hypotension 2024     Pulmonary embolism ([facility]) 2024     Cardiac pacemaker 2024     Alzheimer disease ([facility]) 2024     Pulmonary hypertension ([facility]) 2024     Sick sinus syndrome ([facility]) 2024     Dextroscoliosis 2024     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 2024     Mixed hyperlipidemia 2024     Type 2 diabetes mellitus without complication, without long-term current use of insulin ([facility]) 2024     Osteopenia of multiple sites 2024     Chronic left-sided low back pain with left-sided sciatica  03/12/2024     Aneurysm of ascending aorta without rupture (HCC) 03/12/2024     Acquired hypothyroidism 11/10/2022     Bilateral carotid bruits 11/11/2019       LOS (days): 2  Geometric Mean LOS (GMLOS) (days): 3.9  Days to GMLOS:2.1     OBJECTIVE:    Risk of Unplanned Readmission Score: 18.02         Current admission status: Inpatient       Preferred Pharmacy:   [id number] - [place], [place] - [address]  [address] [address]  Phone: [phone] Fax: [phone]    [facility] - [place], [place] - [address]  [address] [address]  Phone: [phone] Fax: [phone]    Primary Care Provider: Ira Borden DO    Primary Insurance: MEDICARE MISC REPLACEMENT  Secondary Insurance:     ASSESSMENT:  Active Health Care Proxies       Marisa Jansen Health Care Representative - Daughter   Primary Phone: [phone] (Mobile)                 Advance Directives  Does patient have a Health Care POA?: No  Was patient offered paperwork?: Yes  Does patient currently have a Health Care decision maker?: Yes, please see Health Care Proxy section  Does patient have Advance Directives?: No  Was patient offered paperwork?: Yes  Primary Contact: Marisa Jansen, karen         Readmission Root Cause  30 Day Readmission: No    Patient Information  Admitted from:: Facility  Mental Status: Alert  During Assessment patient was accompanied by: Not accompanied during assessment  Assessment information provided by:: Daughter  Primary Caregiver: Other (Comment)  Caregiver's Name:: [place] [address]  Caregiver's Relationship to Patient:: Family Member  Caregiver's Telephone Number:: [phone]  Support Systems: Other (Comment) (Facility Staff)  County [place] Residence: [place]  What city do you live in?: [place]  Home entry access options. Select all that apply.: Elevator  Type of Current Residence: Other (Comment) (Assisted Living Facility.)  Living Arrangements: Lives in  Facility  Is patient a ?: No    Activities of Daily Living Prior to Admission  Functional Status: Independent  Completes ADLs independently?: Yes  Ambulates independently?: Yes  Does patient use assisted devices?: Yes  Assisted Devices (DME) used: Wheelchair, Walker, Nebulizer  Does patient currently own DME?: Yes  What DME does the patient currently own?: Wheelchair, Walker, Nebulizer  Does patient have a history of Outpatient Therapy (PT/OT)?: Yes  Does the patient have a history of Short-Term Rehab?: Yes  Does patient have a history of HHC?: No  Does patient currently have HHC?: No         Patient Information Continued  Income Source: SSI/SSD  Does patient have prescription coverage?: Yes  Does patient receive dialysis treatments?: No  Does patient have a history of substance abuse?: No  Does patient have a history of Mental Health Diagnosis?: Yes  Has patient received inpatient treatment related to mental health in the last 2 years?: No         Means of Transportation  Means of Transport to [facility]:: Other (Comment) (Medical Transport)      Social Determinants of Health (SDOH)      Flowsheet Row Most Recent Value   Housing Stability    In the last 12 months, was there a time when you were not able to pay the mortgage or rent on time? N   In the past 12 months, how many times have you moved where you were living? 1   At any time in the past 12 months, were you homeless or living in a shelter (including now)? N   Transportation Needs    In the past 12 months, has lack of transportation kept you from medical appointments or from getting medications? no   In the past 12 months, has lack of transportation kept you from meetings, work, or from getting things needed for daily living? No   Food Insecurity    Within the past 12 months, you worried that your food would run out before you got the money to buy more. Never true   Within the past 12 months, the food you bought just didn't last and you didn't have money  to get more. Never true   Utilities    In the past 12 months has the electric, gas, oil, or water company threatened to shut off services in your home? No            DISCHARGE DETAILS:    Discharge planning discussed with:: Pt and nurse Rene from [facility].  [place] of Choice: Yes     CM contacted family/caregiver?: Yes  Were Treatment Team discharge recommendations reviewed with patient/caregiver?: Yes  Did patient/caregiver verbalize understanding of patient care needs?: Yes  Were patient/caregiver advised of the risks associated with not following Treatment Team discharge recommendations?: Yes    Contacts  Patient Contacts: Marisa Jansen, Daughter  Relationship to Patient:: Family  Contact Method: Phone  Phone Number: [phone]    Requested Home Health Care         Is the patient interested in HHC at discharge?: No    DME Referral Provided  Referral made for DME?: No                                                           Additional Comments: CM met with pt at bedside and spoke with nurse Rene from [facility] to discern discharge needs. Pt lives with a roommate at [facility], [facility] with elevator. Per Nurse Rene, pt is able to walk independently, but due to his Alzhiemer's he primarily uses a wheelchair to self propel. Pt is a standby assist for ADLs. Reports a hx of OPPT and STR. Reports no hx of HHC. Reports no SDOH concerns, inpatient psych stays, or D&A treatment. Pt stated daughter is medical POA. Pt's daughter can be transport home from the hospital.

## 2024-10-21 NOTE — PLAN OF CARE

## 2024-10-21 NOTE — PROGRESS NOTES
Progress Note - Hospitalist   Name: Trevor Lyons 84 y.o. male I MRN: [id number]  Unit/Bed#: -01 I Date of Admission: 10/19/2024   Date of Service: 10/21/2024 I Hospital Day: 2    Assessment & Plan  Acute on chronic systolic heart failure (HCC)  Presents from nursing home with worsened dyspnea and low SpO2 long with 20 pound weight gain  Last echo January 2024: LVEF 35-40%.  Severely dilated left atrium.  Mild pulmonary hypertension.  TAVR functioning well.  Home diuretic: lasix 20mg BID and spironolactone 25 mg  Continue Lasix 80 mg IV twice daily, +peripheral edema  Wean oxygen as tolerated  1500cc fluid restriction  Cardiology consulted  Repeat echo ordered  Strict I's and O's. Will discuss fluid restriction with patient.    I/O last 3 completed shifts:  In: 2144 [P.O.:2132; I.V.:12]  Out: 3750 [Urine:3750]  Net IO Since Admission: -1,686 mL [10/21/24 1129]    Weight change: -4.491 kg (-9 lb 14.4 oz)  Wt Readings from Last 3 Encounters:   10/21/24 95.8 kg (211 lb 1.6 oz)   09/18/24 89.8 kg (198 lb)   07/16/24 89.4 kg (197 lb)     Acute respiratory insufficiency  SpO2 87% on room air  Improved on 2 L nasal cannula  Suspect secondary to acute on chronic systolic heart failure  Wean oxygen as able, does not use oxygen at home  Anemia  Hgb 11.4 on admit  Baseline Cr 10-12   Trend CBC   SIRS (systemic inflammatory response syndrome) ([facility])  SIRS criteria: Tachypnea and leukocytosis  Initially treated with antibiotics for possible atypical pneumonia in the ED, however procalcitonin is negative and patient examines more like acute on chronic systolic heart failure and denies any fever or productive cough   Hold on further antibiotics at this time  If morning procalcitonin increases-would start ceftriaxone   Follow-up blood cultures  COPD (chronic obstructive pulmonary disease) ([facility])  Home regimen: Trelegy daily and albuterol as needed  No expiratory wheezing appreciated on admission, do not suspect acute  exacerbation at this time  Continue home regimen  Type 2 diabetes mellitus without complication, without long-term current use of insulin (HCC)  Lab Results   Component Value Date    HGBA1C 13.5 (H) 08/28/2024       Recent Labs     10/20/24  1127 10/20/24  1556 10/20/24  2050 10/21/24  0801   POCGLU 124 138 74 80       Blood Sugar Average: Last 72 hrs:  (P) [id number] regimen: metformin, glipizide, and farxiga  Hold oral medication and place on lantus 5U in AM, humalog 5U TID with meals, SSI AC/HS  Coronary artery disease involving native coronary artery of native heart without angina pectoris  CAD status post PCI and CABG  Continue home beta-blocker, statin, and Xarelto  Persistent atrial fibrillation (HCC)  RC: Metoprolol succinate 25 Mg daily  AC: Xarelto  Rate controlled, v paced  Cardiac pacemaker  [employer] [place] s/p SSS   Last device interrogation 07/2024 showed normal device function   Pulmonary embolism (HCC)  Continue home Xarelto   CTA yesterday with no signs of PE  Pulmonary hypertension (HCC)  Most recent echo January 2024 showing mild pulmonary hypertension with RSVP 39 mmHg  Continue home medications  Acquired hypothyroidism  TSH elevated at 8.358  Increase synthroid to 125mcg daily   Recheck TSH in 6-8 weeks   Depression with anxiety  Continue home buspar, cymbalta, and klonopin     VTE Pharmacologic Prophylaxis: VTE Score: 7 High Risk (Score >/= 5) - Pharmacological DVT Prophylaxis Ordered: rivaroxaban (Xarelto). Sequential Compression Devices Ordered.    Mobility:   Basic Mobility Inpatient Raw Score: 20  JH-HLM Goal: 6: Walk 10 steps or more  JH-HLM Achieved: 6: Walk 10 steps or more  JH-HLM Goal achieved. Continue to encourage appropriate mobility.    Patient Centered Rounds: I performed bedside rounds with nursing staff today.   Discussions with Specialists or Other Care Team Provider: Cardiology    Education and Discussions with Family / Patient: Updated  (daughter) via  phone.    Current Length of Stay: 2 day(s)  Current Patient Status: Inpatient   Certification Statement: The patient will continue to require additional inpatient hospital stay due to continued treatment of CHF exacerbation with diuretics  Discharge Plan: Anticipate discharge in 24-48 hrs to prior assisted or independent living facility.    Code Status: Level 1 - Full Code    Subjective   Seen and examined at bedside.  He is pleasant.  No acute complaints at this time.  Did request a lidocaine patch for pain control  Pending echocardiogram today.    Objective :  Temp:  [97.7 °F (36.5 °C)-98.6 °F (37 °C)] 97.8 °F (36.6 °C)  HR:  [59-65] 65  BP: (120-134)/(69-71) 132/69  Resp:  [17-21] 21  SpO2:  [85 %-97 %] 96 %  O2 Device: Nasal cannula  Nasal Cannula O2 Flow Rate (L/min):  [2 L/min] 2 L/min    Body mass index is 32.1 kg/m².     Input and Output Summary (last 24 hours):     Intake/Output Summary (Last 24 hours) at 10/21/2024 1127  Last data filed at 10/21/2024 1043  Gross per 24 hour   Intake 1412 ml   Output 1125 ml   Net 287 ml       Physical Exam  Vitals and nursing note reviewed.   Constitutional:       General: He is not in acute distress.     Appearance: He is well-developed.      Comments: elderly male resting in bed   HENT:      Head: Normocephalic and atraumatic.   Eyes:      Conjunctiva/sclera: Conjunctivae normal.   Cardiovascular:      Rate and Rhythm: Normal rate and regular rhythm.      Heart sounds: No murmur heard.  Pulmonary:      Effort: Pulmonary effort is normal. No respiratory distress.      Breath sounds: Normal breath sounds.   Abdominal:      Palpations: Abdomen is soft.      Tenderness: There is no abdominal tenderness.   Musculoskeletal:         General: No swelling.      Cervical back: Neck supple.      Right lower leg: Edema present.      Left lower leg: Edema present.   Skin:     General: Skin is warm and dry.      Capillary Refill: Capillary refill takes less than 2 seconds.    Neurological:      Mental Status: He is alert.   Psychiatric:         Mood and Affect: Mood normal.         Lines/Drains:    Telemetry:  Telemetry Orders (From admission, onward)               24 Hour Telemetry Monitoring  Continuous x 24 Hours (Telem)        Question:  Reason for 24 Hour Telemetry  Answer:  Decompensated CHF- and any one of the following: continuous diuretic infusion or total diuretic dose >200 mg daily, associated electrolyte derangement (I.e. K < 3.0), ionotropic drip (continuous infusion), hx of ventricular arrhythmia, or new EF < 35%                     Telemetry Reviewed: Normal Sinus Rhythm  Indication for Continued Telemetry Use: Arrthymias requiring medical therapy               Lab Results: I have reviewed the following results:   Results from last 7 days   Lab Units 10/20/24  0438 10/19/24  1641   WBC Thousand/uL 11.34* 12.42*   HEMOGLOBIN g/dL 11.3* 11.4*   HEMATOCRIT % 35.3* 36.7   PLATELETS Thousands/uL 215 217   SEGS PCT %  --  51   LYMPHO PCT %  --  37   MONO PCT %  --  10   EOS PCT %  --  1     Results from last 7 days   Lab Units 10/20/24  0438 10/19/24  1641   SODIUM mmol/L 140 136   POTASSIUM mmol/L 3.4* 4.1   CHLORIDE mmol/L 101 100   CO2 mmol/L 31 28   BUN mg/dL 19 18   CREATININE mg/dL 1.16 1.21   ANION GAP mmol/L 8 8   CALCIUM mg/dL 8.4 9.0   ALBUMIN g/dL  --  3.9   TOTAL BILIRUBIN mg/dL  --  0.66   ALK PHOS U/L  --  99   ALT U/L  --  16   AST U/L  --  21   GLUCOSE RANDOM mg/dL 116 128         Results from last 7 days   Lab Units 10/21/24  0801 10/20/24  2050 10/20/24  1556 10/20/24  1127 10/20/24  0800 10/20/24  0021   POC GLUCOSE mg/dl 80 74 138 124 113 195*         Results from last 7 days   Lab Units 10/19/24  2126   LACTIC ACID mmol/L 1.2   PROCALCITONIN ng/ml 0.08       Recent Cultures (last 7 days):   Results from last 7 days   Lab Units 10/19/24  2116   BLOOD CULTURE  No Growth at 24 hrs.  No Growth at 24 hrs.       Imaging Results Review: No pertinent imaging  studies reviewed.      Last 24 Hours Medication List:     Current Facility-Administered Medications:     acetaminophen (TYLENOL) tablet 650 mg, Q4H PRN    albuterol inhalation solution 2.5 mg, Q4H PRN    atorvastatin (LIPITOR) tablet 20 mg, QAM    busPIRone (BUSPAR) tablet 7.5 mg, BID    clonazePAM (KlonoPIN) tablet 1 mg, HS PRN    donepezil (ARICEPT) tablet 10 mg, QAM    DULoxetine (CYMBALTA) delayed release capsule 30 mg, Daily    ferrous sulfate tablet 325 mg, Daily With Breakfast    Fluticasone Furoate-Vilanterol 100-25 mcg/actuation 1 puff, Daily **AND** umeclidinium 62.5 mcg/actuation inhaler AEPB 1 puff, Daily    folic acid (FOLVITE) tablet 1,000 mcg, QAM    furosemide (LASIX) injection 80 mg, BID (diuretic)    influenza vaccine, high-dose (Fluzone High-Dose) IM injection 0.5 mL, Prior to discharge    insulin glargine (LANTUS) subcutaneous injection 5 Units 0.05 mL, Daily With Breakfast    insulin lispro (HumALOG/ADMELOG) 100 units/mL subcutaneous injection 1-5 Units, HS    insulin lispro (HumALOG/ADMELOG) 100 units/mL subcutaneous injection 1-6 Units, TID AC **AND** Fingerstick Glucose (POCT), 4x Daily AC and at bedtime    insulin lispro (HumALOG/ADMELOG) 100 units/mL subcutaneous injection 5 Units, TID With Meals    levothyroxine tablet 125 mcg, Early Morning    lidocaine (LIDODERM) 5 % patch 1 patch, Daily    loratadine (CLARITIN) tablet 10 mg, QAM    magnesium Oxide (MAG-OX) tablet 400 mg, BID    metoprolol succinate (TOPROL-XL) 24 hr tablet 25 mg, QAM    pantoprazole (PROTONIX) EC tablet 40 mg, BID AC    rivaroxaban (XARELTO) tablet 20 mg, Daily With Dinner    spironolactone (ALDACTONE) tablet 25 mg, Daily    thiamine tablet 100 mg, Daily    Administrative Statements   Today, Patient Was Seen By: Missy Crane, DO  I have spent a total time of 35 minutes in caring for this patient on the day of the visit/encounter including Diagnostic results and Prognosis.    **Please Note: This note may have been  constructed using a voice recognition system.**

## 2024-10-21 NOTE — ASSESSMENT & PLAN NOTE
Lab Results   Component Value Date    HGBA1C 13.5 (H) 08/28/2024       Recent Labs     10/20/24  1127 10/20/24  1556 10/20/24  2050 10/21/24  0801   POCGLU 124 138 74 80       Blood Sugar Average: Last 72 hrs:  (P) 120.9895172722393965  Severely uncontrolled diabetes

## 2024-10-21 NOTE — ASSESSMENT & PLAN NOTE
Lab Results   Component Value Date    HGBA1C 13.5 (H) 08/28/2024       Recent Labs     10/20/24  1127 10/20/24  1556 10/20/24  2050 10/21/24  0801   POCGLU 124 138 74 80       Blood Sugar Average: Last 72 hrs:  (P) 120.6239311383335707Henu regimen: metformin, glipizide, and farxiga  Hold oral medication and place on lantus 5U in AM, humalog 5U TID with meals, SSI AC/HS

## 2024-10-21 NOTE — UTILIZATION REVIEW
Initial Clinical Review    Admission: Date/Time/Statement:   Admission Orders (From admission, onward)       Ordered        10/19/24 2132  INPATIENT ADMISSION  Once                          Orders Placed This Encounter   Procedures    INPATIENT ADMISSION     Standing Status:   Standing     Number of Occurrences:   1     Order Specific Question:   Level of Care     Answer:   Med Surg [16]     Order Specific Question:   Estimated length of stay     Answer:   More than 2 Midnights     Order Specific Question:   Certification     Answer:   I certify that inpatient services are medically necessary for this patient for a duration of greater than two midnights. See H&P and MD Progress Notes for additional information about the patient's course of treatment.     ED Arrival Information       Expected   -    Arrival   10/19/2024 16:24    Acuity   Urgent              Means of arrival   Ambulance    Escorted by   [facility]    Service   Hospitalist    Admission type   Emergency              Arrival complaint   SOB             Chief Complaint   Patient presents with    Shortness of Breath     Pt to ED from [facility] for low 02 and SOB. Feels like he has to take a bigger breath to fill lungs. Denies chest pain. Denies abd pain.        Initial Presentation: 84 y.o. male presents to ed from nursing  home on 10-19-24 via ems for low O2, cough  and shortness of breath.  Feels like he has to take a bigger breath to fill lungs.20 lb wt gain.   Pmhx: COPD not on home O2, DM, alzheimer's, HTN, AFIB, CABG, TAVR. Clinical assessment significant for hypoxia 80s, decreased breath sounds, peripheral edema.  Imaging suggests CHF, pneumonia vs PE.  TSH 8.358, , WBC 12.42.  Initially treated with duo neb x2, iv lasix 80 mg x1, iv cefepime, iv azithromycin, 2L O2nc.  Admit to inpatient med surg for CHF, SIRS, COPD, peripheral edema.  Plan includes: iv lasix bid, spironolactone, wean oxygen, 1500 cc fluid restriction,  cardiology consult, repeat Echo, I&O.      Anticipated Length of Stay/Certification Statement: Patient will be admitted on an inpatient basis with an anticipated length of stay of greater than 2 midnights secondary to acute on chronic systolic heart failure.     Date: 10-20-24    Day 2: inpatient med surg  Certification Statement: The patient will continue to require additional inpatient hospital stay due to ongoing diuresis for CHF  Discharge Plan: Anticipate discharge in 48 hrs to nursing home   2L O2nc to maintain spo2 at least 81%.  Wean to room air as tolerated.  Telemetry nsr.   Continue iv lasix 80 mg bid.  Cardiology consult completed: Has an ischemic cardiomyopathy with EF 35 to 40% as of echo 1/24.   Initiated on intravenous diuretics, 2.6 L of urine output in the last 24 hours.  Continue xarelto and metoprolol for Afib,       Date: 10-21-24   Day 3: Has surpassed a 2nd midnight with active treatments and services to treat CHF with iv diuretics.    Certification Statement: The patient will continue to require additional inpatient hospital stay due to continued treatment of CHF exacerbation with diuretics  Discharge Plan: Anticipate discharge in 24-48 hrs to prior assisted or independent living facility.  On exam : peripheral edema persists. WBC 11.34.  Oxygen requirement improving with spo2 96% on 2L nc resting.  Goal : wean to room air.    Continue telemetry,  strict I&O and fluid restriction.  Continue iv lasix 80 mg bid.     ED Treatment-Medication Administration from 10/19/2024 1624 to 10/19/2024 2205         Date/Time Order Dose Route Action     10/19/2024 1829 ipratropium-albuterol (DUO-NEB) 0.5-2.5 mg/3 mL inhalation solution 3 mL 3 mL Nebulization Given     10/19/2024 1911 furosemide (LASIX) injection 80 mg 80 mg Intravenous Given     10/19/2024 2050 clonazePAM (KlonoPIN) tablet 1 mg 1 mg Oral Given     10/19/2024 2128 cefepime (MAXIPIME) IVPB (premix in dextrose) 2,000 mg 50 mL 2,000 mg  Intravenous New Bag         Scheduled Medications:  atorvastatin, 20 mg, Oral, QAM  busPIRone, 7.5 mg, Oral, BID  donepezil, 10 mg, Oral, QAM  DULoxetine, 30 mg, Oral, Daily  ferrous sulfate, 325 mg, Oral, Daily With Breakfast  Fluticasone Furoate-Vilanterol, 1 puff, Inhalation, Daily   And  umeclidinium, 1 puff, Inhalation, Daily  folic acid, 1,000 mcg, Oral, QAM  furosemide, 80 mg, Intravenous, BID (diuretic)  insulin glargine, 5 Units, Subcutaneous, Daily With Breakfast  insulin lispro, 1-5 Units, Subcutaneous, HS  insulin lispro, 1-6 Units, Subcutaneous, TID AC  insulin lispro, 5 Units, Subcutaneous, TID With Meals  levothyroxine, 125 mcg, Oral, Early Morning  lidocaine, 1 patch, Topical, Daily  loratadine, 10 mg, Oral, QAM  magnesium Oxide, 400 mg, Oral, BID  metoprolol succinate, 25 mg, Oral, QAM  pantoprazole, 40 mg, Oral, BID AC  rivaroxaban, 20 mg, Oral, Daily With Dinner  spironolactone, 25 mg, Oral, Daily  Thiamine Mononitrate, 100 mg, Oral, Daily      Continuous IV Infusions:     PRN Meds:  acetaminophen, 650 mg, Oral, Q4H PRN  albuterol, 2.5 mg, Nebulization, Q4H PRN  clonazePAM, 1 mg, Oral, HS PRN  influenza vaccine, 0.5 mL, Intramuscular, Prior to discharge      ED Triage Vitals [10/19/24 1634]   Temperature Pulse Respirations Blood Pressure SpO2 Pain Score   97.9 °F (36.6 °C) 64 20 133/74 96 % No Pain     Weight (last 2 days)       Date/Time Weight    10/21/24 0553 95.8 (211.1)    10/20/24 0300 97.8 (215.5)    10/19/24 22:12:48 98.4 (217)    10/19/24 2104 98.4 (217)    10/19/24 1634 100 (221)            Vital Signs (last 3 days)       Date/Time Temp Pulse Resp BP MAP  SpO2 Nasal Cannula O2 Flow Rate (L/min) David Coma Scale Score Pain    10/21/24 08:05:05 97.8 °F (36.6 °C) 65 21 132/69 90 96 % -- -- --    10/21/24 0647 -- 65 18 -- -- 97 % 2 L/min -- --    10/20/24 20:52:49 98.6 °F (37 °C) 59 17 134/71 92 92 % 2 L/min -- --    10/20/24 1949 -- -- -- -- -- 97 % 2 L/min 15 No Pain    10/20/24 15:33:34  97.7 °F (36.5 °C) 64 -- 120/69 86 85 % -- -- --    10/20/24 1156 -- -- -- -- -- 97 % 2 L/min -- --    10/20/24 0900 -- 66 -- -- -- 94 % -- -- --    10/20/24 0800 -- -- -- -- -- -- 2 L/min 15 No Pain    10/20/24 07:31:56 98 °F (36.7 °C) 65 18 121/78 92 96 % -- -- --    10/20/24 0518 -- 66 20 -- -- 95 % 2 L/min -- --    10/19/24 2256 -- 65 -- -- -- 81 % 2 L/min -- --    10/19/24 22:12:48 97.7 °F (36.5 °C) 65 16 117/84 95 90 % -- 15 --    10/19/24 2208 -- -- -- -- -- -- -- -- 6    10/19/24 2104 -- 64 16 -- -- 94 % -- -- --    10/19/24 2054 -- 64 16 117/66 86 94 % -- -- --    10/19/24 2000 -- 64 18 157/75 105 96 % -- -- --    10/19/24 1930 -- 64 20 143/72 101 96 % 2 L/min -- --    10/19/24 1918 -- -- -- -- -- -- -- -- --    10/19/24 1917 -- -- -- -- -- -- -- 15 --    10/19/24 1916 -- -- -- -- -- 92 % 2 L/min -- --    10/19/24 1915 -- 64 20 135/62 88 87 % -- -- --    10/19/24 1839 -- -- -- -- -- -- -- 15 --    10/19/24 1838 -- -- -- -- -- -- -- -- --    10/19/24 1830 -- 65 22 125/78 88 92 % -- -- --    10/19/24 1800 -- 61 21 128/66 92 92 % -- -- --    10/19/24 1746 -- -- -- -- -- 92 % -- -- --    10/19/24 1745 -- 65 22 126/64 86 -- -- -- --    10/19/24 1634 97.9 °F (36.6 °C) 64 20 133/74 -- 96 % -- -- No Pain       10-21 -24   I/O last 3 completed shifts:  In: 2144 [P.O.:2132; I.V.:12]  Out: 3750 [Urine:3750]  Net IO Since Admission: -1,686 mL [10/21/24 1129]     Weight change: -4.491 kg (-9 lb 14.4 oz)      Pertinent Labs/Diagnostic Test Results:   Radiology:  CTA ED Chest PE Study   Final  (10/19 2045)         1. No evidence of acute pulmonary embolus or thoracic aortic aneurysm.   2. Small right pleural effusion.   3. Few groundglass opacities in the right lung could represent mild aspiration pneumonitis, atypical pneumonia, or asymmetric pulmonary edema.         XR chest 1 view portable   Final (10/20 0955)      Mild pulmonary edema with small right pleural effusion.        Cardiology:  ECG 12 lead   Final (10/21  0742)   Ventricular-paced rhythm   Abnormal ECG   No previous ECGs available        GI:  No orders to display           Results from last 7 days   Lab Units 10/21/24  1145 10/20/24  0438 10/19/24  1641   WBC Thousand/uL 13.36* 11.34* 12.42*   HEMOGLOBIN g/dL 11.7* 11.3* 11.4*   HEMATOCRIT % 37.7 35.3* 36.7   PLATELETS Thousands/uL 207 215 217   TOTAL NEUT ABS Thousands/µL  --   --  6.33         Results from last 7 days   Lab Units 10/21/24  1145 10/20/24  0438 10/19/24  1641   SODIUM mmol/L 139 140 136   POTASSIUM mmol/L 3.4* 3.4* 4.1   CHLORIDE mmol/L 97 101 100   CO2 mmol/L 34* 31 28   ANION GAP mmol/L 8 8 8   BUN mg/dL 25 19 18   CREATININE mg/dL 0.95 1.16 1.21   EGFR ml/min/1.73sq m 72 56 53   CALCIUM mg/dL 8.4 8.4 9.0   MAGNESIUM mg/dL  --  2.2  --    PHOSPHORUS mg/dL  --  4.0  --      Results from last 7 days   Lab Units 10/19/24  1641   AST U/L 21   ALT U/L 16   ALK PHOS U/L 99   TOTAL PROTEIN g/dL 6.8   ALBUMIN g/dL 3.9   TOTAL BILIRUBIN mg/dL 0.66     Results from last 7 days   Lab Units 10/21/24  1148 10/21/24  0801 10/20/24  2050 10/20/24  1556 10/20/24  1127 10/20/24  0800 10/20/24  0021   POC GLUCOSE mg/dl 118 80 74 138 124 113 195*     Results from last 7 days   Lab Units 10/21/24  1145 10/20/24  0438 10/19/24  1641   GLUCOSE RANDOM mg/dL 122 116 128         Results from last 7 days   Lab Units 10/19/24  2052 10/19/24  1837 10/19/24  1641   HS TNI 0HR ng/L  --   --  18   HS TNI 2HR ng/L  --  18  --    HSTNI D2 ng/L  --  0  --    HS TNI 4HR ng/L 21  --   --    HSTNI D4 ng/L 3  --   --      Results from last 7 days   Lab Units 10/19/24  1837   TSH 3RD GENERATON uIU/mL 8.358*     Results from last 7 days   Lab Units 10/19/24  2126   PROCALCITONIN ng/ml 0.08     Results from last 7 days   Lab Units 10/19/24  2126   LACTIC ACID mmol/L 1.2         Results from last 7 days   Lab Units 10/19/24  1837   BNP pg/mL 419*     Results from last 7 days   Lab Units 10/19/24  2116   BLOOD CULTURE  No Growth at 24 hrs.   No Growth at 24 hrs.     Past Medical History:   Diagnosis Date    Alzheimer disease (HCC)     Anemia     Aneurysm of ascending aorta without rupture (HCC)     Anxiety     Atrial fibrillation (HCC)     CHF (congestive heart failure) (HCC)     Depression     Diabetes mellitus (HCC)     Disease of thyroid gland     Pulmonary hypertension (HCC)     Sciatica     Sick sinus syndrome (HCC)      Present on Admission:   Persistent atrial fibrillation (HCC)   Depression with anxiety   Cardiac pacemaker   Pulmonary embolism (HCC)   Acquired hypothyroidism   Acute on chronic systolic heart failure (HCC)   Coronary artery disease involving native coronary artery of native heart without angina pectoris   COPD (chronic obstructive pulmonary disease) (HCC)   Type 2 diabetes mellitus without complication, without long-term current use of insulin (HCC)   Pulmonary hypertension (HCC)      Admitting Diagnosis:     Pneumonia [J18.9]  SOB (shortness of breath) [R06.02]  Acute exacerbation of congestive heart failure (HCC) [I50.9]    Age/Sex: 84 y.o. male    Network Utilization Review Department  ATTENTION: Please call with any questions or concerns to [phone] and carefully listen to the prompts so that you are directed to the right person. All voicemails are confidential.   For Discharge needs, contact Care Management DC Support Team at [phone] opt. [phone]  Send all requests for admission clinical reviews, approved or denied determinations and any other requests to dedicated fax number below belonging to the campus where the patient is receiving treatment. List of dedicated fax numbers for the Facilities:  FACILITY NAME UR FAX NUMBER   ADMISSION DENIALS (Administrative/Medical Necessity) [phone]   DISCHARGE SUPPORT TEAM (NETWORK) [phone]   PARENT CHILD HEALTH (Maternity/NICU/Pediatrics) [phone]   [facility] [phone]   [facility] [phone]   [facility]  [facility] [phone]   [facility] [phone]   [facility] [phone]   [facility] [phone]   [facility] [phone]   [facility] [phone]   [facility] [phone]   [facility] [phone]   [facility] [phone]   [facility] [phone]

## 2024-10-21 NOTE — ASSESSMENT & PLAN NOTE
SpO2 87% on room air  Improved on 2 L nasal cannula  Suspect secondary to acute on chronic systolic heart failure  Wean oxygen as able, does not use oxygen at home

## 2024-10-21 NOTE — ASSESSMENT & PLAN NOTE
Presents from nursing home with worsened dyspnea and low SpO2 long with 20 pound weight gain  Last echo January 2024: LVEF 35-40%.  Severely dilated left atrium.  Mild pulmonary hypertension.  TAVR functioning well.  Home diuretic: lasix 20mg BID and spironolactone 25 mg  Continue Lasix 80 mg IV twice daily, +peripheral edema  Wean oxygen as tolerated  1500cc fluid restriction  Cardiology consulted  Repeat echo ordered  Strict I's and O's. Will discuss fluid restriction with patient.    I/O last 3 completed shifts:  In: 2144 [P.O.:2132; I.V.:12]  Out: 3750 [Urine:3750]  Net IO Since Admission: -1,686 mL [10/21/24 1129]    Weight change: -4.491 kg (-9 lb 14.4 oz)  Wt Readings from Last 3 Encounters:   10/21/24 95.8 kg (211 lb 1.6 oz)   09/18/24 89.8 kg (198 lb)   07/16/24 89.4 kg (197 lb)

## 2024-10-22 PROBLEM — E87.6 HYPOKALEMIA: Status: ACTIVE | Noted: 2024-10-22

## 2024-10-22 PROBLEM — Z95.2 HISTORY OF TRANSCATHETER AORTIC VALVE REPLACEMENT (TAVR): Status: ACTIVE | Noted: 2024-10-22

## 2024-10-22 LAB
ANION GAP SERPL CALCULATED.3IONS-SCNC: 10 MMOL/L (ref 4–13)
BUN SERPL-MCNC: 28 MG/DL (ref 5–25)
CALCIUM SERPL-MCNC: 8.1 MG/DL (ref 8.4–10.2)
CHLORIDE SERPL-SCNC: 97 MMOL/L (ref 96–108)
CO2 SERPL-SCNC: 33 MMOL/L (ref 21–32)
CREAT SERPL-MCNC: 1.11 MG/DL (ref 0.6–1.3)
ERYTHROCYTE [DISTWIDTH] IN BLOOD BY AUTOMATED COUNT: 14.4 % (ref 11.6–15.1)
GFR SERPL CREATININE-BSD FRML MDRD: 59 ML/MIN/1.73SQ M
GLUCOSE SERPL-MCNC: 112 MG/DL (ref 65–140)
GLUCOSE SERPL-MCNC: 137 MG/DL (ref 65–140)
GLUCOSE SERPL-MCNC: 162 MG/DL (ref 65–140)
GLUCOSE SERPL-MCNC: 162 MG/DL (ref 65–140)
GLUCOSE SERPL-MCNC: 184 MG/DL (ref 65–140)
HCT VFR BLD AUTO: 35.3 % (ref 36.5–49.3)
HGB BLD-MCNC: 11.3 G/DL (ref 12–17)
MAGNESIUM SERPL-MCNC: 2.2 MG/DL (ref 1.9–2.7)
MCH RBC QN AUTO: 30.4 PG (ref 26.8–34.3)
MCHC RBC AUTO-ENTMCNC: 32 G/DL (ref 31.4–37.4)
MCV RBC AUTO: 95 FL (ref 82–98)
PLATELET # BLD AUTO: 209 THOUSANDS/UL (ref 149–390)
PMV BLD AUTO: 8.7 FL (ref 8.9–12.7)
POTASSIUM SERPL-SCNC: 2.9 MMOL/L (ref 3.5–5.3)
RBC # BLD AUTO: 3.72 MILLION/UL (ref 3.88–5.62)
SODIUM SERPL-SCNC: 140 MMOL/L (ref 135–147)
WBC # BLD AUTO: 11.59 THOUSAND/UL (ref 4.31–10.16)

## 2024-10-22 PROCEDURE — 99232 SBSQ HOSP IP/OBS MODERATE 35: CPT | Performed by: INTERNAL MEDICINE

## 2024-10-22 PROCEDURE — 80048 BASIC METABOLIC PNL TOTAL CA: CPT | Performed by: STUDENT IN AN ORGANIZED HEALTH CARE EDUCATION/TRAINING PROGRAM

## 2024-10-22 PROCEDURE — 82948 REAGENT STRIP/BLOOD GLUCOSE: CPT

## 2024-10-22 PROCEDURE — 94640 AIRWAY INHALATION TREATMENT: CPT

## 2024-10-22 PROCEDURE — 99232 SBSQ HOSP IP/OBS MODERATE 35: CPT | Performed by: STUDENT IN AN ORGANIZED HEALTH CARE EDUCATION/TRAINING PROGRAM

## 2024-10-22 PROCEDURE — 85027 COMPLETE CBC AUTOMATED: CPT | Performed by: STUDENT IN AN ORGANIZED HEALTH CARE EDUCATION/TRAINING PROGRAM

## 2024-10-22 PROCEDURE — 83735 ASSAY OF MAGNESIUM: CPT | Performed by: STUDENT IN AN ORGANIZED HEALTH CARE EDUCATION/TRAINING PROGRAM

## 2024-10-22 PROCEDURE — 94760 N-INVAS EAR/PLS OXIMETRY 1: CPT

## 2024-10-22 RX ORDER — LANOLIN ALCOHOL/MO/W.PET/CERES
3 CREAM (GRAM) TOPICAL
Status: DISCONTINUED | OUTPATIENT
Start: 2024-10-22 | End: 2024-10-29 | Stop reason: HOSPADM

## 2024-10-22 RX ORDER — FUROSEMIDE 40 MG/1
40 TABLET ORAL
Status: DISCONTINUED | OUTPATIENT
Start: 2024-10-22 | End: 2024-10-22

## 2024-10-22 RX ORDER — POTASSIUM CHLORIDE 14.9 MG/ML
20 INJECTION INTRAVENOUS
Status: DISCONTINUED | OUTPATIENT
Start: 2024-10-22 | End: 2024-10-22

## 2024-10-22 RX ORDER — POTASSIUM CHLORIDE 1500 MG/1
40 TABLET, EXTENDED RELEASE ORAL 2 TIMES DAILY
Status: DISCONTINUED | OUTPATIENT
Start: 2024-10-23 | End: 2024-10-29 | Stop reason: HOSPADM

## 2024-10-22 RX ORDER — POTASSIUM CHLORIDE 1500 MG/1
40 TABLET, EXTENDED RELEASE ORAL ONCE
Status: COMPLETED | OUTPATIENT
Start: 2024-10-22 | End: 2024-10-22

## 2024-10-22 RX ORDER — FUROSEMIDE 10 MG/ML
80 INJECTION INTRAMUSCULAR; INTRAVENOUS
Status: DISCONTINUED | OUTPATIENT
Start: 2024-10-22 | End: 2024-10-27

## 2024-10-22 RX ORDER — POTASSIUM CHLORIDE 1500 MG/1
40 TABLET, EXTENDED RELEASE ORAL
Status: COMPLETED | OUTPATIENT
Start: 2024-10-22 | End: 2024-10-23

## 2024-10-22 RX ADMIN — ALBUTEROL SULFATE 2.5 MG: 2.5 SOLUTION RESPIRATORY (INHALATION) at 10:31

## 2024-10-22 RX ADMIN — FUROSEMIDE 80 MG: 10 INJECTION, SOLUTION INTRAVENOUS at 17:41

## 2024-10-22 RX ADMIN — FLUTICASONE FUROATE AND VILANTEROL TRIFENATATE 1 PUFF: 100; 25 POWDER RESPIRATORY (INHALATION) at 08:32

## 2024-10-22 RX ADMIN — INSULIN LISPRO 1 UNITS: 100 INJECTION, SOLUTION INTRAVENOUS; SUBCUTANEOUS at 21:22

## 2024-10-22 RX ADMIN — INSULIN LISPRO 1 UNITS: 100 INJECTION, SOLUTION INTRAVENOUS; SUBCUTANEOUS at 17:43

## 2024-10-22 RX ADMIN — POTASSIUM CHLORIDE 40 MEQ: 1500 TABLET, EXTENDED RELEASE ORAL at 11:09

## 2024-10-22 RX ADMIN — METOPROLOL SUCCINATE 25 MG: 25 TABLET, EXTENDED RELEASE ORAL at 08:29

## 2024-10-22 RX ADMIN — Medication 400 MG: at 08:29

## 2024-10-22 RX ADMIN — DONEPEZIL HYDROCHLORIDE 10 MG: 5 TABLET ORAL at 08:29

## 2024-10-22 RX ADMIN — ATORVASTATIN CALCIUM 20 MG: 20 TABLET, FILM COATED ORAL at 08:30

## 2024-10-22 RX ADMIN — CLONAZEPAM 1 MG: 1 TABLET ORAL at 20:18

## 2024-10-22 RX ADMIN — RIVAROXABAN 20 MG: 20 TABLET, FILM COATED ORAL at 17:41

## 2024-10-22 RX ADMIN — INSULIN LISPRO 1 UNITS: 100 INJECTION, SOLUTION INTRAVENOUS; SUBCUTANEOUS at 12:41

## 2024-10-22 RX ADMIN — POTASSIUM CHLORIDE 40 MEQ: 1500 TABLET, EXTENDED RELEASE ORAL at 17:41

## 2024-10-22 RX ADMIN — Medication 100 MG: at 08:30

## 2024-10-22 RX ADMIN — FOLIC ACID 1000 MCG: 1 TABLET ORAL at 08:29

## 2024-10-22 RX ADMIN — BUSPIRONE HYDROCHLORIDE 7.5 MG: 15 TABLET ORAL at 08:30

## 2024-10-22 RX ADMIN — INSULIN LISPRO 5 UNITS: 100 INJECTION, SOLUTION INTRAVENOUS; SUBCUTANEOUS at 08:32

## 2024-10-22 RX ADMIN — Medication 400 MG: at 17:41

## 2024-10-22 RX ADMIN — UMECLIDINIUM 1 PUFF: 62.5 AEROSOL, POWDER ORAL at 08:32

## 2024-10-22 RX ADMIN — POTASSIUM CHLORIDE 20 MEQ: 14.9 INJECTION, SOLUTION INTRAVENOUS at 12:41

## 2024-10-22 RX ADMIN — LEVOTHYROXINE SODIUM 125 MCG: 25 TABLET ORAL at 05:00

## 2024-10-22 RX ADMIN — FERROUS SULFATE TAB 325 MG (65 MG ELEMENTAL FE) 325 MG: 325 (65 FE) TAB at 08:30

## 2024-10-22 RX ADMIN — BUSPIRONE HYDROCHLORIDE 7.5 MG: 15 TABLET ORAL at 17:41

## 2024-10-22 RX ADMIN — PANTOPRAZOLE SODIUM 40 MG: 40 TABLET, DELAYED RELEASE ORAL at 05:00

## 2024-10-22 RX ADMIN — LIDOCAINE 5% 1 PATCH: 700 PATCH TOPICAL at 08:32

## 2024-10-22 RX ADMIN — POTASSIUM CHLORIDE 40 MEQ: 1500 TABLET, EXTENDED RELEASE ORAL at 12:40

## 2024-10-22 RX ADMIN — DULOXETINE HYDROCHLORIDE 30 MG: 30 CAPSULE, DELAYED RELEASE ORAL at 08:29

## 2024-10-22 RX ADMIN — SPIRONOLACTONE 25 MG: 25 TABLET ORAL at 08:30

## 2024-10-22 RX ADMIN — LORATADINE 10 MG: 10 TABLET ORAL at 08:30

## 2024-10-22 RX ADMIN — INSULIN GLARGINE 5 UNITS: 100 INJECTION, SOLUTION SUBCUTANEOUS at 08:30

## 2024-10-22 RX ADMIN — PANTOPRAZOLE SODIUM 40 MG: 40 TABLET, DELAYED RELEASE ORAL at 17:41

## 2024-10-22 RX ADMIN — INSULIN LISPRO 5 UNITS: 100 INJECTION, SOLUTION INTRAVENOUS; SUBCUTANEOUS at 12:41

## 2024-10-22 RX ADMIN — FUROSEMIDE 80 MG: 10 INJECTION, SOLUTION INTRAVENOUS at 08:29

## 2024-10-22 RX ADMIN — Medication 3 MG: at 21:00

## 2024-10-22 RX ADMIN — ACETAMINOPHEN 650 MG: 325 TABLET, FILM COATED ORAL at 00:27

## 2024-10-22 RX ADMIN — INSULIN LISPRO 5 UNITS: 100 INJECTION, SOLUTION INTRAVENOUS; SUBCUTANEOUS at 17:44

## 2024-10-22 RX ADMIN — ACETAMINOPHEN 650 MG: 325 TABLET, FILM COATED ORAL at 14:47

## 2024-10-22 NOTE — ASSESSMENT & PLAN NOTE
SpO2 87% on room air  Improved on 1 L nasal cannula  Suspect secondary to acute on chronic systolic heart failure  Wean oxygen as able, does not use oxygen at home

## 2024-10-22 NOTE — PLAN OF CARE

## 2024-10-22 NOTE — PROGRESS NOTES
Progress Note - Cardiology   Name: Trevor Lyons 84 y.o. male I MRN: [id number]  Unit/Bed#: -01 I Date of Admission: 10/19/2024   Date of Service: 10/22/2024 I Hospital Day: 3     Assessment & Plan  Acute on chronic systolic heart failure (HCC)  Wt Readings from Last 3 Encounters:   10/22/24 95.3 kg (210 lb)   09/18/24 89.8 kg (198 lb)   07/16/24 89.4 kg (197 lb)   Has an ischemic cardiomyopathy with EF 35 to 40% as of echo 1/24  10/21/2024 echo: EF 40-45%, severe left atrial dilation, normal functioning TAVR bioprosthetic valve, moderate to severe mitral insufficiency  Home diuretic furosemide 20 mg twice a day and spironolactone 25 mg daily  Presented with shortness of breath, hypoxemia, 20 pound weight gain  Lives in a nursing facility  Persistent atrial fibrillation (HCC)  Has been in A-fib based on EKGs in April, July, and now  No high rate episodes per recent device check 7/24  Primarily V-paced  Anticoagulation is Xarelto  Also on metoprolol succinate 25 mg daily  Cardiac pacemaker  [employer] dual-chamber permanent pacemaker with 98% ventricular pacing as of 7/24.  Coronary artery disease involving native coronary artery of native heart without angina pectoris  With prior CABG/stenting  LAD mid occlusion, RCA with severe diffuse disease, left circumflex stent to a 90% lesion 2020  LIMA to LAD patent, radial graft to PDA patent, SVG to OM patent in 2020  History of transcatheter aortic valve replacement (TAVR)  3/23/2021 TAVR with 34 mm Medtronic evolute pro valve, complicated by intraoperative complete heart block requiring TVP and eventual permanent pacemaker implantation    Summary:  He presents with dyspnea and 30 pound weight gain at the nursing home  His EF has improved on echo but he has moderate to severe MR  Prior weight as low as 187 pounds in March 2024  He is currently net -2.6 L with stable creatinine 1.1 but potassium 2.9  Give potassium 40 TID today followed by BID  tomorrow  Continue IV Lasix 80 twice daily  Continue Xarelto and metoprolol for A-fib  Continue spironolactone    Subjective   Chief Complaint:   Breathing is fine today. Feels like he is urinating frequently    Objective :  Temp:  [97.4 °F (36.3 °C)-98 °F (36.7 °C)] 98 °F (36.7 °C)  HR:  [64-65] 64  BP: (101-128)/(55-68) 112/68  Resp:  [17-20] 20  SpO2:  [92 %-96 %] 92 %  O2 Device: Nasal cannula  Nasal Cannula O2 Flow Rate (L/min):  [1 L/min-2 L/min] 2 L/min  Orthostatic Blood Pressures      Flowsheet Row Most Recent Value   Blood Pressure 112/68 filed at 10/22/2024 0744   Patient Position - Orthostatic VS Lying filed at 10/22/2024 0745          First Weight: Weight - Scale: 100 kg (221 lb) (10/19/24 1634)  Vitals:    10/21/24 0553 10/22/24 0300   Weight: 95.8 kg (211 lb 1.6 oz) 95.3 kg (210 lb)     Physical Exam  Vitals and nursing note reviewed.   Constitutional:       General: He is not in acute distress.     Appearance: He is well-developed.   HENT:      Head: Normocephalic and atraumatic.   Eyes:      Conjunctiva/sclera: Conjunctivae normal.   Cardiovascular:      Rate and Rhythm: Normal rate and regular rhythm.      Heart sounds: No murmur heard.  Pulmonary:      Effort: Pulmonary effort is normal. No respiratory distress.      Breath sounds: Rales present.   Abdominal:      Palpations: Abdomen is soft.      Tenderness: There is no abdominal tenderness.   Musculoskeletal:         General: No swelling.      Cervical back: Neck supple.   Skin:     General: Skin is warm and dry.      Capillary Refill: Capillary refill takes less than 2 seconds.   Neurological:      Mental Status: He is alert.   Psychiatric:         Mood and Affect: Mood normal.           Lab Results: I have reviewed the following results:  Results from last 7 days   Lab Units 10/22/24  0311 10/21/24  1145 10/20/24  0438   WBC Thousand/uL 11.59* 13.36* 11.34*   HEMOGLOBIN g/dL 11.3* 11.7* 11.3*   HEMATOCRIT % 35.3* 37.7 35.3*   PLATELETS  Thousands/uL 209 207 215     Results from last 7 days   Lab Units 10/22/24  0311 10/21/24  1145 10/20/24  0438   POTASSIUM mmol/L 2.9* 3.4* 3.4*   CHLORIDE mmol/L 97 97 101   CO2 mmol/L 33* 34* 31   BUN mg/dL 28* 25 19   CREATININE mg/dL 1.11 0.95 1.16   CALCIUM mg/dL 8.1* 8.4 8.4         Lab Results   Component Value Date    HGBA1C 13.5 (H) 08/28/2024     Lab Results   Component Value Date    TROPONINI 0.04 10/21/2021             VTE Pharmacologic Prophylaxis:   VTE Mechanical Prophylaxis:

## 2024-10-22 NOTE — ASSESSMENT & PLAN NOTE
Presents from nursing home with worsened dyspnea and low SpO2 long with 20 pound weight gain  Last echo January 2024: LVEF 35-40%.  Severely dilated left atrium.  Mild pulmonary hypertension.  TAVR functioning well.  Home diuretic: lasix 20mg BID and spironolactone 25 mg  Cotninue lasix 60 BID,   continue xarelto and metoprolol  Continue spirinolactone  Wean oxygen as tolerated  ECHO with EF 40-45%, mild global hypokinesis  Strict I's and O's. Will discuss fluid restriction with patient.    I/O last 3 completed shifts:  In: 2110 [P.O.:2110]  Out: 3525 [Urine:3525]  Net IO Since Admission: -2,696 mL [10/22/24 1217]    Weight change: -0.499 kg (-1 lb 1.6 oz)  Wt Readings from Last 3 Encounters:   10/22/24 95.3 kg (210 lb)   09/18/24 89.8 kg (198 lb)   07/16/24 89.4 kg (197 lb)

## 2024-10-22 NOTE — ASSESSMENT & PLAN NOTE
Lab Results   Component Value Date    HGBA1C 13.5 (H) 08/28/2024       Recent Labs     10/21/24  1641 10/21/24  2001 10/22/24  0742 10/22/24  1139   POCGLU 106 101 137 162*       Blood Sugar Average: Last 72 hrs:  (P) [id number] regimen: metformin, glipizide, and farxiga  Hold oral medication and place on lantus 5U in AM, humalog 5U TID with meals, SSI AC/HS

## 2024-10-22 NOTE — ASSESSMENT & PLAN NOTE
Lab Results   Component Value Date    HGBA1C 13.5 (H) 08/28/2024       Recent Labs     10/21/24  1148 10/21/24  1641 10/21/24  2001 10/22/24  0742   POCGLU 118 106 101 137       Blood Sugar Average: Last 72 hrs:  (P) 118.6  Severely uncontrolled diabetes

## 2024-10-22 NOTE — PHYSICAL THERAPY NOTE
PHYSICAL THERAPY SCREEN NOTE      Patient Name: Trevor Lyons  Today's Date: 10/22/2024       10/22/24 0921   PT Last Visit   PT Visit Date 10/22/24   Note Type   Note type Screen   Additional Comments PT orders received, chart reviewed. Per CM and NATE: Pt is able to independently ambulate but, he primarily uses a WC to self-propel d/t his dementia.. Nursing encourages him to use a RW, but he declines, opting for WC. Pt documented as 18 on [facility] and SBA for mobility at this time, appears at baseline. Pt w/ no acute PT needs, will DC PT       Sarah Montgomery, PT, DPT

## 2024-10-22 NOTE — NURSING NOTE
Pt received heart failure education information. Pt lives in a nursing facility, and does not need scale.

## 2024-10-22 NOTE — ASSESSMENT & PLAN NOTE
Wt Readings from Last 3 Encounters:   10/22/24 95.3 kg (210 lb)   09/18/24 89.8 kg (198 lb)   07/16/24 89.4 kg (197 lb)   Has an ischemic cardiomyopathy with EF 35 to 40% as of echo 1/24  10/21/2024 echo: EF 40-45%, severe left atrial dilation, normal functioning TAVR bioprosthetic valve, moderate to severe mitral insufficiency  Home diuretic furosemide 20 mg twice a day and spironolactone 25 mg daily  Presented with shortness of breath, hypoxemia, 20 pound weight gain  Lives in a nursing facility

## 2024-10-22 NOTE — ASSESSMENT & PLAN NOTE
3/23/2021 TAVR with 34 mm [employer] evolute pro valve, complicated by intraoperative complete heart block requiring TVP and eventual permanent pacemaker implantation

## 2024-10-22 NOTE — PROGRESS NOTES
Progress Note - Hospitalist   Name: Trevor Lyons 84 y.o. male I MRN: [id number]  Unit/Bed#: [facility] Tom-01 I Date of Admission: 10/19/2024   Date of Service: 10/22/2024 I Hospital Day: 3    Assessment & Plan  Acute on chronic systolic heart failure (HCC)  Presents from nursing home with worsened dyspnea and low SpO2 long with 20 pound weight gain  Last echo January 2024: LVEF 35-40%.  Severely dilated left atrium.  Mild pulmonary hypertension.  TAVR functioning well.  Home diuretic: lasix 20mg BID and spironolactone 25 mg  Cotninue lasix 60 BID,   continue xarelto and metoprolol  Continue spirinolactone  Wean oxygen as tolerated  ECHO with EF 40-45%, mild global hypokinesis  Strict I's and O's. Will discuss fluid restriction with patient.    I/O last 3 completed shifts:  In: 2110 [P.O.:2110]  Out: 3525 [Urine:3525]  Net IO Since Admission: -2,696 mL [10/22/24 1217]    Weight change: -0.499 kg (-1 lb 1.6 oz)  Wt Readings from Last 3 Encounters:   10/22/24 95.3 kg (210 lb)   09/18/24 89.8 kg (198 lb)   07/16/24 89.4 kg (197 lb)     Hypokalemia  K 2.9 today,  Monitor in the setting of diuretics  Continue to monitor and replete as needed  Acute respiratory insufficiency  SpO2 87% on room air  Improved on 1 L nasal cannula  Suspect secondary to acute on chronic systolic heart failure  Wean oxygen as able, does not use oxygen at home  Anemia  Hgb 11.4 on admit  Baseline Cr 10-12   Trend CBC   SIRS (systemic inflammatory response syndrome) ([facility])  SIRS criteria: Tachypnea and leukocytosis  Initially treated with antibiotics for possible atypical pneumonia in the ED, however procalcitonin is negative and patient examines more like acute on chronic systolic heart failure and denies any fever or productive cough   Hold on further antibiotics at this time  If morning procalcitonin increases-would start ceftriaxone   Follow-up blood cultures  COPD (chronic obstructive pulmonary disease) ([facility])  Home regimen: Trelegy daily and  albuterol as needed  No expiratory wheezing appreciated on admission, do not suspect acute exacerbation at this time  Continue home regimen  Type 2 diabetes mellitus without complication, without long-term current use of insulin (HCC)  Lab Results   Component Value Date    HGBA1C 13.5 (H) 08/28/2024       Recent Labs     10/21/24  1641 10/21/24  2001 10/22/24  0742 10/22/24  1139   POCGLU 106 101 137 162*       Blood Sugar Average: Last 72 hrs:  (P) [id number] regimen: metformin, glipizide, and farxiga  Hold oral medication and place on lantus 5U in AM, humalog 5U TID with meals, SSI AC/HS  Coronary artery disease involving native coronary artery of native heart without angina pectoris  CAD status post PCI and CABG  Continue home beta-blocker, statin, and Xarelto  Persistent atrial fibrillation (HCC)  RC: Metoprolol succinate 25 Mg daily  AC: Xarelto  Rate controlled, v paced  Cardiac pacemaker  [employer] s/p SSS   Last device interrogation 07/2024 showed normal device function   Pulmonary embolism (HCC)  Continue home Xarelto   CTA yesterday with no signs of PE  Pulmonary hypertension (HCC)  Most recent echo January 2024 showing mild pulmonary hypertension with RSVP 39 mmHg  Continue home medications  Acquired hypothyroidism  TSH elevated at 8.358  Increase synthroid to 125mcg daily   Recheck TSH in 6-8 weeks   Depression with anxiety  Continue home buspar, cymbalta, and klonopin   History of transcatheter aortic valve replacement (TAVR)      VTE Pharmacologic Prophylaxis: VTE Score: 7 High Risk (Score >/= 5) - Pharmacological DVT Prophylaxis Ordered: rivaroxaban (Xarelto). Sequential Compression Devices Ordered.    Mobility:   Basic Mobility Inpatient Raw Score: 19  JH-HLM Goal: 6: Walk 10 steps or more  JH-HLM Achieved: 6: Walk 10 steps or more  JH-HLM Goal NOT achieved. Continue with multidisciplinary rounding and encourage appropriate mobility to improve upon JH-HLM goals.    Patient Centered  Rounds: I performed bedside rounds with nursing staff today.   Discussions with Specialists or Other Care Team Provider: cardiology    Education and Discussions with Family / Patient: Patient declined call to .     Current Length of Stay: 3 day(s)  Current Patient Status: Inpatient   Certification Statement: The patient will continue to require additional inpatient hospital stay due to ongoing treatment for CHF  Discharge Plan: Anticipate discharge in 48 hrs to group home.    Code Status: Level 1 - Full Code    Subjective   Seen at bedisde  Comfortable  No acute complaints at this time  Continue to treat for HFrEF    Objective :  Temp:  [97.4 °F (36.3 °C)-98 °F (36.7 °C)] 98 °F (36.7 °C)  HR:  [64-65] 64  BP: (101-128)/(55-68) 112/68  Resp:  [17-20] 20  SpO2:  [92 %-96 %] 92 %  O2 Device: Nasal cannula  Nasal Cannula O2 Flow Rate (L/min):  [1 L/min-2 L/min] 2 L/min    Body mass index is 31.93 kg/m².     Input and Output Summary (last 24 hours):     Intake/Output Summary (Last 24 hours) at 10/22/2024 1217  Last data filed at 10/22/2024 1110  Gross per 24 hour   Intake 1720 ml   Output 2650 ml   Net -930 ml       Physical Exam  Vitals and nursing note reviewed.   Constitutional:       General: He is not in acute distress.     Appearance: He is well-developed.      Comments: elderly male resting in chair   HENT:      Head: Normocephalic and atraumatic.   Eyes:      Conjunctiva/sclera: Conjunctivae normal.   Cardiovascular:      Rate and Rhythm: Normal rate and regular rhythm.      Heart sounds: No murmur heard.  Pulmonary:      Effort: Pulmonary effort is normal. No respiratory distress.      Breath sounds: Normal breath sounds.   Abdominal:      Palpations: Abdomen is soft.      Tenderness: There is no abdominal tenderness.   Musculoskeletal:         General: No swelling.      Cervical back: Neck supple.      Right lower leg: Edema present.      Left lower leg: Edema present.   Skin:     General: Skin is  warm and dry.      Capillary Refill: Capillary refill takes less than 2 seconds.   Neurological:      Mental Status: He is alert.   Psychiatric:         Mood and Affect: Mood normal.           Lines/Drains:        Telemetry:  Telemetry Orders (From admission, onward)               24 Hour Telemetry Monitoring  Continuous x 24 Hours (Telem)        Question:  Reason for 24 Hour Telemetry  Answer:  Decompensated CHF- and any one of the following: continuous diuretic infusion or total diuretic dose >200 mg daily, associated electrolyte derangement (I.e. K < 3.0), ionotropic drip (continuous infusion), hx of ventricular arrhythmia, or new EF < 35%                                Lab Results: I have reviewed the following results:   Results from last 7 days   Lab Units 10/22/24  0311 10/20/24  0438 10/19/24  1641   WBC Thousand/uL 11.59*   < > 12.42*   HEMOGLOBIN g/dL 11.3*   < > 11.4*   HEMATOCRIT % 35.3*   < > 36.7   PLATELETS Thousands/uL 209   < > 217   SEGS PCT %  --   --  51   LYMPHO PCT %  --   --  37   MONO PCT %  --   --  10   EOS PCT %  --   --  1    < > = values in this interval not displayed.     Results from last 7 days   Lab Units 10/22/24  0311 10/20/24  0438 10/19/24  1641   SODIUM mmol/L 140   < > 136   POTASSIUM mmol/L 2.9*   < > 4.1   CHLORIDE mmol/L 97   < > 100   CO2 mmol/L 33*   < > 28   BUN mg/dL 28*   < > 18   CREATININE mg/dL 1.11   < > 1.21   ANION GAP mmol/L 10   < > 8   CALCIUM mg/dL 8.1*   < > 9.0   ALBUMIN g/dL  --   --  3.9   TOTAL BILIRUBIN mg/dL  --   --  0.66   ALK PHOS U/L  --   --  99   ALT U/L  --   --  16   AST U/L  --   --  21   GLUCOSE RANDOM mg/dL 112   < > 128    < > = values in this interval not displayed.         Results from last 7 days   Lab Units 10/22/24  1139 10/22/24  0742 10/21/24  2001 10/21/24  1641 10/21/24  1148 10/21/24  0801 10/20/24  2050 10/20/24  1556 10/20/24  1127 10/20/24  0800 10/20/24  0021   POC GLUCOSE mg/dl 162* 137 101 106 118 80 74 138 124 113 195*          Results from last 7 days   Lab Units 10/19/24  2126   LACTIC ACID mmol/L 1.2   PROCALCITONIN ng/ml 0.08       Recent Cultures (last 7 days):   Results from last 7 days   Lab Units 10/19/24  2116   BLOOD CULTURE  No Growth at 48 hrs.  No Growth at 48 hrs.             Last 24 Hours Medication List:     Current Facility-Administered Medications:     acetaminophen (TYLENOL) tablet 650 mg, Q4H PRN    albuterol inhalation solution 2.5 mg, Q4H PRN    atorvastatin (LIPITOR) tablet 20 mg, QAM    busPIRone (BUSPAR) tablet 7.5 mg, BID    clonazePAM (KlonoPIN) tablet 1 mg, HS PRN    donepezil (ARICEPT) tablet 10 mg, QAM    DULoxetine (CYMBALTA) delayed release capsule 30 mg, Daily    ferrous sulfate tablet 325 mg, Daily With Breakfast    Fluticasone Furoate-Vilanterol 100-25 mcg/actuation 1 puff, Daily **AND** umeclidinium 62.5 mcg/actuation inhaler AEPB 1 puff, Daily    folic acid (FOLVITE) tablet 1,000 mcg, QAM    furosemide (LASIX) injection 80 mg, BID (diuretic)    influenza vaccine, high-dose (Fluzone High-Dose) IM injection 0.5 mL, Prior to discharge    insulin glargine (LANTUS) subcutaneous injection 5 Units 0.05 mL, Daily With Breakfast    insulin lispro (HumALOG/ADMELOG) 100 units/mL subcutaneous injection 1-5 Units, HS    insulin lispro (HumALOG/ADMELOG) 100 units/mL subcutaneous injection 1-6 Units, TID AC **AND** Fingerstick Glucose (POCT), 4x Daily AC and at bedtime    insulin lispro (HumALOG/ADMELOG) 100 units/mL subcutaneous injection 5 Units, TID With Meals    levothyroxine tablet 125 mcg, Early Morning    lidocaine (LIDODERM) 5 % patch 1 patch, Daily    loratadine (CLARITIN) tablet 10 mg, QAM    magnesium Oxide (MAG-OX) tablet 400 mg, BID    metoprolol succinate (TOPROL-XL) 24 hr tablet 25 mg, QAM    pantoprazole (PROTONIX) EC tablet 40 mg, BID AC    potassium chloride (Klor-Con M20) CR tablet 40 mEq, TID With Meals **FOLLOWED BY** [START ON 10/23/2024] potassium chloride (Klor-Con M20) CR tablet 40 mEq,  BID    rivaroxaban (XARELTO) tablet 20 mg, Daily With Dinner    spironolactone (ALDACTONE) tablet 25 mg, Daily    thiamine tablet 100 mg, Daily    Administrative Statements   Today, Patient Was Seen By: Missy Crane DO      **Please Note: This note may have been constructed using a voice recognition system.**

## 2024-10-23 LAB
ANION GAP SERPL CALCULATED.3IONS-SCNC: 8 MMOL/L (ref 4–13)
BUN SERPL-MCNC: 23 MG/DL (ref 5–25)
CALCIUM SERPL-MCNC: 8.5 MG/DL (ref 8.4–10.2)
CHLORIDE SERPL-SCNC: 95 MMOL/L (ref 96–108)
CO2 SERPL-SCNC: 33 MMOL/L (ref 21–32)
CREAT SERPL-MCNC: 1.02 MG/DL (ref 0.6–1.3)
ERYTHROCYTE [DISTWIDTH] IN BLOOD BY AUTOMATED COUNT: 14.4 % (ref 11.6–15.1)
GFR SERPL CREATININE-BSD FRML MDRD: 66 ML/MIN/1.73SQ M
GLUCOSE SERPL-MCNC: 128 MG/DL (ref 65–140)
GLUCOSE SERPL-MCNC: 135 MG/DL (ref 65–140)
GLUCOSE SERPL-MCNC: 142 MG/DL (ref 65–140)
GLUCOSE SERPL-MCNC: 203 MG/DL (ref 65–140)
GLUCOSE SERPL-MCNC: 238 MG/DL (ref 65–140)
HCT VFR BLD AUTO: 38.3 % (ref 36.5–49.3)
HGB BLD-MCNC: 12 G/DL (ref 12–17)
MCH RBC QN AUTO: 29.6 PG (ref 26.8–34.3)
MCHC RBC AUTO-ENTMCNC: 31.3 G/DL (ref 31.4–37.4)
MCV RBC AUTO: 94 FL (ref 82–98)
PLATELET # BLD AUTO: 228 THOUSANDS/UL (ref 149–390)
PMV BLD AUTO: 8.7 FL (ref 8.9–12.7)
POTASSIUM SERPL-SCNC: 4 MMOL/L (ref 3.5–5.3)
RBC # BLD AUTO: 4.06 MILLION/UL (ref 3.88–5.62)
SODIUM SERPL-SCNC: 136 MMOL/L (ref 135–147)
WBC # BLD AUTO: 11.96 THOUSAND/UL (ref 4.31–10.16)

## 2024-10-23 PROCEDURE — 99232 SBSQ HOSP IP/OBS MODERATE 35: CPT | Performed by: INTERNAL MEDICINE

## 2024-10-23 PROCEDURE — 80048 BASIC METABOLIC PNL TOTAL CA: CPT | Performed by: PHYSICIAN ASSISTANT

## 2024-10-23 PROCEDURE — 85027 COMPLETE CBC AUTOMATED: CPT | Performed by: PHYSICIAN ASSISTANT

## 2024-10-23 PROCEDURE — 82948 REAGENT STRIP/BLOOD GLUCOSE: CPT

## 2024-10-23 PROCEDURE — 99232 SBSQ HOSP IP/OBS MODERATE 35: CPT | Performed by: HOSPITALIST

## 2024-10-23 RX ORDER — LANOLIN ALCOHOL/MO/W.PET/CERES
3 CREAM (GRAM) TOPICAL ONCE
Status: COMPLETED | OUTPATIENT
Start: 2024-10-23 | End: 2024-10-23

## 2024-10-23 RX ADMIN — PANTOPRAZOLE SODIUM 40 MG: 40 TABLET, DELAYED RELEASE ORAL at 06:50

## 2024-10-23 RX ADMIN — Medication 3 MG: at 21:48

## 2024-10-23 RX ADMIN — INSULIN LISPRO 2 UNITS: 100 INJECTION, SOLUTION INTRAVENOUS; SUBCUTANEOUS at 12:49

## 2024-10-23 RX ADMIN — FERROUS SULFATE TAB 325 MG (65 MG ELEMENTAL FE) 325 MG: 325 (65 FE) TAB at 08:42

## 2024-10-23 RX ADMIN — FUROSEMIDE 80 MG: 10 INJECTION, SOLUTION INTRAVENOUS at 08:42

## 2024-10-23 RX ADMIN — ATORVASTATIN CALCIUM 20 MG: 20 TABLET, FILM COATED ORAL at 08:42

## 2024-10-23 RX ADMIN — DULOXETINE HYDROCHLORIDE 30 MG: 30 CAPSULE, DELAYED RELEASE ORAL at 08:42

## 2024-10-23 RX ADMIN — PANTOPRAZOLE SODIUM 40 MG: 40 TABLET, DELAYED RELEASE ORAL at 17:17

## 2024-10-23 RX ADMIN — RIVAROXABAN 20 MG: 20 TABLET, FILM COATED ORAL at 17:04

## 2024-10-23 RX ADMIN — CLONAZEPAM 1 MG: 1 TABLET ORAL at 21:49

## 2024-10-23 RX ADMIN — FOLIC ACID 1000 MCG: 1 TABLET ORAL at 08:42

## 2024-10-23 RX ADMIN — FUROSEMIDE 80 MG: 10 INJECTION, SOLUTION INTRAVENOUS at 17:04

## 2024-10-23 RX ADMIN — POTASSIUM CHLORIDE 40 MEQ: 1500 TABLET, EXTENDED RELEASE ORAL at 17:04

## 2024-10-23 RX ADMIN — Medication 100 MG: at 08:42

## 2024-10-23 RX ADMIN — DONEPEZIL HYDROCHLORIDE 10 MG: 5 TABLET ORAL at 08:42

## 2024-10-23 RX ADMIN — METOPROLOL SUCCINATE 25 MG: 25 TABLET, EXTENDED RELEASE ORAL at 08:41

## 2024-10-23 RX ADMIN — SPIRONOLACTONE 25 MG: 25 TABLET ORAL at 08:42

## 2024-10-23 RX ADMIN — LIDOCAINE 5% 1 PATCH: 700 PATCH TOPICAL at 08:47

## 2024-10-23 RX ADMIN — UMECLIDINIUM 1 PUFF: 62.5 AEROSOL, POWDER ORAL at 08:48

## 2024-10-23 RX ADMIN — BUSPIRONE HYDROCHLORIDE 7.5 MG: 15 TABLET ORAL at 08:41

## 2024-10-23 RX ADMIN — Medication 3 MG: at 01:34

## 2024-10-23 RX ADMIN — LORATADINE 10 MG: 10 TABLET ORAL at 08:42

## 2024-10-23 RX ADMIN — Medication 400 MG: at 08:42

## 2024-10-23 RX ADMIN — INSULIN LISPRO 5 UNITS: 100 INJECTION, SOLUTION INTRAVENOUS; SUBCUTANEOUS at 08:48

## 2024-10-23 RX ADMIN — INSULIN GLARGINE 5 UNITS: 100 INJECTION, SOLUTION SUBCUTANEOUS at 08:48

## 2024-10-23 RX ADMIN — BUSPIRONE HYDROCHLORIDE 7.5 MG: 15 TABLET ORAL at 17:04

## 2024-10-23 RX ADMIN — ACETAMINOPHEN 650 MG: 325 TABLET, FILM COATED ORAL at 05:19

## 2024-10-23 RX ADMIN — Medication 400 MG: at 17:04

## 2024-10-23 RX ADMIN — FLUTICASONE FUROATE AND VILANTEROL TRIFENATATE 1 PUFF: 100; 25 POWDER RESPIRATORY (INHALATION) at 08:48

## 2024-10-23 RX ADMIN — INSULIN LISPRO 5 UNITS: 100 INJECTION, SOLUTION INTRAVENOUS; SUBCUTANEOUS at 17:21

## 2024-10-23 RX ADMIN — LEVOTHYROXINE SODIUM 125 MCG: 25 TABLET ORAL at 05:20

## 2024-10-23 RX ADMIN — INSULIN LISPRO 5 UNITS: 100 INJECTION, SOLUTION INTRAVENOUS; SUBCUTANEOUS at 12:50

## 2024-10-23 RX ADMIN — POTASSIUM CHLORIDE 40 MEQ: 1500 TABLET, EXTENDED RELEASE ORAL at 08:42

## 2024-10-23 NOTE — ASSESSMENT & PLAN NOTE
Wt Readings from Last 3 Encounters:   10/23/24 95.3 kg (210 lb)   09/18/24 89.8 kg (198 lb)   07/16/24 89.4 kg (197 lb)   Has an ischemic cardiomyopathy with EF 35 to 40% as of echo 1/24  10/21/2024 echo: EF 40-45%, severe left atrial dilation, normal functioning TAVR bioprosthetic valve, moderate to severe mitral insufficiency  Home diuretic furosemide 20 mg twice a day and spironolactone 25 mg daily  Presented with shortness of breath, hypoxemia, 20 pound weight gain  Lives in a nursing facility

## 2024-10-23 NOTE — ASSESSMENT & PLAN NOTE
Presents from nursing home with worsened dyspnea and low SpO2 long with 20 pound weight gain  Last echo January 2024: LVEF 35-40%.  Severely dilated left atrium.  Mild pulmonary hypertension.  TAVR functioning well.  Home diuretic: lasix 20mg BID and spironolactone 25 mg  Cotninue lasix 60 BID,   continue xarelto and metoprolol  Continue spirinolactone  Wean oxygen as tolerated  ECHO with EF 40-45%, mild global hypokinesis  Strict I's and O's. Will discuss fluid restriction with patient.    I/O last 3 completed shifts:  In: 2106 [P.O.:2106]  Out: 4280 [Urine:4280]  Net IO Since Admission: -3,990 mL [10/23/24 1109]    Weight change: 0 kg (0 lb)  Wt Readings from Last 3 Encounters:   10/23/24 95.3 kg (210 lb)   09/18/24 89.8 kg (198 lb)   07/16/24 89.4 kg (197 lb)

## 2024-10-23 NOTE — PROGRESS NOTES
Progress Note - Cardiology   Name: Trevor Lyons 84 y.o. male I MRN: [id number]  Unit/Bed#: MS Tom-01 I Date of Admission: 10/19/2024   Date of Service: 10/23/2024 I Hospital Day: 4     Assessment & Plan  Acute on chronic systolic heart failure (HCC)  Wt Readings from Last 3 Encounters:   10/23/24 95.3 kg (210 lb)   09/18/24 89.8 kg (198 lb)   07/16/24 89.4 kg (197 lb)   Has an ischemic cardiomyopathy with EF 35 to 40% as of echo 1/24  10/21/2024 echo: EF 40-45%, severe left atrial dilation, normal functioning TAVR bioprosthetic valve, moderate to severe mitral insufficiency  Home diuretic furosemide 20 mg twice a day and spironolactone 25 mg daily  Presented with shortness of breath, hypoxemia, 20 pound weight gain  Lives in a nursing facility  Persistent atrial fibrillation (HCC)  Has been in A-fib based on EKGs in April, July, and now  No high rate episodes per recent device check 7/24  Primarily V-paced  Anticoagulation is Xarelto  Also on metoprolol succinate 25 mg daily  Cardiac pacemaker  [employer] dual-chamber permanent pacemaker with 98% ventricular pacing as of 7/24.  Coronary artery disease involving native coronary artery of native heart without angina pectoris  With prior CABG/stenting  LAD mid occlusion, RCA with severe diffuse disease, left circumflex stent to a 90% lesion 2020  LIMA to LAD patent, radial graft to PDA patent, SVG to OM patent in 2020  History of transcatheter aortic valve replacement (TAVR)  3/23/2021 TAVR with 34 mm [employer]onic evolute pro valve, complicated by intraoperative complete heart block requiring TVP and eventual permanent pacemaker implantation    Summary:  Renal fx and electrolytes stable on diuretics  Continue IV lasix 80 BID  Hopeful transition to PO diuretic in 24-48 hrs  Continue potassium 40 BID   Continue Xarelto  Continue Toprol XL  Continue spironolactone but need AM labs to ensure stable K+    Subjective   Chief Complaint:   Feeling tired today  Breathing  is fine  No chest pain    Objective :  Temp:  [97.3 °F (36.3 °C)-97.7 °F (36.5 °C)] 97.7 °F (36.5 °C)  HR:  [65-66] 65  BP: (112-120)/(60-67) 112/60  Resp:  [18-20] 20  SpO2:  [87 %-97 %] 94 %  O2 Device: Nasal cannula  Nasal Cannula O2 Flow Rate (L/min):  [2 L/min-3 L/min] 3 L/min  Orthostatic Blood Pressures      Flowsheet Row Most Recent Value   Blood Pressure 112/60 filed at 10/23/2024 0737   Patient Position - Orthostatic VS Sitting filed at 10/23/2024 0737          First Weight: Weight - Scale: 100 kg (221 lb) (10/19/24 1634)  Vitals:    10/22/24 0300 10/23/24 0600   Weight: 95.3 kg (210 lb) 95.3 kg (210 lb)     Physical Exam  Constitutional:       Appearance: He is well-developed.   HENT:      Head: Normocephalic and atraumatic.   Eyes:      Pupils: Pupils are equal, round, and reactive to light.   Cardiovascular:      Rate and Rhythm: Normal rate and regular rhythm.      Heart sounds: No murmur heard.     No friction rub. No gallop.   Pulmonary:      Effort: Pulmonary effort is normal. No respiratory distress.      Breath sounds: Normal breath sounds. No wheezing or rales.   Abdominal:      General: Bowel sounds are normal. There is no distension.      Palpations: Abdomen is soft.      Tenderness: There is no abdominal tenderness.   Musculoskeletal:         General: No deformity. Normal range of motion.      Cervical back: Normal range of motion and neck supple.   Skin:     General: Skin is warm and dry.   Neurological:      Mental Status: He is alert and oriented to person, place, and time.   Psychiatric:         Behavior: Behavior normal.           Lab Results: I have reviewed the following results:  Results from last 7 days   Lab Units 10/23/24  0920 10/22/24  0311 10/21/24  1145   WBC Thousand/uL 11.96* 11.59* 13.36*   HEMOGLOBIN g/dL 12.0 11.3* 11.7*   HEMATOCRIT % 38.3 35.3* 37.7   PLATELETS Thousands/uL 228 209 207     Results from last 7 days   Lab Units 10/23/24  0920 10/22/24  0311 10/21/24  1145    POTASSIUM mmol/L 4.0 2.9* 3.4*   CHLORIDE mmol/L 95* 97 97   CO2 mmol/L 33* 33* 34*   BUN mg/dL 23 28* 25   CREATININE mg/dL 1.02 1.11 0.95   CALCIUM mg/dL 8.5 8.1* 8.4         Lab Results   Component Value Date    HGBA1C 13.5 (H) 08/28/2024     Lab Results   Component Value Date    TROPONINI 0.04 10/21/2021             VTE Pharmacologic Prophylaxis:   VTE Mechanical Prophylaxis:

## 2024-10-23 NOTE — ASSESSMENT & PLAN NOTE
Lab Results   Component Value Date    HGBA1C 13.5 (H) 08/28/2024       Recent Labs     10/22/24  1558 10/22/24  2107 10/23/24  0735 10/23/24  1106   POCGLU 184* 162* 128 203*       Blood Sugar Average: Last 72 hrs:  (P) 135Home regimen: metformin, glipizide, and farxiga  Hold oral medication and place on lantus 5U in AM, humalog 5U TID with meals, SSI AC/HS

## 2024-10-23 NOTE — CASE MANAGEMENT
Case Management Discharge Planning Note    Patient name Trevor Lyons  Location /-01 MRN [id number]  : 1940 Date 10/23/2024       Current Admission Date: 10/19/2024  Current Admission Diagnosis:Acute on chronic systolic heart failure ([facility])   Patient Active Problem List    Diagnosis Date Noted Date Diagnosed    History of transcatheter aortic valve replacement (TAVR) 10/22/2024     Hypokalemia 10/22/2024     Acute respiratory insufficiency 10/19/2024     Anemia 10/19/2024     SIRS (systemic inflammatory response syndrome) ([facility]) 10/19/2024     Acute on chronic systolic heart failure ([facility]) 2024     Moderate Alzheimer's dementia, unspecified timing of dementia onset, unspecified whether behavioral, psychotic, or mood disturbance or anxiety ([facility]) 2024     Depression, recurrent ([facility]) 2024     Coronary artery disease involving native coronary artery of native heart without angina pectoris 2024     S/P CABG (coronary artery bypass graft) 2024     S/P TAVR (transcatheter aortic valve replacement) 2024     DDD (degenerative disc disease), lumbosacral 2024     COPD (chronic obstructive pulmonary disease) ([facility]) 2024     Recurrent falls 2024     Persistent atrial fibrillation ([facility]) 2024     Chronic combined systolic and diastolic CHF (congestive heart failure) ([facility]) 2024     Depression with anxiety 2024     NSVT (nonsustained ventricular tachycardia) ([facility]) 2024     Orthostatic hypotension 2024     Pulmonary embolism ([facility]) 2024     Cardiac pacemaker 2024     Alzheimer disease ([facility]) 2024     Pulmonary hypertension ([facility]) 2024     Sick sinus syndrome ([facility]) 2024     Dextroscoliosis 2024     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 2024     Mixed hyperlipidemia 2024     Type 2 diabetes mellitus without complication, without long-term current use of insulin ([facility]) 2024     Osteopenia of  multiple sites 03/12/2024     Chronic left-sided low back pain with left-sided sciatica 03/12/2024     Aneurysm of ascending aorta without rupture (HCC) 03/12/2024     Acquired hypothyroidism 11/10/2022     Bilateral carotid bruits 11/11/2019       LOS (days): 4  Geometric Mean LOS (GMLOS) (days): 3.9  Days to GMLOS:0.3     OBJECTIVE:  Risk of Unplanned Readmission Score: 18.24         Current admission status: Inpatient   Preferred Pharmacy:   [id number] - [place], [place] - [address]  [address] [address]  Phone: [phone] Fax: [phone]    [facility] - [place], [place] - [address]  [address] [address]  Phone: [phone] Fax: [phone]    Primary Care Provider: Ira Borden DO    Primary Insurance: MEDICARE MISC REPLACEMENT  Secondary Insurance:     DISCHARGE DETAILS:                                Requested Home Health Care         Is the patient interested in HHC at discharge?: Yes  Home Health Discipline requested:: Nursing  Home Health Agency Name:: [facility] External Referral Reason (only applicable if external HHA name selected): Patient has established relationship with provider  Home Health Follow-Up Provider:: PCP  Home Health Services Needed:: COPD Management, Heart Failure Management  Homebound Criteria Met:: Uses an Assist Device (i.e. cane, walker, etc), Requires the Assistance of Another Person for Safe Ambulation or to Leave the Home  Supporting Clincal Findings:: Limited Endurance, Fatigues Easliy in Short Distances, Dyspnea with Exertion         Other Referral/Resources/Interventions Provided:  Interventions: HHC  Referral Comments: Spoke with John, nurse at [facility], and informed John that pt is a potential discharge in 24-48hrs, that pt does not have home 02, and had [facility] in the past. RENAE referral made to [facility] in [place]. Awaiting accepting.         Treatment Team Recommendation: Home with Home  Health Care  Discharge Destination Plan:: Home with Home Health Care

## 2024-10-23 NOTE — PROGRESS NOTES
Patient:    MRN:  [id number]    Livanin Request ID:  [id number]    Level of care reserved:    Partner Reserved:    Clinical needs requested:    Geography searched:  [id number]    Start of Service:    Request sent:  12:22pm EDT on 10/23/2024 by Maurice Alexander    Partner reserved:  by Maurice Alexander    Choice list shared:  1:29pm EDT on 10/23/2024 by Maurice Alexander

## 2024-10-23 NOTE — CASE MANAGEMENT
Case Management Discharge Planning Note    Patient name Trevor Lyons  Location /-01 MRN [id number]  : 1940 Date 10/23/2024       Current Admission Date: 10/19/2024  Current Admission Diagnosis:Acute on chronic systolic heart failure ([facility])   Patient Active Problem List    Diagnosis Date Noted Date Diagnosed    History of transcatheter aortic valve replacement (TAVR) 10/22/2024     Hypokalemia 10/22/2024     Acute respiratory insufficiency 10/19/2024     Anemia 10/19/2024     SIRS (systemic inflammatory response syndrome) ([facility]) 10/19/2024     Acute on chronic systolic heart failure ([facility]) 2024     Moderate Alzheimer's dementia, unspecified timing of dementia onset, unspecified whether behavioral, psychotic, or mood disturbance or anxiety ([facility]) 2024     Depression, recurrent ([facility]) 2024     Coronary artery disease involving native coronary artery of native heart without angina pectoris 2024     S/P CABG (coronary artery bypass graft) 2024     S/P TAVR (transcatheter aortic valve replacement) 2024     DDD (degenerative disc disease), lumbosacral 2024     COPD (chronic obstructive pulmonary disease) ([facility]) 2024     Recurrent falls 2024     Persistent atrial fibrillation ([facility]) 2024     Chronic combined systolic and diastolic CHF (congestive heart failure) ([facility]) 2024     Depression with anxiety 2024     NSVT (nonsustained ventricular tachycardia) ([facility]) 2024     Orthostatic hypotension 2024     Pulmonary embolism ([facility]) 2024     Cardiac pacemaker 2024     Alzheimer disease ([facility]) 2024     Pulmonary hypertension ([facility]) 2024     Sick sinus syndrome ([facility]) 2024     Dextroscoliosis 2024     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 2024     Mixed hyperlipidemia 2024     Type 2 diabetes mellitus without complication, without long-term current use of insulin ([facility]) 2024     Osteopenia of  multiple sites 03/12/2024     Chronic left-sided low back pain with left-sided sciatica 03/12/2024     Aneurysm of ascending aorta without rupture (HCC) 03/12/2024     Acquired hypothyroidism 11/10/2022     Bilateral carotid bruits 11/11/2019       LOS (days): 4  Geometric Mean LOS (GMLOS) (days): 3.9  Days to GMLOS:0.2     OBJECTIVE:  Risk of Unplanned Readmission Score: 18.24         Current admission status: Inpatient   Preferred Pharmacy:   [id number] - [place], [place] - [address] [place] [address]  Phone: [phone] Fax: [phone]    [facility] - [place], [place] - [address] [place], [place] [address]  Phone: [phone] Fax: [phone]    Primary Care Provider: Ira Borden DO    Primary Insurance: MEDICARE MISC REPLACEMENT  Secondary Insurance:     DISCHARGE DETAILS:                                          Other Referral/Resources/Interventions Provided:  Interventions: [facility]  Referral Comments: [facility] reserved in [place]. Per Gerard, referral has been accepted for the [facility] senior living office.         Treatment Team Recommendation: Home with Home Health Care  Discharge Destination Plan:: Home with Home Health Care                                IMM Given (Date):: 10/23/24  IMM Given to:: Patient   IMM reviewed with patient, patient agrees with discharge determination.

## 2024-10-23 NOTE — PLAN OF CARE

## 2024-10-23 NOTE — PROGRESS NOTES
Progress Note - Hospitalist   Name: Trevor Lyons 84 y.o. male I MRN: [id number]  Unit/Bed#: [facility]-01 I Date of Admission: 10/19/2024   Date of Service: 10/23/2024 I Hospital Day: 4     Assessment & Plan  Acute on chronic systolic heart failure (HCC)  Presents from nursing home with worsened dyspnea and low SpO2 long with 20 pound weight gain  Last echo January 2024: LVEF 35-40%.  Severely dilated left atrium.  Mild pulmonary hypertension.  TAVR functioning well.  Home diuretic: lasix 20mg BID and spironolactone 25 mg  Cotninue lasix 60 BID,   continue xarelto and metoprolol  Continue spirinolactone  Wean oxygen as tolerated  ECHO with EF 40-45%, mild global hypokinesis  Strict I's and O's. Will discuss fluid restriction with patient.    I/O last 3 completed shifts:  In: 2106 [P.O.:2106]  Out: 4280 [Urine:4280]  Net IO Since Admission: -3,990 mL [10/23/24 1109]    Weight change: 0 kg (0 lb)  Wt Readings from Last 3 Encounters:   10/23/24 95.3 kg (210 lb)   09/18/24 89.8 kg (198 lb)   07/16/24 89.4 kg (197 lb)     Hypokalemia  Monitor in the setting of diuretics  Continue to monitor and replete as needed  Acute respiratory insufficiency  SpO2 87% on room air  Improved on 1 L nasal cannula  Suspect secondary to acute on chronic systolic heart failure  Wean oxygen as able, does not use oxygen at home  Anemia  Hgb 11.4 on admit  Baseline Cr 10-12   Trend CBC   SIRS (systemic inflammatory response syndrome) ([facility])  SIRS criteria: Tachypnea and leukocytosis  Initially treated with antibiotics for possible atypical pneumonia in the ED, however procalcitonin is negative and patient examines more like acute on chronic systolic heart failure and denies any fever or productive cough   Hold on further antibiotics at this time  If morning procalcitonin increases-would start ceftriaxone   Follow-up blood cultures  COPD (chronic obstructive pulmonary disease) ([facility])  Home regimen: Trelegy daily and albuterol as needed  No  expiratory wheezing appreciated on admission, do not suspect acute exacerbation at this time  Continue home regimen  Type 2 diabetes mellitus without complication, without long-term current use of insulin (HCC)  Lab Results   Component Value Date    HGBA1C 13.5 (H) 08/28/2024       Recent Labs     10/22/24  1558 10/22/24  2107 10/23/24  0735 10/23/24  1106   POCGLU 184* 162* 128 203*       Blood Sugar Average: Last 72 hrs:  (P) 135Home regimen: metformin, glipizide, and farxiga  Hold oral medication and place on lantus 5U in AM, humalog 5U TID with meals, SSI AC/HS  Coronary artery disease involving native coronary artery of native heart without angina pectoris  CAD status post PCI and CABG  Continue home beta-blocker, statin, and Xarelto  Persistent atrial fibrillation (HCC)  RC: Metoprolol succinate 25 Mg daily  AC: Xarelto  Rate controlled, v paced  Cardiac pacemaker  [place] s/p SSS   Last device interrogation 07/2024 showed normal device function   Pulmonary embolism (HCC)  Continue home Xarelto   CTA yesterday with no signs of PE  Pulmonary hypertension (HCC)  Most recent echo January 2024 showing mild pulmonary hypertension with RSVP 39 mmHg  Continue home medications  Acquired hypothyroidism  TSH elevated at 8.358  Increase synthroid to 125mcg daily   Recheck TSH in 6-8 weeks   Depression with anxiety  Continue home buspar, cymbalta, and klonopin   History of transcatheter aortic valve replacement (TAVR)     VTE  Prophylaxis:   Pharmacologic: in place  Mechanical VTE Prophylaxis in Place: Yes    Patient Centered Rounds: I have performed bedside rounds with nursing staff today.    Discussions with Specialists or Other Care Team Provider: case management    Education and Discussions with Family / Patient: yes    Mobility:   Basic Mobility Inpatient Raw Score: 19  JH-HLM Goal: 6: Walk 10 steps or more  JH-HLM Achieved: 6: Walk 10 steps or more      Current Length of Stay: 4 day(s)    Current Patient Status:  Inpatient        Code Status: Level 1 - Full Code    Discharge Plan: Pt will require continued inpatient hospitalization.    Subjective:   Pt states breathing not yet at baseline  Continues to diurese well    Patient is seen and examined at bedside.  All other ROS are negative.    Objective:     Vitals:   Temp (24hrs), Av.6 °F (36.4 °C), Min:97.3 °F (36.3 °C), Max:97.7 °F (36.5 °C)    Temp:  [97.3 °F (36.3 °C)-97.7 °F (36.5 °C)] 97.7 °F (36.5 °C)  HR:  [65-66] 65  Resp:  [18-20] 20  BP: (112-120)/(60-67) 112/60  SpO2:  [87 %-97 %] 94 %  Body mass index is 31.93 kg/m².     Input and Output Summary (last 24 hours):       Intake/Output Summary (Last 24 hours) at 10/23/2024 1109  Last data filed at 10/23/2024 1036  Gross per 24 hour   Intake 1476 ml   Output 2530 ml   Net -1054 ml       Physical Exam:       GEN: No acute distress, comfortable, on o2  HEEENT: No JVD, PERRLA, no scleral icterus  RESP: Lungs clear to auscultation bilaterally  CV: RRR, +s1/s2   ABD: SOFT NON TENDER, POSITIVE BOWEL SOUNDS, NO DISTENTION  PSYCH: CALM  NEURO: [place] baseline, NO FOCAL DEFICITS  SKIN: NO RASH  EXTREM: NO EDEMA    Additional Data:     Labs:    Results from last 7 days   Lab Units 10/23/24  0920 10/20/24  0438 10/19/24  1641   WBC Thousand/uL 11.96*   < > 12.42*   HEMOGLOBIN g/dL 12.0   < > 11.4*   HEMATOCRIT % 38.3   < > 36.7   PLATELETS Thousands/uL 228   < > 217   SEGS PCT %  --   --  51   LYMPHO PCT %  --   --  37   MONO PCT %  --   --  10   EOS PCT %  --   --  1    < > = values in this interval not displayed.     Results from last 7 days   Lab Units 10/23/24  0920 10/20/24  0438 10/19/24  1641   SODIUM mmol/L 136   < > 136   POTASSIUM mmol/L 4.0   < > 4.1   CHLORIDE mmol/L 95*   < > 100   CO2 mmol/L 33*   < > 28   BUN mg/dL 23   < > 18   CREATININE mg/dL 1.02   < > 1.21   ANION GAP mmol/L 8   < > 8   CALCIUM mg/dL 8.5   < > 9.0   ALBUMIN g/dL  --   --  3.9   TOTAL BILIRUBIN mg/dL  --   --  0.66   ALK PHOS U/L  --   --   99   ALT U/L  --   --  16   AST U/L  --   --  21   GLUCOSE RANDOM mg/dL 238*   < > 128    < > = values in this interval not displayed.         Results from last 7 days   Lab Units 10/23/24  1106 10/23/24  0735 10/22/24  2107 10/22/24  1558 10/22/24  1139 10/22/24  0742 10/21/24  2001 10/21/24  1641 10/21/24  1148 10/21/24  0801 10/20/24  2050 10/20/24  1556   POC GLUCOSE mg/dl 203* 128 162* 184* 162* 137 101 106 118 80 74 138         Results from last 7 days   Lab Units 10/19/24  2126   LACTIC ACID mmol/L 1.2   PROCALCITONIN ng/ml 0.08       Lines/Drains:  Invasive Devices       Peripheral Intravenous Line  Duration             Peripheral IV 10/19/24 Dorsal (posterior);Right Wrist 3 days    Peripheral IV 10/19/24 Right Antecubital 3 days                    Telemetry:   Telemetry Orders (From admission, onward)               24 Hour Telemetry Monitoring  Continuous x 24 Hours ([place])        Question:  Reason for 24 Hour Telemetry  Answer:  Decompensated CHF- and any one of the following: continuous diuretic infusion or total diuretic dose >200 mg daily, associated electrolyte derangement (I.e. K < 3.0), ionotropic drip (continuous infusion), hx of ventricular arrhythmia, or new EF < 35%                        * I Have Reviewed All Lab Data Listed Above.           Imaging:     Results for orders placed during the hospital encounter of 10/19/24    XR chest 1 view portable    Narrative  XR CHEST PORTABLE    INDICATION: SOB.    COMPARISON: Chest CT 10/19/2024.    FINDINGS:    Mild pulmonary edema. Small right pleural effusion.    Moderate cardiomegaly, CABG, left subclavian pacemaker leads in right atrium and right ventricle.    Bones are unremarkable for age.    Normal upper abdomen. Mild elevation of the left diaphragm.    Impression  Mild pulmonary edema with small right pleural effusion.        Workstation performed: [id number]    No results found for this or any previous visit.      *I have reviewed all imaging  reports listed above      Recent Cultures (last 7 days):     Results from last 7 days   Lab Units 10/19/24  2116   BLOOD CULTURE  No Growth at 72 hrs.  No Growth at 72 hrs.       Last 24 Hours Medication List:   Current Facility-Administered Medications   Medication Dose Route Frequency Provider Last Rate    acetaminophen  650 mg Oral Q4H PRN Usha Quinn PA-C      albuterol  2.5 mg Nebulization Q4H PRN Savanah Garcia MD      atorvastatin  20 mg Oral QAM Usha Quinn PA-C      busPIRone  7.5 mg Oral BID Usha Quinn PA-C      clonazePAM  1 mg Oral HS PRN Usha Quinn PA-C      donepezil  10 mg Oral QAM Usha Quinn PA-C      DULoxetine  30 mg Oral Daily Usha Quinn PA-C      ferrous sulfate  325 mg Oral Daily With Breakfast Usha Quinn PA-C      Fluticasone Furoate-Vilanterol  1 puff Inhalation Daily Usha Quinn PA-C      And    umeclidinium  1 puff Inhalation Daily Usha Quinn PA-C      folic acid  1,000 mcg Oral QAM Usha Quinn PA-C      furosemide  80 mg Intravenous BID (diuretic) Missy Crane DO      influenza vaccine  0.5 mL Intramuscular Prior to discharge Savanah Garcia MD      insulin glargine  5 Units Subcutaneous Daily With Breakfast Usha Quinn PA-C      insulin lispro  1-5 Units Subcutaneous HS Usha Quinn PA-C      insulin lispro  1-6 Units Subcutaneous TID AC Usha Quinn PA-C      insulin lispro  5 Units Subcutaneous TID With Meals Usha Quinn PA-C      levothyroxine  125 mcg Oral Early Morning Usha Quinn PA-C      lidocaine  1 patch Topical Daily Missy Crane DO      loratadine  10 mg Oral QAM Usha Quinn PA-C      magnesium Oxide  400 mg Oral BID Usha Quinn PA-C      melatonin  3 mg Oral HS PRN Ariane Alberts PA-C      metoprolol succinate  25 mg Oral QAM Usha Quinn PA-C      pantoprazole  40 mg Oral BID AC Usha Quinn PA-C      potassium chloride  40 mEq Oral BID Jorgito  JACOB Coughlin PA-C      rivaroxaban  20 mg Oral Daily With Dinner Usha Quinn PA-C      spironolactone  25 mg Oral Daily Usha Quinn PA-C      Thiamine Mononitrate  100 mg Oral Daily Usha Quinn PA-C          Today, Patient Was Seen By: Jevon Campbell MD    ** Please Note: Dictation voice to text software may have been used in the creation of this document. **

## 2024-10-23 NOTE — PLAN OF CARE
Problem: Potential for Falls  Goal: Patient will remain free of falls  Description: INTERVENTIONS:  - Educate patient/family on patient safety including physical limitations  - Instruct patient to call for assistance with activity   - Consult OT/PT to assist with strengthening/mobility   - Keep Call bell within reach  - Keep bed low and locked with side rails adjusted as appropriate  - Keep care items and personal belongings within reach  - Initiate and maintain comfort rounds  - Make Fall Risk Sign visible to staff  - Offer Toileting every 2 Hours, in advance of need  - Initiate/Maintain 2 alarm  - Obtain necessary fall risk management equipment: 2  - Apply yellow socks and bracelet for high fall risk patients  - Consider moving patient to room near nurses station  Outcome: Progressing     Problem: PAIN - ADULT  Goal: Verbalizes/displays adequate comfort level or baseline comfort level  Description: Interventions:  - Encourage patient to monitor pain and request assistance  - Assess pain using appropriate pain scale  - Administer analgesics based on type and severity of pain and evaluate response  - Implement non-pharmacological measures as appropriate and evaluate response  - Consider cultural and social influences on pain and pain management  - Notify physician/advanced practitioner if interventions unsuccessful or patient reports new pain  Outcome: Progressing     Problem: INFECTION - ADULT  Goal: Absence or prevention of progression during hospitalization  Description: INTERVENTIONS:  - Assess and monitor for signs and symptoms of infection  - Monitor lab/diagnostic results  - Monitor all insertion sites, i.e. indwelling lines, tubes, and drains  - Monitor endotracheal if appropriate and nasal secretions for changes in amount and color  - [place] appropriate cooling/warming therapies per order  - Administer medications as ordered  - Instruct and encourage patient and family to use good hand hygiene  technique  - Identify and instruct in appropriate isolation precautions for identified infection/condition  Outcome: Progressing

## 2024-10-24 LAB
ALBUMIN SERPL BCG-MCNC: 3.4 G/DL (ref 3.5–5)
ALP SERPL-CCNC: 90 U/L (ref 34–104)
ALT SERPL W P-5'-P-CCNC: 16 U/L (ref 7–52)
ANION GAP SERPL CALCULATED.3IONS-SCNC: 6 MMOL/L (ref 4–13)
AST SERPL W P-5'-P-CCNC: 24 U/L (ref 13–39)
BASOPHILS # BLD AUTO: 0.03 THOUSANDS/ΜL (ref 0–0.1)
BASOPHILS NFR BLD AUTO: 0 % (ref 0–1)
BILIRUB SERPL-MCNC: 1.07 MG/DL (ref 0.2–1)
BUN SERPL-MCNC: 24 MG/DL (ref 5–25)
CALCIUM ALBUM COR SERPL-MCNC: 8.8 MG/DL (ref 8.3–10.1)
CALCIUM SERPL-MCNC: 8.3 MG/DL (ref 8.4–10.2)
CHLORIDE SERPL-SCNC: 96 MMOL/L (ref 96–108)
CO2 SERPL-SCNC: 34 MMOL/L (ref 21–32)
CREAT SERPL-MCNC: 0.92 MG/DL (ref 0.6–1.3)
EOSINOPHIL # BLD AUTO: 0.11 THOUSAND/ΜL (ref 0–0.61)
EOSINOPHIL NFR BLD AUTO: 1 % (ref 0–6)
ERYTHROCYTE [DISTWIDTH] IN BLOOD BY AUTOMATED COUNT: 14.3 % (ref 11.6–15.1)
GFR SERPL CREATININE-BSD FRML MDRD: 75 ML/MIN/1.73SQ M
GLUCOSE SERPL-MCNC: 159 MG/DL (ref 65–140)
GLUCOSE SERPL-MCNC: 187 MG/DL (ref 65–140)
GLUCOSE SERPL-MCNC: 187 MG/DL (ref 65–140)
GLUCOSE SERPL-MCNC: 198 MG/DL (ref 65–140)
GLUCOSE SERPL-MCNC: 240 MG/DL (ref 65–140)
HCT VFR BLD AUTO: 38.7 % (ref 36.5–49.3)
HGB BLD-MCNC: 12.2 G/DL (ref 12–17)
IMM GRANULOCYTES # BLD AUTO: 0.05 THOUSAND/UL (ref 0–0.2)
IMM GRANULOCYTES NFR BLD AUTO: 1 % (ref 0–2)
LYMPHOCYTES # BLD AUTO: 3.77 THOUSANDS/ΜL (ref 0.6–4.47)
LYMPHOCYTES NFR BLD AUTO: 37 % (ref 14–44)
MCH RBC QN AUTO: 30.2 PG (ref 26.8–34.3)
MCHC RBC AUTO-ENTMCNC: 31.5 G/DL (ref 31.4–37.4)
MCV RBC AUTO: 96 FL (ref 82–98)
MONOCYTES # BLD AUTO: 1.07 THOUSAND/ΜL (ref 0.17–1.22)
MONOCYTES NFR BLD AUTO: 11 % (ref 4–12)
NEUTROPHILS # BLD AUTO: 5.14 THOUSANDS/ΜL (ref 1.85–7.62)
NEUTS SEG NFR BLD AUTO: 50 % (ref 43–75)
NRBC BLD AUTO-RTO: 0 /100 WBCS
PLATELET # BLD AUTO: 160 THOUSANDS/UL (ref 149–390)
PMV BLD AUTO: 10.8 FL (ref 8.9–12.7)
POTASSIUM SERPL-SCNC: 3.9 MMOL/L (ref 3.5–5.3)
PROT SERPL-MCNC: 6.1 G/DL (ref 6.4–8.4)
RBC # BLD AUTO: 4.04 MILLION/UL (ref 3.88–5.62)
SODIUM SERPL-SCNC: 136 MMOL/L (ref 135–147)
WBC # BLD AUTO: 10.17 THOUSAND/UL (ref 4.31–10.16)

## 2024-10-24 PROCEDURE — 99232 SBSQ HOSP IP/OBS MODERATE 35: CPT | Performed by: HOSPITALIST

## 2024-10-24 PROCEDURE — 82948 REAGENT STRIP/BLOOD GLUCOSE: CPT

## 2024-10-24 PROCEDURE — 94640 AIRWAY INHALATION TREATMENT: CPT

## 2024-10-24 PROCEDURE — 85025 COMPLETE CBC W/AUTO DIFF WBC: CPT | Performed by: HOSPITALIST

## 2024-10-24 PROCEDURE — 94760 N-INVAS EAR/PLS OXIMETRY 1: CPT

## 2024-10-24 PROCEDURE — 99232 SBSQ HOSP IP/OBS MODERATE 35: CPT | Performed by: INTERNAL MEDICINE

## 2024-10-24 PROCEDURE — 80053 COMPREHEN METABOLIC PANEL: CPT | Performed by: HOSPITALIST

## 2024-10-24 RX ORDER — GUAIFENESIN/DEXTROMETHORPHAN 100-10MG/5
10 SYRUP ORAL EVERY 4 HOURS PRN
Status: DISCONTINUED | OUTPATIENT
Start: 2024-10-24 | End: 2024-10-29 | Stop reason: HOSPADM

## 2024-10-24 RX ADMIN — INSULIN LISPRO 1 UNITS: 100 INJECTION, SOLUTION INTRAVENOUS; SUBCUTANEOUS at 17:10

## 2024-10-24 RX ADMIN — INSULIN GLARGINE 5 UNITS: 100 INJECTION, SOLUTION SUBCUTANEOUS at 07:53

## 2024-10-24 RX ADMIN — LORATADINE 10 MG: 10 TABLET ORAL at 09:47

## 2024-10-24 RX ADMIN — LEVOTHYROXINE SODIUM 125 MCG: 25 TABLET ORAL at 05:21

## 2024-10-24 RX ADMIN — FERROUS SULFATE TAB 325 MG (65 MG ELEMENTAL FE) 325 MG: 325 (65 FE) TAB at 07:53

## 2024-10-24 RX ADMIN — SPIRONOLACTONE 25 MG: 25 TABLET ORAL at 09:46

## 2024-10-24 RX ADMIN — DULOXETINE HYDROCHLORIDE 30 MG: 30 CAPSULE, DELAYED RELEASE ORAL at 09:48

## 2024-10-24 RX ADMIN — INSULIN LISPRO 1 UNITS: 100 INJECTION, SOLUTION INTRAVENOUS; SUBCUTANEOUS at 21:09

## 2024-10-24 RX ADMIN — Medication 400 MG: at 17:05

## 2024-10-24 RX ADMIN — FLUTICASONE FUROATE AND VILANTEROL TRIFENATATE 1 PUFF: 100; 25 POWDER RESPIRATORY (INHALATION) at 09:50

## 2024-10-24 RX ADMIN — UMECLIDINIUM 1 PUFF: 62.5 AEROSOL, POWDER ORAL at 09:50

## 2024-10-24 RX ADMIN — ACETAMINOPHEN 650 MG: 325 TABLET, FILM COATED ORAL at 11:06

## 2024-10-24 RX ADMIN — Medication 3 MG: at 21:25

## 2024-10-24 RX ADMIN — INSULIN LISPRO 3 UNITS: 100 INJECTION, SOLUTION INTRAVENOUS; SUBCUTANEOUS at 07:53

## 2024-10-24 RX ADMIN — FUROSEMIDE 80 MG: 10 INJECTION, SOLUTION INTRAVENOUS at 17:05

## 2024-10-24 RX ADMIN — BUSPIRONE HYDROCHLORIDE 7.5 MG: 15 TABLET ORAL at 17:04

## 2024-10-24 RX ADMIN — METOPROLOL SUCCINATE 25 MG: 25 TABLET, EXTENDED RELEASE ORAL at 09:46

## 2024-10-24 RX ADMIN — INSULIN LISPRO 5 UNITS: 100 INJECTION, SOLUTION INTRAVENOUS; SUBCUTANEOUS at 12:30

## 2024-10-24 RX ADMIN — INSULIN LISPRO 5 UNITS: 100 INJECTION, SOLUTION INTRAVENOUS; SUBCUTANEOUS at 17:10

## 2024-10-24 RX ADMIN — INSULIN LISPRO 5 UNITS: 100 INJECTION, SOLUTION INTRAVENOUS; SUBCUTANEOUS at 07:53

## 2024-10-24 RX ADMIN — INSULIN LISPRO 2 UNITS: 100 INJECTION, SOLUTION INTRAVENOUS; SUBCUTANEOUS at 12:30

## 2024-10-24 RX ADMIN — BUSPIRONE HYDROCHLORIDE 7.5 MG: 15 TABLET ORAL at 09:47

## 2024-10-24 RX ADMIN — POTASSIUM CHLORIDE 40 MEQ: 1500 TABLET, EXTENDED RELEASE ORAL at 17:05

## 2024-10-24 RX ADMIN — Medication 400 MG: at 09:48

## 2024-10-24 RX ADMIN — POTASSIUM CHLORIDE 40 MEQ: 1500 TABLET, EXTENDED RELEASE ORAL at 09:46

## 2024-10-24 RX ADMIN — ALBUTEROL SULFATE 2.5 MG: 2.5 SOLUTION RESPIRATORY (INHALATION) at 11:02

## 2024-10-24 RX ADMIN — PANTOPRAZOLE SODIUM 40 MG: 40 TABLET, DELAYED RELEASE ORAL at 07:53

## 2024-10-24 RX ADMIN — ATORVASTATIN CALCIUM 20 MG: 20 TABLET, FILM COATED ORAL at 09:47

## 2024-10-24 RX ADMIN — DONEPEZIL HYDROCHLORIDE 10 MG: 5 TABLET ORAL at 09:47

## 2024-10-24 RX ADMIN — RIVAROXABAN 20 MG: 20 TABLET, FILM COATED ORAL at 17:04

## 2024-10-24 RX ADMIN — PANTOPRAZOLE SODIUM 40 MG: 40 TABLET, DELAYED RELEASE ORAL at 17:04

## 2024-10-24 RX ADMIN — Medication 100 MG: at 09:48

## 2024-10-24 RX ADMIN — FOLIC ACID 1000 MCG: 1 TABLET ORAL at 09:47

## 2024-10-24 RX ADMIN — FUROSEMIDE 80 MG: 10 INJECTION, SOLUTION INTRAVENOUS at 07:54

## 2024-10-24 RX ADMIN — ACETAMINOPHEN 650 MG: 325 TABLET, FILM COATED ORAL at 05:21

## 2024-10-24 RX ADMIN — LIDOCAINE 5% 1 PATCH: 700 PATCH TOPICAL at 09:48

## 2024-10-24 NOTE — PLAN OF CARE
Problem: Potential for Falls  Goal: Patient will remain free of falls  Description: INTERVENTIONS:  - Educate patient/family on patient safety including physical limitations  - Instruct patient to call for assistance with activity   - Consult OT/PT to assist with strengthening/mobility   - Keep Call bell within reach  - Keep bed low and locked with side rails adjusted as appropriate  - Keep care items and personal belongings within reach  - Initiate and maintain comfort rounds  - Make Fall Risk Sign visible to staff  - Offer Toileting every 2 Hours, in advance of need  - Initiate/Maintain bed/chair alarm  - Obtain necessary fall risk management equipment:   - Apply yellow socks and bracelet for high fall risk patients  - Consider moving patient to room near nurses station  Outcome: Progressing     Problem: PAIN - ADULT  Goal: Verbalizes/displays adequate comfort level or baseline comfort level  Description: Interventions:  - Encourage patient to monitor pain and request assistance  - Assess pain using appropriate pain scale  - Administer analgesics based on type and severity of pain and evaluate response  - Implement non-pharmacological measures as appropriate and evaluate response  - Consider cultural and social influences on pain and pain management  - Notify physician/advanced practitioner if interventions unsuccessful or patient reports new pain  Outcome: Progressing     Problem: INFECTION - ADULT  Goal: Absence or prevention of progression during hospitalization  Description: INTERVENTIONS:  - Assess and monitor for signs and symptoms of infection  - Monitor lab/diagnostic results  - Monitor all insertion sites, i.e. indwelling lines, tubes, and drains  - Monitor endotracheal if appropriate and nasal secretions for changes in amount and color  - [place] appropriate cooling/warming therapies per order  - Administer medications as ordered  - Instruct and encourage patient and family to use good hand hygiene  technique  - Identify and instruct in appropriate isolation precautions for identified infection/condition  Outcome: Progressing  Goal: Absence of fever/infection during neutropenic period  Description: INTERVENTIONS:  - Monitor WBC    Outcome: Progressing     Problem: SAFETY ADULT  Goal: Patient will remain free of falls  Description: INTERVENTIONS:  - Educate patient/family on patient safety including physical limitations  - Instruct patient to call for assistance with activity   - Consult OT/PT to assist with strengthening/mobility   - Keep Call bell within reach  - Keep bed low and locked with side rails adjusted as appropriate  - Keep care items and personal belongings within reach  - Initiate and maintain comfort rounds  - Make Fall Risk Sign visible to staff  - Offer Toileting every 2 Hours, in advance of need  - Initiate/Maintain bed/chair alarm  - Obtain necessary fall risk management equipment:   - Apply yellow socks and bracelet for high fall risk patients  - Consider moving patient to room near nurses station  Outcome: Progressing  Goal: Maintain or return to baseline ADL function  Description: INTERVENTIONS:  -  Assess patient's ability to carry out ADLs; assess patient's baseline for ADL function and identify physical deficits which impact ability to perform ADLs (bathing, care of mouth/teeth, toileting, grooming, dressing, etc.)  - Assess/evaluate cause of self-care deficits   - Assess range of motion  - Assess patient's mobility; develop plan if impaired  - Assess patient's need for assistive devices and provide as appropriate  - Encourage maximum independence but intervene and supervise when necessary  - Involve family in performance of ADLs  - Assess for home care needs following discharge   - Consider OT consult to assist with ADL evaluation and planning for discharge  - Provide patient education as appropriate  Outcome: Progressing  Goal: Maintains/Returns to pre admission functional  level  Description: INTERVENTIONS:  - Perform AM-PAC 6 Click Basic Mobility/ Daily Activity assessment daily.  - Set and communicate daily mobility goal to care team and patient/family/caregiver.   - Collaborate with rehabilitation services on mobility goals if consulted  - Perform Range of Motion 3 times a day.  - Reposition patient every 3 hours.  - Dangle patient 3 times a day  - Stand patient 3 times a day  - Ambulate patient 3 times a day  - Out of bed to chair 3 times a day   - Out of bed for meals 3 times a day  - Out of bed for toileting  - Record patient progress and toleration of activity level   Outcome: Progressing     Problem: DISCHARGE PLANNING  Goal: Discharge to home or other facility with appropriate resources  Description: INTERVENTIONS:  - Identify barriers to discharge w/patient and caregiver  - Arrange for needed discharge resources and transportation as appropriate  - Identify discharge learning needs (meds, wound care, etc.)  - Arrange for interpretive services to assist at discharge as needed  - Refer to Case Management Department for coordinating discharge planning if the patient needs post-hospital services based on physician/advanced practitioner order or complex needs related to functional status, cognitive ability, or social support system  Outcome: Progressing     Problem: Knowledge Deficit  Goal: Patient/family/caregiver demonstrates understanding of disease process, treatment plan, medications, and discharge instructions  Description: Complete learning assessment and assess knowledge base.  Interventions:  - Provide teaching at level of understanding  - Provide teaching via preferred learning methods  Outcome: Progressing     Problem: Prexisting or High Potential for Compromised Skin Integrity  Goal: Skin integrity is maintained or improved  Description: INTERVENTIONS:  - Identify patients at risk for skin breakdown  - Assess and monitor skin integrity  - Assess and monitor nutrition  and hydration status  - Monitor labs   - Assess for incontinence   - Turn and reposition patient  - Assist with mobility/ambulation  - Relieve pressure over bony prominences  - Avoid friction and shearing  - Provide appropriate hygiene as needed including keeping skin clean and dry  - Evaluate need for skin moisturizer/barrier cream  - Collaborate with interdisciplinary team   - Patient/family teaching  - Consider wound care consult   Outcome: Progressing

## 2024-10-24 NOTE — ASSESSMENT & PLAN NOTE
Lab Results   Component Value Date    HGBA1C 13.5 (H) 08/28/2024       Recent Labs     10/23/24  1106 10/23/24  1613 10/23/24  2047 10/24/24  0716   POCGLU 203* 135 142* 240*       Blood Sugar Average: Last 72 hrs:  (P) 146Home regimen: metformin, glipizide, and farxiga  Hold oral medication and place on lantus 5U in AM, humalog 5U TID with meals, SSI AC/HS

## 2024-10-24 NOTE — PLAN OF CARE
Problem: Potential for Falls  Goal: Patient will remain free of falls  Description: INTERVENTIONS:  - Educate patient/family on patient safety including physical limitations  - Instruct patient to call for assistance with activity   - Consult OT/PT to assist with strengthening/mobility   - Keep Call bell within reach  - Keep bed low and locked with side rails adjusted as appropriate  - Keep care items and personal belongings within reach  - Initiate and maintain comfort rounds  - Make Fall Risk Sign visible to staff  - Offer Toileting every 2 Hours, in advance of need  - Initiate/Maintain bed alarm  - Obtain necessary fall risk management equipment: socks  - Apply yellow socks and bracelet for high fall risk patients  - Consider moving patient to room near nurses station  Outcome: Progressing     Problem: PAIN - ADULT  Goal: Verbalizes/displays adequate comfort level or baseline comfort level  Description: Interventions:  - Encourage patient to monitor pain and request assistance  - Assess pain using appropriate pain scale  - Administer analgesics based on type and severity of pain and evaluate response  - Implement non-pharmacological measures as appropriate and evaluate response  - Consider cultural and social influences on pain and pain management  - Notify physician/advanced practitioner if interventions unsuccessful or patient reports new pain  Outcome: Progressing     Problem: INFECTION - ADULT  Goal: Absence or prevention of progression during hospitalization  Description: INTERVENTIONS:  - Assess and monitor for signs and symptoms of infection  - Monitor lab/diagnostic results  - Monitor all insertion sites, i.e. indwelling lines, tubes, and drains  - Monitor endotracheal if appropriate and nasal secretions for changes in amount and color  - [place] appropriate cooling/warming therapies per order  - Administer medications as ordered  - Instruct and encourage patient and family to use good hand hygiene  technique  - Identify and instruct in appropriate isolation precautions for identified infection/condition  Outcome: Progressing  Goal: Absence of fever/infection during neutropenic period  Description: INTERVENTIONS:  - Monitor WBC    Outcome: Progressing     Problem: SAFETY ADULT  Goal: Patient will remain free of falls  Description: INTERVENTIONS:  - Educate patient/family on patient safety including physical limitations  - Instruct patient to call for assistance with activity   - Consult OT/PT to assist with strengthening/mobility   - Keep Call bell within reach  - Keep bed low and locked with side rails adjusted as appropriate  - Keep care items and personal belongings within reach  - Initiate and maintain comfort rounds  - Make Fall Risk Sign visible to staff  - Offer Toileting every 2 Hours, in advance of need  - Initiate/Maintain bed alarm  - Obtain necessary fall risk management equipment: socks  - Apply yellow socks and bracelet for high fall risk patients  - Consider moving patient to room near nurses station  Outcome: Progressing  Goal: Maintain or return to baseline ADL function  Description: INTERVENTIONS:  -  Assess patient's ability to carry out ADLs; assess patient's baseline for ADL function and identify physical deficits which impact ability to perform ADLs (bathing, care of mouth/teeth, toileting, grooming, dressing, etc.)  - Assess/evaluate cause of self-care deficits   - Assess range of motion  - Assess patient's mobility; develop plan if impaired  - Assess patient's need for assistive devices and provide as appropriate  - Encourage maximum independence but intervene and supervise when necessary  - Involve family in performance of ADLs  - Assess for home care needs following discharge   - Consider OT consult to assist with ADL evaluation and planning for discharge  - Provide patient education as appropriate  Outcome: Progressing  Goal: Maintains/Returns to pre admission functional  level  Description: INTERVENTIONS:  - Perform AM-PAC 6 Click Basic Mobility/ Daily Activity assessment daily.  - Set and communicate daily mobility goal to care team and patient/family/caregiver.   - Collaborate with rehabilitation services on mobility goals if consulted  - Perform Range of Motion 3 times a day.  - Reposition patient every 2 hours.  - Dangle patient 3 times a day  - Stand patient 3 times a day  - Ambulate patient 3 times a day  - Out of bed to chair 3 times a day   - Out of bed for meals 3 times a day  - Out of bed for toileting  - Record patient progress and toleration of activity level   Outcome: Progressing     Problem: DISCHARGE PLANNING  Goal: Discharge to home or other facility with appropriate resources  Description: INTERVENTIONS:  - Identify barriers to discharge w/patient and caregiver  - Arrange for needed discharge resources and transportation as appropriate  - Identify discharge learning needs (meds, wound care, etc.)  - Arrange for interpretive services to assist at discharge as needed  - Refer to Case Management Department for coordinating discharge planning if the patient needs post-hospital services based on physician/advanced practitioner order or complex needs related to functional status, cognitive ability, or social support system  Outcome: Progressing     Problem: Knowledge Deficit  Goal: Patient/family/caregiver demonstrates understanding of disease process, treatment plan, medications, and discharge instructions  Description: Complete learning assessment and assess knowledge base.  Interventions:  - Provide teaching at level of understanding  - Provide teaching via preferred learning methods  Outcome: Progressing     Problem: Prexisting or High Potential for Compromised Skin Integrity  Goal: Skin integrity is maintained or improved  Description: INTERVENTIONS:  - Identify patients at risk for skin breakdown  - Assess and monitor skin integrity  - Assess and monitor nutrition  and hydration status  - Monitor labs   - Assess for incontinence   - Turn and reposition patient  - Assist with mobility/ambulation  - Relieve pressure over bony prominences  - Avoid friction and shearing  - Provide appropriate hygiene as needed including keeping skin clean and dry  - Evaluate need for skin moisturizer/barrier cream  - Collaborate with interdisciplinary team   - Patient/family teaching  - Consider wound care consult   Outcome: Progressing

## 2024-10-24 NOTE — ASSESSMENT & PLAN NOTE
Presents from nursing home with worsened dyspnea and low SpO2 long with 20 pound weight gain  Last echo January 2024: LVEF 35-40%.  Severely dilated left atrium.  Mild pulmonary hypertension.  TAVR functioning well.  Home diuretic: lasix 20mg BID and spironolactone 25 mg  Cotninue lasix 60 BID per cards  continue xarelto and metoprolol  Continue spirinolactone  Wean oxygen as tolerated  ECHO with EF 40-45%, mild global hypokinesis  Strict I's and O's. Will discuss fluid restriction with patient.    I/O last 3 completed shifts:  In: 1950 [P.O.:1950]  Out: 5015 [Urine:5015]  Net IO Since Admission: -6,093 mL [10/24/24 0920]    Weight change: -0.726 kg (-1 lb 9.6 oz)  Wt Readings from Last 3 Encounters:   10/24/24 94.5 kg (208 lb 6.4 oz)   09/18/24 89.8 kg (198 lb)   07/16/24 89.4 kg (197 lb)      Gabapentin Counseling: I discussed with the patient the risks of gabapentin including but not limited to dizziness, somnolence, fatigue and ataxia.

## 2024-10-24 NOTE — PROGRESS NOTES
Progress Note - Cardiology   Name: Trevor Lyons 84 y.o. male I MRN: [id number]  Unit/Bed#: -01 I Date of Admission: 10/19/2024   Date of Service: 10/24/2024 I Hospital Day: 5     Assessment & Plan    Assessment/Plan:  Acute on chronic systolic CHF, ICMP, LVEF 35-40% -Continues to demonstrate good response to diuretics, but remains volume overloaded on supplemental O2.  Creatinine stable.  Potassium within normal limits.  Continue diuresis with lasix 80 mg IV BID  Persistent atrial fibrillation - is ventricular paced, without high rate episodes on PPM. Continue metoprolol XL 25 m g daily. Continue Xarelto daily.  S/p PPM - 98% . Normal device function.   CAD s/p CABG and PCI - No anginal complaints. Continue current therapy. He is on Xarelto for AF therefore not on aspirin.    Subjective     Patient seen and evaluated. Doing well overall, no new complaints. Is getting frustrated having to be in the hospital, but understands the indication for continued hospitalization. He has no chest pain. Breathing is improving.     Objective :  Temp:  [97.8 °F (36.6 °C)-98.1 °F (36.7 °C)] 98.1 °F (36.7 °C)  HR:  [62-70] 65  BP: (113-118)/(61-64) 118/63  Resp:  [18-20] 20  SpO2:  [92 %-99 %] 99 %  O2 Device: Nasal cannula  Nasal Cannula O2 Flow Rate (L/min):  [2 L/min-3 L/min] 2 L/min  Orthostatic Blood Pressures      Flowsheet Row Most Recent Value   Blood Pressure 118/63 filed at 10/24/2024 0733   Patient Position - Orthostatic VS Sitting filed at 10/24/2024 0733          First Weight: Weight - Scale: 100 kg (221 lb) (10/19/24 1634)  Vitals:    10/23/24 0600 10/24/24 0402   Weight: 95.3 kg (210 lb) 94.5 kg (208 lb 6.4 oz)     Physical Exam  Constitutional:       Appearance: He is well-developed.   HENT:      Head: Normocephalic and atraumatic.   Eyes:      Pupils: Pupils are equal, round, and reactive to light.   Cardiovascular:      Rate and Rhythm: Normal rate and regular rhythm.      Heart sounds: No murmur  heard.     No friction rub. No gallop.   Pulmonary:      Effort: Pulmonary effort is normal. No respiratory distress.      Breath sounds: Normal breath sounds. No wheezing or rales.   Abdominal:      General: Bowel sounds are normal. There is no distension.      Palpations: Abdomen is soft.      Tenderness: There is no abdominal tenderness.   Musculoskeletal:         General: No deformity. Normal range of motion.      Cervical back: Normal range of motion and neck supple.      Right lower leg: Edema present.      Left lower leg: Edema present.   Skin:     General: Skin is warm and dry.   Neurological:      Mental Status: He is alert and oriented to person, place, and time.   Psychiatric:         Behavior: Behavior normal.             Lab Results: I have reviewed the following results:  Results from last 7 days   Lab Units 10/24/24  0227 10/23/24  0920 10/22/24  0311   WBC Thousand/uL 10.17* 11.96* 11.59*   HEMOGLOBIN g/dL 12.2 12.0 11.3*   HEMATOCRIT % 38.7 38.3 35.3*   PLATELETS Thousands/uL 160 228 209     Results from last 7 days   Lab Units 10/24/24  0402 10/23/24  0920 10/22/24  0311   POTASSIUM mmol/L 3.9 4.0 2.9*   CHLORIDE mmol/L 96 95* 97   CO2 mmol/L 34* 33* 33*   BUN mg/dL 24 23 28*   CREATININE mg/dL 0.92 1.02 1.11   CALCIUM mg/dL 8.3* 8.5 8.1*         Lab Results   Component Value Date    HGBA1C 13.5 (H) 08/28/2024     Lab Results   Component Value Date    TROPONINI 0.04 10/21/2021       Beatriz Molina MD

## 2024-10-24 NOTE — PROGRESS NOTES
Progress Note - Hospitalist   Name: Trevor Lyons 84 y.o. male I MRN: [id number]  Unit/Bed#: [facility] Tom-01 I Date of Admission: 10/19/2024   Date of Service: 10/24/2024 I Hospital Day: 5     Assessment & Plan  Acute on chronic systolic heart failure (HCC)  Presents from nursing home with worsened dyspnea and low SpO2 long with 20 pound weight gain  Last echo January 2024: LVEF 35-40%.  Severely dilated left atrium.  Mild pulmonary hypertension.  TAVR functioning well.  Home diuretic: lasix 20mg BID and spironolactone 25 mg  Cotninue lasix 60 BID per cards  continue xarelto and metoprolol  Continue spirinolactone  Wean oxygen as tolerated  ECHO with EF 40-45%, mild global hypokinesis  Strict I's and O's. Will discuss fluid restriction with patient.    I/O last 3 completed shifts:  In: 1950 [P.O.:1950]  Out: 5015 [Urine:5015]  Net IO Since Admission: -6,093 mL [10/24/24 0920]    Weight change: -0.726 kg (-1 lb 9.6 oz)  Wt Readings from Last 3 Encounters:   10/24/24 94.5 kg (208 lb 6.4 oz)   09/18/24 89.8 kg (198 lb)   07/16/24 89.4 kg (197 lb)     Hypokalemia  Monitor in the setting of diuretics  Continue to monitor and replete as needed  Acute respiratory insufficiency  SpO2 87% on room air  Improved on 1 L nasal cannula  Suspect secondary to acute on chronic systolic heart failure  Wean oxygen as able, does not use oxygen at home  Anemia  Hgb 11.4 on admit  Baseline Cr 10-12   Trend CBC   SIRS (systemic inflammatory response syndrome) ([facility])  SIRS criteria: Tachypnea and leukocytosis  Initially treated with antibiotics for possible atypical pneumonia in the ED, however procalcitonin is negative and patient examines more like acute on chronic systolic heart failure and denies any fever or productive cough   Hold on further antibiotics at this time  If morning procalcitonin increases-would start ceftriaxone   Follow-up blood cultures  COPD (chronic obstructive pulmonary disease) ([facility])  Home regimen: Trelegy daily  and albuterol as needed  No expiratory wheezing appreciated on admission, do not suspect acute exacerbation at this time  Continue home regimen  Type 2 diabetes mellitus without complication, without long-term current use of insulin (HCC)  Lab Results   Component Value Date    HGBA1C 13.5 (H) 08/28/2024       Recent Labs     10/23/24  1106 10/23/24  1613 10/23/24  2047 10/24/24  0716   POCGLU 203* 135 142* 240*       Blood Sugar Average: Last 72 hrs:  (P) 146Home regimen: metformin, glipizide, and farxiga  Hold oral medication and place on lantus 5U in AM, humalog 5U TID with meals, SSI AC/HS  Coronary artery disease involving native coronary artery of native heart without angina pectoris  CAD status post PCI and CABG  Continue home beta-blocker, statin, and Xarelto  Persistent atrial fibrillation (HCC)  RC: Metoprolol succinate 25 Mg daily  AC: Xarelto  Rate controlled, v paced  Cardiac pacemaker  [place] s/p SSS   Last device interrogation 07/2024 showed normal device function   Pulmonary embolism (HCC)  Continue home Xarelto   CTA yesterday with no signs of PE  Pulmonary hypertension (HCC)  Most recent echo January 2024 showing mild pulmonary hypertension with RSVP 39 mmHg  Continue home medications  Acquired hypothyroidism  TSH elevated at 8.358  Increase synthroid to 125mcg daily   Recheck TSH in 6-8 weeks   Depression with anxiety  Continue home buspar, cymbalta, and klonopin   History of transcatheter aortic valve replacement (TAVR)    VTE  Prophylaxis:   Pharmacologic: in place  Mechanical VTE Prophylaxis in Place: Yes    Patient Centered Rounds: I have performed bedside rounds with nursing staff today.    Discussions with Specialists or Other Care Team Provider: case management    Education and Discussions with Family / Patient: yes    Mobility:   Basic Mobility Inpatient Raw Score: 18  JH-HLM Goal: 6: Walk 10 steps or more  JH-HLM Achieved: 6: Walk 10 steps or more      Current Length of Stay: 5  day(s)    Current Patient Status: Inpatient        Code Status: Level 1 - Full Code    Discharge Plan: Pt will require continued inpatient hospitalization.    Subjective:   Pt states breathing improving    Patient is seen and examined at bedside.  All other ROS are negative.    Objective:     Vitals:   Temp (24hrs), Av °F (36.7 °C), Min:97.8 °F (36.6 °C), Max:98.1 °F (36.7 °C)    Temp:  [97.8 °F (36.6 °C)-98.1 °F (36.7 °C)] 98.1 °F (36.7 °C)  HR:  [62-70] 65  Resp:  [18-20] 20  BP: (113-118)/(61-64) 118/63  SpO2:  [94 %-98 %] 97 %  Body mass index is 31.69 kg/m².     Input and Output Summary (last 24 hours):       Intake/Output Summary (Last 24 hours) at 10/24/2024 0920  Last data filed at 10/24/2024 0919  Gross per 24 hour   Intake 1552 ml   Output 4105 ml   Net -2553 ml       Physical Exam:       GEN: No acute distress, comfortable on o2  HEEENT: No JVD, PERRLA, no scleral icterus  RESP: Lungs crackles  CV: RRR, +s1/s2   ABD: SOFT NON TENDER, POSITIVE BOWEL SOUNDS, NO DISTENTION  PSYCH: CALM  NEURO: [place] baseline, NO FOCAL DEFICITS  SKIN: NO RASH  EXTREM: NO EDEMA    Additional Data:     Labs:    Results from last 7 days   Lab Units 10/24/24  0227   WBC Thousand/uL 10.17*   HEMOGLOBIN g/dL 12.2   HEMATOCRIT % 38.7   PLATELETS Thousands/uL 160   SEGS PCT % 50   LYMPHO PCT % 37   MONO PCT % 11   EOS PCT % 1     Results from last 7 days   Lab Units 10/24/24  0402   SODIUM mmol/L 136   POTASSIUM mmol/L 3.9   CHLORIDE mmol/L 96   CO2 mmol/L 34*   BUN mg/dL 24   CREATININE mg/dL 0.92   ANION GAP mmol/L 6   CALCIUM mg/dL 8.3*   ALBUMIN g/dL 3.4*   TOTAL BILIRUBIN mg/dL 1.07*   ALK PHOS U/L 90   ALT U/L 16   AST U/L 24   GLUCOSE RANDOM mg/dL 187*         Results from last 7 days   Lab Units 10/24/24  0716 10/23/24  2047 10/23/24  1613 10/23/24  1106 10/23/24  0735 10/22/24  2107 10/22/24  1558 10/22/24  1139 10/22/24  0742 10/21/24  2001 10/21/24  1641 10/21/24  1148   POC GLUCOSE mg/dl 240* 142* 135 203* 128  162* 184* 162* 137 101 106 118         Results from last 7 days   Lab Units 10/19/24  2126   LACTIC ACID mmol/L 1.2   PROCALCITONIN ng/ml 0.08       Lines/Drains:  Invasive Devices       Peripheral Intravenous Line  Duration             Peripheral IV 10/24/24 Dorsal (posterior);Left Wrist <1 day                    Telemetry:   Telemetry Orders (From admission, onward)               24 Hour Telemetry Monitoring  Continuous x 24 Hours (Telem)        Question:  Reason for 24 Hour Telemetry  Answer:  Decompensated CHF- and any one of the following: continuous diuretic infusion or total diuretic dose >200 mg daily, associated electrolyte derangement (I.e. K < 3.0), ionotropic drip (continuous infusion), hx of ventricular arrhythmia, or new EF < 35%                        * I Have Reviewed All Lab Data Listed Above.           Imaging:     Results for orders placed during the hospital encounter of 10/19/24    XR chest 1 view portable    Narrative  XR CHEST PORTABLE    INDICATION: SOB.    COMPARISON: Chest CT 10/19/2024.    FINDINGS:    Mild pulmonary edema. Small right pleural effusion.    Moderate cardiomegaly, CABG, left subclavian pacemaker leads in right atrium and right ventricle.    Bones are unremarkable for age.    Normal upper abdomen. Mild elevation of the left diaphragm.    Impression  Mild pulmonary edema with small right pleural effusion.        Workstation performed: [id number]    No results found for this or any previous visit.      *I have reviewed all imaging reports listed above      Recent Cultures (last 7 days):     Results from last 7 days   Lab Units 10/19/24  2116   BLOOD CULTURE  No Growth After 4 Days.  No Growth After 4 Days.       Last 24 Hours Medication List:   Current Facility-Administered Medications   Medication Dose Route Frequency Provider Last Rate    acetaminophen  650 mg Oral Q4H PRN Usha Quinn PA-C      albuterol  2.5 mg Nebulization Q4H PRN Savanah Garcia MD       atorvastatin  20 mg Oral QAM Usha Quinn PA-C      busPIRone  7.5 mg Oral BID Usha Quinn PA-C      clonazePAM  1 mg Oral HS PRN Usha Quinn PA-C      donepezil  10 mg Oral QAM Usha Quinn PA-C      DULoxetine  30 mg Oral Daily Usha Quinn PA-C      ferrous sulfate  325 mg Oral Daily With Breakfast Usha Quinn PA-C      Fluticasone Furoate-Vilanterol  1 puff Inhalation Daily Usha Quinn PA-C      And    umeclidinium  1 puff Inhalation Daily Usha Quinn PA-C      folic acid  1,000 mcg Oral QAM Usha Quinn PA-C      furosemide  80 mg Intravenous BID (diuretic) Missy Crane DO      influenza vaccine  0.5 mL Intramuscular Prior to discharge Savanah Garcia MD      insulin glargine  5 Units Subcutaneous Daily With Breakfast Usha Quinn PA-C      insulin lispro  1-5 Units Subcutaneous HS Usha Quinn PA-C      insulin lispro  1-6 Units Subcutaneous TID AC Usha Quinn PA-C      insulin lispro  5 Units Subcutaneous TID With Meals Usha Quinn PA-C      levothyroxine  125 mcg Oral Early Morning Usha Quinn PA-C      lidocaine  1 patch Topical Daily Missy Crane DO      loratadine  10 mg Oral QAM Usha Quinn PA-C      magnesium Oxide  400 mg Oral BID Usha Quinn PA-C      melatonin  3 mg Oral HS PRN Ariane Alberts PA-C      metoprolol succinate  25 mg Oral QAM Usha Quinn PA-C      pantoprazole  40 mg Oral BID AC Usha Quinn PA-C      potassium chloride  40 mEq Oral BID Jorgito Coughlin PA-C      rivaroxaban  20 mg Oral Daily With Dinner Usha Quinn PA-C      spironolactone  25 mg Oral Daily Usha Quinn PA-C      Thiamine Mononitrate  100 mg Oral Daily Usha Quinn PA-C          Today, Patient Was Seen By: Jevon Campbell MD    ** Please Note: Dictation voice to text software may have been used in the creation of this document. **

## 2024-10-25 LAB
ALBUMIN SERPL BCG-MCNC: 3.6 G/DL (ref 3.5–5)
ALP SERPL-CCNC: 96 U/L (ref 34–104)
ALT SERPL W P-5'-P-CCNC: 16 U/L (ref 7–52)
ANION GAP SERPL CALCULATED.3IONS-SCNC: 7 MMOL/L (ref 4–13)
AST SERPL W P-5'-P-CCNC: 22 U/L (ref 13–39)
BACTERIA BLD CULT: NORMAL
BACTERIA BLD CULT: NORMAL
BASOPHILS # BLD AUTO: 0.05 THOUSANDS/ΜL (ref 0–0.1)
BASOPHILS NFR BLD AUTO: 0 % (ref 0–1)
BILIRUB SERPL-MCNC: 1.1 MG/DL (ref 0.2–1)
BUN SERPL-MCNC: 22 MG/DL (ref 5–25)
CALCIUM SERPL-MCNC: 8.5 MG/DL (ref 8.4–10.2)
CHLORIDE SERPL-SCNC: 96 MMOL/L (ref 96–108)
CO2 SERPL-SCNC: 34 MMOL/L (ref 21–32)
CREAT SERPL-MCNC: 1.02 MG/DL (ref 0.6–1.3)
EOSINOPHIL # BLD AUTO: 0.16 THOUSAND/ΜL (ref 0–0.61)
EOSINOPHIL NFR BLD AUTO: 1 % (ref 0–6)
ERYTHROCYTE [DISTWIDTH] IN BLOOD BY AUTOMATED COUNT: 14.1 % (ref 11.6–15.1)
GFR SERPL CREATININE-BSD FRML MDRD: 66 ML/MIN/1.73SQ M
GLUCOSE SERPL-MCNC: 164 MG/DL (ref 65–140)
GLUCOSE SERPL-MCNC: 165 MG/DL (ref 65–140)
GLUCOSE SERPL-MCNC: 200 MG/DL (ref 65–140)
GLUCOSE SERPL-MCNC: 203 MG/DL (ref 65–140)
GLUCOSE SERPL-MCNC: 300 MG/DL (ref 65–140)
HCT VFR BLD AUTO: 37 % (ref 36.5–49.3)
HGB BLD-MCNC: 11.3 G/DL (ref 12–17)
IMM GRANULOCYTES # BLD AUTO: 0.06 THOUSAND/UL (ref 0–0.2)
IMM GRANULOCYTES NFR BLD AUTO: 1 % (ref 0–2)
LYMPHOCYTES # BLD AUTO: 4.42 THOUSANDS/ΜL (ref 0.6–4.47)
LYMPHOCYTES NFR BLD AUTO: 39 % (ref 14–44)
MCH RBC QN AUTO: 28.9 PG (ref 26.8–34.3)
MCHC RBC AUTO-ENTMCNC: 30.5 G/DL (ref 31.4–37.4)
MCV RBC AUTO: 95 FL (ref 82–98)
MONOCYTES # BLD AUTO: 1.19 THOUSAND/ΜL (ref 0.17–1.22)
MONOCYTES NFR BLD AUTO: 11 % (ref 4–12)
NEUTROPHILS # BLD AUTO: 5.43 THOUSANDS/ΜL (ref 1.85–7.62)
NEUTS SEG NFR BLD AUTO: 48 % (ref 43–75)
NRBC BLD AUTO-RTO: 0 /100 WBCS
PLATELET # BLD AUTO: 212 THOUSANDS/UL (ref 149–390)
PMV BLD AUTO: 9 FL (ref 8.9–12.7)
POTASSIUM SERPL-SCNC: 4.2 MMOL/L (ref 3.5–5.3)
PROT SERPL-MCNC: 6.6 G/DL (ref 6.4–8.4)
RBC # BLD AUTO: 3.91 MILLION/UL (ref 3.88–5.62)
SODIUM SERPL-SCNC: 137 MMOL/L (ref 135–147)
WBC # BLD AUTO: 11.31 THOUSAND/UL (ref 4.31–10.16)

## 2024-10-25 PROCEDURE — 85025 COMPLETE CBC W/AUTO DIFF WBC: CPT | Performed by: HOSPITALIST

## 2024-10-25 PROCEDURE — 80053 COMPREHEN METABOLIC PANEL: CPT | Performed by: HOSPITALIST

## 2024-10-25 PROCEDURE — 82948 REAGENT STRIP/BLOOD GLUCOSE: CPT

## 2024-10-25 PROCEDURE — 99222 1ST HOSP IP/OBS MODERATE 55: CPT | Performed by: STUDENT IN AN ORGANIZED HEALTH CARE EDUCATION/TRAINING PROGRAM

## 2024-10-25 PROCEDURE — 94760 N-INVAS EAR/PLS OXIMETRY 1: CPT

## 2024-10-25 PROCEDURE — 99232 SBSQ HOSP IP/OBS MODERATE 35: CPT | Performed by: INTERNAL MEDICINE

## 2024-10-25 PROCEDURE — 94640 AIRWAY INHALATION TREATMENT: CPT

## 2024-10-25 PROCEDURE — 99232 SBSQ HOSP IP/OBS MODERATE 35: CPT | Performed by: HOSPITALIST

## 2024-10-25 RX ADMIN — SPIRONOLACTONE 25 MG: 25 TABLET ORAL at 09:34

## 2024-10-25 RX ADMIN — INSULIN LISPRO 1 UNITS: 100 INJECTION, SOLUTION INTRAVENOUS; SUBCUTANEOUS at 11:54

## 2024-10-25 RX ADMIN — BUSPIRONE HYDROCHLORIDE 7.5 MG: 15 TABLET ORAL at 09:34

## 2024-10-25 RX ADMIN — INSULIN LISPRO 1 UNITS: 100 INJECTION, SOLUTION INTRAVENOUS; SUBCUTANEOUS at 22:01

## 2024-10-25 RX ADMIN — Medication 100 MG: at 09:34

## 2024-10-25 RX ADMIN — BUSPIRONE HYDROCHLORIDE 7.5 MG: 15 TABLET ORAL at 17:02

## 2024-10-25 RX ADMIN — POTASSIUM CHLORIDE 40 MEQ: 1500 TABLET, EXTENDED RELEASE ORAL at 09:34

## 2024-10-25 RX ADMIN — INSULIN LISPRO 4 UNITS: 100 INJECTION, SOLUTION INTRAVENOUS; SUBCUTANEOUS at 17:01

## 2024-10-25 RX ADMIN — LORATADINE 10 MG: 10 TABLET ORAL at 09:34

## 2024-10-25 RX ADMIN — Medication 400 MG: at 09:35

## 2024-10-25 RX ADMIN — INSULIN LISPRO 5 UNITS: 100 INJECTION, SOLUTION INTRAVENOUS; SUBCUTANEOUS at 17:02

## 2024-10-25 RX ADMIN — METOPROLOL SUCCINATE 25 MG: 25 TABLET, EXTENDED RELEASE ORAL at 09:35

## 2024-10-25 RX ADMIN — INSULIN LISPRO 5 UNITS: 100 INJECTION, SOLUTION INTRAVENOUS; SUBCUTANEOUS at 11:55

## 2024-10-25 RX ADMIN — FUROSEMIDE 80 MG: 10 INJECTION, SOLUTION INTRAVENOUS at 17:02

## 2024-10-25 RX ADMIN — LEVOTHYROXINE SODIUM 125 MCG: 25 TABLET ORAL at 05:52

## 2024-10-25 RX ADMIN — CLONAZEPAM 1 MG: 1 TABLET ORAL at 21:59

## 2024-10-25 RX ADMIN — RIVAROXABAN 20 MG: 20 TABLET, FILM COATED ORAL at 17:02

## 2024-10-25 RX ADMIN — ACETAMINOPHEN 650 MG: 325 TABLET, FILM COATED ORAL at 07:34

## 2024-10-25 RX ADMIN — INSULIN GLARGINE 5 UNITS: 100 INJECTION, SOLUTION SUBCUTANEOUS at 09:35

## 2024-10-25 RX ADMIN — Medication 400 MG: at 17:02

## 2024-10-25 RX ADMIN — FERROUS SULFATE TAB 325 MG (65 MG ELEMENTAL FE) 325 MG: 325 (65 FE) TAB at 07:34

## 2024-10-25 RX ADMIN — LIDOCAINE 5% 1 PATCH: 700 PATCH TOPICAL at 09:35

## 2024-10-25 RX ADMIN — Medication 3 MG: at 19:48

## 2024-10-25 RX ADMIN — UMECLIDINIUM 1 PUFF: 62.5 AEROSOL, POWDER ORAL at 09:41

## 2024-10-25 RX ADMIN — INSULIN LISPRO 1 UNITS: 100 INJECTION, SOLUTION INTRAVENOUS; SUBCUTANEOUS at 09:40

## 2024-10-25 RX ADMIN — FLUTICASONE FUROATE AND VILANTEROL TRIFENATATE 1 PUFF: 100; 25 POWDER RESPIRATORY (INHALATION) at 09:41

## 2024-10-25 RX ADMIN — DONEPEZIL HYDROCHLORIDE 10 MG: 5 TABLET ORAL at 09:34

## 2024-10-25 RX ADMIN — PANTOPRAZOLE SODIUM 40 MG: 40 TABLET, DELAYED RELEASE ORAL at 17:02

## 2024-10-25 RX ADMIN — ALBUTEROL SULFATE 2.5 MG: 2.5 SOLUTION RESPIRATORY (INHALATION) at 19:13

## 2024-10-25 RX ADMIN — PANTOPRAZOLE SODIUM 40 MG: 40 TABLET, DELAYED RELEASE ORAL at 05:52

## 2024-10-25 RX ADMIN — POTASSIUM CHLORIDE 40 MEQ: 1500 TABLET, EXTENDED RELEASE ORAL at 17:02

## 2024-10-25 RX ADMIN — FUROSEMIDE 80 MG: 10 INJECTION, SOLUTION INTRAVENOUS at 09:39

## 2024-10-25 RX ADMIN — FOLIC ACID 1000 MCG: 1 TABLET ORAL at 09:34

## 2024-10-25 RX ADMIN — ATORVASTATIN CALCIUM 20 MG: 20 TABLET, FILM COATED ORAL at 09:34

## 2024-10-25 RX ADMIN — INSULIN LISPRO 5 UNITS: 100 INJECTION, SOLUTION INTRAVENOUS; SUBCUTANEOUS at 09:40

## 2024-10-25 RX ADMIN — DULOXETINE HYDROCHLORIDE 30 MG: 30 CAPSULE, DELAYED RELEASE ORAL at 09:34

## 2024-10-25 NOTE — ASSESSMENT & PLAN NOTE
Wt Readings from Last 3 Encounters:   10/25/24 93.4 kg (205 lb 12.8 oz)   09/18/24 89.8 kg (198 lb)   07/16/24 89.4 kg (197 lb)       Per medicine

## 2024-10-25 NOTE — ASSESSMENT & PLAN NOTE
Lab Results   Component Value Date    HGBA1C 13.5 (H) 08/28/2024       Recent Labs     10/24/24  1118 10/24/24  1626 10/24/24  2108 10/25/24  0726   POCGLU 198* 159* 187* 165*       Blood Sugar Average: Last 72 hrs:  (P) [id number] regimen: metformin, glipizide, and farxiga  Hold oral medication and place on lantus 5U in AM, humalog 5U TID with meals, SSI AC/HS

## 2024-10-25 NOTE — ASSESSMENT & PLAN NOTE
Lab Results   Component Value Date    HGBA1C 13.5 (H) 08/28/2024       Recent Labs     10/24/24  1626 10/24/24  2108 10/25/24  0726 10/25/24  1136   POCGLU 159* 187* 165* 164*       Blood Sugar Average: Last 72 hrs:  (P) 169  Per medicine

## 2024-10-25 NOTE — ASSESSMENT & PLAN NOTE
Presents from nursing home with worsened dyspnea and low SpO2 long with 20 pound weight gain  Last echo January 2024: LVEF 35-40%.  Severely dilated left atrium.  Mild pulmonary hypertension.  TAVR functioning well.  Home diuretic: lasix 20mg BID and spironolactone 25 mg  Cotninue lasix 80 BID per cards  continue xarelto and metoprolol  Continue spirinolactone  Wean oxygen as tolerated  ECHO with EF 40-45%, mild global hypokinesis  Strict I's and O's. Fluid restriction    I/O last 3 completed shifts:  In: 1944 [P.O.:1944]  Out: 5760 [Urine:5760]  Net IO Since Admission: -7,204 mL [10/25/24 1037]    Weight change: -1.179 kg (-2 lb 9.6 oz)  Wt Readings from Last 3 Encounters:   10/25/24 93.4 kg (205 lb 12.8 oz)   09/18/24 89.8 kg (198 lb)   07/16/24 89.4 kg (197 lb)

## 2024-10-25 NOTE — PROGRESS NOTES
Progress Note - Cardiology   Name: Trevor Lyons 84 y.o. male I MRN: [id number]  Unit/Bed#: [facility] I Date of Admission: 10/19/2024   Date of Service: 10/25/2024 I Hospital Day: 6     Assessment & Plan    Acute on chronic systolic CHF, ICMP, LVEF 35-40% - He continues to diurese well. We discussed the importance of fluid restriction with on-going diuresis. He remains on supplemental O2 although now down to 1L via NC.  Creatinine stable.  Potassium within normal limits.  Volume exam improved. Continue diuresis with lasix 80 mg IV BID today. Ambulate off of O2 early this afternoon to ensure no ambulatory hypoxia. I anticipate he will be ready for PO diuretics tomorrow -> Torsemide 20mg PO BID with a BMP 3-5 days after discharge.   Persistent atrial fibrillation - is ventricular paced, without high rate episodes on PPM. Continue metoprolol XL 25 m g daily. Continue Xarelto daily.  S/p PPM - 98% . Normal device function.   CAD s/p CABG and PCI - No anginal complaints. Continue current therapy. He is on Xarelto for AF therefore not on aspirin.       Subjective     Patient seen and evaluated. No new cardiopulmonary complaints. He denies chest pain. His breathing continues to improve.     Objective :  Temp:  [97.7 °F (36.5 °C)-98 °F (36.7 °C)] 97.9 °F (36.6 °C)  HR:  [65-68] 65  BP: (119-123)/(61-90) 123/90  Resp:  [18] 18  SpO2:  [87 %-99 %] 93 %  O2 Device: Nasal cannula  Nasal Cannula O2 Flow Rate (L/min):  [1 L/min-2 L/min] 1 L/min  Orthostatic Blood Pressures      Flowsheet Row Most Recent Value   Blood Pressure 123/90 filed at 10/25/2024 0729   Patient Position - Orthostatic VS Sitting filed at 10/25/2024 0729          First Weight: Weight - Scale: 100 kg (221 lb) (10/19/24 1634)  Vitals:    10/24/24 0402 10/25/24 0427   Weight: 94.5 kg (208 lb 6.4 oz) 93.4 kg (205 lb 12.8 oz)     Physical Exam  Constitutional:       Appearance: He is well-developed.   HENT:      Head: Normocephalic and atraumatic.   Eyes:       Pupils: Pupils are equal, round, and reactive to light.   Cardiovascular:      Rate and Rhythm: Normal rate and regular rhythm.      Heart sounds: No murmur heard.     No friction rub. No gallop.   Pulmonary:      Effort: Pulmonary effort is normal. No respiratory distress.      Breath sounds: Normal breath sounds. No wheezing or rales.   Abdominal:      General: Bowel sounds are normal. There is no distension.      Palpations: Abdomen is soft.      Tenderness: There is no abdominal tenderness.   Musculoskeletal:         General: No deformity. Normal range of motion.      Cervical back: Normal range of motion and neck supple.      Right lower leg: Edema present.      Left lower leg: Edema present.   Skin:     General: Skin is warm and dry.   Neurological:      Mental Status: He is alert and oriented to person, place, and time.   Psychiatric:         Behavior: Behavior normal.           Lab Results: I have reviewed the following results:  Results from last 7 days   Lab Units 10/25/24  0435 10/24/24  0227 10/23/24  0920   WBC Thousand/uL 11.31* 10.17* 11.96*   HEMOGLOBIN g/dL 11.3* 12.2 12.0   HEMATOCRIT % 37.0 38.7 38.3   PLATELETS Thousands/uL 212 160 228     Results from last 7 days   Lab Units 10/25/24  0435 10/24/24  0402 10/23/24  0920   POTASSIUM mmol/L 4.2 3.9 4.0   CHLORIDE mmol/L 96 96 95*   CO2 mmol/L 34* 34* 33*   BUN mg/dL 22 24 23   CREATININE mg/dL 1.02 0.92 1.02   CALCIUM mg/dL 8.5 8.3* 8.5         Lab Results   Component Value Date    HGBA1C 13.5 (H) 08/28/2024     Lab Results   Component Value Date    TROPONINI 0.04 10/21/2021       Beatriz Molina MD

## 2024-10-25 NOTE — ASSESSMENT & PLAN NOTE
84-year-old man with history of depression and anxiety currently hospitalized for dyspnea in the setting of acute on chronic systolic heart failure.  Set for discharge possibly tomorrow.  Continues to complain of depressed mood.  On my interview endorse depressed mood and anhedonia although denies other symptoms of major depressive episode including poor sleep, appetite, energy, does endorse poor concentration, denies suicidal ideation, intent or plan.  Denies HI or AVH.  We discussed treatment options and he is amenable to an increase in his Cymbalta to assist with depression.  Amenable to continuing BuSpar and Ativan at current doses.  No indication for inpatient psychiatric hospitalization or 302.  Patient should follow-up with outpatient provider for management of his psychiatric medications.

## 2024-10-25 NOTE — CONSULTS
TELEConsultation - Behavioral Health   Trevor Lyons 84 y.o. male MRN: [id number]  Unit/Bed#: -01 Encounter: [id number]    REQUIRED DOCUMENTATION:     1. This service was provided via Telemedicine.  2. Provider located in [place].  3. TeleMed provider: Jerome Sigala MD  4. Identify all parties in room with patient during tele consult: patient  5. After connecting through televideo, patient was identified by name and date of birth. Parent/patient was then informed that this was being conducted confidentially over secure lines. My office door was closed. Parties in the room listed above as per #4.  Patient acknowledged consent and understanding of privacy and security of the Telemedicine visit. The patient is aware this is a billable service and understands that he can discontinue the visit at any time. I informed the patient that I have reviewed their record in Epic and presented the opportunity for them to ask any questions regarding the visit today.  The patient agreed to participate.       Assessment & Plan     Assessment & Plan  Acute on chronic systolic heart failure (HCC)  Wt Readings from Last 3 Encounters:   10/25/24 93.4 kg (205 lb 12.8 oz)   09/18/24 89.8 kg (198 lb)   07/16/24 89.4 kg (197 lb)       Per medicine      Persistent atrial fibrillation (HCC)  Per medicine  Depression with anxiety  84-year-old man with history of depression and anxiety currently hospitalized for dyspnea in the setting of acute on chronic systolic heart failure.  Set for discharge possibly tomorrow.  Continues to complain of depressed mood.  On my interview endorse depressed mood and anhedonia although denies other symptoms of major depressive episode including poor sleep, appetite, energy, does endorse poor concentration, denies suicidal ideation, intent or plan.  Denies HI or AVH.  We discussed treatment options and he is amenable to an increase in his Cymbalta to assist with depression.  Amenable to continuing BuSpar  "and Ativan at current doses.  No indication for inpatient psychiatric hospitalization or 302.  Patient should follow-up with outpatient provider for management of his psychiatric medications.    Pulmonary embolism (HCC)  Per medicine  Cardiac pacemaker  Per medicine  Pulmonary hypertension (HCC)  Per medicine  Type 2 diabetes mellitus without complication, without long-term current use of insulin (HCC)  Lab Results   Component Value Date    HGBA1C 13.5 (H) 08/28/2024       Recent Labs     10/24/24  1626 10/24/24  2108 10/25/24  0726 10/25/24  1136   POCGLU 159* 187* 165* 164*       Blood Sugar Average: Last 72 hrs:  (P) 169  Per medicine  COPD (chronic obstructive pulmonary disease) (HCC)  Per medicine  Coronary artery disease involving native coronary artery of native heart without angina pectoris  Per medicine  Acquired hypothyroidism  Per medicine  Acute respiratory insufficiency  Per medicine  Anemia  Per medicine  SIRS (systemic inflammatory response syndrome) (HCC)  Per medicine  History of transcatheter aortic valve replacement (TAVR)  Per medicine  Hypokalemia  Per medicine    Recommendations:   --Increase Cymbalta to 60 mg daily to assist with depressed mood  --Continue BuSpar and Ativan at current doses  --Please TigerText with any questions or concerns.    Diagnoses, available treatment options, alternatives to treatment, and risks vs. benefits of current psychiatric treatment plan were discussed with the patient.  Prior records were reviewed in [employer].  The case was discussed with the primary team.    Chief Complaint: \"I have been feeling a little depressed\"     History of Present Illness   Physician Requesting Consult: Jevon Campbell MD    Reason for Consult / Principal Problem: depression    Patient is a 84-year-old man admitted to the hospital for acute on chronic systolic heart failure.  Has been complaining to his primary team about depression and anxiety psychiatry was consulted for possible " medication adjustment.    On evaluation,     Patient was calm and cooperative with interview.  He appeared pleasant and alert. Organized thought process and behavior.  He reports feeling depressed and boredom while in the hospital.  Reports that he is looking forward to discharge.  Reports that at home he watches a lot of racing and has things that he enjoys doing.  Denies any trouble with sleep or appetite.  Denies any trouble with energy although he does endorse some problems with concentration.  Denies suicidal ideations intention, plan.  Denies HI or AVH.  We discussed treatment options and he is amenable to increasing dose of his antidepressant to 60 mg of Cymbalta daily.  He reports that he is not sure whether BuSpar or Ativan are helping his anxiety and we discussed that the increased dose of Cymbalta can help with his anxiety long-term as well.  He is amenable to this treatment plan and agrees to follow-up with outpatient provider.     Psychiatric Review Of Systems:  Medication side effects: none  Sleep: no change  Appetite: no change  Hygiene: able to tend to instrumental and basic ADLs  Anxiety Symptoms: denies  Psychotic Symptoms: denies  Depression Symptoms:  Endorses  Manic Symptoms: denies  PTSD Symptoms: denies  Suicidal Thoughts: denies  Homicidal Thoughts: denies    Historical Information   Psychiatric History:   Diagnoses:  anxiety and depression   Inpatient Hx: none  Outpatient Hx: yes  Medications/Trials: cymbalta, ativan, buspar    Substance Abuse History:    Social History     Substance and Sexual Activity   Alcohol Use Not Currently     Social History     Substance and Sexual Activity   Drug Use Never       I discussed substance abuse with the patient and, if pertinent, discussed risks vs benefits of decreasing frequency of use.    Family History:   Family History   Problem Relation Age of Onset    Dementia Mother     Heart disease Father     Stroke Father     Thyroid disease Daughter      Thyroid disease Daughter        Social History  Rest of social history as per below:  Social History     Socioeconomic History    Marital status: Unknown     Spouse name: Not on file    Number of children: Not on file    Years of education: Not on file    Highest education level: Not on file   Occupational History    Not on file   Tobacco Use    Smoking status: Former     Types: Cigarettes    Smokeless tobacco: Never   Vaping Use    Vaping status: Never Used   Substance and Sexual Activity    Alcohol use: Not Currently    Drug use: Never    Sexual activity: Not Currently   Other Topics Concern    Not on file   Social History Narrative    Not on file     Social Determinants of Health     Financial Resource Strain: Low Risk  (8/27/2023)    Received from [employer] [facility], [facility]    Overall Financial Resource Strain (CARDIA)     Difficulty of Paying Living Expenses: Not hard at all   Food Insecurity: No Food Insecurity (10/21/2024)    Nursing - Inadequate Food Risk Classification     Worried About Running Out of Food in the Last Year: Never true     Ran Out of Food in the Last Year: Never true     Ran Out of Food in the Last Year: 1   Transportation Needs: No Transportation Needs (10/21/2024)    Nursing - Transportation Risk Classification     Lack of Transportation: Not on file     Lack of Transportation: 2   Physical Activity: Not on file   Stress: Not on file   Social Connections: Feeling Socially Integrated (11/8/2023)    Received from [facility], [facility]    OASIS : Social Isolation     Frequency of experiencing loneliness or isolation: Rarely   Intimate Partner Violence: Unknown (10/21/2024)    Nursing IPS     Feels Physically and Emotionally Safe: Not on file     Physically Hurt by Someone: Not on file     Humiliated or Emotionally Abused by Someone: Not on file     Physically Hurt by Someone: 2     Hurt or Threatened by Someone: 2    Housing Stability: Unknown (10/21/2024)    Nursing: Inadequate Housing Risk Classification     Has Housing: Not on file     Worried About Losing Housing: Not on file     Unable to Get Utilities: Not on file     Unable to Pay for Housing in the Last Year: 2     Has Housin       Past Medical History:   Diagnosis Date    Alzheimer disease (HCC)     Anemia     Aneurysm of ascending aorta without rupture (HCC)     Anxiety     Atrial fibrillation (HCC)     CHF (congestive heart failure) (HCC)     Depression     Diabetes mellitus (HCC)     Disease of thyroid gland     Pulmonary hypertension (HCC)     Sciatica     Sick sinus syndrome (HCC)            Medical Review Of Systems:  Patient denies headache or dizziness.   Patient denies chest pain or palpitations.  Patient denies difficulty breathing or wheezing.  Patient denies nausea, vomiting, or diarrhea.  Patient denies polyuria or polydipsia.  Patient denies weakness or numbness.  Pertinent positives as per HPI.    Meds/Allergies   No Known Allergies    Current Facility-Administered Medications:     acetaminophen (TYLENOL) tablet 650 mg, Q4H PRN    albuterol inhalation solution 2.5 mg, Q4H PRN    atorvastatin (LIPITOR) tablet 20 mg, QAM    busPIRone (BUSPAR) tablet 7.5 mg, BID    clonazePAM (KlonoPIN) tablet 1 mg, HS PRN    dextromethorphan-guaiFENesin (ROBITUSSIN DM) oral syrup 10 mL, Q4H PRN    donepezil (ARICEPT) tablet 10 mg, QAM    DULoxetine (CYMBALTA) delayed release capsule 30 mg, Daily    ferrous sulfate tablet 325 mg, Daily With Breakfast    Fluticasone Furoate-Vilanterol 100-25 mcg/actuation 1 puff, Daily **AND** umeclidinium 62.5 mcg/actuation inhaler AEPB 1 puff, Daily    folic acid (FOLVITE) tablet 1,000 mcg, QAM    furosemide (LASIX) injection 80 mg, BID (diuretic)    influenza vaccine, high-dose (Fluzone High-Dose) IM injection 0.5 mL, Prior to discharge    insulin glargine (LANTUS) subcutaneous injection 5 Units 0.05 mL, Daily With Breakfast     "insulin lispro (HumALOG/ADMELOG) 100 units/mL subcutaneous injection 1-5 Units, HS    insulin lispro (HumALOG/ADMELOG) 100 units/mL subcutaneous injection 1-6 Units, TID AC **AND** Fingerstick Glucose (POCT), 4x Daily AC and at bedtime    insulin lispro (HumALOG/ADMELOG) 100 units/mL subcutaneous injection 5 Units, TID With Meals    levothyroxine tablet 125 mcg, Early Morning    lidocaine (LIDODERM) 5 % patch 1 patch, Daily    loratadine (CLARITIN) tablet 10 mg, QAM    magnesium Oxide (MAG-OX) tablet 400 mg, BID    melatonin tablet 3 mg, HS PRN    metoprolol succinate (TOPROL-XL) 24 hr tablet 25 mg, QAM    pantoprazole (PROTONIX) EC tablet 40 mg, BID AC    [COMPLETED] potassium chloride (Klor-Con M20) CR tablet 40 mEq, TID With Meals **FOLLOWED BY** potassium chloride (Klor-Con M20) CR tablet 40 mEq, BID    rivaroxaban (XARELTO) tablet 20 mg, Daily With Dinner    spironolactone (ALDACTONE) tablet 25 mg, Daily    thiamine tablet 100 mg, Daily    Current Medications:  Current medications as per above. All medications have been reviewed.   Risks, benefits, alternatives, and possible side effects of patient's psychiatric medications were discussed with patient.     Objective   Vital signs in last 24 hours:  Temp:  [97.9 °F (36.6 °C)-98 °F (36.7 °C)] 97.9 °F (36.6 °C)  HR:  [65-68] 65  BP: (119-123)/(61-90) 123/90  Resp:  [18] 18  SpO2:  [87 %-96 %] 93 %  O2 Device: Nasal cannula  Nasal Cannula O2 Flow Rate (L/min):  [1 L/min-2 L/min] 1 L/min    Mental Status Exam:  Appearance: casually dressed, appears consistent with stated age  Motor: no psychomotor disturbances, no gait abnormalities  Behavior: cooperative, interacts with this writer appropriately  Speech: normal rate, rhythm, and volume  Mood: \"good\"  Affect: euthymic, normal range and intensity  Thought Process: organized, linear, and goal-oriented  Thought Content: denies auditory hallucinations, denies visual hallucinations, denies delusions  Risk Potential: " denies suicidal ideation, plan, or intent. Denies homicidal ideation  Sensorium: Oriented to person, place, time, and situation  Cognition: cognitive ability appears mildly impaired but was not quantitatively tested  Consciousness: alert and awake  Attention: able to focus without difficulty  Insight: Fair  Judgement: Fair      Laboratory results:  I have personally reviewed all pertinent laboratory/tests results.  Recent Results (from the past 48 hour(s))   Fingerstick Glucose (POCT)    Collection Time: 10/23/24  4:13 PM   Result Value Ref Range    POC Glucose 135 65 - 140 mg/dl   Fingerstick Glucose (POCT)    Collection Time: 10/23/24  8:47 PM   Result Value Ref Range    POC Glucose 142 (H) 65 - 140 mg/dl   CBC and differential    Collection Time: 10/24/24  2:27 AM   Result Value Ref Range    WBC 10.17 (H) 4.31 - 10.16 Thousand/uL    RBC 4.04 3.88 - 5.62 Million/uL    Hemoglobin 12.2 12.0 - 17.0 g/dL    Hematocrit 38.7 36.5 - 49.3 %    MCV 96 82 - 98 fL    MCH 30.2 26.8 - 34.3 pg    MCHC 31.5 31.4 - 37.4 g/dL    RDW 14.3 11.6 - 15.1 %    MPV 10.8 8.9 - 12.7 fL    Platelets 160 149 - 390 Thousands/uL    nRBC 0 /100 WBCs    Segmented % 50 43 - 75 %    Immature Grans % 1 0 - 2 %    Lymphocytes % 37 14 - 44 %    Monocytes % 11 4 - 12 %    Eosinophils Relative 1 0 - 6 %    Basophils Relative 0 0 - 1 %    Absolute Neutrophils 5.14 1.85 - 7.62 Thousands/µL    Absolute Immature Grans 0.05 0.00 - 0.20 Thousand/uL    Absolute Lymphocytes 3.77 0.60 - 4.47 Thousands/µL    Absolute Monocytes 1.07 0.17 - 1.22 Thousand/µL    Eosinophils Absolute 0.11 0.00 - 0.61 Thousand/µL    Basophils Absolute 0.03 0.00 - 0.10 Thousands/µL   Comprehensive metabolic panel    Collection Time: 10/24/24  4:02 AM   Result Value Ref Range    Sodium 136 135 - 147 mmol/L    Potassium 3.9 3.5 - 5.3 mmol/L    Chloride 96 96 - 108 mmol/L    CO2 34 (H) 21 - 32 mmol/L    ANION GAP 6 4 - 13 mmol/L    BUN 24 5 - 25 mg/dL    Creatinine 0.92 0.60 - 1.30 mg/dL     Glucose 187 (H) 65 - 140 mg/dL    Calcium 8.3 (L) 8.4 - 10.2 mg/dL    Corrected Calcium 8.8 8.3 - 10.1 mg/dL    AST 24 13 - 39 U/L    ALT 16 7 - 52 U/L    Alkaline Phosphatase 90 34 - 104 U/L    Total Protein 6.1 (L) 6.4 - 8.4 g/dL    Albumin 3.4 (L) 3.5 - 5.0 g/dL    Total Bilirubin 1.07 (H) 0.20 - 1.00 mg/dL    eGFR 75 ml/min/1.73sq m   Fingerstick Glucose (POCT)    Collection Time: 10/24/24  7:16 AM   Result Value Ref Range    POC Glucose 240 (H) 65 - 140 mg/dl   Fingerstick Glucose (POCT)    Collection Time: 10/24/24 11:18 AM   Result Value Ref Range    POC Glucose 198 (H) 65 - 140 mg/dl   Fingerstick Glucose (POCT)    Collection Time: 10/24/24  4:26 PM   Result Value Ref Range    POC Glucose 159 (H) 65 - 140 mg/dl   Fingerstick Glucose (POCT)    Collection Time: 10/24/24  9:08 PM   Result Value Ref Range    POC Glucose 187 (H) 65 - 140 mg/dl   CBC and differential    Collection Time: 10/25/24  4:35 AM   Result Value Ref Range    WBC 11.31 (H) 4.31 - 10.16 Thousand/uL    RBC 3.91 3.88 - 5.62 Million/uL    Hemoglobin 11.3 (L) 12.0 - 17.0 g/dL    Hematocrit 37.0 36.5 - 49.3 %    MCV 95 82 - 98 fL    MCH 28.9 26.8 - 34.3 pg    MCHC 30.5 (L) 31.4 - 37.4 g/dL    RDW 14.1 11.6 - 15.1 %    MPV 9.0 8.9 - 12.7 fL    Platelets 212 149 - 390 Thousands/uL    nRBC 0 /100 WBCs    Segmented % 48 43 - 75 %    Immature Grans % 1 0 - 2 %    Lymphocytes % 39 14 - 44 %    Monocytes % 11 4 - 12 %    Eosinophils Relative 1 0 - 6 %    Basophils Relative 0 0 - 1 %    Absolute Neutrophils 5.43 1.85 - 7.62 Thousands/µL    Absolute Immature Grans 0.06 0.00 - 0.20 Thousand/uL    Absolute Lymphocytes 4.42 0.60 - 4.47 Thousands/µL    Absolute Monocytes 1.19 0.17 - 1.22 Thousand/µL    Eosinophils Absolute 0.16 0.00 - 0.61 Thousand/µL    Basophils Absolute 0.05 0.00 - 0.10 Thousands/µL   Comprehensive metabolic panel    Collection Time: 10/25/24  4:35 AM   Result Value Ref Range    Sodium 137 135 - 147 mmol/L    Potassium 4.2 3.5 - 5.3  mmol/L    Chloride 96 96 - 108 mmol/L    CO2 34 (H) 21 - 32 mmol/L    ANION GAP 7 4 - 13 mmol/L    BUN 22 5 - 25 mg/dL    Creatinine 1.02 0.60 - 1.30 mg/dL    Glucose 200 (H) 65 - 140 mg/dL    Calcium 8.5 8.4 - 10.2 mg/dL    AST 22 13 - 39 U/L    ALT 16 7 - 52 U/L    Alkaline Phosphatase 96 34 - 104 U/L    Total Protein 6.6 6.4 - 8.4 g/dL    Albumin 3.6 3.5 - 5.0 g/dL    Total Bilirubin 1.10 (H) 0.20 - 1.00 mg/dL    eGFR 66 ml/min/1.73sq m   Fingerstick Glucose (POCT)    Collection Time: 10/25/24  7:26 AM   Result Value Ref Range    POC Glucose 165 (H) 65 - 140 mg/dl   Fingerstick Glucose (POCT)    Collection Time: 10/25/24 11:36 AM   Result Value Ref Range    POC Glucose 164 (H) 65 - 140 mg/dl        I have spent a total time of 45 minutes in caring for this patient on the day of the visit/encounter including Risks and benefits of tx options, Instructions for management, Patient and family education, Importance of tx compliance, Impressions, Counseling / Coordination of care, Documenting in the medical record, Reviewing / ordering tests, medicine, procedures  , Obtaining or reviewing history  , and Communicating with other healthcare professionals .     Jerome Sigala MD    This note has been constructed using a voice recognition system. There may be translation, syntax, or grammatical errors. If you have any questions, please contact the dictating provider.

## 2024-10-25 NOTE — PLAN OF CARE
Problem: Potential for Falls  Goal: Patient will remain free of falls  Description: INTERVENTIONS:  - Educate patient/family on patient safety including physical limitations  - Instruct patient to call for assistance with activity   - Consult OT/PT to assist with strengthening/mobility   - Keep Call bell within reach  - Keep bed low and locked with side rails adjusted as appropriate  - Keep care items and personal belongings within reach  - Initiate and maintain comfort rounds  - Make Fall Risk Sign visible to staff  - Offer Toileting every 2 Hours, in advance of need  - Initiate/Maintain bed/chair alarm  - Obtain necessary fall risk management equipment: yellow bracelet, yellow socks, bed/chair alarm  - Apply yellow socks and bracelet for high fall risk patients  - Consider moving patient to room near nurses station  Outcome: Progressing     Problem: PAIN - ADULT  Goal: Verbalizes/displays adequate comfort level or baseline comfort level  Description: Interventions:  - Encourage patient to monitor pain and request assistance  - Assess pain using appropriate pain scale  - Administer analgesics based on type and severity of pain and evaluate response  - Implement non-pharmacological measures as appropriate and evaluate response  - Consider cultural and social influences on pain and pain management  - Notify physician/advanced practitioner if interventions unsuccessful or patient reports new pain  Outcome: Progressing     Problem: SAFETY ADULT  Goal: Patient will remain free of falls  Description: INTERVENTIONS:  - Educate patient/family on patient safety including physical limitations  - Instruct patient to call for assistance with activity   - Consult OT/PT to assist with strengthening/mobility   - Keep Call bell within reach  - Keep bed low and locked with side rails adjusted as appropriate  - Keep care items and personal belongings within reach  - Initiate and maintain comfort rounds  - Make Fall Risk Sign  visible to staff  - Offer Toileting every 2 Hours, in advance of need  - Initiate/Maintain bed alarm  - Obtain necessary fall risk management equipment: yellow bracelet, yellow socks, bed/chair alarm  - Apply yellow socks and bracelet for high fall risk patients  - Consider moving patient to room near nurses station  Outcome: Progressing     Problem: CARDIOVASCULAR - ADULT  Goal: Maintains optimal cardiac output and hemodynamic stability  Description: INTERVENTIONS:  - Monitor I/O, vital signs and rhythm  - Monitor for S/S and trends of decreased cardiac output  - Administer and titrate ordered vasoactive medications to optimize hemodynamic stability  - Assess quality of pulses, skin color and temperature  - Assess for signs of decreased coronary artery perfusion  - Instruct patient to report change in severity of symptoms  Outcome: Progressing  Goal: Absence of cardiac dysrhythmias or at baseline rhythm  Description: INTERVENTIONS:  - Continuous cardiac monitoring, vital signs, obtain 12 lead EKG if ordered  - Administer antiarrhythmic and heart rate control medications as ordered  - Monitor electrolytes and administer replacement therapy as ordered  Outcome: Progressing

## 2024-10-25 NOTE — PLAN OF CARE
Problem: Potential for Falls  Goal: Patient will remain free of falls  Description: INTERVENTIONS:  - Educate patient/family on patient safety including physical limitations  - Instruct patient to call for assistance with activity   - Consult OT/PT to assist with strengthening/mobility   - Keep Call bell within reach  - Keep bed low and locked with side rails adjusted as appropriate  - Keep care items and personal belongings within reach  - Initiate and maintain comfort rounds  - Make Fall Risk Sign visible to staff  - Offer Toileting every 2 Hours, in advance of need  - Initiate/Maintain bed alarm  - Obtain necessary fall risk management equipment:   - Apply yellow socks and bracelet for high fall risk patients  - Consider moving patient to room near nurses station  Outcome: Progressing     Problem: PAIN - ADULT  Goal: Verbalizes/displays adequate comfort level or baseline comfort level  Description: Interventions:  - Encourage patient to monitor pain and request assistance  - Assess pain using appropriate pain scale  - Administer analgesics based on type and severity of pain and evaluate response  - Implement non-pharmacological measures as appropriate and evaluate response  - Consider cultural and social influences on pain and pain management  - Notify physician/advanced practitioner if interventions unsuccessful or patient reports new pain  Outcome: Progressing     Problem: INFECTION - ADULT  Goal: Absence or prevention of progression during hospitalization  Description: INTERVENTIONS:  - Assess and monitor for signs and symptoms of infection  - Monitor lab/diagnostic results  - Monitor all insertion sites, i.e. indwelling lines, tubes, and drains  - Monitor endotracheal if appropriate and nasal secretions for changes in amount and color  - [place] appropriate cooling/warming therapies per order  - Administer medications as ordered  - Instruct and encourage patient and family to use good hand hygiene  technique  - Identify and instruct in appropriate isolation precautions for identified infection/condition  Outcome: Progressing  Goal: Absence of fever/infection during neutropenic period  Description: INTERVENTIONS:  - Monitor WBC    Outcome: Progressing     Problem: SAFETY ADULT  Goal: Patient will remain free of falls  Description: INTERVENTIONS:  - Educate patient/family on patient safety including physical limitations  - Instruct patient to call for assistance with activity   - Consult OT/PT to assist with strengthening/mobility   - Keep Call bell within reach  - Keep bed low and locked with side rails adjusted as appropriate  - Keep care items and personal belongings within reach  - Initiate and maintain comfort rounds  - Make Fall Risk Sign visible to staff  - Offer Toileting every 2 Hours, in advance of need  - Initiate/Maintain bed alarm  - Obtain necessary fall risk management equipment:   - Apply yellow socks and bracelet for high fall risk patients  - Consider moving patient to room near nurses station  Outcome: Progressing

## 2024-10-25 NOTE — CASE MANAGEMENT
Case Management Discharge Planning Note    Patient name Trevor Lyons  Location /-01 MRN [id number]  : 1940 Date 10/25/2024       Current Admission Date: 10/19/2024  Current Admission Diagnosis:Acute on chronic systolic heart failure ([facility])   Patient Active Problem List    Diagnosis Date Noted Date Diagnosed    History of transcatheter aortic valve replacement (TAVR) 10/22/2024     Hypokalemia 10/22/2024     Acute respiratory insufficiency 10/19/2024     Anemia 10/19/2024     SIRS (systemic inflammatory response syndrome) ([facility]) 10/19/2024     Acute on chronic systolic heart failure ([facility]) 2024     Moderate Alzheimer's dementia, unspecified timing of dementia onset, unspecified whether behavioral, psychotic, or mood disturbance or anxiety ([facility]) 2024     Depression, recurrent ([facility]) 2024     Coronary artery disease involving native coronary artery of native heart without angina pectoris 2024     S/P CABG (coronary artery bypass graft) 2024     S/P TAVR (transcatheter aortic valve replacement) 2024     DDD (degenerative disc disease), lumbosacral 2024     COPD (chronic obstructive pulmonary disease) ([facility]) 2024     Recurrent falls 2024     Persistent atrial fibrillation ([facility]) 2024     Chronic combined systolic and diastolic CHF (congestive heart failure) ([facility]) 2024     Depression with anxiety 2024     NSVT (nonsustained ventricular tachycardia) ([facility]) 2024     Orthostatic hypotension 2024     Pulmonary embolism ([facility]) 2024     Cardiac pacemaker 2024     Alzheimer disease ([facility]) 2024     Pulmonary hypertension ([facility]) 2024     Sick sinus syndrome ([facility]) 2024     Dextroscoliosis 2024     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 2024     Mixed hyperlipidemia 2024     Type 2 diabetes mellitus without complication, without long-term current use of insulin ([facility]) 2024     Osteopenia of  multiple sites 03/12/2024     Chronic left-sided low back pain with left-sided sciatica 03/12/2024     Aneurysm of ascending aorta without rupture (HCC) 03/12/2024     Acquired hypothyroidism 11/10/2022     Bilateral carotid bruits 11/11/2019       LOS (days): 6  Geometric Mean LOS (GMLOS) (days): 3.9  Days to GMLOS:-1.8     OBJECTIVE:  Risk of Unplanned Readmission Score: 20.08         Current admission status: Inpatient   Preferred Pharmacy:   [id number] - [place], [place] - [address] [place] [address]  Phone: [phone] Fax: [phone]    [facility] - [place], [place] - [address] [place] [address]  Phone: [phone] Fax: [phone]    Primary Care Provider: Ira Borden DO    Primary Insurance: MEDICARE MISC REPLACEMENT  Secondary Insurance:     DISCHARGE DETAILS:                                                                 CM spoke with patient's daughter Marisa to inform her that patient may discharge tomorrow. Marisa was agreeable, informing CM that she could offer transport tomorrow. Cm further spoke with [facility]'s Nurse Anju. Anju is agreeable to plan and can accept pt back.                        IMM Given (Date):: 10/25/24  IMM Given to:: Patient   IMM reviewed with patient, patient agrees with discharge determination.

## 2024-10-25 NOTE — PROGRESS NOTES
Progress Note - Hospitalist   Name: Trevor Lyons 84 y.o. male I MRN: [id number]  Unit/Bed#: [facility]-01 I Date of Admission: 10/19/2024   Date of Service: 10/25/2024 I Hospital Day: 6     Assessment & Plan  Acute on chronic systolic heart failure (HCC)  Presents from nursing home with worsened dyspnea and low SpO2 long with 20 pound weight gain  Last echo January 2024: LVEF 35-40%.  Severely dilated left atrium.  Mild pulmonary hypertension.  TAVR functioning well.  Home diuretic: lasix 20mg BID and spironolactone 25 mg  Cotninue lasix 80 BID per cards  continue xarelto and metoprolol  Continue spirinolactone  Wean oxygen as tolerated  ECHO with EF 40-45%, mild global hypokinesis  Strict I's and O's. Fluid restriction    I/O last 3 completed shifts:  In: 1944 [P.O.:1944]  Out: 5760 [Urine:5760]  Net IO Since Admission: -7,204 mL [10/25/24 1037]    Weight change: -1.179 kg (-2 lb 9.6 oz)  Wt Readings from Last 3 Encounters:   10/25/24 93.4 kg (205 lb 12.8 oz)   09/18/24 89.8 kg (198 lb)   07/16/24 89.4 kg (197 lb)     Hypokalemia  Monitor in the setting of diuretics  Continue to monitor and replete as needed  Acute respiratory insufficiency  SpO2 87% on room air  Improved on 1 L nasal cannula  Suspect secondary to acute on chronic systolic heart failure  Wean oxygen as able, does not use oxygen at home  Anemia  Hgb 11.4 on admit  Baseline Cr 10-12   Trend CBC   SIRS (systemic inflammatory response syndrome) ([facility])  SIRS criteria: Tachypnea and leukocytosis  Initially treated with antibiotics for possible atypical pneumonia in the ED, however procalcitonin is negative and patient examines more like acute on chronic systolic heart failure and denies any fever or productive cough   Hold on further antibiotics at this time  If morning procalcitonin increases-would start ceftriaxone   Follow-up blood cultures  COPD (chronic obstructive pulmonary disease) ([facility])  Home regimen: Trelegy daily and albuterol as needed  No  expiratory wheezing appreciated on admission, do not suspect acute exacerbation at this time  Continue home regimen  Type 2 diabetes mellitus without complication, without long-term current use of insulin (HCC)  Lab Results   Component Value Date    HGBA1C 13.5 (H) 08/28/2024       Recent Labs     10/24/24  1118 10/24/24  1626 10/24/24  2108 10/25/24  0726   POCGLU 198* 159* 187* 165*       Blood Sugar Average: Last 72 hrs:  (P) 169.4687249109370424Cuue regimen: metformin, glipizide, and farxiga  Hold oral medication and place on lantus 5U in AM, humalog 5U TID with meals, SSI AC/HS  Coronary artery disease involving native coronary artery of native heart without angina pectoris  CAD status post PCI and CABG  Continue home beta-blocker, statin, and Xarelto  Persistent atrial fibrillation (HCC)  RC: Metoprolol succinate 25 Mg daily  AC: Xarelto  Rate controlled, v paced  Cardiac pacemaker  [place] s/p SSS   Last device interrogation 07/2024 showed normal device function   Pulmonary embolism (HCC)  Continue home Xarelto   CTA yesterday with no signs of PE  Pulmonary hypertension (HCC)  Most recent echo January 2024 showing mild pulmonary hypertension with RSVP 39 mmHg  Continue home medications  Acquired hypothyroidism  TSH elevated at 8.358  Increase synthroid to 125mcg daily   Recheck TSH in 6-8 weeks   Depression with anxiety  Continue home buspar, cymbalta, and klonopin   History of transcatheter aortic valve replacement (TAVR)     VTE  Prophylaxis:   Pharmacologic: in place  Mechanical VTE Prophylaxis in Place: Yes    Patient Centered Rounds: I have performed bedside rounds with nursing staff today.    Discussions with Specialists or Other Care Team Provider: case management    Education and Discussions with Family / Patient: yes    Mobility:   Basic Mobility Inpatient Raw Score: 22  JH-HLM Goal: 7: Walk 25 feet or more  JH-HLM Achieved: 7: Walk 25 feet or more      Current Length of Stay: 6 day(s)    Current  Patient Status: Inpatient        Code Status: Level 1 - Full Code    Discharge Plan: Pt will require continued inpatient hospitalization.    Subjective:     Pt remains on o2 though dependency is decreasing  Pt still reports ongoing LE edema     Patient is seen and examined at bedside.  All other ROS are negative.    Objective:     Vitals:   Temp (24hrs), Av.9 °F (36.6 °C), Min:97.7 °F (36.5 °C), Max:98 °F (36.7 °C)    Temp:  [97.7 °F (36.5 °C)-98 °F (36.7 °C)] 97.9 °F (36.6 °C)  HR:  [65-68] 65  Resp:  [18] 18  BP: (119-123)/(61-90) 123/90  SpO2:  [87 %-99 %] 93 %  Body mass index is 31.29 kg/m².     Input and Output Summary (last 24 hours):       Intake/Output Summary (Last 24 hours) at 10/25/2024 1038  Last data filed at 10/25/2024 0918  Gross per 24 hour   Intake 2134 ml   Output 2945 ml   Net -811 ml       Physical Exam:       GEN: No acute distress, comfortable on o2  HEEENT: No JVD, PERRLA, no scleral icterus  RESP: Lungs clear to auscultation bilaterally  CV: RRR, +s1/s2   ABD: SOFT NON TENDER, POSITIVE BOWEL SOUNDS, NO DISTENTION  PSYCH: CALM  NEURO: [place] baseline, NO FOCAL DEFICITS  SKIN: NO RASH  EXTREM: LE EDEMA    Additional Data:     Labs:    Results from last 7 days   Lab Units 10/25/24  0435   WBC Thousand/uL 11.31*   HEMOGLOBIN g/dL 11.3*   HEMATOCRIT % 37.0   PLATELETS Thousands/uL 212   SEGS PCT % 48   LYMPHO PCT % 39   MONO PCT % 11   EOS PCT % 1     Results from last 7 days   Lab Units 10/25/24  0435   SODIUM mmol/L 137   POTASSIUM mmol/L 4.2   CHLORIDE mmol/L 96   CO2 mmol/L 34*   BUN mg/dL 22   CREATININE mg/dL 1.02   ANION GAP mmol/L 7   CALCIUM mg/dL 8.5   ALBUMIN g/dL 3.6   TOTAL BILIRUBIN mg/dL 1.10*   ALK PHOS U/L 96   ALT U/L 16   AST U/L 22   GLUCOSE RANDOM mg/dL 200*         Results from last 7 days   Lab Units 10/25/24  0726 10/24/24  2108 10/24/24  1626 10/24/24  1118 10/24/24  0716 10/23/24  2047 10/23/24  1613 10/23/24  1106 10/23/24  0735 10/22/24  2107 10/22/24  1558  10/22/24  1139   POC GLUCOSE mg/dl 165* 187* 159* 198* 240* 142* 135 203* 128 162* 184* 162*         Results from last 7 days   Lab Units 10/19/24  2126   LACTIC ACID mmol/L 1.2   PROCALCITONIN ng/ml 0.08       Lines/Drains:  Invasive Devices       Peripheral Intravenous Line  Duration             Peripheral IV 10/24/24 Dorsal (posterior);Left Wrist 1 day                    Telemetry:   Telemetry Orders (From admission, onward)               24 Hour Telemetry Monitoring  Continuous x 24 Hours (Telem)        Question:  Reason for 24 Hour Telemetry  Answer:  Decompensated CHF- and any one of the following: continuous diuretic infusion or total diuretic dose >200 mg daily, associated electrolyte derangement (I.e. K < 3.0), ionotropic drip (continuous infusion), hx of ventricular arrhythmia, or new EF < 35%                        * I Have Reviewed All Lab Data Listed Above.           Imaging:     Results for orders placed during the hospital encounter of 10/19/24    XR chest 1 view portable    Narrative  XR CHEST PORTABLE    INDICATION: SOB.    COMPARISON: Chest CT 10/19/2024.    FINDINGS:    Mild pulmonary edema. Small right pleural effusion.    Moderate cardiomegaly, CABG, left subclavian pacemaker leads in right atrium and right ventricle.    Bones are unremarkable for age.    Normal upper abdomen. Mild elevation of the left diaphragm.    Impression  Mild pulmonary edema with small right pleural effusion.        Workstation performed: [id number]    No results found for this or any previous visit.      *I have reviewed all imaging reports listed above      Recent Cultures (last 7 days):     Results from last 7 days   Lab Units 10/19/24  2116   BLOOD CULTURE  No Growth After 5 Days.  No Growth After 5 Days.       Last 24 Hours Medication List:   Current Facility-Administered Medications   Medication Dose Route Frequency Provider Last Rate    acetaminophen  650 mg Oral Q4H PRN Usha Quinn PA-C      albuterol   2.5 mg Nebulization Q4H PRN Savanah Garcia MD      atorvastatin  20 mg Oral QAM Usha Quinn PA-C      busPIRone  7.5 mg Oral BID Usha Quinn PA-C      clonazePAM  1 mg Oral HS PRN Usha Quinn PA-C      dextromethorphan-guaiFENesin  10 mL Oral Q4H PRN Jevon Campbell MD      donepezil  10 mg Oral QAM Usha Quinn PA-C      DULoxetine  30 mg Oral Daily Usha Quinn PA-C      ferrous sulfate  325 mg Oral Daily With Breakfast Usha Quinn PA-C      Fluticasone Furoate-Vilanterol  1 puff Inhalation Daily Usha Quinn PA-C      And    umeclidinium  1 puff Inhalation Daily Usha Quinn PA-C      folic acid  1,000 mcg Oral QAM Usha Quinn PA-C      furosemide  80 mg Intravenous BID (diuretic) Missy Crane DO      influenza vaccine  0.5 mL Intramuscular Prior to discharge Savanah Garcia MD      insulin glargine  5 Units Subcutaneous Daily With Breakfast Usha Quinn PA-C      insulin lispro  1-5 Units Subcutaneous HS Usha Quinn PA-C      insulin lispro  1-6 Units Subcutaneous TID AC Usha Quinn PA-C      insulin lispro  5 Units Subcutaneous TID With Meals Usha Quinn PA-C      levothyroxine  125 mcg Oral Early Morning Usha Quinn PA-C      lidocaine  1 patch Topical Daily Missy Crane DO      loratadine  10 mg Oral QAM Usha Quinn PA-C      magnesium Oxide  400 mg Oral BID Usha Quinn PA-C      melatonin  3 mg Oral HS PRN Ariane Alberts PA-C      metoprolol succinate  25 mg Oral QAM Usha Quinn PA-C      pantoprazole  40 mg Oral BID AC Usha Quinn PA-C      potassium chloride  40 mEq Oral BID Jorgito Coughlin PA-C      rivaroxaban  20 mg Oral Daily With Dinner Usha Quinn PA-C      spironolactone  25 mg Oral Daily Usha L Quinn, PA-C      Thiamine Mononitrate  100 mg Oral Daily Usha Quinn PA-C          Today, Patient Was Seen By: Jevon Campbell MD    ** Please Note: Dictation  voice to text software may have been used in the creation of this document. **

## 2024-10-26 LAB
ALBUMIN SERPL BCG-MCNC: 3.7 G/DL (ref 3.5–5)
ALP SERPL-CCNC: 100 U/L (ref 34–104)
ALT SERPL W P-5'-P-CCNC: 16 U/L (ref 7–52)
ANION GAP SERPL CALCULATED.3IONS-SCNC: 7 MMOL/L (ref 4–13)
AST SERPL W P-5'-P-CCNC: 22 U/L (ref 13–39)
BASOPHILS # BLD AUTO: 0.03 THOUSANDS/ΜL (ref 0–0.1)
BASOPHILS NFR BLD AUTO: 0 % (ref 0–1)
BILIRUB SERPL-MCNC: 1.18 MG/DL (ref 0.2–1)
BUN SERPL-MCNC: 28 MG/DL (ref 5–25)
CALCIUM SERPL-MCNC: 8.8 MG/DL (ref 8.4–10.2)
CHLORIDE SERPL-SCNC: 95 MMOL/L (ref 96–108)
CO2 SERPL-SCNC: 36 MMOL/L (ref 21–32)
CREAT SERPL-MCNC: 1.02 MG/DL (ref 0.6–1.3)
EOSINOPHIL # BLD AUTO: 0.16 THOUSAND/ΜL (ref 0–0.61)
EOSINOPHIL NFR BLD AUTO: 2 % (ref 0–6)
ERYTHROCYTE [DISTWIDTH] IN BLOOD BY AUTOMATED COUNT: 14 % (ref 11.6–15.1)
GFR SERPL CREATININE-BSD FRML MDRD: 66 ML/MIN/1.73SQ M
GLUCOSE SERPL-MCNC: 163 MG/DL (ref 65–140)
GLUCOSE SERPL-MCNC: 165 MG/DL (ref 65–140)
GLUCOSE SERPL-MCNC: 176 MG/DL (ref 65–140)
GLUCOSE SERPL-MCNC: 191 MG/DL (ref 65–140)
GLUCOSE SERPL-MCNC: 203 MG/DL (ref 65–140)
HCT VFR BLD AUTO: 36.3 % (ref 36.5–49.3)
HGB BLD-MCNC: 11.5 G/DL (ref 12–17)
IMM GRANULOCYTES # BLD AUTO: 0.05 THOUSAND/UL (ref 0–0.2)
IMM GRANULOCYTES NFR BLD AUTO: 1 % (ref 0–2)
LYMPHOCYTES # BLD AUTO: 3.69 THOUSANDS/ΜL (ref 0.6–4.47)
LYMPHOCYTES NFR BLD AUTO: 38 % (ref 14–44)
MCH RBC QN AUTO: 29.9 PG (ref 26.8–34.3)
MCHC RBC AUTO-ENTMCNC: 31.7 G/DL (ref 31.4–37.4)
MCV RBC AUTO: 94 FL (ref 82–98)
MONOCYTES # BLD AUTO: 0.92 THOUSAND/ΜL (ref 0.17–1.22)
MONOCYTES NFR BLD AUTO: 9 % (ref 4–12)
NEUTROPHILS # BLD AUTO: 4.95 THOUSANDS/ΜL (ref 1.85–7.62)
NEUTS SEG NFR BLD AUTO: 50 % (ref 43–75)
NRBC BLD AUTO-RTO: 0 /100 WBCS
PLATELET # BLD AUTO: 205 THOUSANDS/UL (ref 149–390)
PMV BLD AUTO: 9 FL (ref 8.9–12.7)
POTASSIUM SERPL-SCNC: 3.9 MMOL/L (ref 3.5–5.3)
PROT SERPL-MCNC: 6.7 G/DL (ref 6.4–8.4)
RBC # BLD AUTO: 3.85 MILLION/UL (ref 3.88–5.62)
SODIUM SERPL-SCNC: 138 MMOL/L (ref 135–147)
WBC # BLD AUTO: 9.8 THOUSAND/UL (ref 4.31–10.16)

## 2024-10-26 PROCEDURE — 80053 COMPREHEN METABOLIC PANEL: CPT | Performed by: HOSPITALIST

## 2024-10-26 PROCEDURE — 99232 SBSQ HOSP IP/OBS MODERATE 35: CPT | Performed by: HOSPITALIST

## 2024-10-26 PROCEDURE — 82948 REAGENT STRIP/BLOOD GLUCOSE: CPT

## 2024-10-26 PROCEDURE — 85025 COMPLETE CBC W/AUTO DIFF WBC: CPT | Performed by: HOSPITALIST

## 2024-10-26 RX ORDER — DULOXETIN HYDROCHLORIDE 30 MG/1
60 CAPSULE, DELAYED RELEASE ORAL DAILY
Status: DISCONTINUED | OUTPATIENT
Start: 2024-10-26 | End: 2024-10-29 | Stop reason: HOSPADM

## 2024-10-26 RX ORDER — LIDOCAINE 50 MG/G
1 PATCH TOPICAL DAILY
Status: DISCONTINUED | OUTPATIENT
Start: 2024-10-26 | End: 2024-10-29 | Stop reason: HOSPADM

## 2024-10-26 RX ADMIN — FUROSEMIDE 80 MG: 10 INJECTION, SOLUTION INTRAVENOUS at 17:14

## 2024-10-26 RX ADMIN — INSULIN LISPRO 5 UNITS: 100 INJECTION, SOLUTION INTRAVENOUS; SUBCUTANEOUS at 17:06

## 2024-10-26 RX ADMIN — Medication 100 MG: at 08:07

## 2024-10-26 RX ADMIN — LEVOTHYROXINE SODIUM 125 MCG: 25 TABLET ORAL at 06:00

## 2024-10-26 RX ADMIN — Medication 400 MG: at 08:07

## 2024-10-26 RX ADMIN — UMECLIDINIUM 1 PUFF: 62.5 AEROSOL, POWDER ORAL at 08:09

## 2024-10-26 RX ADMIN — BUSPIRONE HYDROCHLORIDE 7.5 MG: 15 TABLET ORAL at 17:06

## 2024-10-26 RX ADMIN — INSULIN LISPRO 2 UNITS: 100 INJECTION, SOLUTION INTRAVENOUS; SUBCUTANEOUS at 12:37

## 2024-10-26 RX ADMIN — INSULIN LISPRO 5 UNITS: 100 INJECTION, SOLUTION INTRAVENOUS; SUBCUTANEOUS at 08:04

## 2024-10-26 RX ADMIN — FERROUS SULFATE TAB 325 MG (65 MG ELEMENTAL FE) 325 MG: 325 (65 FE) TAB at 08:07

## 2024-10-26 RX ADMIN — ACETAMINOPHEN 650 MG: 325 TABLET, FILM COATED ORAL at 01:39

## 2024-10-26 RX ADMIN — INSULIN LISPRO 1 UNITS: 100 INJECTION, SOLUTION INTRAVENOUS; SUBCUTANEOUS at 17:07

## 2024-10-26 RX ADMIN — DONEPEZIL HYDROCHLORIDE 10 MG: 5 TABLET ORAL at 08:06

## 2024-10-26 RX ADMIN — POTASSIUM CHLORIDE 40 MEQ: 1500 TABLET, EXTENDED RELEASE ORAL at 17:06

## 2024-10-26 RX ADMIN — INSULIN LISPRO 1 UNITS: 100 INJECTION, SOLUTION INTRAVENOUS; SUBCUTANEOUS at 08:03

## 2024-10-26 RX ADMIN — LIDOCAINE 5% 1 PATCH: 700 PATCH TOPICAL at 02:16

## 2024-10-26 RX ADMIN — Medication 400 MG: at 17:06

## 2024-10-26 RX ADMIN — FLUTICASONE FUROATE AND VILANTEROL TRIFENATATE 1 PUFF: 100; 25 POWDER RESPIRATORY (INHALATION) at 08:09

## 2024-10-26 RX ADMIN — INSULIN LISPRO 1 UNITS: 100 INJECTION, SOLUTION INTRAVENOUS; SUBCUTANEOUS at 22:05

## 2024-10-26 RX ADMIN — PANTOPRAZOLE SODIUM 40 MG: 40 TABLET, DELAYED RELEASE ORAL at 06:00

## 2024-10-26 RX ADMIN — LORATADINE 10 MG: 10 TABLET ORAL at 08:06

## 2024-10-26 RX ADMIN — SPIRONOLACTONE 25 MG: 25 TABLET ORAL at 08:07

## 2024-10-26 RX ADMIN — BUSPIRONE HYDROCHLORIDE 7.5 MG: 15 TABLET ORAL at 08:07

## 2024-10-26 RX ADMIN — ATORVASTATIN CALCIUM 20 MG: 20 TABLET, FILM COATED ORAL at 08:07

## 2024-10-26 RX ADMIN — RIVAROXABAN 20 MG: 20 TABLET, FILM COATED ORAL at 17:06

## 2024-10-26 RX ADMIN — PANTOPRAZOLE SODIUM 40 MG: 40 TABLET, DELAYED RELEASE ORAL at 17:14

## 2024-10-26 RX ADMIN — DULOXETINE HYDROCHLORIDE 60 MG: 30 CAPSULE, DELAYED RELEASE ORAL at 08:08

## 2024-10-26 RX ADMIN — INSULIN GLARGINE 5 UNITS: 100 INJECTION, SOLUTION SUBCUTANEOUS at 08:04

## 2024-10-26 RX ADMIN — FUROSEMIDE 80 MG: 10 INJECTION, SOLUTION INTRAVENOUS at 08:06

## 2024-10-26 RX ADMIN — Medication 3 MG: at 20:53

## 2024-10-26 RX ADMIN — INSULIN LISPRO 5 UNITS: 100 INJECTION, SOLUTION INTRAVENOUS; SUBCUTANEOUS at 12:38

## 2024-10-26 RX ADMIN — METOPROLOL SUCCINATE 25 MG: 25 TABLET, EXTENDED RELEASE ORAL at 08:06

## 2024-10-26 RX ADMIN — FOLIC ACID 1000 MCG: 1 TABLET ORAL at 08:06

## 2024-10-26 RX ADMIN — POTASSIUM CHLORIDE 40 MEQ: 1500 TABLET, EXTENDED RELEASE ORAL at 08:07

## 2024-10-26 NOTE — ASSESSMENT & PLAN NOTE
Presents from nursing home with worsened dyspnea and low SpO2 long with 20 pound weight gain  Last echo January 2024: LVEF 35-40%.  Severely dilated left atrium.  Mild pulmonary hypertension.  TAVR functioning well.  Home diuretic: lasix 20mg BID and spironolactone 25 mg  Cotninue lasix 80 BID per cards  continue xarelto and metoprolol  Continue spirinolactone  Wean oxygen as tolerated  ECHO with EF 40-45%, mild global hypokinesis  Strict I's and O's. Fluid restriction    I/O last 3 completed shifts:  In: 1882 [P.O.:1882]  Out: 4225 [Urine:4225]  Net IO Since Admission: -9,387 mL [10/26/24 1023]    Weight change: -1.134 kg (-2 lb 8 oz)  Wt Readings from Last 3 Encounters:   10/26/24 92.2 kg (203 lb 4.8 oz)   09/18/24 89.8 kg (198 lb)   07/16/24 89.4 kg (197 lb)

## 2024-10-26 NOTE — PLAN OF CARE
Problem: Potential for Falls  Goal: Patient will remain free of falls  Description: INTERVENTIONS:  - Educate patient/family on patient safety including physical limitations  - Instruct patient to call for assistance with activity   - Consult OT/PT to assist with strengthening/mobility   - Keep Call bell within reach  - Keep bed low and locked with side rails adjusted as appropriate  - Keep care items and personal belongings within reach  - Initiate and maintain comfort rounds  - Make Fall Risk Sign visible to staff  - Offer Toileting every 2 Hours, in advance of need  - Initiate/Maintain 2 alarm  - Obtain necessary fall risk management equipment: 2  - Apply yellow socks and bracelet for high fall risk patients  - Consider moving patient to room near nurses station  Outcome: Progressing     Problem: PAIN - ADULT  Goal: Verbalizes/displays adequate comfort level or baseline comfort level  Description: Interventions:  - Encourage patient to monitor pain and request assistance  - Assess pain using appropriate pain scale  - Administer analgesics based on type and severity of pain and evaluate response  - Implement non-pharmacological measures as appropriate and evaluate response  - Consider cultural and social influences on pain and pain management  - Notify physician/advanced practitioner if interventions unsuccessful or patient reports new pain  Outcome: Progressing     Problem: INFECTION - ADULT  Goal: Absence or prevention of progression during hospitalization  Description: INTERVENTIONS:  - Assess and monitor for signs and symptoms of infection  - Monitor lab/diagnostic results  - Monitor all insertion sites, i.e. indwelling lines, tubes, and drains  - Monitor endotracheal if appropriate and nasal secretions for changes in amount and color  - [place] appropriate cooling/warming therapies per order  - Administer medications as ordered  - Instruct and encourage patient and family to use good hand hygiene  technique  - Identify and instruct in appropriate isolation precautions for identified infection/condition  Outcome: Progressing

## 2024-10-26 NOTE — ASSESSMENT & PLAN NOTE
Lab Results   Component Value Date    HGBA1C 13.5 (H) 08/28/2024       Recent Labs     10/25/24  1136 10/25/24  1638 10/25/24  2102 10/26/24  0701   POCGLU 164* 300* 203* 163*       Blood Sugar Average: Last 72 hrs:  (P) 183.5051438867859270Mcmm regimen: metformin, glipizide, and farxiga  Hold oral medication and place on lantus 5U in AM, humalog 5U TID with meals, SSI AC/HS

## 2024-10-26 NOTE — PLAN OF CARE
Problem: PAIN - ADULT  Goal: Verbalizes/displays adequate comfort level or baseline comfort level  Description: Interventions:  - Encourage patient to monitor pain and request assistance  - Assess pain using appropriate pain scale  - Administer analgesics based on type and severity of pain and evaluate response  - Implement non-pharmacological measures as appropriate and evaluate response  - Consider cultural and social influences on pain and pain management  - Notify physician/advanced practitioner if interventions unsuccessful or patient reports new pain  Outcome: Progressing     Problem: INFECTION - ADULT  Goal: Absence or prevention of progression during hospitalization  Description: INTERVENTIONS:  - Assess and monitor for signs and symptoms of infection  - Monitor lab/diagnostic results  - Monitor all insertion sites, i.e. indwelling lines, tubes, and drains  - Monitor endotracheal if appropriate and nasal secretions for changes in amount and color  - [place] appropriate cooling/warming therapies per order  - Administer medications as ordered  - Instruct and encourage patient and family to use good hand hygiene technique  - Identify and instruct in appropriate isolation precautions for identified infection/condition  Outcome: Progressing  Goal: Absence of fever/infection during neutropenic period  Description: INTERVENTIONS:  - Monitor WBC    Outcome: Progressing     Problem: DISCHARGE PLANNING  Goal: Discharge to home or other facility with appropriate resources  Description: INTERVENTIONS:  - Identify barriers to discharge w/patient and caregiver  - Arrange for needed discharge resources and transportation as appropriate  - Identify discharge learning needs (meds, wound care, etc.)  - Arrange for interpretive services to assist at discharge as needed  - Refer to Case Management Department for coordinating discharge planning if the patient needs post-hospital services based on physician/advanced  practitioner order or complex needs related to functional status, cognitive ability, or social support system  Outcome: Progressing     Problem: Knowledge Deficit  Goal: Patient/family/caregiver demonstrates understanding of disease process, treatment plan, medications, and discharge instructions  Description: Complete learning assessment and assess knowledge base.  Interventions:  - Provide teaching at level of understanding  - Provide teaching via preferred learning methods  Outcome: Progressing     Problem: CARDIOVASCULAR - ADULT  Goal: Maintains optimal cardiac output and hemodynamic stability  Description: INTERVENTIONS:  - Monitor I/O, vital signs and rhythm  - Monitor for S/S and trends of decreased cardiac output  - Administer and titrate ordered vasoactive medications to optimize hemodynamic stability  - Assess quality of pulses, skin color and temperature  - Assess for signs of decreased coronary artery perfusion  - Instruct patient to report change in severity of symptoms  Outcome: Progressing  Goal: Absence of cardiac dysrhythmias or at baseline rhythm  Description: INTERVENTIONS:  - Continuous cardiac monitoring, vital signs, obtain 12 lead EKG if ordered  - Administer antiarrhythmic and heart rate control medications as ordered  - Monitor electrolytes and administer replacement therapy as ordered  Outcome: Progressing

## 2024-10-27 LAB
GLUCOSE SERPL-MCNC: 158 MG/DL (ref 65–140)
GLUCOSE SERPL-MCNC: 162 MG/DL (ref 65–140)
GLUCOSE SERPL-MCNC: 171 MG/DL (ref 65–140)
GLUCOSE SERPL-MCNC: 224 MG/DL (ref 65–140)

## 2024-10-27 PROCEDURE — 99232 SBSQ HOSP IP/OBS MODERATE 35: CPT | Performed by: HOSPITALIST

## 2024-10-27 PROCEDURE — 82948 REAGENT STRIP/BLOOD GLUCOSE: CPT

## 2024-10-27 RX ORDER — TORSEMIDE 20 MG/1
20 TABLET ORAL 2 TIMES DAILY
Status: DISCONTINUED | OUTPATIENT
Start: 2024-10-27 | End: 2024-10-29 | Stop reason: HOSPADM

## 2024-10-27 RX ORDER — LORAZEPAM 0.5 MG/1
0.5 TABLET ORAL ONCE
Status: COMPLETED | OUTPATIENT
Start: 2024-10-27 | End: 2024-10-27

## 2024-10-27 RX ADMIN — BUSPIRONE HYDROCHLORIDE 7.5 MG: 15 TABLET ORAL at 08:35

## 2024-10-27 RX ADMIN — LORATADINE 10 MG: 10 TABLET ORAL at 08:35

## 2024-10-27 RX ADMIN — Medication 3 MG: at 21:04

## 2024-10-27 RX ADMIN — CLONAZEPAM 1 MG: 1 TABLET ORAL at 21:04

## 2024-10-27 RX ADMIN — INSULIN GLARGINE 5 UNITS: 100 INJECTION, SOLUTION SUBCUTANEOUS at 08:36

## 2024-10-27 RX ADMIN — PANTOPRAZOLE SODIUM 40 MG: 40 TABLET, DELAYED RELEASE ORAL at 06:00

## 2024-10-27 RX ADMIN — INSULIN LISPRO 1 UNITS: 100 INJECTION, SOLUTION INTRAVENOUS; SUBCUTANEOUS at 12:16

## 2024-10-27 RX ADMIN — FLUTICASONE FUROATE AND VILANTEROL TRIFENATATE 1 PUFF: 100; 25 POWDER RESPIRATORY (INHALATION) at 08:39

## 2024-10-27 RX ADMIN — METOPROLOL SUCCINATE 25 MG: 25 TABLET, EXTENDED RELEASE ORAL at 08:35

## 2024-10-27 RX ADMIN — INSULIN LISPRO 5 UNITS: 100 INJECTION, SOLUTION INTRAVENOUS; SUBCUTANEOUS at 12:16

## 2024-10-27 RX ADMIN — RIVAROXABAN 20 MG: 20 TABLET, FILM COATED ORAL at 16:07

## 2024-10-27 RX ADMIN — TORSEMIDE 20 MG: 20 TABLET ORAL at 20:14

## 2024-10-27 RX ADMIN — INSULIN LISPRO 5 UNITS: 100 INJECTION, SOLUTION INTRAVENOUS; SUBCUTANEOUS at 08:39

## 2024-10-27 RX ADMIN — INSULIN LISPRO 1 UNITS: 100 INJECTION, SOLUTION INTRAVENOUS; SUBCUTANEOUS at 17:28

## 2024-10-27 RX ADMIN — UMECLIDINIUM 1 PUFF: 62.5 AEROSOL, POWDER ORAL at 08:39

## 2024-10-27 RX ADMIN — ACETAMINOPHEN 650 MG: 325 TABLET, FILM COATED ORAL at 05:53

## 2024-10-27 RX ADMIN — LEVOTHYROXINE SODIUM 125 MCG: 25 TABLET ORAL at 05:49

## 2024-10-27 RX ADMIN — POTASSIUM CHLORIDE 40 MEQ: 1500 TABLET, EXTENDED RELEASE ORAL at 08:35

## 2024-10-27 RX ADMIN — POTASSIUM CHLORIDE 40 MEQ: 1500 TABLET, EXTENDED RELEASE ORAL at 17:29

## 2024-10-27 RX ADMIN — INSULIN LISPRO 1 UNITS: 100 INJECTION, SOLUTION INTRAVENOUS; SUBCUTANEOUS at 21:04

## 2024-10-27 RX ADMIN — LIDOCAINE 5% 1 PATCH: 700 PATCH TOPICAL at 08:35

## 2024-10-27 RX ADMIN — SPIRONOLACTONE 25 MG: 25 TABLET ORAL at 08:35

## 2024-10-27 RX ADMIN — TORSEMIDE 20 MG: 20 TABLET ORAL at 08:40

## 2024-10-27 RX ADMIN — INSULIN LISPRO 5 UNITS: 100 INJECTION, SOLUTION INTRAVENOUS; SUBCUTANEOUS at 17:29

## 2024-10-27 RX ADMIN — CLONAZEPAM 1 MG: 1 TABLET ORAL at 01:07

## 2024-10-27 RX ADMIN — BUSPIRONE HYDROCHLORIDE 7.5 MG: 15 TABLET ORAL at 17:29

## 2024-10-27 RX ADMIN — Medication 400 MG: at 08:36

## 2024-10-27 RX ADMIN — PANTOPRAZOLE SODIUM 40 MG: 40 TABLET, DELAYED RELEASE ORAL at 16:08

## 2024-10-27 RX ADMIN — FOLIC ACID 1000 MCG: 1 TABLET ORAL at 08:35

## 2024-10-27 RX ADMIN — Medication 100 MG: at 08:35

## 2024-10-27 RX ADMIN — ATORVASTATIN CALCIUM 20 MG: 20 TABLET, FILM COATED ORAL at 08:35

## 2024-10-27 RX ADMIN — FERROUS SULFATE TAB 325 MG (65 MG ELEMENTAL FE) 325 MG: 325 (65 FE) TAB at 08:36

## 2024-10-27 RX ADMIN — LORAZEPAM 0.5 MG: 0.5 TABLET ORAL at 16:07

## 2024-10-27 RX ADMIN — INSULIN LISPRO 1 UNITS: 100 INJECTION, SOLUTION INTRAVENOUS; SUBCUTANEOUS at 08:40

## 2024-10-27 RX ADMIN — DULOXETINE HYDROCHLORIDE 60 MG: 30 CAPSULE, DELAYED RELEASE ORAL at 08:36

## 2024-10-27 RX ADMIN — Medication 400 MG: at 17:29

## 2024-10-27 RX ADMIN — DONEPEZIL HYDROCHLORIDE 10 MG: 5 TABLET ORAL at 08:35

## 2024-10-27 NOTE — ASSESSMENT & PLAN NOTE
Lab Results   Component Value Date    HGBA1C 13.5 (H) 08/28/2024       Recent Labs     10/26/24  1049 10/26/24  1646 10/26/24  2116 10/27/24  0728   POCGLU 191* 165* 203* 162*       Blood Sugar Average: Last 72 hrs:  (P) [id number] regimen: metformin, glipizide, and farxiga  Hold oral medication and place on lantus 5U in AM, humalog 5U TID with meals, SSI AC/HS   Using a Smartneedle with the Modified Seldinger technique and ultrasound (SonSolvesting) the right femoral vein was succesfully accessed times 1 in a retrograde fashion over the guidewire, under fluoroscopic guidance using a INTRODUCER CATH 12CM 8.5FR TRNSPT FAST-CATH HEMOSTASIS. Ultrasound found the vessel was patent.

## 2024-10-27 NOTE — ASSESSMENT & PLAN NOTE
SpO2 87% on room air  Improved on 1 L nasal cannula  Suspect secondary to acute on chronic systolic heart failure  Wean oxygen as able, does not use oxygen at home, check home o2 eval

## 2024-10-27 NOTE — PROGRESS NOTES
Progress Note - Hospitalist   Name: Trevor Lyons 84 y.o. male I MRN: [id number]  Unit/Bed#: [facility] I Date of Admission: 10/19/2024   Date of Service: 10/27/2024 I Hospital Day: 8     Assessment & Plan  Acute on chronic systolic heart failure (HCC)  Presents from nursing home with worsened dyspnea and low SpO2 long with 20 pound weight gain  Last echo January 2024: LVEF 35-40%.  Severely dilated left atrium.  Mild pulmonary hypertension.  TAVR functioning well.  Home diuretic: lasix 20mg BID and spironolactone 25 mg  Transition to oral torsemide today 10/27  continue xarelto and metoprolol  Continue spirinolactone  Wean oxygen as tolerated - home o2   ECHO with EF 40-45%, mild global hypokinesis  Strict I's and O's. Fluid restriction    I/O last 3 completed shifts:  In: 1368 [P.O.:1368]  Out: 3225 [Urine:3225]  Net IO Since Admission: -9,764 mL [10/27/24 0953]    Weight change: -1.361 kg (-3 lb)  Wt Readings from Last 3 Encounters:   10/27/24 90.9 kg (200 lb 4.8 oz)   09/18/24 89.8 kg (198 lb)   07/16/24 89.4 kg (197 lb)     Hypokalemia  Monitor in the setting of diuretics  Continue to monitor and replete as needed  Acute respiratory insufficiency  SpO2 87% on room air  Improved on 1 L nasal cannula  Suspect secondary to acute on chronic systolic heart failure  Wean oxygen as able, does not use oxygen at home, check home o2 eval  Anemia  Hgb 11.4 on admit  Baseline Cr 10-12   Trend CBC   SIRS (systemic inflammatory response syndrome) ([facility])  SIRS criteria: Tachypnea and leukocytosis  Initially treated with antibiotics for possible atypical pneumonia in the ED, however procalcitonin is negative and patient examines more like acute on chronic systolic heart failure and denies any fever or productive cough   Hold on further antibiotics at this time  If morning procalcitonin increases-would start ceftriaxone   Follow-up blood cultures  COPD (chronic obstructive pulmonary disease) ([facility])  Home regimen: Trelegy  daily and albuterol as needed  No expiratory wheezing appreciated on admission, do not suspect acute exacerbation at this time  Continue home regimen  Type 2 diabetes mellitus without complication, without long-term current use of insulin (HCC)  Lab Results   Component Value Date    HGBA1C 13.5 (H) 08/28/2024       Recent Labs     10/26/24  1049 10/26/24  1646 10/26/24  2116 10/27/24  0728   POCGLU 191* 165* 203* 162*       Blood Sugar Average: Last 72 hrs:  (P) [id number] regimen: metformin, glipizide, and farxiga  Hold oral medication and place on lantus 5U in AM, humalog 5U TID with meals, SSI AC/HS  Coronary artery disease involving native coronary artery of native heart without angina pectoris  CAD status post PCI and CABG  Continue home beta-blocker, statin, and Xarelto  Persistent atrial fibrillation (HCC)  RC: Metoprolol succinate 25 Mg daily  AC: Xarelto  Rate controlled, v paced  Cardiac pacemaker  [employer] s/p SSS   Last device interrogation 07/2024 showed normal device function   Pulmonary embolism (HCC)  Continue home Xarelto   CTA yesterday with no signs of PE  Pulmonary hypertension (HCC)  Most recent echo January 2024 showing mild pulmonary hypertension with RSVP 39 mmHg  Continue home medications  Acquired hypothyroidism  TSH elevated at 8.358  Increase synthroid to 125mcg daily   Recheck TSH in 6-8 weeks   Depression with anxiety  Continue home buspar, cymbalta, and klonopin   History of transcatheter aortic valve replacement (TAVR)     VTE  Prophylaxis:   Pharmacologic: in place  Mechanical VTE Prophylaxis in Place: Yes    Patient Centered Rounds: I have performed bedside rounds with nursing staff today.    Discussions with Specialists or Other Care Team Provider: case management    Education and Discussions with Family / Patient: yes dtr    Mobility:   Basic Mobility Inpatient Raw Score: 22  JH-HLM Goal: 7: Walk 25 feet or more  JH-HLM Achieved: 7: Walk 25 feet or more      Current  Length of Stay: 8 day(s)    Current Patient Status: Inpatient        Code Status: Level 1 - Full Code    Discharge Plan: Pt will require continued inpatient hospitalization.    Subjective:   Pt states breathing better  No other complaints   Remains on o2    Patient is seen and examined at bedside.  All other ROS are negative.    Objective:     Vitals:   Temp (24hrs), Av.9 °F (36.6 °C), Min:97.4 °F (36.3 °C), Max:98.1 °F (36.7 °C)    Temp:  [97.4 °F (36.3 °C)-98.1 °F (36.7 °C)] 98.1 °F (36.7 °C)  HR:  [65-66] 66  Resp:  [18] 18  BP: (114-116)/(60-66) 115/65  SpO2:  [91 %-97 %] 97 %  Body mass index is 30.46 kg/m².     Input and Output Summary (last 24 hours):       Intake/Output Summary (Last 24 hours) at 10/27/2024 0953  Last data filed at 10/27/2024 0914  Gross per 24 hour   Intake 1358 ml   Output 2275 ml   Net -917 ml       Physical Exam:       GEN: No acute distress, comfortable, on o2  HEEENT: No JVD, PERRLA, no scleral icterus  RESP: Lungs clear to auscultation bilaterally  CV: RRR, +s1/s2   ABD: SOFT NON TENDER, POSITIVE BOWEL SOUNDS, NO DISTENTION  PSYCH: CALM  NEURO: [place] baseline, NO FOCAL DEFICITS  SKIN: NO RASH  EXTREM: NO EDEMA    Additional Data:     Labs:    Results from last 7 days   Lab Units 10/26/24  0458   WBC Thousand/uL 9.80   HEMOGLOBIN g/dL 11.5*   HEMATOCRIT % 36.3*   PLATELETS Thousands/uL 205   SEGS PCT % 50   LYMPHO PCT % 38   MONO PCT % 9   EOS PCT % 2     Results from last 7 days   Lab Units 10/26/24  0458   SODIUM mmol/L 138   POTASSIUM mmol/L 3.9   CHLORIDE mmol/L 95*   CO2 mmol/L 36*   BUN mg/dL 28*   CREATININE mg/dL 1.02   ANION GAP mmol/L 7   CALCIUM mg/dL 8.8   ALBUMIN g/dL 3.7   TOTAL BILIRUBIN mg/dL 1.18*   ALK PHOS U/L 100   ALT U/L 16   AST U/L 22   GLUCOSE RANDOM mg/dL 176*         Results from last 7 days   Lab Units 10/27/24  0728 10/26/24  2116 10/26/24  1646 10/26/24  1049 10/26/24  0701 10/25/24  2102 10/25/24  1638 10/25/24  1136 10/25/24  0726 10/24/24  2108  10/24/24  1626 10/24/24  1118   POC GLUCOSE mg/dl 162* 203* 165* 191* 163* 203* 300* 164* 165* 187* 159* 198*               Lines/Drains:  Invasive Devices       Peripheral Intravenous Line  Duration             Peripheral IV 10/25/24 Dorsal (posterior);Left Forearm 1 day                    Telemetry:        * I Have Reviewed All Lab Data Listed Above.           Imaging:     Results for orders placed during the hospital encounter of 10/19/24    XR chest 1 view portable    Narrative  XR CHEST PORTABLE    INDICATION: SOB.    COMPARISON: Chest CT 10/19/2024.    FINDINGS:    Mild pulmonary edema. Small right pleural effusion.    Moderate cardiomegaly, CABG, left subclavian pacemaker leads in right atrium and right ventricle.    Bones are unremarkable for age.    Normal upper abdomen. Mild elevation of the left diaphragm.    Impression  Mild pulmonary edema with small right pleural effusion.        Workstation performed: [id number]    No results found for this or any previous visit.      *I have reviewed all imaging reports listed above      Recent Cultures (last 7 days):           Last 24 Hours Medication List:   Current Facility-Administered Medications   Medication Dose Route Frequency Provider Last Rate    acetaminophen  650 mg Oral Q4H PRN Usha Quinn PA-C      albuterol  2.5 mg Nebulization Q4H PRN Savanah Gracia MD      atorvastatin  20 mg Oral QAM Usha Quinn PA-C      busPIRone  7.5 mg Oral BID Usha Quinn PA-C      clonazePAM  1 mg Oral HS PRN Usha Quinn PA-C      dextromethorphan-guaiFENesin  10 mL Oral Q4H PRN Jevon Campbell MD      donepezil  10 mg Oral QAM Usha Quinn PA-C      DULoxetine  60 mg Oral Daily Jevon Campbell MD      ferrous sulfate  325 mg Oral Daily With Breakfast Usha Quinn PA-C      Fluticasone Furoate-Vilanterol  1 puff Inhalation Daily Usha Quinn PA-C      And    umeclidinium  1 puff Inhalation Daily Usha Quinn PA-C       folic acid  1,000 mcg Oral QAM Usha Quinn PA-C      influenza vaccine  0.5 mL Intramuscular Prior to discharge Savanah Garcia MD      insulin glargine  5 Units Subcutaneous Daily With Breakfast Usha Quinn PA-C      insulin lispro  1-5 Units Subcutaneous HS Usha Quinn PA-C      insulin lispro  1-6 Units Subcutaneous TID AC Usha Quinn PA-C      insulin lispro  5 Units Subcutaneous TID With Meals Usha Quinn PA-C      levothyroxine  125 mcg Oral Early Morning Usha Quinn PA-C      lidocaine  1 patch Topical Daily Bc Harrell,       loratadine  10 mg Oral QAM Usha Quinn PA-C      magnesium Oxide  400 mg Oral BID Usha Quinn PA-C      melatonin  3 mg Oral HS PRN Ariane Alberts PA-C      metoprolol succinate  25 mg Oral QAM Usha Quinn PA-C      pantoprazole  40 mg Oral BID AC Usha Quinn PA-C      potassium chloride  40 mEq Oral BID Jorgito Coughlin PA-C      rivaroxaban  20 mg Oral Daily With Dinner Usha Quinn PA-C      spironolactone  25 mg Oral Daily Usha Quinn PA-C      Thiamine Mononitrate  100 mg Oral Daily Usha Quinn PA-C      torsemide  20 mg Oral BID Jevon Campbell MD          Today, Patient Was Seen By: Jevon Campbell MD    ** Please Note: Dictation voice to text software may have been used in the creation of this document. **

## 2024-10-27 NOTE — PLAN OF CARE
Problem: Potential for Falls  Goal: Patient will remain free of falls  Description: INTERVENTIONS:  - Educate patient/family on patient safety including physical limitations  - Instruct patient to call for assistance with activity   - Consult OT/PT to assist with strengthening/mobility   - Keep Call bell within reach  - Keep bed low and locked with side rails adjusted as appropriate  - Keep care items and personal belongings within reach  - Initiate and maintain comfort rounds  - Make Fall Risk Sign visible to staff  - Offer Toileting every 2 Hours, in advance of need  - Initiate/Maintain 2 alarm  - Obtain necessary fall risk management equipment: 2  - Apply yellow socks and bracelet for high fall risk patients  - Consider moving patient to room near nurses station  Outcome: Progressing     Problem: PAIN - ADULT  Goal: Verbalizes/displays adequate comfort level or baseline comfort level  Description: Interventions:  - Encourage patient to monitor pain and request assistance  - Assess pain using appropriate pain scale  - Administer analgesics based on type and severity of pain and evaluate response  - Implement non-pharmacological measures as appropriate and evaluate response  - Consider cultural and social influences on pain and pain management  - Notify physician/advanced practitioner if interventions unsuccessful or patient reports new pain  Outcome: Progressing     Problem: INFECTION - ADULT  Goal: Absence or prevention of progression during hospitalization  Description: INTERVENTIONS:  - Assess and monitor for signs and symptoms of infection  - Monitor lab/diagnostic results  - Monitor all insertion sites, i.e. indwelling lines, tubes, and drains  - Monitor endotracheal if appropriate and nasal secretions for changes in amount and color  - [place] appropriate cooling/warming therapies per order  - Administer medications as ordered  - Instruct and encourage patient and family to use good hand hygiene  technique  - Identify and instruct in appropriate isolation precautions for identified infection/condition  Outcome: Progressing

## 2024-10-27 NOTE — ASSESSMENT & PLAN NOTE
Presents from nursing home with worsened dyspnea and low SpO2 long with 20 pound weight gain  Last echo January 2024: LVEF 35-40%.  Severely dilated left atrium.  Mild pulmonary hypertension.  TAVR functioning well.  Home diuretic: lasix 20mg BID and spironolactone 25 mg  Transition to oral torsemide today 10/27  continue xarelto and metoprolol  Continue spirinolactone  Wean oxygen as tolerated - home o2   ECHO with EF 40-45%, mild global hypokinesis  Strict I's and O's. Fluid restriction    I/O last 3 completed shifts:  In: 1368 [P.O.:1368]  Out: 3225 [Urine:3225]  Net IO Since Admission: -9,764 mL [10/27/24 0953]    Weight change: -1.361 kg (-3 lb)  Wt Readings from Last 3 Encounters:   10/27/24 90.9 kg (200 lb 4.8 oz)   09/18/24 89.8 kg (198 lb)   07/16/24 89.4 kg (197 lb)

## 2024-10-27 NOTE — ASSESSMENT & PLAN NOTE
Most recent echo January 2024 showing mild pulmonary hypertension with RSVP 39 mmHg  Continue home medications   no

## 2024-10-28 PROBLEM — E87.6 HYPOKALEMIA: Status: RESOLVED | Noted: 2024-10-22 | Resolved: 2024-10-28

## 2024-10-28 PROBLEM — R65.10 SIRS (SYSTEMIC INFLAMMATORY RESPONSE SYNDROME) (HCC): Status: RESOLVED | Noted: 2024-10-19 | Resolved: 2024-10-28

## 2024-10-28 LAB
ALBUMIN SERPL BCG-MCNC: 3.8 G/DL (ref 3.5–5)
ALP SERPL-CCNC: 94 U/L (ref 34–104)
ALT SERPL W P-5'-P-CCNC: 14 U/L (ref 7–52)
ANION GAP SERPL CALCULATED.3IONS-SCNC: 9 MMOL/L (ref 4–13)
AST SERPL W P-5'-P-CCNC: 19 U/L (ref 13–39)
BASOPHILS # BLD MANUAL: 0 THOUSAND/UL (ref 0–0.1)
BASOPHILS NFR MAR MANUAL: 0 % (ref 0–1)
BILIRUB SERPL-MCNC: 1.18 MG/DL (ref 0.2–1)
BUN SERPL-MCNC: 29 MG/DL (ref 5–25)
CALCIUM SERPL-MCNC: 9.1 MG/DL (ref 8.4–10.2)
CHLORIDE SERPL-SCNC: 95 MMOL/L (ref 96–108)
CO2 SERPL-SCNC: 33 MMOL/L (ref 21–32)
CREAT SERPL-MCNC: 0.99 MG/DL (ref 0.6–1.3)
EOSINOPHIL # BLD MANUAL: 0 THOUSAND/UL (ref 0–0.4)
EOSINOPHIL NFR BLD MANUAL: 0 % (ref 0–6)
ERYTHROCYTE [DISTWIDTH] IN BLOOD BY AUTOMATED COUNT: 13.8 % (ref 11.6–15.1)
GFR SERPL CREATININE-BSD FRML MDRD: 68 ML/MIN/1.73SQ M
GLUCOSE SERPL-MCNC: 194 MG/DL (ref 65–140)
GLUCOSE SERPL-MCNC: 231 MG/DL (ref 65–140)
GLUCOSE SERPL-MCNC: 259 MG/DL (ref 65–140)
GLUCOSE SERPL-MCNC: 325 MG/DL (ref 65–140)
GLUCOSE SERPL-MCNC: 352 MG/DL (ref 65–140)
GLUCOSE SERPL-MCNC: 85 MG/DL (ref 65–140)
HCT VFR BLD AUTO: 36.7 % (ref 36.5–49.3)
HGB BLD-MCNC: 11.8 G/DL (ref 12–17)
LYMPHOCYTES # BLD AUTO: 4.91 THOUSAND/UL (ref 0.6–4.47)
LYMPHOCYTES # BLD AUTO: 44 % (ref 14–44)
MCH RBC QN AUTO: 30.1 PG (ref 26.8–34.3)
MCHC RBC AUTO-ENTMCNC: 32.2 G/DL (ref 31.4–37.4)
MCV RBC AUTO: 94 FL (ref 82–98)
MONOCYTES # BLD AUTO: 1.01 THOUSAND/UL (ref 0–1.22)
MONOCYTES NFR BLD: 9 % (ref 4–12)
NEUTROPHILS # BLD MANUAL: 5.25 THOUSAND/UL (ref 1.85–7.62)
NEUTS SEG NFR BLD AUTO: 47 % (ref 43–75)
PLATELET # BLD AUTO: 237 THOUSANDS/UL (ref 149–390)
PLATELET BLD QL SMEAR: ADEQUATE
PMV BLD AUTO: 8.9 FL (ref 8.9–12.7)
POTASSIUM SERPL-SCNC: 4.2 MMOL/L (ref 3.5–5.3)
PROT SERPL-MCNC: 6.8 G/DL (ref 6.4–8.4)
RBC # BLD AUTO: 3.92 MILLION/UL (ref 3.88–5.62)
RBC MORPH BLD: NORMAL
SODIUM SERPL-SCNC: 137 MMOL/L (ref 135–147)
WBC # BLD AUTO: 11.17 THOUSAND/UL (ref 4.31–10.16)

## 2024-10-28 PROCEDURE — 90662 IIV NO PRSV INCREASED AG IM: CPT | Performed by: STUDENT IN AN ORGANIZED HEALTH CARE EDUCATION/TRAINING PROGRAM

## 2024-10-28 PROCEDURE — G0008 ADMIN INFLUENZA VIRUS VAC: HCPCS | Performed by: STUDENT IN AN ORGANIZED HEALTH CARE EDUCATION/TRAINING PROGRAM

## 2024-10-28 PROCEDURE — 85007 BL SMEAR W/DIFF WBC COUNT: CPT | Performed by: HOSPITALIST

## 2024-10-28 PROCEDURE — 99232 SBSQ HOSP IP/OBS MODERATE 35: CPT | Performed by: INTERNAL MEDICINE

## 2024-10-28 PROCEDURE — 82948 REAGENT STRIP/BLOOD GLUCOSE: CPT

## 2024-10-28 PROCEDURE — 85027 COMPLETE CBC AUTOMATED: CPT | Performed by: HOSPITALIST

## 2024-10-28 PROCEDURE — 80053 COMPREHEN METABOLIC PANEL: CPT | Performed by: HOSPITALIST

## 2024-10-28 RX ORDER — LOSARTAN POTASSIUM 25 MG/1
25 TABLET ORAL DAILY
Status: DISCONTINUED | OUTPATIENT
Start: 2024-10-28 | End: 2024-10-29 | Stop reason: HOSPADM

## 2024-10-28 RX ORDER — INSULIN GLARGINE 100 [IU]/ML
8 INJECTION, SOLUTION SUBCUTANEOUS
Status: DISCONTINUED | OUTPATIENT
Start: 2024-10-29 | End: 2024-10-29 | Stop reason: HOSPADM

## 2024-10-28 RX ORDER — INSULIN LISPRO 100 [IU]/ML
7 INJECTION, SOLUTION INTRAVENOUS; SUBCUTANEOUS
Status: DISCONTINUED | OUTPATIENT
Start: 2024-10-28 | End: 2024-10-29 | Stop reason: HOSPADM

## 2024-10-28 RX ADMIN — INSULIN LISPRO 3 UNITS: 100 INJECTION, SOLUTION INTRAVENOUS; SUBCUTANEOUS at 11:39

## 2024-10-28 RX ADMIN — POTASSIUM CHLORIDE 40 MEQ: 1500 TABLET, EXTENDED RELEASE ORAL at 08:12

## 2024-10-28 RX ADMIN — BUSPIRONE HYDROCHLORIDE 7.5 MG: 15 TABLET ORAL at 17:23

## 2024-10-28 RX ADMIN — DONEPEZIL HYDROCHLORIDE 10 MG: 5 TABLET ORAL at 08:13

## 2024-10-28 RX ADMIN — ATORVASTATIN CALCIUM 20 MG: 20 TABLET, FILM COATED ORAL at 08:13

## 2024-10-28 RX ADMIN — LIDOCAINE 5% 1 PATCH: 700 PATCH TOPICAL at 08:09

## 2024-10-28 RX ADMIN — BUSPIRONE HYDROCHLORIDE 7.5 MG: 15 TABLET ORAL at 08:13

## 2024-10-28 RX ADMIN — METOPROLOL SUCCINATE 25 MG: 25 TABLET, EXTENDED RELEASE ORAL at 08:14

## 2024-10-28 RX ADMIN — CLONAZEPAM 1 MG: 1 TABLET ORAL at 20:20

## 2024-10-28 RX ADMIN — PANTOPRAZOLE SODIUM 40 MG: 40 TABLET, DELAYED RELEASE ORAL at 05:14

## 2024-10-28 RX ADMIN — SPIRONOLACTONE 25 MG: 25 TABLET ORAL at 08:13

## 2024-10-28 RX ADMIN — ACETAMINOPHEN 650 MG: 325 TABLET, FILM COATED ORAL at 11:39

## 2024-10-28 RX ADMIN — DULOXETINE HYDROCHLORIDE 60 MG: 30 CAPSULE, DELAYED RELEASE ORAL at 08:13

## 2024-10-28 RX ADMIN — FERROUS SULFATE TAB 325 MG (65 MG ELEMENTAL FE) 325 MG: 325 (65 FE) TAB at 08:13

## 2024-10-28 RX ADMIN — TORSEMIDE 20 MG: 20 TABLET ORAL at 20:26

## 2024-10-28 RX ADMIN — INSULIN LISPRO 6 UNITS: 100 INJECTION, SOLUTION INTRAVENOUS; SUBCUTANEOUS at 17:22

## 2024-10-28 RX ADMIN — ACETAMINOPHEN 650 MG: 325 TABLET, FILM COATED ORAL at 04:21

## 2024-10-28 RX ADMIN — TORSEMIDE 20 MG: 20 TABLET ORAL at 08:13

## 2024-10-28 RX ADMIN — INSULIN LISPRO 7 UNITS: 100 INJECTION, SOLUTION INTRAVENOUS; SUBCUTANEOUS at 17:22

## 2024-10-28 RX ADMIN — INSULIN LISPRO 5 UNITS: 100 INJECTION, SOLUTION INTRAVENOUS; SUBCUTANEOUS at 08:08

## 2024-10-28 RX ADMIN — Medication 400 MG: at 17:23

## 2024-10-28 RX ADMIN — Medication 100 MG: at 08:13

## 2024-10-28 RX ADMIN — LEVOTHYROXINE SODIUM 125 MCG: 25 TABLET ORAL at 05:14

## 2024-10-28 RX ADMIN — INSULIN GLARGINE 5 UNITS: 100 INJECTION, SOLUTION SUBCUTANEOUS at 08:09

## 2024-10-28 RX ADMIN — RIVAROXABAN 20 MG: 20 TABLET, FILM COATED ORAL at 17:23

## 2024-10-28 RX ADMIN — PANTOPRAZOLE SODIUM 40 MG: 40 TABLET, DELAYED RELEASE ORAL at 17:23

## 2024-10-28 RX ADMIN — FOLIC ACID 1000 MCG: 1 TABLET ORAL at 08:14

## 2024-10-28 RX ADMIN — LOSARTAN POTASSIUM 25 MG: 25 TABLET, FILM COATED ORAL at 11:39

## 2024-10-28 RX ADMIN — INFLUENZA A VIRUS A/VICTORIA/4897/2022 IVR-238 (H1N1) ANTIGEN (FORMALDEHYDE INACTIVATED), INFLUENZA A VIRUS A/CALIFORNIA/122/2022 SAN-022 (H3N2) ANTIGEN (FORMALDEHYDE INACTIVATED), AND INFLUENZA B VIRUS B/MICHIGAN/01/2021 ANTIGEN (FORMALDEHYDE INACTIVATED) 0.5 ML: 60; 60; 60 INJECTION, SUSPENSION INTRAMUSCULAR at 17:24

## 2024-10-28 RX ADMIN — INSULIN LISPRO 5 UNITS: 100 INJECTION, SOLUTION INTRAVENOUS; SUBCUTANEOUS at 11:40

## 2024-10-28 RX ADMIN — POTASSIUM CHLORIDE 40 MEQ: 1500 TABLET, EXTENDED RELEASE ORAL at 17:23

## 2024-10-28 RX ADMIN — LORATADINE 10 MG: 10 TABLET ORAL at 08:13

## 2024-10-28 RX ADMIN — UMECLIDINIUM 1 PUFF: 62.5 AEROSOL, POWDER ORAL at 08:10

## 2024-10-28 RX ADMIN — Medication 400 MG: at 08:13

## 2024-10-28 RX ADMIN — FLUTICASONE FUROATE AND VILANTEROL TRIFENATATE 1 PUFF: 100; 25 POWDER RESPIRATORY (INHALATION) at 08:07

## 2024-10-28 NOTE — ASSESSMENT & PLAN NOTE
Presents from nursing home with worsened dyspnea and low SpO2 long with 20 pound weight gain  Last echo January 2024: LVEF 35-40%.  Severely dilated left atrium.  Mild pulmonary hypertension.  TAVR functioning well.  Home diuretic: lasix 20mg BID and spironolactone 25 mg  Monitor on p.o. diuretics  continue xarelto and metoprolol  Continue spirinolactone, added losartan  Wean oxygen as tolerated - home o2   ECHO with EF 40-45%, mild global hypokinesis  Strict I's and O's. Fluid restriction    I/O last 3 completed shifts:  In: 1477 [P.O.:1477]  Out: 3230 [Urine:3230]  Net IO Since Admission: -10,916 mL [10/28/24 1336]    Weight change: -0.408 kg (-14.4 oz)  Wt Readings from Last 3 Encounters:   10/28/24 90.4 kg (199 lb 6.4 oz)   09/18/24 89.8 kg (198 lb)   07/16/24 89.4 kg (197 lb)      no

## 2024-10-28 NOTE — PROGRESS NOTES
Progress Note - Hospitalist   Name: Trevor Lyons 84 y.o. male I MRN: [id number]  Unit/Bed#: [facility] I Date of Admission: 10/19/2024   Date of Service: 10/28/2024 I Hospital Day: 9    Assessment & Plan  Acute on chronic systolic heart failure (HCC)  Presents from nursing home with worsened dyspnea and low SpO2 long with 20 pound weight gain  Last echo January 2024: LVEF 35-40%.  Severely dilated left atrium.  Mild pulmonary hypertension.  TAVR functioning well.  Home diuretic: lasix 20mg BID and spironolactone 25 mg  Monitor on p.o. diuretics  continue xarelto and metoprolol  Continue spirinolactone, added losartan  Wean oxygen as tolerated - home o2   ECHO with EF 40-45%, mild global hypokinesis  Strict I's and O's. Fluid restriction    I/O last 3 completed shifts:  In: 1477 [P.O.:1477]  Out: 3230 [Urine:3230]  Net IO Since Admission: -10,916 mL [10/28/24 1336]    Weight change: -0.408 kg (-14.4 oz)  Wt Readings from Last 3 Encounters:   10/28/24 90.4 kg (199 lb 6.4 oz)   09/18/24 89.8 kg (198 lb)   07/16/24 89.4 kg (197 lb)     Hypokalemia (Resolved: 10/28/2024)  Monitor in the setting of diuretics  Continue to monitor and replete as needed  Acute respiratory insufficiency  SpO2 87% on room air  Improved on 1 L nasal cannula  Suspect secondary to acute on chronic systolic heart failure  Wean oxygen as able, does not use oxygen at home, check home o2 eval  Anemia  Hgb 11.4 on admit  Baseline Cr 10-12   Trend CBC   SIRS (systemic inflammatory response syndrome) ([facility]) (Resolved: 10/28/2024)  SIRS criteria: Tachypnea and leukocytosis  Initially treated with antibiotics for possible atypical pneumonia in the ED, however procalcitonin is negative and patient examines more like acute on chronic systolic heart failure and denies any fever or productive cough   Hold on further antibiotics at this time  If morning procalcitonin increases-would start ceftriaxone   Follow-up blood cultures  COPD (chronic obstructive  pulmonary disease) (HCC)  Home regimen: Trelegy daily and albuterol as needed  No expiratory wheezing appreciated on admission, do not suspect acute exacerbation at this time  Continue home regimen  Type 2 diabetes mellitus without complication, without long-term current use of insulin (HCC)  Lab Results   Component Value Date    HGBA1C 13.5 (H) 08/28/2024       Recent Labs     10/27/24  2055 10/28/24  0737 10/28/24  0936 10/28/24  1110   POCGLU 224* 325* 259* 231*       Blood Sugar Average: Last 72 hrs:  (P) 205.6Home regimen: metformin, glipizide, and farxiga  Hold oral medication    lantus 8U in AM, humalog 7U TID with meals, SSI AC/HS  Coronary artery disease involving native coronary artery of native heart without angina pectoris  CAD status post PCI and CABG  Continue home beta-blocker, statin, and Xarelto  Persistent atrial fibrillation (HCC)  RC: Metoprolol succinate 25 Mg daily  AC: Xarelto  Rate controlled, v paced  Cardiac pacemaker  [employer] s/p SSS   Last device interrogation 07/2024 showed normal device function   Pulmonary embolism (HCC)  Continue home Xarelto   CTA yesterday with no signs of PE  Pulmonary hypertension (HCC)  Most recent echo January 2024 showing mild pulmonary hypertension with RSVP 39 mmHg  Continue home medications  Acquired hypothyroidism  TSH elevated at 8.358  Increase synthroid to 125mcg daily   Recheck TSH in 6-8 weeks   Depression with anxiety  Continue home buspar, cymbalta, and klonopin   History of transcatheter aortic valve replacement (TAVR)      VTE Pharmacologic Prophylaxis: VTE Score: 7 High Risk (Score >/= 5) - Pharmacological DVT Prophylaxis Ordered: rivaroxaban (Xarelto). Sequential Compression Devices Ordered.    Mobility:   Basic Mobility Inpatient Raw Score: 16  JH-HLM Goal: 5: Stand one or more mins  JH-HLM Achieved: 5: Stand (1 or more minutes)  JH-HLM Goal achieved. Continue to encourage appropriate mobility.    Patient Centered Rounds: I performed bedside  rounds with nursing staff today.   Discussions with Specialists or Other Care Team Provider: Management    Education and Discussions with Family / Patient: Patient declined call to .     Current Length of Stay: 9 day(s)  Current Patient Status: Inpatient   Certification Statement: The patient will continue to require additional inpatient hospital stay due to p.o. diuresis, high blood sugar, monitor BMP with losartan,  home O2 eval  Discharge Plan: Anticipate discharge tomorrow to home.    Code Status: Level 1 - Full Code    Subjective   Denies any chest pain, shortness of breath, stated he was able to walk with walker to the bathroom    Objective :  Temp:  [97.7 °F (36.5 °C)-97.9 °F (36.6 °C)] 97.7 °F (36.5 °C)  HR:  [65] 65  BP: (114-116)/(63-65) 116/63  Resp:  [18-22] 18  SpO2:  [84 %-95 %] 84 %  O2 Device: None (Room air)  Nasal Cannula O2 Flow Rate (L/min):  [2 L/min] 2 L/min    Body mass index is 30.32 kg/m².     Input and Output Summary (last 24 hours):     Intake/Output Summary (Last 24 hours) at 10/28/2024 1336  Last data filed at 10/28/2024 1001  Gross per 24 hour   Intake 863 ml   Output 2100 ml   Net -1237 ml       Physical Exam  Gen.-Patient comfortable   Neck- Supple. No thyromegaly or lymphadenopathy  Lungs-Clear bilaterally without any wheeze or rales   Heart S1-S2, regular rate and rhythm, no murmurs  Abdomen-soft nontender, no organomegaly. Bowel sounds present  Extremities-no cyanosi,  clubbing or edema  Skin- no rash  Neuro-nonfocal     Lines/Drains:              Lab Results: I have reviewed the following results:   Results from last 7 days   Lab Units 10/28/24  0444 10/26/24  0458   WBC Thousand/uL 11.17* 9.80   HEMOGLOBIN g/dL 11.8* 11.5*   HEMATOCRIT % 36.7 36.3*   PLATELETS Thousands/uL 237 205   SEGS PCT %  --  50   LYMPHO PCT % 44 38   MONO PCT % 9 9   EOS PCT % 0 2     Results from last 7 days   Lab Units 10/28/24  0444   SODIUM mmol/L 137   POTASSIUM mmol/L 4.2   CHLORIDE  mmol/L 95*   CO2 mmol/L 33*   BUN mg/dL 29*   CREATININE mg/dL 0.99   ANION GAP mmol/L 9   CALCIUM mg/dL 9.1   ALBUMIN g/dL 3.8   TOTAL BILIRUBIN mg/dL 1.18*   ALK PHOS U/L 94   ALT U/L 14   AST U/L 19   GLUCOSE RANDOM mg/dL 194*         Results from last 7 days   Lab Units 10/28/24  1110 10/28/24  0936 10/28/24  0737 10/27/24  2055 10/27/24  1628 10/27/24  1126 10/27/24  0728 10/26/24  2116 10/26/24  1646 10/26/24  1049 10/26/24  0701 10/25/24  2102   POC GLUCOSE mg/dl 231* 259* 325* 224* 158* 171* 162* 203* 165* 191* 163* 203*               Recent Cultures (last 7 days):         Imaging Results Review: No pertinent imaging studies reviewed.  Other Study Results Review: No additional pertinent studies reviewed.    Last 24 Hours Medication List:     Current Facility-Administered Medications:     acetaminophen (TYLENOL) tablet 650 mg, Q4H PRN    albuterol inhalation solution 2.5 mg, Q4H PRN    atorvastatin (LIPITOR) tablet 20 mg, QAM    busPIRone (BUSPAR) tablet 7.5 mg, BID    clonazePAM (KlonoPIN) tablet 1 mg, HS PRN    dextromethorphan-guaiFENesin (ROBITUSSIN DM) oral syrup 10 mL, Q4H PRN    donepezil (ARICEPT) tablet 10 mg, QAM    DULoxetine (CYMBALTA) delayed release capsule 60 mg, Daily    ferrous sulfate tablet 325 mg, Daily With Breakfast    Fluticasone Furoate-Vilanterol 100-25 mcg/actuation 1 puff, Daily **AND** umeclidinium 62.5 mcg/actuation inhaler AEPB 1 puff, Daily    folic acid (FOLVITE) tablet 1,000 mcg, QAM    influenza vaccine, high-dose (Fluzone High-Dose) IM injection 0.5 mL, Prior to discharge    insulin glargine (LANTUS) subcutaneous injection 5 Units 0.05 mL, Daily With Breakfast    insulin lispro (HumALOG/ADMELOG) 100 units/mL subcutaneous injection 1-5 Units, HS    insulin lispro (HumALOG/ADMELOG) 100 units/mL subcutaneous injection 1-6 Units, TID AC **AND** Fingerstick Glucose (POCT), 4x Daily AC and at bedtime    insulin lispro (HumALOG/ADMELOG) 100 units/mL subcutaneous injection 5 Units,  TID With Meals    levothyroxine tablet 125 mcg, Early Morning    lidocaine (LIDODERM) 5 % patch 1 patch, Daily    loratadine (CLARITIN) tablet 10 mg, QAM    losartan (COZAAR) tablet 25 mg, Daily    magnesium Oxide (MAG-OX) tablet 400 mg, BID    melatonin tablet 3 mg, HS PRN    metoprolol succinate (TOPROL-XL) 24 hr tablet 25 mg, QAM    pantoprazole (PROTONIX) EC tablet 40 mg, BID AC    [COMPLETED] potassium chloride (Klor-Con M20) CR tablet 40 mEq, TID With Meals **FOLLOWED BY** potassium chloride (Klor-Con M20) CR tablet 40 mEq, BID    rivaroxaban (XARELTO) tablet 20 mg, Daily With Dinner    spironolactone (ALDACTONE) tablet 25 mg, Daily    thiamine tablet 100 mg, Daily    torsemide (DEMADEX) tablet 20 mg, BID    Administrative Statements   Today, Patient Was Seen By: Tristan Alberto MD  I have spent a total time of 40 minutes in caring for this patient on the day of the visit/encounter including Counseling / Coordination of care, Documenting in the medical record, Reviewing / ordering tests, medicine, procedures  , Obtaining or reviewing history  , and Communicating with other healthcare professionals .    **Please Note: This note may have been constructed using a voice recognition system.**

## 2024-10-28 NOTE — PROGRESS NOTES
"Progress Note - Cardiology   Name: Trevor Lyons 84 y.o. male I MRN: [id number]  Unit/Bed#: [facility] I Date of Admission: 10/19/2024   Date of Service: 10/28/2024 I Hospital Day: 9     Assessment & Plan      Cardiology Progress Note - Trevor Lyons 84 y.o. male MRN: [id number]    Unit/Bed#: [facility] Encounter: [id number]        Subjective:    No significant events overnight.  No chest pain or dyspnea at rest.     Review of Systems   Constitutional: Positive for malaise/fatigue.   Cardiovascular:  Negative for chest pain.   Respiratory:  Negative for shortness of breath.        Objective:   Vitals: Blood pressure 116/63, pulse 65, temperature 97.7 °F (36.5 °C), temperature source Axillary, resp. rate 18, height 5' 8\" (1.727 m), weight 90.4 kg (199 lb 6.4 oz), SpO2 (!) 84%., Body mass index is 30.32 kg/m².,   Orthostatic Blood Pressures      Flowsheet Row Most Recent Value   Blood Pressure 116/63 filed at 10/28/2024 0740   Patient Position - Orthostatic VS Lying filed at 10/28/2024 0740           Systolic (24hrs), Av , Min:114 , Max:116     Diastolic (24hrs), Av, Min:63, Max:65      Intake/Output Summary (Last 24 hours) at 10/28/2024 1024  Last data filed at 10/28/2024 0857  Gross per 24 hour   Intake 1001 ml   Output 2375 ml   Net -1374 ml     Weight (last 2 days)       Date/Time Weight    10/28/24 0543 90.4 (199.4)    10/28/24 0421 90.4 (199.4)    10/27/24 0537 90.9 (200.3)    10/26/24 0542 92.2 (203.3)                  Physical Exam  Vitals reviewed.   Constitutional:       Appearance: Normal appearance.   HENT:      Head: Normocephalic.      Nose: Nose normal.      Mouth/Throat:      Mouth: Mucous membranes are moist.      Pharynx: Oropharynx is clear.   Eyes:      General: No scleral icterus.     Conjunctiva/sclera: Conjunctivae normal.   Cardiovascular:      Rate and Rhythm: Normal rate and regular rhythm.      Heart sounds: No murmur heard.     No friction rub. No gallop.   Pulmonary: "      Effort: Pulmonary effort is normal. No respiratory distress.      Breath sounds: Normal breath sounds. No wheezing or rales.   Abdominal:      General: Abdomen is flat. Bowel sounds are normal. There is no distension.      Palpations: Abdomen is soft.      Tenderness: There is no abdominal tenderness. There is no guarding.   Musculoskeletal:      Cervical back: Normal range of motion and neck supple.      Right lower leg: No edema.      Left lower leg: No edema.   Skin:     General: Skin is warm and dry.   Neurological:      General: No focal deficit present.      Mental Status: He is alert and oriented to person, place, and time.   Psychiatric:         Mood and Affect: Mood normal.         Behavior: Behavior normal.           Laboratory Results:        CBC with diff:   Results from last 7 days   Lab Units 10/28/24  0444 10/26/24  0458 10/25/24  0435 10/24/24  0227 10/23/24  0920 10/22/24  0311 10/21/24  1145   WBC Thousand/uL 11.17* 9.80 11.31* 10.17* 11.96* 11.59* 13.36*   HEMOGLOBIN g/dL 11.8* 11.5* 11.3* 12.2 12.0 11.3* 11.7*   HEMATOCRIT % 36.7 36.3* 37.0 38.7 38.3 35.3* 37.7   MCV fL 94 94 95 96 94 95 95   PLATELETS Thousands/uL 237 205 212 160 228 209 207   RBC Million/uL 3.92 3.85* 3.91 4.04 4.06 3.72* 3.99   MCH pg 30.1 29.9 28.9 30.2 29.6 30.4 29.3   MCHC g/dL 32.2 31.7 30.5* 31.5 31.3* 32.0 31.0*   RDW % 13.8 14.0 14.1 14.3 14.4 14.4 14.6   MPV fL 8.9 9.0 9.0 10.8 8.7* 8.7* 8.4*   NRBC AUTO /100 WBCs  --  0 0 0  --   --   --          CMP:  Results from last 7 days   Lab Units 10/28/24  0444 10/26/24  0458 10/25/24  0435 10/24/24  0402 10/23/24  0920 10/22/24  0311 10/21/24  1145   POTASSIUM mmol/L 4.2 3.9 4.2 3.9 4.0 2.9* 3.4*   CHLORIDE mmol/L 95* 95* 96 96 95* 97 97   CO2 mmol/L 33* 36* 34* 34* 33* 33* 34*   BUN mg/dL 29* 28* 22 24 23 28* 25   CREATININE mg/dL 0.99 1.02 1.02 0.92 1.02 1.11 0.95   CALCIUM mg/dL 9.1 8.8 8.5 8.3* 8.5 8.1* 8.4   AST U/L 19 22 22 24  --   --   --    ALT U/L 14 16 16 16   "--   --   --    ALK PHOS U/L 94 100 96 90  --   --   --    EGFR ml/min/1.73sq m 68 66 66 75 66 59 72         BMP:  Results from last 7 days   Lab Units 10/28/24  0444 10/26/24  0458 10/25/24  0435 10/24/24  0402 10/23/24  0920 10/22/24  0311 10/21/24  1145   POTASSIUM mmol/L 4.2 3.9 4.2 3.9 4.0 2.9* 3.4*   CHLORIDE mmol/L 95* 95* 96 96 95* 97 97   CO2 mmol/L 33* 36* 34* 34* 33* 33* 34*   BUN mg/dL 29* 28* 22 24 23 28* 25   CREATININE mg/dL 0.99 1.02 1.02 0.92 1.02 1.11 0.95   CALCIUM mg/dL 9.1 8.8 8.5 8.3* 8.5 8.1* 8.4       BNP: No results for input(s): \"BNP\" in the last 72 hours.    Magnesium:   Results from last 7 days   Lab Units 10/22/24  0311   MAGNESIUM mg/dL 2.2       Coags:       TSH: No results found for: \"TSH\"    Hemoglobin A1C       Lipid Profile:       Cardiac testing:   No results found for this or any previous visit.    No results found for this or any previous visit.    No results found for this or any previous visit.    No results found for this or any previous visit.      Meds/Allergies   current meds:   Current Facility-Administered Medications:     acetaminophen (TYLENOL) tablet 650 mg, Q4H PRN    albuterol inhalation solution 2.5 mg, Q4H PRN    atorvastatin (LIPITOR) tablet 20 mg, QAM    busPIRone (BUSPAR) tablet 7.5 mg, BID    clonazePAM (KlonoPIN) tablet 1 mg, HS PRN    dextromethorphan-guaiFENesin (ROBITUSSIN DM) oral syrup 10 mL, Q4H PRN    donepezil (ARICEPT) tablet 10 mg, QAM    DULoxetine (CYMBALTA) delayed release capsule 60 mg, Daily    ferrous sulfate tablet 325 mg, Daily With Breakfast    Fluticasone Furoate-Vilanterol 100-25 mcg/actuation 1 puff, Daily **AND** umeclidinium 62.5 mcg/actuation inhaler AEPB 1 puff, Daily    folic acid (FOLVITE) tablet 1,000 mcg, QAM    influenza vaccine, high-dose (Fluzone High-Dose) IM injection 0.5 mL, Prior to discharge    insulin glargine (LANTUS) subcutaneous injection 5 Units 0.05 mL, Daily With Breakfast    insulin lispro (HumALOG/ADMELOG) 100 " units/mL subcutaneous injection 1-5 Units, HS    insulin lispro (HumALOG/ADMELOG) 100 units/mL subcutaneous injection 1-6 Units, TID AC **AND** Fingerstick Glucose (POCT), 4x Daily AC and at bedtime    insulin lispro (HumALOG/ADMELOG) 100 units/mL subcutaneous injection 5 Units, TID With Meals    levothyroxine tablet 125 mcg, Early Morning    lidocaine (LIDODERM) 5 % patch 1 patch, Daily    loratadine (CLARITIN) tablet 10 mg, QAM    losartan (COZAAR) tablet 25 mg, Daily    magnesium Oxide (MAG-OX) tablet 400 mg, BID    melatonin tablet 3 mg, HS PRN    metoprolol succinate (TOPROL-XL) 24 hr tablet 25 mg, QAM    pantoprazole (PROTONIX) EC tablet 40 mg, BID AC    [COMPLETED] potassium chloride (Klor-Con M20) CR tablet 40 mEq, TID With Meals **FOLLOWED BY** potassium chloride (Klor-Con M20) CR tablet 40 mEq, BID    rivaroxaban (XARELTO) tablet 20 mg, Daily With Dinner    spironolactone (ALDACTONE) tablet 25 mg, Daily    thiamine tablet 100 mg, Daily    torsemide (DEMADEX) tablet 20 mg, BID    Medications Prior to Admission:     acetaminophen (TYLENOL) 500 mg tablet    albuterol (2.5 mg/3 mL) 0.083 % nebulizer solution    atorvastatin (LIPITOR) 20 mg tablet    busPIRone (BUSPAR) 7.5 mg tablet    clonazePAM (KlonoPIN) 1 mg tablet    donepezil (ARICEPT) 10 mg tablet    DULoxetine (CYMBALTA) 30 mg delayed release capsule    Farxiga 10 MG tablet    FeroSul 325 (65 Fe) MG tablet    fluticasone-umeclidinium-vilanterol 100-62.5-25 mcg/actuation inhaler    folic acid (FOLVITE) 1 mg tablet    furosemide (LASIX) 20 mg tablet    glimepiride (AMARYL) 1 mg tablet    levothyroxine 112 mcg tablet    Lidocaine 4 % PTCH    loratadine (CLARITIN) 10 mg tablet    magnesium Oxide (MAG-OX) 400 mg TABS    Melatonin 5 MG CAPS    metFORMIN (GLUCOPHAGE-XR) 500 mg 24 hr tablet    metoprolol succinate (TOPROL-XL) 25 mg 24 hr tablet    Multiple Vitamin (Daily-Jodie) TABS    pantoprazole (PROTONIX) 40 mg tablet    rivaroxaban (Xarelto) 20 mg tablet     spironolactone (ALDACTONE) 25 mg tablet    Thiamine Mononitrate (VITAMIN B1) 100 mg tablet    albuterol (PROVENTIL HFA,VENTOLIN HFA) 90 mcg/act inhaler    Diclofenac Sodium (VOLTAREN) 1 %    glucose blood (True Metrix Blood Glucose Test) test strip    Lancets 33G MISC    magnesium oxide (MAG-OX) 400 mg    Multiple Vitamin (DAILY-NACHO MULTIVITAMIN PO)       Assessment:  Principal Problem:    Acute on chronic systolic heart failure (HCC)  Active Problems:    Persistent atrial fibrillation (HCC)    Depression with anxiety    Pulmonary embolism ([facility])    Cardiac pacemaker    Pulmonary hypertension ([facility])    Type 2 diabetes mellitus without complication, without long-term current use of insulin ([facility])    COPD (chronic obstructive pulmonary disease) ([facility])    Coronary artery disease involving native coronary artery of native heart without angina pectoris    Acquired hypothyroidism    Acute respiratory insufficiency    Anemia    SIRS (systemic inflammatory response syndrome) ([facility])    History of transcatheter aortic valve replacement (TAVR)    Hypokalemia      Plan:    Acute on Chronic Systolic CHF: EF 40%. Moderate to severe MR. Add losartan. Continue with torsemide, metoprolol and spironolactone.     Persistent AF: Continue metoprolol and xarelto.     CAD S/P CABG and PCI: No angina. Continue with atorvastatin.     S/P PPM implant.:     S/P TAVR: Normal function on last echo.     Counseling / Coordination of Care  Total floor / unit time spent today 25 minutes.  Greater than 50% of total time was spent with the patient and / or family counseling and / or coordination of care.  A description of the counseling / coordination of care.

## 2024-10-28 NOTE — CASE MANAGEMENT
Case Management Discharge Planning Note    Patient name Trevor Lyons  Location /-01 MRN [id number]  : 1940 Date 10/28/2024       Current Admission Date: 10/19/2024  Current Admission Diagnosis:Acute on chronic systolic heart failure (HCC)   Patient Active Problem List    Diagnosis Date Noted Date Diagnosed    History of transcatheter aortic valve replacement (TAVR) 10/22/2024     Acute respiratory insufficiency 10/19/2024     Anemia 10/19/2024     Acute on chronic systolic heart failure (HCC) 2024     Moderate Alzheimer's dementia, unspecified timing of dementia onset, unspecified whether behavioral, psychotic, or mood disturbance or anxiety ([facility]) 2024     Depression, recurrent ([facility]) 2024     Coronary artery disease involving native coronary artery of native heart without angina pectoris 2024     S/P CABG (coronary artery bypass graft) 2024     S/P TAVR (transcatheter aortic valve replacement) 2024     DDD (degenerative disc disease), lumbosacral 2024     COPD (chronic obstructive pulmonary disease) ([facility]) 2024     Recurrent falls 2024     Persistent atrial fibrillation ([facility]) 2024     Chronic combined systolic and diastolic CHF (congestive heart failure) ([facility]) 2024     Depression with anxiety 2024     NSVT (nonsustained ventricular tachycardia) ([facility]) 2024     Orthostatic hypotension 2024     Pulmonary embolism ([facility]) 2024     Cardiac pacemaker 2024     Alzheimer disease ([facility]) 2024     Pulmonary hypertension ([facility]) 2024     Sick sinus syndrome ([facility]) 2024     Dextroscoliosis 2024     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 2024     Mixed hyperlipidemia 2024     Type 2 diabetes mellitus without complication, without long-term current use of insulin ([facility]) 2024     Osteopenia of multiple sites 2024     Chronic left-sided low back pain with left-sided sciatica 2024      Aneurysm of ascending aorta without rupture (HCC) 03/12/2024     Acquired hypothyroidism 11/10/2022     Bilateral carotid bruits 11/11/2019       LOS (days): 9  Geometric Mean LOS (GMLOS) (days): 3.9  Days to GMLOS:-4.9     OBJECTIVE:  Risk of Unplanned Readmission Score: 21.5         Current admission status: Inpatient   Preferred Pharmacy:   [id number] - [place] [address]  Phone: [phone] Fax: [phone]    [facility] - [place] [address]  Phone: [phone] Fax: [phone]    Primary Care Provider: Ira Borden DO    Primary Insurance: MEDICARE MISC REPLACEMENT  Secondary Insurance:     DISCHARGE DETAILS:              CM informed [facility] that pt might be discharge with home 02. Spoke with Marco Antonio from [facility], and Marco Antonio agreed and understood.

## 2024-10-28 NOTE — ASSESSMENT & PLAN NOTE
Lab Results   Component Value Date    HGBA1C 13.5 (H) 08/28/2024       Recent Labs     10/27/24  2055 10/28/24  0737 10/28/24  0936 10/28/24  1110   POCGLU 224* 325* 259* 231*       Blood Sugar Average: Last 72 hrs:  (P) 205.6Home regimen: metformin, glipizide, and farxiga  Hold oral medication    lantus 8U in AM, humalog 7U TID with meals, SSI AC/HS

## 2024-10-28 NOTE — PLAN OF CARE
Problem: Potential for Falls  Goal: Patient will remain free of falls  Description: INTERVENTIONS:  - Educate patient/family on patient safety including physical limitations  - Instruct patient to call for assistance with activity   - Consult OT/PT to assist with strengthening/mobility   - Keep Call bell within reach  - Keep bed low and locked with side rails adjusted as appropriate  - Keep care items and personal belongings within reach  - Initiate and maintain comfort rounds  - Make Fall Risk Sign visible to staff  - Offer Toileting every 2 Hours, in advance of need  - Initiate/Maintain bed alarm  - Obtain necessary fall risk management equipment: socks  Problem: PAIN - ADULT  Goal: Verbalizes/displays adequate comfort level or baseline comfort level  Description: Interventions:  - Encourage patient to monitor pain and request assistance  - Assess pain using appropriate pain scale  - Administer analgesics based on type and severity of pain and evaluate response  - Implement non-pharmacological measures as appropriate and evaluate response  - Consider cultural and social influences on pain and pain management  - Notify physician/advanced practitioner if interventions unsuccessful or patient reports new pain  Outcome: Progressing     Problem: INFECTION - ADULT  Goal: Absence or prevention of progression during hospitalization  Description: INTERVENTIONS:  - Assess and monitor for signs and symptoms of infection  - Monitor lab/diagnostic results  - Monitor all insertion sites, i.e. indwelling lines, tubes, and drains  - Monitor endotracheal if appropriate and nasal secretions for changes in amount and color  - [place] appropriate cooling/warming therapies per order  - Administer medications as ordered  - Instruct and encourage patient and family to use good hand hygiene technique  - Identify and instruct in appropriate isolation precautions for identified infection/condition  Outcome: Progressing  Goal: Absence  of fever/infection during neutropenic period  Description: INTERVENTIONS:  - Monitor WBC    Outcome: Progressing     - Apply yellow socks and bracelet for high fall risk patients  - Consider moving patient to room near nurses station  Outcome: Progressing

## 2024-10-28 NOTE — PLAN OF CARE
Problem: Potential for Falls  Goal: Patient will remain free of falls  Description: INTERVENTIONS:  - Educate patient/family on patient safety including physical limitations  - Instruct patient to call for assistance with activity   - Consult OT/PT to assist with strengthening/mobility   - Keep Call bell within reach  - Keep bed low and locked with side rails adjusted as appropriate  - Keep care items and personal belongings within reach  - Initiate and maintain comfort rounds  - Make Fall Risk Sign visible to staff  - Offer Toileting every 3 Hours, in advance of need  - Initiate/Maintain bed alarm  - Obtain necessary fall risk management equipment: n/a  - Apply yellow socks and bracelet for high fall risk patients  - Consider moving patient to room near nurses station  Outcome: Progressing     Problem: PAIN - ADULT  Goal: Verbalizes/displays adequate comfort level or baseline comfort level  Description: Interventions:  - Encourage patient to monitor pain and request assistance  - Assess pain using appropriate pain scale  - Administer analgesics based on type and severity of pain and evaluate response  - Implement non-pharmacological measures as appropriate and evaluate response  - Consider cultural and social influences on pain and pain management  - Notify physician/advanced practitioner if interventions unsuccessful or patient reports new pain  Outcome: Progressing     Problem: INFECTION - ADULT  Goal: Absence or prevention of progression during hospitalization  Description: INTERVENTIONS:  - Assess and monitor for signs and symptoms of infection  - Monitor lab/diagnostic results  - Monitor all insertion sites, i.e. indwelling lines, tubes, and drains  - Monitor endotracheal if appropriate and nasal secretions for changes in amount and color  - [place] appropriate cooling/warming therapies per order  - Administer medications as ordered  - Instruct and encourage patient and family to use good hand hygiene  technique  - Identify and instruct in appropriate isolation precautions for identified infection/condition  Outcome: Progressing  Goal: Absence of fever/infection during neutropenic period  Description: INTERVENTIONS:  - Monitor WBC    Outcome: Progressing     Problem: SAFETY ADULT  Goal: Patient will remain free of falls  Description: INTERVENTIONS:  - Educate patient/family on patient safety including physical limitations  - Instruct patient to call for assistance with activity   - Consult OT/PT to assist with strengthening/mobility   - Keep Call bell within reach  - Keep bed low and locked with side rails adjusted as appropriate  - Keep care items and personal belongings within reach  - Initiate and maintain comfort rounds  - Make Fall Risk Sign visible to staff  - Offer Toileting every 3 Hours, in advance of need  - Initiate/Maintain bed alarm  - Obtain necessary fall risk management equipment: n/a  - Apply yellow socks and bracelet for high fall risk patients  - Consider moving patient to room near nurses station  Outcome: Progressing  Goal: Maintain or return to baseline ADL function  Description: INTERVENTIONS:  -  Assess patient's ability to carry out ADLs; assess patient's baseline for ADL function and identify physical deficits which impact ability to perform ADLs (bathing, care of mouth/teeth, toileting, grooming, dressing, etc.)  - Assess/evaluate cause of self-care deficits   - Assess range of motion  - Assess patient's mobility; develop plan if impaired  - Assess patient's need for assistive devices and provide as appropriate  - Encourage maximum independence but intervene and supervise when necessary  - Involve family in performance of ADLs  - Assess for home care needs following discharge   - Consider OT consult to assist with ADL evaluation and planning for discharge  - Provide patient education as appropriate  Outcome: Progressing  Goal: Maintains/Returns to pre admission functional  level  Description: INTERVENTIONS:  - Perform AM-PAC 6 Click Basic Mobility/ Daily Activity assessment daily.  - Set and communicate daily mobility goal to care team and patient/family/caregiver.   - Collaborate with rehabilitation services on mobility goals if consulted  - Perform Range of Motion 3 times a day.  - Reposition patient every 3 hours.  - Dangle patient 3 times a day  - Stand patient 3 times a day  - Ambulate patient 3 times a day  - Out of bed to chair 3 times a day   - Out of bed for meals 3 times a day  - Out of bed for toileting  - Record patient progress and toleration of activity level   Outcome: Progressing     Problem: DISCHARGE PLANNING  Goal: Discharge to home or other facility with appropriate resources  Description: INTERVENTIONS:  - Identify barriers to discharge w/patient and caregiver  - Arrange for needed discharge resources and transportation as appropriate  - Identify discharge learning needs (meds, wound care, etc.)  - Arrange for interpretive services to assist at discharge as needed  - Refer to Case Management Department for coordinating discharge planning if the patient needs post-hospital services based on physician/advanced practitioner order or complex needs related to functional status, cognitive ability, or social support system  Outcome: Progressing     Problem: Knowledge Deficit  Goal: Patient/family/caregiver demonstrates understanding of disease process, treatment plan, medications, and discharge instructions  Description: Complete learning assessment and assess knowledge base.  Interventions:  - Provide teaching at level of understanding  - Provide teaching via preferred learning methods  Outcome: Progressing     Problem: Prexisting or High Potential for Compromised Skin Integrity  Goal: Skin integrity is maintained or improved  Description: INTERVENTIONS:  - Identify patients at risk for skin breakdown  - Assess and monitor skin integrity  - Assess and monitor nutrition  and hydration status  - Monitor labs   - Assess for incontinence   - Turn and reposition patient  - Assist with mobility/ambulation  - Relieve pressure over bony prominences  - Avoid friction and shearing  - Provide appropriate hygiene as needed including keeping skin clean and dry  - Evaluate need for skin moisturizer/barrier cream  - Collaborate with interdisciplinary team   - Patient/family teaching  - Consider wound care consult   Outcome: Progressing     Problem: CARDIOVASCULAR - ADULT  Goal: Maintains optimal cardiac output and hemodynamic stability  Description: INTERVENTIONS:  - Monitor I/O, vital signs and rhythm  - Monitor for S/S and trends of decreased cardiac output  - Administer and titrate ordered vasoactive medications to optimize hemodynamic stability  - Assess quality of pulses, skin color and temperature  - Assess for signs of decreased coronary artery perfusion  - Instruct patient to report change in severity of symptoms  Outcome: Progressing  Goal: Absence of cardiac dysrhythmias or at baseline rhythm  Description: INTERVENTIONS:  - Continuous cardiac monitoring, vital signs, obtain 12 lead EKG if ordered  - Administer antiarrhythmic and heart rate control medications as ordered  - Monitor electrolytes and administer replacement therapy as ordered  Outcome: Progressing

## 2024-10-29 VITALS
HEIGHT: 68 IN | WEIGHT: 202 LBS | TEMPERATURE: 97.6 F | SYSTOLIC BLOOD PRESSURE: 110 MMHG | HEART RATE: 65 BPM | DIASTOLIC BLOOD PRESSURE: 56 MMHG | BODY MASS INDEX: 30.62 KG/M2 | OXYGEN SATURATION: 95 % | RESPIRATION RATE: 18 BRPM

## 2024-10-29 DIAGNOSIS — F41.9 ANXIETY: ICD-10-CM

## 2024-10-29 PROBLEM — F32.A DEPRESSED: Status: ACTIVE | Noted: 2024-07-16

## 2024-10-29 LAB
ANION GAP SERPL CALCULATED.3IONS-SCNC: 8 MMOL/L (ref 4–13)
BUN SERPL-MCNC: 34 MG/DL (ref 5–25)
CALCIUM SERPL-MCNC: 8.9 MG/DL (ref 8.4–10.2)
CHLORIDE SERPL-SCNC: 95 MMOL/L (ref 96–108)
CO2 SERPL-SCNC: 35 MMOL/L (ref 21–32)
CREAT SERPL-MCNC: 1.3 MG/DL (ref 0.6–1.3)
ERYTHROCYTE [DISTWIDTH] IN BLOOD BY AUTOMATED COUNT: 13.9 % (ref 11.6–15.1)
GFR SERPL CREATININE-BSD FRML MDRD: 49 ML/MIN/1.73SQ M
GLUCOSE SERPL-MCNC: 139 MG/DL (ref 65–140)
GLUCOSE SERPL-MCNC: 177 MG/DL (ref 65–140)
GLUCOSE SERPL-MCNC: 206 MG/DL (ref 65–140)
GLUCOSE SERPL-MCNC: 259 MG/DL (ref 65–140)
HCT VFR BLD AUTO: 35.8 % (ref 36.5–49.3)
HGB BLD-MCNC: 11.1 G/DL (ref 12–17)
MCH RBC QN AUTO: 29.2 PG (ref 26.8–34.3)
MCHC RBC AUTO-ENTMCNC: 31 G/DL (ref 31.4–37.4)
MCV RBC AUTO: 94 FL (ref 82–98)
NRBC BLD AUTO-RTO: 0 /100 WBCS
PLATELET # BLD AUTO: 245 THOUSANDS/UL (ref 149–390)
PMV BLD AUTO: 9 FL (ref 8.9–12.7)
POTASSIUM SERPL-SCNC: 4.2 MMOL/L (ref 3.5–5.3)
RBC # BLD AUTO: 3.8 MILLION/UL (ref 3.88–5.62)
SODIUM SERPL-SCNC: 138 MMOL/L (ref 135–147)
WBC # BLD AUTO: 13.2 THOUSAND/UL (ref 4.31–10.16)

## 2024-10-29 PROCEDURE — 82948 REAGENT STRIP/BLOOD GLUCOSE: CPT

## 2024-10-29 PROCEDURE — 99239 HOSP IP/OBS DSCHRG MGMT >30: CPT | Performed by: PHYSICIAN ASSISTANT

## 2024-10-29 PROCEDURE — 99232 SBSQ HOSP IP/OBS MODERATE 35: CPT | Performed by: INTERNAL MEDICINE

## 2024-10-29 PROCEDURE — 80048 BASIC METABOLIC PNL TOTAL CA: CPT | Performed by: INTERNAL MEDICINE

## 2024-10-29 PROCEDURE — 85027 COMPLETE CBC AUTOMATED: CPT | Performed by: INTERNAL MEDICINE

## 2024-10-29 RX ORDER — TORSEMIDE 20 MG/1
20 TABLET ORAL 2 TIMES DAILY
Qty: 60 TABLET | Refills: 0 | Status: SHIPPED | OUTPATIENT
Start: 2024-10-29

## 2024-10-29 RX ORDER — LEVOTHYROXINE SODIUM 125 UG/1
125 TABLET ORAL
Qty: 30 TABLET | Refills: 0 | Status: SHIPPED | OUTPATIENT
Start: 2024-10-30

## 2024-10-29 RX ORDER — GAUZE BANDAGE 2" X 2"
100 BANDAGE TOPICAL DAILY
Qty: 30 TABLET | Refills: 0 | Status: SHIPPED | OUTPATIENT
Start: 2024-10-29

## 2024-10-29 RX ORDER — FERROUS SULFATE 325(65) MG
325 TABLET ORAL
Qty: 30 TABLET | Refills: 0 | Status: SHIPPED | OUTPATIENT
Start: 2024-10-29

## 2024-10-29 RX ORDER — ATORVASTATIN CALCIUM 20 MG/1
20 TABLET, FILM COATED ORAL
Qty: 30 TABLET | Refills: 0 | Status: SHIPPED | OUTPATIENT
Start: 2024-10-29

## 2024-10-29 RX ORDER — SPIRONOLACTONE 25 MG/1
25 TABLET ORAL DAILY
Qty: 30 TABLET | Refills: 0 | Status: SHIPPED | OUTPATIENT
Start: 2024-10-29

## 2024-10-29 RX ORDER — GLIMEPIRIDE 1 MG/1
1 TABLET ORAL
Qty: 30 TABLET | Refills: 0 | Status: SHIPPED | OUTPATIENT
Start: 2024-10-29

## 2024-10-29 RX ORDER — MULTIVITAMIN WITH FOLIC ACID 400 MCG
1 TABLET ORAL EVERY MORNING
Qty: 30 TABLET | Refills: 0 | Status: SHIPPED | OUTPATIENT
Start: 2024-10-29

## 2024-10-29 RX ORDER — ALBUTEROL SULFATE 90 UG/1
2 INHALANT RESPIRATORY (INHALATION) EVERY 6 HOURS PRN
Qty: 18 G | Refills: 0 | Status: SHIPPED | OUTPATIENT
Start: 2024-10-29

## 2024-10-29 RX ORDER — POTASSIUM CHLORIDE 1500 MG/1
40 TABLET, EXTENDED RELEASE ORAL 2 TIMES DAILY
Qty: 60 TABLET | Refills: 0 | Status: SHIPPED | OUTPATIENT
Start: 2024-10-29

## 2024-10-29 RX ORDER — DONEPEZIL HYDROCHLORIDE 10 MG/1
10 TABLET, FILM COATED ORAL EVERY MORNING
Qty: 30 TABLET | Refills: 0 | Status: SHIPPED | OUTPATIENT
Start: 2024-10-29

## 2024-10-29 RX ORDER — DULOXETIN HYDROCHLORIDE 60 MG/1
60 CAPSULE, DELAYED RELEASE ORAL DAILY
Qty: 30 CAPSULE | Refills: 0 | Status: SHIPPED | OUTPATIENT
Start: 2024-10-30

## 2024-10-29 RX ORDER — DAPAGLIFLOZIN 10 MG/1
10 TABLET, FILM COATED ORAL DAILY
Qty: 30 TABLET | Refills: 0 | Status: SHIPPED | OUTPATIENT
Start: 2024-10-29

## 2024-10-29 RX ORDER — FOLIC ACID 1 MG/1
1000 TABLET ORAL EVERY MORNING
Qty: 30 TABLET | Refills: 0 | Status: SHIPPED | OUTPATIENT
Start: 2024-10-29

## 2024-10-29 RX ORDER — ACETAMINOPHEN 325 MG/1
975 TABLET ORAL EVERY 8 HOURS SCHEDULED
Qty: 90 TABLET | Refills: 0 | Status: SHIPPED | OUTPATIENT
Start: 2024-10-29

## 2024-10-29 RX ORDER — LOSARTAN POTASSIUM 25 MG/1
25 TABLET ORAL DAILY
Qty: 30 TABLET | Refills: 0 | Status: SHIPPED | OUTPATIENT
Start: 2024-10-30

## 2024-10-29 RX ORDER — ACETAMINOPHEN 325 MG/1
975 TABLET ORAL EVERY 8 HOURS SCHEDULED
Status: DISCONTINUED | OUTPATIENT
Start: 2024-10-29 | End: 2024-10-29 | Stop reason: HOSPADM

## 2024-10-29 RX ORDER — PANTOPRAZOLE SODIUM 40 MG/1
40 TABLET, DELAYED RELEASE ORAL DAILY
Qty: 30 TABLET | Refills: 0 | Status: SHIPPED | OUTPATIENT
Start: 2024-10-29

## 2024-10-29 RX ORDER — METFORMIN HYDROCHLORIDE 500 MG/1
1000 TABLET, EXTENDED RELEASE ORAL 2 TIMES DAILY WITH MEALS
Qty: 60 TABLET | Refills: 0 | Status: SHIPPED | OUTPATIENT
Start: 2024-10-29

## 2024-10-29 RX ORDER — METOPROLOL SUCCINATE 25 MG/1
25 TABLET, EXTENDED RELEASE ORAL EVERY MORNING
Qty: 30 TABLET | Refills: 0 | Status: SHIPPED | OUTPATIENT
Start: 2024-10-29

## 2024-10-29 RX ORDER — INSULIN GLARGINE 100 [IU]/ML
8 INJECTION, SOLUTION SUBCUTANEOUS
Qty: 10 ML | Refills: 0 | Status: SHIPPED | OUTPATIENT
Start: 2024-10-30

## 2024-10-29 RX ORDER — BUSPIRONE HYDROCHLORIDE 7.5 MG/1
7.5 TABLET ORAL 2 TIMES DAILY
Qty: 60 TABLET | Refills: 0 | Status: SHIPPED | OUTPATIENT
Start: 2024-10-29

## 2024-10-29 RX ORDER — LORATADINE 10 MG/1
10 TABLET ORAL EVERY MORNING
Qty: 30 TABLET | Refills: 0 | Status: SHIPPED | OUTPATIENT
Start: 2024-10-29

## 2024-10-29 RX ORDER — INSULIN LISPRO 100 [IU]/ML
1-6 INJECTION, SOLUTION INTRAVENOUS; SUBCUTANEOUS
Qty: 100 ML | Refills: 0 | Status: SHIPPED | OUTPATIENT
Start: 2024-10-29

## 2024-10-29 RX ORDER — ALBUTEROL SULFATE 0.83 MG/ML
2.5 SOLUTION RESPIRATORY (INHALATION) 4 TIMES DAILY
Qty: 100 ML | Refills: 0 | Status: SHIPPED | OUTPATIENT
Start: 2024-10-29

## 2024-10-29 RX ORDER — CLONAZEPAM 1 MG/1
1 TABLET ORAL
Qty: 10 TABLET | Refills: 0 | Status: SHIPPED | OUTPATIENT
Start: 2024-10-29 | End: 2024-10-30

## 2024-10-29 RX ORDER — LANOLIN ALCOHOL/MO/W.PET/CERES
400 CREAM (GRAM) TOPICAL 2 TIMES DAILY
Qty: 60 TABLET | Refills: 0 | Status: SHIPPED | OUTPATIENT
Start: 2024-10-29

## 2024-10-29 RX ADMIN — DONEPEZIL HYDROCHLORIDE 10 MG: 5 TABLET ORAL at 08:23

## 2024-10-29 RX ADMIN — ACETAMINOPHEN 975 MG: 325 TABLET, FILM COATED ORAL at 12:25

## 2024-10-29 RX ADMIN — FOLIC ACID 1000 MCG: 1 TABLET ORAL at 08:22

## 2024-10-29 RX ADMIN — LIDOCAINE 5% 1 PATCH: 700 PATCH TOPICAL at 08:19

## 2024-10-29 RX ADMIN — ACETAMINOPHEN 650 MG: 325 TABLET, FILM COATED ORAL at 04:01

## 2024-10-29 RX ADMIN — LORATADINE 10 MG: 10 TABLET ORAL at 08:22

## 2024-10-29 RX ADMIN — INSULIN LISPRO 7 UNITS: 100 INJECTION, SOLUTION INTRAVENOUS; SUBCUTANEOUS at 17:26

## 2024-10-29 RX ADMIN — INSULIN LISPRO 7 UNITS: 100 INJECTION, SOLUTION INTRAVENOUS; SUBCUTANEOUS at 08:18

## 2024-10-29 RX ADMIN — Medication 400 MG: at 17:28

## 2024-10-29 RX ADMIN — METOPROLOL SUCCINATE 25 MG: 25 TABLET, EXTENDED RELEASE ORAL at 08:22

## 2024-10-29 RX ADMIN — PANTOPRAZOLE SODIUM 40 MG: 40 TABLET, DELAYED RELEASE ORAL at 05:00

## 2024-10-29 RX ADMIN — SPIRONOLACTONE 25 MG: 25 TABLET ORAL at 08:22

## 2024-10-29 RX ADMIN — FLUTICASONE FUROATE AND VILANTEROL TRIFENATATE 1 PUFF: 100; 25 POWDER RESPIRATORY (INHALATION) at 08:26

## 2024-10-29 RX ADMIN — ATORVASTATIN CALCIUM 20 MG: 20 TABLET, FILM COATED ORAL at 08:22

## 2024-10-29 RX ADMIN — INSULIN LISPRO 3 UNITS: 100 INJECTION, SOLUTION INTRAVENOUS; SUBCUTANEOUS at 12:24

## 2024-10-29 RX ADMIN — RIVAROXABAN 20 MG: 20 TABLET, FILM COATED ORAL at 17:27

## 2024-10-29 RX ADMIN — LOSARTAN POTASSIUM 25 MG: 25 TABLET, FILM COATED ORAL at 08:23

## 2024-10-29 RX ADMIN — BUSPIRONE HYDROCHLORIDE 7.5 MG: 15 TABLET ORAL at 17:28

## 2024-10-29 RX ADMIN — Medication 400 MG: at 08:22

## 2024-10-29 RX ADMIN — INSULIN LISPRO 1 UNITS: 100 INJECTION, SOLUTION INTRAVENOUS; SUBCUTANEOUS at 08:16

## 2024-10-29 RX ADMIN — Medication 100 MG: at 08:23

## 2024-10-29 RX ADMIN — FERROUS SULFATE TAB 325 MG (65 MG ELEMENTAL FE) 325 MG: 325 (65 FE) TAB at 08:23

## 2024-10-29 RX ADMIN — INSULIN GLARGINE 8 UNITS: 100 INJECTION, SOLUTION SUBCUTANEOUS at 08:16

## 2024-10-29 RX ADMIN — POTASSIUM CHLORIDE 40 MEQ: 1500 TABLET, EXTENDED RELEASE ORAL at 08:22

## 2024-10-29 RX ADMIN — PANTOPRAZOLE SODIUM 40 MG: 40 TABLET, DELAYED RELEASE ORAL at 17:28

## 2024-10-29 RX ADMIN — UMECLIDINIUM 1 PUFF: 62.5 AEROSOL, POWDER ORAL at 08:26

## 2024-10-29 RX ADMIN — INSULIN LISPRO 7 UNITS: 100 INJECTION, SOLUTION INTRAVENOUS; SUBCUTANEOUS at 12:24

## 2024-10-29 RX ADMIN — TORSEMIDE 20 MG: 20 TABLET ORAL at 08:22

## 2024-10-29 RX ADMIN — INSULIN LISPRO 2 UNITS: 100 INJECTION, SOLUTION INTRAVENOUS; SUBCUTANEOUS at 17:26

## 2024-10-29 RX ADMIN — POTASSIUM CHLORIDE 40 MEQ: 1500 TABLET, EXTENDED RELEASE ORAL at 17:28

## 2024-10-29 RX ADMIN — DULOXETINE HYDROCHLORIDE 60 MG: 30 CAPSULE, DELAYED RELEASE ORAL at 08:22

## 2024-10-29 RX ADMIN — BUSPIRONE HYDROCHLORIDE 7.5 MG: 15 TABLET ORAL at 08:22

## 2024-10-29 RX ADMIN — LEVOTHYROXINE SODIUM 125 MCG: 25 TABLET ORAL at 05:00

## 2024-10-29 NOTE — ASSESSMENT & PLAN NOTE
Would recommend further outpatient follow-up for monitoring during medication titration/addition  Evaluated by behavioral health on 10/25  Continue with recently increased Cymbalta; continue with BuSpar/Ativan

## 2024-10-29 NOTE — PLAN OF CARE
Problem: PAIN - ADULT  Goal: Verbalizes/displays adequate comfort level or baseline comfort level  Description: Interventions:  - Encourage patient to monitor pain and request assistance  - Assess pain using appropriate pain scale  - Administer analgesics based on type and severity of pain and evaluate response  - Implement non-pharmacological measures as appropriate and evaluate response  - Consider cultural and social influences on pain and pain management  - Notify physician/advanced practitioner if interventions unsuccessful or patient reports new pain  Outcome: Progressing     Problem: INFECTION - ADULT  Goal: Absence or prevention of progression during hospitalization  Description: INTERVENTIONS:  - Assess and monitor for signs and symptoms of infection  - Monitor lab/diagnostic results  - Monitor all insertion sites, i.e. indwelling lines, tubes, and drains  - Monitor endotracheal if appropriate and nasal secretions for changes in amount and color  - [place] appropriate cooling/warming therapies per order  - Administer medications as ordered  - Instruct and encourage patient and family to use good hand hygiene technique  - Identify and instruct in appropriate isolation precautions for identified infection/condition  Outcome: Progressing  Goal: Absence of fever/infection during neutropenic period  Description: INTERVENTIONS:  - Monitor WBC    Outcome: Progressing

## 2024-10-29 NOTE — CASE MANAGEMENT
Case Management Discharge Planning Note    Patient name Trevor Lyons  Location /-01 MRN [id number]  : 1940 Date 10/29/2024       Current Admission Date: 10/19/2024  Current Admission Diagnosis:Acute on chronic systolic heart failure (HCC)   Patient Active Problem List    Diagnosis Date Noted Date Diagnosed    History of transcatheter aortic valve replacement (TAVR) 10/22/2024     Acute respiratory insufficiency 10/19/2024     Anemia 10/19/2024     Acute on chronic systolic heart failure (HCC) 2024     Moderate Alzheimer's dementia, unspecified timing of dementia onset, unspecified whether behavioral, psychotic, or mood disturbance or anxiety ([facility]) 2024     Depression, recurrent ([facility]) 2024     Coronary artery disease involving native coronary artery of native heart without angina pectoris 2024     S/P CABG (coronary artery bypass graft) 2024     S/P TAVR (transcatheter aortic valve replacement) 2024     DDD (degenerative disc disease), lumbosacral 2024     COPD (chronic obstructive pulmonary disease) ([facility]) 2024     Recurrent falls 2024     Persistent atrial fibrillation ([facility]) 2024     Chronic combined systolic and diastolic CHF (congestive heart failure) ([facility]) 2024     Depression with anxiety 2024     NSVT (nonsustained ventricular tachycardia) ([facility]) 2024     Orthostatic hypotension 2024     Pulmonary embolism ([facility]) 2024     Cardiac pacemaker 2024     Alzheimer disease ([facility]) 2024     Pulmonary hypertension ([facility]) 2024     Sick sinus syndrome ([facility]) 2024     Dextroscoliosis 2024     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 2024     Mixed hyperlipidemia 2024     Type 2 diabetes mellitus without complication, without long-term current use of insulin ([facility]) 2024     Osteopenia of multiple sites 2024     Chronic left-sided low back pain with left-sided sciatica 2024      Aneurysm of ascending aorta without rupture (HCC) 03/12/2024     Acquired hypothyroidism 11/10/2022     Bilateral carotid bruits 11/11/2019       LOS (days): 10  Geometric Mean LOS (GMLOS) (days): 3.9  Days to GMLOS:-5.6     OBJECTIVE:  Risk of Unplanned Readmission Score: 21.49         Current admission status: Inpatient   Preferred Pharmacy:   [facility] [id number] - [place] [address]  Phone: [phone] Fax: [phone]    [facility] - [place] [address]  Phone: [phone] Fax: [phone]    Primary Care Provider: Ira Borden DO    Primary Insurance: MEDICARE MISC REPLACEMENT  Secondary Insurance:     DISCHARGE DETAILS:                                                                                        IMM Given (Date):: 10/29/24  IMM Given to:: Patient   IMM reviewed with patient, patient agrees with discharge determination.    Pt is aware of right to appeal hospital discharge.

## 2024-10-29 NOTE — DISCHARGE SUMMARY
Discharge Summary - Hospitalist   Name: Trevor Lyons 84 y.o. male I MRN: [id number]  Unit/Bed#: -01 I Date of Admission: 10/19/2024   Date of Service: 10/29/2024 I Hospital Day: 10     Assessment & Plan  Acute on chronic systolic heart failure (HCC)  Presents from nursing home with worsened dyspnea, low SpO2, & 20 pound weight gain  ECHO (01/2024) EF 35-40%, severely dilated left atrium, mild pulmonary hypertension, TAVR functioning well  ECHO (10/21/24): EF 40-45%, mild global hypokinesis  Home diuretic: lasix 20mg BID and spironolactone 25 mg  Clinically euvolemic; stable on p.o. diuretics  C/w Toprol XL, spironolactone, recently added losartan, Demadex 20 mg BID   Daily wts, fluid restriction   Stable on RA     I/O last 3 completed shifts:  In: 1352 [P.O.:1352]  Out: 2850 [Urine:2850]  Net IO Since Admission: -11,166 mL [10/29/24 1444]    Weight change: 1.179 kg (2 lb 9.6 oz)  Wt Readings from Last 3 Encounters:   10/29/24 91.6 kg (202 lb)   09/18/24 89.8 kg (198 lb)   07/16/24 89.4 kg (197 lb)     Acute respiratory insufficiency  POA, SpO2 87% on room air; required up to 2 L via nasal cannula; stable on room air at time of discharge/with ambulation  Etiology secondary to acute CHF exacerbation  Anemia  Baseline Cr 10-12   Trend CBC     Recent Labs     10/28/24  0444 10/29/24  0355   HGB 11.8* 11.1*      COPD (chronic obstructive pulmonary disease) ([facility])  Home regimen: Trelegy daily and albuterol as needed  No expiratory wheezing appreciated on admission, do not suspect acute exacerbation at this time  Continue home regimen  Type 2 diabetes mellitus without complication, without long-term current use of insulin ([facility])  Lab Results   Component Value Date    HGBA1C 13.5 (H) 08/28/2024     Recent Labs     10/28/24  1654 10/28/24  2009 10/29/24  0721 10/29/24  1149   POCGLU 352* 85 177* 259*     Blood Sugar Average: Last 72 hrs:  (P) [id number]  Resume home regimen at d/c - metformin,  glipizide, and farxiga  Receive lantus 8U in AM, humalog 7U TID with meals, SSI AC/HS during hospital course   Glucose poorly controlled per hemoglobin A1c  Coronary artery disease involving native coronary artery of native heart without angina pectoris  CAD status post PCI and CABG  Continue home beta-blocker, statin, and Xarelto  Denies symptoms of unstable angina  Persistent atrial fibrillation (HCC)  RC: Metoprolol succinate 25 Mg daily  AC: Xarelto  Rate controlled, v paced  Cardiac pacemaker  [place] s/p SSS   Last device interrogation 07/2024 showed normal device function   Pulmonary embolism (HCC)  Continue home Xarelto   CTA yesterday with no signs of PE  Pulmonary hypertension (HCC)  Most recent echo January 2024 showing mild pulmonary hypertension with RSVP 39 mmHg  Continue home medications  Acquired hypothyroidism  TSH elevated at 8.358  Increase synthroid to 125mcg daily   Recheck TSH in 6-8 weeks   Depression with anxiety  Continue home buspar, cymbalta, and klonopin   History of transcatheter aortic valve replacement (TAVR)  Noted  Depressed  Would recommend further outpatient follow-up for monitoring during medication titration/addition  Evaluated by behavioral health on 10/25  Continue with recently increased Cymbalta; continue with BuSpar/Ativan    Medical Problems       Resolved Problems  Date Reviewed: 9/18/2024            Resolved    SIRS (systemic inflammatory response syndrome) (HCC) 10/28/2024     Resolved by  Tristan Alberto MD    Hypokalemia 10/28/2024     Resolved by  Tristan Alberto MD        Discharging Physician / Practitioner: Usha Decker PA-C  PCP: Ira Borden DO  Admission Date:   Admission Orders (From admission, onward)       Ordered        10/19/24 2132  INPATIENT ADMISSION  Once                          Discharge Date: 10/29/24    Consultations During Hospital Stay:  Cardiology      CM     Procedures Performed:   N/A    Significant Findings / Test Results:   See A/P  "above     Incidental Findings:   N/A     Test Results Pending at Discharge (will require follow up):   N/A     Outpatient Tests Requested:  Recommend routine lab work within 1 to 2 weeks of discharge close to frequently; recommend repeat thyroid level testing within 6-8 wks     Complications:  N/A     Reason for Admission: Acute CHF exacerbation    Hospital Course:   Trevor Lyons is a 84 y.o. male patient who originally presented to the hospital on 10/19/2024 due to acute CHF exacerbation.  Patient presented from [facility] with complaint of worsening dyspnea, low O2, and approximate 20 pound weight gain.  Patient was found to be in an acute CHF exacerbation and was subsequently admitted.  Patient's O2 saturation was noted to be 87% on room air.  Patient required up to 2 L of nasal cannula.  At discharge patient is stable on room air both at rest and with ambulation.  Patient was evaluated by cardiology team.  Patient received intravenous diuretic therapy during hospital course.  Patient was transitioned from Lasix PTA to Demadex 20 mg twice daily.  Patient is euvolemic at time of discharge.    Patient's thyroid medication was titrated due to elevated TSH.    He was evaluated by behavioral health with complaint of worsening depression.  Patient [place] SI or HI at time of discharge.    Please see above list of diagnoses and related plan for additional information.     Condition at Discharge: fair    Discharge Day Visit / Exam:   Subjective: Patient is doing well.  He does note some back discomfort from the poor sleeping arrangements.  He denies shortness of breath.  He had a bowel movement today.  He denies dysuria.  He has normal urine output.  Vitals: Blood Pressure: 110/58 (10/29/24 0723)  Pulse: 65 (10/29/24 0723)  Temperature: 97.5 °F (36.4 °C) (10/29/24 0723)  Temp Source: Oral (10/29/24 0723)  Respirations: 20 (10/29/24 0723)  Height: 5' 8\" (172.7 cm) (10/19/24 2212)  Weight - Scale: 91.6 kg (202 " lb) (10/29/24 2358)  SpO2: 93 % (10/29/24 0996)  Physical Exam  Constitutional:       General: He is not in acute distress.     Appearance: He is obese. He is not toxic-appearing.      Comments: Elderly, chronically ill in appearance   HENT:      Head: Normocephalic.      Right Ear: External ear normal.      Left Ear: External ear normal.      Nose: Nose normal.      Mouth/Throat:      Mouth: Mucous membranes are moist.      Pharynx: Oropharynx is clear.   Eyes:      Extraocular Movements: Extraocular movements intact.      Conjunctiva/sclera: Conjunctivae normal.   Cardiovascular:      Rate and Rhythm: Normal rate and regular rhythm.      Pulses: Normal pulses.      Heart sounds: Normal heart sounds.   Pulmonary:      Effort: Pulmonary effort is normal. No respiratory distress.      Breath sounds: Normal breath sounds. No wheezing or rales.   Abdominal:      General: Bowel sounds are normal. There is no distension.      Tenderness: There is no abdominal tenderness.      Comments: Obese abdomen   Musculoskeletal:         General: Swelling (Trace lower extremity edema) present.      Cervical back: Normal range of motion.   Neurological:      General: No focal deficit present.      Mental Status: He is alert. Mental status is at baseline.   Psychiatric:         Mood and Affect: Mood normal.         Behavior: Behavior normal.         Thought Content: Thought content normal.         Judgment: Judgment normal.        Discussion with Family: Updated  (daughter) via phone.    Discharge instructions/Information to patient and family:   See after visit summary for information provided to patient and family.      Provisions for Follow-Up Care:  See after visit summary for information related to follow-up care and any pertinent home health orders.      Mobility at time of Discharge:   Basic Mobility Inpatient Raw Score: 16  -HLM Goal: 5: Stand one or more mins  JH-HLM Achieved: 5: Stand (1 or more minutes)  HLM  Goal achieved. Continue to encourage appropriate mobility.     Disposition:   [address]    Planned Readmission: None    Discharge Medications:  See after visit summary for reconciled discharge medications provided to patient and/or family.      Administrative Statements   Discharge Statement:  I have spent a total time of 65 minutes in caring for this patient on the day of the visit/encounter. >30 minutes of time was spent on: Diagnostic results, Instructions for management, Patient and family education, Importance of tx compliance, Risk factor reductions, Impressions, Counseling / Coordination of care, Documenting in the medical record, Reviewing / ordering tests, medicine, procedures  , and Communicating with other healthcare professionals .    **Please Note: This note may have been constructed using a voice recognition system**

## 2024-10-29 NOTE — PROGRESS NOTES
"Progress Note - Cardiology   Name: Trevor Lyons 84 y.o. male I MRN: [id number]  Unit/Bed#: [facility] I Date of Admission: 10/19/2024   Date of Service: 10/29/2024 I Hospital Day: 10     Assessment & Plan      Cardiology Progress Note - Trevor Lyons 84 y.o. male MRN: [id number]    Unit/Bed#: [facility] Encounter: [id number]        Subjective:    No significant events overnight.  No chest pain or dyspnea at rest.     Review of Systems   Constitutional: Negative for malaise/fatigue.   Cardiovascular:  Negative for chest pain.   Respiratory:  Negative for shortness of breath.        Objective:   Vitals: Blood pressure 110/58, pulse 65, temperature 97.5 °F (36.4 °C), temperature source Oral, resp. rate 20, height 5' 8\" (1.727 m), weight 91.6 kg (202 lb), SpO2 93%., Body mass index is 30.71 kg/m².,   Orthostatic Blood Pressures      Flowsheet Row Most Recent Value   Blood Pressure 110/58 filed at 10/29/2024 0723   Patient Position - Orthostatic VS Lying filed at 10/29/2024 0723           Systolic (24hrs), Av , Min:110 , Max:113     Diastolic (24hrs), Av, Min:58, Max:63      Intake/Output Summary (Last 24 hours) at 10/29/2024 1014  Last data filed at 10/29/2024 0933  Gross per 24 hour   Intake 1310 ml   Output 1800 ml   Net -490 ml     Weight (last 2 days)       Date/Time Weight    10/29/24 0355 91.6 (202)    10/28/24 0543 90.4 (199.4)    10/28/24 0421 90.4 (199.4)    10/27/24 0537 90.9 (200.3)              Telemetry Review: No significant arrhythmias seen on telemetry review.     Physical Exam  Vitals reviewed.   Constitutional:       Appearance: Normal appearance.   HENT:      Head: Normocephalic.      Nose: Nose normal.      Mouth/Throat:      Mouth: Mucous membranes are moist.      Pharynx: Oropharynx is clear.   Eyes:      General: No scleral icterus.     Conjunctiva/sclera: Conjunctivae normal.   Cardiovascular:      Rate and Rhythm: Normal rate and regular rhythm.      Heart sounds: No murmur " heard.     No friction rub. No gallop.   Pulmonary:      Effort: Pulmonary effort is normal. No respiratory distress.      Breath sounds: Normal breath sounds. No wheezing or rales.   Abdominal:      General: Abdomen is flat. Bowel sounds are normal. There is no distension.      Palpations: Abdomen is soft.      Tenderness: There is no abdominal tenderness. There is no guarding.   Musculoskeletal:      Cervical back: Normal range of motion and neck supple.      Right lower leg: No edema.      Left lower leg: No edema.   Skin:     General: Skin is warm and dry.   Neurological:      General: No focal deficit present.      Mental Status: He is alert and oriented to person, place, and time.   Psychiatric:         Mood and Affect: Mood normal.         Behavior: Behavior normal.           Laboratory Results:        CBC with diff:   Results from last 7 days   Lab Units 10/29/24  0355 10/28/24  0444 10/26/24  0458 10/25/24  0435 10/24/24  0227 10/23/24  0920   WBC Thousand/uL 13.20* 11.17* 9.80 11.31* 10.17* 11.96*   HEMOGLOBIN g/dL 11.1* 11.8* 11.5* 11.3* 12.2 12.0   HEMATOCRIT % 35.8* 36.7 36.3* 37.0 38.7 38.3   MCV fL 94 94 94 95 96 94   PLATELETS Thousands/uL 245 237 205 212 160 228   RBC Million/uL 3.80* 3.92 3.85* 3.91 4.04 4.06   MCH pg 29.2 30.1 29.9 28.9 30.2 29.6   MCHC g/dL 31.0* 32.2 31.7 30.5* 31.5 31.3*   RDW % 13.9 13.8 14.0 14.1 14.3 14.4   MPV fL 9.0 8.9 9.0 9.0 10.8 8.7*   NRBC AUTO /100 WBCs 0  --  0 0 0  --          CMP:  Results from last 7 days   Lab Units 10/29/24  0355 10/28/24  0444 10/26/24  0458 10/25/24  0435 10/24/24  0402 10/23/24  0920   POTASSIUM mmol/L 4.2 4.2 3.9 4.2 3.9 4.0   CHLORIDE mmol/L 95* 95* 95* 96 96 95*   CO2 mmol/L 35* 33* 36* 34* 34* 33*   BUN mg/dL 34* 29* 28* 22 24 23   CREATININE mg/dL 1.30 0.99 1.02 1.02 0.92 1.02   CALCIUM mg/dL 8.9 9.1 8.8 8.5 8.3* 8.5   AST U/L  --  19 22 22 24  --    ALT U/L  --  14 16 16 16  --    ALK PHOS U/L  --  94 100 96 90  --    EGFR  "ml/min/1.73sq m 49 68 66 66 75 66         BMP:  Results from last 7 days   Lab Units 10/29/24  0355 10/28/24  0444 10/26/24  0458 10/25/24  0435 10/24/24  0402 10/23/24  0920   POTASSIUM mmol/L 4.2 4.2 3.9 4.2 3.9 4.0   CHLORIDE mmol/L 95* 95* 95* 96 96 95*   CO2 mmol/L 35* 33* 36* 34* 34* 33*   BUN mg/dL 34* 29* 28* 22 24 23   CREATININE mg/dL 1.30 0.99 1.02 1.02 0.92 1.02   CALCIUM mg/dL 8.9 9.1 8.8 8.5 8.3* 8.5       BNP: No results for input(s): \"BNP\" in the last 72 hours.    Magnesium:       Coags:       TSH: No results found for: \"TSH\"    Hemoglobin A1C       Lipid Profile:       Cardiac testing:   No results found for this or any previous visit.    No results found for this or any previous visit.    No results found for this or any previous visit.    No results found for this or any previous visit.      Meds/Allergies   current meds:   Current Facility-Administered Medications:     acetaminophen (TYLENOL) tablet 975 mg, Q8H MINNIE    albuterol inhalation solution 2.5 mg, Q4H PRN    atorvastatin (LIPITOR) tablet 20 mg, QAM    busPIRone (BUSPAR) tablet 7.5 mg, BID    clonazePAM (KlonoPIN) tablet 1 mg, HS PRN    dextromethorphan-guaiFENesin (ROBITUSSIN DM) oral syrup 10 mL, Q4H PRN    donepezil (ARICEPT) tablet 10 mg, QAM    DULoxetine (CYMBALTA) delayed release capsule 60 mg, Daily    ferrous sulfate tablet 325 mg, Daily With Breakfast    Fluticasone Furoate-Vilanterol 100-25 mcg/actuation 1 puff, Daily **AND** umeclidinium 62.5 mcg/actuation inhaler AEPB 1 puff, Daily    folic acid (FOLVITE) tablet 1,000 mcg, QAM    insulin glargine (LANTUS) subcutaneous injection 8 Units 0.08 mL, Daily With Breakfast    insulin lispro (HumALOG/ADMELOG) 100 units/mL subcutaneous injection 1-5 Units, HS    insulin lispro (HumALOG/ADMELOG) 100 units/mL subcutaneous injection 1-6 Units, TID AC **AND** Fingerstick Glucose (POCT), 4x Daily AC and at bedtime    insulin lispro (HumALOG/ADMELOG) 100 units/mL subcutaneous injection 7 " Units, TID With Meals    levothyroxine tablet 125 mcg, Early Morning    lidocaine (LIDODERM) 5 % patch 1 patch, Daily    loratadine (CLARITIN) tablet 10 mg, QAM    losartan (COZAAR) tablet 25 mg, Daily    magnesium Oxide (MAG-OX) tablet 400 mg, BID    melatonin tablet 3 mg, HS PRN    metoprolol succinate (TOPROL-XL) 24 hr tablet 25 mg, QAM    pantoprazole (PROTONIX) EC tablet 40 mg, BID AC    [COMPLETED] potassium chloride (Klor-Con M20) CR tablet 40 mEq, TID With Meals **FOLLOWED BY** potassium chloride (Klor-Con M20) CR tablet 40 mEq, BID    rivaroxaban (XARELTO) tablet 20 mg, Daily With Dinner    spironolactone (ALDACTONE) tablet 25 mg, Daily    thiamine tablet 100 mg, Daily    torsemide (DEMADEX) tablet 20 mg, BID    Medications Prior to Admission:     acetaminophen (TYLENOL) 500 mg tablet    albuterol (2.5 mg/3 mL) 0.083 % nebulizer solution    atorvastatin (LIPITOR) 20 mg tablet    busPIRone (BUSPAR) 7.5 mg tablet    clonazePAM (KlonoPIN) 1 mg tablet    donepezil (ARICEPT) 10 mg tablet    DULoxetine (CYMBALTA) 30 mg delayed release capsule    Farxiga 10 MG tablet    FeroSul 325 (65 Fe) MG tablet    fluticasone-umeclidinium-vilanterol 100-62.5-25 mcg/actuation inhaler    folic acid (FOLVITE) 1 mg tablet    furosemide (LASIX) 20 mg tablet    glimepiride (AMARYL) 1 mg tablet    levothyroxine 112 mcg tablet    Lidocaine 4 % PTCH    loratadine (CLARITIN) 10 mg tablet    magnesium Oxide (MAG-OX) 400 mg TABS    Melatonin 5 MG CAPS    metFORMIN (GLUCOPHAGE-XR) 500 mg 24 hr tablet    metoprolol succinate (TOPROL-XL) 25 mg 24 hr tablet    Multiple Vitamin (Daily-Jodie) TABS    pantoprazole (PROTONIX) 40 mg tablet    rivaroxaban (Xarelto) 20 mg tablet    spironolactone (ALDACTONE) 25 mg tablet    Thiamine Mononitrate (VITAMIN B1) 100 mg tablet    albuterol (PROVENTIL HFA,VENTOLIN HFA) 90 mcg/act inhaler    Diclofenac Sodium (VOLTAREN) 1 %    glucose blood (True Metrix Blood Glucose Test) test strip    Lancets 33G MISC     magnesium oxide (MAG-OX) 400 mg    Multiple Vitamin (DAILY-NACHO MULTIVITAMIN PO)       Assessment:  Principal Problem:    Acute on chronic systolic heart failure (HCC)  Active Problems:    Persistent atrial fibrillation (HCC)    Depression with anxiety    Pulmonary embolism (HCC)    Cardiac pacemaker    Pulmonary hypertension (HCC)    Type 2 diabetes mellitus without complication, without long-term current use of insulin (HCC)    COPD (chronic obstructive pulmonary disease) (HCC)    Coronary artery disease involving native coronary artery of native heart without angina pectoris    Acquired hypothyroidism    Acute respiratory insufficiency    Anemia    History of transcatheter aortic valve replacement (TAVR)    Plan:     Acute on Chronic Systolic CHF: EF 40%. Moderate to severe MR. Continue with GDMT that is ordered. Patient is euvolemic on exam.      Persistent AF: Continue metoprolol and xarelto.      CAD S/P CABG and PCI: No angina. Continue with atorvastatin.      S/P PPM implant.:      S/P TAVR: Normal function on last echo.         Counseling / Coordination of Care  Total floor / unit time spent today 25 minutes.  Greater than 50% of total time was spent with the patient and / or family counseling and / or coordination of care.  A description of the counseling / coordination of care.

## 2024-10-29 NOTE — ASSESSMENT & PLAN NOTE
Lab Results   Component Value Date    HGBA1C 13.5 (H) 08/28/2024     Recent Labs     10/28/24  1654 10/28/24  2009 10/29/24  0721 10/29/24  1149   POCGLU 352* 85 177* 259*     Blood Sugar Average: Last 72 hrs:  (P) 208.5802608335842260  Resume home regimen at d/c - metformin, glipizide, and farxiga  Receive lantus 8U in AM, humalog 7U TID with meals, SSI AC/HS during hospital course   Glucose poorly controlled per hemoglobin A1c

## 2024-10-29 NOTE — ASSESSMENT & PLAN NOTE
Baseline Cr 10-12   Trend CBC     Recent Labs     10/28/24  0444 10/29/24  0355   HGB 11.8* 11.1*

## 2024-10-29 NOTE — ASSESSMENT & PLAN NOTE
Presents from nursing home with worsened dyspnea, low SpO2, & 20 pound weight gain  ECHO (01/2024) EF 35-40%, severely dilated left atrium, mild pulmonary hypertension, TAVR functioning well  ECHO (10/21/24): EF 40-45%, mild global hypokinesis  Home diuretic: lasix 20mg BID and spironolactone 25 mg  Clinically euvolemic; stable on p.o. diuretics  C/w Toprol XL, spironolactone, recently added losartan, Demadex 20 mg BID   Daily wts, fluid restriction   Stable on RA     I/O last 3 completed shifts:  In: 1352 [P.O.:1352]  Out: 2850 [Urine:2850]  Net IO Since Admission: -11,166 mL [10/29/24 1444]    Weight change: 1.179 kg (2 lb 9.6 oz)  Wt Readings from Last 3 Encounters:   10/29/24 91.6 kg (202 lb)   09/18/24 89.8 kg (198 lb)   07/16/24 89.4 kg (197 lb)

## 2024-10-29 NOTE — DISCHARGE INSTR - AVS FIRST PAGE
Please obtain a standing weight daily    Please only consume 1800 cc of fluid daily    Contact cardiology with greater than 3 pound weight gain in 1 day or greater 5 pound weight gain in 2 to 3 days    Please follow-up with your family doctor regarding initiating a medication for your depression.    Please obtain lab work within 1 to 2 weeks of discharge.  We recommend having blood counts, electrolytes, renal function tested.    We recommend having her thyroid levels rechecked within 6 to 8 weeks of discharge

## 2024-10-29 NOTE — CASE MANAGEMENT
Case Management Discharge Planning Note    Patient name Trevor Lyons  Location /-01 MRN [id number]  : 1940 Date 10/29/2024       Current Admission Date: 10/19/2024  Current Admission Diagnosis:Acute on chronic systolic heart failure (HCC)   Patient Active Problem List    Diagnosis Date Noted Date Diagnosed    History of transcatheter aortic valve replacement (TAVR) 10/22/2024     Acute respiratory insufficiency 10/19/2024     Anemia 10/19/2024     Acute on chronic systolic heart failure (HCC) 2024     Moderate Alzheimer's dementia, unspecified timing of dementia onset, unspecified whether behavioral, psychotic, or mood disturbance or anxiety ([facility]) 2024     Depressed 2024     Coronary artery disease involving native coronary artery of native heart without angina pectoris 2024     S/P CABG (coronary artery bypass graft) 2024     S/P TAVR (transcatheter aortic valve replacement) 2024     DDD (degenerative disc disease), lumbosacral 2024     COPD (chronic obstructive pulmonary disease) ([facility]) 2024     Recurrent falls 2024     Persistent atrial fibrillation ([facility]) 2024     Chronic combined systolic and diastolic CHF (congestive heart failure) ([facility]) 2024     Depression with anxiety 2024     NSVT (nonsustained ventricular tachycardia) ([facility]) 2024     Orthostatic hypotension 2024     Pulmonary embolism ([facility]) 2024     Cardiac pacemaker 2024     Alzheimer disease ([facility]) 2024     Pulmonary hypertension ([facility]) 2024     Sick sinus syndrome ([facility]) 2024     Dextroscoliosis 2024     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 2024     Mixed hyperlipidemia 2024     Type 2 diabetes mellitus without complication, without long-term current use of insulin ([facility]) 2024     Osteopenia of multiple sites 2024     Chronic left-sided low back pain with left-sided sciatica 2024     Aneurysm of  ascending aorta without rupture (HCC) 03/12/2024     Acquired hypothyroidism 11/10/2022     Bilateral carotid bruits 11/11/2019       LOS (days): 10  Geometric Mean LOS (GMLOS) (days): 3.9  Days to GMLOS:-5.9     OBJECTIVE:  Risk of Unplanned Readmission Score: 21.54         Current admission status: Inpatient   Preferred Pharmacy:   [id number] - [place], [place] - [address]  [address] [address]  Phone: [phone] Fax: [phone]    [facility] - [place], [place] - [address]  [address] [address]  Phone: [phone] Fax: [phone]    Primary Care Provider: Ira Borden DO    Primary Insurance: MEDICARE MISC REPLACEMENT  Secondary Insurance:     DISCHARGE DETAILS:                                          Other Referral/Resources/Interventions Provided:  Interventions: Transportation  Referral Comments: WC van transport scheduled for 07:40pm today for pt to return to [facility]. CM requested 5pm, but due to high volume and availability, Marian stated that was the earliest time they could stop out. JESSICA mentioned that another pt on 3rd fl is being transported to [facility] today at 06:00pm. Per Marian, they will attempt to take both patients today at 06:00pm. CM informed pt, pt's daughter, SLIM, RN, and [facility]. Anita stated the [facility] closes at 8pm, but nursing staff is available 24/7. Anita stated she will inform nursing staff of potential late transport.         Treatment Team Recommendation: Home with Home Health Care  Discharge Destination Plan:: Home with Home Health Care  Transport at Discharge : Wheelchair van     Number/Name of Dispatcher: [phone]  Transported by (Company and Unit #): [facility].

## 2024-10-29 NOTE — ASSESSMENT & PLAN NOTE
POA, SpO2 87% on room air; required up to 2 L via nasal cannula; stable on room air at time of discharge/with ambulation  Etiology secondary to acute CHF exacerbation

## 2024-10-29 NOTE — ASSESSMENT & PLAN NOTE
CAD status post PCI and CABG  Continue home beta-blocker, statin, and Xarelto  Denies symptoms of unstable angina

## 2024-10-30 ENCOUNTER — TRANSITIONAL CARE MANAGEMENT (OUTPATIENT)
Dept: FAMILY MEDICINE CLINIC | Facility: HOSPITAL | Age: 84
End: 2024-10-30

## 2024-10-30 ENCOUNTER — PATIENT OUTREACH (OUTPATIENT)
Dept: CASE MANAGEMENT | Facility: OTHER | Age: 84
End: 2024-10-30

## 2024-10-30 DIAGNOSIS — I50.23 ACUTE ON CHRONIC SYSTOLIC HEART FAILURE (HCC): Primary | ICD-10-CM

## 2024-10-30 RX ORDER — CLONAZEPAM 1 MG/1
TABLET ORAL
Qty: 30 TABLET | Refills: 0 | Status: SHIPPED | OUTPATIENT
Start: 2024-10-30

## 2024-10-30 NOTE — PROGRESS NOTES
"Outpatient Care Management note- Call to Corrie, Director of Nursing with [facility].     Spoke with Corrie who identified herself as the niece to . She described  as forgetful but knows his routine. She states he is independent with his ADLs and is able to toilet himself. She states he does do his own hygiene but refuses to shower.  has stated that he \"soaks in the tub at his sister's\" but Corrie states that information is not true. Corrie states  is ambulatory but he prefers to self-propel in the wheelchair because it's quicker. Corrie states they manage 's medications. Not able to complete a full medication reconciliation at this time. This RN encouraged Corrie to review the clinical instructions located in the AVS for recommendations of care including fluid restriction, daily weight monitoring, low sodium diet and repeat lab work.     Corrie states the facility does not follow a fluid restriction and they have no way of monitoring his fluid intake throughout the day. She stated  is a diabetic and eats what he wants. She states he needs encouragement and is not motivated to make changes. Corrie states they do not have an order for daily weights. She was referred to page 7 of the AVS but she states she has not reviewed. She states they prepare the foods for  in the facility and they have an official \"No added salt diet\" however she states it is not followed well and many of the foods are processed. This RN did note to Corrie that the AVS states  needs repeat lab work in 1-2 weeks for blood counts, electrolytes and renal function and also since there was a titration made to his Levothyroxine d/t elevated TSH level, he needs repeat thyroid levels in 6-8 weeks. Corrie states the facility does weekly lab work on Wednesdays.    This RN mentioned there was a noted right elbow skin tear while hospitalized requiring some local wound care but Corrie states that is something they can handle. "     When asking Corrie if  could be contacted directly, she states he has a room phone but she didn't know it at that time.     Home health nursing referral made with [facility] but Corrie is requesting PT and OT as well. [facility] to follow this request.     Corrie agreeable to further outreach. Will schedule next call.

## 2024-10-30 NOTE — UTILIZATION REVIEW
NOTIFICATION OF ADMISSION DISCHARGE   This is a Notification of Discharge from [facility]. Please be advised that this patient has been discharge from our facility. Below you will find the admission and discharge date and time including the patient’s disposition.   UTILIZATION REVIEW CONTACT:  Feli Marinelli  Utilization   Network Utilization Review Department  Phone: [phone] x carefully listen to the prompts. All voicemails are confidential.  Email: [email or web address]     ADMISSION INFORMATION  PRESENTATION DATE: 10/19/2024  5:43 PM  OBERVATION ADMISSION DATE: N/A  INPATIENT ADMISSION DATE: 10/19/24  9:32 PM   DISCHARGE DATE: 10/29/2024  7:52 PM   DISPOSITION:Home with Home Health Care    Network Utilization Review Department  ATTENTION: Please call with any questions or concerns to [phone] and carefully listen to the prompts so that you are directed to the right person. All voicemails are confidential.   For Discharge needs, contact Care Management DC Support Team at [phone] opt. 2  Send all requests for admission clinical reviews, approved or denied determinations and any other requests to dedicated fax number below belonging to the campus where the patient is receiving treatment. List of dedicated fax numbers for the Facilities:  FACILITY NAME UR FAX NUMBER   ADMISSION DENIALS (Administrative/Medical Necessity) [phone]   DISCHARGE SUPPORT TEAM ([facility]) [phone]   PARENT CHILD HEALTH (Maternity/NICU/Pediatrics) [phone]   [facility] [phone]   [facility] [phone]   Critical access hospital [phone]   [facility] [phone]   [facility] [phone]   [facility] [phone]   [facility] [phone]   [facility]  [place] [phone]   [facility] [phone]   [facility] [phone]   [facility] [phone]   [facility] [phone]

## 2024-10-30 NOTE — PROGRESS NOTES
Outpatient Care Management note- CM Referral.  Call initiated to [facility].     Patient with h/o Afib, CHF, depression with anxiety, PE, pacemaker, Alzheimer, HLD, DM (A1c 13.5 on 8/28), COPD, CAD s/p CABG, s/p TAVR    Patient resides in [facility].     Patient was sent to ED from facility on 10/19 d/t low oxygen and SOB. Patient also had 20lb weight gain. Patient in acute CHF exacerbation. Patient completed IV diuretic therapy during hospitalization and was transitioned from Lasix to Demadex on discharge. Chart noted patient had right elbow skin tear with wound care/dressing orders.  [facility] nursing referral made upon discharge on 10/29. Patient did not require home oxygen upon d/c. His Levothyroxine was titrated to 125 mcg d/t elevated TSH levels.     Will follow up with facility if they received copy of AVS with clinical instructions to follow low sodium diet, obtain daily weight, and follow fluid restriction- 1800 ml daily.  is also advised to have repeat labs in 1-2 weeks to recheck electrolytes, renal function and blood counts. Thyroid testing should be repeated in 6-8 weeks.     Patient has cardiology appt 11/8.     Spoke with hTeo who identified his role as scheduling appointments and transportation. Directed by Theo to speak with Corrie Yanez, director of Nursing [phone].     Spoke wit Corrie who is requesting a call back later today to discuss patient further.     Will make outreach attempt later today to Corrie.

## 2024-10-31 ENCOUNTER — TELEPHONE (OUTPATIENT)
Age: 84
End: 2024-10-31

## 2024-10-31 NOTE — TELEPHONE ENCOUNTER
Verbal consent needed for 1 day delay due to POA request. Primary nurse will see patient tomorrow. Please call Ali to give verbal consent.    Thank you!!

## 2024-11-06 ENCOUNTER — PATIENT OUTREACH (OUTPATIENT)
Dept: CASE MANAGEMENT | Facility: OTHER | Age: 84
End: 2024-11-06

## 2024-11-06 ENCOUNTER — OFFICE VISIT (OUTPATIENT)
Dept: FAMILY MEDICINE CLINIC | Facility: HOSPITAL | Age: 84
End: 2024-11-06
Payer: MEDICARE

## 2024-11-06 VITALS
SYSTOLIC BLOOD PRESSURE: 108 MMHG | DIASTOLIC BLOOD PRESSURE: 50 MMHG | BODY MASS INDEX: 30.62 KG/M2 | WEIGHT: 202 LBS | HEIGHT: 68 IN | OXYGEN SATURATION: 97 % | HEART RATE: 65 BPM

## 2024-11-06 DIAGNOSIS — I50.23 ACUTE ON CHRONIC SYSTOLIC HEART FAILURE (HCC): Primary | ICD-10-CM

## 2024-11-06 DIAGNOSIS — Z09 ENCOUNTER FOR EXAMINATION FOLLOWING TREATMENT AT HOSPITAL: ICD-10-CM

## 2024-11-06 PROCEDURE — 99495 TRANSJ CARE MGMT MOD F2F 14D: CPT

## 2024-11-06 RX ORDER — MENTHOL 40 MG/ML
1 GEL TOPICAL 4 TIMES DAILY PRN
COMMUNITY

## 2024-11-06 NOTE — PROGRESS NOTES
Transition of Care Visit  Name: Trevor Lyons      : 1940      MRN: [id number]  Encounter Provider: Kelley Gomez DO  Encounter Date: 2024   Encounter department: [facility] CARE [address]    Assessment & Plan  Acute on chronic systolic heart failure (HCC)  Wt Readings from Last 3 Encounters:   24 91.6 kg (202 lb)   10/29/24 91.6 kg (202 lb)   24 89.8 kg (198 lb)     -Doing well since discharge, no longer on supplemental oxygen  -Euvolemic on exam, asymptomatic as well  -Current regimen: Torsemide 20 mg daily, losartan 25 mg daily, metoprolol succinate 25 mg daily, spironolactone 25 mg daily    Plan:  -Medication list updated  -Continue to follow with cardiology, next visit 2024  -F/u for next visit with PCP                 Encounter for examination following treatment at hospital              History of Present Illness     Transitional Care Management Review:   Trevor Lyons is a 84 y.o. male here for TCM follow up.     During the TCM phone call patient stated:  TCM Call       Date and time call was made  10/30/2024  1:40 PM    Patient was hospitialized at  [facility]    Date of Admission  10/19/24    Date of discharge  10/29/24    Disposition  Long term care facility; Assisted Living    Current Symptoms  None          TCM Call       I have advised the patient to call PCP with any new or worsening symptoms  Raquel Ozuna, [facility] [address].    Support System  Home care staff    Comments  Pt resides at [address] [place], i spoke w/John in nursing, She states pt has been doing well since his return from [facility]. He is now on Insulin. Med list reviewed. Transportation arrangements are made by Theo, the , He prefers to make his own appointments so John will ask him to call us do set up TCM visit. CR          83yo male presents for TCM visit. Pt was admitted to [facility] 10/19-10/29/24 for acute CHF exacerbation with  "respiratory insufficiency.  Patient was managed with supplemental oxygen, IV diuretics.  Patient was switched from p.o. Lasix to p.o. torsemide.  Patient had a repeat echo while inpatient which did not show worsening of EF.  Last device check 7/2024, device was not rechecked while inpatient.    Since discharge, patient states he been doing well.  Per caregiver, patient was not on oxygen prior to admission.  Reports regular appetite.  Denies chest pain, shortness of breath.  Reminded patient he has follow-up with cardiology upcoming.  Patient states he is depressed, but reports his mood is stable and his depression has been chronic.      Review of Systems   Constitutional:  Negative for appetite change.   Respiratory:  Negative for shortness of breath.    Cardiovascular:  Negative for chest pain.     Objective     /50   Pulse 65   Ht 5' 8\" (1.727 m)   Wt 91.6 kg (202 lb)   SpO2 97%   BMI 30.71 kg/m²     Physical Exam  Vitals reviewed. Exam conducted with a chaperone present.   Constitutional:       General: He is not in acute distress.     Appearance: Normal appearance. He is obese. He is not ill-appearing.   HENT:      Head: Normocephalic and atraumatic.   Cardiovascular:      Rate and Rhythm: Normal rate and regular rhythm.      Heart sounds: Normal heart sounds.   Pulmonary:      Effort: Pulmonary effort is normal.      Breath sounds: Normal breath sounds.   Musculoskeletal:      Right lower leg: No edema.      Left lower leg: No edema.   Neurological:      Mental Status: He is alert.   Psychiatric:         Mood and Affect: Mood normal.         Behavior: Behavior normal.       Medications have been reviewed by provider in current encounter        Kelley Gomez, DO  Family Medicine    "

## 2024-11-06 NOTE — ASSESSMENT & PLAN NOTE
Wt Readings from Last 3 Encounters:   11/06/24 91.6 kg (202 lb)   10/29/24 91.6 kg (202 lb)   09/18/24 89.8 kg (198 lb)     -Doing well since discharge, no longer on supplemental oxygen  -Euvolemic on exam, asymptomatic as well  -Current regimen: Torsemide 20 mg daily, losartan 25 mg daily, metoprolol succinate 25 mg daily, spironolactone 25 mg daily    Plan:  -Medication list updated  -Continue to follow with cardiology, next visit 11/8/2024  -F/u for next visit with PCP

## 2024-11-06 NOTE — PROGRESS NOTES
Outpatient Care Management Note- Weekly chart review.    It is determined that CM is unable to speak to the patient directly.  lives in a facility, [facility]. Outreach made on 10/30 and spoke with staff, at which time, it was reviewed that they follow the clinical instructions in the AVS from hospital discharge.      had a TCM visit with PCP today and no medication changes made. Patient has cardiology appt 11/8.     Will schedule next weekly chart review.

## 2024-11-09 DIAGNOSIS — J18.9 PNEUMONIA: ICD-10-CM

## 2024-11-12 DIAGNOSIS — J18.9 PNEUMONIA: ICD-10-CM

## 2024-11-12 RX ORDER — ACETAMINOPHEN 325 MG/1
TABLET ORAL
Qty: 90 TABLET | Refills: 0 | OUTPATIENT
Start: 2024-11-12

## 2024-11-12 RX ORDER — ACETAMINOPHEN 325 MG/1
TABLET ORAL
Qty: 90 TABLET | Refills: 4 | Status: SHIPPED | OUTPATIENT
Start: 2024-11-12

## 2024-11-13 ENCOUNTER — OFFICE VISIT (OUTPATIENT)
Dept: CARDIOLOGY CLINIC | Facility: CLINIC | Age: 84
End: 2024-11-13
Payer: MEDICARE

## 2024-11-13 ENCOUNTER — PATIENT OUTREACH (OUTPATIENT)
Dept: CASE MANAGEMENT | Facility: OTHER | Age: 84
End: 2024-11-13

## 2024-11-13 VITALS
BODY MASS INDEX: 32.16 KG/M2 | HEIGHT: 68 IN | HEART RATE: 65 BPM | DIASTOLIC BLOOD PRESSURE: 68 MMHG | WEIGHT: 212.2 LBS | SYSTOLIC BLOOD PRESSURE: 106 MMHG

## 2024-11-13 DIAGNOSIS — Z95.2 S/P TAVR (TRANSCATHETER AORTIC VALVE REPLACEMENT): ICD-10-CM

## 2024-11-13 DIAGNOSIS — I95.1 ORTHOSTATIC HYPOTENSION: ICD-10-CM

## 2024-11-13 DIAGNOSIS — E78.2 MIXED HYPERLIPIDEMIA: ICD-10-CM

## 2024-11-13 DIAGNOSIS — J44.9 CHRONIC OBSTRUCTIVE PULMONARY DISEASE, UNSPECIFIED COPD TYPE (HCC): ICD-10-CM

## 2024-11-13 DIAGNOSIS — I48.19 PERSISTENT ATRIAL FIBRILLATION (HCC): ICD-10-CM

## 2024-11-13 DIAGNOSIS — I49.5 SICK SINUS SYNDROME (HCC): Primary | ICD-10-CM

## 2024-11-13 DIAGNOSIS — Z95.0 CARDIAC PACEMAKER: ICD-10-CM

## 2024-11-13 DIAGNOSIS — I27.20 PULMONARY HYPERTENSION (HCC): ICD-10-CM

## 2024-11-13 DIAGNOSIS — I50.42 CHRONIC COMBINED SYSTOLIC AND DIASTOLIC CHF (CONGESTIVE HEART FAILURE) (HCC): ICD-10-CM

## 2024-11-13 DIAGNOSIS — I25.10 CORONARY ARTERY DISEASE INVOLVING NATIVE CORONARY ARTERY OF NATIVE HEART WITHOUT ANGINA PECTORIS: ICD-10-CM

## 2024-11-13 PROCEDURE — 93000 ELECTROCARDIOGRAM COMPLETE: CPT | Performed by: INTERNAL MEDICINE

## 2024-11-13 PROCEDURE — 99214 OFFICE O/P EST MOD 30 MIN: CPT | Performed by: INTERNAL MEDICINE

## 2024-11-13 NOTE — PROGRESS NOTES
Outpatient Care Management Note- Weekly chart review.     It is determined that CM is unable to speak to the patient directly.  lives in a facility, [facility]. Outreach made on 10/30 and spoke with staff, at which time, it was reviewed that they follow the clinical instructions in the AVS from hospital discharge.       had a TCM visit with PCP 11/6 and no medication changes made. Patient has cardiology appt today, 11/13.     Next PCP visit 12/3.     Will schedule next weekly chart review.

## 2024-11-13 NOTE — PROGRESS NOTES
Cardiology   Trevor Lyons 84 y.o. male MRN: [id number]        Impression:  CAD s/p CABG 2005 - stable.  AS s/p TAVR 2021 - stable.  PAF s/p PPM - [facility]  Chronic combined systolic and diastolic HF - EF 35-40%.  Predominantly ischemic.  Compensated.  No ACE-I/ARB/ARNI due to relative hypotension.  On b-blockers, SGLT2i, or MRA.   HTN/orthostatic hypotension - resolved.  Mitral regurgitation/Mitral stenosis - stable.   Dyslipidemia - on statin.      Recommendations:  Continue current medications.  Follow up in 2 months.         HPI: Trevor Lyons is a 84 y.o. year old male with CAD s/p CABG 2005, paroxysmal atrial fibrillation s/p [facility] PPM, s/p TAVR 2021 Chronic combined systolic and diastolic heart failure, orthostatic hypotension who presents for follow up. Was in ED 3/16/24 with a fall. Echo 1/24 - EF 35-40%, ANA CRISTINA, s/p TAVR, mod MR, mild MS. Recently admitted to hospital with acute HFrEF exacerbation.. Echo 10/24 - EF 40-45%, normal TAVR, mod-sev MR, mild AS, mod TR.  Feels well.  No chest pain, shortness of breath, or palpitations .        Review of Systems   Constitutional: Negative.    HENT: Negative.     Eyes: Negative.    Respiratory:  Negative for chest tightness and shortness of breath.    Cardiovascular:  Negative for chest pain, palpitations and leg swelling.   Gastrointestinal: Negative.    Endocrine: Negative.    Genitourinary: Negative.    Musculoskeletal: Negative.    Skin: Negative.    Allergic/Immunologic: Negative.    Neurological: Negative.    Hematological: Negative.    Psychiatric/Behavioral: Negative.     All other systems reviewed and are negative.        Past Medical History:   Diagnosis Date    Alzheimer disease (HCC)     Anemia     Aneurysm of ascending aorta without rupture (HCC)     Anxiety     Atrial fibrillation (HCC)     CHF (congestive heart failure) (HCC)     Depression     Diabetes mellitus (HCC)     Disease of thyroid gland     Pulmonary hypertension (HCC)      Sciatica     Sick sinus syndrome (HCC)      Past Surgical History:   Procedure Laterality Date    CARDIAC PACEMAKER PLACEMENT       Social History     Substance and Sexual Activity   Alcohol Use Not Currently     Social History     Substance and Sexual Activity   Drug Use Never     Social History     Tobacco Use   Smoking Status Former    Types: Cigarettes   Smokeless Tobacco Never     Family History   Problem Relation Age of Onset    Dementia Mother     Heart disease Father     Stroke Father     Thyroid disease Daughter     Thyroid disease Daughter        Allergies:  No Known Allergies    Medications:     Current Outpatient Medications:     acetaminophen (TYLENOL) 325 mg tablet, 3 TABS (975MG) ORALLY EVERY 8 [id number] DX:PAIN *NOT TO EXCEED 3000MG OF APAP IN 24HRS**, Disp: 90 tablet, Rfl: 4    albuterol (2.5 mg/3 mL) 0.083 % nebulizer solution, Take 3 mL (2.5 mg total) by nebulization 4 (four) times a day, Disp: 100 mL, Rfl: 0    albuterol (PROVENTIL HFA,VENTOLIN HFA) 90 mcg/act inhaler, Inhale 2 puffs every 6 (six) hours as needed for wheezing, Disp: 18 g, Rfl: 0    atorvastatin (LIPITOR) 20 mg tablet, Take 1 tablet (20 mg total) by mouth daily with dinner, Disp: 30 tablet, Rfl: 0    busPIRone (BUSPAR) 7.5 mg tablet, Take 1 tablet (7.5 mg total) by mouth 2 (two) times a day, Disp: 60 tablet, Rfl: 0    clonazePAM (KlonoPIN) 1 mg tablet, 1 TAB ORALLY AT BEDTIMEAS NEEDED FOR ANXIETY *NIOSH 3 HAZARDOUS DRUG*, Disp: 30 tablet, Rfl: 0    donepezil (ARICEPT) 10 mg tablet, Take 1 tablet (10 mg total) by mouth every morning, Disp: 30 tablet, Rfl: 0    DULoxetine (CYMBALTA) 60 mg delayed release capsule, Take 1 capsule (60 mg total) by mouth daily, Disp: 30 capsule, Rfl: 0    Farxiga 10 MG tablet, Take 1 tablet (10 mg total) by mouth daily, Disp: 30 tablet, Rfl: 0    ferrous sulfate (FeroSul) 325 (65 Fe) mg tablet, Take 1 tablet (325 mg total) by mouth daily with breakfast, Disp: 30 tablet, Rfl: 0     fluticasone-umeclidinium-vilanterol 100-62.5-25 mcg/actuation inhaler, Inhale 1 puff daily, Disp: , Rfl:     folic acid (FOLVITE) 1 mg tablet, Take 1 tablet (1,000 mcg total) by mouth every morning Dx: Supplement, Disp: 30 tablet, Rfl: 0    glimepiride (AMARYL) 1 mg tablet, Take 1 tablet (1 mg total) by mouth daily with breakfast, Disp: 30 tablet, Rfl: 0    glucose blood (True Metrix Blood Glucose Test) test strip, Testing 1 time daily, Disp: 100 strip, Rfl: 5    insulin glargine (LANTUS) 100 units/mL subcutaneous injection, Inject 8 Units under the skin daily with breakfast, Disp: 10 mL, Rfl: 0    insulin lispro (HumALOG/ADMELOG) 100 units/mL injection, Inject 1-6 Units under the skin 3 (three) times a day before meals, Disp: 100 mL, Rfl: 0    Lancets 33G MISC, Testing 1 time daily, Disp: 100 each, Rfl: 3    levothyroxine 125 mcg tablet, Take 1 tablet (125 mcg total) by mouth daily in the early morning, Disp: 30 tablet, Rfl: 0    Lidocaine 4 % PTCH, Place 1 patch on the skin 2 (two) times a day as needed (as needed), Disp: 60 patch, Rfl: 5    loratadine (CLARITIN) 10 mg tablet, Take 1 tablet (10 mg total) by mouth every morning Allergy, Disp: 30 tablet, Rfl: 0    losartan (COZAAR) 25 mg tablet, Take 1 tablet (25 mg total) by mouth daily, Disp: 30 tablet, Rfl: 0    magnesium Oxide (MAG-OX) 400 mg TABS, Take 1 tablet (400 mg total) by mouth 2 (two) times a day, Disp: 60 tablet, Rfl: 0    Melatonin 5 MG CAPS, Take 2 capsules (10 mg total) by mouth daily, Disp: 30 capsule, Rfl: 0    Menthol, Topical Analgesic, (Biofreeze Roll-On) 4 % GEL, Apply 1 Application topically 4 (four) times a day as needed (pain), Disp: , Rfl:     metFORMIN (GLUCOPHAGE-XR) 500 mg 24 hr tablet, Take 2 tablets (1,000 mg total) by mouth 2 (two) times a day with meals, Disp: 60 tablet, Rfl: 0    metoprolol succinate (TOPROL-XL) 25 mg 24 hr tablet, Take 1 tablet (25 mg total) by mouth every morning Hypertension, Disp: 30 tablet, Rfl: 0    Multiple  Vitamin (Daily-Jodie) TABS, Take 1 tablet by mouth every morning Dx: Supplement, Disp: 30 tablet, Rfl: 0    pantoprazole (PROTONIX) 40 mg tablet, Take 1 tablet (40 mg total) by mouth daily, Disp: 30 tablet, Rfl: 0    potassium chloride (Klor-Con M20) 20 mEq tablet, Take 2 tablets (40 mEq total) by mouth 2 (two) times a day, Disp: 60 tablet, Rfl: 0    rivaroxaban (Xarelto) 20 mg tablet, Take 1 tablet (20 mg total) by mouth daily with dinner, Disp: 30 tablet, Rfl: 0    spironolactone (ALDACTONE) 25 mg tablet, Take 1 tablet (25 mg total) by mouth daily, Disp: 30 tablet, Rfl: 0    Thiamine Mononitrate (VITAMIN B1) 100 mg tablet, Take 1 tablet (100 mg total) by mouth daily, Disp: 30 tablet, Rfl: 0    torsemide (DEMADEX) 20 mg tablet, Take 1 tablet (20 mg total) by mouth 2 (two) times a day, Disp: 60 tablet, Rfl: 0      Wt Readings from Last 3 Encounters:   11/13/24 96.3 kg (212 lb 3.2 oz)   11/06/24 91.6 kg (202 lb)   10/29/24 91.6 kg (202 lb)     Temp Readings from Last 3 Encounters:   10/29/24 97.6 °F (36.4 °C) (Oral)   03/16/24 97.8 °F (36.6 °C) (Temporal)     BP Readings from Last 3 Encounters:   11/13/24 106/68   11/06/24 108/50   10/29/24 110/56     Pulse Readings from Last 3 Encounters:   11/13/24 65   11/06/24 65   10/29/24 65         Physical Exam  HENT:      Head: Atraumatic.      Mouth/Throat:      Mouth: Mucous membranes are moist.   Eyes:      Extraocular Movements: Extraocular movements intact.   Cardiovascular:      Rate and Rhythm: Normal rate and regular rhythm.      Heart sounds: Normal heart sounds.   Pulmonary:      Effort: Pulmonary effort is normal.      Breath sounds: Normal breath sounds.   Abdominal:      General: Abdomen is flat.   Musculoskeletal:         General: Normal range of motion.      Cervical back: Normal range of motion.   Skin:     General: Skin is warm.   Neurological:      General: No focal deficit present.      Mental Status: He is alert and oriented to person, place, and time.    Psychiatric:         Mood and Affect: Mood normal.         Behavior: Behavior normal.           Laboratory Studies:  CMP:  Lab Results   Component Value Date    K 4.2 10/29/2024    CL 95 (L) 10/29/2024    CO2 35 (H) 10/29/2024    BUN 34 (H) 10/29/2024    CREATININE 1.30 10/29/2024    AST 19 10/28/2024    ALT 14 10/28/2024    EGFR 49 10/29/2024       Cardiac testing:   EKG reviewed personally: V-paced 65

## 2024-11-21 ENCOUNTER — PATIENT OUTREACH (OUTPATIENT)
Dept: CASE MANAGEMENT | Facility: OTHER | Age: 84
End: 2024-11-21

## 2024-11-21 NOTE — PROGRESS NOTES
Outpatient Care Management Note- Weekly chart review.     It is determined that CM is unable to speak to the patient directly.  lives in a facility, [facility]. Outreach made on 10/30/24 and spoke with staff, at which time, it was reviewed that they follow the clinical instructions in the AVS from hospital discharge.      Patient has cardiology appt 11/13/24 where patient was advised to continue his current medication regimen and return in 2 months for follow up.       is scheduled with his PCP 12/3/24 and cardiology is scheduled 1/6/25.     Will schedule next weekly chart review.

## 2024-11-22 DIAGNOSIS — J18.9 PNEUMONIA: ICD-10-CM

## 2024-11-22 DIAGNOSIS — E11.9 TYPE 2 DIABETES MELLITUS WITHOUT COMPLICATION, WITHOUT LONG-TERM CURRENT USE OF INSULIN (HCC): Primary | ICD-10-CM

## 2024-11-25 ENCOUNTER — TELEPHONE (OUTPATIENT)
Age: 84
End: 2024-11-25

## 2024-11-25 RX ORDER — INSULIN LISPRO 100 [IU]/ML
INJECTION, SOLUTION INTRAVENOUS; SUBCUTANEOUS
Qty: 6 ML | Refills: 0 | Status: SHIPPED | OUTPATIENT
Start: 2024-11-25

## 2024-11-25 RX ORDER — PEN NEEDLE, DIABETIC, SAFETY 30 GX3/16"
NEEDLE, DISPOSABLE MISCELLANEOUS
Qty: 100 EACH | Refills: 0 | Status: SHIPPED | OUTPATIENT
Start: 2024-11-25

## 2024-11-25 NOTE — TELEPHONE ENCOUNTER
Facilty called to request two orders. One for mucinex as patient has congestion and another for icy hot bengay patches. Daughter brought them in however they need an order. Please send to [facility] direct pharmacy.

## 2024-11-27 DIAGNOSIS — R79.81 LOW O2 SATURATION: Primary | ICD-10-CM

## 2024-11-29 ENCOUNTER — PATIENT OUTREACH (OUTPATIENT)
Dept: CASE MANAGEMENT | Facility: OTHER | Age: 84
End: 2024-11-29

## 2024-11-29 DIAGNOSIS — J18.9 PNEUMONIA: ICD-10-CM

## 2024-11-29 DIAGNOSIS — G89.29 CHRONIC LEFT-SIDED LOW BACK PAIN WITH LEFT-SIDED SCIATICA: ICD-10-CM

## 2024-11-29 DIAGNOSIS — M54.42 CHRONIC LEFT-SIDED LOW BACK PAIN WITH LEFT-SIDED SCIATICA: Primary | ICD-10-CM

## 2024-11-29 DIAGNOSIS — J06.9 VIRAL UPPER RESPIRATORY INFECTION: Primary | ICD-10-CM

## 2024-11-29 DIAGNOSIS — M54.42 CHRONIC LEFT-SIDED LOW BACK PAIN WITH LEFT-SIDED SCIATICA: ICD-10-CM

## 2024-11-29 DIAGNOSIS — G89.29 CHRONIC LEFT-SIDED LOW BACK PAIN WITH LEFT-SIDED SCIATICA: Primary | ICD-10-CM

## 2024-11-29 DIAGNOSIS — E11.9 TYPE 2 DIABETES MELLITUS WITHOUT COMPLICATION, WITHOUT LONG-TERM CURRENT USE OF INSULIN (HCC): ICD-10-CM

## 2024-11-29 RX ORDER — MENTHOL 1.4 %
1 ADHESIVE PATCH, MEDICATED TOPICAL DAILY
Qty: 30 PATCH | Refills: 5 | Status: SHIPPED | OUTPATIENT
Start: 2024-11-29 | End: 2024-11-29 | Stop reason: SDUPTHER

## 2024-11-29 RX ORDER — GUAIFENESIN 600 MG/1
600 TABLET, EXTENDED RELEASE ORAL EVERY 12 HOURS SCHEDULED
Qty: 20 TABLET | Refills: 0 | Status: SHIPPED | OUTPATIENT
Start: 2024-11-29 | End: 2024-12-09

## 2024-11-29 RX ORDER — LANCETS 33 GAUGE
EACH MISCELLANEOUS
Qty: 100 EACH | Refills: 3 | Status: SHIPPED | OUTPATIENT
Start: 2024-11-29

## 2024-11-29 RX ORDER — INSULIN LISPRO 100 [IU]/ML
INJECTION, SOLUTION INTRAVENOUS; SUBCUTANEOUS
Qty: 6 ML | Refills: 0 | OUTPATIENT
Start: 2024-11-29

## 2024-11-29 RX ORDER — MENTHOL 1.4 %
1 ADHESIVE PATCH, MEDICATED TOPICAL DAILY
Qty: 30 PATCH | Refills: 5 | Status: SHIPPED | OUTPATIENT
Start: 2024-11-29

## 2024-11-29 NOTE — TELEPHONE ENCOUNTER
Facilty called to request two orders. One for mucinex as patient has congestion and another for icy hot bengay patches. Daughter brought them in however they need an order. Please send to [facility] direct pharmacy.

## 2024-11-29 NOTE — TELEPHONE ENCOUNTER
Spoke to Nora @ [facility]  She said the patches are Bengay 5% Menthol Patches ( box says Ultra Strength)  They have also requested to have Lidocaine patches Dc'd.

## 2024-11-30 RX ORDER — LOSARTAN POTASSIUM 25 MG/1
25 TABLET ORAL DAILY
Qty: 30 TABLET | Refills: 0 | Status: SHIPPED | OUTPATIENT
Start: 2024-11-30

## 2024-11-30 RX ORDER — LEVOTHYROXINE SODIUM 125 UG/1
125 TABLET ORAL
Qty: 30 TABLET | Refills: 0 | Status: SHIPPED | OUTPATIENT
Start: 2024-11-30

## 2024-11-30 RX ORDER — DULOXETIN HYDROCHLORIDE 60 MG/1
60 CAPSULE, DELAYED RELEASE ORAL DAILY
Qty: 30 CAPSULE | Refills: 0 | Status: SHIPPED | OUTPATIENT
Start: 2024-11-30

## 2024-11-30 RX ORDER — INSULIN GLARGINE 100 [IU]/ML
8 INJECTION, SOLUTION SUBCUTANEOUS
Qty: 10 ML | Refills: 0 | Status: SHIPPED | OUTPATIENT
Start: 2024-11-30

## 2024-11-30 RX ORDER — POTASSIUM CHLORIDE 1500 MG/1
40 TABLET, EXTENDED RELEASE ORAL 2 TIMES DAILY
Qty: 60 TABLET | Refills: 0 | Status: SHIPPED | OUTPATIENT
Start: 2024-11-30

## 2024-11-30 RX ORDER — TORSEMIDE 20 MG/1
20 TABLET ORAL 2 TIMES DAILY
Qty: 60 TABLET | Refills: 0 | Status: SHIPPED | OUTPATIENT
Start: 2024-11-30

## 2024-12-03 ENCOUNTER — OFFICE VISIT (OUTPATIENT)
Dept: FAMILY MEDICINE CLINIC | Facility: HOSPITAL | Age: 84
End: 2024-12-03
Payer: MEDICARE

## 2024-12-03 VITALS
BODY MASS INDEX: 32.84 KG/M2 | SYSTOLIC BLOOD PRESSURE: 124 MMHG | OXYGEN SATURATION: 95 % | DIASTOLIC BLOOD PRESSURE: 68 MMHG | WEIGHT: 216 LBS | HEART RATE: 65 BPM | TEMPERATURE: 96.5 F

## 2024-12-03 DIAGNOSIS — E11.9 TYPE 2 DIABETES MELLITUS WITHOUT COMPLICATION, WITHOUT LONG-TERM CURRENT USE OF INSULIN (HCC): ICD-10-CM

## 2024-12-03 DIAGNOSIS — E03.9 ACQUIRED HYPOTHYROIDISM: ICD-10-CM

## 2024-12-03 DIAGNOSIS — E61.1 IRON DEFICIENCY: ICD-10-CM

## 2024-12-03 DIAGNOSIS — I50.42 CHRONIC COMBINED SYSTOLIC AND DIASTOLIC CHF (CONGESTIVE HEART FAILURE) (HCC): Primary | ICD-10-CM

## 2024-12-03 LAB — SL AMB POCT HEMOGLOBIN AIC: 6.9 (ref ?–6.5)

## 2024-12-03 PROCEDURE — 83036 HEMOGLOBIN GLYCOSYLATED A1C: CPT | Performed by: NURSE PRACTITIONER

## 2024-12-03 PROCEDURE — 99214 OFFICE O/P EST MOD 30 MIN: CPT | Performed by: NURSE PRACTITIONER

## 2024-12-03 RX ORDER — FERROUS SULFATE 325(65) MG
325 TABLET ORAL
Qty: 90 TABLET | Refills: 3 | Status: SHIPPED | OUTPATIENT
Start: 2024-12-03

## 2024-12-03 NOTE — ASSESSMENT & PLAN NOTE
History of hypothyroidism with last TSH 8.358 during hospitalization Synthroid increased to 125 mics daily.  Will recheck TSH this month.

## 2024-12-03 NOTE — ASSESSMENT & PLAN NOTE
Lab Results   Component Value Date    HGBA1C 6.9 (A) 12/03/2024   History of diabetes with improvement of A1c (last A1c 13.5 in 8/28/2024).  Adherence to metformin 1000 mg twice daily, glimepiride 1 mg p.o. daily, Farxiga 10 mg p.o. daily with 8 units Lantus daily; mealtime sliding scale insulin.  Reminded to schedule eye exam.

## 2024-12-03 NOTE — ASSESSMENT & PLAN NOTE
Wt Readings from Last 3 Encounters:   12/03/24 98 kg (216 lb)   11/13/24 96.3 kg (212 lb 3.2 oz)   11/06/24 91.6 kg (202 lb)     History of CHF with adherence to Farxiga 10 mg p.o. daily, torsemide 20 mg p.o. twice daily, spironolactone 25 mg p.o. daily, potassium 40 mg twice daily, Toprol 25 mg p.o. daily.  Weight up since hospitalization for pneumonia with CHF exacerbation +14 pounds however no signs symptoms of fluid overload.  Followed closely by cardiology.

## 2024-12-03 NOTE — PROGRESS NOTES
[facility]   Nupur HOGAN    Assessment/Plan:   1. Chronic combined systolic and diastolic CHF (congestive heart failure) (Formerly [facility])  Assessment & Plan:  Wt Readings from Last 3 Encounters:   12/03/24 98 kg (216 lb)   11/13/24 96.3 kg (212 lb 3.2 oz)   11/06/24 91.6 kg (202 lb)     History of CHF with adherence to Farxiga 10 mg p.o. daily, torsemide 20 mg p.o. twice daily, spironolactone 25 mg p.o. daily, potassium 40 mg twice daily, Toprol 25 mg p.o. daily.  Weight up since hospitalization for pneumonia with CHF exacerbation +14 pounds however no signs symptoms of fluid overload.  Followed closely by cardiology.        2. Type 2 diabetes mellitus without complication, without long-term current use of insulin (Formerly [facility])  Assessment & Plan:    Lab Results   Component Value Date    HGBA1C 6.9 (A) 12/03/2024   History of diabetes with improvement of A1c (last A1c 13.5 in 8/28/2024).  Adherence to metformin 1000 mg twice daily, glimepiride 1 mg p.o. daily, Farxiga 10 mg p.o. daily with 8 units Lantus daily; mealtime sliding scale insulin.  Reminded to schedule eye exam.  Orders:  -     POCT hemoglobin A1c  3. Acquired hypothyroidism  Assessment & Plan:  History of hypothyroidism with last TSH 8.358 during hospitalization Synthroid increased to 125 mics daily.  Will recheck TSH this month.  Orders:  -     TSH, 3rd generation with Free T4 reflex; Future  4. Iron deficiency  -     ferrous sulfate (FeroSul) 325 (65 Fe) mg tablet; Take 1 tablet (325 mg total) by mouth daily with breakfast  -     Iron Panel (Includes Ferritin, Iron Sat%, Iron, and TIBC); Future      Check TSH and iron panel week of 12/19/24  Schedule eye exam  Return in about 3 months (around 3/3/2025) for Recheck.  Patient may call or return to office with any questions or concerns.     ______________________________________________________________________  Subjective:     Patient ID: Trevor Joannaambika is a 84 y.o. male.  Trevor  Eloy  Chief Complaint   Patient presents with    Follow-up    Cough     HPI           The following portions of the patient's history were reviewed and updated as appropriate: allergies, current medications, past family history, past medical history, past social history, past surgical history, and problem list.    Review of Systems   Constitutional: Negative.  Negative for activity change, appetite change, chills, fatigue and fever.   HENT:  Positive for hearing loss. Negative for congestion, ear pain, postnasal drip and sinus pain.    Eyes: Negative.    Respiratory:  Positive for cough. Negative for shortness of breath.    Cardiovascular: Negative.  Negative for chest pain and leg swelling.   Gastrointestinal: Negative.  Negative for constipation and diarrhea.   Endocrine: Negative.    Genitourinary: Negative.  Negative for dysuria.   Musculoskeletal:  Positive for arthralgias and gait problem.   Skin: Negative.    Allergic/Immunologic: Negative.  Negative for immunocompromised state.   Neurological:  Negative for dizziness and light-headedness.   Hematological:  Bruises/bleeds easily.   Psychiatric/Behavioral:  Positive for decreased concentration.          Objective:      Vitals:    12/03/24 0828   BP: 124/68   Pulse: 65   Temp: (!) 96.5 °F (35.8 °C)   SpO2: 95%      Physical Exam  Vitals and nursing note reviewed.   Constitutional:       Appearance: Normal appearance.   HENT:      Head: Normocephalic and atraumatic.      Right Ear: Tympanic membrane, ear canal and external ear normal.      Left Ear: Tympanic membrane, ear canal and external ear normal.      Nose: Nose normal.      Mouth/Throat:      Mouth: Mucous membranes are moist.      Pharynx: Oropharynx is clear.   Eyes:      Extraocular Movements: Extraocular movements intact.      Conjunctiva/sclera: Conjunctivae normal.      Pupils: Pupils are equal, round, and reactive to light.   Cardiovascular:      Rate and Rhythm: Normal rate and regular  "rhythm.      Pulses: Normal pulses.      Heart sounds: Murmur heard.   Pulmonary:      Effort: Pulmonary effort is normal.      Breath sounds: Normal breath sounds.      Comments: Occasional MNPC.  LLL expiratory wheeze.    Abdominal:      General: Bowel sounds are normal.      Palpations: Abdomen is soft.   Musculoskeletal:         General: Normal range of motion.      Cervical back: Normal range of motion and neck supple.      Right lower leg: No edema.      Left lower leg: No edema.   Skin:     General: Skin is warm and dry.      Findings: Bruising present.      Comments: Bruising right hand   Neurological:      General: No focal deficit present.      Mental Status: He is alert. Mental status is at baseline.      Sensory: Sensory deficit present.      Motor: Weakness present.      Coordination: Coordination abnormal.      Gait: Gait abnormal.      Comments: Alert and oriented with forgetfulness/confusion at baseline   Psychiatric:         Mood and Affect: Mood normal.         Speech: Speech is tangential.         Behavior: Behavior is slowed. Behavior is cooperative.         Thought Content: Thought content normal.         Cognition and Memory: Memory is impaired.         Judgment: Judgment is impulsive and inappropriate.           Portions of the record may have been created with voice recognition software. Occasional wrong word or \"sound alike\" substitutions may have occurred due to the inherent limitations of voice recognition software. Please review the chart carefully and recognize, using context, where substitutions/typographical errors may have occurred.       "

## 2024-12-04 ENCOUNTER — HOSPITAL ENCOUNTER (OUTPATIENT)
Dept: RADIOLOGY | Facility: HOSPITAL | Age: 84
Discharge: HOME | End: 2024-12-04
Payer: MEDICARE

## 2024-12-04 DIAGNOSIS — R79.81 LOW O2 SATURATION: ICD-10-CM

## 2024-12-04 PROCEDURE — 71046 X-RAY EXAM CHEST 2 VIEWS: CPT

## 2024-12-05 ENCOUNTER — RESULTS FOLLOW-UP (OUTPATIENT)
Dept: FAMILY MEDICINE CLINIC | Facility: HOSPITAL | Age: 84
End: 2024-12-05

## 2024-12-05 ENCOUNTER — TELEPHONE (OUTPATIENT)
Age: 84
End: 2024-12-05

## 2024-12-05 NOTE — TELEPHONE ENCOUNTER
My from [facility] is awaiting chest xray results to discharge pt. Will be discharging him next week, will need another home visit script.

## 2024-12-06 ENCOUNTER — TELEPHONE (OUTPATIENT)
Age: 84
End: 2024-12-06

## 2024-12-06 NOTE — TELEPHONE ENCOUNTER
[facility] calling regarding patient diabetic supply. Attempted to refill for patient and unable too. Stated order written for patient to test daily and he is testing 3 time a day. Requesting new order to be written and sent to [facility].      Please review and advise.  Thank you

## 2024-12-09 DIAGNOSIS — E11.9 TYPE 2 DIABETES MELLITUS WITHOUT COMPLICATION, WITHOUT LONG-TERM CURRENT USE OF INSULIN (HCC): ICD-10-CM

## 2024-12-09 RX ORDER — LANCETS 33 GAUGE
EACH MISCELLANEOUS
Qty: 100 EACH | Refills: 3 | Status: SHIPPED | OUTPATIENT
Start: 2024-12-09

## 2024-12-09 RX ORDER — CALCIUM CITRATE/VITAMIN D3 200MG-6.25
TABLET ORAL
Qty: 100 STRIP | Refills: 5 | Status: SHIPPED | OUTPATIENT
Start: 2024-12-09

## 2024-12-17 ENCOUNTER — TELEPHONE (OUTPATIENT)
Age: 84
End: 2024-12-17

## 2024-12-18 ENCOUNTER — APPOINTMENT (OUTPATIENT)
Dept: LAB | Facility: HOSPITAL | Age: 84
End: 2024-12-18
Payer: MEDICARE

## 2024-12-18 DIAGNOSIS — E03.9 ACQUIRED HYPOTHYROIDISM: ICD-10-CM

## 2024-12-18 DIAGNOSIS — E61.1 IRON DEFICIENCY: ICD-10-CM

## 2024-12-18 LAB
FERRITIN SERPL-MCNC: 62 NG/ML (ref 24–336)
IRON SATN MFR SERPL: 16 % (ref 15–50)
IRON SERPL-MCNC: 62 UG/DL (ref 50–212)
T4 FREE SERPL-MCNC: 0.86 NG/DL (ref 0.61–1.12)
TIBC SERPL-MCNC: 394.8 UG/DL (ref 250–450)
TRANSFERRIN SERPL-MCNC: 282 MG/DL (ref 203–362)
TSH SERPL DL<=0.05 MIU/L-ACNC: 6.26 UIU/ML (ref 0.45–4.5)
UIBC SERPL-MCNC: 333 UG/DL (ref 155–355)

## 2024-12-18 PROCEDURE — 82728 ASSAY OF FERRITIN: CPT

## 2024-12-18 PROCEDURE — 36415 COLL VENOUS BLD VENIPUNCTURE: CPT

## 2024-12-18 PROCEDURE — 84443 ASSAY THYROID STIM HORMONE: CPT

## 2024-12-18 PROCEDURE — 83540 ASSAY OF IRON: CPT

## 2024-12-18 PROCEDURE — 83550 IRON BINDING TEST: CPT

## 2024-12-18 PROCEDURE — 84439 ASSAY OF FREE THYROXINE: CPT

## 2024-12-19 ENCOUNTER — RESULTS FOLLOW-UP (OUTPATIENT)
Dept: FAMILY MEDICINE CLINIC | Facility: HOSPITAL | Age: 84
End: 2024-12-19

## 2024-12-27 DIAGNOSIS — J18.9 PNEUMONIA: ICD-10-CM

## 2024-12-27 DIAGNOSIS — E03.9 ACQUIRED HYPOTHYROIDISM: ICD-10-CM

## 2024-12-27 DIAGNOSIS — E11.9 TYPE 2 DIABETES MELLITUS WITHOUT COMPLICATION, WITHOUT LONG-TERM CURRENT USE OF INSULIN (HCC): Primary | ICD-10-CM

## 2024-12-30 DIAGNOSIS — J18.9 PNEUMONIA: ICD-10-CM

## 2024-12-30 RX ORDER — LEVOTHYROXINE SODIUM 137 UG/1
137 TABLET ORAL DAILY
Qty: 100 TABLET | Refills: 0 | Status: SHIPPED | OUTPATIENT
Start: 2024-12-30

## 2024-12-30 RX ORDER — POTASSIUM CHLORIDE 1500 MG/1
TABLET, EXTENDED RELEASE ORAL
Qty: 60 TABLET | Refills: 4 | Status: SHIPPED | OUTPATIENT
Start: 2024-12-30

## 2024-12-31 RX ORDER — ACETAMINOPHEN 325 MG/1
TABLET ORAL
Qty: 90 TABLET | Refills: 4 | Status: SHIPPED | OUTPATIENT
Start: 2024-12-31 | End: 2025-01-09

## 2025-01-01 ENCOUNTER — RESULTS FOLLOW-UP (OUTPATIENT)
Dept: FAMILY MEDICINE CLINIC | Facility: HOSPITAL | Age: 85
End: 2025-01-01

## 2025-01-02 DIAGNOSIS — J18.9 PNEUMONIA: ICD-10-CM

## 2025-01-02 RX ORDER — TORSEMIDE 20 MG/1
20 TABLET ORAL 2 TIMES DAILY
Qty: 60 TABLET | Refills: 1 | Status: SHIPPED | OUTPATIENT
Start: 2025-01-02

## 2025-01-02 RX ORDER — DULOXETIN HYDROCHLORIDE 60 MG/1
60 CAPSULE, DELAYED RELEASE ORAL DAILY
Qty: 30 CAPSULE | Refills: 1 | Status: SHIPPED | OUTPATIENT
Start: 2025-01-02

## 2025-01-02 RX ORDER — LOSARTAN POTASSIUM 25 MG/1
25 TABLET ORAL DAILY
Qty: 30 TABLET | Refills: 1 | Status: SHIPPED | OUTPATIENT
Start: 2025-01-02

## 2025-01-02 NOTE — TELEPHONE ENCOUNTER
Pharmacy needs by 4pm in order to get the patient the medications requested for tomorrow intake.   [facility] called and asked for immediate processing of scripts requested to be sent to [facility] pharmacy in order to get patient his medication as he is now out of meds. Thank you

## 2025-01-07 DIAGNOSIS — J18.9 PNEUMONIA: ICD-10-CM

## 2025-01-09 RX ORDER — ACETAMINOPHEN 325 MG/1
TABLET ORAL
Qty: 270 TABLET | Refills: 10 | Status: SHIPPED | OUTPATIENT
Start: 2025-01-09

## 2025-01-23 ENCOUNTER — OFFICE VISIT (OUTPATIENT)
Dept: CARDIOLOGY CLINIC | Facility: CLINIC | Age: 85
End: 2025-01-23
Payer: MEDICARE

## 2025-01-23 VITALS
BODY MASS INDEX: 31.52 KG/M2 | DIASTOLIC BLOOD PRESSURE: 48 MMHG | SYSTOLIC BLOOD PRESSURE: 116 MMHG | WEIGHT: 208 LBS | HEIGHT: 68 IN | HEART RATE: 65 BPM

## 2025-01-23 DIAGNOSIS — I48.19 PERSISTENT ATRIAL FIBRILLATION (HCC): Primary | ICD-10-CM

## 2025-01-23 DIAGNOSIS — E78.2 MIXED HYPERLIPIDEMIA: ICD-10-CM

## 2025-01-23 DIAGNOSIS — Z95.0 CARDIAC PACEMAKER: ICD-10-CM

## 2025-01-23 DIAGNOSIS — I50.42 CHRONIC COMBINED SYSTOLIC AND DIASTOLIC CHF (CONGESTIVE HEART FAILURE) (HCC): ICD-10-CM

## 2025-01-23 DIAGNOSIS — Z95.2 S/P TAVR (TRANSCATHETER AORTIC VALVE REPLACEMENT): ICD-10-CM

## 2025-01-23 DIAGNOSIS — I25.10 CORONARY ARTERY DISEASE INVOLVING NATIVE CORONARY ARTERY OF NATIVE HEART WITHOUT ANGINA PECTORIS: ICD-10-CM

## 2025-01-23 DIAGNOSIS — I47.29 NSVT (NONSUSTAINED VENTRICULAR TACHYCARDIA) (HCC): ICD-10-CM

## 2025-01-23 DIAGNOSIS — I49.5 SICK SINUS SYNDROME (HCC): ICD-10-CM

## 2025-01-23 DIAGNOSIS — Z95.1 S/P CABG (CORONARY ARTERY BYPASS GRAFT): ICD-10-CM

## 2025-01-23 PROBLEM — I50.22 CHRONIC SYSTOLIC HEART FAILURE (HCC): Status: ACTIVE | Noted: 2024-07-16

## 2025-01-23 PROCEDURE — 93000 ELECTROCARDIOGRAM COMPLETE: CPT | Performed by: INTERNAL MEDICINE

## 2025-01-23 PROCEDURE — 99214 OFFICE O/P EST MOD 30 MIN: CPT | Performed by: INTERNAL MEDICINE

## 2025-01-23 NOTE — PROGRESS NOTES
Cardiology   Trevor Lyons 84 y.o. male MRN: [id number]        Impression:  CAD s/p CABG 2005 - stable.  AS s/p TAVR 2021 - stable.  PAF s/p PPM - [facility]  Chronic combined systolic and diastolic HF - EF 35-40%.  Predominantly ischemic.  Compensated.  No ACE-I/ARB/ARNI due to relative hypotension.  On b-blockers, SGLT2i, or MRA.   HTN/orthostatic hypotension - resolved.  Mitral regurgitation/Mitral stenosis - stable.   Dyslipidemia - on statin.      Recommendations:  Continue current medications.  Follow up in 6 months.         HPI: Trevor Lyons is a 84 y.o. year old male with CAD s/p CABG 2005, paroxysmal atrial fibrillation s/p [facility] PPM, s/p TAVR 2021 Chronic combined systolic and diastolic heart failure, orthostatic hypotension who presents for follow up. Was in ED 3/16/24 with a fall. Echo 1/24 - EF 35-40%, ANA CRISTINA, s/p TAVR, mod MR, mild MS. Recently admitted to hospital with acute HFrEF exacerbation.. Echo 10/24 - EF 40-45%, normal TAVR, mod-sev MR, mild AS, mod TR.  Feels well.  No chest pain, shortness of breath, or palpitations. Does have some URI symptoms. Usually uses a wheelchair to get around.         Review of Systems   Constitutional: Negative.    HENT: Negative.     Eyes: Negative.    Respiratory:  Positive for cough. Negative for chest tightness and shortness of breath.    Cardiovascular:  Negative for chest pain, palpitations and leg swelling.   Gastrointestinal: Negative.    Endocrine: Negative.    Genitourinary: Negative.    Musculoskeletal:  Positive for back pain and gait problem.   Skin: Negative.    Allergic/Immunologic: Negative.    Hematological: Negative.    Psychiatric/Behavioral: Negative.     All other systems reviewed and are negative.        Past Medical History:   Diagnosis Date    Alzheimer disease (HCC)     Anemia     Aneurysm of ascending aorta without rupture (HCC)     Anxiety     Atrial fibrillation (HCC)     CHF (congestive heart failure) (HCC)     Depression      Diabetes mellitus (HCC)     Disease of thyroid gland     Pulmonary hypertension (HCC)     Sciatica     Sick sinus syndrome (HCC)      Past Surgical History:   Procedure Laterality Date    CARDIAC PACEMAKER PLACEMENT       Social History     Substance and Sexual Activity   Alcohol Use Not Currently     Social History     Substance and Sexual Activity   Drug Use Never     Social History     Tobacco Use   Smoking Status Former    Types: Cigarettes   Smokeless Tobacco Never     Family History   Problem Relation Age of Onset    Dementia Mother     Heart disease Father     Stroke Father     Thyroid disease Daughter     Thyroid disease Daughter        Allergies:  No Known Allergies    Medications:     Current Outpatient Medications:     acetaminophen (TYLENOL) 325 mg tablet, 3 TABS (975MG) ORALLY EVERY 8 HOURS [id number] DX:PAIN *NOT TO EXCEED 3000MG OF APAP IN 24HRS**, Disp: 270 tablet, Rfl: 10    albuterol (2.5 mg/3 mL) 0.083 % nebulizer solution, Take 3 mL (2.5 mg total) by nebulization 4 (four) times a day, Disp: 100 mL, Rfl: 0    albuterol (PROVENTIL HFA,VENTOLIN HFA) 90 mcg/act inhaler, Inhale 2 puffs every 6 (six) hours as needed for wheezing, Disp: 18 g, Rfl: 0    atorvastatin (LIPITOR) 20 mg tablet, Take 1 tablet (20 mg total) by mouth daily with dinner, Disp: 30 tablet, Rfl: 0    BD AutoShield Duo 30G X 5 MM MISC, USE AS DIRECTED, Disp: 100 each, Rfl: 0    busPIRone (BUSPAR) 7.5 mg tablet, Take 1 tablet (7.5 mg total) by mouth 2 (two) times a day, Disp: 60 tablet, Rfl: 0    clonazePAM (KlonoPIN) 1 mg tablet, 1 TAB ORALLY AT BEDTIMEAS NEEDED FOR ANXIETY *NIOSH 3 HAZARDOUS DRUG*, Disp: 30 tablet, Rfl: 0    donepezil (ARICEPT) 10 mg tablet, Take 1 tablet (10 mg total) by mouth every morning, Disp: 30 tablet, Rfl: 0    DULoxetine (CYMBALTA) 60 mg delayed release capsule, Take 1 capsule (60 mg total) by mouth daily, Disp: 30 capsule, Rfl: 1    Farxiga 10 MG tablet, Take 1 tablet (10 mg total) by mouth daily, Disp:  30 tablet, Rfl: 0    ferrous sulfate (FeroSul) 325 (65 Fe) mg tablet, Take 1 tablet (325 mg total) by mouth daily with breakfast, Disp: 90 tablet, Rfl: 3    fluticasone-umeclidinium-vilanterol 100-62.5-25 mcg/actuation inhaler, Inhale 1 puff daily, Disp: , Rfl:     folic acid (FOLVITE) 1 mg tablet, Take 1 tablet (1,000 mcg total) by mouth every morning Dx: Supplement, Disp: 30 tablet, Rfl: 0    glimepiride (AMARYL) 1 mg tablet, Take 1 tablet (1 mg total) by mouth daily with breakfast, Disp: 30 tablet, Rfl: 0    glucose blood (True Metrix Blood Glucose Test) test strip, Testing 3 times daily, before meals, Disp: 100 strip, Rfl: 5    insulin glargine (LANTUS) 100 units/mL subcutaneous injection, Inject 8 Units under the skin daily with breakfast, Disp: 10 mL, Rfl: 0    insulin lispro (HumaLOG) 100 units/mL injection pen, ACCUCHECK 3 TIMES DAILY BEFORE MEALS W/SSI; 150-189=1U,190-229=2U,230 -269=3U,270-309=4U,310-35 0=5U,>350=6U DX: DIABETES, Disp: 6 mL, Rfl: 0    Lancets 33G MISC, Testing 3 times daily, before meals, Disp: 100 each, Rfl: 3    levothyroxine 137 mcg tablet, Take 1 tablet (137 mcg total) by mouth daily, Disp: 100 tablet, Rfl: 0    loratadine (CLARITIN) 10 mg tablet, Take 1 tablet (10 mg total) by mouth every morning Allergy, Disp: 30 tablet, Rfl: 0    losartan (COZAAR) 25 mg tablet, Take 1 tablet (25 mg total) by mouth daily, Disp: 30 tablet, Rfl: 1    magnesium Oxide (MAG-OX) 400 mg TABS, Take 1 tablet (400 mg total) by mouth 2 (two) times a day, Disp: 60 tablet, Rfl: 0    Melatonin 5 MG CAPS, Take 2 capsules (10 mg total) by mouth daily, Disp: 30 capsule, Rfl: 0    Menthol, Topical Analgesic, (Bengay Ultra Strength) 5 % PTCH, Apply 1 patch topically in the morning, Disp: 30 patch, Rfl: 5    Menthol, Topical Analgesic, (Biofreeze Roll-On) 4 % GEL, Apply 1 Application topically 4 (four) times a day as needed (pain), Disp: , Rfl:     metFORMIN (GLUCOPHAGE-XR) 500 mg 24 hr tablet, Take 2 tablets (1,000 mg  total) by mouth 2 (two) times a day with meals, Disp: 60 tablet, Rfl: 0    metoprolol succinate (TOPROL-XL) 25 mg 24 hr tablet, Take 1 tablet (25 mg total) by mouth every morning Hypertension, Disp: 30 tablet, Rfl: 0    Multiple Vitamin (Daily-Jodie) TABS, Take 1 tablet by mouth every morning Dx: Supplement, Disp: 30 tablet, Rfl: 0    pantoprazole (PROTONIX) 40 mg tablet, Take 1 tablet (40 mg total) by mouth daily, Disp: 30 tablet, Rfl: 0    potassium chloride (Klor-Con M20) 20 mEq tablet, 2 TABS (40MEQ) ORALLY TWICE DAILY DX:SUPPLMENT *NOT ON CYCLE-NO REFILLS*, Disp: 60 tablet, Rfl: 4    rivaroxaban (Xarelto) 20 mg tablet, Take 1 tablet (20 mg total) by mouth daily with dinner, Disp: 30 tablet, Rfl: 0    spironolactone (ALDACTONE) 25 mg tablet, Take 1 tablet (25 mg total) by mouth daily, Disp: 30 tablet, Rfl: 0    Thiamine Mononitrate (VITAMIN B1) 100 mg tablet, Take 1 tablet (100 mg total) by mouth daily, Disp: 30 tablet, Rfl: 0    torsemide (DEMADEX) 20 mg tablet, Take 1 tablet (20 mg total) by mouth 2 (two) times a day, Disp: 60 tablet, Rfl: 1      Wt Readings from Last 3 Encounters:   01/23/25 94.3 kg (208 lb)   12/03/24 98 kg (216 lb)   11/13/24 96.3 kg (212 lb 3.2 oz)     Temp Readings from Last 3 Encounters:   12/03/24 (!) 96.5 °F (35.8 °C)   10/29/24 97.6 °F (36.4 °C) (Oral)   03/16/24 97.8 °F (36.6 °C) (Temporal)     BP Readings from Last 3 Encounters:   01/23/25 (!) 116/48   12/03/24 124/68   11/13/24 106/68     Pulse Readings from Last 3 Encounters:   01/23/25 65   12/03/24 65   11/13/24 65         Physical Exam  HENT:      Head: Atraumatic.      Mouth/Throat:      Mouth: Mucous membranes are moist.   Eyes:      Extraocular Movements: Extraocular movements intact.   Cardiovascular:      Rate and Rhythm: Normal rate and regular rhythm.      Heart sounds: Normal heart sounds.   Pulmonary:      Effort: Pulmonary effort is normal.      Breath sounds: Normal breath sounds.   Abdominal:      General: Abdomen is  flat.   Musculoskeletal:         General: Normal range of motion.      Cervical back: Normal range of motion.   Skin:     General: Skin is warm.   Neurological:      General: No focal deficit present.      Mental Status: He is alert and oriented to person, place, and time.   Psychiatric:         Mood and Affect: Mood normal.         Behavior: Behavior normal.           Laboratory Studies:  CMP:  Lab Results   Component Value Date    K 4.2 10/29/2024    CL 95 (L) 10/29/2024    CO2 35 (H) 10/29/2024    BUN 34 (H) 10/29/2024    CREATININE 1.30 10/29/2024    AST 19 10/28/2024    ALT 14 10/28/2024    EGFR 49 10/29/2024           Cardiac testing:   EKG reviewed personally: V-paced 65

## 2025-01-24 ENCOUNTER — TELEPHONE (OUTPATIENT)
Age: 85
End: 2025-01-24

## 2025-01-24 DIAGNOSIS — J06.9 VIRAL UPPER RESPIRATORY INFECTION: Primary | ICD-10-CM

## 2025-01-24 RX ORDER — GUAIFENESIN 600 MG/1
600 TABLET, EXTENDED RELEASE ORAL EVERY 12 HOURS SCHEDULED
Qty: 20 TABLET | Refills: 0 | Status: ON HOLD | OUTPATIENT
Start: 2025-01-24 | End: 2025-02-03

## 2025-01-24 RX ORDER — FLUTICASONE PROPIONATE 50 MCG
2 SPRAY, SUSPENSION (ML) NASAL DAILY
Qty: 16 G | Refills: 0 | Status: ON HOLD | OUTPATIENT
Start: 2025-01-24 | End: 2025-02-07

## 2025-01-24 NOTE — TELEPHONE ENCOUNTER
[facility] called. Reports patient experiencing runny nose, cough and congestion. Inquiring if able to send prescription to [facility] and cough  med for patient to pharmacy. States some patient have tested positive for flu and Covid inquiring if PCP would like to test patient for FLU or Covid requesting order to be faxed.    Fax # [phone].  Please review.  Thank you

## 2025-01-25 ENCOUNTER — HOSPITAL ENCOUNTER (INPATIENT)
Facility: HOSPITAL | Age: 85
LOS: 5 days | Discharge: NURSING, REHAB OR LONG-TERM CARE | End: 2025-01-30
Attending: EMERGENCY MEDICINE | Admitting: STUDENT IN AN ORGANIZED HEALTH CARE EDUCATION/TRAINING PROGRAM
Payer: MEDICARE

## 2025-01-25 ENCOUNTER — APPOINTMENT (EMERGENCY)
Dept: RADIOLOGY | Facility: HOSPITAL | Age: 85
End: 2025-01-25
Payer: MEDICARE

## 2025-01-25 DIAGNOSIS — I48.19 PERSISTENT ATRIAL FIBRILLATION (HCC): ICD-10-CM

## 2025-01-25 DIAGNOSIS — J96.90 RESPIRATORY FAILURE (HCC): ICD-10-CM

## 2025-01-25 DIAGNOSIS — J44.1 COPD WITH ACUTE EXACERBATION (HCC): ICD-10-CM

## 2025-01-25 DIAGNOSIS — N17.9 AKI (ACUTE KIDNEY INJURY) (HCC): ICD-10-CM

## 2025-01-25 DIAGNOSIS — J10.1 INFLUENZA A: Primary | ICD-10-CM

## 2025-01-25 DIAGNOSIS — I50.42 CHRONIC COMBINED SYSTOLIC AND DIASTOLIC CHF (CONGESTIVE HEART FAILURE) (HCC): ICD-10-CM

## 2025-01-25 PROBLEM — N18.2 STAGE 2 CHRONIC KIDNEY DISEASE: Status: ACTIVE | Noted: 2025-01-25

## 2025-01-25 LAB
ALBUMIN SERPL BCG-MCNC: 3.9 G/DL (ref 3.5–5)
ALP SERPL-CCNC: 110 U/L (ref 34–104)
ALT SERPL W P-5'-P-CCNC: 16 U/L (ref 7–52)
ANION GAP SERPL CALCULATED.3IONS-SCNC: 7 MMOL/L (ref 4–13)
ANION GAP SERPL CALCULATED.3IONS-SCNC: 7 MMOL/L (ref 4–13)
AST SERPL W P-5'-P-CCNC: 30 U/L (ref 13–39)
BASOPHILS # BLD MANUAL: 0 THOUSAND/UL (ref 0–0.1)
BASOPHILS NFR MAR MANUAL: 0 % (ref 0–1)
BILIRUB SERPL-MCNC: 0.68 MG/DL (ref 0.2–1)
BNP SERPL-MCNC: 383 PG/ML (ref 0–100)
BUN SERPL-MCNC: 28 MG/DL (ref 5–25)
BUN SERPL-MCNC: 29 MG/DL (ref 5–25)
CALCIUM SERPL-MCNC: 8.6 MG/DL (ref 8.4–10.2)
CALCIUM SERPL-MCNC: 8.6 MG/DL (ref 8.4–10.2)
CHLORIDE SERPL-SCNC: 101 MMOL/L (ref 96–108)
CHLORIDE SERPL-SCNC: 103 MMOL/L (ref 96–108)
CO2 SERPL-SCNC: 27 MMOL/L (ref 21–32)
CO2 SERPL-SCNC: 27 MMOL/L (ref 21–32)
CREAT SERPL-MCNC: 1.23 MG/DL (ref 0.6–1.3)
CREAT SERPL-MCNC: 1.37 MG/DL (ref 0.6–1.3)
EOSINOPHIL # BLD MANUAL: 0.1 THOUSAND/UL (ref 0–0.4)
EOSINOPHIL NFR BLD MANUAL: 1 % (ref 0–6)
ERYTHROCYTE [DISTWIDTH] IN BLOOD BY AUTOMATED COUNT: 15.3 % (ref 11.6–15.1)
ERYTHROCYTE [DISTWIDTH] IN BLOOD BY AUTOMATED COUNT: 15.5 % (ref 11.6–15.1)
FLUAV AG UPPER RESP QL IA.RAPID: POSITIVE
FLUBV AG UPPER RESP QL IA.RAPID: NEGATIVE
GFR SERPL CREATININE-BSD FRML MDRD: 46 ML/MIN/1.73SQ M
GFR SERPL CREATININE-BSD FRML MDRD: 52 ML/MIN/1.73SQ M
GLUCOSE SERPL-MCNC: 118 MG/DL (ref 65–140)
GLUCOSE SERPL-MCNC: 120 MG/DL (ref 65–140)
GLUCOSE SERPL-MCNC: 129 MG/DL (ref 65–140)
GLUCOSE SERPL-MCNC: 142 MG/DL (ref 65–140)
GLUCOSE SERPL-MCNC: 168 MG/DL (ref 65–140)
GLUCOSE SERPL-MCNC: 181 MG/DL (ref 65–140)
HCT VFR BLD AUTO: 34.1 % (ref 36.5–49.3)
HCT VFR BLD AUTO: 35.1 % (ref 36.5–49.3)
HGB BLD-MCNC: 10.9 G/DL (ref 12–17)
HGB BLD-MCNC: 11.1 G/DL (ref 12–17)
LYMPHOCYTES # BLD AUTO: 1.53 THOUSAND/UL (ref 0.6–4.47)
LYMPHOCYTES # BLD AUTO: 12 % (ref 14–44)
MAGNESIUM SERPL-MCNC: 2.2 MG/DL (ref 1.9–2.7)
MCH RBC QN AUTO: 29.7 PG (ref 26.8–34.3)
MCH RBC QN AUTO: 29.8 PG (ref 26.8–34.3)
MCHC RBC AUTO-ENTMCNC: 31.6 G/DL (ref 31.4–37.4)
MCHC RBC AUTO-ENTMCNC: 32 G/DL (ref 31.4–37.4)
MCV RBC AUTO: 93 FL (ref 82–98)
MCV RBC AUTO: 94 FL (ref 82–98)
MONOCYTES # BLD AUTO: 1.15 THOUSAND/UL (ref 0–1.22)
MONOCYTES NFR BLD: 12 % (ref 4–12)
NEUTROPHILS # BLD MANUAL: 6.78 THOUSAND/UL (ref 1.85–7.62)
NEUTS BAND NFR BLD MANUAL: 5 % (ref 0–8)
NEUTS SEG NFR BLD AUTO: 66 % (ref 43–75)
NRBC BLD AUTO-RTO: 1 /100 WBC (ref 0–2)
PHOSPHATE SERPL-MCNC: 3.7 MG/DL (ref 2.3–4.1)
PLATELET # BLD AUTO: 126 THOUSANDS/UL (ref 149–390)
PLATELET # BLD AUTO: 143 THOUSANDS/UL (ref 149–390)
PLATELET BLD QL SMEAR: ADEQUATE
PMV BLD AUTO: 9 FL (ref 8.9–12.7)
PMV BLD AUTO: 9.1 FL (ref 8.9–12.7)
POLYCHROMASIA BLD QL SMEAR: PRESENT
POTASSIUM SERPL-SCNC: 4.5 MMOL/L (ref 3.5–5.3)
POTASSIUM SERPL-SCNC: 5.5 MMOL/L (ref 3.5–5.3)
PROCALCITONIN SERPL-MCNC: 0.13 NG/ML
PROT SERPL-MCNC: 6.9 G/DL (ref 6.4–8.4)
RBC # BLD AUTO: 3.66 MILLION/UL (ref 3.88–5.62)
RBC # BLD AUTO: 3.74 MILLION/UL (ref 3.88–5.62)
RBC MORPH BLD: PRESENT
SARS-COV+SARS-COV-2 AG RESP QL IA.RAPID: NEGATIVE
SODIUM SERPL-SCNC: 135 MMOL/L (ref 135–147)
SODIUM SERPL-SCNC: 137 MMOL/L (ref 135–147)
VARIANT LYMPHS # BLD AUTO: 4 %
WBC # BLD AUTO: 9.02 THOUSAND/UL (ref 4.31–10.16)
WBC # BLD AUTO: 9.55 THOUSAND/UL (ref 4.31–10.16)

## 2025-01-25 PROCEDURE — 84145 PROCALCITONIN (PCT): CPT | Performed by: EMERGENCY MEDICINE

## 2025-01-25 PROCEDURE — 94640 AIRWAY INHALATION TREATMENT: CPT

## 2025-01-25 PROCEDURE — 83735 ASSAY OF MAGNESIUM: CPT | Performed by: PHYSICIAN ASSISTANT

## 2025-01-25 PROCEDURE — 85007 BL SMEAR W/DIFF WBC COUNT: CPT | Performed by: EMERGENCY MEDICINE

## 2025-01-25 PROCEDURE — 84100 ASSAY OF PHOSPHORUS: CPT | Performed by: PHYSICIAN ASSISTANT

## 2025-01-25 PROCEDURE — 87081 CULTURE SCREEN ONLY: CPT | Performed by: STUDENT IN AN ORGANIZED HEALTH CARE EDUCATION/TRAINING PROGRAM

## 2025-01-25 PROCEDURE — 94644 CONT INHLJ TX 1ST HOUR: CPT

## 2025-01-25 PROCEDURE — 94664 DEMO&/EVAL PT USE INHALER: CPT

## 2025-01-25 PROCEDURE — 99285 EMERGENCY DEPT VISIT HI MDM: CPT

## 2025-01-25 PROCEDURE — 93005 ELECTROCARDIOGRAM TRACING: CPT

## 2025-01-25 PROCEDURE — 71045 X-RAY EXAM CHEST 1 VIEW: CPT

## 2025-01-25 PROCEDURE — 80053 COMPREHEN METABOLIC PANEL: CPT | Performed by: EMERGENCY MEDICINE

## 2025-01-25 PROCEDURE — 85027 COMPLETE CBC AUTOMATED: CPT | Performed by: EMERGENCY MEDICINE

## 2025-01-25 PROCEDURE — 94760 N-INVAS EAR/PLS OXIMETRY 1: CPT

## 2025-01-25 PROCEDURE — 99285 EMERGENCY DEPT VISIT HI MDM: CPT | Performed by: EMERGENCY MEDICINE

## 2025-01-25 PROCEDURE — 83880 ASSAY OF NATRIURETIC PEPTIDE: CPT | Performed by: EMERGENCY MEDICINE

## 2025-01-25 PROCEDURE — 87804 INFLUENZA ASSAY W/OPTIC: CPT | Performed by: EMERGENCY MEDICINE

## 2025-01-25 PROCEDURE — 87811 SARS-COV-2 COVID19 W/OPTIC: CPT | Performed by: EMERGENCY MEDICINE

## 2025-01-25 PROCEDURE — 36415 COLL VENOUS BLD VENIPUNCTURE: CPT | Performed by: EMERGENCY MEDICINE

## 2025-01-25 PROCEDURE — 85027 COMPLETE CBC AUTOMATED: CPT | Performed by: PHYSICIAN ASSISTANT

## 2025-01-25 PROCEDURE — 82948 REAGENT STRIP/BLOOD GLUCOSE: CPT

## 2025-01-25 PROCEDURE — 80048 BASIC METABOLIC PNL TOTAL CA: CPT | Performed by: PHYSICIAN ASSISTANT

## 2025-01-25 PROCEDURE — 99223 1ST HOSP IP/OBS HIGH 75: CPT | Performed by: PHYSICIAN ASSISTANT

## 2025-01-25 RX ORDER — SODIUM CHLORIDE FOR INHALATION 0.9 %
12 VIAL, NEBULIZER (ML) INHALATION ONCE
Status: COMPLETED | OUTPATIENT
Start: 2025-01-25 | End: 2025-01-25

## 2025-01-25 RX ORDER — ACETAMINOPHEN 325 MG/1
650 TABLET ORAL EVERY 6 HOURS PRN
Status: DISCONTINUED | OUTPATIENT
Start: 2025-01-25 | End: 2025-01-30 | Stop reason: HOSPADM

## 2025-01-25 RX ORDER — METHYLPREDNISOLONE SODIUM SUCCINATE 40 MG/ML
40 INJECTION, POWDER, LYOPHILIZED, FOR SOLUTION INTRAMUSCULAR; INTRAVENOUS DAILY
Status: DISCONTINUED | OUTPATIENT
Start: 2025-01-25 | End: 2025-01-26

## 2025-01-25 RX ORDER — GUAIFENESIN 600 MG/1
600 TABLET, EXTENDED RELEASE ORAL EVERY 12 HOURS SCHEDULED
Status: DISCONTINUED | OUTPATIENT
Start: 2025-01-25 | End: 2025-01-26

## 2025-01-25 RX ORDER — MAGNESIUM HYDROXIDE/ALUMINUM HYDROXICE/SIMETHICONE 120; 1200; 1200 MG/30ML; MG/30ML; MG/30ML
30 SUSPENSION ORAL EVERY 6 HOURS PRN
Status: DISCONTINUED | OUTPATIENT
Start: 2025-01-25 | End: 2025-01-30 | Stop reason: HOSPADM

## 2025-01-25 RX ORDER — LEVALBUTEROL INHALATION SOLUTION 1.25 MG/3ML
1.25 SOLUTION RESPIRATORY (INHALATION)
Status: DISCONTINUED | OUTPATIENT
Start: 2025-01-25 | End: 2025-01-25

## 2025-01-25 RX ORDER — BUSPIRONE HYDROCHLORIDE 15 MG/1
7.5 TABLET ORAL 2 TIMES DAILY
Status: DISCONTINUED | OUTPATIENT
Start: 2025-01-25 | End: 2025-01-30 | Stop reason: HOSPADM

## 2025-01-25 RX ORDER — DULOXETIN HYDROCHLORIDE 30 MG/1
60 CAPSULE, DELAYED RELEASE ORAL DAILY
Status: DISCONTINUED | OUTPATIENT
Start: 2025-01-25 | End: 2025-01-30 | Stop reason: HOSPADM

## 2025-01-25 RX ORDER — LOSARTAN POTASSIUM 25 MG/1
25 TABLET ORAL DAILY
Status: DISCONTINUED | OUTPATIENT
Start: 2025-01-25 | End: 2025-01-30 | Stop reason: HOSPADM

## 2025-01-25 RX ORDER — ATORVASTATIN CALCIUM 20 MG/1
20 TABLET, FILM COATED ORAL
Status: DISCONTINUED | OUTPATIENT
Start: 2025-01-25 | End: 2025-01-30 | Stop reason: HOSPADM

## 2025-01-25 RX ORDER — INSULIN LISPRO 100 [IU]/ML
1-6 INJECTION, SOLUTION INTRAVENOUS; SUBCUTANEOUS
Status: DISCONTINUED | OUTPATIENT
Start: 2025-01-25 | End: 2025-01-30 | Stop reason: HOSPADM

## 2025-01-25 RX ORDER — FLUTICASONE FUROATE AND VILANTEROL 100; 25 UG/1; UG/1
1 POWDER RESPIRATORY (INHALATION) DAILY
Status: DISCONTINUED | OUTPATIENT
Start: 2025-01-25 | End: 2025-01-30 | Stop reason: HOSPADM

## 2025-01-25 RX ORDER — FLUTICASONE PROPIONATE 50 MCG
2 SPRAY, SUSPENSION (ML) NASAL DAILY
Status: DISCONTINUED | OUTPATIENT
Start: 2025-01-25 | End: 2025-01-30 | Stop reason: HOSPADM

## 2025-01-25 RX ORDER — OSELTAMIVIR PHOSPHATE 75 MG/1
75 CAPSULE ORAL ONCE
Status: COMPLETED | OUTPATIENT
Start: 2025-01-25 | End: 2025-01-25

## 2025-01-25 RX ORDER — INSULIN LISPRO 100 [IU]/ML
1-5 INJECTION, SOLUTION INTRAVENOUS; SUBCUTANEOUS
Status: DISCONTINUED | OUTPATIENT
Start: 2025-01-25 | End: 2025-01-30 | Stop reason: HOSPADM

## 2025-01-25 RX ORDER — ALBUTEROL SULFATE 5 MG/ML
10 SOLUTION RESPIRATORY (INHALATION) ONCE
Status: COMPLETED | OUTPATIENT
Start: 2025-01-25 | End: 2025-01-25

## 2025-01-25 RX ORDER — LANOLIN ALCOHOL/MO/W.PET/CERES
400 CREAM (GRAM) TOPICAL 2 TIMES DAILY
Status: DISCONTINUED | OUTPATIENT
Start: 2025-01-25 | End: 2025-01-30 | Stop reason: HOSPADM

## 2025-01-25 RX ORDER — SPIRONOLACTONE 25 MG/1
25 TABLET ORAL DAILY
Status: DISCONTINUED | OUTPATIENT
Start: 2025-01-25 | End: 2025-01-28

## 2025-01-25 RX ORDER — LEVALBUTEROL INHALATION SOLUTION 1.25 MG/3ML
1.25 SOLUTION RESPIRATORY (INHALATION)
Status: DISCONTINUED | OUTPATIENT
Start: 2025-01-26 | End: 2025-01-26

## 2025-01-25 RX ORDER — SENNOSIDES 8.6 MG
1 TABLET ORAL
Status: DISCONTINUED | OUTPATIENT
Start: 2025-01-25 | End: 2025-01-30 | Stop reason: HOSPADM

## 2025-01-25 RX ORDER — OSELTAMIVIR PHOSPHATE 30 MG/1
30 CAPSULE ORAL EVERY 12 HOURS SCHEDULED
Status: COMPLETED | OUTPATIENT
Start: 2025-01-25 | End: 2025-01-29

## 2025-01-25 RX ORDER — FERROUS SULFATE 325(65) MG
325 TABLET ORAL
Status: DISCONTINUED | OUTPATIENT
Start: 2025-01-25 | End: 2025-01-30 | Stop reason: HOSPADM

## 2025-01-25 RX ORDER — ONDANSETRON 2 MG/ML
4 INJECTION INTRAMUSCULAR; INTRAVENOUS EVERY 6 HOURS PRN
Status: DISCONTINUED | OUTPATIENT
Start: 2025-01-25 | End: 2025-01-25

## 2025-01-25 RX ORDER — FOLIC ACID 1 MG/1
1000 TABLET ORAL EVERY MORNING
Status: DISCONTINUED | OUTPATIENT
Start: 2025-01-25 | End: 2025-01-30 | Stop reason: HOSPADM

## 2025-01-25 RX ORDER — INSULIN GLARGINE 100 [IU]/ML
8 INJECTION, SOLUTION SUBCUTANEOUS
Status: DISCONTINUED | OUTPATIENT
Start: 2025-01-25 | End: 2025-01-30 | Stop reason: HOSPADM

## 2025-01-25 RX ORDER — METOPROLOL SUCCINATE 25 MG/1
25 TABLET, EXTENDED RELEASE ORAL EVERY MORNING
Status: DISCONTINUED | OUTPATIENT
Start: 2025-01-25 | End: 2025-01-30 | Stop reason: HOSPADM

## 2025-01-25 RX ORDER — DONEPEZIL HYDROCHLORIDE 5 MG/1
10 TABLET, FILM COATED ORAL EVERY MORNING
Status: DISCONTINUED | OUTPATIENT
Start: 2025-01-25 | End: 2025-01-30 | Stop reason: HOSPADM

## 2025-01-25 RX ORDER — LANOLIN ALCOHOL/MO/W.PET/CERES
100 CREAM (GRAM) TOPICAL DAILY
Status: DISCONTINUED | OUTPATIENT
Start: 2025-01-25 | End: 2025-01-30 | Stop reason: HOSPADM

## 2025-01-25 RX ORDER — TORSEMIDE 20 MG/1
20 TABLET ORAL
Status: DISCONTINUED | OUTPATIENT
Start: 2025-01-25 | End: 2025-01-28

## 2025-01-25 RX ORDER — CLONAZEPAM 1 MG/1
1 TABLET ORAL
Status: DISCONTINUED | OUTPATIENT
Start: 2025-01-25 | End: 2025-01-27

## 2025-01-25 RX ORDER — LORATADINE 10 MG/1
10 TABLET ORAL EVERY MORNING
Status: DISCONTINUED | OUTPATIENT
Start: 2025-01-25 | End: 2025-01-30 | Stop reason: HOSPADM

## 2025-01-25 RX ORDER — PANTOPRAZOLE SODIUM 40 MG/1
40 TABLET, DELAYED RELEASE ORAL
Status: DISCONTINUED | OUTPATIENT
Start: 2025-01-25 | End: 2025-01-30 | Stop reason: HOSPADM

## 2025-01-25 RX ORDER — ALBUTEROL SULFATE 0.83 MG/ML
2.5 SOLUTION RESPIRATORY (INHALATION) EVERY 6 HOURS PRN
Status: DISCONTINUED | OUTPATIENT
Start: 2025-01-25 | End: 2025-01-26

## 2025-01-25 RX ADMIN — FOLIC ACID 1000 MCG: 1 TABLET ORAL at 09:07

## 2025-01-25 RX ADMIN — MAGNESIUM OXIDE TAB 400 MG (241.3 MG ELEMENTAL MG) 400 MG: 400 (241.3 MG) TAB at 09:07

## 2025-01-25 RX ADMIN — METHYLPREDNISOLONE SODIUM SUCCINATE 40 MG: 40 INJECTION, POWDER, FOR SOLUTION INTRAMUSCULAR; INTRAVENOUS at 13:56

## 2025-01-25 RX ADMIN — SODIUM CHLORIDE 250 ML: 0.9 INJECTION, SOLUTION INTRAVENOUS at 03:13

## 2025-01-25 RX ADMIN — MAGNESIUM OXIDE TAB 400 MG (241.3 MG ELEMENTAL MG) 400 MG: 400 (241.3 MG) TAB at 17:10

## 2025-01-25 RX ADMIN — IPRATROPIUM BROMIDE 0.5 MG: 0.5 SOLUTION RESPIRATORY (INHALATION) at 07:43

## 2025-01-25 RX ADMIN — FLUTICASONE FUROATE AND VILANTEROL TRIFENATATE 1 PUFF: 100; 25 POWDER RESPIRATORY (INHALATION) at 09:14

## 2025-01-25 RX ADMIN — OSELTAMAVIR PHOSPHATE 75 MG: 75 CAPSULE ORAL at 03:13

## 2025-01-25 RX ADMIN — IPRATROPIUM BROMIDE 0.5 MG: 0.5 SOLUTION RESPIRATORY (INHALATION) at 13:15

## 2025-01-25 RX ADMIN — INSULIN GLARGINE 8 UNITS: 100 INJECTION, SOLUTION SUBCUTANEOUS at 09:06

## 2025-01-25 RX ADMIN — LORATADINE 10 MG: 10 TABLET ORAL at 09:07

## 2025-01-25 RX ADMIN — ATORVASTATIN CALCIUM 20 MG: 20 TABLET, FILM COATED ORAL at 16:50

## 2025-01-25 RX ADMIN — THIAMINE HCL TAB 100 MG 100 MG: 100 TAB at 09:07

## 2025-01-25 RX ADMIN — METOPROLOL SUCCINATE 25 MG: 25 TABLET, FILM COATED, EXTENDED RELEASE ORAL at 09:07

## 2025-01-25 RX ADMIN — PANTOPRAZOLE SODIUM 40 MG: 40 TABLET, DELAYED RELEASE ORAL at 06:10

## 2025-01-25 RX ADMIN — OSELTAMIVIR 30 MG: 30 CAPSULE ORAL at 17:14

## 2025-01-25 RX ADMIN — ISODIUM CHLORIDE 12 ML: 0.03 SOLUTION RESPIRATORY (INHALATION) at 02:26

## 2025-01-25 RX ADMIN — BUSPIRONE HYDROCHLORIDE 7.5 MG: 15 TABLET ORAL at 09:07

## 2025-01-25 RX ADMIN — LEVALBUTEROL HYDROCHLORIDE 1.25 MG: 1.25 SOLUTION RESPIRATORY (INHALATION) at 13:15

## 2025-01-25 RX ADMIN — UMECLIDINIUM 1 PUFF: 62.5 AEROSOL, POWDER ORAL at 09:13

## 2025-01-25 RX ADMIN — DONEPEZIL HYDROCHLORIDE 10 MG: 5 TABLET ORAL at 09:08

## 2025-01-25 RX ADMIN — RIVAROXABAN 15 MG: 15 TABLET, FILM COATED ORAL at 16:50

## 2025-01-25 RX ADMIN — GUAIFENESIN 600 MG: 600 TABLET, EXTENDED RELEASE ORAL at 21:50

## 2025-01-25 RX ADMIN — LEVALBUTEROL HYDROCHLORIDE 1.25 MG: 1.25 SOLUTION RESPIRATORY (INHALATION) at 07:43

## 2025-01-25 RX ADMIN — INSULIN LISPRO 1 UNITS: 100 INJECTION, SOLUTION INTRAVENOUS; SUBCUTANEOUS at 21:50

## 2025-01-25 RX ADMIN — ALBUTEROL SULFATE 10 MG: 2.5 SOLUTION RESPIRATORY (INHALATION) at 02:26

## 2025-01-25 RX ADMIN — FLUTICASONE PROPIONATE 2 SPRAY: 50 SPRAY, METERED NASAL at 09:13

## 2025-01-25 RX ADMIN — FERROUS SULFATE TAB 325 MG (65 MG ELEMENTAL FE) 325 MG: 325 (65 FE) TAB at 09:08

## 2025-01-25 RX ADMIN — LEVOTHYROXINE SODIUM 137 MCG: 25 TABLET ORAL at 06:10

## 2025-01-25 RX ADMIN — EMPAGLIFLOZIN 10 MG: 10 TABLET, FILM COATED ORAL at 09:07

## 2025-01-25 RX ADMIN — DULOXETINE HYDROCHLORIDE 60 MG: 30 CAPSULE, DELAYED RELEASE ORAL at 09:08

## 2025-01-25 RX ADMIN — IPRATROPIUM BROMIDE 1 MG: 0.5 SOLUTION RESPIRATORY (INHALATION) at 02:26

## 2025-01-25 RX ADMIN — MELATONIN 9 MG: 3 TAB ORAL at 21:50

## 2025-01-25 RX ADMIN — BUSPIRONE HYDROCHLORIDE 7.5 MG: 15 TABLET ORAL at 17:10

## 2025-01-25 RX ADMIN — GUAIFENESIN 600 MG: 600 TABLET, EXTENDED RELEASE ORAL at 09:08

## 2025-01-25 NOTE — ASSESSMENT & PLAN NOTE
SpO2 85% on room air  Improved on 2 L supplemental oxygen via nasal cannula  Suspect secondary to influenza A  Wean oxygen as able

## 2025-01-25 NOTE — ASSESSMENT & PLAN NOTE
"Lab Results   Component Value Date    HGBA1C 6.9 (A) 12/03/2024       No results for input(s): \"POCGLU\" in the last 72 hours.    Blood Sugar Average: Last 72 hrs:  Home regimen: Farxiga 10 Mg daily, Lantus 8 units with breakfast, SSI AC/at bedtime, glimepiride 1 Mg daily, and metformin 1000 mg twice daily  Hold home glimepiride and metformin-continue home Farxiga and insulin at this time    "

## 2025-01-25 NOTE — ASSESSMENT & PLAN NOTE
Endorses cough and rhinorrhea x 2 days  Found to be influenza A positive in the ED  Also with underlying COPD-therefore will place on Xopenex and Atrovent nebulizers 3 times daily  Continue Tamiflu for 10 days total as he is hospitalized and requiring supplemental oxygen

## 2025-01-25 NOTE — PLAN OF CARE

## 2025-01-25 NOTE — ASSESSMENT & PLAN NOTE
Lab Results   Component Value Date    EGFR 46 01/25/2025    EGFR 49 10/29/2024    EGFR 68 10/28/2024    CREATININE 1.37 (H) 01/25/2025    CREATININE 1.30 10/29/2024    CREATININE 0.99 10/28/2024   Baseline creatinine 0.92-1.1  Creatinine on admission 1.37-does not quite meet NELLIE criteria  Given 250 cc bolus in the ED-hold further IVF in setting of known systolic heart failure  Recheck BMP around 0900 -hold diuretics and losartan for now

## 2025-01-25 NOTE — ED PROVIDER NOTES
Time reflects when diagnosis was documented in both MDM as applicable and the Disposition within this note       Time User Action Codes Description Comment    1/25/2025  2:51 AM Edilberto Silva [J10.1] Influenza A     1/25/2025  2:51 AM Edilberto Silva [J96.90] Respiratory failure ([facility])     1/25/2025  2:51 AM Edilberto Silva [N17.9] NELLIE (acute kidney injury) ([facility])     1/25/2025  2:51 AM Edilberto Silva [I48.19] Persistent atrial fibrillation ([facility])     1/25/2025  2:51 AM Edilberto Silva [I50.42] Chronic combined systolic and diastolic CHF (congestive heart failure) ([facility])     1/25/2025  2:51 AM Edilberto Silva [J44.1] COPD with acute exacerbation ([facility])           ED Disposition       ED Disposition   Admit    Condition   Stable    Date/Time   Sat Jan 25, 2025  3:20 AM    Comment   Case was discussed with MEGHANA and the patient's admission status was agreed to be Admission Status: inpatient status to the service of Dr. Garcia.               Assessment & Plan       Medical Decision Making    84 y.o. male presenting for cough and URI symptoms.  Hypoxic on arrival, transitioned to NC O2.  Will order CBC, CMP, BNP, EKG, CXR to evaluate for arrhythmia, PTX, pulmonary disease, widened mediastinum, CHF.  Ddx includes COPD exacerbation.  Will viral testing and treat with LEE neb.  Patient anticoagulated, do not suspect PE.    Repeat evaluation: VSS, resting comfortably. O2 sat improved with duoneb.  Reviewed lab results with patient.  Will initiate on tamiflu and treat with gentle IV fluid bolus.    Impression: hypoxia, COPD exacerbation due to influenza A infection. Will admit for further evaluation and management.  Patient care discussed with SLIM who will assume care and admit patient to the hospital. I have discussed with the patient our recommendation of inpatient admission for further medical care. I have answered all of the patient's questions and concerns. The patient is in agreement with the  plan to proceed with admission to the hospital.     Amount and/or Complexity of Data Reviewed  Labs: ordered. Decision-making details documented in ED Course.  Radiology: ordered and independent interpretation performed.    Risk  Prescription drug management.  Decision regarding hospitalization.        ED Course as of 01/25/25 0323   Sat Jan 25, 2025   0249 Influenza A Rapid Antigen(!): Positive   0257 Procedure Note: EKG  Date/Time: 01/25/25 2:57 AM   Interpreted by: Edilberto Silva DO  Indications / Diagnosis: Dyspnea  ECG reviewed by me, the ED Provider: yes   The EKG demonstrates:  Rhythm: ventricular paced rhythm 65 BPM  Intervals: QT interval prolonged 507ms  Axis: left axis  QRS/Blocks: nonspecific conduction delay  ST Changes: No acute ST/T waves changes. No MELVIN. No TWI.       Medications   sodium chloride 0.9 % bolus 250 mL (250 mL Intravenous New Bag 1/25/25 0313)   albuterol inhalation solution 10 mg (10 mg Nebulization Given 1/25/25 0226)   ipratropium (ATROVENT) 0.02 % inhalation solution 1 mg (1 mg Nebulization Given 1/25/25 0226)   sodium chloride 0.9 % inhalation solution 12 mL (12 mL Nebulization Given 1/25/25 0226)   oseltamivir (TAMIFLU) capsule 75 mg (75 mg Oral Given 1/25/25 0313)       ED Risk Strat Scores                          SBIRT 22yo+      Flowsheet Row Most Recent Value   Initial Alcohol Screen: US AUDIT-C     1. How often do you have a drink containing alcohol? 0 Filed at: 01/25/2025 0154   2. How many drinks containing alcohol do you have on a typical day you are drinking?  0 Filed at: 01/25/2025 0154   3a. Male UNDER 65: How often do you have five or more drinks on one occasion? 0 Filed at: 01/25/2025 0154   3b. FEMALE Any Age, or MALE 65+: How often do you have 4 or more drinks on one occassion? 0 Filed at: 01/25/2025 0154   Audit-C Score 0 Filed at: 01/25/2025 0154   NICHOL: How many times in the past year have you...    Used an illegal drug or used a prescription medication for  non-medical reasons? Never Filed at: 01/25/2025 0154                            History of Present Illness       No chief complaint on file.      Past Medical History:   Diagnosis Date    Alzheimer disease (HCC)     Anemia     Aneurysm of ascending aorta without rupture (HCC)     Anxiety     Atrial fibrillation (HCC)     CHF (congestive heart failure) (HCC)     Depression     Diabetes mellitus (HCC)     Disease of thyroid gland     Pulmonary hypertension (HCC)     Sciatica     Sick sinus syndrome (HCC)       Past Surgical History:   Procedure Laterality Date    CARDIAC PACEMAKER PLACEMENT        Family History   Problem Relation Age of Onset    Dementia Mother     Heart disease Father     Stroke Father     Thyroid disease Daughter     Thyroid disease Daughter       Social History     Tobacco Use    Smoking status: Former     Types: Cigarettes    Smokeless tobacco: Never   Vaping Use    Vaping status: Never Used   Substance Use Topics    Alcohol use: Not Currently    Drug use: Never      E-Cigarette/Vaping    E-Cigarette Use Never User       E-Cigarette/Vaping Substances      I have reviewed and agree with the history as documented.     Trevor Lyons is a 84 y.o. year old male with PMH of Afib on xarelto, AS s/p TAVR, SSS s/p PPM, CHF, alzheimer disease presenting to the [facility] ED for cough and runny nose. Patient reporting cough and runny nose for the past 2 days.  He denies fevers, chest pain or leg swelling.  He has a history of COPD however does not wear supplemental oxygen at baseline. No associated nausea/vomit/diarrhea or abdominal pain.      History provided by:  Medical records and patient   used: No        Review of Systems   Constitutional:  Negative for fever.   HENT:  Positive for congestion.    Respiratory:  Positive for cough. Negative for shortness of breath.    Cardiovascular:  Negative for chest pain and leg swelling.   Gastrointestinal:  Negative for abdominal pain,  diarrhea, nausea and vomiting.   Genitourinary:  Negative for dysuria.   All other systems reviewed and are negative.          Objective       ED Triage Vitals   Temperature Pulse Blood Pressure Respirations SpO2 Patient Position - Orthostatic VS   01/25/25 0152 01/25/25 0151 01/25/25 0152 01/25/25 0151 01/25/25 0151 01/25/25 0200   99.1 °F (37.3 °C) 65 110/58 18 (!) 85 % Lying      Temp Source Heart Rate Source BP Location FiO2 (%) Pain Score    01/25/25 0152 01/25/25 0151 01/25/25 0200 -- 01/25/25 0151    Oral Monitor Right arm  No Pain      Vitals      Date and Time Temp Pulse SpO2 Resp BP Pain Score FACES Pain Rating User   01/25/25 0230 -- 65 98 % -- 118/62 -- -- MG   01/25/25 0200 -- 65 96 % 18 111/59 -- -- KK   01/25/25 0157 -- -- 97 % -- -- -- -- MG   01/25/25 0152 99.1 °F (37.3 °C) -- -- -- 110/58 -- -- MG   01/25/25 0151 -- 65 85 % 18 -- No Pain -- MG            Physical Exam  Vitals and nursing note reviewed.   Constitutional:       Appearance: Normal appearance. He is well-developed.   HENT:      Head: Normocephalic and atraumatic.      Nose: Congestion and rhinorrhea present.      Mouth/Throat:      Pharynx: No oropharyngeal exudate or posterior oropharyngeal erythema.   Eyes:      General:         Right eye: No discharge.         Left eye: No discharge.   Cardiovascular:      Rate and Rhythm: Normal rate. Rhythm irregular.      Heart sounds: Murmur heard.   Pulmonary:      Effort: No tachypnea, accessory muscle usage, prolonged expiration, respiratory distress or retractions.      Breath sounds: Wheezing present. No rales.   Abdominal:      Palpations: Abdomen is soft.      Tenderness: There is no abdominal tenderness. There is no guarding or rebound.   Musculoskeletal:      Cervical back: No rigidity.      Right lower leg: No edema.      Left lower leg: No edema.   Skin:     General: Skin is warm.      Capillary Refill: Capillary refill takes less than 2 seconds.   Neurological:      Mental Status: He  is alert and oriented to person, place, and time.   Psychiatric:         Mood and Affect: Mood normal.         Behavior: Behavior normal.         Results Reviewed       Procedure Component Value Units Date/Time    Procalcitonin [[id number]]  (Normal) Collected: 01/25/25 0221    Lab Status: Final result Specimen: Blood from Arm, Left Updated: 01/25/25 0259     Procalcitonin 0.13 ng/ml     Comprehensive metabolic panel [[id number]]  (Abnormal) Collected: 01/25/25 0221    Lab Status: Final result Specimen: Blood from Arm, Left Updated: 01/25/25 0249     Sodium 137 mmol/L      Potassium 5.5 mmol/L      Chloride 103 mmol/L      CO2 27 mmol/L      ANION GAP 7 mmol/L      BUN 29 mg/dL      Creatinine 1.37 mg/dL      Glucose 120 mg/dL      Calcium 8.6 mg/dL      AST 30 U/L      ALT 16 U/L      Alkaline Phosphatase 110 U/L      Total Protein 6.9 g/dL      Albumin 3.9 g/dL      Total Bilirubin 0.68 mg/dL      eGFR 46 ml/min/1.73sq m     Narrative:      National Kidney Disease Foundation guidelines for Chronic Kidney Disease (CKD):     Stage 1 with normal or high GFR (GFR > 90 mL/min/1.73 square meters)    Stage 2 Mild CKD (GFR = 60-89 mL/min/1.73 square meters)    Stage 3A Moderate CKD (GFR = 45-59 mL/min/1.73 square meters)    Stage 3B Moderate CKD (GFR = 30-44 mL/min/1.73 square meters)    Stage 4 Severe CKD (GFR = 15-29 mL/min/1.73 square meters)    Stage 5 End Stage CKD (GFR <15 mL/min/1.73 square meters)  Note: GFR calculation is accurate only with a steady state creatinine    CBC and differential [[id number]]  (Abnormal) Collected: 01/25/25 0221    Lab Status: Preliminary result Specimen: Blood from Arm, Left Updated: 01/25/25 0228     WBC 9.55 Thousand/uL      RBC 3.74 Million/uL      Hemoglobin 11.1 g/dL      Hematocrit 35.1 %      MCV 94 fL      MCH 29.7 pg      MCHC 31.6 g/dL      RDW 15.3 %      MPV 9.0 fL      Platelets 143 Thousands/uL     B-Type Natriuretic Peptide(BNP) [[id number]] Collected: 01/25/25 0221    Lab  Status: In process Specimen: Blood from Arm, Left Updated: 01/25/25 0226    FLU/COVID Rapid Antigen (30 min. TAT) - Preferred screening test in ED [[id number]]  (Abnormal) Collected: 01/25/25 0156    Lab Status: Final result Specimen: Nares from Nose Updated: 01/25/25 0220     SARS COV Rapid Antigen Negative     Influenza A Rapid Antigen Positive     Influenza B Rapid Antigen Negative    Narrative:      This test has been performed using the [employer] 2 FLU+SARS Antigen test under the Emergency Use Authorization (EUA). This test has been validated by the  and verified by the performing laboratory. The [employer] uses lateral flow immunofluorescent sandwich assay to detect SARS-COV, Influenza A and Influenza B Antigen.     The [employer]icit[employer] 2 SARS Antigen test does not differentiate between SARS-CoV and SARS-CoV-2.     Negative results are presumptive and may be confirmed with a molecular assay, if necessary, for patient management. Negative results do not rule out SARS-CoV-2 or influenza infection and should not be used as the sole basis for treatment or patient management decisions. A negative test result may occur if the level of antigen in a sample is below the limit of detection of this test.     Positive results are indicative of the presence of viral antigens, but do not rule out bacterial infection or co-infection with other viruses.     All test results should be used as an adjunct to clinical observations and other information available to the provider.    FOR PEDIATRIC PATIENTS - copy/paste COVID Guidelines URL to browser: [email or web address]            XR chest portable   ED Interpretation by Edilberto Silva DO (01/25 0250)   Cardiomegaly similar to prior. No focal infiltrate. No acute cardiopulmonary disease.          Procedures    ED Medication and Procedure Management   Prior to Admission Medications   Prescriptions Last Dose Informant  Patient Reported? Taking?   BD AutoShield Duo 30G X 5 MM MISC  Outside Facility (Specify) No No   Sig: USE AS DIRECTED   DULoxetine (CYMBALTA) 60 mg delayed release capsule  Outside Facility (Specify) No No   Sig: Take 1 capsule (60 mg total) by mouth daily   Farxiga 10 MG tablet  Outside Facility (Specify) No No   Sig: Take 1 tablet (10 mg total) by mouth daily   Lancets 33G MISC  Outside Facility (Specify) No No   Sig: Testing 3 times daily, before meals   Melatonin 5 MG CAPS  Outside Facility (Specify) No No   Sig: Take 2 capsules (10 mg total) by mouth daily   Menthol, Topical Analgesic, (Bengay Ultra Strength) 5 % PTCH  Outside Facility (Specify) No No   Sig: Apply 1 patch topically in the morning   Menthol, Topical Analgesic, (Biofreeze Roll-On) 4 % GEL  Outside Facility (Specify) Yes No   Sig: Apply 1 Application topically 4 (four) times a day as needed (pain)   Multiple Vitamin (Daily-Jodie) TABS  Outside Facility (Specify) No No   Sig: Take 1 tablet by mouth every morning Dx: Supplement   Thiamine Mononitrate (VITAMIN B1) 100 mg tablet  Outside Facility (Specify) No No   Sig: Take 1 tablet (100 mg total) by mouth daily   acetaminophen (TYLENOL) 325 mg tablet  Outside Facility (Specify) No No   Sig: 3 TABS (975MG) ORALLY EVERY 8 HOURS [id number] DX:PAIN *NOT TO EXCEED 3000MG OF APAP IN 24HRS**   albuterol (2.5 mg/3 mL) 0.083 % nebulizer solution  Outside Facility (Specify) No No   Sig: Take 3 mL (2.5 mg total) by nebulization 4 (four) times a day   albuterol (PROVENTIL HFA,VENTOLIN HFA) 90 mcg/act inhaler  Outside Facility (Specify) No No   Sig: Inhale 2 puffs every 6 (six) hours as needed for wheezing   atorvastatin (LIPITOR) 20 mg tablet  Outside Facility (Specify) No No   Sig: Take 1 tablet (20 mg total) by mouth daily with dinner   busPIRone (BUSPAR) 7.5 mg tablet  Outside Facility (Specify) No No   Sig: Take 1 tablet (7.5 mg total) by mouth 2 (two) times a day   clonazePAM (KlonoPIN) 1 mg tablet   Outside Facility (Specify) No No   Si TAB ORALLY AT BEDTIMEAS NEEDED FOR ANXIETY *NIOSH 3 HAZARDOUS DRUG*   donepezil (ARICEPT) 10 mg tablet  Outside Facility (Specify) No No   Sig: Take 1 tablet (10 mg total) by mouth every morning   ferrous sulfate (FeroSul) 325 (65 Fe) mg tablet  Outside Facility (Specify) No No   Sig: Take 1 tablet (325 mg total) by mouth daily with breakfast   fluticasone (FLONASE) 50 mcg/act nasal spray   No No   Si sprays into each nostril daily for 14 days   fluticasone-umeclidinium-vilanterol 100-62.5-25 mcg/actuation inhaler  Outside Facility (Specify) Yes No   Sig: Inhale 1 puff daily   folic acid (FOLVITE) 1 mg tablet  Outside Facility (Specify) No No   Sig: Take 1 tablet (1,000 mcg total) by mouth every morning Dx: Supplement   glimepiride (AMARYL) 1 mg tablet  Outside Facility (Specify) No No   Sig: Take 1 tablet (1 mg total) by mouth daily with breakfast   glucose blood (True Metrix Blood Glucose Test) test strip  Outside Facility (Specify) No No   Sig: Testing 3 times daily, before meals   guaiFENesin (MUCINEX) 600 mg 12 hr tablet   No No   Sig: Take 1 tablet (600 mg total) by mouth every 12 (twelve) hours for 10 days   insulin glargine (LANTUS) 100 units/mL subcutaneous injection  Outside Facility (Specify) No No   Sig: Inject 8 Units under the skin daily with breakfast   insulin lispro (HumaLOG) 100 units/mL injection pen  Outside Facility (Specify) No No   Sig: ACCUCHECK 3 TIMES DAILY BEFORE MEALS W/SSI; 150-189=1U,190-229=2U,230 -269=3U,270-309=4U,310-35 0=5U,>350=6U DX: DIABETES   levothyroxine 137 mcg tablet  Outside Facility (Specify) No No   Sig: Take 1 tablet (137 mcg total) by mouth daily   loratadine (CLARITIN) 10 mg tablet  Outside Facility (Specify) No No   Sig: Take 1 tablet (10 mg total) by mouth every morning Allergy   losartan (COZAAR) 25 mg tablet  Outside Facility (Specify) No No   Sig: Take 1 tablet (25 mg total) by mouth daily   magnesium Oxide (MAG-OX)  400 mg TABS  Outside Facility (Specify) No No   Sig: Take 1 tablet (400 mg total) by mouth 2 (two) times a day   metFORMIN (GLUCOPHAGE-XR) 500 mg 24 hr tablet  Outside Facility (Specify) No No   Sig: Take 2 tablets (1,000 mg total) by mouth 2 (two) times a day with meals   metoprolol succinate (TOPROL-XL) 25 mg 24 hr tablet  Outside Facility (Specify) No No   Sig: Take 1 tablet (25 mg total) by mouth every morning Hypertension   pantoprazole (PROTONIX) 40 mg tablet  Outside Facility (Specify) No No   Sig: Take 1 tablet (40 mg total) by mouth daily   potassium chloride (Klor-Con M20) 20 mEq tablet  Outside Facility (Specify) No No   Si TABS (40MEQ) ORALLY TWICE DAILY DX:SUPPLMENT *NOT ON CYCLE-NO REFILLS*   rivaroxaban (Xarelto) 20 mg tablet  Outside Facility (Specify) No No   Sig: Take 1 tablet (20 mg total) by mouth daily with dinner   spironolactone (ALDACTONE) 25 mg tablet  Outside Facility (Specify) No No   Sig: Take 1 tablet (25 mg total) by mouth daily   torsemide (DEMADEX) 20 mg tablet  Outside Facility (Specify) No No   Sig: Take 1 tablet (20 mg total) by mouth 2 (two) times a day      Facility-Administered Medications: None     Patient's Medications   Discharge Prescriptions    No medications on file     No discharge procedures on file.  ED SEPSIS DOCUMENTATION   Time reflects when diagnosis was documented in both MDM as applicable and the Disposition within this note       Time User Action Codes Description Comment    2025  2:51 AM Edilberto Silva [J10.1] Influenza A     2025  2:51 AM Edilberto Silva [J96.90] Respiratory failure ([facility])     2025  2:51 AM Edilberto Silva [N17.9] NELLIE (acute kidney injury) ([facility])     2025  2:51 AM Edilberto Silva [I48.19] Persistent atrial fibrillation ([facility])     2025  2:51 AM Edilberto Silva [I50.42] Chronic combined systolic and diastolic CHF (congestive heart failure) ([facility])     2025  2:51 AM Edilberto Silva [J44.1] COPD  with acute exacerbation ([facility]CC)                  Edilberto Silva, DO  01/25/25 0324

## 2025-01-25 NOTE — ASSESSMENT & PLAN NOTE
Home regimen: Trelegy and albuterol nebulizer as needed  Continue formulary equivalent for Trelegy and placed on Xopenex/Atrovent nebulizer 3 times daily in setting of Tamiflu requiring supplemental oxygen

## 2025-01-25 NOTE — H&P
H&P - Hospitalist   Name: Trevor Lyons 84 y.o. male I MRN: [id number]  Unit/Bed#: -01 I Date of Admission: 1/25/2025   Date of Service: 1/25/2025 I Hospital Day: 0     Assessment & Plan  Influenza A  Endorses cough and rhinorrhea x 2 days  Found to be influenza A positive in the ED  Also with underlying COPD-therefore will place on Xopenex and Atrovent nebulizers 3 times daily  Continue Tamiflu for 10 days total as he is hospitalized and requiring supplemental oxygen  Acute respiratory insufficiency  SpO2 85% on room air  Improved on 2 L supplemental oxygen via nasal cannula  Suspect secondary to influenza A  Wean oxygen as able  Stage 2 chronic kidney disease  Lab Results   Component Value Date    EGFR 46 01/25/2025    EGFR 49 10/29/2024    EGFR 68 10/28/2024    CREATININE 1.37 (H) 01/25/2025    CREATININE 1.30 10/29/2024    CREATININE 0.99 10/28/2024   Baseline creatinine 0.92-1.1  Creatinine on admission 1.37-does not quite meet NELLIE criteria  Given 250 cc bolus in the ED-hold further IVF in setting of known systolic heart failure  Recheck BMP around 0900 -hold diuretics and losartan for now  COPD (chronic obstructive pulmonary disease) ([facility])  Home regimen: Trelegy and albuterol nebulizer as needed  Continue formulary equivalent for Trelegy and placed on Xopenex/Atrovent nebulizer 3 times daily in setting of Tamiflu requiring supplemental oxygen  Chronic combined systolic and diastolic CHF (congestive heart failure) (HCC)  Wt Readings from Last 3 Encounters:   01/25/25 95.7 kg (211 lb 1.1 oz)   01/23/25 94.3 kg (208 lb)   12/03/24 98 kg (216 lb)   Home regimen: Torsemide 20 Mg twice daily, spironolactone 25 Mg daily, Farxiga 10 Mg daily, and metoprolol succinate 25 Mg daily  Last echo 10/2024: LVEF 40-45%.  Global hypokinesis.  Normal functioning TAVR bioprosthetic valve  I/O and daily weights  Sodium restriction-liberalize fluid restriction  Will hold home diuretics until repeat labs at 0900  Type 2  "diabetes mellitus without complication, without long-term current use of insulin (HCC)  Lab Results   Component Value Date    HGBA1C 6.9 (A) 12/03/2024       No results for input(s): \"POCGLU\" in the last 72 hours.    Blood Sugar Average: Last 72 hrs:  Home regimen: Farxiga 10 Mg daily, Lantus 8 units with breakfast, SSI AC/at bedtime, glimepiride 1 Mg daily, and metformin 1000 mg twice daily  Hold home glimepiride and metformin-continue home Farxiga and insulin at this time    Coronary artery disease involving native coronary artery of native heart without angina pectoris  CAD status post PCI and CABG  Continue home beta-blocker, statin, and Xarelto  Persistent atrial fibrillation (HCC)  RC: Metoprolol succinate 25 Mg daily  AC: Xarelto  Continue  Cardiac pacemaker  S/p SSS  Last device interrogation 7/2024 showed normal function  Pulmonary embolism (HCC)  Continue home Xarelto  Acquired hypothyroidism  Maintained on Synthroid 137 MCG daily  Continue  Depression with anxiety  Continue home BuSpar, Cymbalta, and Klonopin as needed      VTE Pharmacologic Prophylaxis: VTE Score: 7 High Risk (Score >/= 5) - Pharmacological DVT Prophylaxis Ordered: rivaroxaban (Xarelto). Sequential Compression Devices Ordered.  Code Status: Level 1 - Full Code   Discussion with family: Patient declined call to .     Anticipated Length of Stay: Patient will be admitted on an inpatient basis with an anticipated length of stay of greater than 2 midnights secondary to influenza A, acute respiratory insufficiency.    History of Present Illness   Chief Complaint: \" Cough and runny nose\"    Trevor Lyons is a 84 y.o. male with a PMH of Alzheimer's disease, systolic heart failure, hypothyroidism, A-fib on Xarelto, and CKD stage II who presents with cough and rhinorrhea x 2 days.  Reports cough is productive of yellow sputum.  Denies any chest pain or dyspnea.  No fevers or chills.  No abdominal pain, nausea, vomiting, or " change in bowel or bladder habits.  Reports chronic back pain that is unchanged.    Review of Systems   Constitutional:  Negative for chills and fever.   HENT:  Positive for rhinorrhea. Negative for congestion.    Respiratory:  Positive for cough. Negative for shortness of breath.    Cardiovascular:  Negative for chest pain and leg swelling.   Gastrointestinal:  Negative for abdominal pain, constipation, diarrhea, nausea and vomiting.   Genitourinary:  Negative for difficulty urinating and dysuria.   Musculoskeletal:  Positive for back pain and gait problem (Uses walker/scooter at baseline).   Neurological:  Negative for weakness and numbness.   All other systems reviewed and are negative.      Historical Information   Past Medical History:   Diagnosis Date    Alzheimer disease (HCC)     Anemia     Aneurysm of ascending aorta without rupture (HCC)     Anxiety     Atrial fibrillation (HCC)     CHF (congestive heart failure) (HCC)     Depression     Diabetes mellitus (HCC)     Disease of thyroid gland     Pulmonary hypertension (HCC)     Sciatica     Sick sinus syndrome (HCC)      Past Surgical History:   Procedure Laterality Date    CARDIAC PACEMAKER PLACEMENT       Social History     Tobacco Use    Smoking status: Former     Types: Cigarettes    Smokeless tobacco: Never   Vaping Use    Vaping status: Never Used   Substance and Sexual Activity    Alcohol use: Not Currently    Drug use: Never    Sexual activity: Not Currently     E-Cigarette/Vaping    E-Cigarette Use Never User      E-Cigarette/Vaping Substances     Family History   Problem Relation Age of Onset    Dementia Mother     Heart disease Father     Stroke Father     Thyroid disease Daughter     Thyroid disease Daughter      Social History:  Marital Status: Unknown   Occupation: Retired  Patient Pre-hospital Living Situation: Assisted Living  Patient Pre-hospital Level of Mobility: power wheelchair  Patient Pre-hospital Diet Restrictions: Carb  conscious    Meds/Allergies   I have reveiwed home medications using records provided by [facility].  Prior to Admission medications    Medication Sig Start Date End Date Taking? Authorizing Provider   atorvastatin (LIPITOR) 20 mg tablet Take 1 tablet (20 mg total) by mouth daily with dinner 10/29/24  Yes Usha Decker PA-C   busPIRone (BUSPAR) 7.5 mg tablet Take 1 tablet (7.5 mg total) by mouth 2 (two) times a day 10/29/24  Yes Usha Decker PA-C   clonazePAM (KlonoPIN) 1 mg tablet 1 TAB ORALLY AT BEDTIMEAS NEEDED FOR ANXIETY *[facility]* 10/30/24  Yes Ira Borden DO   donepezil (ARICEPT) 10 mg tablet Take 1 tablet (10 mg total) by mouth every morning 10/29/24  Yes Usha Decker PA-C   DULoxetine (CYMBALTA) 60 mg delayed release capsule Take 1 capsule (60 mg total) by mouth daily 1/2/25  Yes Ira Borden DO   Farxiga 10 MG tablet Take 1 tablet (10 mg total) by mouth daily 10/29/24  Yes Usha Decker PA-C   ferrous sulfate (FeroSul) 325 (65 Fe) mg tablet Take 1 tablet (325 mg total) by mouth daily with breakfast 12/3/24  Yes SAMIRA Boykin   fluticasone (FLONASE) 50 mcg/act nasal spray 2 sprays into each nostril daily for 14 days 1/24/25 2/7/25 Yes Matt Gordon MD   fluticasone-umeclidinium-vilanterol 100-62.5-25 mcg/actuation inhaler Inhale 1 puff daily 2/28/24  Yes Historical Provider, MD   folic acid (FOLVITE) 1 mg tablet Take 1 tablet (1,000 mcg total) by mouth every morning Dx: Supplement 10/29/24  Yes Usha Decker PA-C   glimepiride (AMARYL) 1 mg tablet Take 1 tablet (1 mg total) by mouth daily with breakfast 10/29/24  Yes Usha Decker PA-C   insulin glargine (LANTUS) 100 units/mL subcutaneous injection Inject 8 Units under the skin daily with breakfast 11/30/24  Yes Ira Michi, DO   levothyroxine 137 mcg tablet Take 1 tablet (137 mcg total) by mouth daily 12/30/24  Yes Ira Borden, DO   loratadine (CLARITIN) 10 mg tablet Take 1 tablet (10 mg total) by mouth every morning Allergy  10/29/24  Yes Usha Decker PA-C   losartan (COZAAR) 25 mg tablet Take 1 tablet (25 mg total) by mouth daily 1/2/25  Yes Ira Borden DO   magnesium Oxide (MAG-OX) 400 mg TABS Take 1 tablet (400 mg total) by mouth 2 (two) times a day 10/29/24  Yes Usha Decker PA-C   metFORMIN (GLUCOPHAGE-XR) 500 mg 24 hr tablet Take 2 tablets (1,000 mg total) by mouth 2 (two) times a day with meals 10/29/24  Yes Usha Decker PA-C   metoprolol succinate (TOPROL-XL) 25 mg 24 hr tablet Take 1 tablet (25 mg total) by mouth every morning Hypertension 10/29/24  Yes Usha Decker PA-C   pantoprazole (PROTONIX) 40 mg tablet Take 1 tablet (40 mg total) by mouth daily 10/29/24  Yes Usha Decker PA-C   potassium chloride (Klor-Con M20) 20 mEq tablet 2 TABS (40MEQ) ORALLY TWICE DAILY DX:SUPPLMENT *NOT ON CYCLE-NO REFILLS* 12/30/24  Yes Ira Borden DO   rivaroxaban (Xarelto) 20 mg tablet Take 1 tablet (20 mg total) by mouth daily with dinner 10/29/24  Yes Usha Decker PA-C   spironolactone (ALDACTONE) 25 mg tablet Take 1 tablet (25 mg total) by mouth daily 10/29/24  Yes Usha Decker PA-C   Thiamine Mononitrate (VITAMIN B1) 100 mg tablet Take 1 tablet (100 mg total) by mouth daily 10/29/24  Yes Usha Decker PA-C   torsemide (DEMADEX) 20 mg tablet Take 1 tablet (20 mg total) by mouth 2 (two) times a day 1/2/25  Yes Ira Borden DO   acetaminophen (TYLENOL) 325 mg tablet 3 TABS (975MG) ORALLY EVERY 8 HOURS [id number] DX:PAIN *NOT TO EXCEED 3000MG OF APAP IN 24HRS** 1/9/25   Ira Borden DO   albuterol (2.5 mg/3 mL) 0.083 % nebulizer solution Take 3 mL (2.5 mg total) by nebulization 4 (four) times a day 10/29/24   Usha Decker PA-C   albuterol (PROVENTIL HFA,VENTOLIN HFA) 90 mcg/act inhaler Inhale 2 puffs every 6 (six) hours as needed for wheezing 10/29/24   Usha Decker PA-C   BD AutoShield Duo 30G X 5 MM MISC USE AS DIRECTED 11/25/24   SAMIRA Boykin   glucose blood (True Metrix Blood Glucose Test) test strip  Testing 3 times daily, before meals 12/9/24   Ira Borden DO   guaiFENesin (MUCINEX) 600 mg 12 hr tablet Take 1 tablet (600 mg total) by mouth every 12 (twelve) hours for 10 days 1/24/25 2/3/25  Matt Gordon MD   insulin lispro (HumaLOG) 100 units/mL injection pen ACCUCHECK 3 TIMES DAILY BEFORE MEALS W/SSI; 150-189=1U,190-229=2U,230 -269=3U,270-309=4U,310-35 0=5U,>350=6U DX: DIABETES 11/25/24   SAMIRA Boykin   Lancets 33G MISC Testing 3 times daily, before meals 12/9/24   Ira Boredn DO   Melatonin 5 MG CAPS Take 2 capsules (10 mg total) by mouth daily 10/29/24   Usha Decker PA-C   Menthol, Topical Analgesic, (Bengay Ultra Strength) 5 % PTCH Apply 1 patch topically in the morning 11/29/24   Matt Gordon MD   Menthol, Topical Analgesic, (Biofreeze Roll-On) 4 % GEL Apply 1 Application topically 4 (four) times a day as needed (pain)    Historical Provider, MD   Multiple Vitamin (Daily-Jodie) TABS Take 1 tablet by mouth every morning Dx: Supplement 10/29/24   Usha Decker PA-C     No Known Allergies    Objective :  Temp:  [97.9 °F (36.6 °C)-99.1 °F (37.3 °C)] 97.9 °F (36.6 °C)  HR:  [65-66] 66  BP: (110-123)/(58-62) 123/62  Resp:  [16-18] 16  SpO2:  [85 %-98 %] 96 %  O2 Device: Nasal cannula  Nasal Cannula O2 Flow Rate (L/min):  [2 L/min] 2 L/min    Physical Exam  Vitals and nursing note reviewed.   Constitutional:       General: He is not in acute distress.     Appearance: Normal appearance. He is not ill-appearing.      Comments: Pleasant and conversational   HENT:      Head: Normocephalic.      Nose: Nose normal.      Mouth/Throat:      Mouth: Mucous membranes are moist.   Eyes:      Conjunctiva/sclera: Conjunctivae normal.   Cardiovascular:      Rate and Rhythm: Normal rate and regular rhythm.      Pulses: Normal pulses.   Pulmonary:      Comments: No conversational dyspnea.  No expiratory wheezing.  No rhonchi on auscultation posterior bilateral lung fields.  Faint rales in bases.  Abdominal:       General: Abdomen is flat.      Palpations: Abdomen is soft.      Tenderness: There is no abdominal tenderness.   Musculoskeletal:         General: Normal range of motion.      Cervical back: Normal range of motion.      Right lower leg: No edema.      Left lower leg: No edema.   Skin:     General: Skin is warm and dry.      Coloration: Skin is not pale.   Neurological:      General: No focal deficit present.      Mental Status: He is alert.      Comments: Oriented to self, place, month, year, president   Psychiatric:         Mood and Affect: Mood normal.         Thought Content: Thought content normal.          Lines/Drains:            Lab Results: I have reviewed the following results:  Results from last 7 days   Lab Units 01/25/25  0221   WBC Thousand/uL 9.55   HEMOGLOBIN g/dL 11.1*   HEMATOCRIT % 35.1*   PLATELETS Thousands/uL 143*   BANDS PCT % 5   LYMPHO PCT % 12*   MONO PCT % 12   EOS PCT % 1     Results from last 7 days   Lab Units 01/25/25  0221   SODIUM mmol/L 137   POTASSIUM mmol/L 5.5*   CHLORIDE mmol/L 103   CO2 mmol/L 27   BUN mg/dL 29*   CREATININE mg/dL 1.37*   ANION GAP mmol/L 7   CALCIUM mg/dL 8.6   ALBUMIN g/dL 3.9   TOTAL BILIRUBIN mg/dL 0.68   ALK PHOS U/L 110*   ALT U/L 16   AST U/L 30   GLUCOSE RANDOM mg/dL 120             Lab Results   Component Value Date    HGBA1C 6.9 (A) 12/03/2024    HGBA1C 13.5 (H) 08/28/2024    HGBA1C 10.5 (H) 05/22/2024     Results from last 7 days   Lab Units 01/25/25  0221   PROCALCITONIN ng/ml 0.13       CXR personally reviewed in PACS.  There is no focal consolidation.  Elevated left hemidiaphragm.  Left-sided pleural effusion.  Other Study Results Review: EKG was personally reviewed and my interpretation is: Paced rhythm..    Administrative Statements       ** Please Note: This note has been constructed using a voice recognition system. **

## 2025-01-25 NOTE — ASSESSMENT & PLAN NOTE
Wt Readings from Last 3 Encounters:   01/25/25 95.7 kg (211 lb 1.1 oz)   01/23/25 94.3 kg (208 lb)   12/03/24 98 kg (216 lb)   Home regimen: Torsemide 20 Mg twice daily, spironolactone 25 Mg daily, Farxiga 10 Mg daily, and metoprolol succinate 25 Mg daily  Last echo 10/2024: LVEF 40-45%.  Global hypokinesis.  Normal functioning TAVR bioprosthetic valve  I/O and daily weights  Sodium restriction-liberalize fluid restriction  Will hold home diuretics until repeat labs at 0900

## 2025-01-25 NOTE — RESPIRATORY THERAPY NOTE
RT Protocol Note  Trevor Lyons 84 y.o. male MRN: [id number]  Unit/Bed#: -01 Encounter: [id number]    Assessment    Principal Problem:    Influenza A  Active Problems:    Persistent atrial fibrillation (HCC)    Chronic combined systolic and diastolic CHF (congestive heart failure) (HCC)    Depression with anxiety    Pulmonary embolism (HCC)    Cardiac pacemaker    Type 2 diabetes mellitus without complication, without long-term current use of insulin (HCC)    COPD (chronic obstructive pulmonary disease) (HCC)    Coronary artery disease involving native coronary artery of native heart without angina pectoris    Acquired hypothyroidism    Acute respiratory insufficiency    Stage 2 chronic kidney disease      Home Pulmonary Medications:       Past Medical History:   Diagnosis Date    Alzheimer disease (HCC)     Anemia     Aneurysm of ascending aorta without rupture (HCC)     Anxiety     Atrial fibrillation (HCC)     CHF (congestive heart failure) (HCC)     Depression     Diabetes mellitus (HCC)     Disease of thyroid gland     Pulmonary hypertension (HCC)     Sciatica     Sick sinus syndrome (HCC)      Social History     Socioeconomic History    Marital status: Unknown     Spouse name: None    Number of children: None    Years of education: None    Highest education level: None   Occupational History    None   Tobacco Use    Smoking status: Former     Types: Cigarettes    Smokeless tobacco: Never   Vaping Use    Vaping status: Never Used   Substance and Sexual Activity    Alcohol use: Not Currently    Drug use: Never    Sexual activity: Not Currently   Other Topics Concern    None   Social History Narrative    None     Social Drivers of Health     Financial Resource Strain: Low Risk  (8/27/2023)    Received from [facility]    Overall Financial Resource Strain (CARDIA)     Difficulty of Paying Living Expenses: Not hard at all   Food Insecurity: No Food Insecurity (1/25/2025)     "Nursing - Inadequate Food Risk Classification     Worried About Running Out of Food in the Last Year: Never true     Ran Out of Food in the Last Year: Never true     Ran Out of Food in the Last Year: Never true   Transportation Needs: No Transportation Needs (2025)    Nursing - Transportation Risk Classification     Lack of Transportation: Not on file     Lack of Transportation: No   Physical Activity: Not on file   Stress: Not on file   Social Connections: Feeling Socially Integrated (2023)    [facility]    OASIS : Social Isolation     Frequency of experiencing loneliness or isolation: Rarely   Intimate Partner Violence: Unknown (2025)    Nursing IPS     Feels Physically and Emotionally Safe: Not on file     Physically Hurt by Someone: Not on file     Humiliated or Emotionally Abused by Someone: Not on file     Physically Hurt by Someone: No     Hurt or Threatened by Someone: No   Housing Stability: Unknown (2025)    Nursing: Inadequate Housing Risk Classification     Has Housing: Not on file     Worried About Losing Housing: Not on file     Unable to Get Utilities: Not on file     Unable to Pay for Housing in the Last Year: No     Has Housin       Subjective         Objective    Physical Exam:   Assessment Type: Assess only  General Appearance: Awake  Respiratory Pattern: Normal  Chest Assessment: Chest expansion symmetrical  Cough: Barky, Non-productive    Vitals:  Blood pressure 114/58, pulse 65, temperature 98.1 °F (36.7 °C), resp. rate 20, height 5' 8\" (1.727 m), weight 95.7 kg (211 lb 1.1 oz), SpO2 98%.          Imaging and other studies:           Plan    Respiratory Plan: Mild Distress pathway            "

## 2025-01-25 NOTE — PLAN OF CARE

## 2025-01-26 PROBLEM — J96.00 ACUTE RESPIRATORY FAILURE (HCC): Status: ACTIVE | Noted: 2024-10-19

## 2025-01-26 LAB
ANION GAP SERPL CALCULATED.3IONS-SCNC: 8 MMOL/L (ref 4–13)
ATRIAL RATE: 70 BPM
BASOPHILS # BLD AUTO: 0.02 THOUSANDS/ΜL (ref 0–0.1)
BASOPHILS NFR BLD AUTO: 0 % (ref 0–1)
BUN SERPL-MCNC: 32 MG/DL (ref 5–25)
CALCIUM SERPL-MCNC: 8.9 MG/DL (ref 8.4–10.2)
CHLORIDE SERPL-SCNC: 100 MMOL/L (ref 96–108)
CO2 SERPL-SCNC: 27 MMOL/L (ref 21–32)
CREAT SERPL-MCNC: 0.98 MG/DL (ref 0.6–1.3)
EOSINOPHIL # BLD AUTO: 0 THOUSAND/ΜL (ref 0–0.61)
EOSINOPHIL NFR BLD AUTO: 0 % (ref 0–6)
ERYTHROCYTE [DISTWIDTH] IN BLOOD BY AUTOMATED COUNT: 14.9 % (ref 11.6–15.1)
GFR SERPL CREATININE-BSD FRML MDRD: 69 ML/MIN/1.73SQ M
GLUCOSE SERPL-MCNC: 114 MG/DL (ref 65–140)
GLUCOSE SERPL-MCNC: 148 MG/DL (ref 65–140)
GLUCOSE SERPL-MCNC: 151 MG/DL (ref 65–140)
GLUCOSE SERPL-MCNC: 210 MG/DL (ref 65–140)
GLUCOSE SERPL-MCNC: 232 MG/DL (ref 65–140)
HCT VFR BLD AUTO: 36.6 % (ref 36.5–49.3)
HGB BLD-MCNC: 11.4 G/DL (ref 12–17)
IMM GRANULOCYTES # BLD AUTO: 0.06 THOUSAND/UL (ref 0–0.2)
IMM GRANULOCYTES NFR BLD AUTO: 1 % (ref 0–2)
LYMPHOCYTES # BLD AUTO: 2.95 THOUSANDS/ΜL (ref 0.6–4.47)
LYMPHOCYTES NFR BLD AUTO: 29 % (ref 14–44)
MCH RBC QN AUTO: 28.7 PG (ref 26.8–34.3)
MCHC RBC AUTO-ENTMCNC: 31.1 G/DL (ref 31.4–37.4)
MCV RBC AUTO: 92 FL (ref 82–98)
MONOCYTES # BLD AUTO: 0.96 THOUSAND/ΜL (ref 0.17–1.22)
MONOCYTES NFR BLD AUTO: 9 % (ref 4–12)
MRSA NOSE QL CULT: NORMAL
NEUTROPHILS # BLD AUTO: 6.34 THOUSANDS/ΜL (ref 1.85–7.62)
NEUTS SEG NFR BLD AUTO: 61 % (ref 43–75)
NRBC BLD AUTO-RTO: 0 /100 WBCS
PLATELET # BLD AUTO: 141 THOUSANDS/UL (ref 149–390)
PMV BLD AUTO: 9.1 FL (ref 8.9–12.7)
POTASSIUM SERPL-SCNC: 4.9 MMOL/L (ref 3.5–5.3)
QRS AXIS: 267 DEGREES
QRSD INTERVAL: 190 MS
QT INTERVAL: 488 MS
QTC INTERVAL: 507 MS
RBC # BLD AUTO: 3.97 MILLION/UL (ref 3.88–5.62)
SODIUM SERPL-SCNC: 135 MMOL/L (ref 135–147)
T WAVE AXIS: 87 DEGREES
VENTRICULAR RATE: 65 BPM
WBC # BLD AUTO: 10.33 THOUSAND/UL (ref 4.31–10.16)

## 2025-01-26 PROCEDURE — 99232 SBSQ HOSP IP/OBS MODERATE 35: CPT | Performed by: FAMILY MEDICINE

## 2025-01-26 PROCEDURE — 80048 BASIC METABOLIC PNL TOTAL CA: CPT | Performed by: FAMILY MEDICINE

## 2025-01-26 PROCEDURE — 82948 REAGENT STRIP/BLOOD GLUCOSE: CPT

## 2025-01-26 PROCEDURE — 85025 COMPLETE CBC W/AUTO DIFF WBC: CPT | Performed by: FAMILY MEDICINE

## 2025-01-26 PROCEDURE — 94760 N-INVAS EAR/PLS OXIMETRY 1: CPT

## 2025-01-26 PROCEDURE — 94640 AIRWAY INHALATION TREATMENT: CPT

## 2025-01-26 PROCEDURE — 93010 ELECTROCARDIOGRAM REPORT: CPT | Performed by: INTERNAL MEDICINE

## 2025-01-26 RX ORDER — METHYLPREDNISOLONE SODIUM SUCCINATE 40 MG/ML
40 INJECTION, POWDER, LYOPHILIZED, FOR SOLUTION INTRAMUSCULAR; INTRAVENOUS EVERY 12 HOURS SCHEDULED
Status: DISCONTINUED | OUTPATIENT
Start: 2025-01-26 | End: 2025-01-27

## 2025-01-26 RX ORDER — BUDESONIDE 0.5 MG/2ML
0.5 INHALANT ORAL
Status: DISCONTINUED | OUTPATIENT
Start: 2025-01-26 | End: 2025-01-30 | Stop reason: HOSPADM

## 2025-01-26 RX ORDER — GUAIFENESIN 600 MG/1
1200 TABLET, EXTENDED RELEASE ORAL EVERY 12 HOURS SCHEDULED
Status: DISCONTINUED | OUTPATIENT
Start: 2025-01-26 | End: 2025-01-30 | Stop reason: HOSPADM

## 2025-01-26 RX ORDER — BENZONATATE 100 MG/1
100 CAPSULE ORAL 3 TIMES DAILY
Status: DISCONTINUED | OUTPATIENT
Start: 2025-01-26 | End: 2025-01-30 | Stop reason: HOSPADM

## 2025-01-26 RX ORDER — LEVALBUTEROL INHALATION SOLUTION 1.25 MG/3ML
1.25 SOLUTION RESPIRATORY (INHALATION)
Status: DISCONTINUED | OUTPATIENT
Start: 2025-01-26 | End: 2025-01-27

## 2025-01-26 RX ADMIN — BENZONATATE 100 MG: 100 CAPSULE ORAL at 12:02

## 2025-01-26 RX ADMIN — OSELTAMIVIR 30 MG: 30 CAPSULE ORAL at 20:50

## 2025-01-26 RX ADMIN — MELATONIN 9 MG: 3 TAB ORAL at 20:51

## 2025-01-26 RX ADMIN — LEVOTHYROXINE SODIUM 137 MCG: 25 TABLET ORAL at 05:41

## 2025-01-26 RX ADMIN — ACETAMINOPHEN 650 MG: 325 TABLET, FILM COATED ORAL at 19:50

## 2025-01-26 RX ADMIN — LORATADINE 10 MG: 10 TABLET ORAL at 08:03

## 2025-01-26 RX ADMIN — INSULIN LISPRO 2 UNITS: 100 INJECTION, SOLUTION INTRAVENOUS; SUBCUTANEOUS at 20:58

## 2025-01-26 RX ADMIN — PANTOPRAZOLE SODIUM 40 MG: 40 TABLET, DELAYED RELEASE ORAL at 05:41

## 2025-01-26 RX ADMIN — LEVALBUTEROL HYDROCHLORIDE 1.25 MG: 1.25 SOLUTION RESPIRATORY (INHALATION) at 13:55

## 2025-01-26 RX ADMIN — TORSEMIDE 20 MG: 20 TABLET ORAL at 17:00

## 2025-01-26 RX ADMIN — FLUTICASONE PROPIONATE 2 SPRAY: 50 SPRAY, METERED NASAL at 08:07

## 2025-01-26 RX ADMIN — UMECLIDINIUM 1 PUFF: 62.5 AEROSOL, POWDER ORAL at 08:07

## 2025-01-26 RX ADMIN — FOLIC ACID 1000 MCG: 1 TABLET ORAL at 08:03

## 2025-01-26 RX ADMIN — BENZONATATE 100 MG: 100 CAPSULE ORAL at 20:46

## 2025-01-26 RX ADMIN — FERROUS SULFATE TAB 325 MG (65 MG ELEMENTAL FE) 325 MG: 325 (65 FE) TAB at 08:03

## 2025-01-26 RX ADMIN — INSULIN GLARGINE 8 UNITS: 100 INJECTION, SOLUTION SUBCUTANEOUS at 08:02

## 2025-01-26 RX ADMIN — EMPAGLIFLOZIN 10 MG: 10 TABLET, FILM COATED ORAL at 08:03

## 2025-01-26 RX ADMIN — MAGNESIUM OXIDE TAB 400 MG (241.3 MG ELEMENTAL MG) 400 MG: 400 (241.3 MG) TAB at 08:02

## 2025-01-26 RX ADMIN — MAGNESIUM OXIDE TAB 400 MG (241.3 MG ELEMENTAL MG) 400 MG: 400 (241.3 MG) TAB at 17:00

## 2025-01-26 RX ADMIN — METOPROLOL SUCCINATE 25 MG: 25 TABLET, FILM COATED, EXTENDED RELEASE ORAL at 08:02

## 2025-01-26 RX ADMIN — DONEPEZIL HYDROCHLORIDE 10 MG: 5 TABLET ORAL at 08:03

## 2025-01-26 RX ADMIN — INSULIN LISPRO 1 UNITS: 100 INJECTION, SOLUTION INTRAVENOUS; SUBCUTANEOUS at 12:02

## 2025-01-26 RX ADMIN — BUSPIRONE HYDROCHLORIDE 7.5 MG: 15 TABLET ORAL at 17:00

## 2025-01-26 RX ADMIN — OSELTAMIVIR 30 MG: 30 CAPSULE ORAL at 08:03

## 2025-01-26 RX ADMIN — BUSPIRONE HYDROCHLORIDE 7.5 MG: 15 TABLET ORAL at 08:02

## 2025-01-26 RX ADMIN — DULOXETINE HYDROCHLORIDE 60 MG: 30 CAPSULE, DELAYED RELEASE ORAL at 08:03

## 2025-01-26 RX ADMIN — RIVAROXABAN 15 MG: 15 TABLET, FILM COATED ORAL at 17:00

## 2025-01-26 RX ADMIN — ATORVASTATIN CALCIUM 20 MG: 20 TABLET, FILM COATED ORAL at 17:00

## 2025-01-26 RX ADMIN — METHYLPREDNISOLONE SODIUM SUCCINATE 40 MG: 40 INJECTION, POWDER, FOR SOLUTION INTRAMUSCULAR; INTRAVENOUS at 08:02

## 2025-01-26 RX ADMIN — FLUTICASONE FUROATE AND VILANTEROL TRIFENATATE 1 PUFF: 100; 25 POWDER RESPIRATORY (INHALATION) at 08:07

## 2025-01-26 RX ADMIN — GUAIFENESIN 1200 MG: 600 TABLET, EXTENDED RELEASE ORAL at 20:46

## 2025-01-26 RX ADMIN — BUDESONIDE 0.5 MG: 0.5 INHALANT ORAL at 19:31

## 2025-01-26 RX ADMIN — INSULIN LISPRO 2 UNITS: 100 INJECTION, SOLUTION INTRAVENOUS; SUBCUTANEOUS at 17:04

## 2025-01-26 RX ADMIN — GUAIFENESIN 1200 MG: 600 TABLET, EXTENDED RELEASE ORAL at 05:41

## 2025-01-26 RX ADMIN — BENZONATATE 100 MG: 100 CAPSULE ORAL at 17:04

## 2025-01-26 RX ADMIN — METHYLPREDNISOLONE SODIUM SUCCINATE 40 MG: 40 INJECTION, POWDER, FOR SOLUTION INTRAMUSCULAR; INTRAVENOUS at 20:46

## 2025-01-26 RX ADMIN — THIAMINE HCL TAB 100 MG 100 MG: 100 TAB at 08:03

## 2025-01-26 NOTE — ASSESSMENT & PLAN NOTE
Acute respiratory failure with hypoxia, POA due to COPD exacerbation, and influenza A as evident by hypoxia (85%RA), cough, dyspnea treated with Tamiflu, oxygen administration and nebulizer treatments.   SpO2 85% on room air  Improved on 2 L supplemental oxygen via nasal cannula  Suspect secondary to influenza A  Wean oxygen as able

## 2025-01-26 NOTE — ASSESSMENT & PLAN NOTE
Lab Results   Component Value Date    HGBA1C 6.9 (A) 12/03/2024       Recent Labs     01/25/25  1613 01/25/25  2053 01/26/25  0711 01/26/25  1054   POCGLU 142* 181* 114 151*       Blood Sugar Average: Last 72 hrs:  (P) [id number] regimen: Farxiga 10 Mg daily, Lantus 8 units with breakfast, SSI AC/at bedtime, glimepiride 1 Mg daily, and metformin 1000 mg twice daily  Hold home glimepiride and metformin-continue home Farxiga and insulin at this time

## 2025-01-26 NOTE — ASSESSMENT & PLAN NOTE
Wt Readings from Last 3 Encounters:   01/26/25 95.7 kg (211 lb 1.1 oz)   01/23/25 94.3 kg (208 lb)   12/03/24 98 kg (216 lb)   Home regimen: Torsemide 20 Mg twice daily, spironolactone 25 Mg daily, Farxiga 10 Mg daily, and metoprolol succinate 25 Mg daily  Last echo 10/2024: LVEF 40-45%.  Global hypokinesis.  Normal functioning TAVR bioprosthetic valve  I/O and daily weights  Sodium restriction-liberalize fluid restriction  Will hold home diuretics until repeat labs at 0900

## 2025-01-26 NOTE — ASSESSMENT & PLAN NOTE
Home regimen: Trelegy and albuterol nebulizer as needed  Continue formulary equivalent for Trelegy and placed on Xopenex/Atrovent nebulizer 3 times daily in setting of Tamiflu requiring supplemental oxygen  1/26: Having worsening bronchospasm with cough and wheezing now with acute exacerbation will hold home Trelegy and start Xopenex 3 times daily and Pulmicort twice daily scheduled along with Solu-Medrol 40 mg twice daily IV.

## 2025-01-26 NOTE — PLAN OF CARE

## 2025-01-26 NOTE — PROGRESS NOTES
Progress Note - Hospitalist   Name: Trevor Lyons 84 y.o. male I MRN: [id number]  Unit/Bed#: -01 I Date of Admission: 1/25/2025   Date of Service: 1/26/2025 I Hospital Day: 1    Assessment & Plan  Influenza A  Endorses cough and rhinorrhea x 2 days  Found to be influenza A positive in the ED  Also with underlying COPD-therefore will place on Xopenex and Atrovent nebulizers 3 times daily  Continue Tamiflu for 10 days total as he is hospitalized and requiring supplemental oxygen  Acute respiratory failure (HCC)  Acute respiratory failure with hypoxia, POA due to COPD exacerbation, and influenza A as evident by hypoxia (85%RA), cough, dyspnea treated with Tamiflu, oxygen administration and nebulizer treatments.   SpO2 85% on room air  Improved on 2 L supplemental oxygen via nasal cannula  Suspect secondary to influenza A  Wean oxygen as able  Stage 2 chronic kidney disease  Lab Results   Component Value Date    EGFR 69 01/26/2025    EGFR 52 01/25/2025    EGFR 46 01/25/2025    CREATININE 0.98 01/26/2025    CREATININE 1.23 01/25/2025    CREATININE 1.37 (H) 01/25/2025   Baseline creatinine 0.92-1.1  Creatinine on admission 1.37-does not quite meet NELLIE criteria  Given 250 cc bolus in the ED-hold further IVF in setting of known systolic heart failure  Recheck BMP around 0900 -hold diuretics and losartan for now  COPD (chronic obstructive pulmonary disease) ([facility])  Home regimen: Trelegy and albuterol nebulizer as needed  Continue formulary equivalent for Trelegy and placed on Xopenex/Atrovent nebulizer 3 times daily in setting of Tamiflu requiring supplemental oxygen  1/26: Having worsening bronchospasm with cough and wheezing now with acute exacerbation will hold home Trelegy and start Xopenex 3 times daily and Pulmicort twice daily scheduled along with Solu-Medrol 40 mg twice daily IV.  Chronic combined systolic and diastolic CHF (congestive heart failure) ([facility])  Wt Readings from Last 3 Encounters:   01/26/25  95.7 kg (211 lb 1.1 oz)   01/23/25 94.3 kg (208 lb)   12/03/24 98 kg (216 lb)   Home regimen: Torsemide 20 Mg twice daily, spironolactone 25 Mg daily, Farxiga 10 Mg daily, and metoprolol succinate 25 Mg daily  Last echo 10/2024: LVEF 40-45%.  Global hypokinesis.  Normal functioning TAVR bioprosthetic valve  I/O and daily weights  Sodium restriction-liberalize fluid restriction  Will hold home diuretics until repeat labs at 0900  Type 2 diabetes mellitus without complication, without long-term current use of insulin (HCC)  Lab Results   Component Value Date    HGBA1C 6.9 (A) 12/03/2024       Recent Labs     01/25/25  1613 01/25/25  2053 01/26/25  0711 01/26/25  1054   POCGLU 142* 181* 114 151*       Blood Sugar Average: Last 72 hrs:  (P) [id number] regimen: Farxiga 10 Mg daily, Lantus 8 units with breakfast, SSI AC/at bedtime, glimepiride 1 Mg daily, and metformin 1000 mg twice daily  Hold home glimepiride and metformin-continue home Farxiga and insulin at this time    Coronary artery disease involving native coronary artery of native heart without angina pectoris  CAD status post PCI and CABG  Continue home beta-blocker, statin, and Xarelto  Persistent atrial fibrillation (HCC)  RC: Metoprolol succinate 25 Mg daily  AC: Xarelto  Continue  Cardiac pacemaker  S/p SSS  Last device interrogation 7/2024 showed normal function  Pulmonary embolism (HCC)  Continue home Xarelto  Acquired hypothyroidism  Maintained on Synthroid 137 MCG daily  Continue  Depression with anxiety  Continue home BuSpar, Cymbalta, and Klonopin as needed    VTE Pharmacologic Prophylaxis: VTE Score: 7 High Risk (Score >/= 5) - Pharmacological DVT Prophylaxis Ordered: rivaroxaban (Xarelto). Sequential Compression Devices Ordered.    Mobility:   Basic Mobility Inpatient Raw Score: 23  JH-HLM Goal: 7: Walk 25 feet or more  JH-HLM Achieved: 7: Walk 25 feet or more  JH-HLM Goal achieved. Continue to encourage appropriate mobility.    Patient  Centered Rounds: I performed bedside rounds with nursing staff today.   Discussions with Specialists or Other Care Team Provider: None    Education and Discussions with Family / Patient: Patient declined call to .     Current Length of Stay: 1 day(s)  Current Patient Status: Inpatient   Certification Statement: The patient will continue to require additional inpatient hospital stay due to nebulizer treatments, Tamiflu, oxygen supplementation and IV steroids  Discharge Plan: Anticipate discharge in 48-72 hrs to discharge location to be determined pending rehab evaluations.    Code Status: Level 1 - Full Code    Subjective   Patient seen and examined.  He reports that his cough is worse today and very frequent.    Objective :  Temp:  [97.7 °F (36.5 °C)-98.1 °F (36.7 °C)] 97.7 °F (36.5 °C)  HR:  [65] 65  BP: (113-125)/(56-72) 113/56  Resp:  [20] 20  SpO2:  [91 %-98 %] 96 %  O2 Device: Nasal cannula  Nasal Cannula O2 Flow Rate (L/min):  [2 L/min] 2 L/min    Body mass index is 32.09 kg/m².     Input and Output Summary (last 24 hours):     Intake/Output Summary (Last 24 hours) at 1/26/2025 1158  Last data filed at 1/26/2025 0815  Gross per 24 hour   Intake 864 ml   Output 1700 ml   Net -836 ml       Physical Exam  Vitals and nursing note reviewed.   Constitutional:       General: He is not in acute distress.     Appearance: He is well-developed. He is obese.   HENT:      Head: Normocephalic and atraumatic.   Eyes:      Conjunctiva/sclera: Conjunctivae normal.   Cardiovascular:      Rate and Rhythm: Normal rate and regular rhythm.      Heart sounds: No murmur heard.  Pulmonary:      Effort: Respiratory distress present.      Breath sounds: Wheezing present. No rhonchi or rales.      Comments: Mild respiratory distress with frequent cough with wheezing consistent with bronchospasm  Abdominal:      General: There is no distension.      Palpations: Abdomen is soft.      Tenderness: There is no abdominal  tenderness. There is no guarding or rebound.   Musculoskeletal:      Cervical back: Neck supple.      Right lower leg: No edema.      Left lower leg: No edema.   Skin:     General: Skin is warm and dry.      Capillary Refill: Capillary refill takes less than 2 seconds.   Neurological:      General: No focal deficit present.      Mental Status: He is alert and oriented to person, place, and time.   Psychiatric:         Mood and Affect: Mood normal.         Behavior: Behavior normal.           Lines/Drains:              Lab Results: I have reviewed the following results:   Results from last 7 days   Lab Units 01/26/25  1032 01/25/25  0952 01/25/25 0221   WBC Thousand/uL 10.33*   < > 9.55   HEMOGLOBIN g/dL 11.4*   < > 11.1*   HEMATOCRIT % 36.6   < > 35.1*   PLATELETS Thousands/uL 141*   < > 143*   BANDS PCT %  --   --  5   SEGS PCT % 61  --   --    LYMPHO PCT % 29  --  12*   MONO PCT % 9  --  12   EOS PCT % 0  --  1    < > = values in this interval not displayed.     Results from last 7 days   Lab Units 01/26/25  1032 01/25/25 0952 01/25/25 0221   SODIUM mmol/L 135   < > 137   POTASSIUM mmol/L 4.9   < > 5.5*   CHLORIDE mmol/L 100   < > 103   CO2 mmol/L 27   < > 27   BUN mg/dL 32*   < > 29*   CREATININE mg/dL 0.98   < > 1.37*   ANION GAP mmol/L 8   < > 7   CALCIUM mg/dL 8.9   < > 8.6   ALBUMIN g/dL  --   --  3.9   TOTAL BILIRUBIN mg/dL  --   --  0.68   ALK PHOS U/L  --   --  110*   ALT U/L  --   --  16   AST U/L  --   --  30   GLUCOSE RANDOM mg/dL 148*   < > 120    < > = values in this interval not displayed.         Results from last 7 days   Lab Units 01/26/25  1054 01/26/25  0711 01/25/25  2053 01/25/25  1613 01/25/25  1059 01/25/25  0710   POC GLUCOSE mg/dl 151* 114 181* 142* 129 118         Results from last 7 days   Lab Units 01/25/25  0221   PROCALCITONIN ng/ml 0.13       Recent Cultures (last 7 days):         Imaging Results Review: No pertinent imaging studies reviewed.  Other Study Results Review: No  additional pertinent studies reviewed.    Last 24 Hours Medication List:     Current Facility-Administered Medications:     acetaminophen (TYLENOL) tablet 650 mg, Q6H PRN    aluminum-magnesium hydroxide-simethicone (MAALOX) oral suspension 30 mL, Q6H PRN    atorvastatin (LIPITOR) tablet 20 mg, Daily With Dinner    benzonatate (TESSALON PERLES) capsule 100 mg, TID    budesonide (PULMICORT) inhalation solution 0.5 mg, Q12H    busPIRone (BUSPAR) tablet 7.5 mg, BID    clonazePAM (KlonoPIN) tablet 1 mg, HS PRN    donepezil (ARICEPT) tablet 10 mg, QAM    DULoxetine (CYMBALTA) delayed release capsule 60 mg, Daily    Empagliflozin (JARDIANCE) tablet 10 mg, Daily    ferrous sulfate tablet 325 mg, Daily With Breakfast    fluticasone (FLONASE) 50 mcg/act nasal spray 2 spray, Daily    [Held by provider] Fluticasone Furoate-Vilanterol 100-25 mcg/actuation 1 puff, Daily **AND** [Held by provider] umeclidinium 62.5 mcg/actuation inhaler AEPB 1 puff, Daily    folic acid (FOLVITE) tablet 1,000 mcg, QAM    guaiFENesin (MUCINEX) 12 hr tablet 1,200 mg, Q12H MINNIE    insulin glargine (LANTUS) subcutaneous injection 8 Units 0.08 mL, Daily With Breakfast    insulin lispro (HumALOG/ADMELOG) 100 units/mL subcutaneous injection 1-5 Units, HS    insulin lispro (HumALOG/ADMELOG) 100 units/mL subcutaneous injection 1-6 Units, TID AC **AND** Fingerstick Glucose (POCT), TID AC    levalbuterol (XOPENEX) inhalation solution 1.25 mg, TID    levothyroxine tablet 137 mcg, Early Morning    loratadine (CLARITIN) tablet 10 mg, QAM    [Held by provider] losartan (COZAAR) tablet 25 mg, Daily    magnesium Oxide (MAG-OX) tablet 400 mg, BID    melatonin tablet 9 mg, HS    methylPREDNISolone sodium succinate (Solu-MEDROL) injection 40 mg, Q12H MINNIE    metoprolol succinate (TOPROL-XL) 24 hr tablet 25 mg, QAM    oseltamivir (TAMIFLU) capsule 30 mg, Q12H MINNIE    pantoprazole (PROTONIX) EC tablet 40 mg, Daily Before Breakfast    rivaroxaban (XARELTO) tablet 15 mg,  Daily With Dinner    senna (SENOKOT) tablet 8.6 mg, HS PRN    [Held by provider] spironolactone (ALDACTONE) tablet 25 mg, Daily    thiamine tablet 100 mg, Daily    [Held by provider] torsemide (DEMADEX) tablet 20 mg, BID (diuretic)    trimethobenzamide (TIGAN) IM injection 200 mg, Q12H PRN    Administrative Statements   Today, Patient Was Seen By: Dana Núñez, DO  I have spent a total time of 40 minutes in caring for this patient on the day of the visit/encounter including Risks and benefits of tx options, Instructions for management, Patient and family education, Risk factor reductions, Impressions, Counseling / Coordination of care, Documenting in the medical record, Reviewing / ordering tests, medicine, procedures  , Obtaining or reviewing history  , and Communicating with other healthcare professionals .    **Please Note: This note may have been constructed using a voice recognition system.**

## 2025-01-26 NOTE — ASSESSMENT & PLAN NOTE
Lab Results   Component Value Date    EGFR 69 01/26/2025    EGFR 52 01/25/2025    EGFR 46 01/25/2025    CREATININE 0.98 01/26/2025    CREATININE 1.23 01/25/2025    CREATININE 1.37 (H) 01/25/2025   Baseline creatinine 0.92-1.1  Creatinine on admission 1.37-does not quite meet NELLIE criteria  Given 250 cc bolus in the ED-hold further IVF in setting of known systolic heart failure  Recheck BMP around 0900 -hold diuretics and losartan for now

## 2025-01-26 NOTE — PLAN OF CARE

## 2025-01-27 LAB
ANION GAP SERPL CALCULATED.3IONS-SCNC: 11 MMOL/L (ref 4–13)
BASOPHILS # BLD AUTO: 0.01 THOUSANDS/ΜL (ref 0–0.1)
BASOPHILS NFR BLD AUTO: 0 % (ref 0–1)
BUN SERPL-MCNC: 43 MG/DL (ref 5–25)
CALCIUM SERPL-MCNC: 8.3 MG/DL (ref 8.4–10.2)
CHLORIDE SERPL-SCNC: 97 MMOL/L (ref 96–108)
CO2 SERPL-SCNC: 26 MMOL/L (ref 21–32)
CREAT SERPL-MCNC: 1.13 MG/DL (ref 0.6–1.3)
EOSINOPHIL # BLD AUTO: 0 THOUSAND/ΜL (ref 0–0.61)
EOSINOPHIL NFR BLD AUTO: 0 % (ref 0–6)
ERYTHROCYTE [DISTWIDTH] IN BLOOD BY AUTOMATED COUNT: 14.7 % (ref 11.6–15.1)
GFR SERPL CREATININE-BSD FRML MDRD: 58 ML/MIN/1.73SQ M
GLUCOSE SERPL-MCNC: 171 MG/DL (ref 65–140)
GLUCOSE SERPL-MCNC: 175 MG/DL (ref 65–140)
GLUCOSE SERPL-MCNC: 216 MG/DL (ref 65–140)
GLUCOSE SERPL-MCNC: 239 MG/DL (ref 65–140)
GLUCOSE SERPL-MCNC: 268 MG/DL (ref 65–140)
HCT VFR BLD AUTO: 34.4 % (ref 36.5–49.3)
HGB BLD-MCNC: 11 G/DL (ref 12–17)
IMM GRANULOCYTES # BLD AUTO: 0.08 THOUSAND/UL (ref 0–0.2)
IMM GRANULOCYTES NFR BLD AUTO: 1 % (ref 0–2)
LYMPHOCYTES # BLD AUTO: 4.14 THOUSANDS/ΜL (ref 0.6–4.47)
LYMPHOCYTES NFR BLD AUTO: 41 % (ref 14–44)
MCH RBC QN AUTO: 29.3 PG (ref 26.8–34.3)
MCHC RBC AUTO-ENTMCNC: 32 G/DL (ref 31.4–37.4)
MCV RBC AUTO: 92 FL (ref 82–98)
MONOCYTES # BLD AUTO: 0.46 THOUSAND/ΜL (ref 0.17–1.22)
MONOCYTES NFR BLD AUTO: 5 % (ref 4–12)
NEUTROPHILS # BLD AUTO: 5.52 THOUSANDS/ΜL (ref 1.85–7.62)
NEUTS SEG NFR BLD AUTO: 53 % (ref 43–75)
NRBC BLD AUTO-RTO: 0 /100 WBCS
PLATELET # BLD AUTO: 155 THOUSANDS/UL (ref 149–390)
PMV BLD AUTO: 9.1 FL (ref 8.9–12.7)
POTASSIUM SERPL-SCNC: 3.7 MMOL/L (ref 3.5–5.3)
RBC # BLD AUTO: 3.76 MILLION/UL (ref 3.88–5.62)
SODIUM SERPL-SCNC: 134 MMOL/L (ref 135–147)
WBC # BLD AUTO: 10.21 THOUSAND/UL (ref 4.31–10.16)

## 2025-01-27 PROCEDURE — 94761 N-INVAS EAR/PLS OXIMETRY MLT: CPT

## 2025-01-27 PROCEDURE — 99232 SBSQ HOSP IP/OBS MODERATE 35: CPT | Performed by: FAMILY MEDICINE

## 2025-01-27 PROCEDURE — 94760 N-INVAS EAR/PLS OXIMETRY 1: CPT

## 2025-01-27 PROCEDURE — 80048 BASIC METABOLIC PNL TOTAL CA: CPT | Performed by: FAMILY MEDICINE

## 2025-01-27 PROCEDURE — 94640 AIRWAY INHALATION TREATMENT: CPT

## 2025-01-27 PROCEDURE — 82948 REAGENT STRIP/BLOOD GLUCOSE: CPT

## 2025-01-27 PROCEDURE — 85025 COMPLETE CBC W/AUTO DIFF WBC: CPT | Performed by: FAMILY MEDICINE

## 2025-01-27 RX ORDER — PREDNISONE 20 MG/1
40 TABLET ORAL DAILY
Status: DISCONTINUED | OUTPATIENT
Start: 2025-01-28 | End: 2025-01-30 | Stop reason: HOSPADM

## 2025-01-27 RX ORDER — CLONAZEPAM 1 MG/1
1 TABLET ORAL 2 TIMES DAILY PRN
Status: DISCONTINUED | OUTPATIENT
Start: 2025-01-27 | End: 2025-01-30 | Stop reason: HOSPADM

## 2025-01-27 RX ORDER — LEVALBUTEROL INHALATION SOLUTION 1.25 MG/3ML
1.25 SOLUTION RESPIRATORY (INHALATION)
Status: DISCONTINUED | OUTPATIENT
Start: 2025-01-28 | End: 2025-01-29

## 2025-01-27 RX ADMIN — LEVOTHYROXINE SODIUM 137 MCG: 25 TABLET ORAL at 05:18

## 2025-01-27 RX ADMIN — BENZONATATE 100 MG: 100 CAPSULE ORAL at 08:24

## 2025-01-27 RX ADMIN — BUDESONIDE 0.5 MG: 0.5 INHALANT ORAL at 19:24

## 2025-01-27 RX ADMIN — LEVALBUTEROL HYDROCHLORIDE 1.25 MG: 1.25 SOLUTION RESPIRATORY (INHALATION) at 14:18

## 2025-01-27 RX ADMIN — RIVAROXABAN 15 MG: 15 TABLET, FILM COATED ORAL at 18:20

## 2025-01-27 RX ADMIN — TORSEMIDE 20 MG: 20 TABLET ORAL at 08:25

## 2025-01-27 RX ADMIN — LORATADINE 10 MG: 10 TABLET ORAL at 08:28

## 2025-01-27 RX ADMIN — DULOXETINE HYDROCHLORIDE 60 MG: 30 CAPSULE, DELAYED RELEASE ORAL at 08:24

## 2025-01-27 RX ADMIN — LEVALBUTEROL HYDROCHLORIDE 1.25 MG: 1.25 SOLUTION RESPIRATORY (INHALATION) at 02:09

## 2025-01-27 RX ADMIN — BUSPIRONE HYDROCHLORIDE 7.5 MG: 15 TABLET ORAL at 18:20

## 2025-01-27 RX ADMIN — ATORVASTATIN CALCIUM 20 MG: 20 TABLET, FILM COATED ORAL at 18:20

## 2025-01-27 RX ADMIN — METHYLPREDNISOLONE SODIUM SUCCINATE 40 MG: 40 INJECTION, POWDER, FOR SOLUTION INTRAMUSCULAR; INTRAVENOUS at 08:28

## 2025-01-27 RX ADMIN — INSULIN LISPRO 3 UNITS: 100 INJECTION, SOLUTION INTRAVENOUS; SUBCUTANEOUS at 18:28

## 2025-01-27 RX ADMIN — BENZONATATE 100 MG: 100 CAPSULE ORAL at 18:20

## 2025-01-27 RX ADMIN — BUDESONIDE 0.5 MG: 0.5 INHALANT ORAL at 08:29

## 2025-01-27 RX ADMIN — INSULIN LISPRO 2 UNITS: 100 INJECTION, SOLUTION INTRAVENOUS; SUBCUTANEOUS at 08:31

## 2025-01-27 RX ADMIN — INSULIN LISPRO 1 UNITS: 100 INJECTION, SOLUTION INTRAVENOUS; SUBCUTANEOUS at 21:38

## 2025-01-27 RX ADMIN — GUAIFENESIN 1200 MG: 600 TABLET, EXTENDED RELEASE ORAL at 08:27

## 2025-01-27 RX ADMIN — GUAIFENESIN 1200 MG: 600 TABLET, EXTENDED RELEASE ORAL at 21:31

## 2025-01-27 RX ADMIN — FLUTICASONE PROPIONATE 2 SPRAY: 50 SPRAY, METERED NASAL at 08:31

## 2025-01-27 RX ADMIN — INSULIN LISPRO 1 UNITS: 100 INJECTION, SOLUTION INTRAVENOUS; SUBCUTANEOUS at 11:52

## 2025-01-27 RX ADMIN — MAGNESIUM OXIDE TAB 400 MG (241.3 MG ELEMENTAL MG) 400 MG: 400 (241.3 MG) TAB at 18:20

## 2025-01-27 RX ADMIN — INSULIN GLARGINE 8 UNITS: 100 INJECTION, SOLUTION SUBCUTANEOUS at 08:38

## 2025-01-27 RX ADMIN — THIAMINE HCL TAB 100 MG 100 MG: 100 TAB at 08:25

## 2025-01-27 RX ADMIN — FOLIC ACID 1000 MCG: 1 TABLET ORAL at 08:26

## 2025-01-27 RX ADMIN — FERROUS SULFATE TAB 325 MG (65 MG ELEMENTAL FE) 325 MG: 325 (65 FE) TAB at 08:24

## 2025-01-27 RX ADMIN — TORSEMIDE 20 MG: 20 TABLET ORAL at 18:19

## 2025-01-27 RX ADMIN — METOPROLOL SUCCINATE 25 MG: 25 TABLET, FILM COATED, EXTENDED RELEASE ORAL at 08:27

## 2025-01-27 RX ADMIN — OSELTAMIVIR 30 MG: 30 CAPSULE ORAL at 21:33

## 2025-01-27 RX ADMIN — CLONAZEPAM 1 MG: 1 TABLET ORAL at 15:17

## 2025-01-27 RX ADMIN — MAGNESIUM OXIDE TAB 400 MG (241.3 MG ELEMENTAL MG) 400 MG: 400 (241.3 MG) TAB at 08:26

## 2025-01-27 RX ADMIN — MELATONIN 9 MG: 3 TAB ORAL at 21:31

## 2025-01-27 RX ADMIN — BUSPIRONE HYDROCHLORIDE 7.5 MG: 15 TABLET ORAL at 08:27

## 2025-01-27 RX ADMIN — CLONAZEPAM 1 MG: 1 TABLET ORAL at 05:18

## 2025-01-27 RX ADMIN — LEVALBUTEROL HYDROCHLORIDE 1.25 MG: 1.25 SOLUTION RESPIRATORY (INHALATION) at 08:29

## 2025-01-27 RX ADMIN — BENZONATATE 100 MG: 100 CAPSULE ORAL at 21:30

## 2025-01-27 RX ADMIN — PANTOPRAZOLE SODIUM 40 MG: 40 TABLET, DELAYED RELEASE ORAL at 08:27

## 2025-01-27 RX ADMIN — DONEPEZIL HYDROCHLORIDE 10 MG: 5 TABLET ORAL at 08:24

## 2025-01-27 RX ADMIN — EMPAGLIFLOZIN 10 MG: 10 TABLET, FILM COATED ORAL at 08:28

## 2025-01-27 RX ADMIN — OSELTAMIVIR 30 MG: 30 CAPSULE ORAL at 08:38

## 2025-01-27 NOTE — CASE MANAGEMENT
Case Management Assessment & Discharge Planning Note    Patient name Trevor Lyons  Location /-01 MRN [id number]  : 1940 Date 2025       Current Admission Date: 2025  Current Admission Diagnosis:Influenza A   Patient Active Problem List    Diagnosis Date Noted Date Diagnosed    Influenza A 2025     Stage 2 chronic kidney disease 2025     Encounter for examination following treatment at hospital 2024     History of transcatheter aortic valve replacement (TAVR) 10/22/2024     Acute respiratory failure (HCC) 10/19/2024     Anemia 10/19/2024     Chronic systolic heart failure (HCC) 2024     Moderate Alzheimer's dementia, unspecified timing of dementia onset, unspecified whether behavioral, psychotic, or mood disturbance or anxiety ([facility]) 2024     Depressed 2024     Coronary artery disease involving native coronary artery of native heart without angina pectoris 2024     S/P CABG (coronary artery bypass graft) 2024     S/P TAVR (transcatheter aortic valve replacement) 2024     DDD (degenerative disc disease), lumbosacral 2024     COPD (chronic obstructive pulmonary disease) ([facility]) 2024     Recurrent falls 2024     Persistent atrial fibrillation ([facility]) 2024     Chronic combined systolic and diastolic CHF (congestive heart failure) ([facility]) 2024     Depression with anxiety 2024     NSVT (nonsustained ventricular tachycardia) ([facility]) 2024     Orthostatic hypotension 2024     Pulmonary embolism ([facility]) 2024     Cardiac pacemaker 2024     Alzheimer disease ([facility]) 2024     Pulmonary hypertension ([facility]) 2024     Sick sinus syndrome ([facility]) 2024     Dextroscoliosis 2024     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 2024     Mixed hyperlipidemia 2024     Type 2 diabetes mellitus without complication, without long-term current use of insulin ([facility]) 2024     Osteopenia of  multiple sites 03/12/2024     Chronic left-sided low back pain with left-sided sciatica 03/12/2024     Aneurysm of ascending aorta without rupture (HCC) 03/12/2024     Acquired hypothyroidism 11/10/2022     Bilateral carotid bruits 11/11/2019       LOS (days): 2  Geometric Mean LOS (GMLOS) (days): 3.5  Days to GMLOS:1.1     OBJECTIVE:    Risk of Unplanned Readmission Score: 36.3         Current admission status: Inpatient       Preferred Pharmacy:   [id number] - [place], [place] - [address]  [address] [address]  Phone: [phone] Fax: [phone]    [facility] - [place], [place] - [address]  [address] [id number]  Phone: [phone] Fax: [phone]    Primary Care Provider: Ira Borden DO    Primary Insurance: MEDICARE MISC REPLACEMENT  Secondary Insurance:     ASSESSMENT:  Active Health Care Proxies       Marisa Jansen Health Care Representative - Daughter   Primary Phone: [phone] (Mobile)                 Advance Directives  Does patient have a Health Care POA?: No  Was patient offered paperwork?: Yes (declined)  Does patient currently have a Health Care decision maker?: Yes, please see Health Care Proxy section  Does patient have Advance Directives?: No  Was patient offered paperwork?: Yes (declined)  Primary Contact: Marisa Jansen dtr         Readmission Root Cause  30 Day Readmission: No    Patient Information  Admitted from:: Facility ([facility])  Mental Status: Alert  During Assessment patient was accompanied by: Not accompanied during assessment  Assessment information provided by:: Patient  Primary Caregiver: Self  Support Systems: Daughter, Self, Other (Comment) ([address])  County of Residence: [place]  What city do you live in?: [place]  Home entry access options. Select all that apply.: No steps to enter home  Type of Current Residence: Other (Comment) ([address])  Living Arrangements: Other  (Comment) ([address])    Activities of Daily Living Prior to Admission  Functional Status: Independent  Completes ADLs independently?: Yes  Ambulates independently?: Yes  Does patient use assisted devices?: Yes  Assisted Devices (DME) used: Nebulizer, Walker, Wheelchair  Does patient currently own DME?: Yes  What DME does the patient currently own?: Nebulizer, Walker, Wheelchair  Does patient have a history of Outpatient Therapy (PT/OT)?: Yes  Does the patient have a history of Short-Term Rehab?: Yes  Does patient have a history of HHC?: No  Does patient currently have HHC?: No         Patient Information Continued  Income Source: Pension/intermediate  Does patient have prescription coverage?: Yes  Does patient receive dialysis treatments?: No  Does patient have a history of substance abuse?: No  Does patient have a history of Mental Health Diagnosis?: Yes  Has patient received inpatient treatment related to mental health in the last 2 years?: No         Means of Transportation  Means of Transport to Appts:: Family transport          DISCHARGE DETAILS:    Discharge planning discussed with:: Patient  Freedom of Choice: Yes     CM contacted family/caregiver?: No- see comments (Pt is alert and oriented)  Were Treatment Team discharge recommendations reviewed with patient/caregiver?: Yes  Did patient/caregiver verbalize understanding of patient care needs?: Yes  Were patient/caregiver advised of the risks associated with not following Treatment Team discharge recommendations?: Yes         Requested Home Health Care         Is the patient interested in HHC at discharge?: No    DME Referral Provided  Referral made for DME?: No    Other Referral/Resources/Interventions Provided:  Interventions: Assisted Living         Treatment Team Recommendation: Assisted Living  Discharge Destination Plan:: Assisted Living  Transport at Discharge : Other (Comment) ([address])                                       Additional Comments: CM met with pt to discuss role of CM and any needs prior to discharge. Pt resides at [facility]. Indp PTA. Owns/uses RW, WC (self propels). Hx STR/OP PT/MH - No IP psych stays. No hx HH or DA. [facility] will transport at discharge.

## 2025-01-27 NOTE — PLAN OF CARE

## 2025-01-27 NOTE — ASSESSMENT & PLAN NOTE
Lab Results   Component Value Date    EGFR 58 01/27/2025    EGFR 69 01/26/2025    EGFR 52 01/25/2025    CREATININE 1.13 01/27/2025    CREATININE 0.98 01/26/2025    CREATININE 1.23 01/25/2025   Baseline creatinine 0.92-1.1  Creatinine on admission 1.37-does not quite meet NELLIE criteria  Given 250 cc bolus in the ED-hold further IVF in setting of known systolic heart failure  Recheck BMP around 0900 -hold diuretics and losartan for now

## 2025-01-27 NOTE — PROGRESS NOTES
Progress Note - Hospitalist   Name: Trevor Lyons 84 y.o. male I MRN: [id number]  Unit/Bed#: -01 I Date of Admission: 1/25/2025   Date of Service: 1/27/2025 I Hospital Day: 2    Assessment & Plan  Influenza A  Endorses cough and rhinorrhea x 2 days  Found to be influenza A positive in the ED  Also with underlying COPD-therefore will place on Xopenex and Atrovent nebulizers 3 times daily  Continue Tamiflu for 10 days total as he is hospitalized and requiring supplemental oxygen  Acute respiratory failure (HCC)  Acute respiratory failure with hypoxia, POA due to COPD exacerbation, and influenza A as evident by hypoxia (85%RA), cough, dyspnea treated with Tamiflu, oxygen administration and nebulizer treatments.   SpO2 85% on room air  Improved on 2 L supplemental oxygen via nasal cannula  Suspect secondary to influenza A  Wean oxygen as able  Stage 2 chronic kidney disease  Lab Results   Component Value Date    EGFR 58 01/27/2025    EGFR 69 01/26/2025    EGFR 52 01/25/2025    CREATININE 1.13 01/27/2025    CREATININE 0.98 01/26/2025    CREATININE 1.23 01/25/2025   Baseline creatinine 0.92-1.1  Creatinine on admission 1.37-does not quite meet NELLIE criteria  Given 250 cc bolus in the ED-hold further IVF in setting of known systolic heart failure  Recheck BMP around 0900 -hold diuretics and losartan for now  COPD (chronic obstructive pulmonary disease) ([facility])  Home regimen: Trelegy and albuterol nebulizer as needed  Continue formulary equivalent for Trelegy and placed on Xopenex/Atrovent nebulizer 3 times daily in setting of Tamiflu requiring supplemental oxygen  1/26: Having worsening bronchospasm with cough and wheezing now with acute exacerbation will hold home Trelegy and start Xopenex 3 times daily and Pulmicort twice daily scheduled along with prednisone 40 mg daily  Chronic combined systolic and diastolic CHF (congestive heart failure) ([facility])  Wt Readings from Last 3 Encounters:   01/27/25 92.9 kg (204 lb  12.9 oz)   01/23/25 94.3 kg (208 lb)   12/03/24 98 kg (216 lb)   Home regimen: Torsemide 20 Mg twice daily, spironolactone 25 Mg daily, Farxiga 10 Mg daily, and metoprolol succinate 25 Mg daily  Last echo 10/2024: LVEF 40-45%.  Global hypokinesis.  Normal functioning TAVR bioprosthetic valve  I/O and daily weights  Sodium restriction-liberalize fluid restriction  Will resume home diuretics   Type 2 diabetes mellitus without complication, without long-term current use of insulin (HCC)  Lab Results   Component Value Date    HGBA1C 6.9 (A) 12/03/2024       Recent Labs     01/26/25  1619 01/26/25  2057 01/27/25  0739 01/27/25  1115   POCGLU 210* 232* 216* 171*       Blood Sugar Average: Last 72 hrs:  (P) 166.4Home regimen: Farxiga 10 Mg daily, Lantus 8 units with breakfast, SSI AC/at bedtime, glimepiride 1 Mg daily, and metformin 1000 mg twice daily  Hold home glimepiride and metformin-continue home Farxiga and insulin at this time    Coronary artery disease involving native coronary artery of native heart without angina pectoris  CAD status post PCI and CABG  Continue home beta-blocker, statin, and Xarelto  Persistent atrial fibrillation (HCC)  RC: Metoprolol succinate 25 Mg daily  AC: Xarelto  Continue  Cardiac pacemaker  S/p SSS  Last device interrogation 7/2024 showed normal function  Pulmonary embolism (HCC)  Continue home Xarelto  Acquired hypothyroidism  Maintained on Synthroid 137 MCG daily  Continue  Depression with anxiety  Continue home BuSpar, Cymbalta, and Klonopin as needed    VTE Pharmacologic Prophylaxis: VTE Score: 7 High Risk (Score >/= 5) - Pharmacological DVT Prophylaxis Ordered: rivaroxaban (Xarelto). Sequential Compression Devices Ordered.    Mobility:   Basic Mobility Inpatient Raw Score: 23  JH-HLM Goal: 7: Walk 25 feet or more  JH-HLM Achieved: 7: Walk 25 feet or more  JH-HLM Goal achieved. Continue to encourage appropriate mobility.    Patient Centered Rounds: I performed bedside rounds with  nursing staff today.   Discussions with Specialists or Other Care Team Provider: nursing    Education and Discussions with Family / Patient: Updated  (daughter) via phone.    Current Length of Stay: 2 day(s)  Current Patient Status: Inpatient   Certification Statement: The patient will continue to require additional inpatient hospital stay due to influenza A  Discharge Plan: Anticipate discharge in 24-48 hrs to home with home services.    Code Status: Level 1 - Full Code    Radha Murray was seen earlier today. He was quite anxious and wanted to go home as soon as possible. He even pulled out his IV line.     Objective :  Temp:  [97.7 °F (36.5 °C)-98.1 °F (36.7 °C)] 98.1 °F (36.7 °C)  HR:  [52-68] 52  BP: (110-122)/(54-67) 110/54  Resp:  [14-16] 14  SpO2:  [94 %-97 %] 94 %  O2 Device: Nasal cannula  Nasal Cannula O2 Flow Rate (L/min):  [2 L/min] 2 L/min    Body mass index is 31.14 kg/m².     Input and Output Summary (last 24 hours):     Intake/Output Summary (Last 24 hours) at 1/27/2025 1615  Last data filed at 1/27/2025 0947  Gross per 24 hour   Intake 480 ml   Output 2150 ml   Net -1670 ml       Physical Exam  Vitals and nursing note reviewed.   Constitutional:       General: He is not in acute distress.     Appearance: He is well-developed.   HENT:      Head: Normocephalic and atraumatic.   Eyes:      Conjunctiva/sclera: Conjunctivae normal.   Cardiovascular:      Rate and Rhythm: Normal rate and regular rhythm.      Heart sounds: No murmur heard.  Pulmonary:      Effort: Pulmonary effort is normal. No respiratory distress.      Breath sounds: Wheezing (Mild) present.   Abdominal:      Palpations: Abdomen is soft.      Tenderness: There is no abdominal tenderness.   Musculoskeletal:         General: No swelling.      Cervical back: Neck supple.   Skin:     General: Skin is warm and dry.   Neurological:      Mental Status: He is alert.   Psychiatric:         Mood and Affect: Mood is anxious.            Lines/Drains:              Lab Results: I have reviewed the following results:   Results from last 7 days   Lab Units 01/27/25  0549 01/25/25  0952 01/25/25 0221   WBC Thousand/uL 10.21*   < > 9.55   HEMOGLOBIN g/dL 11.0*   < > 11.1*   HEMATOCRIT % 34.4*   < > 35.1*   PLATELETS Thousands/uL 155   < > 143*   BANDS PCT %  --   --  5   SEGS PCT % 53   < >  --    LYMPHO PCT % 41   < > 12*   MONO PCT % 5   < > 12   EOS PCT % 0   < > 1    < > = values in this interval not displayed.     Results from last 7 days   Lab Units 01/27/25  0549 01/25/25  0952 01/25/25 0221   SODIUM mmol/L 134*   < > 137   POTASSIUM mmol/L 3.7   < > 5.5*   CHLORIDE mmol/L 97   < > 103   CO2 mmol/L 26   < > 27   BUN mg/dL 43*   < > 29*   CREATININE mg/dL 1.13   < > 1.37*   ANION GAP mmol/L 11   < > 7   CALCIUM mg/dL 8.3*   < > 8.6   ALBUMIN g/dL  --   --  3.9   TOTAL BILIRUBIN mg/dL  --   --  0.68   ALK PHOS U/L  --   --  110*   ALT U/L  --   --  16   AST U/L  --   --  30   GLUCOSE RANDOM mg/dL 239*   < > 120    < > = values in this interval not displayed.         Results from last 7 days   Lab Units 01/27/25  1115 01/27/25  0739 01/26/25 2057 01/26/25  1619 01/26/25  1054 01/26/25  0711 01/25/25  2053 01/25/25  1613 01/25/25  1059 01/25/25  0710   POC GLUCOSE mg/dl 171* 216* 232* 210* 151* 114 181* 142* 129 118         Results from last 7 days   Lab Units 01/25/25  0221   PROCALCITONIN ng/ml 0.13       Recent Cultures (last 7 days):               Last 24 Hours Medication List:     Current Facility-Administered Medications:     acetaminophen (TYLENOL) tablet 650 mg, Q6H PRN    aluminum-magnesium hydroxide-simethicone (MAALOX) oral suspension 30 mL, Q6H PRN    atorvastatin (LIPITOR) tablet 20 mg, Daily With Dinner    benzonatate (TESSALON PERLES) capsule 100 mg, TID    budesonide (PULMICORT) inhalation solution 0.5 mg, Q12H    busPIRone (BUSPAR) tablet 7.5 mg, BID    clonazePAM (KlonoPIN) tablet 1 mg, BID PRN    donepezil (ARICEPT)  tablet 10 mg, QAM    DULoxetine (CYMBALTA) delayed release capsule 60 mg, Daily    Empagliflozin (JARDIANCE) tablet 10 mg, Daily    ferrous sulfate tablet 325 mg, Daily With Breakfast    fluticasone (FLONASE) 50 mcg/act nasal spray 2 spray, Daily    [Held by provider] Fluticasone Furoate-Vilanterol 100-25 mcg/actuation 1 puff, Daily **AND** [Held by provider] umeclidinium 62.5 mcg/actuation inhaler AEPB 1 puff, Daily    folic acid (FOLVITE) tablet 1,000 mcg, QAM    guaiFENesin (MUCINEX) 12 hr tablet 1,200 mg, Q12H MINNIE    insulin glargine (LANTUS) subcutaneous injection 8 Units 0.08 mL, Daily With Breakfast    insulin lispro (HumALOG/ADMELOG) 100 units/mL subcutaneous injection 1-5 Units, HS    insulin lispro (HumALOG/ADMELOG) 100 units/mL subcutaneous injection 1-6 Units, TID AC **AND** Fingerstick Glucose (POCT), TID AC    levalbuterol (XOPENEX) inhalation solution 1.25 mg, TID    levothyroxine tablet 137 mcg, Early Morning    loratadine (CLARITIN) tablet 10 mg, QAM    [Held by provider] losartan (COZAAR) tablet 25 mg, Daily    magnesium Oxide (MAG-OX) tablet 400 mg, BID    melatonin tablet 9 mg, HS    metoprolol succinate (TOPROL-XL) 24 hr tablet 25 mg, QAM    oseltamivir (TAMIFLU) capsule 30 mg, Q12H MINNIE    pantoprazole (PROTONIX) EC tablet 40 mg, Daily Before Breakfast    [START ON 1/28/2025] predniSONE tablet 40 mg, Daily    rivaroxaban (XARELTO) tablet 15 mg, Daily With Dinner    senna (SENOKOT) tablet 8.6 mg, HS PRN    [Held by provider] spironolactone (ALDACTONE) tablet 25 mg, Daily    thiamine tablet 100 mg, Daily    torsemide (DEMADEX) tablet 20 mg, BID (diuretic)    trimethobenzamide (TIGAN) IM injection 200 mg, Q12H PRN    Administrative Statements   Today, Patient Was Seen By: Olya Carrillo MD      **Please Note: This note may have been constructed using a voice recognition system.**

## 2025-01-27 NOTE — ASSESSMENT & PLAN NOTE
Lab Results   Component Value Date    HGBA1C 6.9 (A) 12/03/2024       Recent Labs     01/26/25  1619 01/26/25  2057 01/27/25  0739 01/27/25  1115   POCGLU 210* 232* 216* 171*       Blood Sugar Average: Last 72 hrs:  (P) 166.4Home regimen: Farxiga 10 Mg daily, Lantus 8 units with breakfast, SSI AC/at bedtime, glimepiride 1 Mg daily, and metformin 1000 mg twice daily  Hold home glimepiride and metformin-continue home Farxiga and insulin at this time

## 2025-01-27 NOTE — ASSESSMENT & PLAN NOTE
Home regimen: Trelegy and albuterol nebulizer as needed  Continue formulary equivalent for Trelegy and placed on Xopenex/Atrovent nebulizer 3 times daily in setting of Tamiflu requiring supplemental oxygen  1/26: Having worsening bronchospasm with cough and wheezing now with acute exacerbation will hold home Trelegy and start Xopenex 3 times daily and Pulmicort twice daily scheduled along with prednisone 40 mg daily

## 2025-01-27 NOTE — PLAN OF CARE
Problem: PAIN - ADULT  Goal: Verbalizes/displays adequate comfort level or baseline comfort level  Description: Interventions:  - Encourage patient to monitor pain and request assistance  - Assess pain using appropriate pain scale  - Administer analgesics based on type and severity of pain and evaluate response  - Implement non-pharmacological measures as appropriate and evaluate response  - Consider cultural and social influences on pain and pain management  - Notify physician/advanced practitioner if interventions unsuccessful or patient reports new pain  Outcome: Progressing     Problem: INFECTION - ADULT  Goal: Absence or prevention of progression during hospitalization  Description: INTERVENTIONS:  - Assess and monitor for signs and symptoms of infection  - Monitor lab/diagnostic results  - Monitor all insertion sites, i.e. indwelling lines, tubes, and drains  - Monitor endotracheal if appropriate and nasal secretions for changes in amount and color  - [place] appropriate cooling/warming therapies per order  - Administer medications as ordered  - Instruct and encourage patient and family to use good hand hygiene technique  - Identify and instruct in appropriate isolation precautions for identified infection/condition  Outcome: Progressing  Goal: Absence of fever/infection during neutropenic period  Description: INTERVENTIONS:  - Monitor WBC    Outcome: Progressing     Problem: DISCHARGE PLANNING  Goal: Discharge to home or other facility with appropriate resources  Description: INTERVENTIONS:  - Identify barriers to discharge w/patient and caregiver  - Arrange for needed discharge resources and transportation as appropriate  - Identify discharge learning needs (meds, wound care, etc.)  - Arrange for interpretive services to assist at discharge as needed  - Refer to Case Management Department for coordinating discharge planning if the patient needs post-hospital services based on physician/advanced  practitioner order or complex needs related to functional status, cognitive ability, or social support system  Outcome: Progressing     Problem: Knowledge Deficit  Goal: Patient/family/caregiver demonstrates understanding of disease process, treatment plan, medications, and discharge instructions  Description: Complete learning assessment and assess knowledge base.  Interventions:  - Provide teaching at level of understanding  - Provide teaching via preferred learning methods  Outcome: Progressing     Problem: SAFETY ADULT  Goal: Patient will remain free of falls  Description: INTERVENTIONS:  - Educate patient/family on patient safety including physical limitations  - Instruct patient to call for assistance with activity   - Consult OT/PT to assist with strengthening/mobility   - Keep Call bell within reach  - Keep bed low and locked with side rails adjusted as appropriate  - Keep care items and personal belongings within reach  - Initiate and maintain comfort rounds  - Make Fall Risk Sign visible to staff  - Offer Toileting every 2 Hours, in advance of need  - Initiate/Maintain bed alarm  - Apply yellow socks and bracelet for high fall risk patients  - Consider moving patient to room near nurses station  Outcome: Progressing  Goal: Maintain or return to baseline ADL function  Description: INTERVENTIONS:  -  Assess patient's ability to carry out ADLs; assess patient's baseline for ADL function and identify physical deficits which impact ability to perform ADLs (bathing, care of mouth/teeth, toileting, grooming, dressing, etc.)  - Assess/evaluate cause of self-care deficits   - Assess range of motion  - Assess patient's mobility; develop plan if impaired  - Assess patient's need for assistive devices and provide as appropriate  - Encourage maximum independence but intervene and supervise when necessary  - Involve family in performance of ADLs  - Assess for home care needs following discharge   - Consider OT consult  to assist with ADL evaluation and planning for discharge  - Provide patient education as appropriate  Outcome: Progressing  Goal: Maintains/Returns to pre admission functional level  Description: INTERVENTIONS:  - Perform AM-PAC 6 Click Basic Mobility/ Daily Activity assessment daily.  - Set and communicate daily mobility goal to care team and patient/family/caregiver.   - Collaborate with rehabilitation services on mobility goals if consulted  - Perform Range of Motion 2 times a day.  - Reposition patient every 2 hours.  - Dangle patient 2 times a day  - Stand patient 2 times a day  - Ambulate patient 2 times a day  - Out of bed to chair 2 times a day   - Out of bed for meals 2  Problem: Potential for Falls  Goal: Patient will remain free of falls  Description: INTERVENTIONS:  - Educate patient/family on patient safety including physical limitations  - Instruct patient to call for assistance with activity   - Consult OT/PT to assist with strengthening/mobility   - Keep Call bell within reach  - Keep bed low and locked with side rails adjusted as appropriate  - Keep care items and personal belongings within reach  - Initiate and maintain comfort rounds  - Make Fall Risk Sign visible to staff  - Offer Toileting every 2 Hours, in advance of need  - Initiate/Maintain bed alarm  - Apply yellow socks and bracelet for high fall risk patients  - Consider moving patient to room near nurses station  Outcome: Progressing    times a day  - Out of bed for toileting  - Record patient progress and toleration of activity level   Outcome: Progressing

## 2025-01-27 NOTE — ASSESSMENT & PLAN NOTE
Wt Readings from Last 3 Encounters:   01/27/25 92.9 kg (204 lb 12.9 oz)   01/23/25 94.3 kg (208 lb)   12/03/24 98 kg (216 lb)   Home regimen: Torsemide 20 Mg twice daily, spironolactone 25 Mg daily, Farxiga 10 Mg daily, and metoprolol succinate 25 Mg daily  Last echo 10/2024: LVEF 40-45%.  Global hypokinesis.  Normal functioning TAVR bioprosthetic valve  I/O and daily weights  Sodium restriction-liberalize fluid restriction  Will resume home diuretics

## 2025-01-27 NOTE — UTILIZATION REVIEW
Initial Clinical Review    Admission: Date/Time/Statement:   Admission Orders (From admission, onward)       Ordered        01/25/25 0320  INPATIENT ADMISSION  Once                          Orders Placed This Encounter   Procedures    INPATIENT ADMISSION     Standing Status:   Standing     Number of Occurrences:   1     Level of Care:   Med Surg [16]     Estimated length of stay:   More than 2 Midnights     Certification:   I certify that inpatient services are medically necessary for this patient for a duration of greater than two midnights. See H&P and MD Progress Notes for additional information about the patient's course of treatment.     ED Arrival Information       Expected   -    Arrival   1/25/2025 01:50    Acuity   Urgent              Means of arrival   Ambulance    Escorted by   [facility]    Service   Hospitalist    Admission type   Emergency              Arrival complaint   Flu             chief complaint:  cough and URI symptoms      Initial Presentation: 84 y.o. male  to ED via EMS from home.    Admitted to inpatient with Dx: acute respiratory failure with hypoxia/COPD exacerbation/Influenza A.   Presented to ED with cough and runny nose starting 2 days prior to arrival. PMHx: Afib on xarelto, AS s/p TAVR, SSS s/p PPM, CHF, alzheimer disease .   On exam: hypoxia, sat 85%.  Congestion and rhinorrhea.  Rhythm irregular.  Murmur.  wheezing.  Influenza A positive.   Wbc 9.55.  H&H 11.1/35.1.  bun 29. Creatinine 1.37 with baseline of 0.92 - 1.1.  K 5.5, sl hemolyzed.  .     Imaging shows pulmonary vascular congestion. ED treatment:  placed on nasal cannula.  Given Pierce neb.  Started on Tamiflu and given IVF bolus.    Plan includes to continue Tamiflu.  Wean oxygen as able.   Hold home diuretics (torsemide, spironolactone) and losartan.  Trend BMP.   Hold home glimepiride and metformin-continue home Farxiga and insulin.  Start SSI.  Continue remainder of home medications.     Anticipated Length of  Stay/Certification Statement: Patient will be admitted on an inpatient basis with an anticipated length of stay of greater than 2 midnights secondary to influenza A, acute respiratory insufficiency.     Date: 1/26/25    Day 2:  today with worsening cough.   On exam: obese.  Respiratory distress.  Wheezing.  Frequent cough and wheeze consistent with bronchospasm.  Wbc 10.33 bun 32.  Creatinine 0.98.   Continue Tamiflu.  Wean oxygen as able.   Diuretics and losartan on hold.  Hold home trelegy and start scheduled Xopenex and Pulmicort, start IV Solu medrol.  Continue insulin, Farxiga and SSI.      Patient has crossed 3 midnights and requires ongoing care    1/27/2025 .  Patient presents with  persistent barky non productive cough  On exam breath sounds diminished and coarse.   Abnormal labs or imaging:  glucose 216.  Bun 43. Creatinine 1.23.  wbc 10.21.  H&H 11/34.4.   Diagnosis/Plan    influenza A/acute respiratory failure/stage 2 CKD/COPD/Combined CHF/type 2 DM . Continue to require oxygen at 2 liters.  Continue IV solu medrol, scheduled nebs. Overnight solu medrol increased  On Tessalon Perles and Mucinex.   Continue Tamiflu.  Home inhalers on hold.   Home aldactone, losartan on hold. Home torsemide continued with hold parameters.  Fluid and sodium restriction.  On Lantus and Jardiance.  Continue SSI.      ED Treatment-Medication Administration from 01/25/2025 0150 to 01/25/2025 0405         Date/Time Order Dose Route Action     01/25/2025 0226 albuterol inhalation solution 10 mg 10 mg Nebulization Given     01/25/2025 0226 ipratropium (ATROVENT) 0.02 % inhalation solution 1 mg 1 mg Nebulization Given     01/25/2025 0226 sodium chloride 0.9 % inhalation solution 12 mL 12 mL Nebulization Given     01/25/2025 0313 oseltamivir (TAMIFLU) capsule 75 mg 75 mg Oral Given     01/25/2025 0313 sodium chloride 0.9 % bolus 250 mL 250 mL Intravenous New Bag            Scheduled Medications:  atorvastatin, 20 mg, Oral, Daily  With Dinner  benzonatate, 100 mg, Oral, TID  budesonide, 0.5 mg, Nebulization, Q12H  busPIRone, 7.5 mg, Oral, BID  donepezil, 10 mg, Oral, QAM  DULoxetine, 60 mg, Oral, Daily  Empagliflozin, 10 mg, Oral, Daily  ferrous sulfate, 325 mg, Oral, Daily With Breakfast  fluticasone, 2 spray, Nasal, Daily  [Held by provider] Fluticasone Furoate-Vilanterol, 1 puff, Inhalation, Daily   And  [Held by provider] umeclidinium, 1 puff, Inhalation, Daily  folic acid, 1,000 mcg, Oral, QAM  guaiFENesin, 1,200 mg, Oral, Q12H MINNIE  insulin glargine, 8 Units, Subcutaneous, Daily With Breakfast  insulin lispro, 1-5 Units, Subcutaneous, HS  insulin lispro, 1-6 Units, Subcutaneous, TID AC  levalbuterol, 1.25 mg, Nebulization, TID  levothyroxine, 137 mcg, Oral, Early Morning  loratadine, 10 mg, Oral, QAM  [Held by provider] losartan, 25 mg, Oral, Daily  magnesium Oxide, 400 mg, Oral, BID  melatonin, 9 mg, Oral, HS  methylPREDNISolone sodium succinate, 40 mg, Intravenous, Q12H MINNIE  metoprolol succinate, 25 mg, Oral, QAM  oseltamivir, 30 mg, Oral, Q12H MINNIE  pantoprazole, 40 mg, Oral, Daily Before Breakfast  rivaroxaban, 15 mg, Oral, Daily With Dinner  [Held by provider] spironolactone, 25 mg, Oral, Daily  Thiamine Mononitrate, 100 mg, Oral, Daily  torsemide, 20 mg, Oral, BID (diuretic)    guaiFENesin (MUCINEX) 12 hr tablet 600 mg  Dose: 600 mg  Freq: Every 12 hours scheduled Route: PO  Start: 01/25/25 0900 End: 01/26/25 0533  methylPREDNISolone sodium succinate (Solu-MEDROL) injection 40 mg  Dose: 40 mg  Freq: Daily Route: IV  Start: 01/25/25 1315 End: 01/26/25 1013    Continuous IV Infusions: none      PRN Meds:  acetaminophen, 650 mg, Oral, Q6H PRN - x 1 1/26  aluminum-magnesium hydroxide-simethicone, 30 mL, Oral, Q6H PRN  clonazePAM, 1 mg, Oral, HS PRN x 1 1/27  senna, 1 tablet, Oral, HS PRN  trimethobenzamide, 200 mg, Intramuscular, Q12H PRN      ED Triage Vitals   Temperature Pulse Respirations Blood Pressure SpO2 Pain Score   01/25/25  0152 01/25/25 0151 01/25/25 0151 01/25/25 0152 01/25/25 0151 01/25/25 0151   99.1 °F (37.3 °C) 65 18 110/58 (!) 85 % No Pain     Weight (last 2 days)       Date/Time Weight    01/27/25 0551 92.9 (204.81)     Weight: 2.8kg weight difference, RN notified at 01/27/25 0551    01/26/25 0531 95.7 (211.07)    01/25/25 0600 95.7 (211.07)    01/25/25 0537 95.7 (211.07)    01/25/25 04:11:29 95.7 (211.07)          Vital Signs (last 3 days)       Date/Time Temp Pulse Resp BP MAP (mmHg) SpO2 Calculated FIO2 (%) - Nasal Cannula Nasal Cannula O2 Flow Rate (L/min) O2 Device Patient Position - Orthostatic VS David Coma Scale Score Pain    01/27/25 08:27:08 98.1 °F (36.7 °C) 52 -- 110/54 73 97 % -- -- -- -- -- --    01/27/25 0212 -- -- -- -- -- -- 28 2 L/min Nasal cannula -- -- --    01/26/25 2300 -- -- -- -- -- -- -- -- -- -- -- No Pain    01/26/25 20:59:29 97.7 °F (36.5 °C) 61 14 122/67 85 96 % -- -- -- -- -- --    01/26/25 1950 -- -- -- -- -- -- -- -- -- -- -- 2    01/26/25 1931 -- -- -- -- -- -- 28 2 L/min Nasal cannula -- -- --    01/26/25 17:09:11 -- 68 -- 122/57 79 95 % -- -- -- -- -- --    01/26/25 17:01:53 -- 65 -- 122/57 79 97 % -- -- -- -- -- --    01/26/25 16:21:27 97.7 °F (36.5 °C) 65 16 122/61 81 95 % -- -- -- -- -- --    01/26/25 1420 -- -- -- -- -- 98 % -- -- -- -- -- --    01/26/25 1355 -- -- -- -- -- 94 % -- -- Nasal cannula -- -- --    01/26/25 0815 -- -- -- -- -- 96 % 28 2 L/min Nasal cannula -- 15 No Pain    01/26/25 08:05:49 97.7 °F (36.5 °C) 65 -- 113/56 75 97 % -- -- -- Lying -- --    01/26/25 0802 -- 65 -- 113/56 -- -- -- -- -- -- -- --    01/25/25 2200 -- -- -- -- -- -- -- -- -- -- 15 No Pain    01/25/25 21:55:14 97.7 °F (36.5 °C) 65 -- 125/72 90 91 % -- -- -- -- -- --    01/25/25 1519 -- -- -- -- -- 98 % -- -- -- -- -- --    01/25/25 1518 -- 65 -- -- -- 98 % -- -- -- -- -- --    01/25/25 14:35:55 98.1 °F (36.7 °C) 65 20 114/58 77 96 % -- -- -- -- -- --    01/25/25 1316 -- -- -- -- -- 97 % 28 2 L/min Nasal  cannula -- -- --    01/25/25 0915 -- -- -- -- -- 96 % 28 2 L/min Nasal cannula -- 15 No Pain    01/25/25 09:07:27 -- 65 -- 118/54 75 97 % -- -- -- -- -- --    01/25/25 0907 -- 65 -- 118/54 -- -- -- -- -- -- -- --    01/25/25 07:10:51 98.1 °F (36.7 °C) 65 20 104/56 72 97 % 28 2 L/min Nasal cannula -- -- --    01/25/25 04:11:29 97.9 °F (36.6 °C) 66 16 123/62 82 96 % -- -- -- -- 15 No Pain    01/25/25 0352 -- -- -- -- -- -- -- -- -- -- -- No Pain    01/25/25 0230 -- 65 -- 118/62 83 98 % -- -- -- -- -- --    01/25/25 0229 -- -- -- -- -- -- 28 2 L/min Nasal cannula -- -- --    01/25/25 0200 -- 65 18 111/59 81 96 % 28 2 L/min Nasal cannula Lying -- --    01/25/25 0158 -- -- -- -- -- -- -- -- -- -- 15 --    01/25/25 0157 -- -- -- -- -- 97 % 28 2 L/min Nasal cannula -- -- --    01/25/25 0152 99.1 °F (37.3 °C) -- -- 110/58 -- -- -- -- -- -- -- --    01/25/25 0151 -- 65 18 -- -- 85 % -- -- None (Room air) -- -- No Pain              Pertinent Labs/Diagnostic Test Results:   Radiology:  XR chest portable   ED Interpretation by Edilberto Silva DO (01/25 0250)   Cardiomegaly similar to prior. No focal infiltrate. No acute cardiopulmonary disease.      Final Interpretation by Dania Bowles MD (01/25 0917)      Mild pulmonary venous congestion.            Workstation performed: [id number]           Cardiology:  ECG 12 lead   Final Result by Rayshawn Hernadez DO (01/26 0713)   Electronic ventricular pacemaker   Confirmed by Rayshawn Hernadez ([id number]) on 1/26/2025 7:13:54 AM      Procedure Note: EKG  Date/Time: 01/25/25 2:57 AM   Indications / Diagnosis: Dyspnea  ECG reviewed by the ED Provider: yes   The EKG demonstrates:  Rhythm: ventricular paced rhythm 65 BPM  Intervals: QT interval prolonged 507ms  Axis: left axis  QRS/Blocks: nonspecific conduction delay  ST Changes: No acute ST/T waves changes. No MELVIN. No TWI.    GI:  No orders to display       Results from last 7 days   Lab Units 01/27/25  0549 01/26/25  1039  01/25/25  0952 01/25/25 0221   WBC Thousand/uL 10.21* 10.33* 9.02 9.55   HEMOGLOBIN g/dL 11.0* 11.4* 10.9* 11.1*   HEMATOCRIT % 34.4* 36.6 34.1* 35.1*   PLATELETS Thousands/uL 155 141* 126* 143*   TOTAL NEUT ABS Thousands/µL 5.52 6.34  --   --    BANDS PCT %  --   --   --  5     Results from last 7 days   Lab Units 01/27/25  0549 01/26/25  1032 01/25/25 0952 01/25/25 0221   SODIUM mmol/L 134* 135 135 137   POTASSIUM mmol/L 3.7 4.9 4.5 5.5*   CHLORIDE mmol/L 97 100 101 103   CO2 mmol/L 26 27 27 27   ANION GAP mmol/L 11 8 7 7   BUN mg/dL 43* 32* 28* 29*   CREATININE mg/dL 1.13 0.98 1.23 1.37*   EGFR ml/min/1.73sq m 58 69 52 46   CALCIUM mg/dL 8.3* 8.9 8.6 8.6   MAGNESIUM mg/dL  --   --  2.2  --    PHOSPHORUS mg/dL  --   --  3.7  --      Results from last 7 days   Lab Units 01/25/25 0221   AST U/L 30   ALT U/L 16   ALK PHOS U/L 110*   TOTAL PROTEIN g/dL 6.9   ALBUMIN g/dL 3.9   TOTAL BILIRUBIN mg/dL 0.68     Results from last 7 days   Lab Units 01/27/25  0739 01/26/25 2057 01/26/25  1619 01/26/25  1054 01/26/25  0711 01/25/25  2053 01/25/25  1613 01/25/25  1059 01/25/25  0710   POC GLUCOSE mg/dl 216* 232* 210* 151* 114 181* 142* 129 118     Results from last 7 days   Lab Units 01/27/25  0549 01/26/25  1032 01/25/25 0952 01/25/25 0221   GLUCOSE RANDOM mg/dL 239* 148* 168* 120     Results from last 7 days   Lab Units 01/25/25  0221   PROCALCITONIN ng/ml 0.13     Results from last 7 days   Lab Units 01/25/25  0221   BNP pg/mL 383*         Past Medical History:   Diagnosis Date    Alzheimer disease (HCC)     Anemia     Aneurysm of ascending aorta without rupture (HCC)     Anxiety     Atrial fibrillation (HCC)     CHF (congestive heart failure) (HCC)     Depression     Diabetes mellitus (HCC)     Disease of thyroid gland     Pulmonary hypertension (HCC)     Sciatica     Sick sinus syndrome (HCC)      Present on Admission:   Acute respiratory failure (HCC)   COPD (chronic obstructive pulmonary disease) (HCC)   Chronic  combined systolic and diastolic CHF (congestive heart failure) (HCC)   Type 2 diabetes mellitus without complication, without long-term current use of insulin (HCC)   Coronary artery disease involving native coronary artery of native heart without angina pectoris   Persistent atrial fibrillation (HCC)   Cardiac pacemaker   Pulmonary embolism (HCC)   Acquired hypothyroidism   Depression with anxiety      Admitting Diagnosis: Respiratory failure (HCC) [J96.90]  Influenza A [J10.1]  Persistent atrial fibrillation (HCC) [I48.19]  NELLIE (acute kidney injury) ([facility]) [N17.9]  COPD with acute exacerbation (HCC) [J44.1]  Flu-like symptoms [R68.89]  Chronic combined systolic and diastolic CHF (congestive heart failure) (HCC) [I50.42]  Age/Sex: 84 y.o. male    Network Utilization Review Department  ATTENTION: Please call with any questions or concerns to [phone] and carefully listen to the prompts so that you are directed to the right person. All voicemails are confidential.   For Discharge needs, contact Care Management DC Support Team at [phone] opt. [phone]  Send all requests for admission clinical reviews, approved or denied determinations and any other requests to dedicated fax number below belonging to the campus where the patient is receiving treatment. List of dedicated fax numbers for the Facilities:  FACILITY NAME UR FAX NUMBER   ADMISSION DENIALS (Administrative/Medical Necessity) [phone]   DISCHARGE SUPPORT TEAM (NETWORK) [phone]   PARENT CHILD HEALTH (Maternity/NICU/Pediatrics) [phone]   [facility] [phone]   [facility] [phone]   [facility] [phone]   [facility] [phone]   [facility] [phone]   [facility] [phone]   [facility] [phone]   [facility]  [phone]   [facility] [phone]   [facility] [phone]   [facility] [phone]   [facility] [phone]

## 2025-01-27 NOTE — RESPIRATORY THERAPY NOTE
Home Oxygen Qualifying Test     Patient name: Trevor Lyons        : 1940   Date of Test:  2025  Diagnosis:    Home Oxygen Test:    **Medicare Guidelines require item(s) 1-5 on all ambulatory patients or 1 and 2 on non-ambulatory patients.    1. Baseline SPO2 on Room Air at rest 86 %   If <= 88% on Room Air add O2 via NC to obtain SpO2 >=88%. If LPM needed, document LPM 3 needed to reach =>88%    SPO2 during exertion on Room Air 86  %  During exertion monitor SPO2. If SPO2 increases >=89%, do not add supplemental oxygen    SPO2 on Oxygen at Rest 94 % at 2 LPM    SPO2 during exertion on Oxygen 92 % at 2 LPM    Test performed during exertion activity.      [x]  Supplemental Home Oxygen is indicated.    []  Client does not qualify for home oxygen.    Respiratory Additional Notes-     Oralia Brandt, RT

## 2025-01-28 PROBLEM — N17.9 AKI (ACUTE KIDNEY INJURY) (HCC): Status: ACTIVE | Noted: 2025-01-28

## 2025-01-28 PROBLEM — E87.3 METABOLIC ALKALOSIS: Status: ACTIVE | Noted: 2025-01-28

## 2025-01-28 LAB
ANION GAP SERPL CALCULATED.3IONS-SCNC: 14 MMOL/L (ref 4–13)
BACTERIA UR QL AUTO: ABNORMAL /HPF
BASOPHILS # BLD AUTO: 0.02 THOUSANDS/ΜL (ref 0–0.1)
BASOPHILS NFR BLD AUTO: 0 % (ref 0–1)
BILIRUB UR QL STRIP: NEGATIVE
BUN SERPL-MCNC: 53 MG/DL (ref 5–25)
CALCIUM SERPL-MCNC: 8.7 MG/DL (ref 8.4–10.2)
CHLORIDE SERPL-SCNC: 93 MMOL/L (ref 96–108)
CLARITY UR: CLEAR
CO2 SERPL-SCNC: 33 MMOL/L (ref 21–32)
COLOR UR: ABNORMAL
CREAT SERPL-MCNC: 1.45 MG/DL (ref 0.6–1.3)
EOSINOPHIL # BLD AUTO: 0.01 THOUSAND/ΜL (ref 0–0.61)
EOSINOPHIL NFR BLD AUTO: 0 % (ref 0–6)
ERYTHROCYTE [DISTWIDTH] IN BLOOD BY AUTOMATED COUNT: 14.8 % (ref 11.6–15.1)
GFR SERPL CREATININE-BSD FRML MDRD: 43 ML/MIN/1.73SQ M
GLUCOSE SERPL-MCNC: 110 MG/DL (ref 65–140)
GLUCOSE SERPL-MCNC: 121 MG/DL (ref 65–140)
GLUCOSE SERPL-MCNC: 166 MG/DL (ref 65–140)
GLUCOSE SERPL-MCNC: 216 MG/DL (ref 65–140)
GLUCOSE SERPL-MCNC: 259 MG/DL (ref 65–140)
GLUCOSE UR STRIP-MCNC: ABNORMAL MG/DL
HCT VFR BLD AUTO: 39.6 % (ref 36.5–49.3)
HGB BLD-MCNC: 12.7 G/DL (ref 12–17)
HGB UR QL STRIP.AUTO: NEGATIVE
IMM GRANULOCYTES # BLD AUTO: 0.09 THOUSAND/UL (ref 0–0.2)
IMM GRANULOCYTES NFR BLD AUTO: 1 % (ref 0–2)
KETONES UR STRIP-MCNC: NEGATIVE MG/DL
LEUKOCYTE ESTERASE UR QL STRIP: NEGATIVE
LYMPHOCYTES # BLD AUTO: 8.37 THOUSANDS/ΜL (ref 0.6–4.47)
LYMPHOCYTES NFR BLD AUTO: 50 % (ref 14–44)
MCH RBC QN AUTO: 29.5 PG (ref 26.8–34.3)
MCHC RBC AUTO-ENTMCNC: 32.1 G/DL (ref 31.4–37.4)
MCV RBC AUTO: 92 FL (ref 82–98)
MONOCYTES # BLD AUTO: 1.28 THOUSAND/ΜL (ref 0.17–1.22)
MONOCYTES NFR BLD AUTO: 8 % (ref 4–12)
NEUTROPHILS # BLD AUTO: 6.83 THOUSANDS/ΜL (ref 1.85–7.62)
NEUTS SEG NFR BLD AUTO: 41 % (ref 43–75)
NITRITE UR QL STRIP: NEGATIVE
NON-SQ EPI CELLS URNS QL MICRO: ABNORMAL /HPF
NRBC BLD AUTO-RTO: 0 /100 WBCS
PH UR STRIP.AUTO: 5 [PH]
PLATELET # BLD AUTO: 225 THOUSANDS/UL (ref 149–390)
PMV BLD AUTO: 9 FL (ref 8.9–12.7)
POTASSIUM SERPL-SCNC: 2.9 MMOL/L (ref 3.5–5.3)
PROT UR STRIP-MCNC: NEGATIVE MG/DL
RBC # BLD AUTO: 4.31 MILLION/UL (ref 3.88–5.62)
RBC #/AREA URNS AUTO: ABNORMAL /HPF
SODIUM SERPL-SCNC: 140 MMOL/L (ref 135–147)
SP GR UR STRIP.AUTO: <1.005 (ref 1–1.03)
UROBILINOGEN UR STRIP-ACNC: <2 MG/DL
WBC # BLD AUTO: 16.6 THOUSAND/UL (ref 4.31–10.16)
WBC #/AREA URNS AUTO: ABNORMAL /HPF

## 2025-01-28 PROCEDURE — 99223 1ST HOSP IP/OBS HIGH 75: CPT | Performed by: INTERNAL MEDICINE

## 2025-01-28 PROCEDURE — 81001 URINALYSIS AUTO W/SCOPE: CPT | Performed by: PHYSICIAN ASSISTANT

## 2025-01-28 PROCEDURE — 82948 REAGENT STRIP/BLOOD GLUCOSE: CPT

## 2025-01-28 PROCEDURE — 94640 AIRWAY INHALATION TREATMENT: CPT

## 2025-01-28 PROCEDURE — 80048 BASIC METABOLIC PNL TOTAL CA: CPT | Performed by: FAMILY MEDICINE

## 2025-01-28 PROCEDURE — 94760 N-INVAS EAR/PLS OXIMETRY 1: CPT

## 2025-01-28 PROCEDURE — 85025 COMPLETE CBC W/AUTO DIFF WBC: CPT | Performed by: FAMILY MEDICINE

## 2025-01-28 PROCEDURE — 99232 SBSQ HOSP IP/OBS MODERATE 35: CPT | Performed by: HOSPITALIST

## 2025-01-28 RX ORDER — POTASSIUM CHLORIDE 1500 MG/1
40 TABLET, EXTENDED RELEASE ORAL ONCE
Status: COMPLETED | OUTPATIENT
Start: 2025-01-28 | End: 2025-01-28

## 2025-01-28 RX ORDER — POTASSIUM CHLORIDE 1500 MG/1
20 TABLET, EXTENDED RELEASE ORAL ONCE
Status: COMPLETED | OUTPATIENT
Start: 2025-01-28 | End: 2025-01-28

## 2025-01-28 RX ADMIN — FERROUS SULFATE TAB 325 MG (65 MG ELEMENTAL FE) 325 MG: 325 (65 FE) TAB at 09:25

## 2025-01-28 RX ADMIN — INSULIN GLARGINE 8 UNITS: 100 INJECTION, SOLUTION SUBCUTANEOUS at 09:20

## 2025-01-28 RX ADMIN — ATORVASTATIN CALCIUM 20 MG: 20 TABLET, FILM COATED ORAL at 16:33

## 2025-01-28 RX ADMIN — BENZONATATE 100 MG: 100 CAPSULE ORAL at 16:33

## 2025-01-28 RX ADMIN — DONEPEZIL HYDROCHLORIDE 10 MG: 5 TABLET ORAL at 09:20

## 2025-01-28 RX ADMIN — BENZONATATE 100 MG: 100 CAPSULE ORAL at 09:20

## 2025-01-28 RX ADMIN — MELATONIN 9 MG: 3 TAB ORAL at 21:07

## 2025-01-28 RX ADMIN — LEVALBUTEROL HYDROCHLORIDE 1.25 MG: 1.25 SOLUTION RESPIRATORY (INHALATION) at 07:14

## 2025-01-28 RX ADMIN — INSULIN LISPRO 1 UNITS: 100 INJECTION, SOLUTION INTRAVENOUS; SUBCUTANEOUS at 11:36

## 2025-01-28 RX ADMIN — LEVOTHYROXINE SODIUM 137 MCG: 25 TABLET ORAL at 05:36

## 2025-01-28 RX ADMIN — BUSPIRONE HYDROCHLORIDE 7.5 MG: 15 TABLET ORAL at 09:25

## 2025-01-28 RX ADMIN — SODIUM CHLORIDE 250 ML: 0.9 INJECTION, SOLUTION INTRAVENOUS at 13:10

## 2025-01-28 RX ADMIN — BUSPIRONE HYDROCHLORIDE 7.5 MG: 15 TABLET ORAL at 16:33

## 2025-01-28 RX ADMIN — INSULIN LISPRO 1 UNITS: 100 INJECTION, SOLUTION INTRAVENOUS; SUBCUTANEOUS at 21:09

## 2025-01-28 RX ADMIN — OSELTAMIVIR 30 MG: 30 CAPSULE ORAL at 21:07

## 2025-01-28 RX ADMIN — LORATADINE 10 MG: 10 TABLET ORAL at 09:20

## 2025-01-28 RX ADMIN — ACETAMINOPHEN 650 MG: 325 TABLET, FILM COATED ORAL at 16:38

## 2025-01-28 RX ADMIN — BUDESONIDE 0.5 MG: 0.5 INHALANT ORAL at 07:14

## 2025-01-28 RX ADMIN — MAGNESIUM OXIDE TAB 400 MG (241.3 MG ELEMENTAL MG) 400 MG: 400 (241.3 MG) TAB at 16:33

## 2025-01-28 RX ADMIN — FOLIC ACID 1000 MCG: 1 TABLET ORAL at 09:21

## 2025-01-28 RX ADMIN — BUDESONIDE 0.5 MG: 0.5 INHALANT ORAL at 19:24

## 2025-01-28 RX ADMIN — METOPROLOL SUCCINATE 25 MG: 25 TABLET, FILM COATED, EXTENDED RELEASE ORAL at 09:21

## 2025-01-28 RX ADMIN — RIVAROXABAN 15 MG: 15 TABLET, FILM COATED ORAL at 16:33

## 2025-01-28 RX ADMIN — FLUTICASONE PROPIONATE 2 SPRAY: 50 SPRAY, METERED NASAL at 09:26

## 2025-01-28 RX ADMIN — GUAIFENESIN 1200 MG: 600 TABLET, EXTENDED RELEASE ORAL at 21:08

## 2025-01-28 RX ADMIN — TORSEMIDE 20 MG: 20 TABLET ORAL at 09:25

## 2025-01-28 RX ADMIN — DULOXETINE HYDROCHLORIDE 60 MG: 30 CAPSULE, DELAYED RELEASE ORAL at 09:20

## 2025-01-28 RX ADMIN — PANTOPRAZOLE SODIUM 40 MG: 40 TABLET, DELAYED RELEASE ORAL at 05:36

## 2025-01-28 RX ADMIN — EMPAGLIFLOZIN 10 MG: 10 TABLET, FILM COATED ORAL at 09:21

## 2025-01-28 RX ADMIN — POTASSIUM CHLORIDE 40 MEQ: 1500 TABLET, EXTENDED RELEASE ORAL at 13:12

## 2025-01-28 RX ADMIN — BENZONATATE 100 MG: 100 CAPSULE ORAL at 21:07

## 2025-01-28 RX ADMIN — INSULIN LISPRO 3 UNITS: 100 INJECTION, SOLUTION INTRAVENOUS; SUBCUTANEOUS at 16:36

## 2025-01-28 RX ADMIN — SPIRONOLACTONE 25 MG: 25 TABLET ORAL at 09:21

## 2025-01-28 RX ADMIN — LEVALBUTEROL HYDROCHLORIDE 1.25 MG: 1.25 SOLUTION RESPIRATORY (INHALATION) at 13:37

## 2025-01-28 RX ADMIN — PREDNISONE 40 MG: 20 TABLET ORAL at 09:21

## 2025-01-28 RX ADMIN — LEVALBUTEROL HYDROCHLORIDE 1.25 MG: 1.25 SOLUTION RESPIRATORY (INHALATION) at 19:24

## 2025-01-28 RX ADMIN — THIAMINE HCL TAB 100 MG 100 MG: 100 TAB at 09:20

## 2025-01-28 RX ADMIN — OSELTAMIVIR 30 MG: 30 CAPSULE ORAL at 09:20

## 2025-01-28 RX ADMIN — GUAIFENESIN 1200 MG: 600 TABLET, EXTENDED RELEASE ORAL at 09:20

## 2025-01-28 RX ADMIN — MAGNESIUM OXIDE TAB 400 MG (241.3 MG ELEMENTAL MG) 400 MG: 400 (241.3 MG) TAB at 09:20

## 2025-01-28 RX ADMIN — POTASSIUM CHLORIDE 20 MEQ: 1500 TABLET, EXTENDED RELEASE ORAL at 16:33

## 2025-01-28 NOTE — PLAN OF CARE

## 2025-01-28 NOTE — ASSESSMENT & PLAN NOTE
Wt Readings from Last 3 Encounters:   01/28/25 91.9 kg (202 lb 9.6 oz)   01/23/25 94.3 kg (208 lb)   12/03/24 98 kg (216 lb)   Home regimen: Torsemide 20 Mg twice daily, spironolactone 25 Mg daily, Farxiga 10 Mg daily, and metoprolol succinate 25 Mg daily  Last echo 10/2024: LVEF 40-45%.  Global hypokinesis.  Normal functioning TAVR bioprosthetic valve  I/O and daily weights  Sodium restriction-liberalize fluid restriction  Stop Aldactone/Torsemide with NELLIE

## 2025-01-28 NOTE — ASSESSMENT & PLAN NOTE
Lab Results   Component Value Date    HGBA1C 6.9 (A) 12/03/2024       Recent Labs     01/27/25  1713 01/27/25  2129 01/28/25  0755 01/28/25  1123   POCGLU 268* 175* 121 166*       Blood Sugar Average: Last 72 hrs:  (P) 171Home regimen: Farxiga 10 Mg daily, Lantus 8 units with breakfast, SSI AC/at bedtime, glimepiride 1 Mg daily, and metformin 1000 mg twice daily  Currently on Lantus/Farxiga/SSI, Stop SGLT2i with NELLIE

## 2025-01-28 NOTE — CONSULTS
NEPHROLOGY HOSPITAL CONSULTATION   Trevor Lyons 84 y.o. male MRN: [id number]  Unit/Bed#: -01 Encounter: [id number]    Assessment & Plan  NELLIE  Lab Results   Component Value Date    EGFR 43 01/28/2025    EGFR 58 01/27/2025    EGFR 69 01/26/2025    CREATININE 1.45 (H) 01/28/2025    CREATININE 1.13 01/27/2025    CREATININE 0.98 01/26/2025   - creatinine trending up  - patient was restarted on diuretics 1/26 with torsemide and received spironolactone this am, suspect this is the culprit along with relative hypotension  - diuretics now discontinued  - losartan on hold  - discontinue jardiance   - avoid hypotension  - check PVR  - check UA  - give 250ml bolus IVF today  - no NSAID use or IV contrast  - baseline creatinine around 1.0  Hypokalemia  - replete with kdur 60meq today and monitor  Influenza A  - on tamiflu per primary team  - on nasal cannula (not at home)  Chronic combined systolic and diastolic CHF (congestive heart failure) (HCC)  Wt Readings from Last 3 Encounters:   01/28/25 91.9 kg (202 lb 9.6 oz)   01/23/25 94.3 kg (208 lb)   12/03/24 98 kg (216 lb)   - respiratory status worsened by flu  - hold diuretics  - received bolus ivf, give another gentle bolus and monitor   Acute hypoxic respiratory failure (HCC)  - secondary to influenza a  - per primary team  - on nasal cannula  Metabolic alkalosis  - likely secondary to diuretics  - discontinue diuretics  Type 2 diabetes mellitus without complication, without long-term current use of insulin (HCC)  Lab Results   Component Value Date    HGBA1C 6.9 (A) 12/03/2024   Blood Sugar Average: Last 72 hrs:  (P) 171  - per primary team    HISTORY OF PRESENT ILLNESS:  Requesting Physician: Jan Eason MD  Reason for Consult: nellie    Trevor Lyons is a 84 y.o. male who was admitted to [facility] after presenting with cough and shortness of breath.  He states he was eating okay.  He denies SOB.  He denies over the counter medications like NSAIDs and states  he only takes what is given to him by his nursing facility.  A renal consultation is requested today for assistance in the management of acute kidney injury.      PAST MEDICAL HISTORY:  Past Medical History:   Diagnosis Date    Alzheimer disease (HCC)     Anemia     Aneurysm of ascending aorta without rupture (HCC)     Anxiety     Atrial fibrillation (HCC)     CHF (congestive heart failure) (HCC)     Depression     Diabetes mellitus (HCC)     Disease of thyroid gland     Pulmonary hypertension (HCC)     Sciatica     Sick sinus syndrome (HCC)        PAST SURGICAL HISTORY:  Past Surgical History:   Procedure Laterality Date    CARDIAC PACEMAKER PLACEMENT         ALLERGIES:  No Known Allergies    SOCIAL HISTORY:  Social History     Substance and Sexual Activity   Alcohol Use Not Currently     Social History     Substance and Sexual Activity   Drug Use Never     Social History     Tobacco Use   Smoking Status Former    Types: Cigarettes   Smokeless Tobacco Never       FAMILY HISTORY:  Family History   Problem Relation Age of Onset    Dementia Mother     Heart disease Father     Stroke Father     Thyroid disease Daughter     Thyroid disease Daughter        MEDICATIONS:    Current Facility-Administered Medications:     acetaminophen (TYLENOL) tablet 650 mg, 650 mg, Oral, Q6H PRN, Usha Quinn PA-C, 650 mg at 01/26/25 1950    aluminum-magnesium hydroxide-simethicone (MAALOX) oral suspension 30 mL, 30 mL, Oral, Q6H PRN, Usha Quinn PA-C    atorvastatin (LIPITOR) tablet 20 mg, 20 mg, Oral, Daily With Dinner, Usha Quinn PA-C, 20 mg at 01/27/25 1820    benzonatate (TESSALON PERLES) capsule 100 mg, 100 mg, Oral, TID, Dana Núñez DO, 100 mg at 01/28/25 0920    budesonide (PULMICORT) inhalation solution 0.5 mg, 0.5 mg, Nebulization, Q12H, Dana Núñez DO, 0.5 mg at 01/28/25 0714    busPIRone (BUSPAR) tablet 7.5 mg, 7.5 mg, Oral, BID, Usha Quinn PA-C, 7.5 mg at 01/28/25 0925     clonazePAM (KlonoPIN) tablet 1 mg, 1 mg, Oral, BID PRN, Olya Carrillo MD, 1 mg at 01/27/25 1517    donepezil (ARICEPT) tablet 10 mg, 10 mg, Oral, QAM, Usha Quinn PA-C, 10 mg at 01/28/25 0920    DULoxetine (CYMBALTA) delayed release capsule 60 mg, 60 mg, Oral, Daily, Usha Quinn PA-C, 60 mg at 01/28/25 0920    ferrous sulfate tablet 325 mg, 325 mg, Oral, Daily With Breakfast, Usha Quinn PA-C, 325 mg at 01/28/25 0925    fluticasone (FLONASE) 50 mcg/act nasal spray 2 spray, 2 spray, Nasal, Daily, Usha Quinn PA-C, 2 spray at 01/28/25 0926    [Held by provider] Fluticasone Furoate-Vilanterol 100-25 mcg/actuation 1 puff, 1 puff, Inhalation, Daily, 1 puff at 01/26/25 0807 **AND** [Held by provider] umeclidinium 62.5 mcg/actuation inhaler AEPB 1 puff, 1 puff, Inhalation, Daily, Usha Quinn PA-C, 1 puff at 01/26/25 0807    folic acid (FOLVITE) tablet 1,000 mcg, 1,000 mcg, Oral, QAM, Usha Quinn PA-C, 1,000 mcg at 01/28/25 0921    guaiFENesin (MUCINEX) 12 hr tablet 1,200 mg, 1,200 mg, Oral, Q12H MINNIE, Usha Quinn PA-C, 1,200 mg at 01/28/25 0920    insulin glargine (LANTUS) subcutaneous injection 8 Units 0.08 mL, 8 Units, Subcutaneous, Daily With Breakfast, Usha Quinn PA-C, 8 Units at 01/28/25 0920    insulin lispro (HumALOG/ADMELOG) 100 units/mL subcutaneous injection 1-5 Units, 1-5 Units, Subcutaneous, HS, Usha Quinn PA-C, 1 Units at 01/27/25 2138    insulin lispro (HumALOG/ADMELOG) 100 units/mL subcutaneous injection 1-6 Units, 1-6 Units, Subcutaneous, TID AC, 1 Units at 01/28/25 1136 **AND** Fingerstick Glucose (POCT), , , TID AC, Usha Quinn PA-C    levalbuterol (XOPENEX) inhalation solution 1.25 mg, 1.25 mg, Nebulization, TID, Olya Carrillo MD, 1.25 mg at 01/28/25 0714    levothyroxine tablet 137 mcg, 137 mcg, Oral, Early Morning, Usha Quinn PA-C, 137 mcg at 01/28/25 0536    loratadine (CLARITIN) tablet 10 mg, 10 mg, Oral, QA, Usha L  STACEY Quinn-C, 10 mg at 01/28/25 0920    [Held by provider] losartan (COZAAR) tablet 25 mg, 25 mg, Oral, Daily, STACEY Anglin-JEAN-PAUL    magnesium Oxide (MAG-OX) tablet 400 mg, 400 mg, Oral, BID, STACEY Anglin-C, 400 mg at 01/28/25 0920    melatonin tablet 9 mg, 9 mg, Oral, HS, STACEY Anglin-C, 9 mg at 01/27/25 2131    metoprolol succinate (TOPROL-XL) 24 hr tablet 25 mg, 25 mg, Oral, QAM, STACEY Anglin-C, 25 mg at 01/28/25 0921    oseltamivir (TAMIFLU) capsule 30 mg, 30 mg, Oral, Q12H MINNIE, STACEY Anglin-C, 30 mg at 01/28/25 0920    pantoprazole (PROTONIX) EC tablet 40 mg, 40 mg, Oral, Daily Before Breakfast, STACEY Anglin-C, 40 mg at 01/28/25 0536    potassium chloride (Klor-Con M20) CR tablet 20 mEq, 20 mEq, Oral, Once, STACEY Ge-C    potassium chloride (Klor-Con M20) CR tablet 40 mEq, 40 mEq, Oral, Once, STACEY Ge-JEAN-PAUL    predniSONE tablet 40 mg, 40 mg, Oral, Daily, Olya Carrillo MD, 40 mg at 01/28/25 0921    rivaroxaban (XARELTO) tablet 15 mg, 15 mg, Oral, Daily With Dinner, STACEY Anglin-C, 15 mg at 01/27/25 1820    senna (SENOKOT) tablet 8.6 mg, 1 tablet, Oral, HS PRN, STACEY Anglin-C    sodium chloride 0.9 % bolus 250 mL, 250 mL, Intravenous, Once, Loli Collado PA-C    thiamine tablet 100 mg, 100 mg, Oral, Daily, STACEY Anglin-C, 100 mg at 01/28/25 0920    trimethobenzamide (TIGAN) IM injection 200 mg, 200 mg, Intramuscular, Q12H PRN, Usha Quinn PA-C    REVIEW OF SYSTEMS:  All the systems were reviewed and were negative except as documented on the HPI.    PHYSICAL EXAM:  Current Weight: Weight - Scale: 91.9 kg (202 lb 9.6 oz)  First Weight: Weight - Scale: 95.7 kg (211 lb 1.1 oz)  Vitals:    01/27/25 2137 01/28/25 0250 01/28/25 0423 01/28/25 0730   BP: 124/63 98/53  116/65   Pulse: 65 65  63   Resp:       Temp:  97.9 °F (36.6 °C)  97.9 °F (36.6 °C)   TempSrc:       SpO2: 90% 93%  98%   Weight:   91.9 kg (202 lb  9.6 oz)    Height:           Intake/Output Summary (Last 24 hours) at 1/28/2025 1244  Last data filed at 1/28/2025 1208  Gross per 24 hour   Intake 1060 ml   Output --   Net 1060 ml     Physical Exam  General: NAD  Neuro: alert awake  Psych: mood and affect appropriate  Skin: no rash  Eyes: anicteric  ENMT: mm moist  Neck: no masses  Respiratory: coarse breath sounds  Cardiovascular: RRR  Extremities: no edema  Gastrointestinal: soft nt nd     Lab Results:   Results from last 7 days   Lab Units 01/28/25  0440 01/28/25  0438 01/27/25  0549 01/26/25  1032 01/25/25  0952 01/25/25  0221   WBC Thousand/uL 16.60*  --  10.21* 10.33* 9.02 9.55   HEMOGLOBIN g/dL 12.7  --  11.0* 11.4* 10.9* 11.1*   HEMATOCRIT % 39.6  --  34.4* 36.6 34.1* 35.1*   PLATELETS Thousands/uL 225  --  155 141* 126* 143*   POTASSIUM mmol/L  --  2.9* 3.7 4.9 4.5 5.5*   CHLORIDE mmol/L  --  93* 97 100 101 103   CO2 mmol/L  --  33* 26 27 27 27   BUN mg/dL  --  53* 43* 32* 28* 29*   CREATININE mg/dL  --  1.45* 1.13 0.98 1.23 1.37*   CALCIUM mg/dL  --  8.7 8.3* 8.9 8.6 8.6   MAGNESIUM mg/dL  --   --   --   --  2.2  --    PHOSPHORUS mg/dL  --   --   --   --  3.7  --    ALK PHOS U/L  --   --   --   --   --  110*   ALT U/L  --   --   --   --   --  16   AST U/L  --   --   --   --   --  30

## 2025-01-28 NOTE — NURSING NOTE
"Patient began walking out in [place] telling staff that he was leaving. This nurse spoke with patient and told him that he did not currently have a discharge order. Patient was very adamant that he would not be staying. This nurse told patient that she would speak with Dr and ask them to speak with patient. Patient able to tell this nurse date, location, and why he was there. However he seemed forgetful that he had just spoken with staff about this same topic, and repeated himself. Provider  contacted. Provider spoke with patient. Encouraging patient to stay till tomorrow. Patient increasingly angry and belligerent, walking out into [place], even thought patient is a high fall risk, not willing to sit in chair or on bed, heard calling for an ambulance to take him home. Stating that he would not be staying. Attempted to have patient speak with daughter to help reorient him to situation. This nurse also spoke with patient's daughter. Patient's daughter stated that patient has a history of alcohol use, and that she was concerned he was drinking at [facility]. Provider placed order for [facility], see charting. When patient was asked when his last drink was, he stated \"months ago, maybe I had a beer\". Provider did speak with Case management but unable to set up transport back to [facility] this evening. Provider ordered in person t to 1 monitoring because patient considered an elopement risk. Per protocol trailed on virtual 1 to 1 however patient became more angry, telling staff he would not be recorded (staff explained he was not being recorded, but monitored for his safety) patient physically turning the [employer] camera around and putting it in the corner. Placed on in person 1 to 1 and patient calmed down, sat in chair and had a more normal conversation with staff. Resting in chair watching TV.  "

## 2025-01-28 NOTE — ASSESSMENT & PLAN NOTE
Home regimen: Trelegy and albuterol nebulizer as needed  Continue formulary equivalent for Trelegy and placed on Xopenex/Atrovent nebulizer 3 times daily in setting of Tamiflu requiring supplemental oxygen  Patient had bronchospasm and was on Solu-Medrol, now transitioned to oral prednisone

## 2025-01-28 NOTE — ASSESSMENT & PLAN NOTE
Lab Results   Component Value Date    EGFR 43 01/28/2025    EGFR 58 01/27/2025    EGFR 69 01/26/2025    CREATININE 1.45 (H) 01/28/2025    CREATININE 1.13 01/27/2025    CREATININE 0.98 01/26/2025   - creatinine trending up  - patient was restarted on diuretics 1/26 with torsemide and received spironolactone this am, suspect this is the culprit along with relative hypotension  - diuretics now discontinued  - losartan on hold  - discontinue jardiance   - avoid hypotension  - check PVR  - check UA  - give 250ml bolus IVF today  - no NSAID use or IV contrast  - baseline creatinine around 1.0

## 2025-01-28 NOTE — ASSESSMENT & PLAN NOTE
Endorsed cough and rhinorrhea x 2 days  Found to be influenza A positive in the ED  Also with underlying COPD, placed on Xopenex/Atrovent  Cont Tamiflu  Remains on supplemental 2L/min NC O2, home O2 eval

## 2025-01-28 NOTE — CASE MANAGEMENT
Case Management Discharge Planning Note    Patient name Trevor Lyons  Location /-01 MRN [id number]  : 1940 Date 2025       Current Admission Date: 2025  Current Admission Diagnosis:Influenza A   Patient Active Problem List    Diagnosis Date Noted Date Diagnosed    Influenza A 2025     Stage 2 chronic kidney disease 2025     Encounter for examination following treatment at hospital 2024     History of transcatheter aortic valve replacement (TAVR) 10/22/2024     Acute respiratory failure (HCC) 10/19/2024     Anemia 10/19/2024     Chronic systolic heart failure (HCC) 2024     Moderate Alzheimer's dementia, unspecified timing of dementia onset, unspecified whether behavioral, psychotic, or mood disturbance or anxiety ([facility]) 2024     Depressed 2024     Coronary artery disease involving native coronary artery of native heart without angina pectoris 2024     S/P CABG (coronary artery bypass graft) 2024     S/P TAVR (transcatheter aortic valve replacement) 2024     DDD (degenerative disc disease), lumbosacral 2024     COPD (chronic obstructive pulmonary disease) ([facility]) 2024     Recurrent falls 2024     Persistent atrial fibrillation ([facility]) 2024     Chronic combined systolic and diastolic CHF (congestive heart failure) ([facility]) 2024     Depression with anxiety 2024     NSVT (nonsustained ventricular tachycardia) ([facility]) 2024     Orthostatic hypotension 2024     Pulmonary embolism ([facility]) 2024     Cardiac pacemaker 2024     Alzheimer disease ([facility]) 2024     Pulmonary hypertension ([facility]) 2024     Sick sinus syndrome ([facility]) 2024     Dextroscoliosis 2024     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 2024     Mixed hyperlipidemia 2024     Type 2 diabetes mellitus without complication, without long-term current use of insulin ([facility]) 2024     Osteopenia of multiple sites  03/12/2024     Chronic left-sided low back pain with left-sided sciatica 03/12/2024     Aneurysm of ascending aorta without rupture (HCC) 03/12/2024     Acquired hypothyroidism 11/10/2022     Bilateral carotid bruits 11/11/2019       LOS (days): 3  Geometric Mean LOS (GMLOS) (days): 3.5  Days to GMLOS:0.3     OBJECTIVE:  Risk of Unplanned Readmission Score: 32.19         Current admission status: Inpatient   Preferred Pharmacy:   [id number] - [place], [place] - [address] [place] [address]  Phone: [phone] Fax: [phone]    [facility] [address], [facility]. - [place] [address]  Phone: [phone] Fax: [phone]    Primary Care Provider: Ira Borden DO    Primary Insurance: MEDICARE MISC REPLACEMENT  Secondary Insurance:     DISCHARGE DETAILS:     JESSICA spoke to [facility] at [facility] and she stated that there was no protocol for 1:1 and if he is at baseline he can return to facility.

## 2025-01-28 NOTE — PLAN OF CARE

## 2025-01-28 NOTE — ASSESSMENT & PLAN NOTE
Wt Readings from Last 3 Encounters:   01/28/25 91.9 kg (202 lb 9.6 oz)   01/23/25 94.3 kg (208 lb)   12/03/24 98 kg (216 lb)   - respiratory status worsened by flu  - hold diuretics  - received bolus ivf, give another gentle bolus and monitor

## 2025-01-28 NOTE — ASSESSMENT & PLAN NOTE
Lab Results   Component Value Date    EGFR 43 01/28/2025    EGFR 58 01/27/2025    EGFR 69 01/26/2025    CREATININE 1.45 (H) 01/28/2025    CREATININE 1.13 01/27/2025    CREATININE 0.98 01/26/2025   Cr has worsened to 1.45  Stop Aldactone/torsemide  Home losartan has been on hold since admission  C/s renal

## 2025-01-28 NOTE — ASSESSMENT & PLAN NOTE
Lab Results   Component Value Date    HGBA1C 6.9 (A) 12/03/2024   Blood Sugar Average: Last 72 hrs:  (P) 171  - per primary team

## 2025-01-28 NOTE — PROGRESS NOTES
Progress Note - Hospitalist   Name: Trevor Lyons 84 y.o. male I MRN: [id number]  Unit/Bed#: -01 I Date of Admission: 1/25/2025   Date of Service: 1/28/2025 I Hospital Day: 3    Assessment & Plan  Influenza A  Endorsed cough and rhinorrhea x 2 days  Found to be influenza A positive in the ED  Also with underlying COPD, placed on Xopenex/Atrovent  Cont Tamiflu  Remains on supplemental 2L/min NC O2, home O2 eval  Acute hypoxic respiratory failure (HCC)  Acute respiratory failure with hypoxia, POA due to COPD exacerbation, and influenza A as evident by hypoxia (85%RA), cough, dyspnea treated with Tamiflu, oxygen administration and nebulizer treatments.   SpO2 85% on room air  Improved on 2 L supplemental oxygen via nasal cannula  Suspect secondary to influenza A  Wean oxygen as able  NELLIE  Lab Results   Component Value Date    EGFR 43 01/28/2025    EGFR 58 01/27/2025    EGFR 69 01/26/2025    CREATININE 1.45 (H) 01/28/2025    CREATININE 1.13 01/27/2025    CREATININE 0.98 01/26/2025   Cr has worsened to 1.45  Stop Aldactone/torsemide  Home losartan has been on hold since admission  C/s renal  COPD (chronic obstructive pulmonary disease) ([facility])  Home regimen: Trelegy and albuterol nebulizer as needed  Continue formulary equivalent for Trelegy and placed on Xopenex/Atrovent nebulizer 3 times daily in setting of Tamiflu requiring supplemental oxygen  Patient had bronchospasm and was on Solu-Medrol, now transitioned to oral prednisone  Chronic combined systolic and diastolic CHF (congestive heart failure) ([facility])  Wt Readings from Last 3 Encounters:   01/28/25 91.9 kg (202 lb 9.6 oz)   01/23/25 94.3 kg (208 lb)   12/03/24 98 kg (216 lb)   Home regimen: Torsemide 20 Mg twice daily, spironolactone 25 Mg daily, Farxiga 10 Mg daily, and metoprolol succinate 25 Mg daily  Last echo 10/2024: LVEF 40-45%.  Global hypokinesis.  Normal functioning TAVR bioprosthetic valve  I/O and daily weights  Sodium restriction-liberalize  fluid restriction  Stop Aldactone/Torsemide with NELLIE  Type 2 diabetes mellitus without complication, without long-term current use of insulin (HCC)  Lab Results   Component Value Date    HGBA1C 6.9 (A) 12/03/2024       Recent Labs     01/27/25  1713 01/27/25  2129 01/28/25  0755 01/28/25  1123   POCGLU 268* 175* 121 166*       Blood Sugar Average: Last 72 hrs:  (P) 171Home regimen: Farxiga 10 Mg daily, Lantus 8 units with breakfast, SSI AC/at bedtime, glimepiride 1 Mg daily, and metformin 1000 mg twice daily  Currently on Lantus/Farxiga/SSI, Stop SGLT2i with NELLIE    Coronary artery disease involving native coronary artery of native heart without angina pectoris  CAD status post PCI and CABG  Continue home beta-blocker, statin  Persistent atrial fibrillation (HCC)  Cont BB/Xarelto  Cardiac pacemaker  S/p SSS  Last device interrogation 7/2024 showed normal function  Pulmonary embolism (HCC)  Continue home Xarelto  Acquired hypothyroidism  Cont Synthroid   Depression with anxiety  Continue home BuSpar, Cymbalta, and Klonopin as needed    VTE Pharmacologic Prophylaxis: VTE Score: 7 Xarelto    Mobility:   Basic Mobility Inpatient Raw Score: 23  JH-HLM Goal: 7: Walk 25 feet or more  JH-HLM Achieved: 8: Walk 250 feet ot more  JH-HLM Goal achieved. Continue to encourage appropriate mobility.    Patient Centered Rounds: I performed bedside rounds with nursing staff today.     Current Length of Stay: 3 day(s)  Current Patient Status: Inpatient   Certification Statement: The patient will continue to require additional inpatient hospital stay due to NELLIE  Discharge Plan: Anticipate discharge in 24-48 hrs to home.    Code Status: Level 1 - Full Code    Subjective   Denies chest pain or shortness of breath.    Objective :  Temp:  [97.5 °F (36.4 °C)-97.9 °F (36.6 °C)] 97.9 °F (36.6 °C)  HR:  [63-65] 63  BP: ()/(53-70) 116/65  Resp:  [17] 17  SpO2:  [90 %-98 %] 98 %  O2 Device: Nasal cannula  Nasal Cannula O2 Flow Rate (L/min):   [1.5 L/min-2 L/min] 2 L/min    Body mass index is 30.81 kg/m².     Input and Output Summary (last 24 hours):     Intake/Output Summary (Last 24 hours) at 1/28/2025 1150  Last data filed at 1/28/2025 0756  Gross per 24 hour   Intake 700 ml   Output --   Net 700 ml       Physical Exam  Gen: NAD, AAOx3, well developed, well nourished  Eyes: EOMI, PERRLA, no scleral icterus  ENMT:  no nasal discharge, no otic discharge, moist mucous membranes  Neck:  Supple  Cardiovascular:  Regular rate and rhythm, normal S1-S2, no murmurs, rubs, or gallops  Lungs:  B/L rhonchi  Abdomen:  Positive bowel sounds, soft, nontender, nondistended  Skin:  Intact, no obvious lesions or rashes, no edema  Neuro: Cranial nerves 2-12 are intact, non-focal, moves all 4 extremities      Lines/Drains:              Lab Results: I have reviewed the following results:   Results from last 7 days   Lab Units 01/28/25  0440 01/25/25  0952 01/25/25  0221   WBC Thousand/uL 16.60*   < > 9.55   HEMOGLOBIN g/dL 12.7   < > 11.1*   HEMATOCRIT % 39.6   < > 35.1*   PLATELETS Thousands/uL 225   < > 143*   BANDS PCT %  --   --  5   SEGS PCT % 41*   < >  --    LYMPHO PCT % 50*   < > 12*   MONO PCT % 8   < > 12   EOS PCT % 0   < > 1    < > = values in this interval not displayed.     Results from last 7 days   Lab Units 01/28/25  0438 01/25/25  0952 01/25/25  0221   SODIUM mmol/L 140   < > 137   POTASSIUM mmol/L 2.9*   < > 5.5*   CHLORIDE mmol/L 93*   < > 103   CO2 mmol/L 33*   < > 27   BUN mg/dL 53*   < > 29*   CREATININE mg/dL 1.45*   < > 1.37*   ANION GAP mmol/L 14*   < > 7   CALCIUM mg/dL 8.7   < > 8.6   ALBUMIN g/dL  --   --  3.9   TOTAL BILIRUBIN mg/dL  --   --  0.68   ALK PHOS U/L  --   --  110*   ALT U/L  --   --  16   AST U/L  --   --  30   GLUCOSE RANDOM mg/dL 110   < > 120    < > = values in this interval not displayed.         Results from last 7 days   Lab Units 01/28/25  1123 01/28/25  0755 01/27/25  2129 01/27/25  1713 01/27/25  1115 01/27/25  0739  01/26/25 2057 01/26/25  1619 01/26/25  1054 01/26/25  0711 01/25/25 2053 01/25/25  1613   POC GLUCOSE mg/dl 166* 121 175* 268* 171* 216* 232* 210* 151* 114 181* 142*         Results from last 7 days   Lab Units 01/25/25  0221   PROCALCITONIN ng/ml 0.13       Recent Cultures (last 7 days):         Imaging Results Review: I reviewed radiology reports from this admission including: chest xray.    Last 24 Hours Medication List:     Current Facility-Administered Medications:     acetaminophen (TYLENOL) tablet 650 mg, Q6H PRN    aluminum-magnesium hydroxide-simethicone (MAALOX) oral suspension 30 mL, Q6H PRN    atorvastatin (LIPITOR) tablet 20 mg, Daily With Dinner    benzonatate (TESSALON PERLES) capsule 100 mg, TID    budesonide (PULMICORT) inhalation solution 0.5 mg, Q12H    busPIRone (BUSPAR) tablet 7.5 mg, BID    clonazePAM (KlonoPIN) tablet 1 mg, BID PRN    donepezil (ARICEPT) tablet 10 mg, QAM    DULoxetine (CYMBALTA) delayed release capsule 60 mg, Daily    Empagliflozin (JARDIANCE) tablet 10 mg, Daily    ferrous sulfate tablet 325 mg, Daily With Breakfast    fluticasone (FLONASE) 50 mcg/act nasal spray 2 spray, Daily    [Held by provider] Fluticasone Furoate-Vilanterol 100-25 mcg/actuation 1 puff, Daily **AND** [Held by provider] umeclidinium 62.5 mcg/actuation inhaler AEPB 1 puff, Daily    folic acid (FOLVITE) tablet 1,000 mcg, QAM    guaiFENesin (MUCINEX) 12 hr tablet 1,200 mg, Q12H MINNIE    insulin glargine (LANTUS) subcutaneous injection 8 Units 0.08 mL, Daily With Breakfast    insulin lispro (HumALOG/ADMELOG) 100 units/mL subcutaneous injection 1-5 Units, HS    insulin lispro (HumALOG/ADMELOG) 100 units/mL subcutaneous injection 1-6 Units, TID AC **AND** Fingerstick Glucose (POCT), TID AC    levalbuterol (XOPENEX) inhalation solution 1.25 mg, TID    levothyroxine tablet 137 mcg, Early Morning    loratadine (CLARITIN) tablet 10 mg, QAM    [Held by provider] losartan (COZAAR) tablet 25 mg, Daily    magnesium  Oxide (MAG-OX) tablet 400 mg, BID    melatonin tablet 9 mg, HS    metoprolol succinate (TOPROL-XL) 24 hr tablet 25 mg, QAM    oseltamivir (TAMIFLU) capsule 30 mg, Q12H MINNIE    pantoprazole (PROTONIX) EC tablet 40 mg, Daily Before Breakfast    predniSONE tablet 40 mg, Daily    rivaroxaban (XARELTO) tablet 15 mg, Daily With Dinner    senna (SENOKOT) tablet 8.6 mg, HS PRN    spironolactone (ALDACTONE) tablet 25 mg, Daily    thiamine tablet 100 mg, Daily    torsemide (DEMADEX) tablet 20 mg, BID (diuretic)    trimethobenzamide (TIGAN) IM injection 200 mg, Q12H PRN    Administrative Statements   Today, Patient Was Seen By: Jan Eason MD      **Please Note: This note may have been constructed using a voice recognition system.**

## 2025-01-29 LAB
ANION GAP SERPL CALCULATED.3IONS-SCNC: 7 MMOL/L (ref 4–13)
BUN SERPL-MCNC: 53 MG/DL (ref 5–25)
CALCIUM SERPL-MCNC: 8.9 MG/DL (ref 8.4–10.2)
CHLORIDE SERPL-SCNC: 99 MMOL/L (ref 96–108)
CO2 SERPL-SCNC: 35 MMOL/L (ref 21–32)
CREAT SERPL-MCNC: 1.22 MG/DL (ref 0.6–1.3)
GFR SERPL CREATININE-BSD FRML MDRD: 53 ML/MIN/1.73SQ M
GLUCOSE SERPL-MCNC: 123 MG/DL (ref 65–140)
GLUCOSE SERPL-MCNC: 136 MG/DL (ref 65–140)
GLUCOSE SERPL-MCNC: 202 MG/DL (ref 65–140)
GLUCOSE SERPL-MCNC: 226 MG/DL (ref 65–140)
GLUCOSE SERPL-MCNC: 227 MG/DL (ref 65–140)
POTASSIUM SERPL-SCNC: 3.6 MMOL/L (ref 3.5–5.3)
SODIUM SERPL-SCNC: 141 MMOL/L (ref 135–147)

## 2025-01-29 PROCEDURE — 94760 N-INVAS EAR/PLS OXIMETRY 1: CPT

## 2025-01-29 PROCEDURE — 80048 BASIC METABOLIC PNL TOTAL CA: CPT | Performed by: INTERNAL MEDICINE

## 2025-01-29 PROCEDURE — 94640 AIRWAY INHALATION TREATMENT: CPT

## 2025-01-29 PROCEDURE — 82948 REAGENT STRIP/BLOOD GLUCOSE: CPT

## 2025-01-29 PROCEDURE — 99232 SBSQ HOSP IP/OBS MODERATE 35: CPT | Performed by: INTERNAL MEDICINE

## 2025-01-29 PROCEDURE — 99232 SBSQ HOSP IP/OBS MODERATE 35: CPT | Performed by: PHYSICIAN ASSISTANT

## 2025-01-29 RX ORDER — PREDNISONE 20 MG/1
40 TABLET ORAL DAILY
Qty: 6 TABLET | Refills: 0 | Status: SHIPPED | OUTPATIENT
Start: 2025-01-30 | End: 2025-02-02

## 2025-01-29 RX ORDER — BENZONATATE 100 MG/1
100 CAPSULE ORAL 3 TIMES DAILY PRN
Qty: 20 CAPSULE | Refills: 0 | Status: SHIPPED | OUTPATIENT
Start: 2025-01-29

## 2025-01-29 RX ORDER — POTASSIUM CHLORIDE 1500 MG/1
40 TABLET, EXTENDED RELEASE ORAL ONCE
Status: COMPLETED | OUTPATIENT
Start: 2025-01-29 | End: 2025-01-29

## 2025-01-29 RX ORDER — LEVALBUTEROL INHALATION SOLUTION 1.25 MG/3ML
1.25 SOLUTION RESPIRATORY (INHALATION)
Status: DISCONTINUED | OUTPATIENT
Start: 2025-01-29 | End: 2025-01-30 | Stop reason: HOSPADM

## 2025-01-29 RX ORDER — LIDOCAINE 50 MG/G
1 PATCH TOPICAL DAILY
Status: DISCONTINUED | OUTPATIENT
Start: 2025-01-30 | End: 2025-01-30 | Stop reason: HOSPADM

## 2025-01-29 RX ADMIN — MELATONIN 9 MG: 3 TAB ORAL at 21:26

## 2025-01-29 RX ADMIN — ATORVASTATIN CALCIUM 20 MG: 20 TABLET, FILM COATED ORAL at 16:33

## 2025-01-29 RX ADMIN — BUDESONIDE 0.5 MG: 0.5 INHALANT ORAL at 07:24

## 2025-01-29 RX ADMIN — MAGNESIUM OXIDE TAB 400 MG (241.3 MG ELEMENTAL MG) 400 MG: 400 (241.3 MG) TAB at 08:49

## 2025-01-29 RX ADMIN — METOPROLOL SUCCINATE 25 MG: 25 TABLET, FILM COATED, EXTENDED RELEASE ORAL at 08:42

## 2025-01-29 RX ADMIN — GUAIFENESIN 1200 MG: 600 TABLET, EXTENDED RELEASE ORAL at 21:26

## 2025-01-29 RX ADMIN — GUAIFENESIN 1200 MG: 600 TABLET, EXTENDED RELEASE ORAL at 08:42

## 2025-01-29 RX ADMIN — BUSPIRONE HYDROCHLORIDE 7.5 MG: 15 TABLET ORAL at 08:43

## 2025-01-29 RX ADMIN — OSELTAMIVIR 30 MG: 30 CAPSULE ORAL at 08:44

## 2025-01-29 RX ADMIN — LORATADINE 10 MG: 10 TABLET ORAL at 08:42

## 2025-01-29 RX ADMIN — BENZONATATE 100 MG: 100 CAPSULE ORAL at 08:42

## 2025-01-29 RX ADMIN — INSULIN GLARGINE 8 UNITS: 100 INJECTION, SOLUTION SUBCUTANEOUS at 08:43

## 2025-01-29 RX ADMIN — POTASSIUM CHLORIDE 40 MEQ: 1500 TABLET, EXTENDED RELEASE ORAL at 16:33

## 2025-01-29 RX ADMIN — PREDNISONE 40 MG: 20 TABLET ORAL at 08:42

## 2025-01-29 RX ADMIN — DULOXETINE HYDROCHLORIDE 60 MG: 30 CAPSULE, DELAYED RELEASE ORAL at 08:43

## 2025-01-29 RX ADMIN — BUSPIRONE HYDROCHLORIDE 7.5 MG: 15 TABLET ORAL at 16:34

## 2025-01-29 RX ADMIN — LEVALBUTEROL HYDROCHLORIDE 1.25 MG: 1.25 SOLUTION RESPIRATORY (INHALATION) at 19:22

## 2025-01-29 RX ADMIN — OSELTAMIVIR 30 MG: 30 CAPSULE ORAL at 21:26

## 2025-01-29 RX ADMIN — BENZONATATE 100 MG: 100 CAPSULE ORAL at 16:34

## 2025-01-29 RX ADMIN — LEVOTHYROXINE SODIUM 137 MCG: 25 TABLET ORAL at 05:06

## 2025-01-29 RX ADMIN — RIVAROXABAN 15 MG: 15 TABLET, FILM COATED ORAL at 16:33

## 2025-01-29 RX ADMIN — INSULIN LISPRO 2 UNITS: 100 INJECTION, SOLUTION INTRAVENOUS; SUBCUTANEOUS at 12:06

## 2025-01-29 RX ADMIN — BENZONATATE 100 MG: 100 CAPSULE ORAL at 21:26

## 2025-01-29 RX ADMIN — CLONAZEPAM 1 MG: 1 TABLET ORAL at 14:31

## 2025-01-29 RX ADMIN — DONEPEZIL HYDROCHLORIDE 10 MG: 5 TABLET ORAL at 08:43

## 2025-01-29 RX ADMIN — THIAMINE HCL TAB 100 MG 100 MG: 100 TAB at 08:33

## 2025-01-29 RX ADMIN — Medication 2.5 MG: at 16:33

## 2025-01-29 RX ADMIN — FERROUS SULFATE TAB 325 MG (65 MG ELEMENTAL FE) 325 MG: 325 (65 FE) TAB at 08:43

## 2025-01-29 RX ADMIN — BUDESONIDE 0.5 MG: 0.5 INHALANT ORAL at 19:22

## 2025-01-29 RX ADMIN — INSULIN LISPRO 2 UNITS: 100 INJECTION, SOLUTION INTRAVENOUS; SUBCUTANEOUS at 16:38

## 2025-01-29 RX ADMIN — ACETAMINOPHEN 650 MG: 325 TABLET, FILM COATED ORAL at 14:31

## 2025-01-29 RX ADMIN — FOLIC ACID 1000 MCG: 1 TABLET ORAL at 08:42

## 2025-01-29 RX ADMIN — INSULIN LISPRO 2 UNITS: 100 INJECTION, SOLUTION INTRAVENOUS; SUBCUTANEOUS at 21:28

## 2025-01-29 RX ADMIN — MAGNESIUM OXIDE TAB 400 MG (241.3 MG ELEMENTAL MG) 400 MG: 400 (241.3 MG) TAB at 16:33

## 2025-01-29 RX ADMIN — PANTOPRAZOLE SODIUM 40 MG: 40 TABLET, DELAYED RELEASE ORAL at 05:05

## 2025-01-29 RX ADMIN — LEVALBUTEROL HYDROCHLORIDE 1.25 MG: 1.25 SOLUTION RESPIRATORY (INHALATION) at 07:24

## 2025-01-29 NOTE — PROGRESS NOTES
Progress Note - Hospitalist   Name: Trevor Lyons 84 y.o. male I MRN: [id number]  Unit/Bed#: -01 I Date of Admission: 1/25/2025   Date of Service: 1/29/2025 I Hospital Day: 4    Assessment & Plan  Influenza A  Endorsed cough and rhinorrhea x 2 days  Found to be influenza A positive in the ED  Also with underlying COPD, placed on Xopenex/Atrovent  Completed course of Tamiflu while inpatient  Stable for discharge back to assisted living pending home O2 set up  Acute hypoxic respiratory failure (HCC)  Acute respiratory failure with hypoxia, POA due to COPD exacerbation, and influenza A as evident by hypoxia (85%RA), cough, dyspnea treated with Tamiflu, oxygen administration and nebulizer treatments.   SpO2 85% on room air  Improved on 2 L supplemental oxygen via nasal cannula  Suspect secondary to influenza A  Home O2 eval completed, patient requires 2 L nasal cannula - awaiting home O2 equipment delivery   NELLIE  Lab Results   Component Value Date    EGFR 53 01/29/2025    EGFR 43 01/28/2025    EGFR 58 01/27/2025    CREATININE 1.22 01/29/2025    CREATININE 1.45 (H) 01/28/2025    CREATININE 1.13 01/27/2025   Cr had worsened to 1.45 yesterday  Torsemide and spironolactone were held.  Losartan had been on hold since admission  Creatinine improved to 1.22  Per nephrology, recommend resuming diuretics Friday, 1/31  Recommend outpatient BMP  COPD (chronic obstructive pulmonary disease) ([facility])  Home regimen: Trelegy and albuterol nebulizer as needed  Continue formulary equivalent for Trelegy and placed on Xopenex/Atrovent nebulizer 3 times daily in setting of Tamiflu requiring supplemental oxygen  Patient had bronchospasm and was on Solu-Medrol, now transitioned to oral prednisone -complete 3 more days  NELLIE (acute kidney injury) ([facility])  Creatinine worsened to 1.45 yesterday  Diuretics were held  Creatinine improved, plan to resume diuretics as outpatient on Friday 1/31  Monitor BMP as outpatient  Chronic combined  systolic and diastolic CHF (congestive heart failure) ([facility])  Wt Readings from Last 3 Encounters:   01/29/25 91.1 kg (200 lb 12.8 oz)   01/23/25 94.3 kg (208 lb)   12/03/24 98 kg (216 lb)   Home regimen: Torsemide 20 Mg twice daily, spironolactone 25 Mg daily, Farxiga 10 Mg daily, and metoprolol succinate 25 Mg daily  Last echo 10/2024: LVEF 40-45%.  Global hypokinesis.  Normal functioning TAVR bioprosthetic valve  Torsemide and spironolactone held yesterday due to worsening renal function  Resume diuretics Friday, 1/31 per nephrology  Type 2 diabetes mellitus without complication, without long-term current use of insulin ([facility])  Lab Results   Component Value Date    HGBA1C 6.9 (A) 12/03/2024       Recent Labs     01/28/25  1546 01/28/25  2106 01/29/25  0705 01/29/25  1114   POCGLU 259* 216* 123 202*       Blood Sugar Average: Last 72 hrs:  (P) [id number] regimen: Farxiga 10 Mg daily, Lantus 8 units with breakfast, SSI AC/at bedtime, glimepiride 1 Mg daily, and metformin 1000 mg twice daily  Continue home regimen on discharge    Coronary artery disease involving native coronary artery of native heart without angina pectoris  CAD status post PCI and CABG  Continue home beta-blocker, statin  Persistent atrial fibrillation (HCC)  Cont BB/Xarelto  Cardiac pacemaker  S/p SSS  Last device interrogation 7/2024 showed normal function  Pulmonary embolism (HCC)  Continue home Xarelto  Acquired hypothyroidism  Cont Synthroid   Depression with anxiety  Continue home BuSpar, Cymbalta, and Klonopin as needed  Hypokalemia  Repleted  Potassium 3.6 this morning  Metabolic alkalosis      VTE Pharmacologic Prophylaxis: VTE Score: 7 High Risk (Score >/= 5) - Pharmacological DVT Prophylaxis Ordered: rivaroxaban (Xarelto). Sequential Compression Devices Ordered.    Mobility:   Basic Mobility Inpatient Raw Score: 23  JH-HLM Goal: 7: Walk 25 feet or more  JH-HLM Achieved: 7: Walk 25 feet or more  JH-HLM Goal achieved. Continue  to encourage appropriate mobility.    Patient Centered Rounds: I performed bedside rounds with nursing staff today.   Discussions with Specialists or Other Care Team Provider: CM, nephrology AP    Education and Discussions with Family / Patient: Updated  (daughter) via phone.    Current Length of Stay: 4 day(s)  Current Patient Status: Inpatient   Certification Statement: The patient will continue to require additional inpatient hospital stay due to awaiting home O2 equipment  Discharge Plan: Anticipate discharge later today or tomorrow to prior assisted or independent living facility.    Code Status: Level 1 - Full Code    Subjective   Patient feels well, eager for discharge back to assisted living.  Occasional dry cough.  Denies chest pain/palpitations, shortness of breath, nausea/vomiting, abdominal pain, fever/chills.    Objective :  Temp:  [97.5 °F (36.4 °C)-98.1 °F (36.7 °C)] 97.5 °F (36.4 °C)  HR:  [60-67] 62  BP: (117-125)/(65-68) 125/66  Resp:  [18] 18  SpO2:  [91 %-100 %] 99 %  O2 Device: Nasal cannula  Nasal Cannula O2 Flow Rate (L/min):  [2 L/min] 2 L/min    Body mass index is 30.53 kg/m².     Input and Output Summary (last 24 hours):     Intake/Output Summary (Last 24 hours) at 1/29/2025 1455  Last data filed at 1/29/2025 0930  Gross per 24 hour   Intake 1680 ml   Output 1850 ml   Net -170 ml       Physical Exam  Vitals and nursing note reviewed.   Constitutional:       Appearance: Normal appearance.      Interventions: Nasal cannula in place.      Comments: No acute distress   HENT:      Head: Normocephalic.   Eyes:      General: No scleral icterus.     Extraocular Movements: Extraocular movements intact.      Conjunctiva/sclera: Conjunctivae normal.   Cardiovascular:      Rate and Rhythm: Normal rate and regular rhythm.      Heart sounds: Normal heart sounds. No murmur heard.  Pulmonary:      Effort: Pulmonary effort is normal. No respiratory distress.      Breath sounds: Decreased  breath sounds present.   Abdominal:      General: Bowel sounds are normal.      Palpations: Abdomen is soft.      Tenderness: There is no abdominal tenderness. There is no guarding or rebound.   Musculoskeletal:      Cervical back: Normal range of motion.      Comments: Able to move upper/lower ext bilaterally, no edema   Skin:     General: Skin is warm and dry.   Neurological:      Mental Status: He is alert and oriented to person, place, and time.   Psychiatric:         Mood and Affect: Mood normal.         Speech: Speech normal.         Behavior: Behavior normal.           Lines/Drains:              Lab Results: I have reviewed the following results:   Results from last 7 days   Lab Units 01/28/25  0440 01/25/25  0952 01/25/25  0221   WBC Thousand/uL 16.60*   < > 9.55   HEMOGLOBIN g/dL 12.7   < > 11.1*   HEMATOCRIT % 39.6   < > 35.1*   PLATELETS Thousands/uL 225   < > 143*   BANDS PCT %  --   --  5   SEGS PCT % 41*   < >  --    LYMPHO PCT % 50*   < > 12*   MONO PCT % 8   < > 12   EOS PCT % 0   < > 1    < > = values in this interval not displayed.     Results from last 7 days   Lab Units 01/29/25  0505 01/25/25  0952 01/25/25  0221   SODIUM mmol/L 141   < > 137   POTASSIUM mmol/L 3.6   < > 5.5*   CHLORIDE mmol/L 99   < > 103   CO2 mmol/L 35*   < > 27   BUN mg/dL 53*   < > 29*   CREATININE mg/dL 1.22   < > 1.37*   ANION GAP mmol/L 7   < > 7   CALCIUM mg/dL 8.9   < > 8.6   ALBUMIN g/dL  --   --  3.9   TOTAL BILIRUBIN mg/dL  --   --  0.68   ALK PHOS U/L  --   --  110*   ALT U/L  --   --  16   AST U/L  --   --  30   GLUCOSE RANDOM mg/dL 136   < > 120    < > = values in this interval not displayed.         Results from last 7 days   Lab Units 01/29/25  1114 01/29/25  0705 01/28/25  2106 01/28/25  1546 01/28/25  1123 01/28/25  0755 01/27/25  2129 01/27/25  1713 01/27/25  1115 01/27/25  0739 01/26/25 2057 01/26/25  1619   POC GLUCOSE mg/dl 202* 123 216* 259* 166* 121 175* 268* 171* 216* 232* 210*         Results from  last 7 days   Lab Units 01/25/25  0221   PROCALCITONIN ng/ml 0.13       Recent Cultures (last 7 days):         Imaging Results Review: I reviewed radiology reports from this admission including: chest xray.  Other Study Results Review: No additional pertinent studies reviewed.    Last 24 Hours Medication List:     Current Facility-Administered Medications:     acetaminophen (TYLENOL) tablet 650 mg, Q6H PRN    aluminum-magnesium hydroxide-simethicone (MAALOX) oral suspension 30 mL, Q6H PRN    atorvastatin (LIPITOR) tablet 20 mg, Daily With Dinner    benzonatate (TESSALON PERLES) capsule 100 mg, TID    budesonide (PULMICORT) inhalation solution 0.5 mg, Q12H    busPIRone (BUSPAR) tablet 7.5 mg, BID    clonazePAM (KlonoPIN) tablet 1 mg, BID PRN    donepezil (ARICEPT) tablet 10 mg, QAM    DULoxetine (CYMBALTA) delayed release capsule 60 mg, Daily    ferrous sulfate tablet 325 mg, Daily With Breakfast    fluticasone (FLONASE) 50 mcg/act nasal spray 2 spray, Daily    [Held by provider] Fluticasone Furoate-Vilanterol 100-25 mcg/actuation 1 puff, Daily **AND** [Held by provider] umeclidinium 62.5 mcg/actuation inhaler AEPB 1 puff, Daily    folic acid (FOLVITE) tablet 1,000 mcg, QAM    guaiFENesin (MUCINEX) 12 hr tablet 1,200 mg, Q12H MINNIE    insulin glargine (LANTUS) subcutaneous injection 8 Units 0.08 mL, Daily With Breakfast    insulin lispro (HumALOG/ADMELOG) 100 units/mL subcutaneous injection 1-5 Units, HS    insulin lispro (HumALOG/ADMELOG) 100 units/mL subcutaneous injection 1-6 Units, TID AC **AND** Fingerstick Glucose (POCT), TID AC    levalbuterol (XOPENEX) inhalation solution 1.25 mg, Q12H    levothyroxine tablet 137 mcg, Early Morning    loratadine (CLARITIN) tablet 10 mg, QAM    [Held by provider] losartan (COZAAR) tablet 25 mg, Daily    magnesium Oxide (MAG-OX) tablet 400 mg, BID    melatonin tablet 9 mg, HS    metoprolol succinate (TOPROL-XL) 24 hr tablet 25 mg, QAM    oseltamivir (TAMIFLU) capsule 30 mg, Q12H  MINNIE    pantoprazole (PROTONIX) EC tablet 40 mg, Daily Before Breakfast    predniSONE tablet 40 mg, Daily    rivaroxaban (XARELTO) tablet 15 mg, Daily With Dinner    senna (SENOKOT) tablet 8.6 mg, HS PRN    thiamine tablet 100 mg, Daily    trimethobenzamide (TIGAN) IM injection 200 mg, Q12H PRN    Administrative Statements   Today, Patient Was Seen By: Corrie Padilla PA-C  I have spent a total time of 35 minutes in caring for this patient on the day of the visit/encounter including Diagnostic results, Instructions for management, Patient and family education, Importance of tx compliance, Risk factor reductions, Impressions, Counseling / Coordination of care, Documenting in the medical record, Reviewing / ordering tests, medicine, procedures  , Obtaining or reviewing history  , and Communicating with other healthcare professionals .    **Please Note: This note may have been constructed using a voice recognition system.**

## 2025-01-29 NOTE — ASSESSMENT & PLAN NOTE
Lab Results   Component Value Date    HGBA1C 6.9 (A) 12/03/2024       Recent Labs     01/29/25  1114 01/29/25  1632 01/29/25  2108 01/30/25  0700   POCGLU 202* 227* 226* 129       Blood Sugar Average: Last 72 hrs:  (P) [id number] regimen: Farxiga 10 Mg daily, Lantus 8 units with breakfast, SSI AC/at bedtime, glimepiride 1 Mg daily, and metformin 1000 mg twice daily  Continue home regimen on discharge

## 2025-01-29 NOTE — ASSESSMENT & PLAN NOTE
Creatinine worsened to 1.45 yesterday  Diuretics were held  Creatinine improved, plan to resume diuretics as outpatient on Friday 1/31  Monitor BMP as outpatient

## 2025-01-29 NOTE — ASSESSMENT & PLAN NOTE
Home regimen: Trelegy and albuterol nebulizer as needed  Continue formulary equivalent for Trelegy and placed on Xopenex/Atrovent nebulizer 3 times daily in setting of Tamiflu requiring supplemental oxygen  Patient had bronchospasm and was on Solu-Medrol, now transitioned to oral prednisone -complete 3 more days

## 2025-01-29 NOTE — ASSESSMENT & PLAN NOTE
Lab Results   Component Value Date    EGFR 64 01/30/2025    EGFR 53 01/29/2025    EGFR 43 01/28/2025    CREATININE 1.04 01/30/2025    CREATININE 1.22 01/29/2025    CREATININE 1.45 (H) 01/28/2025   Cr had worsened to 1.45 yesterday  Torsemide and spironolactone were held.  Losartan had been on hold since admission  Creatinine improved to 1.22  Per nephrology, recommend resuming diuretics Friday, 1/31  Recommend outpatient BMP

## 2025-01-29 NOTE — DISCHARGE INSTR - AVS FIRST PAGE
Follow-up with PCP within 1 week for posthospitalization follow-up  Nephrology recommends resuming diuretics Friday, 1/31/25    Return to the emergency department for further evaluation with any chest pain/palpitations, worsening shortness of breath, nausea/vomiting, abdominal pain, fever/chills.

## 2025-01-29 NOTE — DISCHARGE SUMMARY
Discharge Summary - Hospitalist   Name: Trevor Lyons 84 y.o. male I MRN: [id number]  Unit/Bed#: -01 I Date of Admission: 1/25/2025   Date of Service: 1/30/2025 I Hospital Day: 5     Assessment & Plan  Influenza A  Endorsed cough and rhinorrhea x 2 days  Found to be influenza A positive in the ED  Also with underlying COPD, placed on Xopenex/Atrovent  Completed course of Tamiflu while inpatient  Stable for discharge back to assisted living today  Acute hypoxic respiratory failure (HCC)  Acute respiratory failure with hypoxia, POA due to COPD exacerbation, and influenza A as evident by hypoxia (85%RA), cough, dyspnea treated with Tamiflu, oxygen administration and nebulizer treatments.   SpO2 85% on room air  Improved on 2 L supplemental oxygen via nasal cannula  Suspect secondary to influenza A  Home O2 eval completed, patient requires 2 L nasal cannula  NELLIE  Lab Results   Component Value Date    EGFR 64 01/30/2025    EGFR 53 01/29/2025    EGFR 43 01/28/2025    CREATININE 1.04 01/30/2025    CREATININE 1.22 01/29/2025    CREATININE 1.45 (H) 01/28/2025   Cr had worsened to 1.45 yesterday  Torsemide and spironolactone were held.  Losartan had been on hold since admission  Creatinine improved to 1.22  Per nephrology, recommend resuming diuretics Friday, 1/31  Recommend outpatient BMP  COPD (chronic obstructive pulmonary disease) ([facility])  Home regimen: Trelegy and albuterol nebulizer as needed  Continue formulary equivalent for Trelegy and placed on Xopenex/Atrovent nebulizer 3 times daily in setting of Tamiflu requiring supplemental oxygen  Patient had bronchospasm and was on Solu-Medrol, now transitioned to oral prednisone -complete 3 more days  NELLIE (acute kidney injury) ([facility])  Creatinine worsened to 1.45 yesterday  Diuretics were held  Creatinine improved, plan to resume diuretics as outpatient on Friday 1/31  Monitor BMP as outpatient  Chronic combined systolic and diastolic CHF (congestive heart failure)  (HCC)  Wt Readings from Last 3 Encounters:   01/30/25 92.7 kg (204 lb 6.4 oz)   01/23/25 94.3 kg (208 lb)   12/03/24 98 kg (216 lb)   Home regimen: Torsemide 20 Mg twice daily, spironolactone 25 Mg daily, Farxiga 10 Mg daily, and metoprolol succinate 25 Mg daily  Last echo 10/2024: LVEF 40-45%.  Global hypokinesis.  Normal functioning TAVR bioprosthetic valve  Torsemide and spironolactone held yesterday due to worsening renal function  Resume diuretics Friday, 1/31 per nephrology  Type 2 diabetes mellitus without complication, without long-term current use of insulin (HCC)  Lab Results   Component Value Date    HGBA1C 6.9 (A) 12/03/2024       Recent Labs     01/29/25  1114 01/29/25  1632 01/29/25  2108 01/30/25  0700   POCGLU 202* 227* 226* 129       Blood Sugar Average: Last 72 hrs:  (P) [id number] regimen: Farxiga 10 Mg daily, Lantus 8 units with breakfast, SSI AC/at bedtime, glimepiride 1 Mg daily, and metformin 1000 mg twice daily  Continue home regimen on discharge    Coronary artery disease involving native coronary artery of native heart without angina pectoris  CAD status post PCI and CABG  Continue home beta-blocker, statin  Persistent atrial fibrillation (HCC)  Cont BB/Xarelto  Cardiac pacemaker  S/p SSS  Last device interrogation 7/2024 showed normal function  Pulmonary embolism (HCC)  Continue home Xarelto  Acquired hypothyroidism  Cont Synthroid   Depression with anxiety  Continue home BuSpar, Cymbalta, and Klonopin as needed  Hypokalemia  Repleted  Potassium 3.6 this morning  Metabolic alkalosis       Medical Problems       Resolved Problems  Date Reviewed: 12/3/2024   None       Discharging Physician / Practitioner: Corrie Padilla PA-C  PCP: Ira Borden DO  Admission Date:   Admission Orders (From admission, onward)       Ordered        01/25/25 0320  INPATIENT ADMISSION  Once                          Discharge Date: 01/30/25    Consultations During Hospital  "Stay:  Nephrology    Procedures Performed:   None    Significant Findings / Test Results:   CXR: Mild pulmonary venous congestion  Influenza A positive    Incidental Findings:   None     Test Results Pending at Discharge (will require follow up):   None     Outpatient Tests Requested:  Monitor BMP as outpt    Complications:  None    Reason for Admission: Influenza A    Hospital Course:   Trevor Lyons is a 84 y.o. male patient who originally presented to the hospital on 1/25/2025 due to cough, runny nose.    Past medical history significant for cognitive dysfunction, heart failure, hypothyroidism, A-fib on Xarelto, chronic kidney disease.  Patient presented to the emergency department with worsening cough, rhinorrhea.  Patient was found to have influenza and was started on Tamiflu.  Required 2 L nasal cannula since presentation.  Briefly on IV Solu-Medrol which was weaned to oral prednisone given clinical improvement.  Developed acute kidney injury during course of hospitalization, diuretics were held and nephrology was consulted.  Creatinine improved and nephrology recommended restarting diuretics on Friday, 1/31 and monitoring BMP as outpatient.  Home O2 eval was completed and patient requires 2 L nasal cannula.  Patient stable for discharge back to assisted living today.    Please see above list of diagnoses and related plan for additional information.     Condition at Discharge: stable    Discharge Day Visit / Exam:   Subjective:  Feeling greatly improved, eager for discharge.  Vitals: Blood Pressure: 128/65 (01/30/25 0702)  Pulse: 70 (01/30/25 0702)  Temperature: 97.5 °F (36.4 °C) (01/30/25 0702)  Temp Source: Temporal (01/29/25 1605)  Respirations: 20 (01/29/25 2224)  Height: 5' 8\" (172.7 cm) (01/25/25 0411)  Weight - Scale: 92.7 kg (204 lb 6.4 oz) (01/30/25 0600)  SpO2: 97 % (01/30/25 0702)  Physical Exam  Vitals and nursing note reviewed.   Constitutional:       Appearance: Normal appearance.      " Interventions: Nasal cannula in place.      Comments: No acute distress   HENT:      Head: Normocephalic.   Eyes:      General: No scleral icterus.     Extraocular Movements: Extraocular movements intact.      Conjunctiva/sclera: Conjunctivae normal.   Cardiovascular:      Rate and Rhythm: Normal rate and regular rhythm.      Heart sounds: Normal heart sounds. No murmur heard.  Pulmonary:      Effort: Pulmonary effort is normal. No respiratory distress.      Breath sounds: Decreased breath sounds present. No wheezing, rhonchi or rales.   Abdominal:      General: Bowel sounds are normal.      Palpations: Abdomen is soft.      Tenderness: There is no abdominal tenderness. There is no guarding or rebound.   Musculoskeletal:      Cervical back: Normal range of motion.      Comments: Able to move upper/lower extremities bilaterally, no edema   Skin:     General: Skin is warm and dry.   Neurological:      Mental Status: He is alert. Mental status is at baseline.   Psychiatric:         Mood and Affect: Mood normal.         Speech: Speech normal.         Behavior: Behavior normal.          Discussion with Family: Patient declined call to .     Discharge instructions/Information to patient and family:   See after visit summary for information provided to patient and family.      Provisions for Follow-Up Care:  See after visit summary for information related to follow-up care and any pertinent home health orders.      Mobility at time of Discharge:   Basic Mobility Inpatient Raw Score: 23  JH-HLM Goal: 7: Walk 25 feet or more  JH-HLM Achieved: 7: Walk 25 feet or more  HLM Goal achieved. Continue to encourage appropriate mobility.     Disposition:   Assisted Living Facility at [facility]    Planned Readmission: None    Discharge Medications:  See after visit summary for reconciled discharge medications provided to patient and/or family.      Administrative Statements   Discharge Statement:  I have spent a  total time of 60 minutes in caring for this patient on the day of the visit/encounter. >30 minutes of time was spent on: Diagnostic results, Instructions for management, Patient and family education, Importance of tx compliance, Risk factor reductions, Impressions, Counseling / Coordination of care, Documenting in the medical record, Reviewing / ordering tests, medicine, procedures  , and Communicating with other healthcare professionals .    **Please Note: This note may have been constructed using a voice recognition system**

## 2025-01-29 NOTE — ASSESSMENT & PLAN NOTE
Wt Readings from Last 3 Encounters:   01/29/25 91.1 kg (200 lb 12.8 oz)   01/23/25 94.3 kg (208 lb)   12/03/24 98 kg (216 lb)   - respiratory status worsened by flu  - hold diuretics  - appears euvolemic currently

## 2025-01-29 NOTE — CASE MANAGEMENT
Case Management Discharge Planning Note    Patient name Trevor Lyons  Location /-01 MRN [id number]  : 1940 Date 2025       Current Admission Date: 2025  Current Admission Diagnosis:Influenza A   Patient Active Problem List    Diagnosis Date Noted Date Diagnosed    Metabolic alkalosis 2025     NELLIE (acute kidney injury) ([facility]) 2025     Influenza A 2025     NELLIE 2025     Encounter for examination following treatment at hospital 2024     History of transcatheter aortic valve replacement (TAVR) 10/22/2024     Hypokalemia 10/22/2024     Acute hypoxic respiratory failure ([facility]) 10/19/2024     Anemia 10/19/2024     Chronic systolic heart failure ([facility]) 2024     Moderate Alzheimer's dementia, unspecified timing of dementia onset, unspecified whether behavioral, psychotic, or mood disturbance or anxiety ([facility]) 2024     Depressed 2024     Coronary artery disease involving native coronary artery of native heart without angina pectoris 2024     S/P CABG (coronary artery bypass graft) 2024     S/P TAVR (transcatheter aortic valve replacement) 2024     DDD (degenerative disc disease), lumbosacral 2024     COPD (chronic obstructive pulmonary disease) ([facility]) 2024     Recurrent falls 2024     Persistent atrial fibrillation ([facility]) 2024     Chronic combined systolic and diastolic CHF (congestive heart failure) ([facility]) 2024     Depression with anxiety 2024     NSVT (nonsustained ventricular tachycardia) ([facility]) 2024     Orthostatic hypotension 2024     Pulmonary embolism ([facility]) 2024     Cardiac pacemaker 2024     Alzheimer disease ([facility]) 2024     Pulmonary hypertension ([facility]) 2024     Sick sinus syndrome ([facility]) 2024     Dextroscoliosis 2024     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 2024     Mixed hyperlipidemia 2024     Type 2 diabetes mellitus without complication,  without long-term current use of insulin (HCC) 03/12/2024     Osteopenia of multiple sites 03/12/2024     Chronic left-sided low back pain with left-sided sciatica 03/12/2024     Aneurysm of ascending aorta without rupture (HCC) 03/12/2024     Acquired hypothyroidism 11/10/2022     Bilateral carotid bruits 11/11/2019       LOS (days): 4  Geometric Mean LOS (GMLOS) (days): 3.5  Days to GMLOS:-0.9     OBJECTIVE:  Risk of Unplanned Readmission Score: 32.12         Current admission status: Inpatient   Preferred Pharmacy:   [id number] - [place], [place] - [address] [place] [address]  Phone: [phone] Fax: [phone]    [facility], [facility] - [place] [address]  Phone: [phone] Fax: [phone]    Primary Care Provider: Ira Borden DO    Primary Insurance: MEDICARE MISC REPLACEMENT  Secondary Insurance:     DISCHARGE DETAILS:     CM spoke with Debora at [facility]. Pt's home O2 concentrator and portable tanks will need to be delivered prior to discharge. CM relayed same in [place].

## 2025-01-29 NOTE — ASSESSMENT & PLAN NOTE
Lab Results   Component Value Date    EGFR 53 01/29/2025    EGFR 43 01/28/2025    EGFR 58 01/27/2025    CREATININE 1.22 01/29/2025    CREATININE 1.45 (H) 01/28/2025    CREATININE 1.13 01/27/2025   Cr had worsened to 1.45 yesterday  Torsemide and spironolactone were held.  Losartan had been on hold since admission  Creatinine improved to 1.22  Per nephrology, recommend resuming diuretics Friday, 1/31  Recommend outpatient BMP

## 2025-01-29 NOTE — PROGRESS NOTES
Progress Note - Nephrology   Name: Trevor Lyons 84 y.o. male I MRN: [id number]  Unit/Bed#: -01 I Date of Admission: 1/25/2025   Date of Service: 1/29/2025 I Hospital Day: 4     Assessment & Plan  NELLIE  Lab Results   Component Value Date    EGFR 53 01/29/2025    EGFR 43 01/28/2025    EGFR 58 01/27/2025    CREATININE 1.22 01/29/2025    CREATININE 1.45 (H) 01/28/2025    CREATININE 1.13 01/27/2025   - creatinine improving  - patient was restarted on diuretics 1/26 with torsemide and received spironolactone 1/28, suspect overdiuresis in the setting of flu + relative hypotension to be etiology of NELLIE  - diuretics now discontinued  - losartan on hold  - discontinue jardiance  - s/p low dose IVF bolus last two days  - avoid hypotension  - PVR normal  - UA okay  - no NSAID use or IV contrast  - baseline creatinine around 1.0  - okay for discharge from renal standpoint, would recommend restarting diuretics on Friday  Hypokalemia  - replete as needed and monitor  Influenza A  - on tamiflu per primary team  - on nasal cannula (not at home)  Chronic combined systolic and diastolic CHF (congestive heart failure) (HCC)  Wt Readings from Last 3 Encounters:   01/29/25 91.1 kg (200 lb 12.8 oz)   01/23/25 94.3 kg (208 lb)   12/03/24 98 kg (216 lb)   - respiratory status worsened by flu  - hold diuretics  - appears euvolemic currently  Acute hypoxic respiratory failure (HCC)  - secondary to influenza a  - per primary team  - on nasal cannula  Metabolic alkalosis  - likely secondary to diuretics  - discontinued diuretics  - monitor   Type 2 diabetes mellitus without complication, without long-term current use of insulin (HCC)  Lab Results   Component Value Date    HGBA1C 6.9 (A) 12/03/2024   Blood Sugar Average: Last 72 hrs:  (P) [id number]  - per primary team    I have reviewed the nephrology recommendations including discharge recommendations, with slim ap, and we are in agreement with renal plan including the  information outlined above. Nephrology service signing off.    Subjective   Patient feeling well.  Wants to go home and sit in his lounge chair and drink iced tea.  Denies sob.  Denies urinary complaints.     Objective :  Temp:  [97.5 °F (36.4 °C)-98.1 °F (36.7 °C)] 97.5 °F (36.4 °C)  HR:  [60-67] 62  BP: (117-125)/(65-68) 125/66  Resp:  [18] 18  SpO2:  [91 %-100 %] 99 %  O2 Device: Nasal cannula  Nasal Cannula O2 Flow Rate (L/min):  [2 L/min] 2 L/min    Current Weight: Weight - Scale: 91.1 kg (200 lb 12.8 oz)  First Weight: Weight - Scale: 95.7 kg (211 lb 1.1 oz)  I/O         01/27 0701  01/28 0700 01/28 0701  01/29 0700 01/29 0701  01/30 0700    P.O. 880 1620 600    Total Intake(mL/kg) 880 (9.6) 1620 (17.8) 600 (6.6)    Urine (mL/kg/hr) 525 (0.2) 1950 (0.9) 250 (0.7)    Total Output 525 1950 250    Net +355 -330 +350           Unmeasured Urine Occurrence 2 x            Physical Exam  General: NAD  Neuro: alert awake  Psych: mood and affect appropriate  Skin: no rash  Eyes: anicteric  ENMT: mm moist  Neck: no masses  Respiratory: + wheeze  Cardiovascular: rrr  Extremities: no edema  Gastrointestinal: soft nt nd    Medications:    Current Facility-Administered Medications:     acetaminophen (TYLENOL) tablet 650 mg, 650 mg, Oral, Q6H PRN, Usha Quinn PA-C, 650 mg at 01/28/25 1638    aluminum-magnesium hydroxide-simethicone (MAALOX) oral suspension 30 mL, 30 mL, Oral, Q6H PRN, Usha Quinn PA-C    atorvastatin (LIPITOR) tablet 20 mg, 20 mg, Oral, Daily With Dinner, Usha Quinn PA-C, 20 mg at 01/28/25 1633    benzonatate (TESSALON PERLES) capsule 100 mg, 100 mg, Oral, TID, Dana Núñez DO, 100 mg at 01/29/25 0842    budesonide (PULMICORT) inhalation solution 0.5 mg, 0.5 mg, Nebulization, Q12H, Dana Núñez DO, 0.5 mg at 01/29/25 0724    busPIRone (BUSPAR) tablet 7.5 mg, 7.5 mg, Oral, BID, Usha Quinn PA-C, 7.5 mg at 01/29/25 0843    clonazePAM (KlonoPIN) tablet 1 mg, 1 mg, Oral,  BID PRN, Olya Carrillo MD, 1 mg at 01/27/25 1517    donepezil (ARICEPT) tablet 10 mg, 10 mg, Oral, QAM, Usha Quinn PA-C, 10 mg at 01/29/25 0843    DULoxetine (CYMBALTA) delayed release capsule 60 mg, 60 mg, Oral, Daily, Usha Qiunn PA-C, 60 mg at 01/29/25 0843    ferrous sulfate tablet 325 mg, 325 mg, Oral, Daily With Breakfast, Usha Quinn PA-C, 325 mg at 01/29/25 0843    fluticasone (FLONASE) 50 mcg/act nasal spray 2 spray, 2 spray, Nasal, Daily, Usha Quinn PA-C, 2 spray at 01/28/25 0926    [Held by provider] Fluticasone Furoate-Vilanterol 100-25 mcg/actuation 1 puff, 1 puff, Inhalation, Daily, 1 puff at 01/26/25 0807 **AND** [Held by provider] umeclidinium 62.5 mcg/actuation inhaler AEPB 1 puff, 1 puff, Inhalation, Daily, Usha Quinn PA-C, 1 puff at 01/26/25 0807    folic acid (FOLVITE) tablet 1,000 mcg, 1,000 mcg, Oral, QAM, Usha Quinn PA-C, 1,000 mcg at 01/29/25 0842    guaiFENesin (MUCINEX) 12 hr tablet 1,200 mg, 1,200 mg, Oral, Q12H MINNIE, Usha Quinn PA-C, 1,200 mg at 01/29/25 0842    insulin glargine (LANTUS) subcutaneous injection 8 Units 0.08 mL, 8 Units, Subcutaneous, Daily With Breakfast, Usha Quinn PA-C, 8 Units at 01/29/25 0843    insulin lispro (HumALOG/ADMELOG) 100 units/mL subcutaneous injection 1-5 Units, 1-5 Units, Subcutaneous, HS, Usha Quinn PA-C, 1 Units at 01/28/25 2109    insulin lispro (HumALOG/ADMELOG) 100 units/mL subcutaneous injection 1-6 Units, 1-6 Units, Subcutaneous, TID AC, 3 Units at 01/28/25 1636 **AND** Fingerstick Glucose (POCT), , , TID AC, Usha Quinn PA-C    levalbuterol (XOPENEX) inhalation solution 1.25 mg, 1.25 mg, Nebulization, Q12H, Jevon Campbell MD    levothyroxine tablet 137 mcg, 137 mcg, Oral, Early Morning, Usha Quinn PA-C, 137 mcg at 01/29/25 0506    loratadine (CLARITIN) tablet 10 mg, 10 mg, Oral, QAM, Usha Quinn PA-C, 10 mg at 01/29/25 0842    [Held by provider]  losartan (COZAAR) tablet 25 mg, 25 mg, Oral, Daily, Usha Quinn PA-C    magnesium Oxide (MAG-OX) tablet 400 mg, 400 mg, Oral, BID, STACEY Anglin-C, 400 mg at 01/29/25 0849    melatonin tablet 9 mg, 9 mg, Oral, HS, STACEY Anglin-C, 9 mg at 01/28/25 2107    metoprolol succinate (TOPROL-XL) 24 hr tablet 25 mg, 25 mg, Oral, QAM, STACEY Anglin-C, 25 mg at 01/29/25 0842    oseltamivir (TAMIFLU) capsule 30 mg, 30 mg, Oral, Q12H MINNIE, STACEY Anglin-C, 30 mg at 01/29/25 0844    pantoprazole (PROTONIX) EC tablet 40 mg, 40 mg, Oral, Daily Before Breakfast, JACKELINE AnglinC, 40 mg at 01/29/25 0505    predniSONE tablet 40 mg, 40 mg, Oral, Daily, Olya Carrillo MD, 40 mg at 01/29/25 0842    rivaroxaban (XARELTO) tablet 15 mg, 15 mg, Oral, Daily With Dinner, Usha Quinn PA-C, 15 mg at 01/28/25 1633    senna (SENOKOT) tablet 8.6 mg, 1 tablet, Oral, HS PRN, STACEY Anglin-C    thiamine tablet 100 mg, 100 mg, Oral, Daily, STACEY Anglin-C, 100 mg at 01/28/25 0920    trimethobenzamide (TIGAN) IM injection 200 mg, 200 mg, Intramuscular, Q12H PRN, STACEY Anglin-C      Lab Results: I have reviewed the following results:  Results from last 7 days   Lab Units 01/29/25  0505 01/28/25  0440 01/28/25  0438 01/27/25  0549 01/26/25  1032 01/25/25  0952 01/25/25  0221   WBC Thousand/uL  --  16.60*  --  10.21* 10.33* 9.02 9.55   HEMOGLOBIN g/dL  --  12.7  --  11.0* 11.4* 10.9* 11.1*   HEMATOCRIT %  --  39.6  --  34.4* 36.6 34.1* 35.1*   PLATELETS Thousands/uL  --  225  --  155 141* 126* 143*   POTASSIUM mmol/L 3.6  --  2.9* 3.7 4.9 4.5 5.5*   CHLORIDE mmol/L 99  --  93* 97 100 101 103   CO2 mmol/L 35*  --  33* 26 27 27 27   BUN mg/dL 53*  --  53* 43* 32* 28* 29*   CREATININE mg/dL 1.22  --  1.45* 1.13 0.98 1.23 1.37*   CALCIUM mg/dL 8.9  --  8.7 8.3* 8.9 8.6 8.6   MAGNESIUM mg/dL  --   --   --   --   --  2.2  --    PHOSPHORUS mg/dL  --   --   --   --   --  3.7  --     ALBUMIN g/dL  --   --   --   --   --   --  3.9

## 2025-01-29 NOTE — ASSESSMENT & PLAN NOTE
Endorsed cough and rhinorrhea x 2 days  Found to be influenza A positive in the ED  Also with underlying COPD, placed on Xopenex/Atrovent  Completed course of Tamiflu while inpatient  Stable for discharge back to assisted living pending home O2 set up

## 2025-01-29 NOTE — ASSESSMENT & PLAN NOTE
Lab Results   Component Value Date    EGFR 53 01/29/2025    EGFR 43 01/28/2025    EGFR 58 01/27/2025    CREATININE 1.22 01/29/2025    CREATININE 1.45 (H) 01/28/2025    CREATININE 1.13 01/27/2025   - creatinine improving  - patient was restarted on diuretics 1/26 with torsemide and received spironolactone 1/28, suspect overdiuresis in the setting of flu + relative hypotension to be etiology of NELLIE  - diuretics now discontinued  - losartan on hold  - discontinue jardiance  - s/p low dose IVF bolus last two days  - avoid hypotension  - PVR normal  - UA okay  - no NSAID use or IV contrast  - baseline creatinine around 1.0  - okay for discharge from renal standpoint, would recommend restarting diuretics on Friday

## 2025-01-29 NOTE — CASE MANAGEMENT
Case Management Discharge Planning Note    Patient name Trevor Lyons  Location /-01 MRN [id number]  : 1940 Date 2025       Current Admission Date: 2025  Current Admission Diagnosis:Influenza A   Patient Active Problem List    Diagnosis Date Noted Date Diagnosed    Metabolic alkalosis 2025     NELLIE (acute kidney injury) ([facility]) 2025     Influenza A 2025     NELLIE 2025     Encounter for examination following treatment at hospital 2024     History of transcatheter aortic valve replacement (TAVR) 10/22/2024     Hypokalemia 10/22/2024     Acute hypoxic respiratory failure ([facility]) 10/19/2024     Anemia 10/19/2024     Chronic systolic heart failure ([facility]) 2024     Moderate Alzheimer's dementia, unspecified timing of dementia onset, unspecified whether behavioral, psychotic, or mood disturbance or anxiety ([facility]) 2024     Depressed 2024     Coronary artery disease involving native coronary artery of native heart without angina pectoris 2024     S/P CABG (coronary artery bypass graft) 2024     S/P TAVR (transcatheter aortic valve replacement) 2024     DDD (degenerative disc disease), lumbosacral 2024     COPD (chronic obstructive pulmonary disease) ([facility]) 2024     Recurrent falls 2024     Persistent atrial fibrillation ([facility]) 2024     Chronic combined systolic and diastolic CHF (congestive heart failure) ([facility]) 2024     Depression with anxiety 2024     NSVT (nonsustained ventricular tachycardia) ([facility]) 2024     Orthostatic hypotension 2024     Pulmonary embolism ([facility]) 2024     Cardiac pacemaker 2024     Alzheimer disease ([facility]) 2024     Pulmonary hypertension ([facility]) 2024     Sick sinus syndrome ([facility]) 2024     Dextroscoliosis 2024     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 2024     Mixed hyperlipidemia 2024     Type 2 diabetes mellitus without complication,  without long-term current use of insulin (HCC) 03/12/2024     Osteopenia of multiple sites 03/12/2024     Chronic left-sided low back pain with left-sided sciatica 03/12/2024     Aneurysm of ascending aorta without rupture (HCC) 03/12/2024     Acquired hypothyroidism 11/10/2022     Bilateral carotid bruits 11/11/2019       LOS (days): 4  Geometric Mean LOS (GMLOS) (days): 3.5  Days to GMLOS:-0.8     OBJECTIVE:  Risk of Unplanned Readmission Score: 31.51         Current admission status: Inpatient   Preferred Pharmacy:   [id number] - [place] [address]  Phone: [phone] Fax: [phone]    [facility] - [place] [address]  Phone: [phone] Fax: [phone]    Primary Care Provider: Ira Borden DO    Primary Insurance: MEDICARE MISC REPLACEMENT  Secondary Insurance:     DISCHARGE DETAILS:        CM spoke with shant and updated her that pt will be discharged today back to [facility] after a home O2 eval. Awaiting respiratory to complete home O2 eval.                                                                                 IMM Given (Date):: 01/29/25 (1043am)  IMM Given to:: Family  Family notified:: Marisa Choe

## 2025-01-29 NOTE — PLAN OF CARE

## 2025-01-29 NOTE — ASSESSMENT & PLAN NOTE
Acute respiratory failure with hypoxia, POA due to COPD exacerbation, and influenza A as evident by hypoxia (85%RA), cough, dyspnea treated with Tamiflu, oxygen administration and nebulizer treatments.   SpO2 85% on room air  Improved on 2 L supplemental oxygen via nasal cannula  Suspect secondary to influenza A  Home O2 eval completed, patient requires 2 L nasal cannula

## 2025-01-29 NOTE — ASSESSMENT & PLAN NOTE
Wt Readings from Last 3 Encounters:   01/29/25 91.1 kg (200 lb 12.8 oz)   01/23/25 94.3 kg (208 lb)   12/03/24 98 kg (216 lb)   Home regimen: Torsemide 20 Mg twice daily, spironolactone 25 Mg daily, Farxiga 10 Mg daily, and metoprolol succinate 25 Mg daily  Last echo 10/2024: LVEF 40-45%.  Global hypokinesis.  Normal functioning TAVR bioprosthetic valve  Torsemide and spironolactone held yesterday due to worsening renal function  Resume diuretics Friday, 1/31 per nephrology

## 2025-01-29 NOTE — ASSESSMENT & PLAN NOTE
Endorsed cough and rhinorrhea x 2 days  Found to be influenza A positive in the ED  Also with underlying COPD, placed on Xopenex/Atrovent  Completed course of Tamiflu while inpatient  Stable for discharge back to assisted living today

## 2025-01-29 NOTE — ASSESSMENT & PLAN NOTE
Lab Results   Component Value Date    HGBA1C 6.9 (A) 12/03/2024       Recent Labs     01/28/25  1546 01/28/25  2106 01/29/25  0705 01/29/25  1114   POCGLU 259* 216* 123 202*       Blood Sugar Average: Last 72 hrs:  (P) [id number] regimen: Farxiga 10 Mg daily, Lantus 8 units with breakfast, SSI AC/at bedtime, glimepiride 1 Mg daily, and metformin 1000 mg twice daily  Continue home regimen on discharge

## 2025-01-29 NOTE — PLAN OF CARE

## 2025-01-29 NOTE — ASSESSMENT & PLAN NOTE
Lab Results   Component Value Date    HGBA1C 6.9 (A) 12/03/2024   Blood Sugar Average: Last 72 hrs:  (P) [id number]  - per primary team

## 2025-01-29 NOTE — ASSESSMENT & PLAN NOTE
Wt Readings from Last 3 Encounters:   01/30/25 92.7 kg (204 lb 6.4 oz)   01/23/25 94.3 kg (208 lb)   12/03/24 98 kg (216 lb)   Home regimen: Torsemide 20 Mg twice daily, spironolactone 25 Mg daily, Farxiga 10 Mg daily, and metoprolol succinate 25 Mg daily  Last echo 10/2024: LVEF 40-45%.  Global hypokinesis.  Normal functioning TAVR bioprosthetic valve  Torsemide and spironolactone held yesterday due to worsening renal function  Resume diuretics Friday, 1/31 per nephrology

## 2025-01-29 NOTE — ASSESSMENT & PLAN NOTE
Acute respiratory failure with hypoxia, POA due to COPD exacerbation, and influenza A as evident by hypoxia (85%RA), cough, dyspnea treated with Tamiflu, oxygen administration and nebulizer treatments.   SpO2 85% on room air  Improved on 2 L supplemental oxygen via nasal cannula  Suspect secondary to influenza A  Home O2 eval completed, patient requires 2 L nasal cannula - awaiting home O2 equipment delivery

## 2025-01-30 ENCOUNTER — TELEPHONE (OUTPATIENT)
Dept: NEPHROLOGY | Facility: CLINIC | Age: 85
End: 2025-01-30

## 2025-01-30 VITALS
DIASTOLIC BLOOD PRESSURE: 65 MMHG | TEMPERATURE: 97.5 F | SYSTOLIC BLOOD PRESSURE: 128 MMHG | BODY MASS INDEX: 30.98 KG/M2 | WEIGHT: 204.4 LBS | HEART RATE: 70 BPM | OXYGEN SATURATION: 97 % | RESPIRATION RATE: 20 BRPM | HEIGHT: 68 IN

## 2025-01-30 DIAGNOSIS — N17.9 AKI (ACUTE KIDNEY INJURY) (HCC): ICD-10-CM

## 2025-01-30 DIAGNOSIS — N18.2 STAGE 2 CHRONIC KIDNEY DISEASE: ICD-10-CM

## 2025-01-30 DIAGNOSIS — E87.6 HYPOKALEMIA: Primary | ICD-10-CM

## 2025-01-30 LAB
ANION GAP SERPL CALCULATED.3IONS-SCNC: 8 MMOL/L (ref 4–13)
BASE EX.OXY STD BLDV CALC-SCNC: 70.5 % (ref 60–80)
BASE EXCESS BLDV CALC-SCNC: 6 MMOL/L
BUN SERPL-MCNC: 39 MG/DL (ref 5–25)
CALCIUM SERPL-MCNC: 8.7 MG/DL (ref 8.4–10.2)
CHLORIDE SERPL-SCNC: 98 MMOL/L (ref 96–108)
CO2 SERPL-SCNC: 31 MMOL/L (ref 21–32)
CREAT SERPL-MCNC: 1.04 MG/DL (ref 0.6–1.3)
DME PARACHUTE DELIVERY DATE ACTUAL: NORMAL
DME PARACHUTE DELIVERY DATE EXPECTED: NORMAL
DME PARACHUTE DELIVERY DATE REQUESTED: NORMAL
DME PARACHUTE DELIVERY NOTE: NORMAL
DME PARACHUTE ITEM DESCRIPTION: NORMAL
DME PARACHUTE ORDER STATUS: NORMAL
DME PARACHUTE SUPPLIER NAME: NORMAL
DME PARACHUTE SUPPLIER PHONE: NORMAL
GFR SERPL CREATININE-BSD FRML MDRD: 64 ML/MIN/1.73SQ M
GLUCOSE SERPL-MCNC: 129 MG/DL (ref 65–140)
GLUCOSE SERPL-MCNC: 157 MG/DL (ref 65–140)
HCO3 BLDV-SCNC: 31.9 MMOL/L (ref 24–30)
MAGNESIUM SERPL-MCNC: 2.6 MG/DL (ref 1.9–2.7)
O2 CT BLDV-SCNC: 12.3 ML/DL
PCO2 BLDV: 51.8 MM HG (ref 42–50)
PH BLDV: 7.41 [PH] (ref 7.3–7.4)
PO2 BLDV: 39.5 MM HG (ref 35–45)
POTASSIUM SERPL-SCNC: 3.8 MMOL/L (ref 3.5–5.3)
SODIUM SERPL-SCNC: 137 MMOL/L (ref 135–147)

## 2025-01-30 PROCEDURE — 83735 ASSAY OF MAGNESIUM: CPT | Performed by: HOSPITALIST

## 2025-01-30 PROCEDURE — 80048 BASIC METABOLIC PNL TOTAL CA: CPT | Performed by: HOSPITALIST

## 2025-01-30 PROCEDURE — 82805 BLOOD GASES W/O2 SATURATION: CPT | Performed by: HOSPITALIST

## 2025-01-30 PROCEDURE — 82948 REAGENT STRIP/BLOOD GLUCOSE: CPT

## 2025-01-30 PROCEDURE — 99239 HOSP IP/OBS DSCHRG MGMT >30: CPT | Performed by: PHYSICIAN ASSISTANT

## 2025-01-30 PROCEDURE — 94640 AIRWAY INHALATION TREATMENT: CPT

## 2025-01-30 PROCEDURE — 94760 N-INVAS EAR/PLS OXIMETRY 1: CPT

## 2025-01-30 PROCEDURE — 99232 SBSQ HOSP IP/OBS MODERATE 35: CPT | Performed by: PHYSICIAN ASSISTANT

## 2025-01-30 RX ADMIN — BUSPIRONE HYDROCHLORIDE 7.5 MG: 15 TABLET ORAL at 08:21

## 2025-01-30 RX ADMIN — DULOXETINE HYDROCHLORIDE 60 MG: 30 CAPSULE, DELAYED RELEASE ORAL at 08:24

## 2025-01-30 RX ADMIN — FERROUS SULFATE TAB 325 MG (65 MG ELEMENTAL FE) 325 MG: 325 (65 FE) TAB at 08:23

## 2025-01-30 RX ADMIN — BUDESONIDE 0.5 MG: 0.5 INHALANT ORAL at 07:12

## 2025-01-30 RX ADMIN — LEVOTHYROXINE SODIUM 137 MCG: 25 TABLET ORAL at 06:24

## 2025-01-30 RX ADMIN — GUAIFENESIN 1200 MG: 600 TABLET, EXTENDED RELEASE ORAL at 08:23

## 2025-01-30 RX ADMIN — THIAMINE HCL TAB 100 MG 100 MG: 100 TAB at 08:23

## 2025-01-30 RX ADMIN — LIDOCAINE 5% 1 PATCH: 700 PATCH TOPICAL at 08:38

## 2025-01-30 RX ADMIN — PANTOPRAZOLE SODIUM 40 MG: 40 TABLET, DELAYED RELEASE ORAL at 06:24

## 2025-01-30 RX ADMIN — MAGNESIUM OXIDE TAB 400 MG (241.3 MG ELEMENTAL MG) 400 MG: 400 (241.3 MG) TAB at 08:21

## 2025-01-30 RX ADMIN — PREDNISONE 40 MG: 20 TABLET ORAL at 08:22

## 2025-01-30 RX ADMIN — FOLIC ACID 1000 MCG: 1 TABLET ORAL at 08:25

## 2025-01-30 RX ADMIN — LORATADINE 10 MG: 10 TABLET ORAL at 08:25

## 2025-01-30 RX ADMIN — LEVALBUTEROL HYDROCHLORIDE 1.25 MG: 1.25 SOLUTION RESPIRATORY (INHALATION) at 07:12

## 2025-01-30 RX ADMIN — INSULIN GLARGINE 8 UNITS: 100 INJECTION, SOLUTION SUBCUTANEOUS at 08:21

## 2025-01-30 RX ADMIN — FLUTICASONE PROPIONATE 2 SPRAY: 50 SPRAY, METERED NASAL at 08:26

## 2025-01-30 RX ADMIN — METOPROLOL SUCCINATE 25 MG: 25 TABLET, FILM COATED, EXTENDED RELEASE ORAL at 08:21

## 2025-01-30 RX ADMIN — BENZONATATE 100 MG: 100 CAPSULE ORAL at 08:25

## 2025-01-30 RX ADMIN — DONEPEZIL HYDROCHLORIDE 10 MG: 5 TABLET ORAL at 08:24

## 2025-01-30 NOTE — PLAN OF CARE
Problem: Potential for Falls  Goal: Patient will remain free of falls  Description: INTERVENTIONS:  - Educate patient/family on patient safety including physical limitations  - Instruct patient to call for assistance with activity   - Consult OT/PT to assist with strengthening/mobility   - Keep Call bell within reach  - Keep bed low and locked with side rails adjusted as appropriate  - Keep care items and personal belongings within reach  - Initiate and maintain comfort rounds  - Make Fall Risk Sign visible to staff  - Offer Toileting every 2 Hours, in advance of need  - Initiate/Maintain bed/ chair alarm  - Obtain necessary fall risk management equipment  - Apply yellow socks and bracelet for high fall risk patients  - Consider moving patient to room near nurses station  Outcome: Progressing     Problem: PAIN - ADULT  Goal: Verbalizes/displays adequate comfort level or baseline comfort level  Description: Interventions:  - Encourage patient to monitor pain and request assistance  - Assess pain using appropriate pain scale  - Administer analgesics based on type and severity of pain and evaluate response  - Implement non-pharmacological measures as appropriate and evaluate response  - Consider cultural and social influences on pain and pain management  - Notify physician/advanced practitioner if interventions unsuccessful or patient reports new pain  Outcome: Progressing     Problem: SAFETY ADULT  Goal: Patient will remain free of falls  Description: INTERVENTIONS:  - Educate patient/family on patient safety including physical limitations  - Instruct patient to call for assistance with activity   - Consult OT/PT to assist with strengthening/mobility   - Keep Call bell within reach  - Keep bed low and locked with side rails adjusted as appropriate  - Keep care items and personal belongings within reach  - Initiate and maintain comfort rounds  - Make Fall Risk Sign visible to staff  - Apply yellow socks and bracelet  for high fall risk patients  - Consider moving patient to room near nurses station  Outcome: Progressing

## 2025-01-30 NOTE — PROGRESS NOTES
NEPHROLOGY PROGRESS NOTE   Trevor Lyons 84 y.o. male MRN: [id number]  Unit/Bed#: -01 Encounter: [id number]      Assessment & Plan  NELLIE  Likely related to overdiuresis and relative hypotension in the setting of influenza  Baseline creatinine around 1  Creatinine peaked at 1.45 and now has improved back down to 1.04 today  Farxiga, losartan, spironolactone, torsemide currently on hold  PVR acceptable  Influenza A  Treated with Tamiflu per primary team   Hypokalemia  Resolved, potassium 3.8 today  Chronic combined systolic and diastolic CHF (congestive heart failure) (HCC)  Diuretics currently on hold and planning to resume tomorrow  Metabolic alkalosis  Improved with bicarb down to 31 today      Plan Summary:   Planning to be discharged today  Okay to resume home spironolactone and torsemide tomorrow  Would continue to hold losartan at discharge patient's blood pressure is acceptable until seen as an outpatient  Recommend repeating BMP next week    SUBJECTIVE:  Feeling well and excited to go home today.  Continues to have significant cough and wheezing but states this has been chronic for a long time due to working at the [employer].    OBJECTIVE:  Current Weight: Weight - Scale: 92.7 kg (204 lb 6.4 oz)  Vitals:    01/30/25 0713   BP:    Pulse:    Resp:    Temp:    SpO2: 97%       Intake/Output Summary (Last 24 hours) at 1/30/2025 1010  Last data filed at 1/30/2025 0806  Gross per 24 hour   Intake 1302 ml   Output 800 ml   Net 502 ml     General:  appears comfortable and in no acute distress   Skin:  No rash  Eyes:  Sclerae anicteric, no periorbital edema   ENT:  Moist mucous membranes  Neck:  Trachea midline, symmetric   Chest: Bilateral expiratory wheezing  CVS:  Regular rate and rhythm  Abdomen:  Soft, nontender, nondistended  Neuro:  Awake and alert  Psych:  Appropriate affect  Extremities: no lower extremity edema         Medications:  Scheduled Meds:  Current Facility-Administered Medications    Medication Dose Route Frequency Provider Last Rate    acetaminophen  650 mg Oral Q6H PRN Usha Quinn PA-C      aluminum-magnesium hydroxide-simethicone  30 mL Oral Q6H PRN Usha Quinn PA-C      atorvastatin  20 mg Oral Daily With Dinner Usha Quinn PA-C      benzonatate  100 mg Oral TID Dana Núñez DO      budesonide  0.5 mg Nebulization Q12H Dana Núñez DO      busPIRone  7.5 mg Oral BID Usha Quinn PA-C      clonazePAM  1 mg Oral BID PRN Olya Carrillo MD      donepezil  10 mg Oral QAM Usha Quinn PA-C      DULoxetine  60 mg Oral Daily Usha Quinn PA-C      ferrous sulfate  325 mg Oral Daily With Breakfast Usha Quinn PA-C      fluticasone  2 spray Nasal Daily Usha Quinn PA-C      [Held by provider] Fluticasone Furoate-Vilanterol  1 puff Inhalation Daily Usha Quinn PA-C      And    [Held by provider] umeclidinium  1 puff Inhalation Daily Usha Quinn PA-C      folic acid  1,000 mcg Oral QAM Usha Quinn PA-C      guaiFENesin  1,200 mg Oral Q12H MINNIE Usha Quinn PA-C      insulin glargine  8 Units Subcutaneous Daily With Breakfast Usha Quinn PA-C      insulin lispro  1-5 Units Subcutaneous HS Usha Quinn PA-C      insulin lispro  1-6 Units Subcutaneous TID AC Usha Quinn PA-C      levalbuterol  1.25 mg Nebulization Q12H Jevon Campbell MD      levothyroxine  137 mcg Oral Early Morning Usha Quinn PA-C      lidocaine  1 patch Topical Daily Corrie Padilla PA-C      loratadine  10 mg Oral QAM Usha Quinn PA-C      [Held by provider] losartan  25 mg Oral Daily Usha Quinn PA-C      magnesium Oxide  400 mg Oral BID Usha Quinn PA-C      melatonin  9 mg Oral HS Usha Quinn PA-C      metoprolol succinate  25 mg Oral QAM Usha Quinn PA-C      oxyCODONE  2.5 mg Oral Q6H PRN Corrie Padilla PA-C      pantoprazole  40 mg Oral Daily Before Breakfast  Usha Quinn PA-C      predniSONE  40 mg Oral Daily Olya Carrillo MD      rivaroxaban  15 mg Oral Daily With Dinner Usha Quinn PA-C      senna  1 tablet Oral HS PRN Usha Quinn PA-C      Thiamine Mononitrate  100 mg Oral Daily Usha Quinn PA-C      trimethobenzamide  200 mg Intramuscular Q12H PRN Usha Quinn PA-C         PRN Meds:.  acetaminophen    aluminum-magnesium hydroxide-simethicone    clonazePAM    oxyCODONE    senna    trimethobenzamide    Continuous Infusions:     Laboratory Results:  Results from last 7 days   Lab Units 01/30/25  0324 01/29/25  0505 01/28/25  0440 01/28/25  0438 01/27/25  0549 01/26/25  1032 01/25/25  0952 01/25/25  0221   WBC Thousand/uL  --   --  16.60*  --  10.21* 10.33* 9.02 9.55   HEMOGLOBIN g/dL  --   --  12.7  --  11.0* 11.4* 10.9* 11.1*   HEMATOCRIT %  --   --  39.6  --  34.4* 36.6 34.1* 35.1*   PLATELETS Thousands/uL  --   --  225  --  155 141* 126* 143*   SODIUM mmol/L 137 141  --  140 134* 135 135 137   POTASSIUM mmol/L 3.8 3.6  --  2.9* 3.7 4.9 4.5 5.5*   CHLORIDE mmol/L 98 99  --  93* 97 100 101 103   CO2 mmol/L 31 35*  --  33* 26 27 27 27   BUN mg/dL 39* 53*  --  53* 43* 32* 28* 29*   CREATININE mg/dL 1.04 1.22  --  1.45* 1.13 0.98 1.23 1.37*   CALCIUM mg/dL 8.7 8.9  --  8.7 8.3* 8.9 8.6 8.6   MAGNESIUM mg/dL 2.6  --   --   --   --   --  2.2  --    PHOSPHORUS mg/dL  --   --   --   --   --   --  3.7  --

## 2025-01-30 NOTE — TELEPHONE ENCOUNTER
LM to patient to repeat blood test in 1 week. Will determine if follow up needed after we see repeat labs. Advised to please call our office to let us know he received the message and if have any other questions or concerns.

## 2025-01-30 NOTE — CASE MANAGEMENT
Case Management Discharge Planning Note    Patient name Trevor Lyons  Location /-01 MRN [id number]  : 1940 Date 2025       Current Admission Date: 2025  Current Admission Diagnosis:Influenza A   Patient Active Problem List    Diagnosis Date Noted Date Diagnosed    Metabolic alkalosis 2025     NELLIE (acute kidney injury) ([facility]) 2025     Influenza A 2025     NELLIE 2025     Encounter for examination following treatment at hospital 2024     History of transcatheter aortic valve replacement (TAVR) 10/22/2024     Hypokalemia 10/22/2024     Acute hypoxic respiratory failure ([facility]) 10/19/2024     Anemia 10/19/2024     Chronic systolic heart failure ([facility]) 2024     Moderate Alzheimer's dementia, unspecified timing of dementia onset, unspecified whether behavioral, psychotic, or mood disturbance or anxiety ([facility]) 2024     Depressed 2024     Coronary artery disease involving native coronary artery of native heart without angina pectoris 2024     S/P CABG (coronary artery bypass graft) 2024     S/P TAVR (transcatheter aortic valve replacement) 2024     DDD (degenerative disc disease), lumbosacral 2024     COPD (chronic obstructive pulmonary disease) ([facility]) 2024     Recurrent falls 2024     Persistent atrial fibrillation ([facility]) 2024     Chronic combined systolic and diastolic CHF (congestive heart failure) ([facility]) 2024     Depression with anxiety 2024     NSVT (nonsustained ventricular tachycardia) ([facility]) 2024     Orthostatic hypotension 2024     Pulmonary embolism ([facility]) 2024     Cardiac pacemaker 2024     Alzheimer disease ([facility]) 2024     Pulmonary hypertension ([facility]) 2024     Sick sinus syndrome ([facility]) 2024     Dextroscoliosis 2024     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 2024     Mixed hyperlipidemia 2024     Type 2 diabetes mellitus without complication,  without long-term current use of insulin (HCC) 03/12/2024     Osteopenia of multiple sites 03/12/2024     Chronic left-sided low back pain with left-sided sciatica 03/12/2024     Aneurysm of ascending aorta without rupture (HCC) 03/12/2024     Acquired hypothyroidism 11/10/2022     Bilateral carotid bruits 11/11/2019       LOS (days): 5  Geometric Mean LOS (GMLOS) (days): 3.5  Days to GMLOS:-1.7     OBJECTIVE:  Risk of Unplanned Readmission Score: 32.43         Current admission status: Inpatient   Preferred Pharmacy:   [id number] - [place], [place] - [address] [place] [address]  Phone: [phone] Fax: [phone]    [facility] [employer]. - [place] [address]  Phone: [phone] Fax: [phone]    Primary Care Provider: Ira Borden DO    Primary Insurance: MEDICARE MISC REPLACEMENT  Secondary Insurance:     DISCHARGE DETAILS:     As per Corrie Padilla PA-C, patient cleared for discharge. Confirmed with Gayle of IC at [phone] that O2 concentrator was delivered last night. Request 10:30 BLS transport via roundtrip, Wait confirmation of transport time.

## 2025-01-30 NOTE — CASE MANAGEMENT
Case Management Discharge Planning Note    Patient name Trevor Lyons  Location /-01 MRN [id number]  : 1940 Date 2025       Current Admission Date: 2025  Current Admission Diagnosis:Influenza A   Patient Active Problem List    Diagnosis Date Noted Date Diagnosed    Metabolic alkalosis 2025     NELLIE (acute kidney injury) ([facility]) 2025     Influenza A 2025     NELLIE 2025     Encounter for examination following treatment at hospital 2024     History of transcatheter aortic valve replacement (TAVR) 10/22/2024     Hypokalemia 10/22/2024     Acute hypoxic respiratory failure ([facility]) 10/19/2024     Anemia 10/19/2024     Chronic systolic heart failure ([facility]) 2024     Moderate Alzheimer's dementia, unspecified timing of dementia onset, unspecified whether behavioral, psychotic, or mood disturbance or anxiety ([facility]) 2024     Depressed 2024     Coronary artery disease involving native coronary artery of native heart without angina pectoris 2024     S/P CABG (coronary artery bypass graft) 2024     S/P TAVR (transcatheter aortic valve replacement) 2024     DDD (degenerative disc disease), lumbosacral 2024     COPD (chronic obstructive pulmonary disease) ([facility]) 2024     Recurrent falls 2024     Persistent atrial fibrillation ([facility]) 2024     Chronic combined systolic and diastolic CHF (congestive heart failure) ([facility]) 2024     Depression with anxiety 2024     NSVT (nonsustained ventricular tachycardia) ([facility]) 2024     Orthostatic hypotension 2024     Pulmonary embolism ([facility]) 2024     Cardiac pacemaker 2024     Alzheimer disease ([facility]) 2024     Pulmonary hypertension ([facility]) 2024     Sick sinus syndrome ([facility]) 2024     Dextroscoliosis 2024     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 2024     Mixed hyperlipidemia 2024     Type 2 diabetes mellitus without complication,  without long-term current use of insulin (HCC) 03/12/2024     Osteopenia of multiple sites 03/12/2024     Chronic left-sided low back pain with left-sided sciatica 03/12/2024     Aneurysm of ascending aorta without rupture (HCC) 03/12/2024     Acquired hypothyroidism 11/10/2022     Bilateral carotid bruits 11/11/2019       LOS (days): 5  Geometric Mean LOS (GMLOS) (days): 3.5  Days to GMLOS:-1.7     OBJECTIVE:  Risk of Unplanned Readmission Score: 32.43         Current admission status: Inpatient   Preferred Pharmacy:   [id number] - [place], [place] - [address] [place] [address]  Phone: [phone] Fax: [phone]    [facility] [employer]. - [place] [address]  Phone: [phone] Fax: [phone]    Primary Care Provider: Ira Borden DO    Primary Insurance: MEDICARE MISC REPLACEMENT  Secondary Insurance:     DISCHARGE DETAILS:     Continuing to follow patient. As per parachute, O2 concentrator was successfully delivered to patient's room at [facility] at 6:30 on 1/29/25.

## 2025-01-30 NOTE — TELEPHONE ENCOUNTER
----- Message from Gabriela Bowles PA-C sent at 1/30/2025 11:19 AM EST -----  Please have patient repeat BMP in 1 week. Will determine if follow up needed after we see repeat labs

## 2025-01-30 NOTE — ASSESSMENT & PLAN NOTE
Likely related to overdiuresis and relative hypotension in the setting of influenza  Baseline creatinine around 1  Creatinine peaked at 1.45 and now has improved back down to 1.04 today  Farxiga, losartan, spironolactone, torsemide currently on hold  PVR acceptable

## 2025-01-30 NOTE — PLAN OF CARE

## 2025-01-30 NOTE — CASE MANAGEMENT
Case Management Discharge Planning Note    Patient name Trevor Lyons  Location /-01 MRN [id number]  : 1940 Date 2025       Current Admission Date: 2025  Current Admission Diagnosis:Influenza A   Patient Active Problem List    Diagnosis Date Noted Date Diagnosed    Metabolic alkalosis 2025     NELLIE (acute kidney injury) ([facility]) 2025     Influenza A 2025     NELLIE 2025     Encounter for examination following treatment at hospital 2024     History of transcatheter aortic valve replacement (TAVR) 10/22/2024     Hypokalemia 10/22/2024     Acute hypoxic respiratory failure ([facility]) 10/19/2024     Anemia 10/19/2024     Chronic systolic heart failure ([facility]) 2024     Moderate Alzheimer's dementia, unspecified timing of dementia onset, unspecified whether behavioral, psychotic, or mood disturbance or anxiety ([facility]) 2024     Depressed 2024     Coronary artery disease involving native coronary artery of native heart without angina pectoris 2024     S/P CABG (coronary artery bypass graft) 2024     S/P TAVR (transcatheter aortic valve replacement) 2024     DDD (degenerative disc disease), lumbosacral 2024     COPD (chronic obstructive pulmonary disease) ([facility]) 2024     Recurrent falls 2024     Persistent atrial fibrillation ([facility]) 2024     Chronic combined systolic and diastolic CHF (congestive heart failure) ([facility]) 2024     Depression with anxiety 2024     NSVT (nonsustained ventricular tachycardia) ([facility]) 2024     Orthostatic hypotension 2024     Pulmonary embolism ([facility]) 2024     Cardiac pacemaker 2024     Alzheimer disease ([facility]) 2024     Pulmonary hypertension ([facility]) 2024     Sick sinus syndrome ([facility]) 2024     Dextroscoliosis 2024     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 2024     Mixed hyperlipidemia 2024     Type 2 diabetes mellitus without complication,  without long-term current use of insulin (HCC) 03/12/2024     Osteopenia of multiple sites 03/12/2024     Chronic left-sided low back pain with left-sided sciatica 03/12/2024     Aneurysm of ascending aorta without rupture (HCC) 03/12/2024     Acquired hypothyroidism 11/10/2022     Bilateral carotid bruits 11/11/2019       LOS (days): 5  Geometric Mean LOS (GMLOS) (days): 3.5  Days to GMLOS:-1.7     OBJECTIVE:  Risk of Unplanned Readmission Score: 32.43         Current admission status: Inpatient   Preferred Pharmacy:   [id number] - [place], [place] - [address] [place] [address]  Phone: [phone] Fax: [phone]    [facility] [facility]. - [place] [address]  Phone: [phone] Fax: [phone]    Primary Care Provider: Ira Borden DO    Primary Insurance: MEDICARE MISC REPLACEMENT  Secondary Insurance:     DISCHARGE DETAILS:     As per round trip. [facility] S can transport patient to  at 12 noon today. Notified patient, attending, his nurse, Debora of IC and Caterina, patient's daughter at [phone] of discharge time.

## 2025-01-31 ENCOUNTER — TRANSITIONAL CARE MANAGEMENT (OUTPATIENT)
Dept: FAMILY MEDICINE CLINIC | Facility: HOSPITAL | Age: 85
End: 2025-01-31

## 2025-01-31 ENCOUNTER — TELEPHONE (OUTPATIENT)
Age: 85
End: 2025-01-31

## 2025-01-31 NOTE — TELEPHONE ENCOUNTER
Theo from [facility] called to schedule patient for a TCM, discharged on 1/30/25. Please call him back for questionnaire.  [phone]     thanks

## 2025-01-31 NOTE — UTILIZATION REVIEW
NOTIFICATION OF ADMISSION DISCHARGE   This is a Notification of Discharge from [facility]. Please be advised that this patient has been discharge from our facility. Below you will find the admission and discharge date and time including the patient’s disposition.   UTILIZATION REVIEW CONTACT:  Feli Marinelli  Utilization   Network Utilization Review Department  Phone: [phone] x carefully listen to the prompts. All voicemails are confidential.  Email: [email or web address]     ADMISSION INFORMATION  PRESENTATION DATE: 1/25/2025  1:50 AM  OBERVATION ADMISSION DATE: N/A  INPATIENT ADMISSION DATE: 1/25/25  3:20 AM   DISCHARGE DATE: 1/30/2025 11:04 AM   DISPOSITION:Discharged/Transferred to Long Term Care/Personal Care Home/Assisted Living    Network Utilization Review Department  ATTENTION: Please call with any questions or concerns to [phone] and carefully listen to the prompts so that you are directed to the right person. All voicemails are confidential.   For Discharge needs, contact Care Management DC Support Team at [phone] opt. 2  Send all requests for admission clinical reviews, approved or denied determinations and any other requests to dedicated fax number below belonging to the campus where the patient is receiving treatment. List of dedicated fax numbers for the Facilities:  FACILITY NAME UR FAX NUMBER   ADMISSION DENIALS (Administrative/Medical Necessity) [phone]   DISCHARGE SUPPORT TEAM ([facility]) [phone]   PARENT CHILD HEALTH (Maternity/NICU/Pediatrics) [phone]   [facility] [phone]   [facility] [phone]   [facility] [phone]   [facility] [phone]   [facility] [phone]   [facility] [phone]   [facility] [phone]    [facility] [phone]   [facility] [phone]   [facility] [phone]   [facility] [phone]   [facility] [phone]

## 2025-02-03 NOTE — TELEPHONE ENCOUNTER
Called patient and spoke with Theo from [address] is aware and requested to fax BMP at [phone].

## 2025-02-05 ENCOUNTER — APPOINTMENT (OUTPATIENT)
Dept: LAB | Facility: HOSPITAL | Age: 85
End: 2025-02-05
Attending: STUDENT IN AN ORGANIZED HEALTH CARE EDUCATION/TRAINING PROGRAM
Payer: MEDICARE

## 2025-02-05 DIAGNOSIS — N17.9 AKI (ACUTE KIDNEY INJURY) (HCC): ICD-10-CM

## 2025-02-05 DIAGNOSIS — E11.9 TYPE 2 DIABETES MELLITUS WITHOUT COMPLICATION, WITHOUT LONG-TERM CURRENT USE OF INSULIN (HCC): ICD-10-CM

## 2025-02-05 DIAGNOSIS — N18.2 STAGE 2 CHRONIC KIDNEY DISEASE: ICD-10-CM

## 2025-02-05 DIAGNOSIS — E87.6 HYPOKALEMIA: ICD-10-CM

## 2025-02-05 DIAGNOSIS — E03.9 ACQUIRED HYPOTHYROIDISM: ICD-10-CM

## 2025-02-05 DIAGNOSIS — E11.65 TYPE 2 DIABETES MELLITUS WITH HYPERGLYCEMIA, WITHOUT LONG-TERM CURRENT USE OF INSULIN (HCC): ICD-10-CM

## 2025-02-05 LAB
ANION GAP SERPL CALCULATED.3IONS-SCNC: 9 MMOL/L (ref 4–13)
BUN SERPL-MCNC: 30 MG/DL (ref 5–25)
CALCIUM SERPL-MCNC: 8.7 MG/DL (ref 8.4–10.2)
CHLORIDE SERPL-SCNC: 95 MMOL/L (ref 96–108)
CO2 SERPL-SCNC: 34 MMOL/L (ref 21–32)
CREAT SERPL-MCNC: 1.29 MG/DL (ref 0.6–1.3)
EST. AVERAGE GLUCOSE BLD GHB EST-MCNC: 140 MG/DL
GFR SERPL CREATININE-BSD FRML MDRD: 49 ML/MIN/1.73SQ M
GLUCOSE P FAST SERPL-MCNC: 80 MG/DL (ref 65–99)
HBA1C MFR BLD: 6.5 %
POTASSIUM SERPL-SCNC: 4.3 MMOL/L (ref 3.5–5.3)
SODIUM SERPL-SCNC: 138 MMOL/L (ref 135–147)
TSH SERPL DL<=0.05 MIU/L-ACNC: 5.11 UIU/ML (ref 0.45–4.5)

## 2025-02-05 PROCEDURE — 84443 ASSAY THYROID STIM HORMONE: CPT

## 2025-02-05 PROCEDURE — 80048 BASIC METABOLIC PNL TOTAL CA: CPT

## 2025-02-05 PROCEDURE — 83036 HEMOGLOBIN GLYCOSYLATED A1C: CPT

## 2025-02-05 PROCEDURE — 36415 COLL VENOUS BLD VENIPUNCTURE: CPT

## 2025-02-12 ENCOUNTER — OFFICE VISIT (OUTPATIENT)
Dept: FAMILY MEDICINE CLINIC | Facility: HOSPITAL | Age: 85
End: 2025-02-12
Payer: MEDICARE

## 2025-02-12 VITALS
SYSTOLIC BLOOD PRESSURE: 96 MMHG | OXYGEN SATURATION: 94 % | WEIGHT: 213 LBS | BODY MASS INDEX: 32.39 KG/M2 | DIASTOLIC BLOOD PRESSURE: 68 MMHG | HEART RATE: 74 BPM

## 2025-02-12 DIAGNOSIS — J10.1 INFLUENZA A: ICD-10-CM

## 2025-02-12 DIAGNOSIS — I50.42 CHRONIC COMBINED SYSTOLIC AND DIASTOLIC CHF (CONGESTIVE HEART FAILURE) (HCC): ICD-10-CM

## 2025-02-12 DIAGNOSIS — Z79.4 TYPE 2 DIABETES MELLITUS WITHOUT COMPLICATION, WITH LONG-TERM CURRENT USE OF INSULIN (HCC): ICD-10-CM

## 2025-02-12 DIAGNOSIS — J96.01 ACUTE RESPIRATORY FAILURE WITH HYPOXIA (HCC): ICD-10-CM

## 2025-02-12 DIAGNOSIS — J44.9 CHRONIC OBSTRUCTIVE PULMONARY DISEASE, UNSPECIFIED COPD TYPE (HCC): ICD-10-CM

## 2025-02-12 DIAGNOSIS — E11.9 TYPE 2 DIABETES MELLITUS WITHOUT COMPLICATION, WITH LONG-TERM CURRENT USE OF INSULIN (HCC): ICD-10-CM

## 2025-02-12 DIAGNOSIS — E03.9 ACQUIRED HYPOTHYROIDISM: ICD-10-CM

## 2025-02-12 DIAGNOSIS — Z76.89 ENCOUNTER FOR SUPPORT AND COORDINATION OF TRANSITION OF CARE: Primary | ICD-10-CM

## 2025-02-12 DIAGNOSIS — N17.9 AKI (ACUTE KIDNEY INJURY) (HCC): ICD-10-CM

## 2025-02-12 PROBLEM — E87.6 HYPOKALEMIA: Status: RESOLVED | Noted: 2024-10-22 | Resolved: 2025-02-12

## 2025-02-12 PROCEDURE — 99495 TRANSJ CARE MGMT MOD F2F 14D: CPT | Performed by: NURSE PRACTITIONER

## 2025-02-12 NOTE — ASSESSMENT & PLAN NOTE
Recent hospitalization for influenza A requiring Tamiflu, Solu-Medrol and 2 L nasal cannula oxygen.  Transition to prednisone upon hospital discharge and using oxygen nasal cannula as needed for shortness of breath.

## 2025-02-12 NOTE — ASSESSMENT & PLAN NOTE
Latest Reference Range & Units 02/05/25 08:00   TSH 3RD GENERATON 0.450 - 4.500 uIU/mL 5.111 (H)   (H): Data is abnormally high    History of hypothyroidism with improved thyroid function (last TSH 8.358).  Continue levothyroxine 137 mics p.o. daily on empty stomach with full glass of water

## 2025-02-12 NOTE — ASSESSMENT & PLAN NOTE
History of NELLIE presumed related to overdiuresis and hypotension in the setting of influenza A with acute hypoxic respiratory failure.  Baseline creatinine around 1 had peaked to 1.45 during hospitalization.  Farxiga, losartan, spironolactone and torsemide held and restarted upon hospital discharge.  Repeat BMP on 2/5/2025 with creat 1.29.  Encouraged optimizing hydration and monitoring vital signs at [facility].

## 2025-02-12 NOTE — ASSESSMENT & PLAN NOTE
History of COPD with medication adherence to Trelegy with albuterol neb as needed.  Denies any shortness of breath does have occasional cough.  Lungs CTA on exam and on room air.

## 2025-02-12 NOTE — PROGRESS NOTES
[facility]   Nupur HOGAN    Assessment/Plan:   1. Encounter for support and coordination of transition of care  2. Influenza A  Assessment & Plan:  Recent hospitalization for influenza A requiring Tamiflu, Solu-Medrol and 2 L nasal cannula oxygen.  Transition to prednisone upon hospital discharge and using oxygen nasal cannula as needed for shortness of breath.  3. Acute respiratory failure with hypoxia ([facility])  Assessment & Plan:  Acute respiratory failure with hypoxia due to COPD exacerbation and influenza A.  Was treated with Tamiflu, Solu-Medrol with transition to prednisone upon discharge as well as supplemental oxygen.  Since hospital discharge wearing O2 as needed for dyspnea.  Lungs CTA, vital stable without O2 in place.  4. Chronic obstructive pulmonary disease, unspecified COPD type ([facility])  Assessment & Plan:  History of COPD with medication adherence to Trelegy with albuterol neb as needed.  Denies any shortness of breath does have occasional cough.  Lungs CTA on exam and on room air.  5. Chronic combined systolic and diastolic CHF (congestive heart failure) ([facility])  Assessment & Plan:  Wt Readings from Last 3 Encounters:   02/12/25 96.6 kg (213 lb)   01/30/25 92.7 kg (204 lb 6.4 oz)   01/23/25 94.3 kg (208 lb)     History of CHF with medication adherence to Farxiga 10 mg p.o. daily, losartan 25 mg p.o. daily, Toprol 25 mg p.o. daily, with spironolactone and Demadex restarted upon hospital discharge.  Denies chest pain pressure shortness of breath nor lower extremity edema.  Euvolemic on exam.  Order in place to weigh daily and call cardiology if over 3 pounds in 24 hours or over 5 pounds in the past week.  6. NELLIE (acute kidney injury) ([facility])  Assessment & Plan:  History of NELLIE presumed related to overdiuresis and hypotension in the setting of influenza A with acute hypoxic respiratory failure.  Baseline creatinine around 1 had peaked to 1.45 during hospitalization.  Farxiga, losartan,  spironolactone and torsemide held and restarted upon hospital discharge.  Repeat BMP on 2/5/2025 with creat 1.29.  Encouraged optimizing hydration and monitoring vital signs at [facility].  7. Type 2 diabetes mellitus without complication, with long-term current use of insulin ([facility])  Assessment & Plan:    Lab Results   Component Value Date    HGBA1C 6.5 (H) 02/05/2025   History of type 2 diabetes with medication adherence to Farxiga 10 mg p.o. daily glimepiride 1 mg p.o. daily, Lantus 8 units daily, metformin 1000 mg twice daily along with mealtime insulin.  8. Acquired hypothyroidism  Assessment & Plan:   Latest Reference Range & Units 02/05/25 08:00   TSH 3RD GENERATON 0.450 - 4.500 uIU/mL 5.111 (H)   (H): Data is abnormally high    History of hypothyroidism with improved thyroid function (last TSH 8.358).  Continue levothyroxine 137 mics p.o. daily on empty stomach with full glass of water      Return in about 3 months (around 5/12/2025) for Recheck.  Patient may call or return to office with any questions or concerns.     ______________________________________________________________________  Subjective:     Patient ID: Trevor Lyons is a 84 y.o. male.  Trevor Lyons  Chief Complaint   Patient presents with    Transition of Care Management     Here for TCM accompanied by [facility] staff.    Recent hospitalization from 1/25/2025 to 1/30/2025 with worsening URI symptoms.  Found to have influenza A and received Tamiflu.  Required 2 L nasal cannula which has been able to be weaned to as needed upon hospital discharge.  Today feeling well, denies shortness of breath has occasional cough.  Appetite strong staying well-hydrated.  Bowel and bladder function at baseline.  Sleeping well at night.  Mood stable, calm with exam.      On RA.  Occasional cough.  Wearing O2 NC as needed for dyspnea.                      The following portions of the patient's history were reviewed and updated as appropriate:  allergies, current medications, past family history, past medical history, past social history, past surgical history, and problem list.    Review of Systems   Constitutional: Negative.  Negative for activity change, appetite change, chills, fatigue and fever.   HENT:  Positive for hearing loss. Negative for congestion, ear pain, postnasal drip, sinus pain and trouble swallowing.    Eyes: Negative.  Negative for visual disturbance.   Respiratory:  Positive for cough. Negative for chest tightness, shortness of breath and wheezing.    Cardiovascular: Negative.  Negative for chest pain, palpitations and leg swelling.   Gastrointestinal: Negative.  Negative for abdominal pain, constipation and diarrhea.   Endocrine: Negative.    Genitourinary: Negative.  Negative for difficulty urinating and dysuria.   Musculoskeletal:  Positive for gait problem.   Skin:  Positive for wound.   Allergic/Immunologic: Negative.  Negative for immunocompromised state.   Neurological:  Negative for dizziness and light-headedness.   Hematological:  Bruises/bleeds easily.   Psychiatric/Behavioral:  Positive for decreased concentration.          Objective:      Vitals:    02/12/25 1009   BP: 96/68   Pulse: 74   SpO2: 94%      Physical Exam  Vitals and nursing note reviewed.   Constitutional:       Appearance: Normal appearance. He is obese.   HENT:      Head: Normocephalic and atraumatic.      Right Ear: Tympanic membrane, ear canal and external ear normal.      Left Ear: Tympanic membrane, ear canal and external ear normal.      Nose: Nose normal.      Mouth/Throat:      Mouth: Mucous membranes are moist.      Pharynx: Oropharynx is clear.   Eyes:      Extraocular Movements: Extraocular movements intact.      Conjunctiva/sclera: Conjunctivae normal.      Pupils: Pupils are equal, round, and reactive to light.   Cardiovascular:      Rate and Rhythm: Normal rate and regular rhythm.      Pulses: Normal pulses.      Heart sounds: Murmur heard.    Pulmonary:      Effort: Pulmonary effort is normal.      Breath sounds: Normal breath sounds.      Comments: MNPC x1 on exam  Abdominal:      General: Bowel sounds are normal.      Palpations: Abdomen is soft.   Musculoskeletal:         General: Normal range of motion.      Cervical back: Normal range of motion and neck supple.      Right lower leg: No edema.      Left lower leg: No edema.   Skin:     General: Skin is warm and dry.      Findings: Bruising present.      Comments: Skin tear right hand with Band-Aid intact.  No signs or symptoms of infection present.   Neurological:      General: No focal deficit present.      Mental Status: He is alert. Mental status is at baseline.      Sensory: Sensory deficit present.      Motor: Weakness present.      Coordination: Coordination abnormal.      Gait: Gait abnormal.      Comments: Alert and oriented to person place with forgetfulness.   Psychiatric:         Mood and Affect: Mood normal.         Speech: Speech is tangential.         Behavior: Behavior is slowed. Behavior is cooperative.         Thought Content: Thought content normal.         Cognition and Memory: Memory is impaired.         Judgment: Judgment is impulsive and inappropriate.         TCM Call       Date and time call was made  1/31/2025  3:09 PM    Patient was hospitialized at  [facility]    Date of Admission  01/25/25    Date of discharge  01/30/25    Diagnosis  ADMITTING DX--FLU A, D/C DX--ACTIVE PROBLEMS.    Disposition  Long term care facility    Were the patients medications reviewed and updated  Yes    Current Symptoms  None          TCM Call       Post hospital issues  None    Should patient be enrolled in anticoag monitoring?  No    Scheduled for follow up?  Yes    I have advised the patient to call PCP with any new or worsening symptoms  [facility], [address]    Support System  Home care staff    Comments  SPOKE TO BRIANNA HINTON --NURSE.  PT  "IS DOING GOOD.  NOT WEARING HIS O2 ALL THE TIME, BUT DOING OK.   MEDS REVIEWED, CHART CURRENT.  GAURAV FROM  SETTING UP TCM APPT.  DK              Portions of the record may have been created with voice recognition software. Occasional wrong word or \"sound alike\" substitutions may have occurred due to the inherent limitations of voice recognition software. Please review the chart carefully and recognize, using context, where substitutions/typographical errors may have occurred.       "

## 2025-02-12 NOTE — ASSESSMENT & PLAN NOTE
Lab Results   Component Value Date    HGBA1C 6.5 (H) 02/05/2025   History of type 2 diabetes with medication adherence to Farxiga 10 mg p.o. daily glimepiride 1 mg p.o. daily, Lantus 8 units daily, metformin 1000 mg twice daily along with mealtime insulin.

## 2025-02-12 NOTE — ASSESSMENT & PLAN NOTE
Acute respiratory failure with hypoxia due to COPD exacerbation and influenza A.  Was treated with Tamiflu, Solu-Medrol with transition to prednisone upon discharge as well as supplemental oxygen.  Since hospital discharge wearing O2 as needed for dyspnea.  Lungs CTA, vital stable without O2 in place.

## 2025-02-12 NOTE — ASSESSMENT & PLAN NOTE
Wt Readings from Last 3 Encounters:   02/12/25 96.6 kg (213 lb)   01/30/25 92.7 kg (204 lb 6.4 oz)   01/23/25 94.3 kg (208 lb)     History of CHF with medication adherence to Farxiga 10 mg p.o. daily, losartan 25 mg p.o. daily, Toprol 25 mg p.o. daily, with spironolactone and Demadex restarted upon hospital discharge.  Denies chest pain pressure shortness of breath nor lower extremity edema.  Euvolemic on exam.  Order in place to weigh daily and call cardiology if over 3 pounds in 24 hours or over 5 pounds in the past week.

## 2025-02-19 DIAGNOSIS — J18.9 PNEUMONIA: ICD-10-CM

## 2025-02-20 RX ORDER — TORSEMIDE 20 MG/1
TABLET ORAL
Qty: 60 TABLET | Refills: 0 | Status: SHIPPED | OUTPATIENT
Start: 2025-02-20

## 2025-02-24 PROBLEM — J10.1 INFLUENZA A: Status: RESOLVED | Noted: 2025-01-25 | Resolved: 2025-02-24

## 2025-03-01 DIAGNOSIS — J18.9 PNEUMONIA: ICD-10-CM

## 2025-03-03 DIAGNOSIS — J18.9 PNEUMONIA: ICD-10-CM

## 2025-03-03 RX ORDER — LOSARTAN POTASSIUM 25 MG/1
TABLET ORAL
Qty: 30 TABLET | Refills: 0 | Status: SHIPPED | OUTPATIENT
Start: 2025-03-03

## 2025-03-03 RX ORDER — DULOXETIN HYDROCHLORIDE 60 MG/1
CAPSULE, DELAYED RELEASE ORAL
Qty: 30 CAPSULE | Refills: 0 | Status: SHIPPED | OUTPATIENT
Start: 2025-03-03

## 2025-03-03 RX ORDER — INSULIN GLARGINE 100 [IU]/ML
INJECTION, SOLUTION SUBCUTANEOUS
Qty: 3 ML | Refills: 4 | Status: SHIPPED | OUTPATIENT
Start: 2025-03-03

## 2025-03-04 RX ORDER — INSULIN GLARGINE 100 [IU]/ML
INJECTION, SOLUTION SUBCUTANEOUS
Qty: 3 ML | Refills: 4 | OUTPATIENT
Start: 2025-03-04

## 2025-03-11 DIAGNOSIS — E11.9 TYPE 2 DIABETES MELLITUS WITHOUT COMPLICATION, WITHOUT LONG-TERM CURRENT USE OF INSULIN (HCC): ICD-10-CM

## 2025-03-11 RX ORDER — PEN NEEDLE, DIABETIC, SAFETY 30 GX3/16"
NEEDLE, DISPOSABLE MISCELLANEOUS
Qty: 100 EACH | Refills: 4 | Status: SHIPPED | OUTPATIENT
Start: 2025-03-11

## 2025-03-20 ENCOUNTER — OFFICE VISIT (OUTPATIENT)
Dept: FAMILY MEDICINE CLINIC | Facility: HOSPITAL | Age: 85
End: 2025-03-20
Payer: MEDICARE

## 2025-03-20 VITALS
WEIGHT: 219.8 LBS | SYSTOLIC BLOOD PRESSURE: 128 MMHG | BODY MASS INDEX: 33.42 KG/M2 | HEART RATE: 65 BPM | DIASTOLIC BLOOD PRESSURE: 58 MMHG | OXYGEN SATURATION: 93 %

## 2025-03-20 DIAGNOSIS — G89.29 CHRONIC LEFT-SIDED LOW BACK PAIN WITH LEFT-SIDED SCIATICA: ICD-10-CM

## 2025-03-20 DIAGNOSIS — Z79.4 TYPE 2 DIABETES MELLITUS WITHOUT COMPLICATION, WITH LONG-TERM CURRENT USE OF INSULIN (HCC): ICD-10-CM

## 2025-03-20 DIAGNOSIS — E11.9 TYPE 2 DIABETES MELLITUS WITHOUT COMPLICATION, WITH LONG-TERM CURRENT USE OF INSULIN (HCC): ICD-10-CM

## 2025-03-20 DIAGNOSIS — E03.9 ACQUIRED HYPOTHYROIDISM: ICD-10-CM

## 2025-03-20 DIAGNOSIS — M54.42 CHRONIC LEFT-SIDED LOW BACK PAIN WITH LEFT-SIDED SCIATICA: ICD-10-CM

## 2025-03-20 DIAGNOSIS — I50.42 CHRONIC COMBINED SYSTOLIC AND DIASTOLIC CHF (CONGESTIVE HEART FAILURE) (HCC): Primary | ICD-10-CM

## 2025-03-20 DIAGNOSIS — I26.99 PULMONARY EMBOLISM, UNSPECIFIED CHRONICITY, UNSPECIFIED PULMONARY EMBOLISM TYPE, UNSPECIFIED WHETHER ACUTE COR PULMONALE PRESENT (HCC): ICD-10-CM

## 2025-03-20 DIAGNOSIS — G30.9 MODERATE ALZHEIMER'S DEMENTIA, UNSPECIFIED TIMING OF DEMENTIA ONSET, UNSPECIFIED WHETHER BEHAVIORAL, PSYCHOTIC, OR MOOD DISTURBANCE OR ANXIETY (HCC): ICD-10-CM

## 2025-03-20 DIAGNOSIS — F02.B0 MODERATE ALZHEIMER'S DEMENTIA, UNSPECIFIED TIMING OF DEMENTIA ONSET, UNSPECIFIED WHETHER BEHAVIORAL, PSYCHOTIC, OR MOOD DISTURBANCE OR ANXIETY (HCC): ICD-10-CM

## 2025-03-20 PROBLEM — Z95.2 S/P TAVR (TRANSCATHETER AORTIC VALVE REPLACEMENT): Status: RESOLVED | Noted: 2024-04-18 | Resolved: 2025-03-20

## 2025-03-20 PROBLEM — F32.A DEPRESSED: Status: RESOLVED | Noted: 2024-07-16 | Resolved: 2025-03-20

## 2025-03-20 PROBLEM — N18.2 STAGE 2 CHRONIC KIDNEY DISEASE: Status: RESOLVED | Noted: 2025-01-25 | Resolved: 2025-03-20

## 2025-03-20 PROCEDURE — G2211 COMPLEX E/M VISIT ADD ON: HCPCS | Performed by: NURSE PRACTITIONER

## 2025-03-20 PROCEDURE — 99214 OFFICE O/P EST MOD 30 MIN: CPT | Performed by: NURSE PRACTITIONER

## 2025-03-20 NOTE — ASSESSMENT & PLAN NOTE
History of hypothyroidism with improved thyroid function on 2/5/2025.  Medication adherence to levothyroxine 137 mcg p.o. daily on an empty stomach with a full glass of water in the morning.  Denies any depressive symptoms, xerosis, constipation.  Will continue current management.

## 2025-03-20 NOTE — ASSESSMENT & PLAN NOTE
History of PE with medication adherence to Xarelto 20 mg p.o. daily.  Denies any chest pressure pain palpitations nor shortness of breath.  Lungs CTA on exam.  No signs of bleed.  Followed by cardiology.

## 2025-03-20 NOTE — PROGRESS NOTES
[facility]   Nupur HOGAN    Assessment/Plan:   1. Chronic combined systolic and diastolic CHF (congestive heart failure) (HCC)  Assessment & Plan:  Wt Readings from Last 3 Encounters:   03/20/25 99.7 kg (219 lb 12.8 oz)   02/12/25 96.6 kg (213 lb)   01/30/25 92.7 kg (204 lb 6.4 oz)     History of CHF with weight up to 219lb today;  provided daily weight record and he is up 6 pounds in a month.  Last office visit requested cardiology to be called if over 3 pounds in 24 hours or over 5 pounds weight gain in a week-unclear if this was done?    -Medication adherence to Farxiga 10 mg p.o. daily, losartan 25 mg p.o. daily, Toprol 25 mg p.o. daily, spironolactone and Demadex.  Euvolemic on exam lungs CTA without edema nor abdominal distention.  Denies chest pressure pain palpitations.  Follow-up with cardiology scheduled in July.  Request that he be followed up sooner.        2. Moderate Alzheimer's dementia, unspecified timing of dementia onset, unspecified whether behavioral, psychotic, or mood disturbance or anxiety ([facility])  Assessment & Plan:  History of moderate late onset AD.  Currently resides at [facility].  Alert and oriented to person place, time with disorientation to situation.  Staff report no behavioral concerns at this time.  Managed with Aricept 10 mg p.o. daily, BuSpar 7.5 twice daily, Cymbalta 60 mg p.o. daily with as needed clonazepam at at bedtime.  -Delirium precautions: Optimize nutrition hydration and sleep hygiene.  Increase socialization, prevent falls.  Monitor for constipation or change in urination pattern.  3. Chronic left-sided low back pain with left-sided sciatica  Assessment & Plan:  Chronic LBP managed with menthol topical patch, Cymbalta, and Tylenol.  Essentially wheelchair-bound however will get up and ambulate a few steps with standby assist.  4. Acquired hypothyroidism  Assessment & Plan:  History of hypothyroidism with improved thyroid function on 2/5/2025.   Medication adherence to levothyroxine 137 mcg p.o. daily on an empty stomach with a full glass of water in the morning.  Denies any depressive symptoms, xerosis, constipation.  Will continue current management.  5. Pulmonary embolism, unspecified chronicity, unspecified pulmonary embolism type, unspecified whether acute cor pulmonale present ([facility])  Assessment & Plan:  History of PE with medication adherence to Xarelto 20 mg p.o. daily.  Denies any chest pressure pain palpitations nor shortness of breath.  Lungs CTA on exam.  No signs of bleed.  Followed by cardiology.  6. Type 2 diabetes mellitus without complication, with long-term current use of insulin ([facility])  Assessment & Plan:    Lab Results   Component Value Date    HGBA1C 6.5 (H) 02/05/2025   History of type 2 diabetes well-controlled on Farxiga 10 mg p.o. daily, glimepiride 1 mg p.o. daily along with metformin 1000 mg twice daily.  He also receives Lantus 8 units subcu daily with mealtime insulin.  No blood sugar records available at today's office visit.  Denies signs symptoms of hypoglycemia and has a strong appetite.  Up-to-date with diabetic foot exam.  Needs to schedule diabetic eye exam      No follow-ups on file.  Patient may call or return to office with any questions or concerns.     ______________________________________________________________________  Subjective:     Patient ID: Trevor Lyons is a 84 y.o. male.  Trevor Lyons  Chief Complaint   Patient presents with    Follow-up     Annual DME     Here for DME completion.  Is noted to be up 6 pounds in 1 month without overt fluid overload.                 The following portions of the patient's history were reviewed and updated as appropriate: allergies, current medications, past family history, past medical history, past social history, past surgical history, and problem list.    Review of Systems   Constitutional:  Positive for unexpected weight change. Negative for activity change,  appetite change, chills, fatigue and fever.   HENT:  Positive for hearing loss. Negative for congestion, ear pain, postnasal drip, sinus pain and trouble swallowing.    Eyes: Negative.    Respiratory: Negative.  Negative for cough, chest tightness, shortness of breath and wheezing.    Cardiovascular: Negative.  Negative for chest pain, palpitations and leg swelling.   Gastrointestinal: Negative.  Negative for constipation and diarrhea.   Endocrine: Negative.    Genitourinary: Negative.  Negative for difficulty urinating and dysuria.   Musculoskeletal:  Positive for arthralgias and gait problem.   Skin: Negative.    Allergic/Immunologic: Negative.  Negative for immunocompromised state.   Neurological:  Negative for dizziness and light-headedness.   Hematological: Negative.    Psychiatric/Behavioral:  Positive for decreased concentration.          Objective:      Vitals:    03/20/25 0752   BP: 128/58   Pulse: 65   SpO2: 93%      Physical Exam  Vitals and nursing note reviewed.   Constitutional:       Appearance: Normal appearance. He is obese.   HENT:      Head: Normocephalic and atraumatic.      Right Ear: Tympanic membrane, ear canal and external ear normal.      Left Ear: Tympanic membrane, ear canal and external ear normal.      Nose: Nose normal.      Mouth/Throat:      Mouth: Mucous membranes are moist.      Pharynx: Oropharynx is clear.   Eyes:      Extraocular Movements: Extraocular movements intact.      Conjunctiva/sclera: Conjunctivae normal.      Pupils: Pupils are equal, round, and reactive to light.   Cardiovascular:      Rate and Rhythm: Normal rate and regular rhythm.      Pulses: Normal pulses.      Heart sounds: Murmur heard.   Pulmonary:      Effort: Pulmonary effort is normal.      Breath sounds: Normal breath sounds. No wheezing, rhonchi or rales.   Abdominal:      General: Bowel sounds are normal. There is no distension.      Palpations: Abdomen is soft.      Tenderness: There is no abdominal  "tenderness.   Musculoskeletal:         General: Normal range of motion.      Cervical back: Normal range of motion and neck supple.      Right lower leg: No edema.      Left lower leg: No edema.   Skin:     General: Skin is warm and dry.      Findings: Bruising present.      Comments: Bruising to b/l hands   Neurological:      General: No focal deficit present.      Mental Status: He is alert and oriented to person, place, and time.      Sensory: Sensory deficit present.      Motor: Weakness present.      Gait: Gait abnormal.      Comments: Self propels in wheelchair but will stand and walk several steps with standby assist.  Reports he does not use a walker/rollator at [facility]?  Alert and oriented to person place time disoriented to situation/forgetfulness at times   Psychiatric:         Mood and Affect: Mood normal. Mood is not anxious or depressed.         Speech: Speech is tangential.         Behavior: Behavior is slowed. Behavior is cooperative.         Thought Content: Thought content normal.         Cognition and Memory: Memory is impaired.         Judgment: Judgment is impulsive and inappropriate.           Portions of the record may have been created with voice recognition software. Occasional wrong word or \"sound alike\" substitutions may have occurred due to the inherent limitations of voice recognition software. Please review the chart carefully and recognize, using context, where substitutions/typographical errors may have occurred.       "

## 2025-03-20 NOTE — ASSESSMENT & PLAN NOTE
Wt Readings from Last 3 Encounters:   03/20/25 99.7 kg (219 lb 12.8 oz)   02/12/25 96.6 kg (213 lb)   01/30/25 92.7 kg (204 lb 6.4 oz)     History of CHF with weight up to 219lb today; IC provided daily weight record and he is up 6 pounds in a month.  Last office visit requested cardiology to be called if over 3 pounds in 24 hours or over 5 pounds weight gain in a week-unclear if this was done?    -Medication adherence to Farxiga 10 mg p.o. daily, losartan 25 mg p.o. daily, Toprol 25 mg p.o. daily, spironolactone and Demadex.  Euvolemic on exam lungs CTA without edema nor abdominal distention.  Denies chest pressure pain palpitations.  Follow-up with cardiology scheduled in July.  Request that he be followed up sooner.

## 2025-03-20 NOTE — ASSESSMENT & PLAN NOTE
Chronic LBP managed with menthol topical patch, Cymbalta, and Tylenol.  Essentially wheelchair-bound however will get up and ambulate a few steps with standby assist.

## 2025-03-20 NOTE — ASSESSMENT & PLAN NOTE
Lab Results   Component Value Date    HGBA1C 6.5 (H) 02/05/2025   History of type 2 diabetes well-controlled on Farxiga 10 mg p.o. daily, glimepiride 1 mg p.o. daily along with metformin 1000 mg twice daily.  He also receives Lantus 8 units subcu daily with mealtime insulin.  No blood sugar records available at today's office visit.  Denies signs symptoms of hypoglycemia and has a strong appetite.  Up-to-date with diabetic foot exam.  Needs to schedule diabetic eye exam

## 2025-03-20 NOTE — ASSESSMENT & PLAN NOTE
History of moderate late onset AD.  Currently resides at [facility].  Alert and oriented to person place, time with disorientation to situation.  Staff report no behavioral concerns at this time.  Managed with Aricept 10 mg p.o. daily, BuSpar 7.5 twice daily, Cymbalta 60 mg p.o. daily with as needed clonazepam at at bedtime.  -Delirium precautions: Optimize nutrition hydration and sleep hygiene.  Increase socialization, prevent falls.  Monitor for constipation or change in urination pattern.

## 2025-04-16 ENCOUNTER — RESULTS FOLLOW-UP (OUTPATIENT)
Dept: NON INVASIVE DIAGNOSTICS | Facility: HOSPITAL | Age: 85
End: 2025-04-16

## 2025-04-16 ENCOUNTER — IN-CLINIC DEVICE VISIT (OUTPATIENT)
Dept: CARDIOLOGY CLINIC | Facility: CLINIC | Age: 85
End: 2025-04-16
Payer: MEDICARE

## 2025-04-16 DIAGNOSIS — Z95.0 CARDIAC PACEMAKER IN SITU: Primary | ICD-10-CM

## 2025-04-16 PROCEDURE — 93279 PRGRMG DEV EVAL PM/LDLS PM: CPT | Performed by: STUDENT IN AN ORGANIZED HEALTH CARE EDUCATION/TRAINING PROGRAM

## 2025-04-16 NOTE — PROGRESS NOTES
Results for orders placed or performed in visit on 04/16/25   Cardiac EP device report    Narrative    [facility] DC/PM (VVI)  DEVICE INTERROGATED IN THE [place] OFFICE: PRESENTING EGRAM AF @ 65 BPM. BATTERY VOLTAGE ADEQUATE-6 YRS. AP 0%  98%. ALL AVAILABLE LEAD PARAMETERS WITHIN NORMAL LIMITS. 100% AF-PT ON XARELTO. MODE CHANGED TO VVI-PERSISTENT/CHRONIC AF. NORMAL DEVICE FUNCTION. NC

## 2025-05-01 NOTE — PROGRESS NOTES
Progress Note - Hospitalist   Name: Trevor Lyons 84 y.o. male I MRN: [id number]  Unit/Bed#: MS Tom-01 I Date of Admission: 10/19/2024   Date of Service: 10/26/2024 I Hospital Day: 7     Assessment & Plan  Acute on chronic systolic heart failure (HCC)  Presents from nursing home with worsened dyspnea and low SpO2 long with 20 pound weight gain  Last echo January 2024: LVEF 35-40%.  Severely dilated left atrium.  Mild pulmonary hypertension.  TAVR functioning well.  Home diuretic: lasix 20mg BID and spironolactone 25 mg  Cotninue lasix 80 BID per cards  continue xarelto and metoprolol  Continue spirinolactone  Wean oxygen as tolerated  ECHO with EF 40-45%, mild global hypokinesis  Strict I's and O's. Fluid restriction    I/O last 3 completed shifts:  In: 1882 [P.O.:1882]  Out: 4225 [Urine:4225]  Net IO Since Admission: -9,387 mL [10/26/24 1023]    Weight change: -1.134 kg (-2 lb 8 oz)  Wt Readings from Last 3 Encounters:   10/26/24 92.2 kg (203 lb 4.8 oz)   09/18/24 89.8 kg (198 lb)   07/16/24 89.4 kg (197 lb)     Hypokalemia  Monitor in the setting of diuretics  Continue to monitor and replete as needed  Acute respiratory insufficiency  SpO2 87% on room air  Improved on 1 L nasal cannula  Suspect secondary to acute on chronic systolic heart failure  Wean oxygen as able, does not use oxygen at home  Anemia  Hgb 11.4 on admit  Baseline Cr 10-12   Trend CBC   SIRS (systemic inflammatory response syndrome) ([facility])  SIRS criteria: Tachypnea and leukocytosis  Initially treated with antibiotics for possible atypical pneumonia in the ED, however procalcitonin is negative and patient examines more like acute on chronic systolic heart failure and denies any fever or productive cough   Hold on further antibiotics at this time  If morning procalcitonin increases-would start ceftriaxone   Follow-up blood cultures  COPD (chronic obstructive pulmonary disease) ([facility])  Home regimen: Trelegy daily and albuterol as needed  No  expiratory wheezing appreciated on admission, do not suspect acute exacerbation at this time  Continue home regimen  Type 2 diabetes mellitus without complication, without long-term current use of insulin (HCC)  Lab Results   Component Value Date    HGBA1C 13.5 (H) 08/28/2024       Recent Labs     10/25/24  1136 10/25/24  1638 10/25/24  2102 10/26/24  0701   POCGLU 164* 300* 203* 163*       Blood Sugar Average: Last 72 hrs:  (P) [id number] regimen: metformin, glipizide, and farxiga  Hold oral medication and place on lantus 5U in AM, humalog 5U TID with meals, SSI AC/HS  Coronary artery disease involving native coronary artery of native heart without angina pectoris  CAD status post PCI and CABG  Continue home beta-blocker, statin, and Xarelto  Persistent atrial fibrillation (HCC)  RC: Metoprolol succinate 25 Mg daily  AC: Xarelto  Rate controlled, v paced  Cardiac pacemaker  [employer] s/p SSS   Last device interrogation 07/2024 showed normal device function   Pulmonary embolism (HCC)  Continue home Xarelto   CTA yesterday with no signs of PE  Pulmonary hypertension (HCC)  Most recent echo January 2024 showing mild pulmonary hypertension with RSVP 39 mmHg  Continue home medications  Acquired hypothyroidism  TSH elevated at 8.358  Increase synthroid to 125mcg daily   Recheck TSH in 6-8 weeks   Depression with anxiety  Continue home buspar, cymbalta, and klonopin   History of transcatheter aortic valve replacement (TAVR)     VTE  Prophylaxis:   Pharmacologic: in place  Mechanical VTE Prophylaxis in Place: Yes    Patient Centered Rounds: I have performed bedside rounds with nursing staff today.    Discussions with Specialists or Other Care Team Provider: case management    Education and Discussions with Family / Patient: yes    Mobility:   Basic Mobility Inpatient Raw Score: 22  JH-HLM Goal: 7: Walk 25 feet or more  JH-HLM Achieved: 7: Walk 25 feet or more      Current Length of Stay: 7 day(s)    Current  Patient Status: Inpatient        Code Status: Level 1 - Full Code    Discharge Plan: Pt will require continued inpatient hospitalization.    Subjective:   Pt states no acute complaints   No chest pain   Reports breathing better    Patient is seen and examined at bedside.  All other ROS are negative.    Objective:     Vitals:   Temp (24hrs), Av °F (36.7 °C), Min:97.8 °F (36.6 °C), Max:98.1 °F (36.7 °C)    Temp:  [97.8 °F (36.6 °C)-98.1 °F (36.7 °C)] 98.1 °F (36.7 °C)  HR:  [64-66] 65  Resp:  [18] 18  BP: (114-123)/(56-61) 114/61  SpO2:  [86 %-95 %] 95 %  Body mass index is 30.91 kg/m².     Input and Output Summary (last 24 hours):       Intake/Output Summary (Last 24 hours) at 10/26/2024 1023  Last data filed at 10/26/2024 1008  Gross per 24 hour   Intake 1132 ml   Output 3315 ml   Net -2183 ml       Physical Exam:       GEN: No acute distress, comfortable on o2  HEEENT: No JVD, PERRLA, no scleral icterus  RESP: Lungs clear to auscultation bilaterally  CV: RRR, +s1/s2   ABD: SOFT NON TENDER, POSITIVE BOWEL SOUNDS, NO DISTENTION  PSYCH: CALM  NEURO: Mentation baseline, NO FOCAL DEFICITS  SKIN: NO RASH  EXTREM: NO EDEMA    Additional Data:     Labs:    Results from last 7 days   Lab Units 10/26/24  0458   WBC Thousand/uL 9.80   HEMOGLOBIN g/dL 11.5*   HEMATOCRIT % 36.3*   PLATELETS Thousands/uL 205   SEGS PCT % 50   LYMPHO PCT % 38   MONO PCT % 9   EOS PCT % 2     Results from last 7 days   Lab Units 10/26/24  0458   SODIUM mmol/L 138   POTASSIUM mmol/L 3.9   CHLORIDE mmol/L 95*   CO2 mmol/L 36*   BUN mg/dL 28*   CREATININE mg/dL 1.02   ANION GAP mmol/L 7   CALCIUM mg/dL 8.8   ALBUMIN g/dL 3.7   TOTAL BILIRUBIN mg/dL 1.18*   ALK PHOS U/L 100   ALT U/L 16   AST U/L 22   GLUCOSE RANDOM mg/dL 176*         Results from last 7 days   Lab Units 10/26/24  0701 10/25/24  2102 10/25/24  1638 10/25/24  1136 10/25/24  0726 10/24/24  2108 10/24/24  1626 10/24/24  1118 10/24/24  0716 10/23/24  2047 10/23/24  1613 10/23/24  1106    POC GLUCOSE mg/dl 163* 203* 300* 164* 165* 187* 159* 198* 240* 142* 135 203*         Results from last 7 days   Lab Units 10/19/24  2126   LACTIC ACID mmol/L 1.2   PROCALCITONIN ng/ml 0.08       Lines/Drains:  Invasive Devices       Peripheral Intravenous Line  Duration             Peripheral IV 10/25/24 Dorsal (posterior);Left Forearm <1 day                    Telemetry:        * I Have Reviewed All Lab Data Listed Above.           Imaging:     Results for orders placed during the hospital encounter of 10/19/24    XR chest 1 view portable    Narrative  XR CHEST PORTABLE    INDICATION: SOB.    COMPARISON: Chest CT 10/19/2024.    FINDINGS:    Mild pulmonary edema. Small right pleural effusion.    Moderate cardiomegaly, CABG, left subclavian pacemaker leads in right atrium and right ventricle.    Bones are unremarkable for age.    Normal upper abdomen. Mild elevation of the left diaphragm.    Impression  Mild pulmonary edema with small right pleural effusion.        Workstation performed: [id number]    No results found for this or any previous visit.      *I have reviewed all imaging reports listed above      Recent Cultures (last 7 days):     Results from last 7 days   Lab Units 10/19/24  2116   BLOOD CULTURE  No Growth After 5 Days.  No Growth After 5 Days.       Last 24 Hours Medication List:   Current Facility-Administered Medications   Medication Dose Route Frequency Provider Last Rate    acetaminophen  650 mg Oral Q4H PRN Usha Quinn PA-C      albuterol  2.5 mg Nebulization Q4H PRN Savanah Garcia MD      atorvastatin  20 mg Oral QAM Usha Quinn PA-C      busPIRone  7.5 mg Oral BID Usha Quinn PA-C      clonazePAM  1 mg Oral HS PRN Usha Quinn PA-C      dextromethorphan-guaiFENesin  10 mL Oral Q4H PRN Jevon Campbell MD      donepezil  10 mg Oral QAM Usha Quinn PA-C      DULoxetine  60 mg Oral Daily Jevon Campbell MD      ferrous sulfate  325 mg Oral Daily With  Breakfast Usha Quinn PA-C      Fluticasone Furoate-Vilanterol  1 puff Inhalation Daily Usha Quinn PA-C      And    umeclidinium  1 puff Inhalation Daily Usha Quinn PA-C      folic acid  1,000 mcg Oral QAM Usha Quinn PA-C      furosemide  80 mg Intravenous BID (diuretic) Missy Crane DO      influenza vaccine  0.5 mL Intramuscular Prior to discharge Savanah Garcia MD      insulin glargine  5 Units Subcutaneous Daily With Breakfast Usha Quinn PA-C      insulin lispro  1-5 Units Subcutaneous HS Usha Quinn PA-C      insulin lispro  1-6 Units Subcutaneous TID AC Usha Quinn PA-C      insulin lispro  5 Units Subcutaneous TID With Meals Usha Quinn PA-C      levothyroxine  125 mcg Oral Early Morning Usha Quinn PA-C      lidocaine  1 patch Topical Daily Bc Harrell,       loratadine  10 mg Oral QAM Usha Quinn PA-C      magnesium Oxide  400 mg Oral BID Usha Quinn PA-C      melatonin  3 mg Oral HS PRN Ariane Alberts PA-C      metoprolol succinate  25 mg Oral QAM Usha Quinn PA-C      pantoprazole  40 mg Oral BID AC Usha Quinn PA-C      potassium chloride  40 mEq Oral BID Jorgito Coughlin PA-C      rivaroxaban  20 mg Oral Daily With Dinner Usha Quinn PA-C      spironolactone  25 mg Oral Daily Usha Quinn PA-C      Thiamine Mononitrate  100 mg Oral Daily Usha Quinn PA-C          Today, Patient Was Seen By: Jevon Campbell MD    ** Please Note: Dictation voice to text software may have been used in the creation of this document. **       Sacral wound

## 2025-05-02 DIAGNOSIS — E11.9 TYPE 2 DIABETES MELLITUS WITHOUT COMPLICATION, WITHOUT LONG-TERM CURRENT USE OF INSULIN (HCC): ICD-10-CM

## 2025-05-02 RX ORDER — LANCETS 33 GAUGE
EACH MISCELLANEOUS
Qty: 100 EACH | Refills: 3 | Status: SHIPPED | OUTPATIENT
Start: 2025-05-02

## 2025-05-28 ENCOUNTER — OFFICE VISIT (OUTPATIENT)
Dept: FAMILY MEDICINE CLINIC | Facility: HOSPITAL | Age: 85
End: 2025-05-28
Payer: MEDICARE

## 2025-05-28 ENCOUNTER — APPOINTMENT (OUTPATIENT)
Dept: LAB | Facility: HOSPITAL | Age: 85
End: 2025-05-28
Payer: MEDICARE

## 2025-05-28 ENCOUNTER — APPOINTMENT (OUTPATIENT)
Dept: LAB | Facility: HOSPITAL | Age: 85
DRG: 871 | End: 2025-05-28
Payer: MEDICARE

## 2025-05-28 VITALS
BODY MASS INDEX: 35.16 KG/M2 | HEART RATE: 60 BPM | SYSTOLIC BLOOD PRESSURE: 108 MMHG | WEIGHT: 232 LBS | OXYGEN SATURATION: 79 % | DIASTOLIC BLOOD PRESSURE: 50 MMHG | HEIGHT: 68 IN

## 2025-05-28 DIAGNOSIS — G30.9 MODERATE ALZHEIMER'S DEMENTIA, UNSPECIFIED TIMING OF DEMENTIA ONSET, UNSPECIFIED WHETHER BEHAVIORAL, PSYCHOTIC, OR MOOD DISTURBANCE OR ANXIETY (HCC): ICD-10-CM

## 2025-05-28 DIAGNOSIS — E61.1 IRON DEFICIENCY: ICD-10-CM

## 2025-05-28 DIAGNOSIS — M51.372 DEGENERATION OF INTERVERTEBRAL DISC OF LUMBOSACRAL REGION WITH DISCOGENIC BACK PAIN AND LOWER EXTREMITY PAIN: ICD-10-CM

## 2025-05-28 DIAGNOSIS — E11.9 TYPE 2 DIABETES MELLITUS WITHOUT COMPLICATION, WITH LONG-TERM CURRENT USE OF INSULIN (HCC): ICD-10-CM

## 2025-05-28 DIAGNOSIS — Z79.4 TYPE 2 DIABETES MELLITUS WITHOUT COMPLICATION, WITH LONG-TERM CURRENT USE OF INSULIN (HCC): ICD-10-CM

## 2025-05-28 DIAGNOSIS — E03.9 ACQUIRED HYPOTHYROIDISM: ICD-10-CM

## 2025-05-28 DIAGNOSIS — I50.42 CHRONIC COMBINED SYSTOLIC AND DIASTOLIC CHF (CONGESTIVE HEART FAILURE) (HCC): ICD-10-CM

## 2025-05-28 DIAGNOSIS — E78.2 MIXED HYPERLIPIDEMIA: ICD-10-CM

## 2025-05-28 DIAGNOSIS — J44.9 CHRONIC OBSTRUCTIVE PULMONARY DISEASE, UNSPECIFIED COPD TYPE (HCC): ICD-10-CM

## 2025-05-28 DIAGNOSIS — I50.23 ACUTE ON CHRONIC SYSTOLIC HEART FAILURE (HCC): ICD-10-CM

## 2025-05-28 DIAGNOSIS — Z79.4 TYPE 2 DIABETES MELLITUS WITHOUT COMPLICATION, WITH LONG-TERM CURRENT USE OF INSULIN (HCC): Primary | ICD-10-CM

## 2025-05-28 DIAGNOSIS — E11.9 TYPE 2 DIABETES MELLITUS WITHOUT COMPLICATION, WITH LONG-TERM CURRENT USE OF INSULIN (HCC): Primary | ICD-10-CM

## 2025-05-28 DIAGNOSIS — F02.B0 MODERATE ALZHEIMER'S DEMENTIA, UNSPECIFIED TIMING OF DEMENTIA ONSET, UNSPECIFIED WHETHER BEHAVIORAL, PSYCHOTIC, OR MOOD DISTURBANCE OR ANXIETY (HCC): ICD-10-CM

## 2025-05-28 DIAGNOSIS — K21.9 GASTROESOPHAGEAL REFLUX DISEASE, UNSPECIFIED WHETHER ESOPHAGITIS PRESENT: ICD-10-CM

## 2025-05-28 PROBLEM — N17.9 AKI (ACUTE KIDNEY INJURY) (HCC): Status: RESOLVED | Noted: 2025-01-28 | Resolved: 2025-05-28

## 2025-05-28 LAB
ALBUMIN SERPL BCG-MCNC: 3.8 G/DL (ref 3.5–5)
ALP SERPL-CCNC: 91 U/L (ref 34–104)
ALT SERPL W P-5'-P-CCNC: 10 U/L (ref 7–52)
ANION GAP SERPL CALCULATED.3IONS-SCNC: 6 MMOL/L (ref 4–13)
ANISOCYTOSIS BLD QL SMEAR: PRESENT
AST SERPL W P-5'-P-CCNC: 15 U/L (ref 13–39)
BASOPHILS # BLD MANUAL: 0.13 THOUSAND/UL (ref 0–0.1)
BASOPHILS NFR MAR MANUAL: 1 % (ref 0–1)
BILIRUB SERPL-MCNC: 0.95 MG/DL (ref 0.2–1)
BNP SERPL-MCNC: 357 PG/ML (ref 0–100)
BUN SERPL-MCNC: 29 MG/DL (ref 5–25)
CALCIUM SERPL-MCNC: 9.1 MG/DL (ref 8.4–10.2)
CHLORIDE SERPL-SCNC: 100 MMOL/L (ref 96–108)
CO2 SERPL-SCNC: 35 MMOL/L (ref 21–32)
CREAT SERPL-MCNC: 1.68 MG/DL (ref 0.6–1.3)
CREAT UR-MCNC: 98.7 MG/DL
EOSINOPHIL # BLD MANUAL: 0.4 THOUSAND/UL (ref 0–0.4)
EOSINOPHIL NFR BLD MANUAL: 3 % (ref 0–6)
ERYTHROCYTE [DISTWIDTH] IN BLOOD BY AUTOMATED COUNT: 14.6 % (ref 11.6–15.1)
EST. AVERAGE GLUCOSE BLD GHB EST-MCNC: 103 MG/DL
FERRITIN SERPL-MCNC: 89 NG/ML (ref 30–336)
GFR SERPL CREATININE-BSD FRML MDRD: 36 ML/MIN/1.73SQ M
GLUCOSE P FAST SERPL-MCNC: 105 MG/DL (ref 65–99)
HBA1C MFR BLD: 5.2 %
HCT VFR BLD AUTO: 29 % (ref 36.5–49.3)
HGB BLD-MCNC: 8.3 G/DL (ref 12–17)
IRON SATN MFR SERPL: 4 % (ref 15–50)
IRON SERPL-MCNC: 16 UG/DL (ref 50–212)
LYMPHOCYTES # BLD AUTO: 2.68 THOUSAND/UL (ref 0.6–4.47)
LYMPHOCYTES # BLD AUTO: 20 % (ref 14–44)
MCH RBC QN AUTO: 27.8 PG (ref 26.8–34.3)
MCHC RBC AUTO-ENTMCNC: 28.6 G/DL (ref 31.4–37.4)
MCV RBC AUTO: 97 FL (ref 82–98)
MICROALBUMIN UR-MCNC: 11.8 MG/L
MICROALBUMIN/CREAT 24H UR: 12 MG/G CREATININE (ref 0–30)
MICROCYTES BLD QL AUTO: PRESENT
MONOCYTES # BLD AUTO: 1.07 THOUSAND/UL (ref 0–1.22)
MONOCYTES NFR BLD: 8 % (ref 4–12)
NEUTROPHILS # BLD MANUAL: 9.11 THOUSAND/UL (ref 1.85–7.62)
NEUTS SEG NFR BLD AUTO: 68 % (ref 43–75)
OVALOCYTES BLD QL SMEAR: PRESENT
PLATELET # BLD AUTO: 244 THOUSANDS/UL (ref 149–390)
PLATELET BLD QL SMEAR: ADEQUATE
PMV BLD AUTO: 9 FL (ref 8.9–12.7)
POIKILOCYTOSIS BLD QL SMEAR: PRESENT
POLYCHROMASIA BLD QL SMEAR: PRESENT
POTASSIUM SERPL-SCNC: 4.5 MMOL/L (ref 3.5–5.3)
PROT SERPL-MCNC: 6.7 G/DL (ref 6.4–8.4)
RBC # BLD AUTO: 2.99 MILLION/UL (ref 3.88–5.62)
RBC MORPH BLD: PRESENT
SODIUM SERPL-SCNC: 141 MMOL/L (ref 135–147)
TIBC SERPL-MCNC: 418.6 UG/DL (ref 250–450)
TRANSFERRIN SERPL-MCNC: 299 MG/DL (ref 203–362)
TSH SERPL DL<=0.05 MIU/L-ACNC: 3.67 UIU/ML (ref 0.45–4.5)
UIBC SERPL-MCNC: 403 UG/DL (ref 155–355)
WBC # BLD AUTO: 13.39 THOUSAND/UL (ref 4.31–10.16)

## 2025-05-28 PROCEDURE — 85027 COMPLETE CBC AUTOMATED: CPT

## 2025-05-28 PROCEDURE — 80053 COMPREHEN METABOLIC PANEL: CPT

## 2025-05-28 PROCEDURE — 83540 ASSAY OF IRON: CPT

## 2025-05-28 PROCEDURE — 84443 ASSAY THYROID STIM HORMONE: CPT

## 2025-05-28 PROCEDURE — 82728 ASSAY OF FERRITIN: CPT

## 2025-05-28 PROCEDURE — 36415 COLL VENOUS BLD VENIPUNCTURE: CPT

## 2025-05-28 PROCEDURE — 83036 HEMOGLOBIN GLYCOSYLATED A1C: CPT

## 2025-05-28 PROCEDURE — 83880 ASSAY OF NATRIURETIC PEPTIDE: CPT

## 2025-05-28 PROCEDURE — G2211 COMPLEX E/M VISIT ADD ON: HCPCS | Performed by: STUDENT IN AN ORGANIZED HEALTH CARE EDUCATION/TRAINING PROGRAM

## 2025-05-28 PROCEDURE — 85007 BL SMEAR W/DIFF WBC COUNT: CPT

## 2025-05-28 PROCEDURE — 82043 UR ALBUMIN QUANTITATIVE: CPT

## 2025-05-28 PROCEDURE — 99215 OFFICE O/P EST HI 40 MIN: CPT | Performed by: STUDENT IN AN ORGANIZED HEALTH CARE EDUCATION/TRAINING PROGRAM

## 2025-05-28 PROCEDURE — 83550 IRON BINDING TEST: CPT

## 2025-05-28 PROCEDURE — 82570 ASSAY OF URINE CREATININE: CPT

## 2025-05-28 NOTE — ASSESSMENT & PLAN NOTE
Lab Results   Component Value Date    HGBA1C 5.2 05/28/2025   Well controlled now - on amaryl & metformin & farxiga  Orders:  •  Comprehensive metabolic panel; Future  •  Hemoglobin A1C; Future  •  Albumin / creatinine urine ratio; Future

## 2025-05-28 NOTE — ASSESSMENT & PLAN NOTE
Wt Readings from Last 3 Encounters:   06/13/25 99.2 kg (218 lb 12.8 oz)   05/28/25 105 kg (232 lb)   03/20/25 99.7 kg (219 lb 12.8 oz)     Seeing cardio in July for 6 month f/u.   Device checks for PM

## 2025-05-28 NOTE — PROGRESS NOTES
Name: Trevor Lyons      : 1940      MRN: [id number]  Encounter Provider: Ira Borden DO  Encounter Date: 2025   Encounter department: [facility] PRIMARY C[facility] [address]  :  Assessment & Plan  Type 2 diabetes mellitus without complication, with long-term current use of insulin (HCC)  Lab Results   Component Value Date    HGBA1C 5.2 2025   Well controlled now - on amaryl & metformin & farxiga  Orders:  •  Comprehensive metabolic panel; Future  •  Hemoglobin A1C; Future  •  Albumin / creatinine urine ratio; Future    Acute on chronic systolic heart failure (HCC)  Wt Readings from Last 3 Encounters:   25 99.2 kg (218 lb 12.8 oz)   25 105 kg (232 lb)   25 99.7 kg (219 lb 12.8 oz)     Seeing cardio in July for 6 month f/u.   Device checks for PM       Chronic combined systolic and diastolic CHF (congestive heart failure) (HCC)  Wt Readings from Last 3 Encounters:   25 99.2 kg (218 lb 12.8 oz)   25 105 kg (232 lb)   25 99.7 kg (219 lb 12.8 oz)       Orders:  •  B-Type Natriuretic Peptide(BNP); Future    Acquired hypothyroidism  Check levels   Orders:  •  TSH, 3rd generation; Future    Moderate Alzheimer's dementia, unspecified timing of dementia onset, unspecified whether behavioral, psychotic, or mood disturbance or anxiety (HCC)  Chronic, on aricept daily.        Chronic obstructive pulmonary disease, unspecified COPD type (HCC)  On 2 LPM nc at his room, but not using right now.   Denies SOB, but best pulse ox is 79% in office.     Orders:  •  Ambulatory Referral to Pulmonology; Future    Mixed hyperlipidemia  On statin daily.        Degeneration of intervertebral disc of lumbosacral region with discogenic back pain and lower extremity pain  Chronic pain, using lido patches topically.        Iron deficiency  Recheck levels  Orders:  •  CBC and differential; Future  •  Iron Panel (Includes Ferritin, Iron Sat%, Iron, and TIBC);  "Future    Gastroesophageal reflux disease, unspecified whether esophagitis present  Denies reflux, on PPI 40 mg bid. Could go to once a day or 20 mg bid.        Return in about 2 weeks (around 6/11/2025) for F/u CHF .       History of Present Illness   HPI  Chief Complaint   Patient presents with   • Follow-up     Noticing cough, dry, no phlegm. Has been there for a while now.   Is doing round trelegy inhaler every morning & not using albuterol inhaler much. Not using neb either.     On ARB, not ACEi.     Did gain some wt, lives in facility.   3 meals per day with them, some snacks in his room, states not much.   Is on water pill bid.   Likes sugar free decaf iced tea.     Chronic back pain, on & off, using pain patch.     Labs done in Feb. A1c 6.5. TSH 5.111. eGFR 49.     Wt Readings from Last 3 Encounters:   06/13/25 99.2 kg (218 lb 12.8 oz)   05/28/25 105 kg (232 lb)   03/20/25 99.7 kg (219 lb 12.8 oz)     Temp Readings from Last 3 Encounters:   06/13/25 97.8 °F (36.6 °C)   01/30/25 97.5 °F (36.4 °C) (Temporal)   12/03/24 (!) 96.5 °F (35.8 °C)     BP Readings from Last 3 Encounters:   06/13/25 128/56   05/28/25 108/50   03/20/25 128/58     Pulse Readings from Last 3 Encounters:   06/13/25 60   05/28/25 60   03/20/25 65     Review of Systems   Constitutional:  Negative for chills and fever.   Respiratory:  Positive for cough. Negative for shortness of breath.    Cardiovascular:  Negative for chest pain, palpitations and leg swelling.   Gastrointestinal:         Denies reflux    Neurological:  Positive for dizziness (occas with headaches) and headaches (occas). Negative for light-headedness.     Objective   /50   Pulse 60   Ht 5' 8\" (1.727 m)   Wt 105 kg (232 lb)   SpO2 (!) 79%   BMI 35.28 kg/m²      Physical Exam  Vitals reviewed.   Constitutional:       General: He is not in acute distress.     Appearance: Normal appearance. He is obese. He is not ill-appearing.   HENT:      Head: Normocephalic and " atraumatic.     Eyes:      General: No scleral icterus.        Right eye: No discharge.         Left eye: No discharge.       Cardiovascular:      Rate and Rhythm: Normal rate and regular rhythm.      Pulses: Normal pulses.      Heart sounds: Normal heart sounds. No murmur heard.  Pulmonary:      Effort: Pulmonary effort is normal. No respiratory distress.      Breath sounds: Normal breath sounds. No wheezing.     Musculoskeletal:      Cervical back: Normal range of motion and neck supple.      Right lower leg: Edema (mid shin 1+) present.      Left lower leg: Edema (L upper shin 1 +) present.     Neurological:      Mental Status: He is alert and oriented to person, place, and time.      Gait: Gait normal.     Psychiatric:         Mood and Affect: Mood normal.         Behavior: Behavior normal.         Thought Content: Thought content normal.         Judgment: Judgment normal.       Administrative Statements   I have spent a total time of 42 minutes in caring for this patient on the day of the visit/encounter including Diagnostic results, Risks and benefits of tx options, Risk factor reductions, Impressions, Documenting in the medical record, Reviewing/placing orders in the medical record (including tests, medications, and/or procedures), and Obtaining or reviewing history  .

## 2025-05-28 NOTE — ASSESSMENT & PLAN NOTE
On 2 LPM nc at his room, but not using right now.   Denies SOB, but best pulse ox is 79% in office.     Orders:  •  Ambulatory Referral to Pulmonology; Future

## 2025-05-28 NOTE — ASSESSMENT & PLAN NOTE
Wt Readings from Last 3 Encounters:   06/13/25 99.2 kg (218 lb 12.8 oz)   05/28/25 105 kg (232 lb)   03/20/25 99.7 kg (219 lb 12.8 oz)       Orders:  •  B-Type Natriuretic Peptide(BNP); Future

## 2025-05-28 NOTE — PATIENT INSTRUCTIONS
TID Torsemide 20 mg for 4 days, then back to bid.   Watch weights daily for one week  Check oxygen bid for one week.

## 2025-05-30 ENCOUNTER — HOSPITAL ENCOUNTER (INPATIENT)
Facility: HOSPITAL | Age: 85
LOS: 14 days | Discharge: HOSPICE | DRG: 871 | End: 2025-06-13
Attending: EMERGENCY MEDICINE | Admitting: STUDENT IN AN ORGANIZED HEALTH CARE EDUCATION/TRAINING PROGRAM
Payer: MEDICARE

## 2025-05-30 ENCOUNTER — APPOINTMENT (EMERGENCY)
Dept: RADIOLOGY | Facility: HOSPITAL | Age: 85
DRG: 871 | End: 2025-05-30
Payer: MEDICARE

## 2025-05-30 ENCOUNTER — APPOINTMENT (INPATIENT)
Dept: CT IMAGING | Facility: HOSPITAL | Age: 85
DRG: 871 | End: 2025-05-30
Payer: MEDICARE

## 2025-05-30 DIAGNOSIS — E11.9 TYPE 2 DIABETES MELLITUS WITHOUT COMPLICATION, WITHOUT LONG-TERM CURRENT USE OF INSULIN (HCC): ICD-10-CM

## 2025-05-30 DIAGNOSIS — I50.42 CHRONIC COMBINED SYSTOLIC AND DIASTOLIC CHF (CONGESTIVE HEART FAILURE) (HCC): ICD-10-CM

## 2025-05-30 DIAGNOSIS — G30.9 ALZHEIMER DISEASE (HCC): ICD-10-CM

## 2025-05-30 DIAGNOSIS — I50.23 ACUTE ON CHRONIC SYSTOLIC HEART FAILURE (HCC): ICD-10-CM

## 2025-05-30 DIAGNOSIS — F02.80 ALZHEIMER DISEASE (HCC): ICD-10-CM

## 2025-05-30 DIAGNOSIS — E61.1 IRON DEFICIENCY: ICD-10-CM

## 2025-05-30 DIAGNOSIS — F41.9 ANXIETY: ICD-10-CM

## 2025-05-30 DIAGNOSIS — J44.9 CHRONIC OBSTRUCTIVE PULMONARY DISEASE, UNSPECIFIED COPD TYPE (HCC): ICD-10-CM

## 2025-05-30 DIAGNOSIS — I48.19 PERSISTENT ATRIAL FIBRILLATION (HCC): ICD-10-CM

## 2025-05-30 DIAGNOSIS — I71.21 ANEURYSM OF ASCENDING AORTA WITHOUT RUPTURE (HCC): ICD-10-CM

## 2025-05-30 DIAGNOSIS — I50.9 CHF EXACERBATION (HCC): Primary | ICD-10-CM

## 2025-05-30 DIAGNOSIS — J18.9 PNEUMONIA: ICD-10-CM

## 2025-05-30 DIAGNOSIS — E11.65 TYPE 2 DIABETES MELLITUS WITH HYPERGLYCEMIA, WITHOUT LONG-TERM CURRENT USE OF INSULIN (HCC): ICD-10-CM

## 2025-05-30 DIAGNOSIS — E78.2 MIXED HYPERLIPIDEMIA: ICD-10-CM

## 2025-05-30 DIAGNOSIS — J44.1 COPD WITH ACUTE EXACERBATION (HCC): ICD-10-CM

## 2025-05-30 DIAGNOSIS — J18.9 MULTIFOCAL PNEUMONIA: ICD-10-CM

## 2025-05-30 DIAGNOSIS — D72.829 LEUCOCYTOSIS: ICD-10-CM

## 2025-05-30 DIAGNOSIS — D64.9 ANEMIA, UNSPECIFIED TYPE: ICD-10-CM

## 2025-05-30 DIAGNOSIS — I50.41 ACUTE COMBINED SYSTOLIC AND DIASTOLIC CONGESTIVE HEART FAILURE (HCC): ICD-10-CM

## 2025-05-30 PROBLEM — J96.10 CHRONIC RESPIRATORY FAILURE (HCC): Status: ACTIVE | Noted: 2025-05-30

## 2025-05-30 PROBLEM — A41.9 SEVERE SEPSIS (HCC): Status: ACTIVE | Noted: 2025-05-30

## 2025-05-30 PROBLEM — R65.20 SEVERE SEPSIS (HCC): Status: ACTIVE | Noted: 2025-05-30

## 2025-05-30 PROBLEM — E87.20 LACTIC ACIDOSIS: Status: ACTIVE | Noted: 2025-05-30

## 2025-05-30 LAB
2HR DELTA HS TROPONIN: 2 NG/L
4HR DELTA HS TROPONIN: 3 NG/L
ALBUMIN SERPL BCG-MCNC: 3.9 G/DL (ref 3.5–5)
ALP SERPL-CCNC: 79 U/L (ref 34–104)
ALT SERPL W P-5'-P-CCNC: 9 U/L (ref 7–52)
ANION GAP SERPL CALCULATED.3IONS-SCNC: 15 MMOL/L (ref 4–13)
ANISOCYTOSIS BLD QL SMEAR: PRESENT
APTT PPP: 52 SECONDS (ref 23–34)
AST SERPL W P-5'-P-CCNC: 13 U/L (ref 13–39)
BACTERIA UR QL AUTO: ABNORMAL /HPF
BASOPHILS # BLD MANUAL: 0.16 THOUSAND/UL (ref 0–0.1)
BASOPHILS NFR MAR MANUAL: 1 % (ref 0–1)
BILIRUB SERPL-MCNC: 1.14 MG/DL (ref 0.2–1)
BILIRUB UR QL STRIP: NEGATIVE
BNP SERPL-MCNC: 502 PG/ML (ref 0–100)
BUN SERPL-MCNC: 36 MG/DL (ref 5–25)
CALCIUM SERPL-MCNC: 9 MG/DL (ref 8.4–10.2)
CARDIAC TROPONIN I PNL SERPL HS: 24 NG/L (ref ?–50)
CARDIAC TROPONIN I PNL SERPL HS: 26 NG/L (ref ?–50)
CARDIAC TROPONIN I PNL SERPL HS: 27 NG/L (ref ?–50)
CHLORIDE SERPL-SCNC: 96 MMOL/L (ref 96–108)
CLARITY UR: CLEAR
CO2 SERPL-SCNC: 27 MMOL/L (ref 21–32)
COLOR UR: YELLOW
CREAT SERPL-MCNC: 1.81 MG/DL (ref 0.6–1.3)
EOSINOPHIL # BLD MANUAL: 0 THOUSAND/UL (ref 0–0.4)
EOSINOPHIL NFR BLD MANUAL: 0 % (ref 0–6)
ERYTHROCYTE [DISTWIDTH] IN BLOOD BY AUTOMATED COUNT: 14.4 % (ref 11.6–15.1)
FLUAV RNA RESP QL NAA+PROBE: NEGATIVE
FLUBV RNA RESP QL NAA+PROBE: NEGATIVE
GFR SERPL CREATININE-BSD FRML MDRD: 33 ML/MIN/1.73SQ M
GLUCOSE SERPL-MCNC: 139 MG/DL (ref 65–140)
GLUCOSE SERPL-MCNC: 174 MG/DL (ref 65–140)
GLUCOSE SERPL-MCNC: 201 MG/DL (ref 65–140)
GLUCOSE UR STRIP-MCNC: ABNORMAL MG/DL
HCT VFR BLD AUTO: 29.1 % (ref 36.5–49.3)
HGB BLD-MCNC: 8.4 G/DL (ref 12–17)
HGB UR QL STRIP.AUTO: NEGATIVE
HYALINE CASTS #/AREA URNS LPF: ABNORMAL /LPF
INR PPP: 1.87 (ref 0.85–1.19)
KETONES UR STRIP-MCNC: NEGATIVE MG/DL
L PNEUMO1 AG UR QL IA.RAPID: NEGATIVE
LACTATE SERPL-SCNC: 1.3 MMOL/L (ref 0.5–2)
LACTATE SERPL-SCNC: 2.8 MMOL/L (ref 0.5–2)
LACTATE SERPL-SCNC: 2.9 MMOL/L (ref 0.5–2)
LEUKOCYTE ESTERASE UR QL STRIP: NEGATIVE
LYMPHOCYTES # BLD AUTO: 35 % (ref 14–44)
LYMPHOCYTES # BLD AUTO: 5.57 THOUSAND/UL (ref 0.6–4.47)
MCH RBC QN AUTO: 27.6 PG (ref 26.8–34.3)
MCHC RBC AUTO-ENTMCNC: 28.9 G/DL (ref 31.4–37.4)
MCV RBC AUTO: 96 FL (ref 82–98)
MONOCYTES # BLD AUTO: 1.75 THOUSAND/UL (ref 0–1.22)
MONOCYTES NFR BLD: 11 % (ref 4–12)
MUCOUS THREADS UR QL AUTO: ABNORMAL
NEUTROPHILS # BLD MANUAL: 8.43 THOUSAND/UL (ref 1.85–7.62)
NEUTS BAND NFR BLD MANUAL: 3 % (ref 0–8)
NEUTS SEG NFR BLD AUTO: 50 % (ref 43–75)
NITRITE UR QL STRIP: NEGATIVE
NON-SQ EPI CELLS URNS QL MICRO: ABNORMAL /HPF
OVALOCYTES BLD QL SMEAR: PRESENT
PH UR STRIP.AUTO: 5.5 [PH]
PLATELET # BLD AUTO: 262 THOUSANDS/UL (ref 149–390)
PLATELET BLD QL SMEAR: ADEQUATE
PMV BLD AUTO: 9.1 FL (ref 8.9–12.7)
POIKILOCYTOSIS BLD QL SMEAR: PRESENT
POLYCHROMASIA BLD QL SMEAR: PRESENT
POTASSIUM SERPL-SCNC: 4.6 MMOL/L (ref 3.5–5.3)
PROCALCITONIN SERPL-MCNC: 0.57 NG/ML
PROT SERPL-MCNC: 7.2 G/DL (ref 6.4–8.4)
PROT UR STRIP-MCNC: NEGATIVE MG/DL
PROTHROMBIN TIME: 22 SECONDS (ref 12.3–15)
RBC # BLD AUTO: 3.04 MILLION/UL (ref 3.88–5.62)
RBC #/AREA URNS AUTO: ABNORMAL /HPF
RBC MORPH BLD: PRESENT
RSV RNA RESP QL NAA+PROBE: NEGATIVE
S PNEUM AG UR QL: NEGATIVE
SARS-COV-2 RNA RESP QL NAA+PROBE: NEGATIVE
SODIUM SERPL-SCNC: 138 MMOL/L (ref 135–147)
SP GR UR STRIP.AUTO: 1.01 (ref 1–1.03)
T4 FREE SERPL-MCNC: 1.27 NG/DL (ref 0.61–1.12)
TSH SERPL DL<=0.05 MIU/L-ACNC: 6.25 UIU/ML (ref 0.45–4.5)
UROBILINOGEN UR STRIP-ACNC: <2 MG/DL
WBC # BLD AUTO: 15.91 THOUSAND/UL (ref 4.31–10.16)
WBC #/AREA URNS AUTO: ABNORMAL /HPF

## 2025-05-30 PROCEDURE — 96365 THER/PROPH/DIAG IV INF INIT: CPT

## 2025-05-30 PROCEDURE — 0241U HB NFCT DS VIR RESP RNA 4 TRGT: CPT | Performed by: EMERGENCY MEDICINE

## 2025-05-30 PROCEDURE — 85730 THROMBOPLASTIN TIME PARTIAL: CPT | Performed by: EMERGENCY MEDICINE

## 2025-05-30 PROCEDURE — 87040 BLOOD CULTURE FOR BACTERIA: CPT | Performed by: EMERGENCY MEDICINE

## 2025-05-30 PROCEDURE — 83880 ASSAY OF NATRIURETIC PEPTIDE: CPT | Performed by: EMERGENCY MEDICINE

## 2025-05-30 PROCEDURE — 85007 BL SMEAR W/DIFF WBC COUNT: CPT | Performed by: EMERGENCY MEDICINE

## 2025-05-30 PROCEDURE — 71250 CT THORAX DX C-: CPT

## 2025-05-30 PROCEDURE — 88185 FLOWCYTOMETRY/TC ADD-ON: CPT

## 2025-05-30 PROCEDURE — 97166 OT EVAL MOD COMPLEX 45 MIN: CPT

## 2025-05-30 PROCEDURE — 99285 EMERGENCY DEPT VISIT HI MDM: CPT

## 2025-05-30 PROCEDURE — 94664 DEMO&/EVAL PT USE INHALER: CPT

## 2025-05-30 PROCEDURE — 97163 PT EVAL HIGH COMPLEX 45 MIN: CPT

## 2025-05-30 PROCEDURE — 94760 N-INVAS EAR/PLS OXIMETRY 1: CPT

## 2025-05-30 PROCEDURE — 36415 COLL VENOUS BLD VENIPUNCTURE: CPT | Performed by: EMERGENCY MEDICINE

## 2025-05-30 PROCEDURE — 99285 EMERGENCY DEPT VISIT HI MDM: CPT | Performed by: EMERGENCY MEDICINE

## 2025-05-30 PROCEDURE — 99223 1ST HOSP IP/OBS HIGH 75: CPT | Performed by: INTERNAL MEDICINE

## 2025-05-30 PROCEDURE — 80053 COMPREHEN METABOLIC PANEL: CPT | Performed by: EMERGENCY MEDICINE

## 2025-05-30 PROCEDURE — 83605 ASSAY OF LACTIC ACID: CPT | Performed by: EMERGENCY MEDICINE

## 2025-05-30 PROCEDURE — 84484 ASSAY OF TROPONIN QUANT: CPT | Performed by: EMERGENCY MEDICINE

## 2025-05-30 PROCEDURE — 87449 NOS EACH ORGANISM AG IA: CPT | Performed by: INTERNAL MEDICINE

## 2025-05-30 PROCEDURE — 94640 AIRWAY INHALATION TREATMENT: CPT

## 2025-05-30 PROCEDURE — 84439 ASSAY OF FREE THYROXINE: CPT | Performed by: INTERNAL MEDICINE

## 2025-05-30 PROCEDURE — 83605 ASSAY OF LACTIC ACID: CPT | Performed by: INTERNAL MEDICINE

## 2025-05-30 PROCEDURE — 93005 ELECTROCARDIOGRAM TRACING: CPT

## 2025-05-30 PROCEDURE — 81001 URINALYSIS AUTO W/SCOPE: CPT | Performed by: INTERNAL MEDICINE

## 2025-05-30 PROCEDURE — 84443 ASSAY THYROID STIM HORMONE: CPT | Performed by: INTERNAL MEDICINE

## 2025-05-30 PROCEDURE — 85027 COMPLETE CBC AUTOMATED: CPT | Performed by: EMERGENCY MEDICINE

## 2025-05-30 PROCEDURE — 85610 PROTHROMBIN TIME: CPT | Performed by: EMERGENCY MEDICINE

## 2025-05-30 PROCEDURE — 96375 TX/PRO/DX INJ NEW DRUG ADDON: CPT

## 2025-05-30 PROCEDURE — 82948 REAGENT STRIP/BLOOD GLUCOSE: CPT

## 2025-05-30 PROCEDURE — 71045 X-RAY EXAM CHEST 1 VIEW: CPT

## 2025-05-30 PROCEDURE — 84145 PROCALCITONIN (PCT): CPT | Performed by: EMERGENCY MEDICINE

## 2025-05-30 PROCEDURE — 96374 THER/PROPH/DIAG INJ IV PUSH: CPT

## 2025-05-30 RX ORDER — METOPROLOL SUCCINATE 25 MG/1
25 TABLET, EXTENDED RELEASE ORAL EVERY MORNING
Status: DISCONTINUED | OUTPATIENT
Start: 2025-05-31 | End: 2025-06-13 | Stop reason: HOSPADM

## 2025-05-30 RX ORDER — BUMETANIDE 0.25 MG/ML
2 INJECTION, SOLUTION INTRAMUSCULAR; INTRAVENOUS 2 TIMES DAILY
Status: DISCONTINUED | OUTPATIENT
Start: 2025-05-30 | End: 2025-06-02

## 2025-05-30 RX ORDER — INSULIN LISPRO 100 [IU]/ML
1-5 INJECTION, SOLUTION INTRAVENOUS; SUBCUTANEOUS
Status: DISCONTINUED | OUTPATIENT
Start: 2025-05-30 | End: 2025-06-13 | Stop reason: HOSPADM

## 2025-05-30 RX ORDER — LEVALBUTEROL INHALATION SOLUTION 1.25 MG/3ML
1.25 SOLUTION RESPIRATORY (INHALATION)
Status: DISCONTINUED | OUTPATIENT
Start: 2025-05-30 | End: 2025-06-13 | Stop reason: HOSPADM

## 2025-05-30 RX ORDER — FLUTICASONE FUROATE AND VILANTEROL 100; 25 UG/1; UG/1
1 POWDER RESPIRATORY (INHALATION) DAILY
Status: DISCONTINUED | OUTPATIENT
Start: 2025-05-30 | End: 2025-06-13 | Stop reason: HOSPADM

## 2025-05-30 RX ORDER — TORSEMIDE 20 MG/1
20 TABLET ORAL
Status: DISCONTINUED | OUTPATIENT
Start: 2025-05-30 | End: 2025-05-30

## 2025-05-30 RX ORDER — DONEPEZIL HYDROCHLORIDE 5 MG/1
10 TABLET, FILM COATED ORAL EVERY MORNING
Status: DISCONTINUED | OUTPATIENT
Start: 2025-05-31 | End: 2025-06-13 | Stop reason: HOSPADM

## 2025-05-30 RX ORDER — CLONAZEPAM 1 MG/1
1 TABLET ORAL
Status: DISCONTINUED | OUTPATIENT
Start: 2025-05-30 | End: 2025-06-03

## 2025-05-30 RX ORDER — ALBUTEROL SULFATE 0.83 MG/ML
2.5 SOLUTION RESPIRATORY (INHALATION) 4 TIMES DAILY
Status: DISCONTINUED | OUTPATIENT
Start: 2025-05-30 | End: 2025-05-30

## 2025-05-30 RX ORDER — ATORVASTATIN CALCIUM 20 MG/1
20 TABLET, FILM COATED ORAL
Status: DISCONTINUED | OUTPATIENT
Start: 2025-05-30 | End: 2025-06-13 | Stop reason: HOSPADM

## 2025-05-30 RX ORDER — FERROUS SULFATE 325(65) MG
325 TABLET ORAL
Status: DISCONTINUED | OUTPATIENT
Start: 2025-05-31 | End: 2025-06-01

## 2025-05-30 RX ORDER — AZITHROMYCIN 500 MG/1
500 TABLET, FILM COATED ORAL EVERY 24 HOURS
Status: COMPLETED | OUTPATIENT
Start: 2025-05-31 | End: 2025-06-01

## 2025-05-30 RX ORDER — SPIRONOLACTONE 25 MG/1
25 TABLET ORAL DAILY
Status: DISCONTINUED | OUTPATIENT
Start: 2025-05-30 | End: 2025-06-13 | Stop reason: HOSPADM

## 2025-05-30 RX ORDER — CEFTRIAXONE 1 G/50ML
1000 INJECTION, SOLUTION INTRAVENOUS ONCE
Status: COMPLETED | OUTPATIENT
Start: 2025-05-30 | End: 2025-05-30

## 2025-05-30 RX ORDER — FUROSEMIDE 10 MG/ML
40 INJECTION INTRAMUSCULAR; INTRAVENOUS ONCE
Status: COMPLETED | OUTPATIENT
Start: 2025-05-30 | End: 2025-05-30

## 2025-05-30 RX ORDER — CEFTRIAXONE 2 G/50ML
2000 INJECTION, SOLUTION INTRAVENOUS EVERY 24 HOURS
Status: DISCONTINUED | OUTPATIENT
Start: 2025-05-31 | End: 2025-06-04

## 2025-05-30 RX ORDER — INSULIN LISPRO 100 [IU]/ML
1-6 INJECTION, SOLUTION INTRAVENOUS; SUBCUTANEOUS
Status: DISCONTINUED | OUTPATIENT
Start: 2025-05-30 | End: 2025-06-13 | Stop reason: HOSPADM

## 2025-05-30 RX ORDER — FUROSEMIDE 10 MG/ML
40 INJECTION INTRAMUSCULAR; INTRAVENOUS 2 TIMES DAILY
Status: DISCONTINUED | OUTPATIENT
Start: 2025-05-30 | End: 2025-05-30

## 2025-05-30 RX ORDER — BUSPIRONE HYDROCHLORIDE 15 MG/1
7.5 TABLET ORAL 2 TIMES DAILY
Status: DISCONTINUED | OUTPATIENT
Start: 2025-05-30 | End: 2025-06-13 | Stop reason: HOSPADM

## 2025-05-30 RX ORDER — POLYETHYLENE GLYCOL 3350 17 G/17G
17 POWDER, FOR SOLUTION ORAL DAILY
Status: DISCONTINUED | OUTPATIENT
Start: 2025-05-30 | End: 2025-06-13 | Stop reason: HOSPADM

## 2025-05-30 RX ORDER — SENNOSIDES 8.6 MG
1 TABLET ORAL DAILY
Status: DISCONTINUED | OUTPATIENT
Start: 2025-05-30 | End: 2025-06-13 | Stop reason: HOSPADM

## 2025-05-30 RX ORDER — DULOXETIN HYDROCHLORIDE 30 MG/1
60 CAPSULE, DELAYED RELEASE ORAL DAILY
Status: DISCONTINUED | OUTPATIENT
Start: 2025-05-30 | End: 2025-06-13 | Stop reason: HOSPADM

## 2025-05-30 RX ORDER — ACETAMINOPHEN 325 MG/1
650 TABLET ORAL EVERY 6 HOURS PRN
Status: DISCONTINUED | OUTPATIENT
Start: 2025-05-30 | End: 2025-06-05

## 2025-05-30 RX ORDER — PANTOPRAZOLE SODIUM 40 MG/1
40 TABLET, DELAYED RELEASE ORAL
Status: DISCONTINUED | OUTPATIENT
Start: 2025-05-31 | End: 2025-06-13 | Stop reason: HOSPADM

## 2025-05-30 RX ORDER — LANOLIN ALCOHOL/MO/W.PET/CERES
400 CREAM (GRAM) TOPICAL 2 TIMES DAILY
Status: DISCONTINUED | OUTPATIENT
Start: 2025-05-30 | End: 2025-06-13 | Stop reason: HOSPADM

## 2025-05-30 RX ORDER — IPRATROPIUM BROMIDE AND ALBUTEROL SULFATE .5; 3 MG/3ML; MG/3ML
1 SOLUTION RESPIRATORY (INHALATION) ONCE
Status: COMPLETED | OUTPATIENT
Start: 2025-05-30 | End: 2025-05-30

## 2025-05-30 RX ORDER — IPRATROPIUM BROMIDE AND ALBUTEROL SULFATE .5; 3 MG/3ML; MG/3ML
1 SOLUTION RESPIRATORY (INHALATION) ONCE
Status: DISCONTINUED | OUTPATIENT
Start: 2025-05-30 | End: 2025-05-30

## 2025-05-30 RX ORDER — LOSARTAN POTASSIUM 25 MG/1
25 TABLET ORAL DAILY
Status: DISCONTINUED | OUTPATIENT
Start: 2025-05-30 | End: 2025-06-12

## 2025-05-30 RX ADMIN — POLYETHYLENE GLYCOL 3350 17 G: 17 POWDER, FOR SOLUTION ORAL at 14:57

## 2025-05-30 RX ADMIN — FUROSEMIDE 40 MG: 10 INJECTION, SOLUTION INTRAVENOUS at 11:59

## 2025-05-30 RX ADMIN — BUSPIRONE HYDROCHLORIDE 7.5 MG: 15 TABLET ORAL at 17:04

## 2025-05-30 RX ADMIN — AZITHROMYCIN MONOHYDRATE 500 MG: 500 INJECTION, POWDER, LYOPHILIZED, FOR SOLUTION INTRAVENOUS at 13:19

## 2025-05-30 RX ADMIN — RIVAROXABAN 15 MG: 15 TABLET, FILM COATED ORAL at 17:04

## 2025-05-30 RX ADMIN — ATORVASTATIN CALCIUM 20 MG: 20 TABLET, FILM COATED ORAL at 17:04

## 2025-05-30 RX ADMIN — CEFTRIAXONE 1000 MG: 1 INJECTION, SOLUTION INTRAVENOUS at 12:52

## 2025-05-30 RX ADMIN — LEVALBUTEROL HYDROCHLORIDE 1.25 MG: 1.25 SOLUTION RESPIRATORY (INHALATION) at 20:50

## 2025-05-30 RX ADMIN — LEVOTHYROXINE SODIUM 137 MCG: 112 TABLET ORAL at 14:57

## 2025-05-30 RX ADMIN — SENNOSIDES 8.6 MG: 8.6 TABLET, FILM COATED ORAL at 14:57

## 2025-05-30 RX ADMIN — DULOXETINE HYDROCHLORIDE 60 MG: 30 CAPSULE, DELAYED RELEASE ORAL at 14:57

## 2025-05-30 RX ADMIN — BUMETANIDE 2 MG: 0.25 INJECTION INTRAMUSCULAR; INTRAVENOUS at 17:04

## 2025-05-30 RX ADMIN — Medication 3 MG: at 21:15

## 2025-05-30 RX ADMIN — Medication 3 MG: at 21:45

## 2025-05-30 RX ADMIN — INSULIN LISPRO 1 UNITS: 100 INJECTION, SOLUTION INTRAVENOUS; SUBCUTANEOUS at 17:19

## 2025-05-30 RX ADMIN — Medication 400 MG: at 17:04

## 2025-05-30 NOTE — PHYSICAL THERAPY NOTE
"                                                                                  PHYSICAL THERAPY EVALUATION NOTE      Patient Name: Trevor Lyons  Today's Date: 2025    AGE:   84 y.o.  Mrn:   [id number]  ADMIT DX:  Shortness of breath [R06.02]  CHF exacerbation (HCC) [I50.9]  Multifocal pneumonia [J18.9]    Past Medical History[1]  Length Of Stay: 0  PHYSICAL THERAPY EVALUATION :   Patient's identity confirmed via 2 patient identifiers (full name and ) at start of session       25 1513   PT Last Visit   PT Visit Date 25   Note Type   Note type Evaluation   Pain Assessment   Pain Assessment Tool 0-10   Pain Score No Pain   Restrictions/Precautions   Weight Bearing Precautions Per Order No   Other Precautions Cognitive;Chair Alarm;Bed Alarm;Multiple lines;Fall Risk;O2  (3L NC O2)   Home Living   Type of Home Assisted living  ([address])   Home Layout One level   Home Equipment Walker;Wheelchair-manual   Additional Comments Per CM/chart review and patient report,  primarily uses WC, but can ambulate w/ modified I w/ RW to/from bathroom in his apartment   Prior Function   Level of [place] [facility] with functional mobility;Modified independent with wheelchair   Lives With Facility staff   Falls in the last 6 months 0  (denies)   Vocational Retired   General   Family/Caregiver Present No   Cognition   Attention Within functional limits   Orientation Level Oriented X4   Following Commands Follows one step commands with increased time or repetition   Comments Agreeable to participate   Subjective   Subjective \"Walk to the wheelchair? I mean you could bring it over here\"   RLE Assessment   RLE Assessment WFL   Strength RLE   RLE Overall Strength 4-/5   LLE Assessment   LLE Assessment WFL   Strength LLE   LLE Overall Strength 4-/5   Bed Mobility   Additional Comments  is already OOB in recliner chair upon arrival   Transfers   Sit to Stand 6  Modified independent   Stand to " Sit 6  Modified independent   Ambulation/Elevation   Gait pattern Decreased foot clearance   Gait Assistance 6  Modified independent   Assistive Device Rolling walker   Distance 20 ft   Balance   Static Sitting Normal   Dynamic Sitting Good   Static Standing Good   Dynamic Standing Fair +   Ambulatory Fair +   Activity Tolerance   Activity Tolerance Patient tolerated treatment well   Medical Staff Made Aware LESLY Flores   Nurse Made Aware PATRICE Carvajal   Assessment   Problem List Decreased strength;Decreased endurance;Decreased mobility;Decreased cognition;Impaired judgement;Decreased safety awareness;Obesity   Assessment Trevor Lyons is a 84 y.o. Male who presents [facility] on 5/30/20[facility] from [facility] w/ c/o SOB and diagnosis of acute combined systolic and diastolic CHF. Orders for PT eval and treat received. Pt presents w/ comorbidities of Afib, SSS, DMII, hypothyroidism, chronic respiratory failure, COPD, Alzheimer's Dementia. At baseline, pt mobilizes modified I w/ RW vs WC, and reports 0 falls in the last 6 months. Upon evaluation, pt presents w/ the following deficits: weakness, impaired hearing, and impaired cognition and safety awareness . Upon eval, pt requires modified I for transfers, and modified I for gait. Based on this PT evaluation today, patient's discharge recommendation is for No rehabilitation needs. Given the above findings from this evaluation, at this time this patient does not require skilled inpatient PT for the remainder of this admission as he is at his mobility baseline. Will D/C patient from PT caseload, please reconsult if any changes or needs arise.   Goals   Patient Goals none stated   Discharge Recommendation   Rehab Resource Intensity Level, PT No post-acute rehabilitation needs   AM-PAC Basic Mobility Inpatient   Turning in Flat Bed Without Bedrails 3   Lying on Back to Sitting on Edge of Flat Bed Without Bedrails 3   Moving Bed to Chair 4   Standing Up From Chair  Using Arms 4   Walk in Room 4   Climb 3-5 Stairs With Railing 1   Basic Mobility Inpatient Raw Score 19   Basic Mobility Standardized Score 42.48   [facility] Highest Level Of Mobility   -HLM Goal 6: Walk 10 steps or more   -HLM Achieved 6: Walk 10 steps or more   End of Consult   Patient Position at End of Consult Other (comment)  (OOB in WC w/ PCA to go to CT Scan)           The patient's AM-PAC Basic Mobility Inpatient Short Form Raw Score is 19, Standardized Score is 42.48. A standardized score greater than 38.32 (raw score of 16) suggests the patient may benefit from discharge to home which may not coincide with above PT recommendations. However please refer to therapist recommendation for discharge planning given other factors that may influence destination.    Given the above findings from this evaluation, at this time this patient does not require skilled inpatient PT for the remainder of this admission. Will D/C patient from PT caseload, please reconsult if any changes or needs arise.      Sarah Montgomery PT, DPT            [1]   Past Medical History:  Diagnosis Date    Alzheimer disease (HCC)     Anemia     Aneurysm of ascending aorta without rupture (HCC)     Anxiety     Atrial fibrillation (HCC)     CHF (congestive heart failure) (HCC)     Depression     Diabetes mellitus (HCC)     Disease of thyroid gland     Pulmonary hypertension (HCC)     Sciatica     Sick sinus syndrome (HCC)

## 2025-05-30 NOTE — H&P
"H&P - Hospitalist   Name: Trevor Lyons 84 y.o. male I MRN: [id number]  Unit/Bed#: -01 I Date of Admission: 5/30/2025   Date of Service: 5/30/2025 I Hospital Day: 0     Assessment & Plan  Acute combined systolic and diastolic congestive heart failure (HCC)  Wt Readings from Last 3 Encounters:   05/30/25 105 kg (232 lb)   05/28/25 105 kg (232 lb)   03/20/25 99.7 kg (219 lb 12.8 oz)     Resented with shortness of breath  BNP-502  Echo-EF of 40-45%, mild global hypokinesis, atrial dilatation (October/2024)    Home diuretic regimen-torsemide 20 twice daily, Aldactone 25 mg daily  Hold home regimen  Continue with IV diuretics  Continue to monitor daily weights, I's and O's  Inpatient consult to cardiology, appreciate recommendations  Repeat echo ordered and pending        Persistent atrial fibrillation (HCC)  Continue with Toprol-XL 25 mg daily  Anticoagulation with Xarelto  Sick sinus syndrome (HCC)  Per history  Status post pacemaker placement  Type 2 diabetes mellitus without complication, with long-term current use of insulin (HCC)  Lab Results   Component Value Date    HGBA1C 5.2 05/28/2025       No results for input(s): \"POCGLU\" in the last 72 hours.    Blood Sugar Average: Last 72 hrs:    Continue with insulin on sliding scale  Adjust as needed  Acquired hypothyroidism  Will continue with Synthroid  NELLIE (acute kidney injury) ([facility])  Recent Labs     05/28/25  0941 05/30/25  1135   CREATININE 1.68* 1.81*   EGFR 36 33     Estimated Creatinine Clearance: 34.4 mL/min (A) (by C-G formula based on SCr of 1.81 mg/dL (H)).     Baseline creatinine-1-1.2  Continue with urinary retention protocol  Continue with IV diuretics  Continue to monitor BUNs/creatinine  Chronic respiratory failure (HCC)  Chronically on 2 L of oxygen  Currently at baseline   Respiratory protocol  Management of acute CHF as outlined above  Severe sepsis (HCC)  Met severe sepsis criteria with tachypnea, leukocytosis, lactic acidosis, NELLIE and " suspicion of pneumonia on chest imaging    CT chest without contrast-ordered and pending    Lactic acid-2.9  Procalcitonin-0.59  WBC-15K    Continue with IV ceftriaxone and azithromycin  Trend procalcitonin, de-escalate antibiotics as warranted  And follow-up infectious workup  Follow-up pneumonia labs  Monitor hemodynamics  Respiratory protocol    Pneumonia  See under severe sepsis  Leucocytosis  Secondary to severe sepsis  Management as above  Lactic acidosis  Secondary to severe sepsis  Continue to trend until 2 or less than 2      VTE Pharmacologic Prophylaxis:   Moderate Risk (Score 3-4) - Pharmacological DVT Prophylaxis Ordered: rivaroxaban (Xarelto).  Code Status: Level 3 - DNAR and DNI patient  Discussion with family: not available .     Anticipated Length of Stay: Patient will be admitted on an inpatient basis with an anticipated length of stay of greater than 2 midnights secondary to chf, sepsis.    History of Present Illness   Chief Complaint:   Chief Complaint   Patient presents with    Shortness of Breath     Pt arrives via EMS [facility] after facility staff reported pt having sob.Duo neb x2.          Trevor Lyons is a 84 y.o. male with a PMH of hypertension, hyperlipidemia, CAD, sick sinus syndrome status post pacemaker, status post CABG, hypothyroidism, COPD, atrial fibrillation, chronic anticoagulation with Xarelto presented with shortness of breath.  Currently at baseline oxygen of 2 L.  Confirm level 3 DNR/DNI CODE STATUS.  In mild CHF exacerbation along with severe sepsis concerning for pneumonia.  Will admit under MedSurg for further management and care    Review of Systems   Constitutional:  Positive for activity change, appetite change and fatigue.   HENT:  Negative for congestion.    Respiratory:  Positive for cough and shortness of breath.    Cardiovascular:  Positive for leg swelling.   Gastrointestinal:  Negative for abdominal pain.   Genitourinary:  Negative for  dysuria.   Musculoskeletal:  Positive for arthralgias and back pain.   Neurological:  Positive for weakness.   Psychiatric/Behavioral:  Negative for agitation and confusion.        Historical Information   Past Medical History[1]  Past Surgical History[2]  Social History[3]  E-Cigarette/Vaping    E-Cigarette Use Never User      E-Cigarette/Vaping Substances     Family history non-contributory  Social History:  Marital Status: Unknown   Meds/Allergies   I have reviewed home medications with patient personally.  Prior to Admission medications    Medication Sig Start Date End Date Taking? Authorizing Provider   albuterol (2.5 mg/3 mL) 0.083 % nebulizer solution Take 3 mL (2.5 mg total) by nebulization 4 (four) times a day 10/29/24  Yes Usha Decker PA-C   albuterol (PROVENTIL HFA,VENTOLIN HFA) 90 mcg/act inhaler Inhale 2 puffs every 6 (six) hours as needed for wheezing 10/29/24  Yes Usha Decker PA-C   atorvastatin (LIPITOR) 20 mg tablet Take 1 tablet (20 mg total) by mouth daily with dinner 10/29/24  Yes Usha Decker PA-C   busPIRone (BUSPAR) 7.5 mg tablet Take 1 tablet (7.5 mg total) by mouth 2 (two) times a day 10/29/24  Yes Usha Decker PA-C   donepezil (ARICEPT) 10 mg tablet Take 1 tablet (10 mg total) by mouth every morning 10/29/24  Yes Usha Decker PA-C   DULoxetine (CYMBALTA) 60 mg delayed release capsule 1 CAP ORALLY EVERY MORNING DX: DEPRESSION*NOT ON CYCLE-REORDER* 3/3/25  Yes DO Jaison Connorsxiga 10 MG tablet Take 1 tablet (10 mg total) by mouth daily 10/29/24  Yes Usha Decker PA-C   ferrous sulfate (FeroSul) 325 (65 Fe) mg tablet Take 1 tablet (325 mg total) by mouth daily with breakfast 12/3/24  Yes SAMIRA Boykin   fluticasone-umeclidinium-vilanterol 100-62.5-25 mcg/actuation inhaler Inhale 1 puff in the morning. 2/28/24  Yes Historical Provider, MD   folic acid (FOLVITE) 1 mg tablet Take 1 tablet (1,000 mcg total) by mouth every morning Dx: Supplement 10/29/24  Yes Usha Decker,  NADIA   glimepiride (AMARYL) 1 mg tablet Take 1 tablet (1 mg total) by mouth daily with breakfast 10/29/24  Yes Usha Decker PA-C   levothyroxine 137 mcg tablet Take 1 tablet (137 mcg total) by mouth daily 12/30/24  Yes Ira Borden DO   loratadine (CLARITIN) 10 mg tablet Take 1 tablet (10 mg total) by mouth every morning Allergy 10/29/24  Yes Usha Decker PA-C   losartan (COZAAR) 25 mg tablet 1 TAB ORALLY DAILY DX:HYPERTENSION*NOT ON CYCLE-REORDER* 3/3/25  Yes Ira Borden DO   magnesium Oxide (MAG-OX) 400 mg TABS Take 1 tablet (400 mg total) by mouth 2 (two) times a day 10/29/24  Yes Usha Decker PA-C   Melatonin 5 MG CAPS Take 2 capsules (10 mg total) by mouth daily 10/29/24  Yes Usha Decker PA-C   metFORMIN (GLUCOPHAGE-XR) 500 mg 24 hr tablet Take 2 tablets (1,000 mg total) by mouth 2 (two) times a day with meals 10/29/24  Yes Usha Decker PA-C   metoprolol succinate (TOPROL-XL) 25 mg 24 hr tablet Take 1 tablet (25 mg total) by mouth every morning Hypertension 10/29/24  Yes Usha Decker PA-C   Multiple Vitamin (Daily-Jodie) TABS Take 1 tablet by mouth every morning Dx: Supplement 10/29/24  Yes Usha Decker PA-C   pantoprazole (PROTONIX) 40 mg tablet Take 1 tablet (40 mg total) by mouth daily 10/29/24  Yes Usha Decker PA-C   potassium chloride (Klor-Con M20) 20 mEq tablet 2 TABS (40MEQ) ORALLY TWICE DAILY DX:SUPPLMENT *NOT ON CYCLE-NO REFILLS* 12/30/24  Yes Ira Borden DO   rivaroxaban (Xarelto) 20 mg tablet Take 1 tablet (20 mg total) by mouth daily with dinner 10/29/24  Yes Usha Decker PA-C   spironolactone (ALDACTONE) 25 mg tablet Take 1 tablet (25 mg total) by mouth daily 10/29/24  Yes Usha Decker PA-C   Thiamine Mononitrate (VITAMIN B1) 100 mg tablet Take 1 tablet (100 mg total) by mouth daily 10/29/24  Yes Usha Decker PA-C   torsemide (DEMADEX) 20 mg tablet 1 TAB ORALLY TWICE DAILY DX: EDEMA *NOT ON CYCLE-NO REFILLS* 2/20/25  Yes Ira Borden,    acetaminophen (TYLENOL) 325  mg tablet 3 TABS (975MG) ORALLY EVERY 8 HOURS [id number] DX:PAIN *NOT TO EXCEED 3000MG OF APAP IN 24HRS** 1/9/25   Ira Borden DO   BD AutoShield Duo 30G X 5 MM MISC USE AS DIRECTED 3/11/25   SAMIRA Boykin   clonazePAM (KlonoPIN) 1 mg tablet 1 TAB ORALLY AT BEDTIMEAS NEEDED FOR ANXIETY *NIOSH 3 HAZARDOUS DRUG* 10/30/24   Ira Borden DO   fluticasone (FLONASE) 50 mcg/act nasal spray 2 sprays into each nostril daily for 14 days 1/24/25 5/28/25  Matt Gordon MD   glucose blood (True Metrix Blood Glucose Test) test strip Testing 3 times daily, before meals 12/9/24   Ira Borden DO   insulin lispro (HumaLOG) 100 units/mL injection pen ACCUCHECK 3 TIMES DAILY BEFORE MEALS W/SSI; 150-189=1U,190-229=2U,230 -269=3U,270-309=4U,310-35 0=5U,>350=6U DX: DIABETES 11/25/24   SAMIRA Boykin   Lancets 33G MISC Testing 3 times daily, before meals 5/2/25   Ira Borden DO   Lantus SoloStar 100 units/mL SOPN INJECT 8 UNITS SUB-Q EVERY MORNING DX: DIABETES **DISCARD OPEN PEN UNIT 28 DAYS FROM DATE OPEN** 3/3/25   Ira Borden DO   Menthol, Topical Analgesic, (Biofreeze Roll-On) 4 % GEL Apply 1 Application topically as needed in the morning and 1 Application as needed at noon and 1 Application as needed in the evening and 1 Application as needed before bedtime (pain).    Historical Provider, MD     No Known Allergies    Objective :  Temp:  [97.4 °F (36.3 °C)-98.4 °F (36.9 °C)] 97.4 °F (36.3 °C)  HR:  [58-60] 58  BP: (105-145)/(58-82) 105/70  Resp:  [18-26] 20  SpO2:  [85 %-94 %] 92 %  O2 Device: Nasal cannula  Nasal Cannula O2 Flow Rate (L/min):  [3 L/min] 3 L/min    Physical Exam  Vitals reviewed.   Constitutional:       Appearance: He is obese.   HENT:      Head: Atraumatic.      Mouth/Throat:      Mouth: Mucous membranes are dry.     Cardiovascular:      Rate and Rhythm: Normal rate.      Heart sounds: Normal heart sounds.   Pulmonary:      Effort: No respiratory distress.   Abdominal:      General:  There is no distension.     Musculoskeletal:      Right lower leg: Edema present.      Left lower leg: Edema present.     Skin:     Findings: Bruising and erythema present.     Neurological:      Mental Status: He is alert and oriented to person, place, and time.      Motor: Weakness present.          Lines/Drains:            Lab Results: I have reviewed the following results:  Results from last 7 days   Lab Units 05/30/25  1135   WBC Thousand/uL 15.91*   HEMOGLOBIN g/dL 8.4*   HEMATOCRIT % 29.1*   PLATELETS Thousands/uL 262   BANDS PCT % 3   LYMPHO PCT % 35   MONO PCT % 11   EOS PCT % 0     Results from last 7 days   Lab Units 05/30/25  1135   SODIUM mmol/L 138   POTASSIUM mmol/L 4.6   CHLORIDE mmol/L 96   CO2 mmol/L 27   BUN mg/dL 36*   CREATININE mg/dL 1.81*   ANION GAP mmol/L 15*   CALCIUM mg/dL 9.0   ALBUMIN g/dL 3.9   TOTAL BILIRUBIN mg/dL 1.14*   ALK PHOS U/L 79   ALT U/L 9   AST U/L 13   GLUCOSE RANDOM mg/dL 201*     Results from last 7 days   Lab Units 05/30/25  1320   INR  1.87*         Lab Results   Component Value Date    HGBA1C 5.2 05/28/2025    HGBA1C 6.5 (H) 02/05/2025    HGBA1C 6.9 (A) 12/03/2024     Results from last 7 days   Lab Units 05/30/25  1245 05/30/25  1135   LACTIC ACID mmol/L 2.9*  --    PROCALCITONIN ng/ml  --  0.57*       Imaging Results Review: I reviewed radiology reports from this admission including: chest xray.  Other Study Results Review: EKG was reviewed.     Administrative Statements   I have spent a total time of 65 minutes in caring for this patient on the day of the visit/encounter including Instructions for management, Counseling / Coordination of care, Documenting in the medical record, Reviewing/placing orders in the medical record (including tests, medications, and/or procedures), Obtaining or reviewing history  , and Communicating with other healthcare professionals .    ** Please Note: This note has been constructed using a voice recognition system. **         [1]   Past  Medical History:  Diagnosis Date    Alzheimer disease (HCC)     Anemia     Aneurysm of ascending aorta without rupture (HCC)     Anxiety     Atrial fibrillation (HCC)     CHF (congestive heart failure) (HCC)     Depression     Diabetes mellitus (HCC)     Disease of thyroid gland     Pulmonary hypertension (HCC)     Sciatica     Sick sinus syndrome (HCC)    [2]   Past Surgical History:  Procedure Laterality Date    CARDIAC PACEMAKER PLACEMENT     [3]   Social History  Tobacco Use    Smoking status: Former     Types: Cigarettes    Smokeless tobacco: Never   Vaping Use    Vaping status: Never Used   Substance and Sexual Activity    Alcohol use: Not Currently    Drug use: Never    Sexual activity: Not Currently

## 2025-05-30 NOTE — ASSESSMENT & PLAN NOTE
Patient on Xarelto 20 mg daily for stroke prevention and metoprolol succinate 25 mg daily for rate control -he is 100% V paced at 63 with 100% A-fib burden

## 2025-05-30 NOTE — ASSESSMENT & PLAN NOTE
Recent Labs     05/28/25  0941 05/30/25  1135   CREATININE 1.68* 1.81*   EGFR 36 33     Estimated Creatinine Clearance: 34.4 mL/min (A) (by C-G formula based on SCr of 1.81 mg/dL (H)).     Baseline creatinine-1-1.2  Continue with urinary retention protocol  Continue with IV diuretics  Continue to monitor BUNs/creatinine

## 2025-05-30 NOTE — RESPIRATORY THERAPY NOTE
"RT Protocol Note  Trevor Lyons 84 y.o. male MRN: [id number]  Unit/Bed#: -01 Encounter: [id number]    Assessment    Principal Problem:    Acute combined systolic and diastolic congestive heart failure (HCC)  Active Problems:    Persistent atrial fibrillation (HCC)    Sick sinus syndrome (HCC)    Type 2 diabetes mellitus without complication, with long-term current use of insulin (HCC)    Acquired hypothyroidism    NELLIE (acute kidney injury) (HCC)    Chronic respiratory failure (HCC)    Severe sepsis (HCC)    Pneumonia    Leucocytosis    Lactic acidosis      Home Pulmonary Medications:     05/30/25 1708   Inhalation Therapy Tx   $ Demo/Eval of Neb, MDI, IPPB, CPT Yes   $ Pulse Oximetry Spot Check Charge Completed   Pre-Treatment Pulse 59   Breath Sounds Pre-Treatment Bilateral Diminished            Past Medical History[1]  Social History[1]    Subjective         Objective    Physical Exam:   Assessment Type: Assess only  General Appearance: Alert, Awake  Respiratory Pattern: Dyspnea with exertion  Chest Assessment: Chest expansion symmetrical  Bilateral Breath Sounds: Diminished  Cough: Non-productive    Vitals:  Blood pressure 105/70, pulse 58, temperature (!) 97.4 °F (36.3 °C), resp. rate 20, height 5' 8\" (1.727 m), weight 105 kg (232 lb), SpO2 92%.          Imaging and other studies:           Plan    Respiratory Plan: Home Bronchodilator Patient pathway                 [1]   Past Medical History:  Diagnosis Date    Alzheimer disease (HCC)     Anemia     Aneurysm of ascending aorta without rupture (HCC)     Anxiety     Atrial fibrillation (HCC)     CHF (congestive heart failure) (HCC)     Depression     Diabetes mellitus (HCC)     Disease of thyroid gland     Pulmonary hypertension (HCC)     Sciatica     Sick sinus syndrome (HCC)    [1]   Social History  Socioeconomic History    Marital status: Unknown   Tobacco Use    Smoking status: Former     Types: Cigarettes    Smokeless tobacco: Never   Vaping " Use    Vaping status: Never Used   Substance and Sexual Activity    Alcohol use: Not Currently    Drug use: Never    Sexual activity: Not Currently     Social Drivers of Health     Financial Resource Strain: Low Risk  (2023)    Received [facility]    Overall Financial Resource Strain (CARDIA)     Difficulty of Paying Living Expenses: Not hard at all   Food Insecurity: No Food Insecurity (2025)    Nursing - Inadequate Food Risk Classification     Worried About Running Out of Food in the Last Year: Never true     Ran Out of Food in the Last Year: Never true     Ran Out of Food in the Last Year: Never true   Transportation Needs: No Transportation Needs (2025)    Nursing - Transportation Risk Classification     Lack of Transportation: No   Social Connections: Feeling Socially Integrated (2023)    Received from [facility]    OASIS : Social Isolation     Frequency of experiencing loneliness or isolation: Rarely   Intimate Partner Violence: Unknown (2025)    Nursing IPS     Physically Hurt by Someone: No     Hurt or Threatened by Someone: No   Housing Stability: Unknown (2025)    Nursing: Inadequate Housing Risk Classification     Unable to Pay for Housing in the Last Year: No     Has Housin

## 2025-05-30 NOTE — ASSESSMENT & PLAN NOTE
Wt Readings from Last 3 Encounters:   05/30/25 105 kg (232 lb)   05/28/25 105 kg (232 lb)   03/20/25 99.7 kg (219 lb 12.8 oz)     Resented with shortness of breath  BNP-502  Echo-EF of 40-45%, mild global hypokinesis, atrial dilatation (October/2024)    Home diuretic regimen-torsemide 20 twice daily, Aldactone 25 mg daily  Hold home regimen  Continue with IV diuretics  Continue to monitor daily weights, I's and O's  Inpatient consult to cardiology, appreciate recommendations  Repeat echo ordered and pending

## 2025-05-30 NOTE — OCCUPATIONAL THERAPY NOTE
"    Occupational Therapy Evaluation     Patient Name: Trevor Lyons  Today's Date: 5/30/2025  Problem List  Principal Problem:    Acute combined systolic and diastolic congestive heart failure (HCC)  Active Problems:    Persistent atrial fibrillation (HCC)    Sick sinus syndrome (HCC)    Type 2 diabetes mellitus without complication, with long-term current use of insulin (HCC)    Acquired hypothyroidism    NELLIE (acute kidney injury) (HCC)    Chronic respiratory failure (HCC)    Severe sepsis (HCC)    Pneumonia    Leucocytosis    Lactic acidosis    Past Medical History  Past Medical History[1]  Past Surgical History  Past Surgical History[2]        05/30/25 1512   OT Last Visit   OT Visit Date 05/30/25   Note Type   Note type Evaluation   Pain Assessment   Pain Assessment Tool 0-10   Pain Score No Pain   Restrictions/Precautions   Weight Bearing Precautions Per Order No   Other Precautions Cognitive;Chair Alarm;Bed Alarm;Fall Risk;O2  (3L NC currently)   Home Living   Type of Home Assisted living  ([address])   Home Layout Performs ADLs on one level   Home Equipment Walker;Wheelchair-manual   Additional Comments Per chart/CM & pt self-report: primarily uses WC for mobility, able to take self <> BR with RW   Prior Function   Level of [place] Needs assistance with ADLs;Needs assistance with IADLS;Independent with functional mobility  (\"standby assist\" for ADLs)   Lives With Facility staff   Receives Help From Personal care attendant   IADLs Family/Friend/Other provides transportation;Family/Friend/Other provides meals;Family/Friend/Other provides medication management   Falls in the last 6 months 0  (Denies)   General   Family/Caregiver Present No   Subjective   Subjective \"  here\"   ADL   Eating Assistance 7  Independent   Grooming Assistance 5  Supervision/Setup   UB Bathing Assistance 5  Supervision/Setup   LB Bathing Assistance 5  Supervision/Setup   UB Dressing Assistance 5  Supervision/Setup   LB " Dressing Assistance 5  Supervision/Setup   LB Dressing Deficit   (Utilizes figure 4 position)   Toileting Assistance  5  Supervision/Setup   Bed Mobility   Supine to Sit Unable to assess   Additional Comments Sitting OOB to recliner   Transfers   Sit to Stand 6  Modified independent   Additional items Armrests   Stand to Sit 6  Modified independent   Additional items Armrests   Stand pivot 7  Independent  (WC > CT scan)   Additional Comments 2 sit <> stands   Functional Mobility   Functional Mobility 6  Modified independent   Additional Comments Short household distance   Additional items Rolling walker   Balance   Static Sitting Normal   Dynamic Sitting Good   Static Standing Good   Dynamic Standing Fair +   Ambulatory Fair +   Activity Tolerance   Activity Tolerance Patient tolerated treatment well   Medical Staff Made Aware PT Sarah   Nurse Made Aware PATRICE CARMEN Assessment   RUE Assessment WFL   LUE Assessment   LUE Assessment WFL   Cognition   Overall Cognitive Status WFL   Arousal/Participation Alert   Attention Within functional limits   Orientation Level Oriented X4   Following Commands Follows all commands and directions without difficulty   Assessment   Prognosis Good   Assessment Pt is a 84 y.o. male seen for OT evaluation at [facility], admitted 5/30/2025 w/ Acute combined systolic and diastolic congestive heart failure (HCC). OT completed expanded review of pt's medical and social history. Comorbidities affecting pt's functional performance at time of assessment include: dementia, chronic respiratory failure, PNA, lactic acidosis, leucocytosis, h/o orthostatic hypotension, osteopenia. Prior to admission, pt was living at [facility]. Pt was supervision for ADLs, S for RW, Mod I with WC. Upon evaluation, pt presents to OT at his functional baseline. Based on findings, pt is of moderate complexity. The patient's raw score on the AM-PAC Daily Activity inpatient short form is  20, standardized score is 42.03, greater than 39.4. Patients at this level are likely to benefit from DC to home. Please refer to the recommendation of the Occupational Therapist for safe DC planning. At this time, OT recommendations at time of discharge are no OT needs. No further acute OT needs indicated at this time - Recommend pt continue to be OOB for meals, ambulation to/from BR, perform self care tasks, and mobility in hallway with nursing. D/C from OT caseload with above recommendations.   Goals   Patient Goals None stated   Plan   OT Frequency Eval only   Discharge Recommendation   Rehab Resource Intensity Level, OT No post-acute rehabilitation needs   AM-PAC Daily Activity Inpatient   Lower Body Dressing 3   Bathing 3   Toileting 3   Upper Body Dressing 3   Grooming 4   Eating 4   Daily Activity Raw Score 20   Daily Activity Standardized Score (Calc for Raw Score >=11) 42.03   AM-PAC Applied Cognition Inpatient   Following a Speech/Presentation 3   Understanding Ordinary Conversation 4   Taking Medications 4   Remembering Where Things Are Placed or Put Away 4   Remembering List of 4-5 Errands 3   Taking Care of Complicated Tasks 3   Applied Cognition Raw Score 21   Applied Cognition Standardized Score 44.3   End of Consult   Education Provided Yes   Patient Position at End of Consult Other (comment)  (on CT bed/scanner c radiology staff & PCA)   Nurse Communication Nurse aware of consult     Sandra Nuñez OTR/L              [1]   Past Medical History:  Diagnosis Date    Alzheimer disease (HCC)     Anemia     Aneurysm of ascending aorta without rupture (HCC)     Anxiety     Atrial fibrillation (HCC)     CHF (congestive heart failure) (HCC)     Depression     Diabetes mellitus (HCC)     Disease of thyroid gland     Pulmonary hypertension (HCC)     Sciatica     Sick sinus syndrome (HCC)    [2]   Past Surgical History:  Procedure Laterality Date    CARDIAC PACEMAKER PLACEMENT

## 2025-05-30 NOTE — ASSESSMENT & PLAN NOTE
"SECOND PT AND OT COTREAT FOR 1/10/23    Subjective: Pt agreeable to therapeutic plan of care. Pt ready to return to bed and nursing asking if therapy can do the transfer    Objective:     Bed mobility - Mod-A and Assist x 2  Transfers - aric lift transfer recliner to bed with assist for BLE management due to limited knee flexion by pt   Pt performed rolling side to side for linen and gown change and aric pad removal    Vitals: WNL    Pain: 5 VAS   Location: right shoulder and knees  Intervention for pain: Repositioned    Education: Provided education on the importance of mobility in the acute care setting and Verbal/Tactile Cues    Assessment: Jaime Bar presents with functional mobility impairments which indicate the need for skilled intervention. Tolerating session today without incident. Will continue to follow and progress as tolerated.     Plan/Recommendations:   Moderate Intensity Therapy recommended post-acute care. This is recommended as therapy feels the patient would require 3-4 days per week and wouldn't tolerate \"3 hour daily\" rehab intensity. SNF would be the preferred choice. If the patient does not agree to SNF, arrange HH or OP depending on home bound status. If patient is medically complex, consider LTACH.. Pt requires no DME at discharge.     Pt desires Skilled Rehab placement at discharge. Pt cooperative; agreeable to therapeutic recommendations and plan of care.         Basic Mobility 6-click:  Rollin = Total, A lot = 2, A little = 3; 4 = None  Supine>Sit:   1 = Total, A lot = 2, A little = 3; 4 = None   Sit>Stand with arms:  1 = Total, A lot = 2, A little = 3; 4 = None  Bed>Chair:   1 = Total, A lot = 2, A little = 3; 4 = None  Ambulate in room:  1 = Total, A lot = 2, A little = 3; 4 = None  3-5 Steps with railin = Total, A lot = 2, A little = 3; 4 = None  Score: 8    Modified [place]: N/A = No pre-op stroke/TIA    Post-Tx Position: Supine with HOB Elevated, Alarms activated " Uses as needed oxygen    and Call light and personal items within reach  PPE: gloves and surgical mask

## 2025-05-30 NOTE — ASSESSMENT & PLAN NOTE
Met severe sepsis criteria with tachypnea, leukocytosis, lactic acidosis, NELLIE and suspicion of pneumonia on chest imaging    CT chest without contrast-ordered and pending    Lactic acid-2.9  Procalcitonin-0.59  WBC-15K    Continue with IV ceftriaxone and azithromycin  Trend procalcitonin, de-escalate antibiotics as warranted  And follow-up infectious workup  Follow-up pneumonia labs  Monitor hemodynamics  Respiratory protocol

## 2025-05-30 NOTE — ASSESSMENT & PLAN NOTE
Wt Readings from Last 3 Encounters:   25 105 kg (232 lb)   25 105 kg (232 lb)   25 99.7 kg (219 lb 12.8 oz)   Patient with a history of HFrEF -comes to [facility] emergency department on 2025 with progressive dyspnea, worsening cough and hypoxia.  Echo 10/21/25: EF 40 to 45%, mild global hypokinesis, RV dilatation, left atrial dilatation, well-functioning TAVR, moderate mitral MAC, moderate to severe MR regurgitation, mild MS, Moderate TR  GDMT: Farxiga 10 mg daily, losartan 25 mg daily, metoprolol succinate 25 mg daily, spironolactone 25 mg daily, torsemide 20 mg twice daily with potassium supplementation    EK% V paced    Troponin negative x 2  Chest x-ray: Moderate pulmonary edema, CT scan pending  Initially diuresed with 40 mg of Lasix in the emergency department -little diuretic response, will transition to Bumex

## 2025-05-30 NOTE — Clinical Note
Case was discussed with MEGHANA and the patient's admission status was agreed to be Admission Status: inpatient status to the service of Dr. Campos .

## 2025-05-30 NOTE — CONSULTS
Consultation - Cardiology   Name: Trevor Lyons 84 y.o. male I MRN: [id number]  Unit/Bed#: -01 I Date of Admission: 2025   Date of Service: 2025 I Hospital Day: 0   Inpatient consult to Cardiology  Consult performed by: SAMIRA Sadler  Consult ordered by: Anya Dash MD        Physician Requesting Evaluation: Anya Dash MD   Reason for Evaluation / Principal Problem: Heart failure     Assessment & Plan  Acute combined systolic and diastolic congestive heart failure (HCC)  Wt Readings from Last 3 Encounters:   25 105 kg (232 lb)   25 105 kg (232 lb)   25 99.7 kg (219 lb 12.8 oz)   Patient with a history of HFrEF -comes to [facility] emergency department on 2025 with progressive dyspnea, worsening cough and hypoxia.  Echo 10/21/25: EF 40 to 45%, mild global hypokinesis, RV dilatation, left atrial dilatation, well-functioning TAVR, moderate mitral MAC, moderate to severe MR regurgitation, mild MS, Moderate TR  GDMT: Farxiga 10 mg daily, losartan 25 mg daily, metoprolol succinate 25 mg daily, spironolactone 25 mg daily, torsemide 20 mg twice daily with potassium supplementation    EK% V paced    Troponin negative x 2  Chest x-ray: Moderate pulmonary edema, CT scan pending  Initially diuresed with 40 mg of Lasix in the emergency department -little diuretic response, will transition to Bumex  Persistent atrial fibrillation (HCC)  Patient on Xarelto 20 mg daily for stroke prevention and metoprolol succinate 25 mg daily for rate control -he is 100% V paced at 65 with 100% A-fib burden  Sick sinus syndrome (HCC)  Patient has [employer] permanent pacemaker -set at VVI at 65, last pacemaker check 2025 shows 98% V paced with patient 100% A-fib  Type 2 diabetes mellitus without complication, with long-term current use of insulin (HCC)  Lab Results   Component Value Date    HGBA1C 5.2 2025     Acquired hypothyroidism    NELLIE (acute  kidney injury) (HCC)  Creatinine on arrival 1.8  Chronic respiratory failure (HCC)  Uses as needed oxygen   Severe sepsis (HCC)    Pneumonia    Leucocytosis    Lactic acidosis    Plan  Bumex 2 mg IV twice daily   Daily BMP  Strict I's and O's, fluid restriction, daily standing weight  Continue above GDMT while patient is in the hospital  Continue Xarelto    History of Present Illness   Trevor Lyons is a 84 y.o. male with a past medical history of coronary artery disease with CABG in 2005, aortic stenosis with TAVR in 2021, sick sinus syndrome with permanent pacemaker, paroxysmal atrial fibrillation, HFrEF, COPD hypertension, mitral regurgitation, dyslipidemia, dementia who presents from SNF with progressive dyspnea, worse with activity, worsening cough, hypoxia with reported O2 sat in the 70s at SNF.     Patient's x-ray concerning for heart failure exacerbation, BNP elevated -patient given furosemide in the emergency department.  X-ray concerning also for questionable pneumonia started on IV antibiotics.  Patient currently on 3 L of O2 with O2 sats greater than 92% with tachypnea.    Patient denies any chest pain, chest pressure, lightheadedness or dizziness.  He does endorse some shortness of breath and coughing.  He has a history of dementia and is not forthcoming with details.    Review of Systems   Respiratory:  Positive for cough and shortness of breath.    Cardiovascular:  Negative for chest pain, palpitations and leg swelling.   Gastrointestinal:  Negative for blood in stool, constipation and diarrhea.   Genitourinary:  Negative for hematuria.   Musculoskeletal:  Positive for back pain.   Neurological:  Negative for dizziness, syncope, weakness and light-headedness.     Medical History Review: I have reviewed the patient's PMH, PSH, Social History, Family History, Meds, and Allergies     Objective :  Temp:  [97.4 °F (36.3 °C)-98.4 °F (36.9 °C)] 97.4 °F (36.3 °C)  HR:  [58-60] 58  BP: (105-145)/(58-82)  105/70  Resp:  [18-26] 20  SpO2:  [85 %-94 %] 92 %  O2 Device: Nasal cannula  Nasal Cannula O2 Flow Rate (L/min):  [3 L/min] 3 L/min  Orthostatic Blood Pressures      Flowsheet Row Most Recent Value   Blood Pressure 105/70 filed at 05/30/2025 1401   Patient Position - Orthostatic VS Sitting filed at 05/30/2025 1200          First Weight: Weight - Scale: 105 kg (232 lb) (05/30/25 1403)  Vitals:    05/30/25 1403   Weight: 105 kg (232 lb)       Physical Exam  Constitutional:       Appearance: Normal appearance. He is obese.   HENT:      Head: Normocephalic and atraumatic.      Nose: Nose normal.      Mouth/Throat:      Mouth: Mucous membranes are moist.     Cardiovascular:      Rate and Rhythm: Normal rate and regular rhythm.      Pulses: Normal pulses.      Heart sounds: Murmur heard.   Pulmonary:      Breath sounds: Rales present.   Abdominal:      Palpations: Abdomen is soft.     Musculoskeletal:      Cervical back: Normal range of motion.      Right lower leg: No edema.      Left lower leg: Edema present.     Skin:     General: Skin is warm.      Capillary Refill: Capillary refill takes less than 2 seconds.     Neurological:      General: No focal deficit present.      Mental Status: He is alert. Mental status is at baseline.     Psychiatric:         Mood and Affect: Mood normal.         Behavior: Behavior normal.         Lab Results: I have reviewed the following results:Troponin,BNP:  .     05/30/25  1135 05/30/25  1320   HSTNI0 24  --    HSTNI2  --  26   *  --       Results from last 7 days   Lab Units 05/30/25  1135 05/28/25  0941   WBC Thousand/uL 15.91* 13.39*   HEMOGLOBIN g/dL 8.4* 8.3*   HEMATOCRIT % 29.1* 29.0*   PLATELETS Thousands/uL 262 244     Results from last 7 days   Lab Units 05/30/25  1135 05/28/25  0941   POTASSIUM mmol/L 4.6 4.5   CHLORIDE mmol/L 96 100   CO2 mmol/L 27 35*   BUN mg/dL 36* 29*   CREATININE mg/dL 1.81* 1.68*   CALCIUM mg/dL 9.0 9.1     Results from last 7 days   Lab Units  05/30/25  1320   INR  1.87*   PTT seconds 52*     Lab Results   Component Value Date    HGBA1C 5.2 05/28/2025     Lab Results   Component Value Date    TROPONINI 0.04 10/21/2021       Imaging Results Review: I reviewed radiology reports from this admission including: chest xray.  Other Study Results Review: EKG was reviewed.     VTE Prophylaxis: VTE covered by:  rivaroxaban, Oral

## 2025-05-30 NOTE — ED PROVIDER NOTES
Time reflects when diagnosis was documented in both MDM as applicable and the Disposition within this note       Time User Action Codes Description Comment    5/30/2025 12:11 PM Gladys Segovia Add [J44.1] COPD exacerbation (HCC)     5/30/2025 12:11 PM Gladys Segovia Remove [J44.1] COPD exacerbation (HCC)     5/30/2025 12:11 PM LucaHosea campvasquez [facility] Add [I50.9] CHF exacerbation (HCC)     5/30/2025  1:09 PM LucaGladys stuart Add [J18.9] Multifocal pneumonia           ED Disposition       ED Disposition   Admit    Condition   Stable    Date/Time   Fri May 30, 2025  1:25 PM    Comment   Case was discussed with MEGHANA and the patient's admission status was agreed to be Admission Status: inpatient status to the service of Dr. Garcia .               Assessment & Plan       Medical Decision Making  84 year old male with history of COPD and CHF presents for evaluation of increased shortness of breath.  Coarse rales throughout on auscultation with 2+ bilateral lower extremity edema concerning for CHF exacerbation.  Per review of EMR, last echo was 10/21/24 and showed EF 40-45% at that time.  Currently requiring 2 L NC.  No chest pain to suggest ACS.  Anticoagulated on Xarelto therefore VTE less likely.  No wheezing or change in chronic cough to suggest COPD exacerbation.  Pulmonary edema on CXR; however, cannot exclude infiltrate.  40 mg IV lasix ordered for CHF exacerbation.  SIRS criteria present.  Rocephin and azithromycin ordered for possible underlying pneumonia.  Procal elevated.  Patient admitted for further evaluation and management.     Amount and/or Complexity of Data Reviewed  Labs: ordered. Decision-making details documented in ED Course.  Radiology: ordered and independent interpretation performed.    Risk  Prescription drug management.  Decision regarding hospitalization.        ED Course as of 05/30/25 1329   Fri May 30, 2025   1214 WBC(!): 15.91  13.39 two days ago   1214 Hemoglobin(!): 8.4  8.3 two days  ago   1256 BNP(!): 502   1257 Creatinine(!): 1.81  1.68 two days ago, trending up from 1.29 three months ago   1308 Procalcitonin(!): 0.57       Medications   azithromycin (ZITHROMAX) 500 mg in sodium chloride 0.9% 250mL IVPB 500 mg (500 mg Intravenous New Bag 5/30/25 1319)   ipratropium-albuterol (FOR EMS ONLY) (DUO-NEB) 0.5-2.5 mg/3 mL inhalation solution 3 mL (0 mL Does not apply Given to EMS 5/30/25 1134)   furosemide (LASIX) injection 40 mg (40 mg Intravenous Given 5/30/25 1159)   cefTRIAXone (ROCEPHIN) IVPB (premix in dextrose) 1,000 mg 50 mL (0 mg Intravenous Stopped 5/30/25 1322)       ED Risk Strat Scores   HEART Risk Score      Flowsheet Row Most Recent Value   Heart Score Risk Calculator    History 0  [no chest pain] Filed at: 05/30/2025 1141   ECG 1 Filed at: 05/30/2025 1141   Age 2 Filed at: 05/30/2025 1141   Risk Factors 2 Filed at: 05/30/2025 1141   Troponin 1 Filed at: 05/30/2025 1141   HEART Score 6 Filed at: 05/30/2025 1141          HEART Risk Score      Flowsheet Row Most Recent Value   Heart Score Risk Calculator    History 0  [no chest pain] Filed at: 05/30/2025 1141   ECG 1 Filed at: 05/30/2025 1141   Age 2 Filed at: 05/30/2025 1141   Risk Factors 2 Filed at: 05/30/2025 1141   Troponin 1 Filed at: 05/30/2025 1141   HEART Score 6 Filed at: 05/30/2025 1141                      No data recorded                            History of Present Illness       Chief Complaint   Patient presents with    Shortness of Breath     Pt arrives via EMS from [facility] after facility staff reported pt having sob.Duo neb x2.        Past Medical History[1]   Past Surgical History[2]   Family History[3]   Social History[4]   E-Cigarette/Vaping    E-Cigarette Use Never User       E-Cigarette/Vaping Substances      I have reviewed and agree with the history as documented.     84 year old male presents for evaluation of shortness of breath this morning.  Patient states his symptoms worsen with activity.  He  reports chronic cough which is unchanged.  He has noticed a slight increase in his lower extremity edema, but does not know if he has gained any weight.  No fevers or chills.  Patient had been noted to have a oxygen saturation in the 70s by nursing home staff.  He has supplemental oxygen as needed, but does not typically require it.  He was placed on 2 L NC and given a duoneb treatment with improvement in oxygen saturation to 96%.      Shortness of Breath      Review of Systems   Respiratory:  Positive for shortness of breath.            Objective       ED Triage Vitals   Temperature Pulse Blood Pressure Respirations SpO2 Patient Position - Orthostatic VS   05/30/25 1131 05/30/25 1126 05/30/25 1126 05/30/25 1126 05/30/25 1126 05/30/25 1126   98.4 °F (36.9 °C) 59 116/82 (!) 26 (!) 85 % Sitting      Temp Source Heart Rate Source BP Location FiO2 (%) Pain Score    05/30/25 1131 05/30/25 1126 05/30/25 1126 -- 05/30/25 1126    Oral Monitor Left arm  No Pain      Vitals      Date and Time Temp Pulse SpO2 Resp BP Pain Score FACES Pain Rating User   05/30/25 1245 -- 60 94 % 18 128/60 -- --    05/30/25 1200 -- 60 93 % 19 116/58 -- --    05/30/25 1131 98.4 °F (36.9 °C) -- -- -- -- -- --    05/30/25 1130 -- 60 91 % 20 145/64 -- --    05/30/25 1126 -- 59 85 % 26 116/82 No Pain --             Physical Exam  Vitals and nursing note reviewed.     Cardiovascular:      Rate and Rhythm: Regular rhythm. Bradycardia present.      Pulses: Normal pulses.      Heart sounds: Normal heart sounds.   Pulmonary:      Effort: Pulmonary effort is normal.      Breath sounds: Rales (coarse, bilateral) present.   Abdominal:      General: There is no distension.      Palpations: Abdomen is soft.      Tenderness: There is no abdominal tenderness.     Musculoskeletal:      Right lower leg: Edema (2+) present.      Left lower leg: Edema (2+) present.     Neurological:      Mental Status: He is alert.         Results Reviewed       Procedure  Component Value Units Date/Time    Lactic acid, plasma (w/reflex if result > 2.0) [[id number]]  (Abnormal) Collected: 05/30/25 1245    Lab Status: Final result Specimen: Blood from Arm, Right Updated: 05/30/25 1321     LACTIC ACID 2.9 mmol/L     Narrative:      Result may be elevated if tourniquet was used during collection.    Lactic acid 2 Hours [[id number]]     Lab Status: No result Specimen: Blood     Protime-INR [[id number]] Collected: 05/30/25 1320    Lab Status: No result Specimen: Blood from Arm, Right Updated: 05/30/25 1320    APTT [[id number]] Collected: 05/30/25 1320    Lab Status: No result Specimen: Blood from Arm, Right Updated: 05/30/25 1320    HS Troponin I 2hr [[id number]] Collected: 05/30/25 1320    Lab Status: No result Specimen: Blood from Arm, Right     Procalcitonin [[id number]]  (Abnormal) Collected: 05/30/25 1135    Lab Status: Final result Specimen: Blood from Arm, Left Updated: 05/30/25 1306     Procalcitonin 0.57 ng/ml     CBC and differential [[id number]]  (Abnormal) Collected: 05/30/25 1135    Lab Status: Final result Specimen: Blood from Arm, Left Updated: 05/30/25 1303     WBC 15.91 Thousand/uL      RBC 3.04 Million/uL      Hemoglobin 8.4 g/dL      Hematocrit 29.1 %      MCV 96 fL      MCH 27.6 pg      MCHC 28.9 g/dL      RDW 14.4 %      MPV 9.1 fL      Platelets 262 Thousands/uL     Manual Differential(PHLEBS Do Not Order) [[id number]] Collected: 05/30/25 1135    Lab Status: In process Specimen: Blood from Arm, Left Updated: 05/30/25 1303    FLU/RSV/COVID - if FLU/RSV clinically relevant (2hr TAT) [[id number]] Collected: 05/30/25 1245    Lab Status: In process Specimen: Nares from Nose Updated: 05/30/25 1259    Blood culture #2 [[id number]] Collected: 05/30/25 1245    Lab Status: In process Specimen: Blood from Arm, Right Updated: 05/30/25 1258    Blood culture #1 [[id number]] Collected: 05/30/25 1245    Lab Status: In process Specimen: Blood from Arm, Left Updated: 05/30/25 1258    B-Type  Natriuretic Peptide(BNP) [[id number]]  (Abnormal) Collected: 05/30/25 1135    Lab Status: Final result Specimen: Blood from Arm, Left Updated: 05/30/25 1241      pg/mL     HS Troponin I 4hr [[id number]]     Lab Status: No result Specimen: Blood     HS Troponin 0hr (reflex protocol) [[id number]]  (Normal) Collected: 05/30/25 1135    Lab Status: Final result Specimen: Blood from Arm, Left Updated: 05/30/25 1230     hs TnI 0hr 24 ng/L     Comprehensive metabolic panel [[id number]]  (Abnormal) Collected: 05/30/25 1135    Lab Status: Final result Specimen: Blood from Arm, Left Updated: 05/30/25 1227     Sodium 138 mmol/L      Potassium 4.6 mmol/L      Chloride 96 mmol/L      CO2 27 mmol/L      ANION GAP 15 mmol/L      BUN 36 mg/dL      Creatinine 1.81 mg/dL      Glucose 201 mg/dL      Calcium 9.0 mg/dL      AST 13 U/L      ALT 9 U/L      Alkaline Phosphatase 79 U/L      Total Protein 7.2 g/dL      Albumin 3.9 g/dL      Total Bilirubin 1.14 mg/dL      eGFR 33 ml/min/1.73sq m     Narrative:      [employer] guidelines for Chronic Kidney Disease (CKD):     Stage 1 with normal or high GFR (GFR > 90 mL/min/1.73 square meters)    Stage 2 Mild CKD (GFR = 60-89 mL/min/1.73 square meters)    Stage 3A Moderate CKD (GFR = 45-59 mL/min/1.73 square meters)    Stage 3B Moderate CKD (GFR = 30-44 mL/min/1.73 square meters)    Stage 4 Severe CKD (GFR = 15-29 mL/min/1.73 square meters)    Stage 5 End Stage CKD (GFR <15 mL/min/1.73 square meters)  Note: GFR calculation is accurate only with a steady state creatinine            XR chest 1 view portable   ED Interpretation by Gladys Segovia MD (05/30 5575)   Pulmonary edema.  Cannot exclude infiltrate           ECG 12 Lead Documentation Only    Date/Time: 5/30/2025 11:39 AM    Performed by: Gladys Segovia MD  Authorized by: Gladys Segovia MD    Indications / Diagnosis:  Shortness of breath  ECG reviewed by me, the ED Provider: yes     Patient location:  ED  Previous ECG:     Previous ECG:  Compared to current    Comparison ECG info:  25 paced    Similarity:  No change  Interpretation:     Interpretation: abnormal    Rate:     ECG rate:  62    ECG rate assessment: normal    Rhythm:     Rhythm: paced    Pacing:     Capture:  Complete    Type of pacing:  Ventricular  Ectopy:     Ectopy: PVCs    QRS:     QRS axis:  Right    QRS intervals:  Wide  ST segments:     ST segments:  Non-specific    Elevation:  II, III, aVF, V3, V4, V5, V6 and V2  T waves:     T waves: inverted      Inverted:  AVL      ED Medication and Procedure Management   Prior to Admission Medications   Prescriptions Last Dose Informant Patient Reported? Taking?   BD AutoShield Duo 30G X 5 MM MISC   No No   Sig: USE AS DIRECTED   DULoxetine (CYMBALTA) 60 mg delayed release capsule   No No   Si CAP ORALLY EVERY MORNING DX: DEPRESSION*NOT ON CYCLE-REORDER*   Farxiga 10 MG tablet  Outside Facility (Specify) No No   Sig: Take 1 tablet (10 mg total) by mouth daily   Lancets 33G MISC   No No   Sig: Testing 3 times daily, before meals   Lantus SoloStar 100 units/mL SOPN   No No   Sig: INJECT 8 UNITS SUB-Q EVERY MORNING DX: DIABETES **DISCARD OPEN PEN UNIT 28 DAYS FROM DATE OPEN**   Melatonin 5 MG CAPS  Outside Facility (Specify) No No   Sig: Take 2 capsules (10 mg total) by mouth daily   Menthol, Topical Analgesic, (Biofreeze Roll-On) 4 % GEL  Outside Facility (Specify) Yes No   Sig: Apply 1 Application topically as needed in the morning and 1 Application as needed at noon and 1 Application as needed in the evening and 1 Application as needed before bedtime (pain).   Multiple Vitamin (Daily-Jodie) TABS  Outside Facility (Specify) No No   Sig: Take 1 tablet by mouth every morning Dx: Supplement   Thiamine Mononitrate (VITAMIN B1) 100 mg tablet  Outside Facility (Specify) No No   Sig: Take 1 tablet (100 mg total) by mouth daily   acetaminophen (TYLENOL) 325 mg tablet  Outside Facility  (Specify) No No   Sig: 3 TABS (975MG) ORALLY EVERY 8 HOURS [id number] DX:PAIN *NOT TO EXCEED 3000MG OF APAP IN 24HRS**   albuterol (2.5 mg/3 mL) 0.083 % nebulizer solution  Outside Facility (Specify) No No   Sig: Take 3 mL (2.5 mg total) by nebulization 4 (four) times a day   albuterol (PROVENTIL HFA,VENTOLIN HFA) 90 mcg/act inhaler  Outside Facility (Specify) No No   Sig: Inhale 2 puffs every 6 (six) hours as needed for wheezing   atorvastatin (LIPITOR) 20 mg tablet  Outside Facility (Specify) No No   Sig: Take 1 tablet (20 mg total) by mouth daily with dinner   busPIRone (BUSPAR) 7.5 mg tablet  Outside Facility (Specify) No No   Sig: Take 1 tablet (7.5 mg total) by mouth 2 (two) times a day   clonazePAM (KlonoPIN) 1 mg tablet  Outside Facility (Specify) No No   Si TAB ORALLY AT BEDTIMEAS NEEDED FOR ANXIETY *NIOSH 3 HAZARDOUS DRUG*   donepezil (ARICEPT) 10 mg tablet  Outside Facility (Specify) No No   Sig: Take 1 tablet (10 mg total) by mouth every morning   ferrous sulfate (FeroSul) 325 (65 Fe) mg tablet  Outside Facility (Specify) No No   Sig: Take 1 tablet (325 mg total) by mouth daily with breakfast   fluticasone (FLONASE) 50 mcg/act nasal spray   No No   Si sprays into each nostril daily for 14 days   fluticasone-umeclidinium-vilanterol 100-62.5-25 mcg/actuation inhaler  Outside Facility (Specify) Yes No   Sig: Inhale 1 puff in the morning.   folic acid (FOLVITE) 1 mg tablet  Outside Facility (Specify) No No   Sig: Take 1 tablet (1,000 mcg total) by mouth every morning Dx: Supplement   glimepiride (AMARYL) 1 mg tablet  Outside Facility (Specify) No No   Sig: Take 1 tablet (1 mg total) by mouth daily with breakfast   glucose blood (True Metrix Blood Glucose Test) test strip  Outside Facility (Specify) No No   Sig: Testing 3 times daily, before meals   insulin lispro (HumaLOG) 100 units/mL injection pen  Outside Facility (Specify) No No   Sig: ACCUCHECK 3 TIMES DAILY BEFORE MEALS W/SSI;  150-189=1U,190-229=2U,230 -269=3U,270-309=4U,310-35 0=5U,>350=6U DX: DIABETES   levothyroxine 137 mcg tablet  Outside Facility (Specify) No No   Sig: Take 1 tablet (137 mcg total) by mouth daily   loratadine (CLARITIN) 10 mg tablet  Outside Facility (Specify) No No   Sig: Take 1 tablet (10 mg total) by mouth every morning Allergy   losartan (COZAAR) 25 mg tablet   No No   Si TAB ORALLY DAILY DX:HYPERTENSION*NOT ON CYCLE-REORDER*   magnesium Oxide (MAG-OX) 400 mg TABS  Outside Facility (Specify) No No   Sig: Take 1 tablet (400 mg total) by mouth 2 (two) times a day   metFORMIN (GLUCOPHAGE-XR) 500 mg 24 hr tablet  Outside Facility (Specify) No No   Sig: Take 2 tablets (1,000 mg total) by mouth 2 (two) times a day with meals   metoprolol succinate (TOPROL-XL) 25 mg 24 hr tablet  Outside Facility (Specify) No No   Sig: Take 1 tablet (25 mg total) by mouth every morning Hypertension   pantoprazole (PROTONIX) 40 mg tablet  Outside Facility (Specify) No No   Sig: Take 1 tablet (40 mg total) by mouth daily   potassium chloride (Klor-Con M20) 20 mEq tablet  Outside Facility (Specify) No No   Si TABS (40MEQ) ORALLY TWICE DAILY DX:SUPPLMENT *NOT ON CYCLE-NO REFILLS*   rivaroxaban (Xarelto) 20 mg tablet  Outside Facility (Specify) No No   Sig: Take 1 tablet (20 mg total) by mouth daily with dinner   spironolactone (ALDACTONE) 25 mg tablet  Outside Facility (Specify) No No   Sig: Take 1 tablet (25 mg total) by mouth daily   torsemide (DEMADEX) 20 mg tablet   No No   Si TAB ORALLY TWICE DAILY DX: EDEMA *NOT ON CYCLE-NO REFILLS*      Facility-Administered Medications: None     Patient's Medications   Discharge Prescriptions    No medications on file     No discharge procedures on file.  ED SEPSIS DOCUMENTATION   Time reflects when diagnosis was documented in both MDM as applicable and the Disposition within this note       Time User Action Codes Description Comment    2025 12:11 PM Gladys Segovia Add [J44.1]  "COPD exacerbation (HCC)     5/30/2025 12:11 PM Luca Gladys [facility] Remove [J44.1] COPD exacerbation (HCC)     5/30/2025 12:11 PM Luca Gladys [facility] Add [I50.9] CHF exacerbation (HCC)     5/30/2025  1:09 PM Gladys Segovia Add [J18.9] Multifocal pneumonia            Initial Sepsis Screening       Row Name 05/30/25 1211                Is the patient's history suggestive of a new or worsening infection? Yes (Proceed)  -EE        Suspected source of infection pneumonia  -EE        Indicate SIRS criteria Tachypnea > 20 resp per min;Leukocytosis (WBC > 78682 IJL) OR Leukopenia (WBC <4000 IJL) OR Bandemia (WBC >10% bands)  -EE        Are two or more of the above signs & symptoms of infection both present and new to the patient? Yes (Proceed)  -EE        Assess for evidence of organ dysfunction: Are any of the below criteria present within 6 hours of suspected infection and SIRS criteria that are NOT considered to be chronic conditions? Lactate > 2.0  -EE        Date of presentation of severe sepsis 05/30/25  -EE        Time of presentation of severe sepsis 1329  -EE        Sepsis Note: Click \"NEXT\" below (NOT \"close\") to generate sepsis note based on above information. --                  User Key  (r) = Recorded By, (t) = Taken By, (c) = Cosigned By      Initials Name Provider Type    EE Gladys Segovia MD Physician                         Gladys Segovia MD  05/30/25 1326         [1]   Past Medical History:  Diagnosis Date    Alzheimer disease (HCC)     Anemia     Aneurysm of ascending aorta without rupture (HCC)     Anxiety     Atrial fibrillation (HCC)     CHF (congestive heart failure) (HCC)     Depression     Diabetes mellitus (HCC)     Disease of thyroid gland     Pulmonary hypertension (HCC)     Sciatica     Sick sinus syndrome (HCC)    [2]   Past Surgical History:  Procedure Laterality Date    CARDIAC PACEMAKER PLACEMENT     [3]   Family History  Problem Relation Name Age of Onset    Dementia " Mother      Heart disease Father      Stroke Father      Thyroid disease Daughter      Thyroid disease Daughter     [4]   Social History  Tobacco Use    Smoking status: Former     Types: Cigarettes    Smokeless tobacco: Never   Vaping Use    Vaping status: Never Used   Substance Use Topics    Alcohol use: Not Currently    Drug use: Never        Gladys Segovia MD  05/30/25 [id number]

## 2025-05-30 NOTE — PLAN OF CARE
Problem: Potential for Falls  Goal: Patient will remain free of falls  Description: INTERVENTIONS:  - Educate patient/family on patient safety including physical limitations  - Instruct patient to call for assistance with activity   - Consider consulting OT/PT to assist with strengthening/mobility based on AM PAC & JH-HLM score  - Consult OT/PT to assist with strengthening/mobility   - Keep Call bell within reach  - Keep bed low and locked with side rails adjusted as appropriate  - Keep care items and personal belongings within reach  - Initiate and maintain comfort rounds  - Make Fall Risk Sign visible to staff  - Offer Toileting every 2 Hours, in advance of need  - Initiate/Maintain bed alarm  - Obtain necessary fall risk management equipment: socks  - Apply yellow socks and bracelet for high fall risk patients  - Consider moving patient to room near nurses station  Outcome: Progressing     Problem: PAIN - ADULT  Goal: Verbalizes/displays adequate comfort level or baseline comfort level  Description: Interventions:  - Encourage patient to monitor pain and request assistance  - Assess pain using appropriate pain scale  - Administer analgesics as ordered based on type and severity of pain and evaluate response  - Implement non-pharmacological measures as appropriate and evaluate response  - Consider cultural and social influences on pain and pain management  - Notify physician/advanced practitioner if interventions unsuccessful or patient reports new pain  - Educate patient/family on pain management process including their role and importance of  reporting pain   - Provide non-pharmacologic/complimentary pain relief interventions  Outcome: Progressing     Problem: INFECTION - ADULT  Goal: Absence or prevention of progression during hospitalization  Description: INTERVENTIONS:  - Assess and monitor for signs and symptoms of infection  - Monitor lab/diagnostic results  - Monitor all insertion sites, i.e. indwelling  lines, tubes, and drains  - Monitor endotracheal if appropriate and nasal secretions for changes in amount and color  - [place] appropriate cooling/warming therapies per order  - Administer medications as ordered  - Instruct and encourage patient and family to use good hand hygiene technique  - Identify and instruct in appropriate isolation precautions for identified infection/condition  Outcome: Progressing  Goal: Absence of fever/infection during neutropenic period  Description: INTERVENTIONS:  - Monitor WBC  - Perform strict hand hygiene  - Limit to healthy visitors only  - No plants, dried, fresh or silk flowers with damon in patient room  Outcome: Progressing     Problem: SAFETY ADULT  Goal: Patient will remain free of falls  Description: INTERVENTIONS:  - Educate patient/family on patient safety including physical limitations  - Instruct patient to call for assistance with activity   - Consider consulting OT/PT to assist with strengthening/mobility based on AM PAC & JH-HLM score  - Consult OT/PT to assist with strengthening/mobility   - Keep Call bell within reach  - Keep bed low and locked with side rails adjusted as appropriate  - Keep care items and personal belongings within reach  - Initiate and maintain comfort rounds  - Make Fall Risk Sign visible to staff  - Offer Toileting every 2 Hours, in advance of need  - Initiate/Maintain bed alarm  - Obtain necessary fall risk management equipment: socks  - Apply yellow socks and bracelet for high fall risk patients  - Consider moving patient to room near nurses station  Outcome: Progressing  Goal: Maintain or return to baseline ADL function  Description: INTERVENTIONS:  -  Assess patient's ability to carry out ADLs; assess patient's baseline for ADL function and identify physical deficits which impact ability to perform ADLs (bathing, care of mouth/teeth, toileting, grooming, dressing, etc.)  - Assess/evaluate cause of self-care deficits   - Assess range of  motion  - Assess patient's mobility; develop plan if impaired  - Assess patient's need for assistive devices and provide as appropriate  - Encourage maximum independence but intervene and supervise when necessary  - Involve family in performance of ADLs  - Assess for home care needs following discharge   - Consider OT consult to assist with ADL evaluation and planning for discharge  - Provide patient education as appropriate  - Monitor functional capacity and physical performance, use of AM PAC & JH-HLM   - Monitor gait, balance and fatigue with ambulation    Outcome: Progressing  Goal: Maintains/Returns to pre admission functional level  Description: INTERVENTIONS:  - Perform AM-PAC 6 Click Basic Mobility/ Daily Activity assessment daily.  - Set and communicate daily mobility goal to care team and patient/family/caregiver.   - Collaborate with rehabilitation services on mobility goals if consulted  - Perform Range of Motion 2 times a day.  - Reposition patient every 2 hours.  - Dangle patient 2 times a day  - Stand patient 2 times a day  - Ambulate patient 2 times a day  - Out of bed to chair 2 times a day   - Out of bed for meals 2 times a day  - Out of bed for toileting  - Record patient progress and toleration of activity level   Outcome: Progressing     Problem: DISCHARGE PLANNING  Goal: Discharge to home or other facility with appropriate resources  Description: INTERVENTIONS:  - Identify barriers to discharge w/patient and caregiver  - Arrange for needed discharge resources and transportation as appropriate  - Identify discharge learning needs (meds, wound care, etc.)  - Arrange for interpretive services to assist at discharge as needed  - Refer to Case Management Department for coordinating discharge planning if the patient needs post-hospital services based on physician/advanced practitioner order or complex needs related to functional status, cognitive ability, or social support system  Outcome:  Progressing     Problem: Knowledge Deficit  Goal: Patient/family/caregiver demonstrates understanding of disease process, treatment plan, medications, and discharge instructions  Description: Complete learning assessment and assess knowledge base.  Interventions:  - Provide teaching at level of understanding  - Provide teaching via preferred learning methods  Outcome: Progressing

## 2025-05-30 NOTE — ASSESSMENT & PLAN NOTE
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

## 2025-05-30 NOTE — ASSESSMENT & PLAN NOTE
"Lab Results   Component Value Date    HGBA1C 5.2 05/28/2025       No results for input(s): \"POCGLU\" in the last 72 hours.    Blood Sugar Average: Last 72 hrs:    Continue with insulin on sliding scale  Adjust as needed  "

## 2025-05-31 PROBLEM — I10 HYPERTENSION: Status: ACTIVE | Noted: 2025-05-31

## 2025-05-31 PROBLEM — J96.20 ACUTE ON CHRONIC RESPIRATORY FAILURE (HCC): Status: ACTIVE | Noted: 2025-05-30

## 2025-05-31 PROBLEM — E87.20 LACTIC ACIDOSIS: Status: RESOLVED | Noted: 2025-05-30 | Resolved: 2025-05-31

## 2025-05-31 LAB
ANION GAP SERPL CALCULATED.3IONS-SCNC: 9 MMOL/L (ref 4–13)
BUN SERPL-MCNC: 40 MG/DL (ref 5–25)
CALCIUM SERPL-MCNC: 9 MG/DL (ref 8.4–10.2)
CHLORIDE SERPL-SCNC: 97 MMOL/L (ref 96–108)
CO2 SERPL-SCNC: 31 MMOL/L (ref 21–32)
CREAT SERPL-MCNC: 1.84 MG/DL (ref 0.6–1.3)
ERYTHROCYTE [DISTWIDTH] IN BLOOD BY AUTOMATED COUNT: 14.5 % (ref 11.6–15.1)
GFR SERPL CREATININE-BSD FRML MDRD: 32 ML/MIN/1.73SQ M
GLUCOSE SERPL-MCNC: 132 MG/DL (ref 65–140)
GLUCOSE SERPL-MCNC: 138 MG/DL (ref 65–140)
GLUCOSE SERPL-MCNC: 144 MG/DL (ref 65–140)
GLUCOSE SERPL-MCNC: 147 MG/DL (ref 65–140)
GLUCOSE SERPL-MCNC: 159 MG/DL (ref 65–140)
HCT VFR BLD AUTO: 27.7 % (ref 36.5–49.3)
HGB BLD-MCNC: 8.1 G/DL (ref 12–17)
MCH RBC QN AUTO: 27.6 PG (ref 26.8–34.3)
MCHC RBC AUTO-ENTMCNC: 29.2 G/DL (ref 31.4–37.4)
MCV RBC AUTO: 95 FL (ref 82–98)
PLATELET # BLD AUTO: 281 THOUSANDS/UL (ref 149–390)
PMV BLD AUTO: 9.1 FL (ref 8.9–12.7)
POTASSIUM SERPL-SCNC: 4.4 MMOL/L (ref 3.5–5.3)
PROCALCITONIN SERPL-MCNC: 0.63 NG/ML
RBC # BLD AUTO: 2.93 MILLION/UL (ref 3.88–5.62)
SODIUM SERPL-SCNC: 137 MMOL/L (ref 135–147)
WBC # BLD AUTO: 20.15 THOUSAND/UL (ref 4.31–10.16)

## 2025-05-31 PROCEDURE — 99232 SBSQ HOSP IP/OBS MODERATE 35: CPT | Performed by: INTERNAL MEDICINE

## 2025-05-31 PROCEDURE — 82948 REAGENT STRIP/BLOOD GLUCOSE: CPT

## 2025-05-31 PROCEDURE — 99232 SBSQ HOSP IP/OBS MODERATE 35: CPT

## 2025-05-31 PROCEDURE — 85027 COMPLETE CBC AUTOMATED: CPT

## 2025-05-31 PROCEDURE — 94760 N-INVAS EAR/PLS OXIMETRY 1: CPT

## 2025-05-31 PROCEDURE — 94640 AIRWAY INHALATION TREATMENT: CPT

## 2025-05-31 PROCEDURE — 83735 ASSAY OF MAGNESIUM: CPT | Performed by: PHYSICIAN ASSISTANT

## 2025-05-31 PROCEDURE — 80048 BASIC METABOLIC PNL TOTAL CA: CPT

## 2025-05-31 PROCEDURE — 84145 PROCALCITONIN (PCT): CPT | Performed by: INTERNAL MEDICINE

## 2025-05-31 RX ORDER — BUMETANIDE 0.25 MG/ML
2 INJECTION, SOLUTION INTRAMUSCULAR; INTRAVENOUS ONCE
Status: COMPLETED | OUTPATIENT
Start: 2025-05-31 | End: 2025-05-31

## 2025-05-31 RX ADMIN — DULOXETINE HYDROCHLORIDE 60 MG: 30 CAPSULE, DELAYED RELEASE ORAL at 09:25

## 2025-05-31 RX ADMIN — CEFTRIAXONE 2000 MG: 2 INJECTION, SOLUTION INTRAVENOUS at 13:55

## 2025-05-31 RX ADMIN — BUSPIRONE HYDROCHLORIDE 7.5 MG: 15 TABLET ORAL at 17:11

## 2025-05-31 RX ADMIN — BUMETANIDE 2 MG: 0.25 INJECTION INTRAMUSCULAR; INTRAVENOUS at 09:25

## 2025-05-31 RX ADMIN — DONEPEZIL HYDROCHLORIDE 10 MG: 5 TABLET ORAL at 09:25

## 2025-05-31 RX ADMIN — PANTOPRAZOLE SODIUM 40 MG: 40 TABLET, DELAYED RELEASE ORAL at 06:22

## 2025-05-31 RX ADMIN — LEVOTHYROXINE SODIUM 137 MCG: 112 TABLET ORAL at 06:22

## 2025-05-31 RX ADMIN — BUMETANIDE 2 MG: 0.25 INJECTION INTRAMUSCULAR; INTRAVENOUS at 13:54

## 2025-05-31 RX ADMIN — Medication 6 MG: at 20:56

## 2025-05-31 RX ADMIN — LEVALBUTEROL HYDROCHLORIDE 1.25 MG: 1.25 SOLUTION RESPIRATORY (INHALATION) at 20:25

## 2025-05-31 RX ADMIN — Medication 400 MG: at 09:25

## 2025-05-31 RX ADMIN — ATORVASTATIN CALCIUM 20 MG: 20 TABLET, FILM COATED ORAL at 17:10

## 2025-05-31 RX ADMIN — LEVALBUTEROL HYDROCHLORIDE 1.25 MG: 1.25 SOLUTION RESPIRATORY (INHALATION) at 13:52

## 2025-05-31 RX ADMIN — Medication 400 MG: at 17:10

## 2025-05-31 RX ADMIN — POLYETHYLENE GLYCOL 3350 17 G: 17 POWDER, FOR SOLUTION ORAL at 09:25

## 2025-05-31 RX ADMIN — METOPROLOL SUCCINATE 25 MG: 25 TABLET, EXTENDED RELEASE ORAL at 09:25

## 2025-05-31 RX ADMIN — RIVAROXABAN 15 MG: 15 TABLET, FILM COATED ORAL at 17:10

## 2025-05-31 RX ADMIN — BUSPIRONE HYDROCHLORIDE 7.5 MG: 15 TABLET ORAL at 09:25

## 2025-05-31 RX ADMIN — FERROUS SULFATE TAB 325 MG (65 MG ELEMENTAL FE) 325 MG: 325 (65 FE) TAB at 09:30

## 2025-05-31 RX ADMIN — SENNOSIDES 8.6 MG: 8.6 TABLET, FILM COATED ORAL at 09:25

## 2025-05-31 RX ADMIN — LEVALBUTEROL HYDROCHLORIDE 1.25 MG: 1.25 SOLUTION RESPIRATORY (INHALATION) at 09:40

## 2025-05-31 RX ADMIN — BUMETANIDE 2 MG: 0.25 INJECTION INTRAMUSCULAR; INTRAVENOUS at 17:10

## 2025-05-31 RX ADMIN — AZITHROMYCIN DIHYDRATE 500 MG: 500 TABLET ORAL at 13:55

## 2025-05-31 NOTE — ASSESSMENT & PLAN NOTE
Home regimen: Losartan 25 mg daily, spironolactone 25 mg daily, torsemide 20 mg daily  Held on admission 2/2 severe sepsis and NELLIE  BP reviewed and acceptable   Continue holding for now, resume when able

## 2025-05-31 NOTE — PLAN OF CARE
Problem: Potential for Falls  Goal: Patient will remain free of falls  Description: INTERVENTIONS:  - Educate patient/family on patient safety including physical limitations  - Instruct patient to call for assistance with activity   - Consider consulting OT/PT to assist with strengthening/mobility based on AM PAC & JH-HLM score  - Consult OT/PT to assist with strengthening/mobility   - Keep Call bell within reach  - Keep bed low and locked with side rails adjusted as appropriate  - Keep care items and personal belongings within reach  - Initiate and maintain comfort rounds  - Make Fall Risk Sign visible to staff  - Offer Toileting every 2 Hours, in advance of need  - Initiate/Maintain bed alarm  - Obtain necessary fall risk management equipment: socks  - Apply yellow socks and bracelet for high fall risk patients  - Consider moving patient to room near nurses station  Outcome: Progressing     Problem: PAIN - ADULT  Goal: Verbalizes/displays adequate comfort level or baseline comfort level  Description: Interventions:  - Encourage patient to monitor pain and request assistance  - Assess pain using appropriate pain scale  - Administer analgesics as ordered based on type and severity of pain and evaluate response  - Implement non-pharmacological measures as appropriate and evaluate response  - Consider cultural and social influences on pain and pain management  - Notify physician/advanced practitioner if interventions unsuccessful or patient reports new pain  - Educate patient/family on pain management process including their role and importance of  reporting pain   - Provide non-pharmacologic/complimentary pain relief interventions  Outcome: Progressing     Problem: INFECTION - ADULT  Goal: Absence or prevention of progression during hospitalization  Description: INTERVENTIONS:  - Assess and monitor for signs and symptoms of infection  - Monitor lab/diagnostic results  - Monitor all insertion sites, i.e. indwelling  lines, tubes, and drains  - Monitor endotracheal if appropriate and nasal secretions for changes in amount and color  - [place] appropriate cooling/warming therapies per order  - Administer medications as ordered  - Instruct and encourage patient and family to use good hand hygiene technique  - Identify and instruct in appropriate isolation precautions for identified infection/condition  Outcome: Progressing  Goal: Absence of fever/infection during neutropenic period  Description: INTERVENTIONS:  - Monitor WBC  - Perform strict hand hygiene  - Limit to healthy visitors only  - No plants, dried, fresh or silk flowers with damon in patient room  Outcome: Progressing     Problem: SAFETY ADULT  Goal: Patient will remain free of falls  Description: INTERVENTIONS:  - Educate patient/family on patient safety including physical limitations  - Instruct patient to call for assistance with activity   - Consider consulting OT/PT to assist with strengthening/mobility based on AM PAC & JH-HLM score  - Consult OT/PT to assist with strengthening/mobility   - Keep Call bell within reach  - Keep bed low and locked with side rails adjusted as appropriate  - Keep care items and personal belongings within reach  - Initiate and maintain comfort rounds  - Make Fall Risk Sign visible to staff  - Offer Toileting every 2 Hours, in advance of need  - Initiate/Maintain bed alarm  - Obtain necessary fall risk management equipment: socks  - Apply yellow socks and bracelet for high fall risk patients  - Consider moving patient to room near nurses station  Outcome: Progressing  Goal: Maintain or return to baseline ADL function  Description: INTERVENTIONS:  -  Assess patient's ability to carry out ADLs; assess patient's baseline for ADL function and identify physical deficits which impact ability to perform ADLs (bathing, care of mouth/teeth, toileting, grooming, dressing, etc.)  - Assess/evaluate cause of self-care deficits   - Assess range of  motion  - Assess patient's mobility; develop plan if impaired  - Assess patient's need for assistive devices and provide as appropriate  - Encourage maximum independence but intervene and supervise when necessary  - Involve family in performance of ADLs  - Assess for home care needs following discharge   - Consider OT consult to assist with ADL evaluation and planning for discharge  - Provide patient education as appropriate  - Monitor functional capacity and physical performance, use of AM PAC & JH-HLM   - Monitor gait, balance and fatigue with ambulation    Outcome: Progressing  Goal: Maintains/Returns to pre admission functional level  Description: INTERVENTIONS:  - Perform AM-PAC 6 Click Basic Mobility/ Daily Activity assessment daily.  - Set and communicate daily mobility goal to care team and patient/family/caregiver.   - Collaborate with rehabilitation services on mobility goals if consulted  - Perform Range of Motion 2 times a day.  - Reposition patient every 2 hours.  - Dangle patient 2 times a day  - Stand patient 2 times a day  - Ambulate patient 2 times a day  - Out of bed to chair 2 times a day   - Out of bed for meals 2 times a day  - Out of bed for toileting  - Record patient progress and toleration of activity level   Outcome: Progressing     Problem: DISCHARGE PLANNING  Goal: Discharge to home or other facility with appropriate resources  Description: INTERVENTIONS:  - Identify barriers to discharge w/patient and caregiver  - Arrange for needed discharge resources and transportation as appropriate  - Identify discharge learning needs (meds, wound care, etc.)  - Arrange for interpretive services to assist at discharge as needed  - Refer to Case Management Department for coordinating discharge planning if the patient needs post-hospital services based on physician/advanced practitioner order or complex needs related to functional status, cognitive ability, or social support system  Outcome:  Progressing     Problem: Knowledge Deficit  Goal: Patient/family/caregiver demonstrates understanding of disease process, treatment plan, medications, and discharge instructions  Description: Complete learning assessment and assess knowledge base.  Interventions:  - Provide teaching at level of understanding  - Provide teaching via preferred learning methods  Outcome: Progressing     Problem: Prexisting or High Potential for Compromised Skin Integrity  Goal: Skin integrity is maintained or improved  Description: INTERVENTIONS:  - Identify patients at risk for skin breakdown  - Assess and monitor skin integrity including under and around medical devices   - Assess and monitor nutrition and hydration status  - Monitor labs  - Assess for incontinence   - Turn and reposition patient  - Assist with mobility/ambulation  - Relieve pressure over payam prominences   - Avoid friction and shearing  - Provide appropriate hygiene as needed including keeping skin clean and dry  - Evaluate need for skin moisturizer/barrier cream  - Collaborate with interdisciplinary team  - Patient/family teaching  - Consider wound care consult    Assess:  - Review [place] scale daily  - Clean and moisturize skin   - Inspect skin when repositioning, toileting, and assisting with ADLS  - Assess under medical devices   - Assess extremities for adequate circulation and sensation     Bed Management:  - Have minimal linens on bed & keep smooth, unwrinkled  - Change linens as needed when moist or perspiring  - Avoid sitting or lying in one position for more than 2 hours while in bed?Keep HOB at 30 degrees   - Toileting:  - Offer bedside commode  - Assess for incontinence   - Use incontinent care products after each incontinent episode     Activity:  - Mobilize patient 3 times a day  - Encourage activity and walks on unit  - Encourage or provide ROM exercises   - Turn and reposition patient every 2 Hours  - Use appropriate equipment to lift or move  patient in bed  - Instruct/ Assist with weight shifting when out of bed in chair  - Consider limitation of chair time 4 hour intervals    Skin Care:  - Avoid use of baby powder, tape, friction and shearing, hot water or constrictive clothing  - Relieve pressure over bony prominences   - Do not massage red bony areas    Next Steps:  - Teach patient strategies to minimize risks   - Consider consults to  interdisciplinary teams   Outcome: Progressing

## 2025-05-31 NOTE — PLAN OF CARE
Problem: Potential for Falls  Goal: Patient will remain free of falls  Description: INTERVENTIONS:  - Educate patient/family on patient safety including physical limitations  - Instruct patient to call for assistance with activity   - Consider consulting OT/PT to assist with strengthening/mobility based on AM PAC & JH-HLM score  - Consult OT/PT to assist with strengthening/mobility   - Keep Call bell within reach  - Keep bed low and locked with side rails adjusted as appropriate  - Keep care items and personal belongings within reach  - Initiate and maintain comfort rounds  - Make Fall Risk Sign visible to staff  - Offer Toileting every 2 Hours, in advance of need  - Initiate/Maintain bed alarm  - Obtain necessary fall risk management equipment: socks  - Apply yellow socks and bracelet for high fall risk patients  - Consider moving patient to room near nurses station  Outcome: Progressing     Problem: PAIN - ADULT  Goal: Verbalizes/displays adequate comfort level or baseline comfort level  Description: Interventions:  - Encourage patient to monitor pain and request assistance  - Assess pain using appropriate pain scale  - Administer analgesics as ordered based on type and severity of pain and evaluate response  - Implement non-pharmacological measures as appropriate and evaluate response  - Consider cultural and social influences on pain and pain management  - Notify physician/advanced practitioner if interventions unsuccessful or patient reports new pain  - Educate patient/family on pain management process including their role and importance of  reporting pain   - Provide non-pharmacologic/complimentary pain relief interventions  Outcome: Progressing     Problem: INFECTION - ADULT  Goal: Absence or prevention of progression during hospitalization  Description: INTERVENTIONS:  - Assess and monitor for signs and symptoms of infection  - Monitor lab/diagnostic results  - Monitor all insertion sites, i.e. indwelling  lines, tubes, and drains  - Monitor endotracheal if appropriate and nasal secretions for changes in amount and color  - [place] appropriate cooling/warming therapies per order  - Administer medications as ordered  - Instruct and encourage patient and family to use good hand hygiene technique  - Identify and instruct in appropriate isolation precautions for identified infection/condition  Outcome: Progressing  Goal: Absence of fever/infection during neutropenic period  Description: INTERVENTIONS:  - Monitor WBC  - Perform strict hand hygiene  - Limit to healthy visitors only  - No plants, dried, fresh or silk flowers with damon in patient room  Outcome: Progressing

## 2025-05-31 NOTE — ASSESSMENT & PLAN NOTE
Chronically on 2 L of oxygen  Requiring 3-4L NC on admission   Likely multifactorial in the setting of CHF and PNA -- continue management per associated problem  Respiratory protocol  Continue to wean supplemental O2 as able

## 2025-05-31 NOTE — ASSESSMENT & PLAN NOTE
Met severe sepsis criteria with tachypnea, leukocytosis, lactic acidosis, and NELLIE  Source: suspect pneumonia   CT chest  Severe bilateral consolidation and groundglass opacity, greatest in the right lower lobe, compatible with edema and/or pneumonia in the appropriate clinical setting   Lactic acid 2.9 -- now cleared  Procal trend  0.57 - 0.63  Started on IV ceftriaxone and azithromycin, continue D2  Trend procalcitonin, de-escalate antibiotics as warranted  Urinary antigens negative   Blood cultures pending   Monitor hemodynamics  Respiratory protocol

## 2025-05-31 NOTE — ASSESSMENT & PLAN NOTE
Lab Results   Component Value Date    HGBA1C 5.2 05/28/2025       Recent Labs     05/30/25  1707 05/30/25 2115   POCGLU 174* 139       Blood Sugar Average: Last 72 hrs:  (P) 156.5  Home regimen: farxiga 10mg daily, glimerpiride 1mg daily, lantus 8U qhs, metformin 1000mg BID  Continue with insulin on sliding scale  Adjust as needed

## 2025-05-31 NOTE — ASSESSMENT & PLAN NOTE
Met severe sepsis criteria on admission   CT chest: Severe bilateral consolidation and groundglass opacity, greatest in the right lower lobe, compatible with edema and/or pneumonia in the appropriate clinical setting   Urinary antigens negative

## 2025-05-31 NOTE — CASE MANAGEMENT
Case Management Assessment & Discharge Planning Note    Patient name Trevor Lyons  Location /-01 MRN [id number]  : 1940 Date 2025       Current Admission Date: 2025  Current Admission Diagnosis:Acute combined systolic and diastolic congestive heart failure (HCC)   Patient Active Problem List    Diagnosis Date Noted    Hypertension 2025    Acute combined systolic and diastolic congestive heart failure ([facility]) 2025    Acute on chronic respiratory failure ([facility]) 2025    Severe sepsis ([facility]) 2025    Pneumonia 2025    Leucocytosis 2025    Acute on chronic systolic heart failure ([facility]) 2025    Metabolic alkalosis 2025    NELLIE (acute kidney injury) ([facility]) 2025    Encounter for examination following treatment at hospital 2024    History of transcatheter aortic valve replacement (TAVR) 10/22/2024    Acute hypoxic respiratory failure ([facility]) 10/19/2024    Anemia 10/19/2024    Coronary artery disease involving native coronary artery of native heart without angina pectoris 2024    S/P CABG (coronary artery bypass graft) 2024    DDD (degenerative disc disease), lumbosacral 2024    COPD (chronic obstructive pulmonary disease) ([facility]) 2024    Recurrent falls 2024    Persistent atrial fibrillation ([facility]) 2024    Chronic combined systolic and diastolic CHF (congestive heart failure) ([facility]) 2024    Depression with anxiety 2024    NSVT (nonsustained ventricular tachycardia) ([facility]) 2024    Orthostatic hypotension 2024    Pulmonary embolism ([facility]) 2024    Cardiac pacemaker 2024    Pulmonary hypertension ([facility]) 2024    Sick sinus syndrome ([facility]) 2024    Dextroscoliosis 2024    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 2024    Mixed hyperlipidemia 2024    Type 2 diabetes mellitus without complication, with long-term current use of insulin ([facility]) 2024    Osteopenia of  multiple sites 03/12/2024    Chronic left-sided low back pain with left-sided sciatica 03/12/2024    Aneurysm of ascending aorta without rupture (HCC) 03/12/2024    Moderate Alzheimer's dementia, unspecified timing of dementia onset, unspecified whether behavioral, psychotic, or mood disturbance or anxiety (HCC) 03/12/2024    Acquired hypothyroidism 11/10/2022    Bilateral carotid bruits 11/11/2019      LOS (days): 1  Geometric Mean LOS (GMLOS) (days):   Days to GMLOS:     OBJECTIVE:    Risk of Unplanned Readmission Score: 35.81         Current admission status: Inpatient       Preferred Pharmacy:   [id number] - [place], [place] - [address]  [address] [id number]  Phone: [phone] Fax: [phone]    [facility] - [place], [place] - [address]  [address] [address]  Phone: [phone] Fax: [phone]    Primary Care Provider: Ira Borden DO    Primary Insurance: MEDICARE MISC REPLACEMENT  Secondary Insurance:     ASSESSMENT:  Active Health Care Proxies       Marisa Jansen Health Care Agent - Daughter   Primary Phone: [phone] (Mobile)                 Advance Directives  Does patient have a Health Care POA?: Yes  Does patient have Advance Directives?: Yes  Advance Directives: Living will, Power of  for health care  Primary Contact: Caterina Inderjit: dtr: [phone]    Readmission Root Cause  30 Day Readmission: No    Patient Information  Admitted from:: Facility  Mental Status: Alert  During Assessment patient was accompanied by: Not accompanied during assessment  Assessment information provided by:: Patient, Other - please comment (Mamta at [address])  Primary Caregiver: Other (Comment) (staff at [facility])  Support Systems: Self, Children, Family members  County of Residence: [place]  What city do you live in?: [place]  Home entry access options. Select all that apply.: No steps to enter home  Type of Current  Residence: Facility  Upon entering residence, is there a bedroom on the main floor (no further steps)?: Yes  Upon entering residence, is there a bathroom on the main floor (no further steps)?: Yes  Living Arrangements: Other (Comment) (Resides at [facility])  Is patient a ?: No    Activities of Daily Living Prior to Admission  Functional Status: Independent  Completes ADLs independently?: Yes  Ambulates independently?: No  Level of ambulatory dependence: Assistance  Does patient use assisted devices?: Yes  Assisted Devices (DME) used: Wheelchair  Does patient currently own DME?: Yes  What DME does the patient currently own?: Wheelchair  Does patient have a history of Outpatient Therapy (PT/OT)?: No  Does the patient have a history of Short-Term Rehab?: No  Does patient have a history of HHC?: Yes ([facility])  Does patient currently have HHC?: No    Patient Information Continued  Income Source: Pension/correction  Does patient have prescription coverage?: Yes  Can the patient afford their medications and any related supplies (such as glucometers or test strips)?: Yes  Does patient receive dialysis treatments?: No  Does patient have a history of substance abuse?: No  Does patient have a history of Mental Health Diagnosis?: No    Means of Transportation  Means of Transport to Appts:: Family transport    DISCHARGE DETAILS:    Discharge planning discussed with:: patient  Freedom of Choice: Yes  Comments - Freedom of Choice: Return to [facility]    Additional Comments: Call placed to [facility]([phone]), spoke with Mamta. Mamta angel Pt is independent with adls and ambulates with wheelchair. Pt is independent with transfers into wheelchair. Mamta reports Pt has oxygen concentrator and wears O2 PRN. Mamta reports facility cannot accommodate weekend discharges. Met with Pt. Pt presents AA&Ox4. Discussed role of case management, Pt confirms his residence at  [address], ambulates with wheelchair and wears O2 as needed. Pt's dtr is POA and he has living will. Hx of [facility]. Denies hx of SNF/MH/D&A. CM to follow for discharge planning.

## 2025-05-31 NOTE — PROGRESS NOTES
"Progress Note - Cardiology   Name: Trevro Lyons 84 y.o. male I MRN: [id number]  Unit/Bed#: -01 I Date of Admission: 5/30/2025   Date of Service: 5/31/2025 I Hospital Day: 1     Assessment & Plan  Acute combined systolic and diastolic congestive heart failure (HCC)  Wt Readings from Last 3 Encounters:   05/31/25 103 kg (228 lb)   05/28/25 105 kg (232 lb)   03/20/25 99.7 kg (219 lb 12.8 oz)   Patient with a history of HFrEF -comes to [facility] emergency department on 5/30/2025 with progressive dyspnea, worsening cough and hypoxia.  Echo 10/21/25: EF 40 to 45%, mild global hypokinesis, RV dilatation, left atrial dilatation, well-functioning TAVR, moderate mitral MAC, moderate to severe MR regurgitation, mild MS, Moderate TR    GDMT: Farxiga 10 mg daily, losartan 25 mg daily, metoprolol succinate 25 mg daily, spironolactone 25 mg daily, torsemide 20 mg twice daily with potassium supplementation    Chest x-ray: Moderate pulmonary edema.    Patient was transition to IV Bumex and is having a better response.  We will give another midday dose today.  Follow urine output and daily labs.  Persistent atrial fibrillation (HCC)  Patient on Xarelto 20 mg daily for stroke prevention and metoprolol succinate 25 mg daily for rate control -he is 100% V paced at 65 with 100% A-fib burden  Sick sinus syndrome (HCC)  Patient has [employer] permanent pacemaker -set at VVI at 65, last pacemaker check 4/16/2025 shows 98% V paced with patient 100% A-fib  NELLIE (acute kidney injury) ([facility])  Creatinine on arrival 1.8  Acute on chronic respiratory failure (HCC)  Remains on 4 L nasal cannula.  Pneumonia    Hypertension      Subjective:   Patient seen and examined.  No significant events overnight.  Remains on 4 L nasal cannula.  States breathing is improved but does remain volume overloaded.    Objective:     Vitals: Blood pressure 125/73, pulse 60, temperature (!) 97.2 °F (36.2 °C), resp. rate 20, height 5' 8\" (1.727 " m), weight 103 kg (228 lb), SpO2 96%., Body mass index is 34.67 kg/m².,   Orthostatic Blood Pressures      Flowsheet Row Most Recent Value   Blood Pressure 125/73 filed at 05/31/2025 0733   Patient Position - Orthostatic VS Lying filed at 05/30/2025 2000              Intake/Output Summary (Last 24 hours) at 5/31/2025 1205  Last data filed at 5/31/2025 1132  Gross per 24 hour   Intake 2646 ml   Output 1800 ml   Net 846 ml       Telemetry reviewed:  Ventricular paced rhythm with intermittent PVCs.      Physical Exam:    GEN: Trevor Lyons appears well, alert and oriented x 3, pleasant and cooperative.  Obese  HEENT: pupils equal, round, and reactive to light; extraocular muscles intact  NECK: supple, no carotid bruits. + JVD  HEART: regular rhythm, normal S1 and S2, no murmurs, clicks, gallops or rubs   LUNGS: Diminished with rales at the bases bilaterally; no wheezes or rhonchi   ABDOMEN: normal bowel sounds, soft, no tenderness, no distention  EXTREMITIES: peripheral pulses normal; no clubbing, cyanosis,.  ++ Edema bilaterally  NEURO: no focal findings   SKIN: normal without suspicious lesions on exposed skin    Medications:    Current Medications[1]     Labs & Results:        Results from last 7 days   Lab Units 05/31/25  0938 05/30/25  1135 05/28/25  0941   WBC Thousand/uL 20.15* 15.91* 13.39*   HEMOGLOBIN g/dL 8.1* 8.4* 8.3*   HEMATOCRIT % 27.7* 29.1* 29.0*   PLATELETS Thousands/uL 281 262 244         Results from last 7 days   Lab Units 05/31/25  0938 05/30/25  1135 05/28/25  0941   POTASSIUM mmol/L 4.4 4.6 4.5   CHLORIDE mmol/L 97 96 100   CO2 mmol/L 31 27 35*   BUN mg/dL 40* 36* 29*   CREATININE mg/dL 1.84* 1.81* 1.68*   CALCIUM mg/dL 9.0 9.0 9.1   ALK PHOS U/L  --  79 91   ALT U/L  --  9 10   AST U/L  --  13 15     Results from last 7 days   Lab Units 05/30/25  1320   INR  1.87*   PTT seconds 52*             EKG personally reviewed by Jesse Perez MD. V-paced rhythm.  Last device check showed 98%  ventricular pacing with underlying atrial fibrillation.    Counseling / Coordination of Care  Total floor / unit time spent today 25 minutes.  Greater than 50% of total time was spent with the patient and / or family counseling and / or coordination of care.         [1]   Current Facility-Administered Medications:     acetaminophen (TYLENOL) tablet 650 mg, 650 mg, Oral, Q6H PRN, Anya Dash MD    atorvastatin (LIPITOR) tablet 20 mg, 20 mg, Oral, Daily With Dinner, Anya Dash MD, 20 mg at 05/30/25 1704    azithromycin (ZITHROMAX) tablet 500 mg, 500 mg, Oral, Q24H, Anya Dash MD    bumetanide (BUMEX) injection 2 mg, 2 mg, Intravenous, BID, Jose Rahman MD, 2 mg at 05/31/25 0925    busPIRone (BUSPAR) tablet 7.5 mg, 7.5 mg, Oral, BID, Anya Dash MD, 7.5 mg at 05/31/25 0925    cefTRIAXone (ROCEPHIN) IVPB (premix in dextrose) 2,000 mg 50 mL, 2,000 mg, Intravenous, Q24H, Anya Dash MD    clonazePAM (KlonoPIN) tablet 1 mg, 1 mg, Oral, HS PRN, Anya Dash MD    donepezil (ARICEPT) tablet 10 mg, 10 mg, Oral, QAM, Anya Dash MD, 10 mg at 05/31/25 0925    DULoxetine (CYMBALTA) delayed release capsule 60 mg, 60 mg, Oral, Daily, Anya Dash MD, 60 mg at 05/31/25 0925    ferrous sulfate tablet 325 mg, 325 mg, Oral, Daily With Breakfast, Anya Dash MD, 325 mg at 05/31/25 0930    [Held by provider] Fluticasone Furoate-Vilanterol 100-25 mcg/actuation 1 puff, 1 puff, Inhalation, Daily **AND** [Held by provider] umeclidinium 62.5 mcg/actuation inhaler AEPB 1 puff, 1 puff, Inhalation, Daily, Anya Dash MD    insulin lispro (HumALOG/ADMELOG) 100 units/mL subcutaneous injection 1-5 Units, 1-5 Units, Subcutaneous, TID AC, 1 Units at 05/30/25 1719 **AND** Fingerstick Glucose (POCT), , , TID ACAnya MD    insulin lispro (HumALOG/ADMELOG) 100 units/mL subcutaneous injection 1-6 Units, 1-6 Units, Subcutaneous, HSAnya MD    levalbuterol (XOPENEX) inhalation solution 1.25 mg, 1.25 mg, Nebulization, TID, Anya Dash MD,  1.25 mg at 05/31/25 0940    levothyroxine tablet 137 mcg, 137 mcg, Oral, Early Morning, Anya Dash MD, 137 mcg at 05/31/25 0622    [Held by provider] losartan (COZAAR) tablet 25 mg, 25 mg, Oral, Daily, Anya Dash MD    magnesium Oxide (MAG-OX) tablet 400 mg, 400 mg, Oral, BID, Anya Dash MD, 400 mg at 05/31/25 0925    melatonin tablet 6 mg, 6 mg, Oral, HS, Usha Quinn PA-C, 3 mg at 05/30/25 2145    metoprolol succinate (TOPROL-XL) 24 hr tablet 25 mg, 25 mg, Oral, QAM, Anya Dash MD, 25 mg at 05/31/25 0925    pantoprazole (PROTONIX) EC tablet 40 mg, 40 mg, Oral, Daily Before Breakfast, Anya Dash MD, 40 mg at 05/31/25 0622    polyethylene glycol (MIRALAX) packet 17 g, 17 g, Oral, Daily, Anya Dash MD, 17 g at 05/31/25 0925    rivaroxaban (XARELTO) tablet 15 mg, 15 mg, Oral, Daily With Dinner, Anya Dash MD, 15 mg at 05/30/25 1704    senna (SENOKOT) tablet 8.6 mg, 1 tablet, Oral, Daily, Anya Dash MD, 8.6 mg at 05/31/25 0925    [Held by provider] spironolactone (ALDACTONE) tablet 25 mg, 25 mg, Oral, Daily, Anya Dash MD

## 2025-05-31 NOTE — ASSESSMENT & PLAN NOTE
Wt Readings from Last 3 Encounters:   05/31/25 103 kg (228 lb)   05/28/25 105 kg (232 lb)   03/20/25 99.7 kg (219 lb 12.8 oz)   Presenting to the ED for shortness of breath worsened with activity.   Evidence of volume overload with elevated , CT chest w/ severe bilateral consolidations and ground glass opacity, greatest in the right lower lobe, compatible with edema and/or of pneumonia in the appropriate clinical setting  Prior echo 10/24: EF of 40-45%, mild global hypokinesis, atrial dilatation  Home diuretic regimen: torsemide 20 twice daily, Aldactone 25 mg daily  Hold home regimen with IV diuresis   Continue to monitor daily weights, I's and O's  228 pounds on standing scale this morning.  ED weight stated so unclear accuracy   Will defer need for updated ECHO to cards  Cardiology consulted, appreciate recs  S/p IV lasix 40mg with little output --> transitioned to bumex 2mg BID, continue   Patient having hard time adhering to fluid restriction. Discussed importance with patient at bedside today. He verbalizes understanding.

## 2025-05-31 NOTE — NURSING NOTE
Pt is refusing to follow fluid restriction continually rings asking for water. Pt was told that he is at his limit till morning and he can not have any more fluids offered a few ice chips and pt continues to get agitated that he is not allowed any more fluids continues to say he will just get up and get his own. Pt sleeps in chair, chair alarm on and activated.

## 2025-05-31 NOTE — PROGRESS NOTES
Progress Note - Hospitalist   Name: Trevor Lyons 84 y.o. male I MRN: [id number]  Unit/Bed#: -01 I Date of Admission: 5/30/2025   Date of Service: 5/31/2025 I Hospital Day: 1    Assessment & Plan  Acute combined systolic and diastolic congestive heart failure (HCC)  Wt Readings from Last 3 Encounters:   05/31/25 103 kg (228 lb)   05/28/25 105 kg (232 lb)   03/20/25 99.7 kg (219 lb 12.8 oz)   Presenting to the ED for shortness of breath worsened with activity.   Evidence of volume overload with elevated , CT chest w/ severe bilateral consolidations and ground glass opacity, greatest in the right lower lobe, compatible with edema and/or of pneumonia in the appropriate clinical setting  Prior echo 10/24: EF of 40-45%, mild global hypokinesis, atrial dilatation  Home diuretic regimen: torsemide 20 twice daily, Aldactone 25 mg daily  Hold home regimen with IV diuresis   Continue to monitor daily weights, I's and O's  228 pounds on standing scale this morning.  ED weight stated so unclear accuracy   Will defer need for updated ECHO to cards  Cardiology consulted, appreciate recs  S/p IV lasix 40mg with little output --> transitioned to bumex 2mg BID, continue   Patient having hard time adhering to fluid restriction. Discussed importance with patient at bedside today. He verbalizes understanding.  Severe sepsis (HCC)  Met severe sepsis criteria with tachypnea, leukocytosis, lactic acidosis, and NELLIE  Source: suspect pneumonia   CT chest  Severe bilateral consolidation and groundglass opacity, greatest in the right lower lobe, compatible with edema and/or pneumonia in the appropriate clinical setting   Lactic acid 2.9 -- now cleared  Procal trend  0.57 - 0.63  Started on IV ceftriaxone and azithromycin, continue D2  Trend procalcitonin, de-escalate antibiotics as warranted  Urinary antigens negative   Blood cultures pending   Monitor hemodynamics  Respiratory protocol  Persistent atrial fibrillation  ([facility])  Continue with Toprol-XL 25 mg daily  Anticoagulation with Xarelto  Sick sinus syndrome ([facility])  Per history  Status post pacemaker placement  Type 2 diabetes mellitus without complication, with long-term current use of insulin ([facility])  Lab Results   Component Value Date    HGBA1C 5.2 05/28/2025       Recent Labs     05/30/25  1707 05/30/25  2115   POCGLU 174* 139       Blood Sugar Average: Last 72 hrs:  (P) 156.5  Home regimen: farxiga 10mg daily, glimerpiride 1mg daily, lantus 8U qhs, metformin 1000mg BID  Continue with insulin on sliding scale  Adjust as needed  Acquired hypothyroidism  Will continue with Synthroid  NELLIE (acute kidney injury) ([facility])  Recent Labs     05/28/25  0941 05/30/25  1135   CREATININE 1.68* 1.81*   EGFR 36 33     Estimated Creatinine Clearance: 34.1 mL/min (A) (by C-G formula based on SCr of 1.81 mg/dL (H)).     Baseline creatinine-1-1.2  Continue with urinary retention protocol  Continue with IV diuretics  Continue to monitor BUNs/creatinine  Am labs ordered but not drawn yet, f/u when collected   Acute on chronic respiratory failure (HCC)  Chronically on 2 L of oxygen  Requiring 3-4L NC on admission   Likely multifactorial in the setting of CHF and PNA -- continue management per associated problem  Respiratory protocol  Continue to wean supplemental O2 as able   Pneumonia  Met severe sepsis criteria on admission   CT chest: Severe bilateral consolidation and groundglass opacity, greatest in the right lower lobe, compatible with edema and/or pneumonia in the appropriate clinical setting   Urinary antigens negative   Leucocytosis  Secondary to severe sepsis  Management as above  Lactic acidosis (Resolved: 5/31/2025)  Resolved   Hypertension  Home regimen: Losartan 25 mg daily, spironolactone 25 mg daily, torsemide 20 mg daily  Held on admission 2/2 severe sepsis and NELLIE  BP reviewed and acceptable   Continue holding for now, resume when able     VTE Pharmacologic Prophylaxis:   High Risk  (Score >/= 5) - Pharmacological DVT Prophylaxis Ordered: rivaroxaban (Xarelto). Sequential Compression Devices Ordered.    Mobility:   Basic Mobility Inpatient Raw Score: 19  JH-HLM Goal: 6: Walk 10 steps or more  JH-HLM Achieved: 6: Walk 10 steps or more  JH-HLM Goal achieved. Continue to encourage appropriate mobility.    Patient Centered Rounds: I performed bedside rounds with nursing staff today.   Discussions with Specialists or Other Care Team Provider: None    Education and Discussions with Family / Patient: will update daughter.     Current Length of Stay: 1 day(s)  Current Patient Status: Inpatient   Certification Statement: The patient will continue to require additional inpatient hospital stay due to continued IV antibiotics, continue IV Lasix, lab monitoring, wean supplemental O2, pending safe dispo  Discharge Plan: Anticipate discharge in 48-72 hrs to home.    Code Status: Level 3 - DNAR and DNI    Subjective   Seen and examined.  No acute events overnight.  Patient reports feeling tired today.  Spoke with nursing who states patient has been having trouble with adhering to fluid restriction.  Encouraged and discussed importance of fluid restriction in the setting of CHF.  Patient reports drinking large amounts of water at home.  He verbalizes understanding at this time.  He reports some coughing, however denies chills, fevers.  He reports continued shortness of breath with exertion.     Objective :  Temp:  [97.2 °F (36.2 °C)-98.7 °F (37.1 °C)] 97.2 °F (36.2 °C)  HR:  [55-62] 60  BP: (105-152)/() 125/73  Resp:  [18-26] 20  SpO2:  [85 %-96 %] 96 %  O2 Device: Aerosol mask  Nasal Cannula O2 Flow Rate (L/min):  [3 L/min-4 L/min] 4 L/min    Body mass index is 34.67 kg/m².     Input and Output Summary (last 24 hours):     Intake/Output Summary (Last 24 hours) at 5/31/2025 0738  Last data filed at 5/31/2025 0700  Gross per 24 hour   Intake 2230 ml   Output 1675 ml   Net 555 ml       Physical  Exam  Constitutional:       General: He is not in acute distress.     Appearance: He is obese. He is ill-appearing.   HENT:      Head: Normocephalic and atraumatic.      Nose: Nose normal.      Mouth/Throat:      Mouth: Mucous membranes are moist.     Eyes:      Conjunctiva/sclera: Conjunctivae normal.       Cardiovascular:      Rate and Rhythm: Normal rate.      Heart sounds: Normal heart sounds. No murmur heard.     No friction rub. No gallop.   Pulmonary:      Breath sounds: Rales (bilaterally) present.   Abdominal:      General: There is no distension.      Palpations: Abdomen is soft.      Tenderness: There is no abdominal tenderness.     Musculoskeletal:      Cervical back: Normal range of motion and neck supple.      Right lower leg: Edema present.      Left lower leg: Edema present.      Comments: 3+ pitting bilaterally     Skin:     Coloration: Skin is pale.     Neurological:      General: No focal deficit present.     Psychiatric:         Mood and Affect: Mood normal.       Lines/Drains:        Telemetry:  Telemetry Orders (From admission, onward)               24 Hour Telemetry Monitoring  (ED Bridging Orders Panel)  Continuous x 24 Hours (Telem)        Expiring   Question:  Reason for 24 Hour Telemetry  Answer:  Decompensated CHF- and any one of the following: continuous diuretic infusion or total diuretic dose >200 mg daily, associated electrolyte derangement (I.e. K < 3.0), inotropic drip (continuous infusion), hx of ventricular arrhythmia, or new EF < 35%                     Telemetry Reviewed: NSVT  Indication for Continued Telemetry Use: Acute CHF on >200 mg lasix/day or equivalent dose or with new reduced EF.                Lab Results: I have reviewed the following results:   Results from last 7 days   Lab Units 05/30/25  1135   WBC Thousand/uL 15.91*   HEMOGLOBIN g/dL 8.4*   HEMATOCRIT % 29.1*   PLATELETS Thousands/uL 262   BANDS PCT % 3   LYMPHO PCT % 35   MONO PCT % 11   EOS PCT % 0     Results  from last 7 days   Lab Units 05/30/25  1135   SODIUM mmol/L 138   POTASSIUM mmol/L 4.6   CHLORIDE mmol/L 96   CO2 mmol/L 27   BUN mg/dL 36*   CREATININE mg/dL 1.81*   ANION GAP mmol/L 15*   CALCIUM mg/dL 9.0   ALBUMIN g/dL 3.9   TOTAL BILIRUBIN mg/dL 1.14*   ALK PHOS U/L 79   ALT U/L 9   AST U/L 13   GLUCOSE RANDOM mg/dL 201*     Results from last 7 days   Lab Units 05/30/25  1320   INR  1.87*     Results from last 7 days   Lab Units 05/30/25  2115 05/30/25  1707   POC GLUCOSE mg/dl 139 174*     Results from last 7 days   Lab Units 05/28/25  0941   HEMOGLOBIN A1C % 5.2     Results from last 7 days   Lab Units 05/31/25  0539 05/30/25  2028 05/30/25  1655 05/30/25  1245 05/30/25  1135   LACTIC ACID mmol/L  --  1.3 2.8* 2.9*  --    PROCALCITONIN ng/ml 0.63*  --   --   --  0.57*       Recent Cultures (last 7 days):   Results from last 7 days   Lab Units 05/30/25  1725 05/30/25  1245   BLOOD CULTURE   --  Received in Microbiology Lab. Culture in Progress.  Received in Microbiology Lab. Culture in Progress.   LEGIONELLA URINARY ANTIGEN  Negative  --        Imaging Results Review: I reviewed radiology reports from this admission including: CT chest.  Other Study Results Review: EKG was reviewed.     Last 24 Hours Medication List:     Current Facility-Administered Medications:     acetaminophen (TYLENOL) tablet 650 mg, Q6H PRN    atorvastatin (LIPITOR) tablet 20 mg, Daily With Dinner    azithromycin (ZITHROMAX) tablet 500 mg, Q24H    bumetanide (BUMEX) injection 2 mg, BID    busPIRone (BUSPAR) tablet 7.5 mg, BID    cefTRIAXone (ROCEPHIN) IVPB (premix in dextrose) 2,000 mg 50 mL, Q24H    clonazePAM (KlonoPIN) tablet 1 mg, HS PRN    donepezil (ARICEPT) tablet 10 mg, QAM    DULoxetine (CYMBALTA) delayed release capsule 60 mg, Daily    ferrous sulfate tablet 325 mg, Daily With Breakfast    [Held by provider] Fluticasone Furoate-Vilanterol 100-25 mcg/actuation 1 puff, Daily **AND** [Held by provider] umeclidinium 62.5  mcg/actuation inhaler AEPB 1 puff, Daily    insulin lispro (HumALOG/ADMELOG) 100 units/mL subcutaneous injection 1-5 Units, TID AC **AND** Fingerstick Glucose (POCT), TID AC    insulin lispro (HumALOG/ADMELOG) 100 units/mL subcutaneous injection 1-6 Units, HS    levalbuterol (XOPENEX) inhalation solution 1.25 mg, TID    levothyroxine tablet 137 mcg, Early Morning    [Held by provider] losartan (COZAAR) tablet 25 mg, Daily    magnesium Oxide (MAG-OX) tablet 400 mg, BID    melatonin tablet 6 mg, HS    metoprolol succinate (TOPROL-XL) 24 hr tablet 25 mg, QAM    pantoprazole (PROTONIX) EC tablet 40 mg, Daily Before Breakfast    polyethylene glycol (MIRALAX) packet 17 g, Daily    rivaroxaban (XARELTO) tablet 15 mg, Daily With Dinner    senna (SENOKOT) tablet 8.6 mg, Daily    [Held by provider] spironolactone (ALDACTONE) tablet 25 mg, Daily    Administrative Statements   Today, Patient Was Seen By: Debbie Riggins PA-C    **Please Note: This note may have been constructed using a voice recognition system.**

## 2025-05-31 NOTE — ASSESSMENT & PLAN NOTE
Recent Labs     05/28/25  0941 05/30/25  1135   CREATININE 1.68* 1.81*   EGFR 36 33     Estimated Creatinine Clearance: 34.1 mL/min (A) (by C-G formula based on SCr of 1.81 mg/dL (H)).     Baseline creatinine-1-1.2  Continue with urinary retention protocol  Continue with IV diuretics  Continue to monitor BUNs/creatinine  Am labs ordered but not drawn yet, f/u when collected

## 2025-05-31 NOTE — ASSESSMENT & PLAN NOTE
Wt Readings from Last 3 Encounters:   05/31/25 103 kg (228 lb)   05/28/25 105 kg (232 lb)   03/20/25 99.7 kg (219 lb 12.8 oz)   Patient with a history of HFrEF -comes to [facility] emergency department on 5/30/2025 with progressive dyspnea, worsening cough and hypoxia.  Echo 10/21/25: EF 40 to 45%, mild global hypokinesis, RV dilatation, left atrial dilatation, well-functioning TAVR, moderate mitral MAC, moderate to severe MR regurgitation, mild MS, Moderate TR    GDMT: Farxiga 10 mg daily, losartan 25 mg daily, metoprolol succinate 25 mg daily, spironolactone 25 mg daily, torsemide 20 mg twice daily with potassium supplementation    Chest x-ray: Moderate pulmonary edema.    Patient was transition to IV Bumex and is having a better response.  We will give another midday dose today.  Follow urine output and daily labs.

## 2025-06-01 LAB
ANION GAP SERPL CALCULATED.3IONS-SCNC: 11 MMOL/L (ref 4–13)
ANISOCYTOSIS BLD QL SMEAR: PRESENT
BILIRUB SERPL-MCNC: 0.92 MG/DL (ref 0.2–1)
BUN SERPL-MCNC: 39 MG/DL (ref 5–25)
CALCIUM SERPL-MCNC: 8.7 MG/DL (ref 8.4–10.2)
CHLORIDE SERPL-SCNC: 95 MMOL/L (ref 96–108)
CO2 SERPL-SCNC: 31 MMOL/L (ref 21–32)
CREAT SERPL-MCNC: 1.66 MG/DL (ref 0.6–1.3)
ERYTHROCYTE [DISTWIDTH] IN BLOOD BY AUTOMATED COUNT: 14.1 % (ref 11.6–15.1)
FERRITIN SERPL-MCNC: 171 NG/ML (ref 30–336)
FOLATE SERPL-MCNC: >22.3 NG/ML
GFR SERPL CREATININE-BSD FRML MDRD: 36 ML/MIN/1.73SQ M
GLUCOSE SERPL-MCNC: 111 MG/DL (ref 65–140)
GLUCOSE SERPL-MCNC: 112 MG/DL (ref 65–140)
GLUCOSE SERPL-MCNC: 154 MG/DL (ref 65–140)
GLUCOSE SERPL-MCNC: 162 MG/DL (ref 65–140)
GLUCOSE SERPL-MCNC: 172 MG/DL (ref 65–140)
HCT VFR BLD AUTO: 26.4 % (ref 36.5–49.3)
HGB BLD-MCNC: 7.7 G/DL (ref 12–17)
IRON SATN MFR SERPL: 7 % (ref 15–50)
IRON SERPL-MCNC: 25 UG/DL (ref 50–212)
LDH SERPL-CCNC: 348 U/L (ref 140–271)
LYMPHOCYTES # BLD AUTO: 39 %
LYMPHOCYTES # BLD AUTO: 8.19 THOUSAND/UL (ref 0.6–4.47)
MCH RBC QN AUTO: 27.3 PG (ref 26.8–34.3)
MCHC RBC AUTO-ENTMCNC: 29.2 G/DL (ref 31.4–37.4)
MCV RBC AUTO: 94 FL (ref 82–98)
METAMYELOCYTES # BLD MANUAL: 0.42 THOUSAND/UL (ref 0–0.1)
METAMYELOCYTES NFR BLD MANUAL: 2 % (ref 0–1)
MONOCYTES # BLD AUTO: 1.68 THOUSAND/UL (ref 0–1.22)
MONOCYTES NFR BLD AUTO: 8 % (ref 4–12)
NEUTS SEG # BLD: 10.72 THOUSAND/UL (ref 1.81–6.82)
NEUTS SEG NFR BLD AUTO: 51 %
PLATELET # BLD AUTO: 275 THOUSANDS/UL (ref 149–390)
PLATELET BLD QL SMEAR: ADEQUATE
PMV BLD AUTO: 9 FL (ref 8.9–12.7)
POTASSIUM SERPL-SCNC: 3.5 MMOL/L (ref 3.5–5.3)
PROCALCITONIN SERPL-MCNC: 0.56 NG/ML
RBC # BLD AUTO: 2.82 MILLION/UL (ref 3.88–5.62)
RETICS # AUTO: ABNORMAL 10*3/UL (ref 14356–105094)
RETICS # CALC: 4.01 % (ref 0.37–1.87)
SODIUM SERPL-SCNC: 137 MMOL/L (ref 135–147)
TIBC SERPL-MCNC: 358.4 UG/DL (ref 250–450)
TOTAL CELLS COUNTED SPEC: 100
TRANSFERRIN SERPL-MCNC: 256 MG/DL (ref 203–362)
UIBC SERPL-MCNC: 333 UG/DL (ref 155–355)
VIT B12 SERPL-MCNC: 312 PG/ML (ref 180–914)
WBC # BLD AUTO: 21.01 THOUSAND/UL (ref 4.31–10.16)

## 2025-06-01 PROCEDURE — 99232 SBSQ HOSP IP/OBS MODERATE 35: CPT

## 2025-06-01 PROCEDURE — 94640 AIRWAY INHALATION TREATMENT: CPT

## 2025-06-01 PROCEDURE — 82607 VITAMIN B-12: CPT

## 2025-06-01 PROCEDURE — 82948 REAGENT STRIP/BLOOD GLUCOSE: CPT

## 2025-06-01 PROCEDURE — 83550 IRON BINDING TEST: CPT

## 2025-06-01 PROCEDURE — 85007 BL SMEAR W/DIFF WBC COUNT: CPT

## 2025-06-01 PROCEDURE — 85045 AUTOMATED RETICULOCYTE COUNT: CPT | Performed by: INTERNAL MEDICINE

## 2025-06-01 PROCEDURE — 80048 BASIC METABOLIC PNL TOTAL CA: CPT | Performed by: INTERNAL MEDICINE

## 2025-06-01 PROCEDURE — 36415 COLL VENOUS BLD VENIPUNCTURE: CPT

## 2025-06-01 PROCEDURE — 82728 ASSAY OF FERRITIN: CPT

## 2025-06-01 PROCEDURE — 88184 FLOWCYTOMETRY/ TC 1 MARKER: CPT

## 2025-06-01 PROCEDURE — 82746 ASSAY OF FOLIC ACID SERUM: CPT

## 2025-06-01 PROCEDURE — 81263 IGH VARI REGIONAL MUTATION: CPT

## 2025-06-01 PROCEDURE — 99447 NTRPROF PH1/NTRNET/EHR 11-20: CPT | Performed by: INTERNAL MEDICINE

## 2025-06-01 PROCEDURE — 83615 LACTATE (LD) (LDH) ENZYME: CPT | Performed by: INTERNAL MEDICINE

## 2025-06-01 PROCEDURE — 85027 COMPLETE CBC AUTOMATED: CPT | Performed by: INTERNAL MEDICINE

## 2025-06-01 PROCEDURE — 84145 PROCALCITONIN (PCT): CPT

## 2025-06-01 PROCEDURE — 83540 ASSAY OF IRON: CPT

## 2025-06-01 PROCEDURE — 99232 SBSQ HOSP IP/OBS MODERATE 35: CPT | Performed by: INTERNAL MEDICINE

## 2025-06-01 PROCEDURE — 82247 BILIRUBIN TOTAL: CPT

## 2025-06-01 PROCEDURE — 94760 N-INVAS EAR/PLS OXIMETRY 1: CPT

## 2025-06-01 PROCEDURE — 88374 M/PHMTRC ALYS ISHQUANT/SEMIQ: CPT

## 2025-06-01 RX ORDER — BUMETANIDE 0.25 MG/ML
2 INJECTION, SOLUTION INTRAMUSCULAR; INTRAVENOUS ONCE
Status: COMPLETED | OUTPATIENT
Start: 2025-06-01 | End: 2025-06-01

## 2025-06-01 RX ORDER — FERROUS SULFATE 325(65) MG
325 TABLET ORAL EVERY OTHER DAY
Status: DISCONTINUED | OUTPATIENT
Start: 2025-06-02 | End: 2025-06-13 | Stop reason: HOSPADM

## 2025-06-01 RX ADMIN — DONEPEZIL HYDROCHLORIDE 10 MG: 5 TABLET ORAL at 08:22

## 2025-06-01 RX ADMIN — LEVALBUTEROL HYDROCHLORIDE 1.25 MG: 1.25 SOLUTION RESPIRATORY (INHALATION) at 19:57

## 2025-06-01 RX ADMIN — PANTOPRAZOLE SODIUM 40 MG: 40 TABLET, DELAYED RELEASE ORAL at 04:29

## 2025-06-01 RX ADMIN — INSULIN LISPRO 1 UNITS: 100 INJECTION, SOLUTION INTRAVENOUS; SUBCUTANEOUS at 17:21

## 2025-06-01 RX ADMIN — METOPROLOL SUCCINATE 25 MG: 25 TABLET, EXTENDED RELEASE ORAL at 08:22

## 2025-06-01 RX ADMIN — INSULIN LISPRO 1 UNITS: 100 INJECTION, SOLUTION INTRAVENOUS; SUBCUTANEOUS at 12:42

## 2025-06-01 RX ADMIN — RIVAROXABAN 15 MG: 15 TABLET, FILM COATED ORAL at 15:30

## 2025-06-01 RX ADMIN — SENNOSIDES 8.6 MG: 8.6 TABLET, FILM COATED ORAL at 08:21

## 2025-06-01 RX ADMIN — ACETAMINOPHEN 650 MG: 325 TABLET, FILM COATED ORAL at 19:14

## 2025-06-01 RX ADMIN — BUMETANIDE 2 MG: 0.25 INJECTION INTRAMUSCULAR; INTRAVENOUS at 08:22

## 2025-06-01 RX ADMIN — AZITHROMYCIN DIHYDRATE 500 MG: 500 TABLET ORAL at 13:37

## 2025-06-01 RX ADMIN — BUSPIRONE HYDROCHLORIDE 7.5 MG: 15 TABLET ORAL at 17:22

## 2025-06-01 RX ADMIN — LEVALBUTEROL HYDROCHLORIDE 1.25 MG: 1.25 SOLUTION RESPIRATORY (INHALATION) at 13:24

## 2025-06-01 RX ADMIN — BUMETANIDE 2 MG: 0.25 INJECTION INTRAMUSCULAR; INTRAVENOUS at 15:28

## 2025-06-01 RX ADMIN — LEVOTHYROXINE SODIUM 137 MCG: 112 TABLET ORAL at 04:29

## 2025-06-01 RX ADMIN — BUMETANIDE 2 MG: 0.25 INJECTION INTRAMUSCULAR; INTRAVENOUS at 17:22

## 2025-06-01 RX ADMIN — LEVALBUTEROL HYDROCHLORIDE 1.25 MG: 1.25 SOLUTION RESPIRATORY (INHALATION) at 07:15

## 2025-06-01 RX ADMIN — BUSPIRONE HYDROCHLORIDE 7.5 MG: 15 TABLET ORAL at 08:21

## 2025-06-01 RX ADMIN — ATORVASTATIN CALCIUM 20 MG: 20 TABLET, FILM COATED ORAL at 15:30

## 2025-06-01 RX ADMIN — Medication 400 MG: at 08:21

## 2025-06-01 RX ADMIN — INSULIN LISPRO 1 UNITS: 100 INJECTION, SOLUTION INTRAVENOUS; SUBCUTANEOUS at 21:39

## 2025-06-01 RX ADMIN — DULOXETINE HYDROCHLORIDE 60 MG: 30 CAPSULE, DELAYED RELEASE ORAL at 08:21

## 2025-06-01 RX ADMIN — Medication 6 MG: at 20:57

## 2025-06-01 RX ADMIN — CEFTRIAXONE 2000 MG: 2 INJECTION, SOLUTION INTRAVENOUS at 13:37

## 2025-06-01 RX ADMIN — Medication 400 MG: at 17:22

## 2025-06-01 RX ADMIN — POLYETHYLENE GLYCOL 3350 17 G: 17 POWDER, FOR SOLUTION ORAL at 08:28

## 2025-06-01 NOTE — PROGRESS NOTES
Progress Note - Cardiology   Name: Trevor Lyons 84 y.o. male I MRN: [id number]  Unit/Bed#: -01 I Date of Admission: 5/30/2025   Date of Service: 6/1/2025 I Hospital Day: 2     Assessment & Plan  Acute combined systolic and diastolic congestive heart failure (HCC)  Wt Readings from Last 3 Encounters:   06/01/25 102 kg (225 lb 4.8 oz)   05/28/25 105 kg (232 lb)   03/20/25 99.7 kg (219 lb 12.8 oz)   Patient with a history of HFrEF -comes to [facility] emergency department on 5/30/2025 with progressive dyspnea, worsening cough and hypoxia.  Echo 10/21/25: EF 40 to 45%, mild global hypokinesis, RV dilatation, left atrial dilatation, well-functioning TAVR, moderate mitral MAC, moderate to severe MR regurgitation, mild MS, Moderate TR    GDMT: Farxiga 10 mg daily, losartan 25 mg daily, metoprolol succinate 25 mg daily, spironolactone 25 mg daily, torsemide 20 mg twice daily with potassium supplementation    Chest x-ray: Moderate pulmonary edema.    Patient was transition to IV Bumex and is having a better response.  Yesterday we gave a midday dose and we will give another 1 today.  He is clinically improving slowly and responding to IV Bumex.  His creatinine is also improving.  Persistent atrial fibrillation (HCC)  Patient on Xarelto 20 mg daily for stroke prevention and metoprolol succinate 25 mg daily for rate control -he is 100% V paced at 65 with 100% A-fib burden  Sick sinus syndrome (HCC)  Patient has [employer] permanent pacemaker -set at VVI at 65, last pacemaker check 4/16/2025 shows 98% V paced with patient 100% A-fib  NELLIE (acute kidney injury) ([facility])  Creatinine on arrival 1.8  Acute on chronic respiratory failure (HCC)  Remains on 4 L nasal cannula.  Pneumonia    Hypertension        Subjective:   Patient seen and examined.  No significant events overnight.  Remains on oxygen at 3 L.  Breathing overall improved.  Responding to IV Bumex but remains volume overloaded.    Objective:  "    Vitals: Blood pressure (!) 121/41, pulse 61, temperature 98.2 °F (36.8 °C), resp. rate 20, height 5' 8\" (1.727 m), weight 102 kg (225 lb 4.8 oz), SpO2 94%., Body mass index is 34.26 kg/m².,   Orthostatic Blood Pressures      Flowsheet Row Most Recent Value   Blood Pressure 121/41 filed at 06/01/2025 0736   Patient Position - Orthostatic VS Sitting filed at 06/01/2025 0429              Intake/Output Summary (Last 24 hours) at 6/1/2025 1144  Last data filed at 6/1/2025 0948  Gross per 24 hour   Intake 2204 ml   Output 1200 ml   Net 1004 ml       Telemetry reviewed:  Ventricular paced rhythm with intermittent PVCs.      Physical Exam:    GEN: Trevor Lyons appears well, alert and oriented x 3, pleasant and cooperative   HEENT: pupils equal, round, and reactive to light; extraocular muscles intact  NECK: supple, no carotid bruits   HEART: irregular rhythm, normal S1 and S2, no significant murmurs, clicks, gallops or rubs   LUNGS: Diminished bilaterally; no wheezes, rales, or rhonchi   ABDOMEN: normal bowel sounds, soft, no tenderness, no distention  EXTREMITIES: peripheral pulses normal; no clubbing, cyanosis.  ++ Edema bilaterally  NEURO: no focal findings   SKIN: normal without suspicious lesions on exposed skin     Medications:    Current Medications[1]     Labs & Results:        Results from last 7 days   Lab Units 06/01/25 0426 05/31/25 0938 05/30/25  1135   WBC Thousand/uL 21.01* 20.15* 15.91*   HEMOGLOBIN g/dL 7.7* 8.1* 8.4*   HEMATOCRIT % 26.4* 27.7* 29.1*   PLATELETS Thousands/uL 275 281 262         Results from last 7 days   Lab Units 06/01/25 0426 05/31/25 0938 05/30/25  1135 05/28/25  0941   POTASSIUM mmol/L 3.5 4.4 4.6 4.5   CHLORIDE mmol/L 95* 97 96 100   CO2 mmol/L 31 31 27 35*   BUN mg/dL 39* 40* 36* 29*   CREATININE mg/dL 1.66* 1.84* 1.81* 1.68*   CALCIUM mg/dL 8.7 9.0 9.0 9.1   ALK PHOS U/L  --   --  79 91   ALT U/L  --   --  9 10   AST U/L  --   --  13 15     Results from last 7 days "   Lab Units 05/30/25  1320   INR  1.87*   PTT seconds 52*             EKG personally reviewed by Jesse Perez MD. V-paced rhythm.  Last device check showed 98% ventricular pacing with underlying atrial fibrillation.    Counseling / Coordination of Care  Total floor / unit time spent today 25 minutes.  Greater than 50% of total time was spent with the patient and / or family counseling and / or coordination of care.         [1]   Current Facility-Administered Medications:     acetaminophen (TYLENOL) tablet 650 mg, 650 mg, Oral, Q6H PRN, Anya Dash MD    atorvastatin (LIPITOR) tablet 20 mg, 20 mg, Oral, Daily With Dinner, Anya Dash MD, 20 mg at 05/31/25 1710    azithromycin (ZITHROMAX) tablet 500 mg, 500 mg, Oral, Q24H, Anya Dash MD, 500 mg at 05/31/25 1355    bumetanide (BUMEX) injection 2 mg, 2 mg, Intravenous, BID, Jose Rahman MD, 2 mg at 06/01/25 0822    busPIRone (BUSPAR) tablet 7.5 mg, 7.5 mg, Oral, BID, Anya Dash MD, 7.5 mg at 06/01/25 0821    cefTRIAXone (ROCEPHIN) IVPB (premix in dextrose) 2,000 mg 50 mL, 2,000 mg, Intravenous, Q24H, Anya Dash MD, Last Rate: 100 mL/hr at 05/31/25 1355, 2,000 mg at 05/31/25 1355    clonazePAM (KlonoPIN) tablet 1 mg, 1 mg, Oral, HS PRN, Anya Dash MD    donepezil (ARICEPT) tablet 10 mg, 10 mg, Oral, QAM, Anya Dash MD, 10 mg at 06/01/25 0822    DULoxetine (CYMBALTA) delayed release capsule 60 mg, 60 mg, Oral, Daily, Anya Dash MD, 60 mg at 06/01/25 0821    [START ON 6/2/2025] ferrous sulfate tablet 325 mg, 325 mg, Oral, Every Other Day, Debbie Riggins PA-C    [Held by provider] Fluticasone Furoate-Vilanterol 100-25 mcg/actuation 1 puff, 1 puff, Inhalation, Daily **AND** [Held by provider] umeclidinium 62.5 mcg/actuation inhaler AEPB 1 puff, 1 puff, Inhalation, Daily, Anya Dash MD    insulin lispro (HumALOG/ADMELOG) 100 units/mL subcutaneous injection 1-5 Units, 1-5 Units, Subcutaneous, TID AC, 1 Units at 05/30/25 5118 **AND** Fingerstick Glucose (POCT), ,  , TID AC, Anya Dash MD    insulin lispro (HumALOG/ADMELOG) 100 units/mL subcutaneous injection 1-6 Units, 1-6 Units, Subcutaneous, HS, Anya Dash MD    levalbuterol (XOPENEX) inhalation solution 1.25 mg, 1.25 mg, Nebulization, TID, Anya Dsah MD, 1.25 mg at 06/01/25 0715    levothyroxine tablet 137 mcg, 137 mcg, Oral, Early Morning, Anya Dash MD, 137 mcg at 06/01/25 0429    [Held by provider] losartan (COZAAR) tablet 25 mg, 25 mg, Oral, Daily, Anya Dash MD    magnesium Oxide (MAG-OX) tablet 400 mg, 400 mg, Oral, BID, Anya Dash MD, 400 mg at 06/01/25 0821    melatonin tablet 6 mg, 6 mg, Oral, HS, Usha Quinn PA-C, 6 mg at 05/31/25 2056    metoprolol succinate (TOPROL-XL) 24 hr tablet 25 mg, 25 mg, Oral, QAM, Anya Dash MD, 25 mg at 06/01/25 0822    pantoprazole (PROTONIX) EC tablet 40 mg, 40 mg, Oral, Daily Before Breakfast, Anya Dash MD, 40 mg at 06/01/25 0429    polyethylene glycol (MIRALAX) packet 17 g, 17 g, Oral, Daily, Anya Dash MD, 17 g at 06/01/25 0828    rivaroxaban (XARELTO) tablet 15 mg, 15 mg, Oral, Daily With Dinner, Anya Dash MD, 15 mg at 05/31/25 1710    senna (SENOKOT) tablet 8.6 mg, 1 tablet, Oral, Daily, Anya Dash MD, 8.6 mg at 06/01/25 0821    [Held by provider] spironolactone (ALDACTONE) tablet 25 mg, 25 mg, Oral, Daily, Anya Dash MD

## 2025-06-01 NOTE — ASSESSMENT & PLAN NOTE
Wt Readings from Last 3 Encounters:   06/01/25 102 kg (225 lb 4.8 oz)   05/28/25 105 kg (232 lb)   03/20/25 99.7 kg (219 lb 12.8 oz)   Presenting to the ED for shortness of breath worsened with activity.   Evidence of volume overload with elevated , CT chest w/ severe bilateral consolidations and ground glass opacity, greatest in the right lower lobe, compatible with edema and/or of pneumonia in the appropriate clinical setting  Prior echo 10/24: EF of 40-45%, mild global hypokinesis, atrial dilatation  Home diuretic regimen: torsemide 20 twice daily, Aldactone 25 mg daily  Hold home regimen with IV diuresis   Continue to monitor daily weights, I's and O's  225 pounds on standing scale this morning.  ED weight stated so unclear accuracy   Will defer need for updated ECHO to cards  Cardiology consulted, appreciate recs  S/p IV lasix 40mg with little output --> transitioned to bumex 2mg BID, continue   Patient having hard time adhering to fluid restriction. Discussed importance with patient at bedside. He verbalizes understanding.

## 2025-06-01 NOTE — ASSESSMENT & PLAN NOTE
Recent Labs     05/30/25  1135 05/31/25  0938 06/01/25  0426   CREATININE 1.81* 1.84* 1.66*   EGFR 33 32 36     Estimated Creatinine Clearance: 37 mL/min (A) (by C-G formula based on SCr of 1.66 mg/dL (H)).     Baseline creatinine-1-1.2  Continue with urinary retention protocol  Continue with IV diuretics, Cr improving   Continue to monitor BUNs/creatinine

## 2025-06-01 NOTE — ASSESSMENT & PLAN NOTE
Uptrending leukocytosis however clinically improving. 13 on admission, 21 on am labs   Curbside w/ ID today -- given clinical improvement, ok to continue monitoring. Low threshold to reimage if worsening symptoms   Additionally discussed with hematology. Smear nonspecific and difficult to assess with acute infection. See anemia for plan

## 2025-06-01 NOTE — ASSESSMENT & PLAN NOTE
Met severe sepsis criteria with tachypnea, leukocytosis, lactic acidosis, and NELLIE  Source: suspect pneumonia   CT chest  Severe bilateral consolidation and groundglass opacity, greatest in the right lower lobe, compatible with edema and/or pneumonia in the appropriate clinical setting   Lactic acid 2.9 -- now cleared  Procal trend  0.57 - 0.63 - 0.56  Started on IV ceftriaxone and azithromycin, continue D3  Trend procalcitonin, de-escalate antibiotics as warranted  Urinary antigens negative   Blood cultures no growth at 24 hours   Monitor hemodynamics  Respiratory protocol

## 2025-06-01 NOTE — CONSULTS
E-Consult  Trevor Lyons 84 y.o. male MRN: [id number]  Unit/Bed#: -01 Encounter: [id number]      Reason for Consult / Principal Problem:  anemia and leukocytosis    Consulting Provider: Usha Humphries DO    Requesting Provider: cheko sweet MD        ASSESSMENT:   84-year-old male admitted with CHF exacerbation and symptoms of sepsis.  Pneumonia is suspected infection source.  We are consulted because of leukocytosis and anemia.  During this hospital admission he has had a slightly worsening elevated white count with most recent value today 21,000.  It is mostly neutrophils, but absolute lymphocytes are elevated in addition to monocytes and metamyelocytes.  Peripheral blood flow has been sent.  Hemoglobin is 7.7.  There is a mixed picture of inflammation and iron deficiency on iron studies.  Folate and B12 are pending.        RECOMMENDATIONS: Will follow-up on folate and B12 testing and peripheral blood flow.  I would additionally add a BCR able because of the metamyelocytes.  These changes may be reactive to infection where there could be a primary bone marrow process.  If his white blood cell count does not normalize after this admission we could consider an outpatient bone marrow biopsy.  Will also add LDH, haptoglobin, reticulocyte count, SPEP, free light chains.  My nurse practitioner will see the patient in person tomorrow for ongoing follow-up.        Total time spent 20 minutes, >50% of the total time devoted to medical consultative verbal/EMR discussion between providers. Written report will be generated in the EMR.

## 2025-06-01 NOTE — ASSESSMENT & PLAN NOTE
Hgb 12.7 in Jan 2025  Hgb 8.3 on admission, 7.7 on am labs   No active bleeding appreciated  He does have history of iron deficiency anemia  F/u iron panel, B12 and folate  Additional concern for uptrending leukocytosis  Hematology consulted, appreciate recs

## 2025-06-01 NOTE — PLAN OF CARE
Problem: Potential for Falls  Goal: Patient will remain free of falls  Description: INTERVENTIONS:  - Educate patient/family on patient safety including physical limitations  - Instruct patient to call for assistance with activity   - Consider consulting OT/PT to assist with strengthening/mobility based on AM PAC & JH-HLM score  - Consult OT/PT to assist with strengthening/mobility   - Keep Call bell within reach  - Keep bed low and locked with side rails adjusted as appropriate  - Keep care items and personal belongings within reach  - Initiate and maintain comfort rounds  - Make Fall Risk Sign visible to staff  - Offer Toileting every 2 Hours, in advance of need  - Initiate/Maintain bed alarm  - Obtain necessary fall risk management equipment: socks  - Apply yellow socks and bracelet for high fall risk patients  - Consider moving patient to room near nurses station  Outcome: Progressing

## 2025-06-01 NOTE — ASSESSMENT & PLAN NOTE
Lab Results   Component Value Date    HGBA1C 5.2 05/28/2025       Recent Labs     05/31/25  0813 05/31/25  1100 05/31/25  1629 05/31/25 2105   POCGLU 147* 144* 132 138       Blood Sugar Average: Last 72 hrs:  (P) [id number]  Home regimen: farxiga 10mg daily, glimerpiride 1mg daily, lantus 8U qhs, metformin 1000mg BID  Continue with insulin on sliding scale  Adjust as needed

## 2025-06-01 NOTE — UTILIZATION REVIEW
Initial Clinical Review    Admission: Date/Time/Statement:   Admission Orders (From admission, onward)       Ordered        05/30/25 1325  INPATIENT ADMISSION  Once                          Orders Placed This Encounter   Procedures    INPATIENT ADMISSION     Standing Status:   Standing     Number of Occurrences:   1     Level of Care:   Med Surg [16]     Estimated length of stay:   More than 2 Midnights     Certification:   I certify that inpatient services are medically necessary for this patient for a duration of greater than two midnights. See H&P and MD Progress Notes for additional information about the patient's course of treatment.     ED Arrival Information       Expected   -    Arrival   5/30/2025 11:22    Acuity   Emergent              Means of arrival   Ambulance    Escorted by   [facility])    Service   Hospitalist    Admission type   Emergency              Arrival complaint   SOB             Chief Complaint   Patient presents with    Shortness of Breath     Pt arrives via EMS from [address] after facility staff reported pt having sob.Duo neb x2.        nitial Presentation: 84 y.o. male presents to ED via  EMS  from SNF with shortness of breath.  PMH is  HTN, CAD, SSS S/P  pacemaker, S/P CABG, COPD,  on  O2  2L  NC  chronically,  Afib and chronic anticoagulation  on xarelto.   Met sepsis criteria with tachypnea, leukocytosis, ;lactic acidosis and NELLIE.  Imaging suspicious for pneumonia.  BNP   502.   Admit  Ip with Acute combined systolic  and diastolic heart failure, and plan is  IV  diuretics,  daily weight,  2 DE,   MORENO,  O2, daily weight and cardiology consult.         Cardiology consult  Need strict   I & O, fluid restriction.  Continue  IV  bumex.   Need daily standing weight.       Anticipated Length of Stay/Certification Statement:  Patient will be admitted on an inpatient basis with an anticipated length of stay of greater than 2 midnights secondary to chf, sepsis.     Date:    5/31   Day 2:   Remains on  MORENO, blood cultures pending.  Continue  IV  bumex.  Feels  tired.  Having difficulty  adhering to  fluid restrict.   Admitted to drinking  to larger amts  of water.  Has continued  SOB.  On  O2  4L  + JVD     ++ edema  B/L    Date:   6/1  Day 3: Has surpassed a 2nd midnight with active treatments and services.  Continue MORENO  and  IV  bumex.   BC  NG thus far.  Hemoglobin  7.7 this am.   O2  sats  94  % on 3 L NC.       ED Treatment-Medication Administration from 05/30/2025 1122 to 05/30/2025 1354         Date/Time Order Dose Route Action     05/30/2025 1134 ipratropium-albuterol (FOR EMS ONLY) (DUO-NEB) 0.5-2.5 mg/3 mL inhalation solution 3 mL 0 mL Does not apply Given to EMS     05/30/2025 1159 furosemide (LASIX) injection 40 mg 40 mg Intravenous Given     05/30/2025 1252 cefTRIAXone (ROCEPHIN) IVPB (premix in dextrose) 1,000 mg 50 mL 1,000 mg Intravenous New Bag     05/30/2025 1319 azithromycin (ZITHROMAX) 500 mg in sodium chloride 0.9% 250mL IVPB 500 mg 500 mg Intravenous New Bag            Scheduled Medications:  atorvastatin, 20 mg, Oral, Daily With Dinner  bumetanide, 2 mg, Intravenous, BID  busPIRone, 7.5 mg, Oral, BID  cefTRIAXone, 2,000 mg, Intravenous, Q24H  donepezil, 10 mg, Oral, QAM  DULoxetine, 60 mg, Oral, Daily  [START ON 6/2/2025] ferrous sulfate, 325 mg, Oral, Every Other Day  [Held by provider] Fluticasone Furoate-Vilanterol, 1 puff, Inhalation, Daily   And  [Held by provider] umeclidinium, 1 puff, Inhalation, Daily  insulin lispro, 1-5 Units, Subcutaneous, TID AC  insulin lispro, 1-6 Units, Subcutaneous, HS  levalbuterol, 1.25 mg, Nebulization, TID  levothyroxine, 137 mcg, Oral, Early Morning  [Held by provider] losartan, 25 mg, Oral, Daily  magnesium Oxide, 400 mg, Oral, BID  melatonin, 6 mg, Oral, HS  metoprolol succinate, 25 mg, Oral, QAM  pantoprazole, 40 mg, Oral, Daily Before Breakfast  polyethylene glycol, 17 g, Oral, Daily  rivaroxaban, 15 mg, Oral, Daily With  Dinner  senna, 1 tablet, Oral, Daily  [Held by provider] spironolactone, 25 mg, Oral, Daily      Continuous IV Infusions:     PRN Meds:  acetaminophen, 650 mg, Oral, Q6H PRN  clonazePAM, 1 mg, Oral, HS PRN      ED Triage Vitals   Temperature Pulse Respirations Blood Pressure SpO2 Pain Score   05/30/25 1131 05/30/25 1126 05/30/25 1126 05/30/25 1126 05/30/25 1126 05/30/25 1126   98.4 °F (36.9 °C) 59 (!) 26 116/82 (!) 85 % No Pain     Weight (last 2 days)       Date/Time Weight    06/01/25 0429 102 (225.3)    05/31/25 0542 103 (228)    05/30/25 1403 105 (232)            Vital Signs (last 3 days)       Date/Time Temp Pulse Resp BP MAP (mmHg) SpO2 Calculated FIO2 (%) - Nasal Cannula Nasal Cannula O2 Flow Rate (L/min) O2 Device Patient Position - Orthostatic VS David Coma Scale Score Pain    06/01/25 15:12:12 97.3 °F (36.3 °C) 59 20 120/43 69 96 % -- -- -- -- -- --    06/01/25 1335 -- -- -- -- -- 100 % -- -- -- -- -- --    06/01/25 1324 -- -- -- -- -- 97 % -- -- Nasal cannula -- -- --    06/01/25 0828 -- -- -- -- -- -- -- -- -- -- 15 No Pain    06/01/25 0740 -- -- -- -- -- 94 % 32 3 L/min Nasal cannula -- -- --    06/01/25 07:36:36 98.2 °F (36.8 °C) 61 20 121/41 68 95 % -- -- -- -- -- --    06/01/25 0715 -- -- -- -- -- 98 % 32 3 L/min Nasal cannula -- -- --    06/01/25 0429 98.6 °F (37 °C) 59 19 122/87 99 93 % -- -- None (Room air) Sitting -- --    06/01/25 0100 -- -- -- -- -- -- -- -- -- -- -- No Pain    05/31/25 20:53:31 98.4 °F (36.9 °C) 57 17 122/52 75 92 % 32 3 L/min Nasal cannula Sitting -- --    05/31/25 2025 -- -- -- -- -- 94 % 32 3 L/min Nasal cannula -- -- --    05/31/25 17:13:49 -- 60 -- 121/53 76 92 % -- -- -- -- -- --    05/31/25 15:25:30 98 °F (36.7 °C) 62 18 120/52 75 94 % -- -- -- -- -- --    05/31/25 13:55:29 -- 62 -- 119/53 75 100 % -- -- -- -- -- --    05/31/25 1352 -- -- -- -- -- 93 % 36 4 L/min Nasal cannula -- -- --    05/31/25 0943 -- -- -- -- -- 96 % 36 4 L/min Nasal cannula -- -- --    05/31/25  0900 -- -- -- -- -- -- -- -- Nasal cannula -- -- No Pain    05/31/25 07:33:47 97.2 °F (36.2 °C) 60 20 125/73 90 96 % -- -- -- -- -- --    05/31/25 05:46:57 98.7 °F (37.1 °C) 62 -- 127/53 78 94 % -- -- -- -- -- --    05/31/25 0035 -- -- -- -- -- -- -- -- -- -- 15 No Pain    05/30/25 2050 -- -- -- -- -- -- 36 4 L/min Aerosol mask -- -- --    05/30/25 20:00:13 97.7 °F (36.5 °C) 55 20 152/115 127 93 % 36 4 L/min Nasal cannula Lying -- --    05/30/25 17:21:02 -- 61 -- 113/60 78 93 % -- -- -- -- -- --    05/30/25 1708 -- -- -- -- -- -- 36 4 L/min Nasal cannula -- -- --    05/30/25 1513 -- -- -- -- -- -- -- -- -- -- -- No Pain    05/30/25 1512 -- -- -- -- -- -- -- -- -- -- -- No Pain    05/30/25 1434 -- -- -- -- -- -- 32 3 L/min Nasal cannula -- -- --    05/30/25 1403 -- -- -- -- -- -- -- -- -- -- -- No Pain    05/30/25 14:01:32 97.4 °F (36.3 °C) -- 20 105/70 82 -- -- -- -- -- -- --    05/30/25 1330 -- 58 -- 128/58 84 92 % -- -- -- -- -- --    05/30/25 1245 -- 60 18 128/60 87 94 % -- -- -- -- -- --    05/30/25 1200 -- 60 19 116/58 83 93 % 32 3 L/min Nasal cannula Sitting -- --    05/30/25 1141 -- -- -- -- -- -- -- -- Nasal cannula -- 15 --    05/30/25 1131 98.4 °F (36.9 °C) -- -- -- -- -- -- -- -- -- -- --    05/30/25 1130 -- 60 20 145/64 92 91 % 32 3 L/min Nasal cannula -- -- --    05/30/25 1126 -- 59 26 116/82 -- 85 % -- -- None (Room air) Sitting -- No Pain              Pertinent Labs/Diagnostic Test Results:   Radiology:  CT chest wo contrast   Final Interpretation by Dania Bowles MD (05/30 2904)      Severe bilateral consolidation and groundglass opacity, greatest in the right lower lobe, compatible with edema and/or pneumonia in the appropriate clinical setting      Small hiatal hernia.      Computerized Assisted Algorithm (CAA) may have aided analysis of applicable images.      Workstation performed: [id number]         XR chest 1 view portable   ED Interpretation by Gladys Segovia MD (05/30 3125)    Pulmonary edema.  Cannot exclude infiltrate       Final Interpretation by Meenu Mitchell MD (05/30 1523)      Bilateral multifocal pneumonia.            Workstation performed: [id number]           Cardiology:    GI:      Results from last 7 days   Lab Units 05/30/25  1245   SARS-COV-2  Negative     Results from last 7 days   Lab Units 06/01/25  0426 05/31/25  0938 05/30/25  1135 05/28/25  0941   WBC Thousand/uL 21.01* 20.15* 15.91* 13.39*   HEMOGLOBIN g/dL 7.7* 8.1* 8.4* 8.3*   HEMATOCRIT % 26.4* 27.7* 29.1* 29.0*   PLATELETS Thousands/uL 275 281 262 244   TOTAL NEUT ABS Thousand/uL 10.72*  --   --   --    BANDS PCT %  --   --  3  --          Results from last 7 days   Lab Units 06/01/25  0426 05/31/25  0938 05/30/25  1135 05/28/25  0941   SODIUM mmol/L 137 137 138 141   POTASSIUM mmol/L 3.5 4.4 4.6 4.5   CHLORIDE mmol/L 95* 97 96 100   CO2 mmol/L 31 31 27 35*   ANION GAP mmol/L 11 9 15* 6   BUN mg/dL 39* 40* 36* 29*   CREATININE mg/dL 1.66* 1.84* 1.81* 1.68*   EGFR ml/min/1.73sq m 36 32 33 36   CALCIUM mg/dL 8.7 9.0 9.0 9.1     Results from last 7 days   Lab Units 06/01/25  0426 05/30/25  1135 05/28/25  0941   AST U/L  --  13 15   ALT U/L  --  9 10   ALK PHOS U/L  --  79 91   TOTAL PROTEIN g/dL  --  7.2 6.7   ALBUMIN g/dL  --  3.9 3.8   TOTAL BILIRUBIN mg/dL 0.92 1.14* 0.95     Results from last 7 days   Lab Units 06/01/25  1626 06/01/25  1123 06/01/25  0734 05/31/25  2105 05/31/25  1629 05/31/25  1100 05/31/25  0813 05/30/25  2115 05/30/25  1707   POC GLUCOSE mg/dl 172* 162* 111 138 132 144* 147* 139 174*     Results from last 7 days   Lab Units 06/01/25  0426 05/31/25  0938 05/30/25  1135   GLUCOSE RANDOM mg/dL 112 159* 201*         Results from last 7 days   Lab Units 05/28/25  0941   HEMOGLOBIN A1C % 5.2   EAG mg/dl 103       Results from last 7 days   Lab Units 05/30/25  1655 05/30/25  1320 05/30/25  1135   HS TNI 0HR ng/L  --   --  24   HS TNI 2HR ng/L  --  26  --    HSTNI D2 ng/L  --  2  --    HS  TNI 4HR ng/L 27  --   --    HSTNI D4 ng/L 3  --   --          Results from last 7 days   Lab Units 05/30/25  1320   PROTIME seconds 22.0*   INR  1.87*   PTT seconds 52*     Results from last 7 days   Lab Units 05/30/25  1135 05/28/25  0941   TSH 3RD GENERATON uIU/mL 6.252* 3.665     Results from last 7 days   Lab Units 06/01/25  0426 05/31/25  0539 05/30/25  1135   PROCALCITONIN ng/ml 0.56* 0.63* 0.57*     Results from last 7 days   Lab Units 05/30/25  2028 05/30/25  1655 05/30/25  1245   LACTIC ACID mmol/L 1.3 2.8* 2.9*             Results from last 7 days   Lab Units 05/30/25  1135 05/28/25  0941   BNP pg/mL 502* 357*     Results from last 7 days   Lab Units 05/28/25  0941   FERRITIN ng/mL 89   IRON SATURATION % 4*   IRON ug/dL 16*   TIBC ug/dL 418.6     Results from last 7 days   Lab Units 05/28/25  0941   TRANSFERRIN mg/dL 299                             Results from last 7 days   Lab Units 05/30/25  1725 05/28/25  0941   CLARITY UA  Clear  --    COLOR UA  Yellow  --    SPEC GRAV UA  1.015  --    PH UA  5.5  --    GLUCOSE UA mg/dl 1000 (1%)*  --    KETONES UA mg/dl Negative  --    BLOOD UA  Negative  --    PROTEIN UA mg/dl Negative  --    NITRITE UA  Negative  --    BILIRUBIN UA  Negative  --    UROBILINOGEN UA (BE) mg/dl <2.0  --    LEUKOCYTES UA  Negative  --    WBC UA /hpf None Seen  --    RBC UA /hpf None Seen  --    BACTERIA UA /hpf None Seen  --    EPITHELIAL CELLS WET PREP /hpf Occasional  --    MUCUS THREADS  Moderate*  --    CREATININE UR mg/dL  --  98.7     Results from last 7 days   Lab Units 05/30/25  1725 05/30/25  1245   STREP PNEUMONIAE ANTIGEN, URINE  Negative  --    LEGIONELLA URINARY ANTIGEN  Negative  --    INFLUENZA A PCR   --  Negative   INFLUENZA B PCR   --  Negative   RSV PCR   --  Negative                             Results from last 7 days   Lab Units 05/30/25  1245   BLOOD CULTURE  No Growth at 24 hrs.  No Growth at 24 hrs.     Results from last 7 days   Lab Units 06/01/25  0425    TOTAL COUNTED  100               Admitting Diagnosis: Shortness of breath [R06.02]  CHF exacerbation (HCC) [I50.9]  Multifocal pneumonia [J18.9]  Age/Sex: 84 y.o. male    Network Utilization Review Department  ATTENTION: Please call with any questions or concerns to [phone] and carefully listen to the prompts so that you are directed to the right person. All voicemails are confidential.   For Discharge needs, contact Care Management DC Support Team at [phone] opt. [phone]  Send all requests for admission clinical reviews, approved or denied determinations and any other requests to dedicated fax number below belonging to the campus where the patient is receiving treatment. List of dedicated fax numbers for the Facilities:  FACILITY NAME UR FAX NUMBER   ADMISSION DENIALS (Administrative/Medical Necessity) [phone]   DISCHARGE SUPPORT TEAM (NETWORK) [phone]   PARENT CHILD HEALTH (Maternity/NICU/Pediatrics) [phone]   [facility] [phone]   [facility] [phone]   [facility] [phone]   [facility] [phone]   [facility] [phone]   [facility] [phone]   [facility] [phone]   [facility] [phone]   [facility] [phone]   [facility] [phone]   [facility] [phone]   [facility] [phone]

## 2025-06-01 NOTE — ASSESSMENT & PLAN NOTE
Wt Readings from Last 3 Encounters:   06/01/25 102 kg (225 lb 4.8 oz)   05/28/25 105 kg (232 lb)   03/20/25 99.7 kg (219 lb 12.8 oz)   Patient with a history of HFrEF -comes to [facility] emergency department on 5/30/2025 with progressive dyspnea, worsening cough and hypoxia.  Echo 10/21/25: EF 40 to 45%, mild global hypokinesis, RV dilatation, left atrial dilatation, well-functioning TAVR, moderate mitral MAC, moderate to severe MR regurgitation, mild MS, Moderate TR    GDMT: Farxiga 10 mg daily, losartan 25 mg daily, metoprolol succinate 25 mg daily, spironolactone 25 mg daily, torsemide 20 mg twice daily with potassium supplementation    Chest x-ray: Moderate pulmonary edema.    Patient was transition to IV Bumex and is having a better response.  Yesterday we gave a midday dose and we will give another 1 today.  He is clinically improving slowly and responding to IV Bumex.  His creatinine is also improving.

## 2025-06-01 NOTE — PLAN OF CARE
Problem: Potential for Falls  Goal: Patient will remain free of falls  Description: INTERVENTIONS:  - Educate patient/family on patient safety including physical limitations  - Instruct patient to call for assistance with activity   - Consider consulting OT/PT to assist with strengthening/mobility based on AM PAC & JH-HLM score  - Consult OT/PT to assist with strengthening/mobility   - Keep Call bell within reach  - Keep bed low and locked with side rails adjusted as appropriate  - Keep care items and personal belongings within reach  - Initiate and maintain comfort rounds  - Make Fall Risk Sign visible to staff  - Offer Toileting every 2 Hours, in advance of need  - Initiate/Maintain bed alarm  - Obtain necessary fall risk management equipment: socks  - Apply yellow socks and bracelet for high fall risk patients  - Consider moving patient to room near nurses station  Outcome: Progressing     Problem: PAIN - ADULT  Goal: Verbalizes/displays adequate comfort level or baseline comfort level  Description: Interventions:  - Encourage patient to monitor pain and request assistance  - Assess pain using appropriate pain scale  - Administer analgesics as ordered based on type and severity of pain and evaluate response  - Implement non-pharmacological measures as appropriate and evaluate response  - Consider cultural and social influences on pain and pain management  - Notify physician/advanced practitioner if interventions unsuccessful or patient reports new pain  - Educate patient/family on pain management process including their role and importance of  reporting pain   - Provide non-pharmacologic/complimentary pain relief interventions  Outcome: Progressing     Problem: Knowledge Deficit  Goal: Patient/family/caregiver demonstrates understanding of disease process, treatment plan, medications, and discharge instructions  Description: Complete learning assessment and assess knowledge base.  Interventions:  - Provide  teaching at level of understanding  - Provide teaching via preferred learning methods  Outcome: Progressing

## 2025-06-01 NOTE — PROGRESS NOTES
Progress Note - Hospitalist   Name: Trevor Lyons 84 y.o. male I MRN: [id number]  Unit/Bed#: -01 I Date of Admission: 5/30/2025   Date of Service: 6/1/2025 I Hospital Day: 2    Assessment & Plan  Acute combined systolic and diastolic congestive heart failure (HCC)  Wt Readings from Last 3 Encounters:   06/01/25 102 kg (225 lb 4.8 oz)   05/28/25 105 kg (232 lb)   03/20/25 99.7 kg (219 lb 12.8 oz)   Presenting to the ED for shortness of breath worsened with activity.   Evidence of volume overload with elevated , CT chest w/ severe bilateral consolidations and ground glass opacity, greatest in the right lower lobe, compatible with edema and/or of pneumonia in the appropriate clinical setting  Prior echo 10/24: EF of 40-45%, mild global hypokinesis, atrial dilatation  Home diuretic regimen: torsemide 20 twice daily, Aldactone 25 mg daily  Hold home regimen with IV diuresis   Continue to monitor daily weights, I's and O's  225 pounds on standing scale this morning.  ED weight stated so unclear accuracy   Will defer need for updated ECHO to cards  Cardiology consulted, appreciate recs  S/p IV lasix 40mg with little output --> transitioned to bumex 2mg BID, continue   Patient having hard time adhering to fluid restriction. Discussed importance with patient at bedside. He verbalizes understanding.  Severe sepsis (HCC)  Met severe sepsis criteria with tachypnea, leukocytosis, lactic acidosis, and NELLIE  Source: suspect pneumonia   CT chest  Severe bilateral consolidation and groundglass opacity, greatest in the right lower lobe, compatible with edema and/or pneumonia in the appropriate clinical setting   Lactic acid 2.9 -- now cleared  Procal trend  0.57 - 0.63 - 0.56  Started on IV ceftriaxone and azithromycin, continue D3  Trend procalcitonin, de-escalate antibiotics as warranted  Urinary antigens negative   Blood cultures no growth at 24 hours   Monitor hemodynamics  Respiratory  protocol  Leukocytosis  Uptrending leukocytosis however clinically improving. 13 on admission, 21 on am labs   Curbside w/ ID today -- given clinical improvement, ok to continue monitoring. Low threshold to reimage if worsening symptoms   Additionally discussed with hematology. Smear nonspecific and difficult to assess with acute infection. See anemia for plan   Anemia  Hgb 12.7 in Jan 2025  Hgb 8.3 on admission, 7.7 on am labs   No active bleeding appreciated  He does have history of iron deficiency anemia  F/u iron panel, B12 and folate  Additional concern for uptrending leukocytosis  Hematology consulted, appreciate recs  Persistent atrial fibrillation (HCC)  Continue with Toprol-XL 25 mg daily  Anticoagulation with Xarelto  Sick sinus syndrome (HCC)  Per history  Status post pacemaker placement  Type 2 diabetes mellitus without complication, with long-term current use of insulin (HCC)  Lab Results   Component Value Date    HGBA1C 5.2 05/28/2025       Recent Labs     05/31/25  0813 05/31/25  1100 05/31/25  1629 05/31/25  2105   POCGLU 147* 144* 132 138       Blood Sugar Average: Last 72 hrs:  (P) [id number]  Home regimen: farxiga 10mg daily, glimerpiride 1mg daily, lantus 8U qhs, metformin 1000mg BID  Continue with insulin on sliding scale  Adjust as needed  Acquired hypothyroidism  Will continue with Synthroid  NELLIE (acute kidney injury) (HCC)  Recent Labs     05/30/25  1135 05/31/25  0938 06/01/25  0426   CREATININE 1.81* 1.84* 1.66*   EGFR 33 32 36     Estimated Creatinine Clearance: 37 mL/min (A) (by C-G formula based on SCr of 1.66 mg/dL (H)).     Baseline creatinine-1-1.2  Continue with urinary retention protocol  Continue with IV diuretics, Cr improving   Continue to monitor BUNs/creatinine  Acute on chronic respiratory failure (HCC)  Chronically on 2 L of oxygen  Requiring 3-4L NC on admission   Likely multifactorial in the setting of CHF and PNA -- continue management per associated  problem  Respiratory protocol  Continue to wean supplemental O2 as able   Pneumonia  Met severe sepsis criteria on admission   CT chest: Severe bilateral consolidation and groundglass opacity, greatest in the right lower lobe, compatible with edema and/or pneumonia in the appropriate clinical setting   Urinary antigens negative   Hypertension  Home regimen: Losartan 25 mg daily, spironolactone 25 mg daily, torsemide 20 mg daily  Held on admission 2/2 severe sepsis and NELLIE  BP reviewed and acceptable   Continue holding for now, resume when able     VTE Pharmacologic Prophylaxis:   Moderate Risk (Score 3-4) - Pharmacological DVT Prophylaxis Ordered: rivaroxaban (Xarelto).    Mobility:   Basic Mobility Inpatient Raw Score: 19  JH-HLM Goal: 6: Walk 10 steps or more  JH-HLM Achieved: 6: Walk 10 steps or more  JH-HLM Goal achieved. Continue to encourage appropriate mobility.    Patient Centered Rounds: I performed bedside rounds with nursing staff today.   Discussions with Specialists or Other Care Team Provider: hematology, ID    Education and Discussions with Family / Patient: Attempted to update  (daughter) via phone. Unable to contact.    Current Length of Stay: 2 day(s)  Current Patient Status: Inpatient   Certification Statement: The patient will continue to require additional inpatient hospital stay due to continued IV abx, IV diuretics, hematology consult   Discharge Plan: Anticipate discharge in 48 hrs to discharge location to be determined pending rehab evaluations.    Code Status: Level 3 - DNAR and DNI    Subjective   Seen and examined.  No acute events overnight.  Patient reports still feeling tired.  He states his breathing feels about baseline.  He is eating and drinking without difficulty.    Objective :  Temp:  [97.2 °F (36.2 °C)-98.6 °F (37 °C)] 98.6 °F (37 °C)  HR:  [57-62] 59  BP: (119-125)/(52-87) 122/87  Resp:  [17-20] 19  SpO2:  [92 %-100 %] 98 %  O2 Device: Nasal cannula  Nasal  Cannula O2 Flow Rate (L/min):  [3 L/min-4 L/min] 3 L/min    Body mass index is 34.26 kg/m².     Input and Output Summary (last 24 hours):     Intake/Output Summary (Last 24 hours) at 6/1/2025 0730  Last data filed at 6/1/2025 0429  Gross per 24 hour   Intake 2180 ml   Output 1325 ml   Net 855 ml       Physical Exam  Constitutional:       General: He is not in acute distress.     Appearance: He is obese. He is ill-appearing.   HENT:      Head: Normocephalic and atraumatic.      Nose: Nose normal.      Mouth/Throat:      Mouth: Mucous membranes are moist.     Cardiovascular:      Rate and Rhythm: Normal rate.      Pulses: Normal pulses.      Heart sounds: Normal heart sounds.   Pulmonary:      Breath sounds: Rhonchi and rales present.      Comments: 3L NC  Abdominal:      General: There is distension.      Palpations: Abdomen is soft.      Tenderness: There is no abdominal tenderness.     Musculoskeletal:      Cervical back: Normal range of motion and neck supple.      Right lower leg: Edema present.      Left lower leg: Edema present.     Skin:     General: Skin is warm and dry.     Neurological:      General: No focal deficit present.     Psychiatric:         Mood and Affect: Mood normal.       Lines/Drains:        Telemetry:  Telemetry Orders (From admission, onward)               24 Hour Telemetry Monitoring  (ED Bridging Orders Panel)  Continuous x 24 Hours (Telem)        Expiring   Question:  Reason for 24 Hour Telemetry  Answer:  Decompensated CHF- and any one of the following: continuous diuretic infusion or total diuretic dose >200 mg daily, associated electrolyte derangement (I.e. K < 3.0), inotropic drip (continuous infusion), hx of ventricular arrhythmia, or new EF < 35%                     Telemetry Reviewed: PVCs  Indication for Continued Telemetry Use: Acute CHF on >200 mg lasix/day or equivalent dose or with new reduced EF.                Lab Results: I have reviewed the following results:   Results  from last 7 days   Lab Units 06/01/25  0426 05/31/25  0938 05/30/25  1135   WBC Thousand/uL 21.01*   < > 15.91*   HEMOGLOBIN g/dL 7.7*   < > 8.4*   HEMATOCRIT % 26.4*   < > 29.1*   PLATELETS Thousands/uL 275   < > 262   BANDS PCT %  --   --  3   LYMPHO PCT %  --   --  35   MONO PCT %  --   --  11   EOS PCT %  --   --  0    < > = values in this interval not displayed.     Results from last 7 days   Lab Units 06/01/25 0426 05/31/25  0938 05/30/25  1135   SODIUM mmol/L 137   < > 138   POTASSIUM mmol/L 3.5   < > 4.6   CHLORIDE mmol/L 95*   < > 96   CO2 mmol/L 31   < > 27   BUN mg/dL 39*   < > 36*   CREATININE mg/dL 1.66*   < > 1.81*   ANION GAP mmol/L 11   < > 15*   CALCIUM mg/dL 8.7   < > 9.0   ALBUMIN g/dL  --   --  3.9   TOTAL BILIRUBIN mg/dL  --   --  1.14*   ALK PHOS U/L  --   --  79   ALT U/L  --   --  9   AST U/L  --   --  13   GLUCOSE RANDOM mg/dL 112   < > 201*    < > = values in this interval not displayed.     Results from last 7 days   Lab Units 05/30/25  1320   INR  1.87*     Results from last 7 days   Lab Units 05/31/25  2105 05/31/25  1629 05/31/25  1100 05/31/25  0813 05/30/25  2115 05/30/25  1707   POC GLUCOSE mg/dl 138 132 144* 147* 139 174*     Results from last 7 days   Lab Units 05/28/25  0941   HEMOGLOBIN A1C % 5.2     Results from last 7 days   Lab Units 06/01/25  0426 05/31/25  0539 05/30/25  2028 05/30/25  1655 05/30/25  1245 05/30/25  1135   LACTIC ACID mmol/L  --   --  1.3 2.8* 2.9*  --    PROCALCITONIN ng/ml 0.56* 0.63*  --   --   --  0.57*       Recent Cultures (last 7 days):   Results from last 7 days   Lab Units 05/30/25  1725 05/30/25  1245   BLOOD CULTURE   --  No Growth at 24 hrs.  No Growth at 24 hrs.   LEGIONELLA URINARY ANTIGEN  Negative  --        Imaging Results Review: No pertinent imaging studies reviewed.  Other Study Results Review: No additional pertinent studies reviewed.    Last 24 Hours Medication List:     Current Facility-Administered Medications:     acetaminophen  (TYLENOL) tablet 650 mg, Q6H PRN    atorvastatin (LIPITOR) tablet 20 mg, Daily With Dinner    azithromycin (ZITHROMAX) tablet 500 mg, Q24H    bumetanide (BUMEX) injection 2 mg, BID    busPIRone (BUSPAR) tablet 7.5 mg, BID    cefTRIAXone (ROCEPHIN) IVPB (premix in dextrose) 2,000 mg 50 mL, Q24H, Last Rate: 2,000 mg (05/31/25 1355)    clonazePAM (KlonoPIN) tablet 1 mg, HS PRN    donepezil (ARICEPT) tablet 10 mg, QAM    DULoxetine (CYMBALTA) delayed release capsule 60 mg, Daily    [START ON 6/2/2025] ferrous sulfate tablet 325 mg, Every Other Day    [Held by provider] Fluticasone Furoate-Vilanterol 100-25 mcg/actuation 1 puff, Daily **AND** [Held by provider] umeclidinium 62.5 mcg/actuation inhaler AEPB 1 puff, Daily    insulin lispro (HumALOG/ADMELOG) 100 units/mL subcutaneous injection 1-5 Units, TID AC **AND** Fingerstick Glucose (POCT), TID AC    insulin lispro (HumALOG/ADMELOG) 100 units/mL subcutaneous injection 1-6 Units, HS    levalbuterol (XOPENEX) inhalation solution 1.25 mg, TID    levothyroxine tablet 137 mcg, Early Morning    [Held by provider] losartan (COZAAR) tablet 25 mg, Daily    magnesium Oxide (MAG-OX) tablet 400 mg, BID    melatonin tablet 6 mg, HS    metoprolol succinate (TOPROL-XL) 24 hr tablet 25 mg, QAM    pantoprazole (PROTONIX) EC tablet 40 mg, Daily Before Breakfast    polyethylene glycol (MIRALAX) packet 17 g, Daily    rivaroxaban (XARELTO) tablet 15 mg, Daily With Dinner    senna (SENOKOT) tablet 8.6 mg, Daily    [Held by provider] spironolactone (ALDACTONE) tablet 25 mg, Daily    Administrative Statements   Today, Patient Was Seen By: Debbie Riggins PA-C    **Please Note: This note may have been constructed using a voice recognition system.**

## 2025-06-02 LAB
ANION GAP SERPL CALCULATED.3IONS-SCNC: 10 MMOL/L (ref 4–13)
BUN SERPL-MCNC: 35 MG/DL (ref 5–25)
CALCIUM SERPL-MCNC: 8.5 MG/DL (ref 8.4–10.2)
CHLORIDE SERPL-SCNC: 96 MMOL/L (ref 96–108)
CO2 SERPL-SCNC: 32 MMOL/L (ref 21–32)
CREAT SERPL-MCNC: 1.41 MG/DL (ref 0.6–1.3)
ERYTHROCYTE [DISTWIDTH] IN BLOOD BY AUTOMATED COUNT: 14.2 % (ref 11.6–15.1)
GFR SERPL CREATININE-BSD FRML MDRD: 44 ML/MIN/1.73SQ M
GLUCOSE SERPL-MCNC: 128 MG/DL (ref 65–140)
GLUCOSE SERPL-MCNC: 146 MG/DL (ref 65–140)
GLUCOSE SERPL-MCNC: 146 MG/DL (ref 65–140)
GLUCOSE SERPL-MCNC: 224 MG/DL (ref 65–140)
GLUCOSE SERPL-MCNC: 247 MG/DL (ref 65–140)
HCT VFR BLD AUTO: 27.5 % (ref 36.5–49.3)
HGB BLD-MCNC: 7.9 G/DL (ref 12–17)
MAGNESIUM SERPL-MCNC: 2.4 MG/DL (ref 1.9–2.7)
MCH RBC QN AUTO: 26.9 PG (ref 26.8–34.3)
MCHC RBC AUTO-ENTMCNC: 28.7 G/DL (ref 31.4–37.4)
MCV RBC AUTO: 94 FL (ref 82–98)
PLATELET # BLD AUTO: 288 THOUSANDS/UL (ref 149–390)
PMV BLD AUTO: 8.8 FL (ref 8.9–12.7)
POTASSIUM SERPL-SCNC: 3.3 MMOL/L (ref 3.5–5.3)
RBC # BLD AUTO: 2.94 MILLION/UL (ref 3.88–5.62)
SODIUM SERPL-SCNC: 138 MMOL/L (ref 135–147)
VIT B12 SERPL-MCNC: 477 PG/ML (ref 180–914)
WBC # BLD AUTO: 20.72 THOUSAND/UL (ref 4.31–10.16)

## 2025-06-02 PROCEDURE — 85027 COMPLETE CBC AUTOMATED: CPT | Performed by: PHYSICIAN ASSISTANT

## 2025-06-02 PROCEDURE — 99233 SBSQ HOSP IP/OBS HIGH 50: CPT | Performed by: NURSE PRACTITIONER

## 2025-06-02 PROCEDURE — 94760 N-INVAS EAR/PLS OXIMETRY 1: CPT

## 2025-06-02 PROCEDURE — 83521 IG LIGHT CHAINS FREE EACH: CPT | Performed by: INTERNAL MEDICINE

## 2025-06-02 PROCEDURE — 81206 BCR/ABL1 GENE MAJOR BP: CPT | Performed by: INTERNAL MEDICINE

## 2025-06-02 PROCEDURE — 99232 SBSQ HOSP IP/OBS MODERATE 35: CPT | Performed by: INTERNAL MEDICINE

## 2025-06-02 PROCEDURE — 82607 VITAMIN B-12: CPT

## 2025-06-02 PROCEDURE — 84165 PROTEIN E-PHORESIS SERUM: CPT | Performed by: INTERNAL MEDICINE

## 2025-06-02 PROCEDURE — 94640 AIRWAY INHALATION TREATMENT: CPT

## 2025-06-02 PROCEDURE — 86334 IMMUNOFIX E-PHORESIS SERUM: CPT | Performed by: INTERNAL MEDICINE

## 2025-06-02 PROCEDURE — 94664 DEMO&/EVAL PT USE INHALER: CPT

## 2025-06-02 PROCEDURE — 82948 REAGENT STRIP/BLOOD GLUCOSE: CPT

## 2025-06-02 PROCEDURE — 81207 BCR/ABL1 GENE MINOR BP: CPT | Performed by: INTERNAL MEDICINE

## 2025-06-02 PROCEDURE — 83010 ASSAY OF HAPTOGLOBIN QUANT: CPT | Performed by: INTERNAL MEDICINE

## 2025-06-02 PROCEDURE — 80048 BASIC METABOLIC PNL TOTAL CA: CPT | Performed by: PHYSICIAN ASSISTANT

## 2025-06-02 PROCEDURE — 99232 SBSQ HOSP IP/OBS MODERATE 35: CPT | Performed by: PHYSICIAN ASSISTANT

## 2025-06-02 RX ORDER — INSULIN GLARGINE 100 [IU]/ML
5 INJECTION, SOLUTION SUBCUTANEOUS
Status: DISCONTINUED | OUTPATIENT
Start: 2025-06-02 | End: 2025-06-03

## 2025-06-02 RX ORDER — BUMETANIDE 0.25 MG/ML
3 INJECTION, SOLUTION INTRAMUSCULAR; INTRAVENOUS
Status: DISCONTINUED | OUTPATIENT
Start: 2025-06-02 | End: 2025-06-03

## 2025-06-02 RX ORDER — POTASSIUM CHLORIDE 1500 MG/1
40 TABLET, EXTENDED RELEASE ORAL ONCE
Status: COMPLETED | OUTPATIENT
Start: 2025-06-02 | End: 2025-06-02

## 2025-06-02 RX ORDER — OXYCODONE HYDROCHLORIDE 5 MG/1
5 TABLET ORAL ONCE
Refills: 0 | Status: COMPLETED | OUTPATIENT
Start: 2025-06-02 | End: 2025-06-02

## 2025-06-02 RX ADMIN — DONEPEZIL HYDROCHLORIDE 10 MG: 5 TABLET ORAL at 09:34

## 2025-06-02 RX ADMIN — INSULIN LISPRO 3 UNITS: 100 INJECTION, SOLUTION INTRAVENOUS; SUBCUTANEOUS at 21:42

## 2025-06-02 RX ADMIN — BUSPIRONE HYDROCHLORIDE 7.5 MG: 15 TABLET ORAL at 09:34

## 2025-06-02 RX ADMIN — Medication 400 MG: at 09:34

## 2025-06-02 RX ADMIN — OXYCODONE HYDROCHLORIDE 5 MG: 5 TABLET ORAL at 17:12

## 2025-06-02 RX ADMIN — POTASSIUM CHLORIDE 40 MEQ: 1500 TABLET, EXTENDED RELEASE ORAL at 09:34

## 2025-06-02 RX ADMIN — FERROUS SULFATE TAB 325 MG (65 MG ELEMENTAL FE) 325 MG: 325 (65 FE) TAB at 09:34

## 2025-06-02 RX ADMIN — POLYETHYLENE GLYCOL 3350 17 G: 17 POWDER, FOR SOLUTION ORAL at 09:34

## 2025-06-02 RX ADMIN — BUMETANIDE 3 MG: 0.25 INJECTION INTRAMUSCULAR; INTRAVENOUS at 17:01

## 2025-06-02 RX ADMIN — BUMETANIDE 2 MG: 0.25 INJECTION INTRAMUSCULAR; INTRAVENOUS at 09:48

## 2025-06-02 RX ADMIN — METOPROLOL SUCCINATE 25 MG: 25 TABLET, EXTENDED RELEASE ORAL at 09:48

## 2025-06-02 RX ADMIN — LEVOTHYROXINE SODIUM 137 MCG: 112 TABLET ORAL at 05:40

## 2025-06-02 RX ADMIN — ACETAMINOPHEN 650 MG: 325 TABLET, FILM COATED ORAL at 09:35

## 2025-06-02 RX ADMIN — Medication 400 MG: at 17:12

## 2025-06-02 RX ADMIN — CEFTRIAXONE 2000 MG: 2 INJECTION, SOLUTION INTRAVENOUS at 13:13

## 2025-06-02 RX ADMIN — LEVALBUTEROL HYDROCHLORIDE 1.25 MG: 1.25 SOLUTION RESPIRATORY (INHALATION) at 13:36

## 2025-06-02 RX ADMIN — INSULIN LISPRO 2 UNITS: 100 INJECTION, SOLUTION INTRAVENOUS; SUBCUTANEOUS at 13:05

## 2025-06-02 RX ADMIN — LEVALBUTEROL HYDROCHLORIDE 1.25 MG: 1.25 SOLUTION RESPIRATORY (INHALATION) at 19:40

## 2025-06-02 RX ADMIN — ATORVASTATIN CALCIUM 20 MG: 20 TABLET, FILM COATED ORAL at 17:12

## 2025-06-02 RX ADMIN — BUSPIRONE HYDROCHLORIDE 7.5 MG: 15 TABLET ORAL at 17:12

## 2025-06-02 RX ADMIN — PANTOPRAZOLE SODIUM 40 MG: 40 TABLET, DELAYED RELEASE ORAL at 05:40

## 2025-06-02 RX ADMIN — DULOXETINE HYDROCHLORIDE 60 MG: 30 CAPSULE, DELAYED RELEASE ORAL at 09:34

## 2025-06-02 RX ADMIN — RIVAROXABAN 15 MG: 15 TABLET, FILM COATED ORAL at 17:12

## 2025-06-02 RX ADMIN — SENNOSIDES 8.6 MG: 8.6 TABLET, FILM COATED ORAL at 09:35

## 2025-06-02 RX ADMIN — ACETAMINOPHEN 650 MG: 325 TABLET, FILM COATED ORAL at 19:39

## 2025-06-02 RX ADMIN — INSULIN GLARGINE 5 UNITS: 100 INJECTION, SOLUTION SUBCUTANEOUS at 21:41

## 2025-06-02 RX ADMIN — LEVALBUTEROL HYDROCHLORIDE 1.25 MG: 1.25 SOLUTION RESPIRATORY (INHALATION) at 08:41

## 2025-06-02 RX ADMIN — Medication 6 MG: at 21:13

## 2025-06-02 NOTE — PLAN OF CARE
Problem: PAIN - ADULT  Goal: Verbalizes/displays adequate comfort level or baseline comfort level  Description: Interventions:  - Encourage patient to monitor pain and request assistance  - Assess pain using appropriate pain scale  - Administer analgesics as ordered based on type and severity of pain and evaluate response  - Implement non-pharmacological measures as appropriate and evaluate response  - Consider cultural and social influences on pain and pain management  - Notify physician/advanced practitioner if interventions unsuccessful or patient reports new pain  - Educate patient/family on pain management process including their role and importance of  reporting pain   - Provide non-pharmacologic/complimentary pain relief interventions  Outcome: Progressing     Problem: INFECTION - ADULT  Goal: Absence or prevention of progression during hospitalization  Description: INTERVENTIONS:  - Assess and monitor for signs and symptoms of infection  - Monitor lab/diagnostic results  - Monitor all insertion sites, i.e. indwelling lines, tubes, and drains  - Monitor endotracheal if appropriate and nasal secretions for changes in amount and color  - Carmi appropriate cooling/warming therapies per order  - Administer medications as ordered  - Instruct and encourage patient and family to use good hand hygiene technique  - Identify and instruct in appropriate isolation precautions for identified infection/condition  Outcome: Progressing     Problem: Prexisting or High Potential for Compromised Skin Integrity  Goal: Skin integrity is maintained or improved  Description: INTERVENTIONS:  - Identify patients at risk for skin breakdown  - Assess and monitor skin integrity including under and around medical devices   - Assess and monitor nutrition and hydration status  - Monitor labs  - Assess for incontinence   - Turn and reposition patient  - Assist with mobility/ambulation  - Relieve pressure over payam prominences   -  Avoid friction and shearing  - Provide appropriate hygiene as needed including keeping skin clean and dry  - Evaluate need for skin moisturizer/barrier cream  - Collaborate with interdisciplinary team  - Patient/family teaching  - Consider wound care consult      Problem: DISCHARGE PLANNING  Goal: Discharge to home or other facility with appropriate resources  Description: INTERVENTIONS:  - Identify barriers to discharge w/patient and caregiver  - Arrange for needed discharge resources and transportation as appropriate  - Identify discharge learning needs (meds, wound care, etc.)  - Arrange for interpretive services to assist at discharge as needed  - Refer to Case Management Department for coordinating discharge planning if the patient needs post-hospital services based on physician/advanced practitioner order or complex needs related to functional status, cognitive ability, or social support system  Outcome: Progressing

## 2025-06-02 NOTE — ASSESSMENT & PLAN NOTE
Wt Readings from Last 3 Encounters:   06/02/25 101 kg (223 lb 8 oz)   05/28/25 105 kg (232 lb)   03/20/25 99.7 kg (219 lb 12.8 oz)     Presenting to the ED for LOPES.  ; CT chest w/ severe bilateral consolidations and ground glass opacity, greatest in the right lower lobe, compatible with edema and/or PNA   Maintained on Farxiga 10 mg daily, losartan 25 mg daily, metoprolol succinate 25 mg daily, spironolactone 25 mg daily, torsemide 20 mg twice daily w/ Kdur   ECHO (10/2024): EF 40-45%, mild global hypokinesis, atrial dilatation  Cardiology following, recs appreciated  S/p IV Lasix which was transitioned to Bumex  Bumex 2 mg BID increased to 3 mg IV BID on 06/02   Daily wts, I&O's   - 2 kg

## 2025-06-02 NOTE — PROGRESS NOTES
Progress Note - Hospitalist   Name: Trevor Lyons 84 y.o. male I MRN: [id number]  Unit/Bed#: -01 I Date of Admission: 5/30/2025   Date of Service: 6/2/2025 I Hospital Day: 3    Assessment & Plan  Acute combined systolic and diastolic congestive heart failure (HCC)  Wt Readings from Last 3 Encounters:   06/02/25 101 kg (223 lb 8 oz)   05/28/25 105 kg (232 lb)   03/20/25 99.7 kg (219 lb 12.8 oz)     Presenting to the ED for LOPES.  ; CT chest w/ severe bilateral consolidations and ground glass opacity, greatest in the right lower lobe, compatible with edema and/or PNA   Maintained on Farxiga 10 mg daily, losartan 25 mg daily, metoprolol succinate 25 mg daily, spironolactone 25 mg daily, torsemide 20 mg twice daily w/ Kdur   ECHO (10/2024): EF 40-45%, mild global hypokinesis, atrial dilatation  Cardiology following, recs appreciated  S/p IV Lasix which was transitioned to Bumex  Bumex 2 mg BID increased to 3 mg IV BID on 06/02   Daily wts, I&O's   - 2 kg   Severe sepsis (HCC)  Criteria e/b tachypnea, leukocytosis, lactic acidosis, and NELLIE 2/2 PNA    CT chest  Severe bilateral consolidation and groundglass opacity, greatest in the right lower lobe, compatible with edema and/or pneumonia in the appropriate clinical setting   Lactic acid 2.9, now cleared   Procal: 0.57 > 0.63 > 0.56  BCx w/o growth x 48 hrs   S/p 3 days of Azithro; c/w Rocephin, day # 4   Hemodynamically stable  Pneumonia  Met severe sepsis criteria on admission   CT chest: Severe bilateral consolidation and groundglass opacity, greatest in the right lower lobe, compatible with edema and/or pneumonia in the appropriate clinical setting   S/p 3 days of Azithro; c/w Rocephin, day # 4   Acute on chronic respiratory failure (HCC)  Chronically on 2 L via NC PRN   Required up to 4 L; now stable on 3 L   Multifactorial w/ CHF and PNA  Leukocytosis  Recent Labs     05/31/25  0938 06/01/25  0426 06/02/25  1011   WBC 20.15* 21.01* 20.72*      Uptrending  ID curbsided,given clinical improvement monitor; low threshold to reimage if symptoms worsen   Hematology consulted  F/u peripheral blood flow, LDH, haptoglobin, reticulocyte count, SPEP, free light chain   Possible reactionary w/ acute infxn?  Anemia  Recent Labs     05/31/25  0938 06/01/25  0426 06/02/25  1011   HGB 8.1* 7.7* 7.9*     Per chart review, baseline Hgb 11  No active bleeding   Folate  >22, B12 312   Iron panel consistent w/ SHELIA  Defer Venofer/iron supplements to heatology  NELLIE (acute kidney injury) (HCC)  Recent Labs     05/31/25  0938 06/01/25  0426 06/02/25  0529   CREATININE 1.84* 1.66* 1.41*   EGFR 32 36 44     Baseline Cr. 1-1.2  C/w urinary retention protocol & diuretics  Avoid hypotension/nephrotoxins  PTA Cozaar on hold    Chronically on 2 L of oxygen  Requiring 3-4L NC on admission   Likely multifactorial in the setting of CHF and PNA -- continue management per associated problem  Respiratory protocol  Continue to wean supplemental O2 as able   Persistent atrial fibrillation (HCC)  Rate controlled   C/w Toprol-XL 25 mg  q day & Xarelto  Sick sinus syndrome (HCC)  S/p pacemaker placement  Hypertension  Home regimen: Losartan 25 mg daily, spironolactone 25 mg daily, torsemide 20 mg daily  Held on admission 2/2 severe sepsis and NELLIE  BP reviewed and acceptable   Continue holding for now, resume when able   Type 2 diabetes mellitus without complication, with long-term current use of insulin (HCC)  Lab Results   Component Value Date    HGBA1C 5.2 05/28/2025     Recent Labs     06/01/25  1123 06/01/25  1626 06/01/25  2124 06/02/25  0743   POCGLU 162* 172* 154* 146*     Blood Sugar Average: Last 72 hrs:  (P) 147.1547558369623040    Maintained on farxiga 10mg daily, glimerpiride 1mg daily, lantus 8U qhs, metformin 1000mg BID  C/w SSI AC/QHS, Accu-Cheks, CCD  Restart Lantus at 5 U qhs   Acquired hypothyroidism  TSH 6.2 in 05/2025, T4 therapeutic; recommend outpt monitoring in 4-6 wks    C/w levothyroxine    VTE Pharmacologic Prophylaxis:   High Risk (Score >/= 5) - Pharmacological DVT Prophylaxis Ordered: rivaroxaban (Xarelto). Sequential Compression Devices Ordered.    Mobility:   Basic Mobility Inpatient Raw Score: 19  JH-HLM Goal: 6: Walk 10 steps or more  JH-HLM Achieved: 6: Walk 10 steps or more  JH-HLM Goal achieved. Continue to encourage appropriate mobility.    Patient Centered Rounds: I performed bedside rounds with nursing staff today.   Discussions with Specialists or Other Care Team Provider: Plan of care reviewed with cardiology, hematology, nursing,     Education and Discussions with Family / Patient: Attempted to update  (daughter) via phone. Left voicemail.     Current Length of Stay: 3 day(s)  Current Patient Status: Inpatient   Certification Statement: The patient will continue to require additional inpatient hospital stay due to CHF/pneumonia  Discharge Plan: Anticipate discharge in 48 hrs to home.    Code Status: Level 3 - DNAR and DNI    Subjective       Objective :  Temp:  [97.3 °F (36.3 °C)-98.5 °F (36.9 °C)] 97.9 °F (36.6 °C)  HR:  [59-63] 60  BP: (109-131)/(43-58) 109/45  Resp:  [20] 20  SpO2:  [91 %-100 %] 99 %  O2 Device: Nasal cannula  Nasal Cannula O2 Flow Rate (L/min):  [3 L/min] 3 L/min    Body mass index is 33.98 kg/m².     Input and Output Summary (last 24 hours):     Intake/Output Summary (Last 24 hours) at 6/2/2025 0900  Last data filed at 6/2/2025 0851  Gross per 24 hour   Intake 1800 ml   Output 2425 ml   Net -625 ml       Physical Exam  Constitutional:       Appearance: He is obese.      Comments: Elderly, hard of hearing   HENT:      Head: Normocephalic.      Right Ear: External ear normal.      Left Ear: External ear normal.      Nose: Nose normal.      Mouth/Throat:      Mouth: Mucous membranes are moist.      Pharynx: Oropharynx is clear.     Eyes:      Extraocular Movements: Extraocular movements intact.      Conjunctiva/sclera:  Conjunctivae normal.       Cardiovascular:      Rate and Rhythm: Normal rate and regular rhythm.      Pulses: Normal pulses.      Heart sounds: Normal heart sounds.   Pulmonary:      Comments: decreased air exchange bibasilarly, coarse  Abdominal:      General: Abdomen is flat. There is no distension.      Palpations: Abdomen is soft.      Tenderness: There is no abdominal tenderness. There is no guarding or rebound.      Comments: Obese abdomen     Musculoskeletal:         General: Swelling (2- 3+ lower extremity pitting edema) present.      Cervical back: Normal range of motion.     Skin:     General: Skin is warm and dry.      Coloration: Skin is not jaundiced.      Findings: No bruising or lesion.     Neurological:      General: No focal deficit present.      Mental Status: He is alert and oriented to person, place, and time. Mental status is at baseline.     Psychiatric:         Mood and Affect: Mood normal.         Behavior: Behavior normal.           Lab Results: I have reviewed the following results:   Results from last 7 days   Lab Units 06/01/25  0426 05/31/25  0938 05/30/25  1135   WBC Thousand/uL 21.01*   < > 15.91*   HEMOGLOBIN g/dL 7.7*   < > 8.4*   HEMATOCRIT % 26.4*   < > 29.1*   PLATELETS Thousands/uL 275   < > 262   BANDS PCT %  --   --  3   LYMPHO PCT % 39  --  35   MONO PCT % 8  --  11   EOS PCT %  --   --  0    < > = values in this interval not displayed.     Results from last 7 days   Lab Units 06/02/25  0529 06/01/25  0426 05/31/25  0938 05/30/25  1135   SODIUM mmol/L 138 137   < > 138   POTASSIUM mmol/L 3.3* 3.5   < > 4.6   CHLORIDE mmol/L 96 95*   < > 96   CO2 mmol/L 32 31   < > 27   BUN mg/dL 35* 39*   < > 36*   CREATININE mg/dL 1.41* 1.66*   < > 1.81*   ANION GAP mmol/L 10 11   < > 15*   CALCIUM mg/dL 8.5 8.7   < > 9.0   ALBUMIN g/dL  --   --   --  3.9   TOTAL BILIRUBIN mg/dL  --  0.92  --  1.14*   ALK PHOS U/L  --   --   --  79   ALT U/L  --   --   --  9   AST U/L  --   --   --  13    GLUCOSE RANDOM mg/dL 128 112   < > 201*    < > = values in this interval not displayed.     Results from last 7 days   Lab Units 05/30/25  1320   INR  1.87*     Results from last 7 days   Lab Units 06/02/25  0743 06/01/25  2124 06/01/25  1626 06/01/25  1123 06/01/25  0734 05/31/25  2105 05/31/25  1629 05/31/25  1100 05/31/25  0813 05/30/25  2115 05/30/25  1707   POC GLUCOSE mg/dl 146* 154* 172* 162* 111 138 132 144* 147* 139 174*     Results from last 7 days   Lab Units 05/28/25  0941   HEMOGLOBIN A1C % 5.2     Results from last 7 days   Lab Units 06/01/25  0426 05/31/25  0539 05/30/25  2028 05/30/25  1655 05/30/25  1245 05/30/25  1135   LACTIC ACID mmol/L  --   --  1.3 2.8* 2.9*  --    PROCALCITONIN ng/ml 0.56* 0.63*  --   --   --  0.57*       Recent Cultures (last 7 days):   Results from last 7 days   Lab Units 05/30/25  1725 05/30/25  1245   BLOOD CULTURE   --  No Growth at 48 hrs.  No Growth at 48 hrs.   LEGIONELLA URINARY ANTIGEN  Negative  --        Imaging Results Review: I reviewed radiology reports from this admission including: CT chest.  Other Study Results Review: EKG was reviewed.     Last 24 Hours Medication List:     Current Facility-Administered Medications:     acetaminophen (TYLENOL) tablet 650 mg, Q6H PRN    atorvastatin (LIPITOR) tablet 20 mg, Daily With Dinner    bumetanide (BUMEX) injection 2 mg, BID    busPIRone (BUSPAR) tablet 7.5 mg, BID    cefTRIAXone (ROCEPHIN) IVPB (premix in dextrose) 2,000 mg 50 mL, Q24H, Last Rate: 2,000 mg (06/01/25 1337)    clonazePAM (KlonoPIN) tablet 1 mg, HS PRN    donepezil (ARICEPT) tablet 10 mg, QAM    DULoxetine (CYMBALTA) delayed release capsule 60 mg, Daily    ferrous sulfate tablet 325 mg, Every Other Day    [Held by provider] Fluticasone Furoate-Vilanterol 100-25 mcg/actuation 1 puff, Daily **AND** [Held by provider] umeclidinium 62.5 mcg/actuation inhaler AEPB 1 puff, Daily    insulin lispro (HumALOG/ADMELOG) 100 units/mL subcutaneous injection 1-5  Units, TID AC **AND** Fingerstick Glucose (POCT), TID AC    insulin lispro (HumALOG/ADMELOG) 100 units/mL subcutaneous injection 1-6 Units, HS    levalbuterol (XOPENEX) inhalation solution 1.25 mg, TID    levothyroxine tablet 137 mcg, Early Morning    [Held by provider] losartan (COZAAR) tablet 25 mg, Daily    magnesium Oxide (MAG-OX) tablet 400 mg, BID    melatonin tablet 6 mg, HS    metoprolol succinate (TOPROL-XL) 24 hr tablet 25 mg, QAM    pantoprazole (PROTONIX) EC tablet 40 mg, Daily Before Breakfast    polyethylene glycol (MIRALAX) packet 17 g, Daily    rivaroxaban (XARELTO) tablet 15 mg, Daily With Dinner    senna (SENOKOT) tablet 8.6 mg, Daily    [Held by provider] spironolactone (ALDACTONE) tablet 25 mg, Daily    Administrative Statements   Today, Patient Was Seen By: Usha Decker PA-C  I have spent a total time of 50 minutes in caring for this patient on the day of the visit/encounter including Diagnostic results, Prognosis, Risks and benefits of tx options, Instructions for management, Patient and family education, Importance of tx compliance, Risk factor reductions, Impressions, Counseling / Coordination of care, Documenting in the medical record, Reviewing/placing orders in the medical record (including tests, medications, and/or procedures), Obtaining or reviewing history  , and Communicating with other healthcare professionals .    **Please Note: This note may have been constructed using a voice recognition system.**

## 2025-06-02 NOTE — WOUND OSTOMY CARE
Consult Note - Wound   Trevor Lyons 84 y.o. male MRN: [id number]  Unit/Bed#: -01 Encounter: [id number]      History and Present Illness:  84 year old male admitted to hospital with CHF exacerbation and sepsis.  Significant medical history includes HTN, hyperlipidemia, CAD, CABG, COPD, Afib and on 2L O2 at baseline via nasal canula.    Assessment Findings:   Patient resting in recliner.  Alert, oriented and agreeable to assessment of scrotum.  Consultation was for a scrotal wound.  Unable to determine wound site, and discussed with primary nurse.  States that he had briefly been bleeding on a cleansing wipe, but it appears to have repaired itself, as it is not currently bleeding.  Heels are intact and blanchable.  Patient is able to ambulate independently with walker.  Continent of bowel and bladder.  Refuses assessment of buttock/sacral area.  Consistent carbohydrate diet with 2000 mL fluid restriction.      See flowsheet for wound details.    Wound Care Plan:   1-Silicone Cream/Hydraguard lotion to buttocks, sacrum and bilateral heels twice daily and as needed.  2-Elevate heels off of bed/chair surface to offload pressure.  3-Offloading air cushion in chair when out of bed.  4-Apply moisturizing skin cream to body daily and as needed.  5-Turn/reposition every 2 hours while in bed and weight shift frequently while in chair for pressure re-distribution on skin.     Wound care team to sign off at this time.  Plan of care reviewed with primary RN.  Secure chat or re-consult if any changes or questions.        Sera Dimas, RN, BSN

## 2025-06-02 NOTE — DISCHARGE INSTR - OTHER ORDERS
Wound Care Plan:   1-Silicone Cream/Hydraguard lotion to buttocks, sacrum and bilateral heels twice daily and as needed.  2-Elevate heels off of bed/chair surface to offload pressure.  3-Offloading air cushion in chair when out of bed.  4-Apply moisturizing skin cream to body daily and as needed.  5-Turn/reposition every 2 hours while in bed and weight shift frequently while in chair for pressure re-distribution on skin.

## 2025-06-02 NOTE — ASSESSMENT & PLAN NOTE
Normocytic anemia  Hemoglobin 12.7 in January  Hemoglobin 8.4 on admission  Folate and B12 are normal  Iron panel shows mixed picture of inflammation and iron deficiency.  Serum ferritin is normal  Start oral iron supplementation as patient does have evidence of volume overload  Haptoglobin, SPEP, free light chains pending  Mild reticulocytosis    T. bili normal

## 2025-06-02 NOTE — ASSESSMENT & PLAN NOTE
Wt Readings from Last 3 Encounters:   06/02/25 101 kg (223 lb 8 oz)   05/28/25 105 kg (232 lb)   03/20/25 99.7 kg (219 lb 12.8 oz)   Patient with a history of HFrEF -comes to [facility] department on 5/30/2025 with progressive dyspnea, worsening cough and hypoxia.  Echo 10/21/25: EF 40 to 45%, mild global hypokinesis, RV dilatation, left atrial dilatation, well-functioning TAVR, moderate mitral MAC, moderate to severe MR regurgitation, mild MS, Moderate TR  GDMT: Farxiga 10 mg daily, losartan 25 mg daily, metoprolol succinate 25 mg daily, spironolactone 25 mg daily, torsemide 20 mg twice daily with potassium supplementation  Chest x-ray: Moderate pulmonary edema.    Currently on IV Bumex 2 mg twice daily with extra dose of 2 mg yesterday given overall good diuretic response, creatinine improving, weight down

## 2025-06-02 NOTE — ASSESSMENT & PLAN NOTE
Component      Latest Ref Rng 5/30/2025 5/31/2025 6/1/2025 6/2/2025   WBC      4.31 - 10.16 Thousand/uL 15.91 (H)  20.15 (H)  21.01 (H)  20.72 (H)       Patient admitted CHF exacerbation, sepsis secondary to pneumonia.  White count reached a peak of 21 likely in the setting of acute infection.  He has associated neutrophilia but also some lymphocytosis as well as some monocytes and metamyelocytes on peripheral smear  Suspect this is likely reactive however additional workup has been ordered and is pending at this time:  Flow cytometry on peripheral blood  PCR for BCR able    Clinically, patient does appear to be improving.  He is afebrile.  If counts do not continue to improve, can consider bone marrow biopsy in the outpatient setting

## 2025-06-02 NOTE — ASSESSMENT & PLAN NOTE
Lab Results   Component Value Date    HGBA1C 5.2 05/28/2025     Recent Labs     06/01/25  1123 06/01/25  1626 06/01/25  2124 06/02/25  0743   POCGLU 162* 172* 154* 146*     Blood Sugar Average: Last 72 hrs:  (P) [id number]    Maintained on farxiga 10mg daily, glimerpiride 1mg daily, lantus 8U qhs, metformin 1000mg BID  C/w SSI AC/QHS, Accu-Cheks, CCD  Restart Lantus at 5 U qhs

## 2025-06-02 NOTE — PROGRESS NOTES
Progress Note - Oncology-Medical   Name: Trevor Lyons 84 y.o. male I MRN: [id number]  Unit/Bed#: -Mateusz I Date of Admission: 5/30/2025   Date of Service: 6/2/2025 I Hospital Day: 3    Assessment & Plan  Anemia  Normocytic anemia  Hemoglobin 12.7 in January  Hemoglobin 8.4 on admission  Folate and B12 are normal  Iron panel shows mixed picture of inflammation and iron deficiency.  Serum ferritin is normal  Start oral iron supplementation as patient does have evidence of volume overload  Haptoglobin, SPEP, free light chains pending  Mild reticulocytosis    T. bili normal  Leukocytosis  Component      Latest Ref Rng 5/30/2025 5/31/2025 6/1/2025 6/2/2025   WBC      4.31 - 10.16 Thousand/uL 15.91 (H)  20.15 (H)  21.01 (H)  20.72 (H)       Patient admitted CHF exacerbation, sepsis secondary to pneumonia.  White count reached a peak of 21 likely in the setting of acute infection.  He has associated neutrophilia but also some lymphocytosis as well as some monocytes and metamyelocytes on peripheral smear  Suspect this is likely reactive however additional workup has been ordered and is pending at this time:  Flow cytometry on peripheral blood  PCR for BCR able    Clinically, patient does appear to be improving.  He is afebrile.  If counts do not continue to improve, can consider bone marrow biopsy in the outpatient setting  Severe sepsis (HCC)    Pneumonia      Subjective   Patient continues with volume overload requiring IV diuresis.  Afebrile.  Denies any chest pain, shortness of breath.    Objective :  Temp:  [97.3 °F (36.3 °C)-98.5 °F (36.9 °C)] 97.9 °F (36.6 °C)  HR:  [59-63] 60  BP: (109-140)/(43-58) 140/56  Resp:  [20] 20  SpO2:  [90 %-100 %] 90 %  O2 Device: Nasal cannula  Nasal Cannula O2 Flow Rate (L/min):  [3 L/min] 3 L/min      Physical Exam  Constitutional:       General: He is not in acute distress.     Appearance: He is obese. He is not toxic-appearing.   HENT:      Head: Normocephalic and  "atraumatic.     Cardiovascular:      Rate and Rhythm: Normal rate.   Pulmonary:      Effort: Pulmonary effort is normal. No respiratory distress.     Musculoskeletal:      Cervical back: Normal range of motion.      Right lower leg: Edema present.      Left lower leg: Edema present.     Skin:     Coloration: Skin is pale.      Findings: Bruising present.     Neurological:      General: No focal deficit present.      Mental Status: He is alert and oriented to person, place, and time.     Psychiatric:         Mood and Affect: Mood normal.         Behavior: Behavior normal.           Lab Results: I have reviewed the following results:Vitamins B1, B6, B12, A, and D:   Lab Results   Component Value Date    RHAGCBVG74 477 06/02/2025   , Iron: No results found for: \"IRON\", Folate: No results found for: \"FOLATE\"  Lab Results   Component Value Date    K 3.3 (L) 06/02/2025    CL 96 06/02/2025    CO2 32 06/02/2025    BUN 35 (H) 06/02/2025    CREATININE 1.41 (H) 06/02/2025    GLUF 105 (H) 05/28/2025    CALCIUM 8.5 06/02/2025    CORRECTEDCA 8.8 10/24/2024    AST 13 05/30/2025    ALT 9 05/30/2025    ALKPHOS 79 05/30/2025    EGFR 44 06/02/2025     Lab Results   Component Value Date    WBC 20.72 (H) 06/02/2025    HGB 7.9 (L) 06/02/2025    HCT 27.5 (L) 06/02/2025    MCV 94 06/02/2025     06/02/2025     Lab Results   Component Value Date    NEUTROABS 10.72 (H) 06/01/2025             Administrative Statements   I have spent a total time of 30 minutes in caring for this patient on the day of the visit/encounter including Patient and family education, Documenting in the medical record, Reviewing/placing orders in the medical record (including tests, medications, and/or procedures), Obtaining or reviewing history  , and Communicating with other healthcare professionals .  "

## 2025-06-02 NOTE — PLAN OF CARE
Problem: Potential for Falls  Goal: Patient will remain free of falls  Description: INTERVENTIONS:  - Educate patient/family on patient safety including physical limitations  - Instruct patient to call for assistance with activity   - Consider consulting OT/PT to assist with strengthening/mobility based on AM PAC & JH-HLM score  - Consult OT/PT to assist with strengthening/mobility   - Keep Call bell within reach  - Keep bed low and locked with side rails adjusted as appropriate  - Keep care items and personal belongings within reach  - Initiate and maintain comfort rounds  - Make Fall Risk Sign visible to staff  - Offer Toileting every 2 Hours, in advance of need  - Initiate/Maintain bed alarm  - Obtain necessary fall risk management equipment: socks  - Apply yellow socks and bracelet for high fall risk patients  - Consider moving patient to room near nurses station  Outcome: Progressing     Problem: PAIN - ADULT  Goal: Verbalizes/displays adequate comfort level or baseline comfort level  Description: Interventions:  - Encourage patient to monitor pain and request assistance  - Assess pain using appropriate pain scale  - Administer analgesics as ordered based on type and severity of pain and evaluate response  - Implement non-pharmacological measures as appropriate and evaluate response  - Consider cultural and social influences on pain and pain management  - Notify physician/advanced practitioner if interventions unsuccessful or patient reports new pain  - Educate patient/family on pain management process including their role and importance of  reporting pain   - Provide non-pharmacologic/complimentary pain relief interventions  Outcome: Progressing     Problem: INFECTION - ADULT  Goal: Absence or prevention of progression during hospitalization  Description: INTERVENTIONS:  - Assess and monitor for signs and symptoms of infection  - Monitor lab/diagnostic results  - Monitor all insertion sites, i.e. indwelling  lines, tubes, and drains  - Monitor endotracheal if appropriate and nasal secretions for changes in amount and color  - [place] appropriate cooling/warming therapies per order  - Administer medications as ordered  - Instruct and encourage patient and family to use good hand hygiene technique  - Identify and instruct in appropriate isolation precautions for identified infection/condition  Outcome: Progressing  Goal: Absence of fever/infection during neutropenic period  Description: INTERVENTIONS:  - Monitor WBC  - Perform strict hand hygiene  - Limit to healthy visitors only  - No plants, dried, fresh or silk flowers with damon in patient room  Outcome: Progressing     Problem: SAFETY ADULT  Goal: Patient will remain free of falls  Description: INTERVENTIONS:  - Educate patient/family on patient safety including physical limitations  - Instruct patient to call for assistance with activity   - Consider consulting OT/PT to assist with strengthening/mobility based on AM PAC & JH-HLM score  - Consult OT/PT to assist with strengthening/mobility   - Keep Call bell within reach  - Keep bed low and locked with side rails adjusted as appropriate  - Keep care items and personal belongings within reach  - Initiate and maintain comfort rounds  - Make Fall Risk Sign visible to staff  - Offer Toileting every 2 Hours, in advance of need  - Initiate/Maintain bed alarm  - Obtain necessary fall risk management equipment: socks  - Apply yellow socks and bracelet for high fall risk patients  - Consider moving patient to room near nurses station  Outcome: Progressing  Goal: Maintain or return to baseline ADL function  Description: INTERVENTIONS:  -  Assess patient's ability to carry out ADLs; assess patient's baseline for ADL function and identify physical deficits which impact ability to perform ADLs (bathing, care of mouth/teeth, toileting, grooming, dressing, etc.)  - Assess/evaluate cause of self-care deficits   - Assess range of  motion  - Assess patient's mobility; develop plan if impaired  - Assess patient's need for assistive devices and provide as appropriate  - Encourage maximum independence but intervene and supervise when necessary  - Involve family in performance of ADLs  - Assess for home care needs following discharge   - Consider OT consult to assist with ADL evaluation and planning for discharge  - Provide patient education as appropriate  - Monitor functional capacity and physical performance, use of AM PAC & JH-HLM   - Monitor gait, balance and fatigue with ambulation    Outcome: Progressing  Goal: Maintains/Returns to pre admission functional level  Description: INTERVENTIONS:  - Perform AM-PAC 6 Click Basic Mobility/ Daily Activity assessment daily.  - Set and communicate daily mobility goal to care team and patient/family/caregiver.   - Collaborate with rehabilitation services on mobility goals if consulted  - Perform Range of Motion 2 times a day.  - Reposition patient every 2 hours.  - Dangle patient 2 times a day  - Stand patient 2 times a day  - Ambulate patient 2 times a day  - Out of bed to chair 2 times a day   - Out of bed for meals 2 times a day  - Out of bed for toileting  - Record patient progress and toleration of activity level   Outcome: Progressing     Problem: DISCHARGE PLANNING  Goal: Discharge to home or other facility with appropriate resources  Description: INTERVENTIONS:  - Identify barriers to discharge w/patient and caregiver  - Arrange for needed discharge resources and transportation as appropriate  - Identify discharge learning needs (meds, wound care, etc.)  - Arrange for interpretive services to assist at discharge as needed  - Refer to Case Management Department for coordinating discharge planning if the patient needs post-hospital services based on physician/advanced practitioner order or complex needs related to functional status, cognitive ability, or social support system  Outcome:  Progressing     Problem: Knowledge Deficit  Goal: Patient/family/caregiver demonstrates understanding of disease process, treatment plan, medications, and discharge instructions  Description: Complete learning assessment and assess knowledge base.  Interventions:  - Provide teaching at level of understanding  - Provide teaching via preferred learning methods  Outcome: Progressing

## 2025-06-02 NOTE — CASE MANAGEMENT
Case Management Discharge Planning Note    Patient name Trevor Lyons  Location /-01 MRN [id number]  : 1940 Date 2025       Current Admission Date: 2025  Current Admission Diagnosis:Acute combined systolic and diastolic congestive heart failure (HCC)   Patient Active Problem List    Diagnosis Date Noted    Hypertension 2025    Acute combined systolic and diastolic congestive heart failure ([facility]) 2025    Acute on chronic respiratory failure ([facility]) 2025    Severe sepsis ([facility]) 2025    Pneumonia 2025    Leukocytosis 2025    Acute on chronic systolic heart failure ([facility]) 2025    Metabolic alkalosis 2025    NELLIE (acute kidney injury) ([facility]) 2025    Encounter for examination following treatment at hospital 2024    History of transcatheter aortic valve replacement (TAVR) 10/22/2024    Acute hypoxic respiratory failure ([facility]) 10/19/2024    Anemia 10/19/2024    Coronary artery disease involving native coronary artery of native heart without angina pectoris 2024    S/P CABG (coronary artery bypass graft) 2024    DDD (degenerative disc disease), lumbosacral 2024    COPD (chronic obstructive pulmonary disease) ([facility]) 2024    Recurrent falls 2024    Persistent atrial fibrillation ([facility]) 2024    Chronic combined systolic and diastolic CHF (congestive heart failure) ([facility]) 2024    Depression with anxiety 2024    NSVT (nonsustained ventricular tachycardia) ([facility]) 2024    Orthostatic hypotension 2024    Pulmonary embolism ([facility]) 2024    Cardiac pacemaker 2024    Pulmonary hypertension ([facility]) 2024    Sick sinus syndrome ([facility]) 2024    Dextroscoliosis 2024    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 2024    Mixed hyperlipidemia 2024    Type 2 diabetes mellitus without complication, with long-term current use of insulin ([facility]) 2024    Osteopenia of multiple sites  03/12/2024    Chronic left-sided low back pain with left-sided sciatica 03/12/2024    Aneurysm of ascending aorta without rupture (HCC) 03/12/2024    Moderate Alzheimer's dementia, unspecified timing of dementia onset, unspecified whether behavioral, psychotic, or mood disturbance or anxiety (HCC) 03/12/2024    Acquired hypothyroidism 11/10/2022    Bilateral carotid bruits 11/11/2019      LOS (days): 3  Geometric Mean LOS (GMLOS) (days):   Days to GMLOS:     OBJECTIVE:  Risk of Unplanned Readmission Score: 36.1         Current admission status: Inpatient   Preferred Pharmacy:   [id number] - [place], [place] - [address] [place] [address]  Phone: [phone] Fax: [phone]    [facility] [employer] [facility], [facility]. - [place] [address]  Phone: [phone] Fax: [phone]    Primary Care Provider: Ira Borden DO    Primary Insurance: MEDICARE MISC REPLACEMENT  Secondary Insurance:     DISCHARGE DETAILS:          IMM Given (Date):: 06/02/25  IMM Given to:: Patient  IMM reviewed with patient, patient agrees with discharge determination.

## 2025-06-02 NOTE — PROGRESS NOTES
Progress Note - Cardiology   Name: Trevor Lyons 84 y.o. male I MRN: [id number]  Unit/Bed#: -01 I Date of Admission: 5/30/2025   Date of Service: 6/2/2025 I Hospital Day: 3    Assessment & Plan  Acute combined systolic and diastolic congestive heart failure (HCC)  Wt Readings from Last 3 Encounters:   06/02/25 101 kg (223 lb 8 oz)   05/28/25 105 kg (232 lb)   03/20/25 99.7 kg (219 lb 12.8 oz)   Patient with a history of HFrEF -comes to [facility] emergency department on 5/30/2025 with progressive dyspnea, worsening cough and hypoxia.  Echo 10/21/25: EF 40 to 45%, mild global hypokinesis, RV dilatation, left atrial dilatation, well-functioning TAVR, moderate mitral MAC, moderate to severe MR regurgitation, mild MS, Moderate TR  GDMT: Farxiga 10 mg daily, losartan 25 mg daily, metoprolol succinate 25 mg daily, spironolactone 25 mg daily, torsemide 20 mg twice daily with potassium supplementation  Chest x-ray: Moderate pulmonary edema.    Currently on IV Bumex 2 mg twice daily with extra dose of 2 mg yesterday given overall good diuretic response, creatinine improving, weight down  Persistent atrial fibrillation (HCC)  Patient on Xarelto 20 mg daily for stroke prevention and metoprolol succinate 25 mg daily for rate control -he is 100% V paced at 65 with 100% A-fib burden  Sick sinus syndrome (HCC)  Patient has [employer] permanent pacemaker -set at VVI at 65, last pacemaker check 4/16/2025 shows 98% V paced with patient 100% A-fib  NELLIE (acute kidney injury) ([facility])  Creatinine on arrival 1.8  Acute on chronic respiratory failure (HCC)  Remains on 4 L nasal cannula.  Pneumonia    Hypertension    Anemia    Plan  Continue with Bumex for diuresis -will increase to 3 mg twice daily  Daily BMP  Strict I's and O's, daily weights  Restart spironolactone, losartan when patient euvolemic and creatinine normalizes    Subjective   Patient seen and examined.  Complains of lower back discomfort which is  chronic for patient, otherwise without complaints    Objective :  Temp:  [97.3 °F (36.3 °C)-98.5 °F (36.9 °C)] 97.9 °F (36.6 °C)  HR:  [59-63] 60  BP: (109-131)/(43-58) 109/45  Resp:  [20] 20  SpO2:  [91 %-100 %] 99 %  O2 Device: Nasal cannula  Nasal Cannula O2 Flow Rate (L/min):  [3 L/min] 3 L/min  Orthostatic Blood Pressures      Flowsheet Row Most Recent Value   Blood Pressure 109/45 filed at 06/02/2025 0719   Patient Position - Orthostatic VS Sitting filed at 06/01/2025 2129          First Weight: Weight - Scale: 105 kg (232 lb) (05/30/25 1403)  Vitals:    06/02/25 0500 06/02/25 0541   Weight: 101 kg (223 lb 8 oz) 101 kg (223 lb 8 oz)     Physical Exam  Constitutional:       Appearance: Normal appearance. He is obese.   HENT:      Head: Normocephalic and atraumatic.      Nose: Nose normal.      Mouth/Throat:      Mouth: Mucous membranes are moist.     Cardiovascular:      Rate and Rhythm: Normal rate and regular rhythm.      Pulses: Normal pulses.      Heart sounds: Normal heart sounds.   Pulmonary:      Breath sounds: Rales present.   Abdominal:      General: Abdomen is flat.      Palpations: Abdomen is soft.     Musculoskeletal:      Cervical back: Normal range of motion.      Right lower leg: Edema present.      Left lower leg: Edema present.     Skin:     General: Skin is warm.      Capillary Refill: Capillary refill takes less than 2 seconds.     Neurological:      General: No focal deficit present.      Mental Status: He is alert. Mental status is at baseline.     Psychiatric:         Mood and Affect: Mood normal.         Behavior: Behavior normal.         Thought Content: Thought content normal.           Lab Results: I have reviewed the following results:  Results from last 7 days   Lab Units 06/01/25  0426 05/31/25  0938 05/30/25  1135   WBC Thousand/uL 21.01* 20.15* 15.91*   HEMOGLOBIN g/dL 7.7* 8.1* 8.4*   HEMATOCRIT % 26.4* 27.7* 29.1*   PLATELETS Thousands/uL 275 281 262     Results from last 7 days    Lab Units 06/02/25  0529 06/01/25  0426 05/31/25  0938   POTASSIUM mmol/L 3.3* 3.5 4.4   CHLORIDE mmol/L 96 95* 97   CO2 mmol/L 32 31 31   BUN mg/dL 35* 39* 40*   CREATININE mg/dL 1.41* 1.66* 1.84*   CALCIUM mg/dL 8.5 8.7 9.0     Results from last 7 days   Lab Units 05/30/25  1320   INR  1.87*   PTT seconds 52*     Lab Results   Component Value Date    HGBA1C 5.2 05/28/2025     Lab Results   Component Value Date    TROPONINI 0.04 10/21/2021       Imaging Results Review: I reviewed radiology reports from this admission including: chest xray.  Other Study Results Review: EKG was reviewed.     VTE Pharmacologic Prophylaxis: VTE covered by:  rivaroxaban, Oral, 15 mg at 06/01/25 1530     VTE Mechanical Prophylaxis: sequential compression device

## 2025-06-02 NOTE — ASSESSMENT & PLAN NOTE
Met severe sepsis criteria on admission   CT chest: Severe bilateral consolidation and groundglass opacity, greatest in the right lower lobe, compatible with edema and/or pneumonia in the appropriate clinical setting   S/p 3 days of Azithro; c/w Rocephin, day # 4

## 2025-06-02 NOTE — ASSESSMENT & PLAN NOTE
Chronically on 2 L via NC PRN   Required up to 4 L; now stable on 3 L   Multifactorial w/ CHF and PNA

## 2025-06-02 NOTE — ASSESSMENT & PLAN NOTE
Recent Labs     05/31/25  0938 06/01/25  0426 06/02/25  1011   HGB 8.1* 7.7* 7.9*     Per chart review, baseline Hgb 11  No active bleeding   Folate  >22, B12 312   Iron panel consistent w/ SHELIA  Defer Venofer/iron supplements to heatology

## 2025-06-02 NOTE — ASSESSMENT & PLAN NOTE
Recent Labs     05/31/25  0938 06/01/25  0426 06/02/25  0529   CREATININE 1.84* 1.66* 1.41*   EGFR 32 36 44     Baseline Cr. 1-1.2  C/w urinary retention protocol & diuretics  Avoid hypotension/nephrotoxins  PTA Cozaar on hold    Chronically on 2 L of oxygen  Requiring 3-4L NC on admission   Likely multifactorial in the setting of CHF and PNA -- continue management per associated problem  Respiratory protocol  Continue to wean supplemental O2 as able

## 2025-06-02 NOTE — ASSESSMENT & PLAN NOTE
Criteria e/b tachypnea, leukocytosis, lactic acidosis, and NELLIE 2/2 PNA    CT chest  Severe bilateral consolidation and groundglass opacity, greatest in the right lower lobe, compatible with edema and/or pneumonia in the appropriate clinical setting   Lactic acid 2.9, now cleared   Procal: 0.57 > 0.63 > 0.56  BCx w/o growth x 48 hrs   S/p 3 days of Azithro; c/w Rocephin, day # 4   Hemodynamically stable

## 2025-06-02 NOTE — ASSESSMENT & PLAN NOTE
Recent Labs     05/31/25  0938 06/01/25  0426 06/02/25  1011   WBC 20.15* 21.01* 20.72*     Uptrending  ID curbsided,given clinical improvement monitor; low threshold to reimage if symptoms worsen   Hematology consulted  F/u peripheral blood flow, LDH, haptoglobin, reticulocyte count, SPEP, free light chain   Possible reactionary w/ acute infxn?

## 2025-06-03 LAB
ANION GAP SERPL CALCULATED.3IONS-SCNC: 9 MMOL/L (ref 4–13)
BUN SERPL-MCNC: 31 MG/DL (ref 5–25)
CALCIUM SERPL-MCNC: 8.6 MG/DL (ref 8.4–10.2)
CHLORIDE SERPL-SCNC: 95 MMOL/L (ref 96–108)
CO2 SERPL-SCNC: 34 MMOL/L (ref 21–32)
CREAT SERPL-MCNC: 1.24 MG/DL (ref 0.6–1.3)
ERYTHROCYTE [DISTWIDTH] IN BLOOD BY AUTOMATED COUNT: 14.3 % (ref 11.6–15.1)
GFR SERPL CREATININE-BSD FRML MDRD: 52 ML/MIN/1.73SQ M
GLUCOSE SERPL-MCNC: 141 MG/DL (ref 65–140)
GLUCOSE SERPL-MCNC: 204 MG/DL (ref 65–140)
GLUCOSE SERPL-MCNC: 212 MG/DL (ref 65–140)
GLUCOSE SERPL-MCNC: 231 MG/DL (ref 65–140)
GLUCOSE SERPL-MCNC: 236 MG/DL (ref 65–140)
HAPTOGLOB SERPL-MCNC: 368 MG/DL (ref 38–329)
HCT VFR BLD AUTO: 25.7 % (ref 36.5–49.3)
HGB BLD-MCNC: 7.5 G/DL (ref 12–17)
KAPPA LC FREE SER-MCNC: 43.7 MG/L (ref 3.3–19.4)
KAPPA LC FREE/LAMBDA FREE SER: 1.15 {RATIO} (ref 0.26–1.65)
LAMBDA LC FREE SERPL-MCNC: 37.9 MG/L (ref 5.7–26.3)
MAGNESIUM SERPL-MCNC: 2.3 MG/DL (ref 1.9–2.7)
MCH RBC QN AUTO: 27.9 PG (ref 26.8–34.3)
MCHC RBC AUTO-ENTMCNC: 29.2 G/DL (ref 31.4–37.4)
MCV RBC AUTO: 96 FL (ref 82–98)
PLATELET # BLD AUTO: 299 THOUSANDS/UL (ref 149–390)
PMV BLD AUTO: 9 FL (ref 8.9–12.7)
POTASSIUM SERPL-SCNC: 3.4 MMOL/L (ref 3.5–5.3)
PROCALCITONIN SERPL-MCNC: 0.27 NG/ML
RBC # BLD AUTO: 2.69 MILLION/UL (ref 3.88–5.62)
SODIUM SERPL-SCNC: 138 MMOL/L (ref 135–147)
WBC # BLD AUTO: 21.9 THOUSAND/UL (ref 4.31–10.16)

## 2025-06-03 PROCEDURE — 94640 AIRWAY INHALATION TREATMENT: CPT

## 2025-06-03 PROCEDURE — 84145 PROCALCITONIN (PCT): CPT | Performed by: INTERNAL MEDICINE

## 2025-06-03 PROCEDURE — 99232 SBSQ HOSP IP/OBS MODERATE 35: CPT | Performed by: INTERNAL MEDICINE

## 2025-06-03 PROCEDURE — 82948 REAGENT STRIP/BLOOD GLUCOSE: CPT

## 2025-06-03 PROCEDURE — 85027 COMPLETE CBC AUTOMATED: CPT | Performed by: INTERNAL MEDICINE

## 2025-06-03 PROCEDURE — 80048 BASIC METABOLIC PNL TOTAL CA: CPT | Performed by: INTERNAL MEDICINE

## 2025-06-03 PROCEDURE — 83735 ASSAY OF MAGNESIUM: CPT | Performed by: INTERNAL MEDICINE

## 2025-06-03 PROCEDURE — 99232 SBSQ HOSP IP/OBS MODERATE 35: CPT | Performed by: PHYSICIAN ASSISTANT

## 2025-06-03 PROCEDURE — 87081 CULTURE SCREEN ONLY: CPT | Performed by: INTERNAL MEDICINE

## 2025-06-03 PROCEDURE — 94760 N-INVAS EAR/PLS OXIMETRY 1: CPT

## 2025-06-03 RX ORDER — BUMETANIDE 0.25 MG/ML
3 INJECTION, SOLUTION INTRAMUSCULAR; INTRAVENOUS 2 TIMES DAILY
Status: DISCONTINUED | OUTPATIENT
Start: 2025-06-03 | End: 2025-06-03

## 2025-06-03 RX ORDER — INSULIN GLARGINE 100 [IU]/ML
8 INJECTION, SOLUTION SUBCUTANEOUS
Status: DISCONTINUED | OUTPATIENT
Start: 2025-06-03 | End: 2025-06-09

## 2025-06-03 RX ORDER — BUMETANIDE 0.25 MG/ML
3 INJECTION, SOLUTION INTRAMUSCULAR; INTRAVENOUS 2 TIMES DAILY
Status: COMPLETED | OUTPATIENT
Start: 2025-06-03 | End: 2025-06-03

## 2025-06-03 RX ORDER — CLONAZEPAM 1 MG/1
1 TABLET ORAL
Status: DISCONTINUED | OUTPATIENT
Start: 2025-06-03 | End: 2025-06-05

## 2025-06-03 RX ORDER — HYDROXYZINE HYDROCHLORIDE 25 MG/1
50 TABLET, FILM COATED ORAL EVERY 6 HOURS PRN
Status: DISCONTINUED | OUTPATIENT
Start: 2025-06-03 | End: 2025-06-05

## 2025-06-03 RX ORDER — POTASSIUM CHLORIDE 1500 MG/1
40 TABLET, EXTENDED RELEASE ORAL ONCE
Status: COMPLETED | OUTPATIENT
Start: 2025-06-03 | End: 2025-06-03

## 2025-06-03 RX ADMIN — BUMETANIDE 3 MG: 0.25 INJECTION INTRAMUSCULAR; INTRAVENOUS at 17:26

## 2025-06-03 RX ADMIN — LEVALBUTEROL HYDROCHLORIDE 1.25 MG: 1.25 SOLUTION RESPIRATORY (INHALATION) at 07:29

## 2025-06-03 RX ADMIN — Medication 6 MG: at 21:57

## 2025-06-03 RX ADMIN — ATORVASTATIN CALCIUM 20 MG: 20 TABLET, FILM COATED ORAL at 17:26

## 2025-06-03 RX ADMIN — INSULIN LISPRO 2 UNITS: 100 INJECTION, SOLUTION INTRAVENOUS; SUBCUTANEOUS at 12:41

## 2025-06-03 RX ADMIN — INSULIN LISPRO 2 UNITS: 100 INJECTION, SOLUTION INTRAVENOUS; SUBCUTANEOUS at 17:26

## 2025-06-03 RX ADMIN — LEVALBUTEROL HYDROCHLORIDE 1.25 MG: 1.25 SOLUTION RESPIRATORY (INHALATION) at 19:38

## 2025-06-03 RX ADMIN — METOPROLOL SUCCINATE 25 MG: 25 TABLET, EXTENDED RELEASE ORAL at 08:03

## 2025-06-03 RX ADMIN — HYDROXYZINE HYDROCHLORIDE 50 MG: 25 TABLET, FILM COATED ORAL at 13:22

## 2025-06-03 RX ADMIN — CEFTRIAXONE 2000 MG: 2 INJECTION, SOLUTION INTRAVENOUS at 13:22

## 2025-06-03 RX ADMIN — INSULIN GLARGINE 8 UNITS: 100 INJECTION, SOLUTION SUBCUTANEOUS at 21:56

## 2025-06-03 RX ADMIN — CLONAZEPAM 1 MG: 1 TABLET ORAL at 01:31

## 2025-06-03 RX ADMIN — DULOXETINE HYDROCHLORIDE 60 MG: 30 CAPSULE, DELAYED RELEASE ORAL at 08:03

## 2025-06-03 RX ADMIN — BUMETANIDE 3 MG: 0.25 INJECTION INTRAMUSCULAR; INTRAVENOUS at 13:22

## 2025-06-03 RX ADMIN — PANTOPRAZOLE SODIUM 40 MG: 40 TABLET, DELAYED RELEASE ORAL at 04:40

## 2025-06-03 RX ADMIN — Medication 400 MG: at 17:26

## 2025-06-03 RX ADMIN — RIVAROXABAN 15 MG: 15 TABLET, FILM COATED ORAL at 17:26

## 2025-06-03 RX ADMIN — BUSPIRONE HYDROCHLORIDE 7.5 MG: 15 TABLET ORAL at 08:03

## 2025-06-03 RX ADMIN — HYDROXYZINE HYDROCHLORIDE 50 MG: 25 TABLET, FILM COATED ORAL at 21:57

## 2025-06-03 RX ADMIN — BUSPIRONE HYDROCHLORIDE 7.5 MG: 15 TABLET ORAL at 17:26

## 2025-06-03 RX ADMIN — DONEPEZIL HYDROCHLORIDE 10 MG: 5 TABLET ORAL at 08:02

## 2025-06-03 RX ADMIN — INSULIN LISPRO 2 UNITS: 100 INJECTION, SOLUTION INTRAVENOUS; SUBCUTANEOUS at 21:56

## 2025-06-03 RX ADMIN — POLYETHYLENE GLYCOL 3350 17 G: 17 POWDER, FOR SOLUTION ORAL at 08:02

## 2025-06-03 RX ADMIN — LEVOTHYROXINE SODIUM 137 MCG: 112 TABLET ORAL at 04:40

## 2025-06-03 RX ADMIN — INSULIN LISPRO 2 UNITS: 100 INJECTION, SOLUTION INTRAVENOUS; SUBCUTANEOUS at 08:03

## 2025-06-03 RX ADMIN — POTASSIUM CHLORIDE 40 MEQ: 1500 TABLET, EXTENDED RELEASE ORAL at 08:03

## 2025-06-03 RX ADMIN — ACETAMINOPHEN 650 MG: 325 TABLET, FILM COATED ORAL at 19:21

## 2025-06-03 RX ADMIN — LEVALBUTEROL HYDROCHLORIDE 1.25 MG: 1.25 SOLUTION RESPIRATORY (INHALATION) at 13:45

## 2025-06-03 RX ADMIN — SENNOSIDES 8.6 MG: 8.6 TABLET, FILM COATED ORAL at 08:02

## 2025-06-03 RX ADMIN — CLONAZEPAM 1 MG: 1 TABLET ORAL at 21:57

## 2025-06-03 RX ADMIN — BUMETANIDE 3 MG: 0.25 INJECTION INTRAMUSCULAR; INTRAVENOUS at 08:03

## 2025-06-03 RX ADMIN — Medication 400 MG: at 08:03

## 2025-06-03 NOTE — PLAN OF CARE
Problem: Potential for Falls  Goal: Patient will remain free of falls  Description: INTERVENTIONS:  - Educate patient/family on patient safety including physical limitations  - Instruct patient to call for assistance with activity   - Consider consulting OT/PT to assist with strengthening/mobility based on AM PAC & JH-HLM score  - Consult OT/PT to assist with strengthening/mobility   - Keep Call bell within reach  - Keep bed low and locked with side rails adjusted as appropriate  - Keep care items and personal belongings within reach  - Initiate and maintain comfort rounds  - Make Fall Risk Sign visible to staff  - Offer Toileting every 2 Hours, in advance of need  - Initiate/Maintain bed alarm  - Obtain necessary fall risk management equipment: socks  - Apply yellow socks and bracelet for high fall risk patients  - Consider moving patient to room near nurses station  Outcome: Progressing     Problem: PAIN - ADULT  Goal: Verbalizes/displays adequate comfort level or baseline comfort level  Description: Interventions:  - Encourage patient to monitor pain and request assistance  - Assess pain using appropriate pain scale  - Administer analgesics as ordered based on type and severity of pain and evaluate response  - Implement non-pharmacological measures as appropriate and evaluate response  - Consider cultural and social influences on pain and pain management  - Notify physician/advanced practitioner if interventions unsuccessful or patient reports new pain  - Educate patient/family on pain management process including their role and importance of  reporting pain   - Provide non-pharmacologic/complimentary pain relief interventions  Outcome: Progressing     Problem: INFECTION - ADULT  Goal: Absence or prevention of progression during hospitalization  Description: INTERVENTIONS:  - Assess and monitor for signs and symptoms of infection  - Monitor lab/diagnostic results  - Monitor all insertion sites, i.e. indwelling  lines, tubes, and drains  - Monitor endotracheal if appropriate and nasal secretions for changes in amount and color  - [place] appropriate cooling/warming therapies per order  - Administer medications as ordered  - Instruct and encourage patient and family to use good hand hygiene technique  - Identify and instruct in appropriate isolation precautions for identified infection/condition  Outcome: Progressing  Goal: Absence of fever/infection during neutropenic period  Description: INTERVENTIONS:  - Monitor WBC  - Perform strict hand hygiene  - Limit to healthy visitors only  - No plants, dried, fresh or silk flowers with damon in patient room  Outcome: Progressing

## 2025-06-03 NOTE — ASSESSMENT & PLAN NOTE
Wt Readings from Last 3 Encounters:   06/03/25 102 kg (225 lb 8 oz)   05/28/25 105 kg (232 lb)   03/20/25 99.7 kg (219 lb 12.8 oz)     Presenting to the ED for [facility]OPES.  ; CT chest w/ severe bilateral consolidations and ground glass opacity, greatest in the right lower lobe, compatible with edema and/or PNA   Maintained on Farxiga 10 mg daily, losartan 25 mg daily, metoprolol succinate 25 mg daily, spironolactone 25 mg daily, torsemide 20 mg twice daily w/ Kdur   ECHO (10/2024): EF 40-45%, mild global hypokinesis, atrial dilatation  Cardiology following, recs appreciated  S/p IV Lasix which was transitioned to Bumex  Bumex 2 mg BID increased to 3 mg IV BID on 06/02   Plan for Bumex x 3 doses total on 06/03   Daily wts, I&O's   - 1 kg   I&O's unclear accuracy   C/w losartan, metoprolol, spironolactone

## 2025-06-03 NOTE — PROGRESS NOTES
Progress Note - Hospitalist   Name: Trevor Lyons 84 y.o. male I MRN: [id number]  Unit/Bed#: -01 I Date of Admission: 5/30/2025   Date of Service: 6/3/2025 I Hospital Day: 4    Assessment & Plan  Acute combined systolic and diastolic congestive heart failure (HCC)  Wt Readings from Last 3 Encounters:   06/03/25 102 kg (225 lb 8 oz)   05/28/25 105 kg (232 lb)   03/20/25 99.7 kg (219 lb 12.8 oz)     Presenting to the ED for LOPES.  ; CT chest w/ severe bilateral consolidations and ground glass opacity, greatest in the right lower lobe, compatible with edema and/or PNA   Maintained on Farxiga 10 mg daily, losartan 25 mg daily, metoprolol succinate 25 mg daily, spironolactone 25 mg daily, torsemide 20 mg twice daily w/ Kdur   ECHO (10/2024): EF 40-45%, mild global hypokinesis, atrial dilatation  Cardiology following, recs appreciated  S/p IV Lasix which was transitioned to Bumex  Bumex 2 mg BID increased to 3 mg IV BID on 06/02   Plan for Bumex x 3 doses total on 06/03   Daily wts, I&O's   - 1 kg   I&O's unclear accuracy   C/w losartan, metoprolol, spironolactone  Severe sepsis (HCC)  Criteria e/b tachypnea, leukocytosis, lactic acidosis, and NELLIE 2/2 PNA    CT chest  Severe bilateral consolidation and groundglass opacity, greatest in the right lower lobe, compatible with edema and/or pneumonia in the appropriate clinical setting   Lactic acid 2.9, now cleared   Procal: 0.57 > 0.63 > 0.56 > 0.27   BCx w/o growth x 48 hrs   S/p 3 days of Azithro; c/w Rocephin, day # 5  Hemodynamically stable  Pneumonia  Met severe sepsis criteria on admission   CT chest: Severe bilateral consolidation and groundglass opacity, greatest in the right lower lobe, compatible with edema and/or pneumonia in the appropriate clinical setting   S/p 3 days of Azithro; c/w Rocephin, day # 5   Acute on chronic respiratory failure (HCC)  Chronically on 2 L via NC PRN   Required up to 4 L; now stable on 3 L   Multifactorial w/ CHF and  PNA  Leukocytosis  Recent Labs     06/01/25  0426 06/02/25  1011 06/03/25  0446   WBC 21.01* 20.72* 21.90*     Uptrending  ID curbsided,given clinical improvement monitor; low threshold to reimage if symptoms worsen   Hematology consulted  F/u peripheral blood flow, LDH, haptoglobin, reticulocyte count, SPEP, free light chain   Possible reactionary w/ acute infxn?  Procal improving   Anemia  Recent Labs     06/01/25  0426 06/02/25  1011 06/03/25  0446   HGB 7.7* 7.9* 7.5*     Per chart review, baseline Hgb ~11  Normocytic anemia  No active bleeding   Folate  >22, B12 312   Iron panel mixed -inflammatory/SHELIA  C/w iron supplement   Haptoglobin, SPEP, free light chains pending   Appreciate hematology's recs  NELLIE (acute kidney injury) (HCC)  Recent Labs     06/01/25  0426 06/02/25  0529 06/03/25  0446   CREATININE 1.66* 1.41* 1.24   EGFR 36 44 52     Peak Cr. 1.84; baseline Cr. 1-1.2   Renal func improved   C/w urinary retention protocol & diuretics  Avoid hypotension/nephrotoxins  Persistent atrial fibrillation (HCC)  Rate controlled   C/w Toprol-XL 25 mg  q day & Xarelto  Sick sinus syndrome (HCC)  S/p pacemaker placement  Hypertension  Home regimen: Toprol-XL 25 mg  q day, Losartan 25 mg daily, & spironolactone 25 mg daily, c/w  BP acceptable   Type 2 diabetes mellitus without complication, with long-term current use of insulin (formerly [facility])  Lab Results   Component Value Date    HGBA1C 5.2 05/28/2025     Recent Labs     06/02/25  1548 06/02/25  2117 06/03/25  0757 06/03/25  1129   POCGLU 146* 247* 236* 212*     Blood Sugar Average: Last 72 hrs:  (P) 169.2442653744425635    Maintained on farxiga 10mg daily, glimerpiride 1mg daily, lantus 8U qhs, metformin 1000mg BID  C/w SSI AC/QHS, Accu-Cheks, CCD  Titrate Lantus from 5 to 8 U qhs   Acquired hypothyroidism  TSH 6.2 in 05/2025, T4 therapeutic; recommend outpt monitoring in 4-6 wks   Cw levothyroxine    VTE Pharmacologic Prophylaxis:   High Risk (Score >/= 5) -  Pharmacological DVT Prophylaxis Ordered: rivaroxaban (Xarelto). Sequential Compression Devices Ordered.    Mobility:   Basic Mobility Inpatient Raw Score: 19  JH-HLM Goal: 6: Walk 10 steps or more  JH-HLM Achieved: 7: Walk 25 feet or more  JH-HLM Goal achieved. Continue to encourage appropriate mobility.    Patient Centered Rounds: I performed bedside rounds with nursing staff today.   Discussions with Specialists or Other Care Team Provider: Plan of care reviewed with cardiology, case management, nursing    Education and Discussions with Family / Patient: Updated  (daughter) via phone.    Current Length of Stay: 4 day(s)  Current Patient Status: Inpatient   Certification Statement: The patient will continue to require additional inpatient hospital stay due to CHF exacerbation  Discharge Plan: Anticipate discharge in 24-48 hrs to home.    Code Status: Level 3 - DNAR and DNI    Subjective   Patient endorses he is doing okay.  He still admits to a productive hacking cough.  He notes his breathing is not quite at baseline.  He has not gotten up to ambulate recently.  He is tolerating p.o. intake.  He recently had a bowel movement.  He is urinating frequently 2/2 diuretics.    Objective :  Temp:  [97.4 °F (36.3 °C)-97.9 °F (36.6 °C)] 97.9 °F (36.6 °C)  HR:  [60-64] 64  BP: (119-158)/(51-73) 120/51  Resp:  [21] 21  SpO2:  [91 %-100 %] 94 %  O2 Device: Nasal cannula  Nasal Cannula O2 Flow Rate (L/min):  [3 L/min] 3 L/min    Body mass index is 34.29 kg/m².     Input and Output Summary (last 24 hours):     Intake/Output Summary (Last 24 hours) at 6/3/2025 1220  Last data filed at 6/3/2025 1101  Gross per 24 hour   Intake 2379 ml   Output 1600 ml   Net 779 ml       Physical Exam  Constitutional:       Appearance: He is obese.      Comments: Elderly, hard of hearing   HENT:      Head: Normocephalic.      Right Ear: External ear normal.      Left Ear: External ear normal.      Nose: Nose normal.       Mouth/Throat:      Mouth: Mucous membranes are moist.      Pharynx: Oropharynx is clear.     Eyes:      Extraocular Movements: Extraocular movements intact.      Conjunctiva/sclera: Conjunctivae normal.       Cardiovascular:      Rate and Rhythm: Normal rate and regular rhythm.      Pulses: Normal pulses.      Heart sounds: Normal heart sounds.   Pulmonary:      Effort: Pulmonary effort is normal.      Breath sounds: Rales (Bibasilar, mild) present.      Comments: decreased air exchange bibasilarly, coarse  Abdominal:      General: Abdomen is flat. There is no distension.      Palpations: Abdomen is soft.      Tenderness: There is no abdominal tenderness. There is no guarding or rebound.      Comments: Obese abdomen     Musculoskeletal:         General: Swelling (2- 3+ lower extremity pitting edema) present.      Cervical back: Normal range of motion.     Skin:     General: Skin is warm and dry.      Coloration: Skin is not jaundiced.      Findings: No bruising or lesion.     Neurological:      General: No focal deficit present.      Mental Status: He is alert and oriented to person, place, and time. Mental status is at baseline.     Psychiatric:         Mood and Affect: Mood normal.         Behavior: Behavior normal.             Lab Results: I have reviewed the following results:   Results from last 7 days   Lab Units 06/03/25 0446 06/02/25  1011 06/01/25 0426 05/31/25  0938 05/30/25  1135   WBC Thousand/uL 21.90*   < > 21.01*   < > 15.91*   HEMOGLOBIN g/dL 7.5*   < > 7.7*   < > 8.4*   HEMATOCRIT % 25.7*   < > 26.4*   < > 29.1*   PLATELETS Thousands/uL 299   < > 275   < > 262   BANDS PCT %  --   --   --   --  3   LYMPHO PCT %  --   --  39  --  35   MONO PCT %  --   --  8  --  11   EOS PCT %  --   --   --   --  0    < > = values in this interval not displayed.     Results from last 7 days   Lab Units 06/03/25  0446 06/02/25  0529 06/01/25  0426 05/31/25  0938 05/30/25  1135   SODIUM mmol/L 138   < > 137   < > 138    POTASSIUM mmol/L 3.4*   < > 3.5   < > 4.6   CHLORIDE mmol/L 95*   < > 95*   < > 96   CO2 mmol/L 34*   < > 31   < > 27   BUN mg/dL 31*   < > 39*   < > 36*   CREATININE mg/dL 1.24   < > 1.66*   < > 1.81*   ANION GAP mmol/L 9   < > 11   < > 15*   CALCIUM mg/dL 8.6   < > 8.7   < > 9.0   ALBUMIN g/dL  --   --   --   --  3.9   TOTAL BILIRUBIN mg/dL  --   --  0.92  --  1.14*   ALK PHOS U/L  --   --   --   --  79   ALT U/L  --   --   --   --  9   AST U/L  --   --   --   --  13   GLUCOSE RANDOM mg/dL 141*   < > 112   < > 201*    < > = values in this interval not displayed.     Results from last 7 days   Lab Units 05/30/25  1320   INR  1.87*     Results from last 7 days   Lab Units 06/03/25  1129 06/03/25  0757 06/02/25  2117 06/02/25  1548 06/02/25  1047 06/02/25  0743 06/01/25  2124 06/01/25  1626 06/01/25  1123 06/01/25  0734 05/31/25  2105 05/31/25  1629   POC GLUCOSE mg/dl 212* 236* 247* 146* 224* 146* 154* 172* 162* 111 138 132     Results from last 7 days   Lab Units 05/28/25  0941   HEMOGLOBIN A1C % 5.2     Results from last 7 days   Lab Units 06/03/25  0446 06/01/25  0426 05/31/25  0539 05/30/25  2028 05/30/25  1655 05/30/25  1245 05/30/25  1135   LACTIC ACID mmol/L  --   --   --  1.3 2.8* 2.9*  --    PROCALCITONIN ng/ml 0.27* 0.56* 0.63*  --   --   --  0.57*       Recent Cultures (last 7 days):   Results from last 7 days   Lab Units 05/30/25  1725 05/30/25  1245   BLOOD CULTURE   --  No Growth at 72 hrs.  No Growth at 72 hrs.   LEGIONELLA URINARY ANTIGEN  Negative  --        Imaging Results Review: I reviewed radiology reports from this admission including: chest xray.  Other Study Results Review: EKG was reviewed.     Last 24 Hours Medication List:     Current Facility-Administered Medications:     acetaminophen (TYLENOL) tablet 650 mg, Q6H PRN    atorvastatin (LIPITOR) tablet 20 mg, Daily With Dinner    bumetanide (BUMEX) injection 3 mg, BID    busPIRone (BUSPAR) tablet 7.5 mg, BID    cefTRIAXone (ROCEPHIN)  IVPB (premix in dextrose) 2,000 mg 50 mL, Q24H, Last Rate: 2,000 mg (06/02/25 1313)    clonazePAM (KlonoPIN) tablet 1 mg, HS PRN    donepezil (ARICEPT) tablet 10 mg, QAM    DULoxetine (CYMBALTA) delayed release capsule 60 mg, Daily    ferrous sulfate tablet 325 mg, Every Other Day    [Held by provider] Fluticasone Furoate-Vilanterol 100-25 mcg/actuation 1 puff, Daily **AND** [Held by provider] umeclidinium 62.5 mcg/actuation inhaler AEPB 1 puff, Daily    hydrOXYzine HCL (ATARAX) tablet 50 mg, Q6H PRN    insulin glargine (LANTUS) subcutaneous injection 8 Units 0.08 mL, HS    insulin lispro (HumALOG/ADMELOG) 100 units/mL subcutaneous injection 1-5 Units, TID AC **AND** Fingerstick Glucose (POCT), TID AC    insulin lispro (HumALOG/ADMELOG) 100 units/mL subcutaneous injection 1-6 Units, HS    levalbuterol (XOPENEX) inhalation solution 1.25 mg, TID    levothyroxine tablet 137 mcg, Early Morning    losartan (COZAAR) tablet 25 mg, Daily    magnesium Oxide (MAG-OX) tablet 400 mg, BID    melatonin tablet 6 mg, HS    metoprolol succinate (TOPROL-XL) 24 hr tablet 25 mg, QAM    pantoprazole (PROTONIX) EC tablet 40 mg, Daily Before Breakfast    polyethylene glycol (MIRALAX) packet 17 g, Daily    rivaroxaban (XARELTO) tablet 15 mg, Daily With Dinner    senna (SENOKOT) tablet 8.6 mg, Daily    spironolactone (ALDACTONE) tablet 25 mg, Daily    Administrative Statements   Today, Patient Was Seen By: Usha Decker PA-C  I have spent a total time of 45 minutes in caring for this patient on the day of the visit/encounter including Diagnostic results, Prognosis, Risks and benefits of tx options, Instructions for management, Patient and family education, Impressions, Counseling / Coordination of care, Documenting in the medical record, Reviewing/placing orders in the medical record (including tests, medications, and/or procedures), Obtaining or reviewing history  , and Communicating with other healthcare professionals .    **Please Note:  This note may have been constructed using a voice recognition system.**

## 2025-06-03 NOTE — ASSESSMENT & PLAN NOTE
Lab Results   Component Value Date    HGBA1C 5.2 05/28/2025     Recent Labs     06/02/25  1548 06/02/25  2117 06/03/25  0757 06/03/25  1129   POCGLU 146* 247* 236* 212*     Blood Sugar Average: Last 72 hrs:  (P) 169.0812542615099805    Maintained on farxiga 10mg daily, glimerpiride 1mg daily, lantus 8U qhs, metformin 1000mg BID  C/w SSI AC/QHS, Accu-Cheks, CCD  Titrate Lantus from 5 to 8 U qhs

## 2025-06-03 NOTE — PLAN OF CARE
Problem: Potential for Falls  Goal: Patient will remain free of falls  Description: INTERVENTIONS:  - Educate patient/family on patient safety including physical limitations  - Instruct patient to call for assistance with activity   - Consider consulting OT/PT to assist with strengthening/mobility based on AM PAC & JH-HLM score  - Consult OT/PT to assist with strengthening/mobility   - Keep Call bell within reach  - Keep bed low and locked with side rails adjusted as appropriate  - Keep care items and personal belongings within reach  - Initiate and maintain comfort rounds  - Make Fall Risk Sign visible to staff  - Offer Toileting every 2 Hours, in advance of need  - Initiate/Maintain bed alarm  - Obtain necessary fall risk management equipment: socks  - Apply yellow socks and bracelet for high fall risk patients  - Consider moving patient to room near nurses station  Outcome: Progressing     Problem: PAIN - ADULT  Goal: Verbalizes/displays adequate comfort level or baseline comfort level  Description: Interventions:  - Encourage patient to monitor pain and request assistance  - Assess pain using appropriate pain scale  - Administer analgesics as ordered based on type and severity of pain and evaluate response  - Implement non-pharmacological measures as appropriate and evaluate response  - Consider cultural and social influences on pain and pain management  - Notify physician/advanced practitioner if interventions unsuccessful or patient reports new pain  - Educate patient/family on pain management process including their role and importance of  reporting pain   - Provide non-pharmacologic/complimentary pain relief interventions  Outcome: Progressing     Problem: INFECTION - ADULT  Goal: Absence or prevention of progression during hospitalization  Description: INTERVENTIONS:  - Assess and monitor for signs and symptoms of infection  - Monitor lab/diagnostic results  - Monitor all insertion sites, i.e. indwelling  lines, tubes, and drains  - Monitor endotracheal if appropriate and nasal secretions for changes in amount and color  - [place] appropriate cooling/warming therapies per order  - Administer medications as ordered  - Instruct and encourage patient and family to use good hand hygiene technique  - Identify and instruct in appropriate isolation precautions for identified infection/condition  Outcome: Progressing  Goal: Absence of fever/infection during neutropenic period  Description: INTERVENTIONS:  - Monitor WBC  - Perform strict hand hygiene  - Limit to healthy visitors only  - No plants, dried, fresh or silk flowers with damon in patient room  Outcome: Progressing

## 2025-06-03 NOTE — ASSESSMENT & PLAN NOTE
Recent Labs     06/01/25  0426 06/02/25  0529 06/03/25  0446   CREATININE 1.66* 1.41* 1.24   EGFR 36 44 52     Peak Cr. 1.84; baseline Cr. 1-1.2   Renal func improved   C/w urinary retention protocol & diuretics  Avoid hypotension/nephrotoxins

## 2025-06-03 NOTE — ASSESSMENT & PLAN NOTE
Recent Labs     06/01/25  0426 06/02/25  1011 06/03/25  0446   HGB 7.7* 7.9* 7.5*     Per chart review, baseline Hgb ~11  Normocytic anemia  No active bleeding   Folate  >22, B12 312   Iron panel mixed -inflammatory/SHELIA  C/w iron supplement   Haptoglobin, SPEP, free light chains pending   Appreciate hematology's recs

## 2025-06-03 NOTE — ASSESSMENT & PLAN NOTE
Criteria e/b tachypnea, leukocytosis, lactic acidosis, and NELLIE 2/2 PNA    CT chest  Severe bilateral consolidation and groundglass opacity, greatest in the right lower lobe, compatible with edema and/or pneumonia in the appropriate clinical setting   Lactic acid 2.9, now cleared   Procal: 0.57 > 0.63 > 0.56 > 0.27   BCx w/o growth x 48 hrs   S/p 3 days of Azithro; c/w Rocephin, day # 5  Hemodynamically stable

## 2025-06-03 NOTE — PROGRESS NOTES
Progress Note - Cardiology   Name: Trevor Lyons 84 y.o. male I MRN: [id number]  Unit/Bed#: -01 I Date of Admission: 5/30/2025   Date of Service: 6/3/2025 I Hospital Day: 4    Assessment & Plan  Acute combined systolic and diastolic congestive heart failure (HCC)  Wt Readings from Last 3 Encounters:   06/03/25 102 kg (225 lb 8 oz)   05/28/25 105 kg (232 lb)   03/20/25 99.7 kg (219 lb 12.8 oz)   Patient with a history of HFrEF -comes to [facility] emergency department on 5/30/2025 with progressive dyspnea, worsening cough and hypoxia.  Echo 10/21/25: EF 40 to 45%, mild global hypokinesis, RV dilatation, left atrial dilatation, well-functioning TAVR, moderate mitral MAC, moderate to severe MR regurgitation, mild MS, Moderate TR  GDMT: Farxiga 10 mg daily, losartan 25 mg daily, metoprolol succinate 25 mg daily, spironolactone 25 mg daily, torsemide 20 mg twice daily with potassium supplementation  Chest x-ray: Moderate pulmonary edema.    Currently on IV Bumex 3 mg twice daily overall good diuretic response, creatinine improving, weight down  Continues with rales bilateral, remains on O2, lower extremity edema. - will give and extra dose of diuretics today   Persistent atrial fibrillation (HCC)  Patient on Xarelto 20 mg daily for stroke prevention and metoprolol succinate 25 mg daily for rate control -he is 100% V paced at 65 with 100% A-fib burden  Sick sinus syndrome ([facility])  Patient has [employer] permanent pacemaker -set at VVI at 65, last pacemaker check 4/16/2025 shows 98% V paced with patient 100% A-fib  NELLIE (acute kidney injury) ([facility])  Creatinine on arrival 1.8  Acute on chronic respiratory failure ([facility])  Remains on 4 L nasal cannula.  Pneumonia    Hypertension    Anemia    Plan   Continue IV diuresis today - will give extra dose today  Anticipate transition to Oral diuretics in 24-48 hours  Restart losartan, spironolactone today.    Daily BMP  Strict I& O, daily standing  weights.    Subjective   Patient seen and examined - denies pain     Objective :  Temp:  [97.4 °F (36.3 °C)-97.9 °F (36.6 °C)] 97.9 °F (36.6 °C)  HR:  [60-64] 64  BP: (119-158)/(51-73) 120/51  Resp:  [21] 21  SpO2:  [91 %-100 %] 94 %  O2 Device: Nasal cannula  Nasal Cannula O2 Flow Rate (L/min):  [3 L/min] 3 L/min  Orthostatic Blood Pressures      Flowsheet Row Most Recent Value   Blood Pressure 120/51 filed at 06/03/2025 0923   Patient Position - Orthostatic VS Sitting filed at 06/01/2025 2129          First Weight: Weight - Scale: 105 kg (232 lb) (05/30/25 1403)  Vitals:    06/02/25 0541 06/03/25 0300   Weight: 101 kg (223 lb 8 oz) 102 kg (225 lb 8 oz)     Physical Exam  Constitutional:       Appearance: Normal appearance. He is obese.   HENT:      Head: Normocephalic and atraumatic.      Nose: Nose normal.      Mouth/Throat:      Mouth: Mucous membranes are moist.     Cardiovascular:      Pulses: Normal pulses.      Heart sounds: Normal heart sounds.   Pulmonary:      Breath sounds: Rales present.   Abdominal:      Palpations: Abdomen is soft.     Musculoskeletal:      Cervical back: Normal range of motion.      Right lower leg: Edema present.      Left lower leg: Edema present.     Skin:     General: Skin is warm.      Capillary Refill: Capillary refill takes less than 2 seconds.     Neurological:      General: No focal deficit present.      Mental Status: He is alert. Mental status is at baseline.     Psychiatric:         Mood and Affect: Mood normal.         Behavior: Behavior normal.         Thought Content: Thought content normal.         Lab Results: I have reviewed the following results:  Results from last 7 days   Lab Units 06/03/25  0446 06/02/25  1011 06/01/25  0426   WBC Thousand/uL 21.90* 20.72* 21.01*   HEMOGLOBIN g/dL 7.5* 7.9* 7.7*   HEMATOCRIT % 25.7* 27.5* 26.4*   PLATELETS Thousands/uL 299 288 275     Results from last 7 days   Lab Units 06/03/25  0446 06/02/25  0529 06/01/25  0426   POTASSIUM  mmol/L 3.4* 3.3* 3.5   CHLORIDE mmol/L 95* 96 95*   CO2 mmol/L 34* 32 31   BUN mg/dL 31* 35* 39*   CREATININE mg/dL 1.24 1.41* 1.66*   CALCIUM mg/dL 8.6 8.5 8.7     Results from last 7 days   Lab Units 05/30/25  1320   INR  1.87*   PTT seconds 52*     Lab Results   Component Value Date    HGBA1C 5.2 05/28/2025     Lab Results   Component Value Date    TROPONINI 0.04 10/21/2021       Imaging Results Review: I reviewed radiology reports from this admission including: chest xray and CT chest.  Other Study Results Review: EKG was reviewed.     VTE Pharmacologic Prophylaxis: VTE covered by:  rivaroxaban, Oral, 15 mg at 06/02/25 1712     VTE Mechanical Prophylaxis: sequential compression device

## 2025-06-03 NOTE — ASSESSMENT & PLAN NOTE
Wt Readings from Last 3 Encounters:   06/03/25 102 kg (225 lb 8 oz)   05/28/25 105 kg (232 lb)   03/20/25 99.7 kg (219 lb 12.8 oz)   Patient with a history of HFrEF -comes to [facility] emergency department on 5/30/2025 with progressive dyspnea, worsening cough and hypoxia.  Echo 10/21/25: EF 40 to 45%, mild global hypokinesis, RV dilatation, left atrial dilatation, well-functioning TAVR, moderate mitral MAC, moderate to severe MR regurgitation, mild MS, Moderate TR  GDMT: Farxiga 10 mg daily, losartan 25 mg daily, metoprolol succinate 25 mg daily, spironolactone 25 mg daily, torsemide 20 mg twice daily with potassium supplementation  Chest x-ray: Moderate pulmonary edema.    Currently on IV Bumex 3 mg twice daily overall good diuretic response, creatinine improving, weight down  Continues with rales bilateral, remains on O2, lower extremity edema. - will give and extra dose of diuretics today

## 2025-06-03 NOTE — ASSESSMENT & PLAN NOTE
Recent Labs     06/01/25  0426 06/02/25  1011 06/03/25  0446   WBC 21.01* 20.72* 21.90*     Uptrending  ID curbsided,given clinical improvement monitor; low threshold to reimage if symptoms worsen   Hematology consulted  F/u peripheral blood flow, LDH, haptoglobin, reticulocyte count, SPEP, free light chain   Possible reactionary w/ acute infxn?  Procal improving

## 2025-06-03 NOTE — ASSESSMENT & PLAN NOTE
Home regimen: Toprol-XL 25 mg  q day, Losartan 25 mg daily, & spironolactone 25 mg daily, c/w  BP acceptable

## 2025-06-03 NOTE — ASSESSMENT & PLAN NOTE
Met severe sepsis criteria on admission   CT chest: Severe bilateral consolidation and groundglass opacity, greatest in the right lower lobe, compatible with edema and/or pneumonia in the appropriate clinical setting   S/p 3 days of Azithro; c/w Rocephin, day # 5

## 2025-06-04 ENCOUNTER — TELEPHONE (OUTPATIENT)
Age: 85
End: 2025-06-04

## 2025-06-04 LAB
ANION GAP SERPL CALCULATED.3IONS-SCNC: 9 MMOL/L (ref 4–13)
ANISOCYTOSIS BLD QL SMEAR: PRESENT
BACTERIA BLD CULT: NORMAL
BACTERIA BLD CULT: NORMAL
BASOPHILS # BLD MANUAL: 0 THOUSAND/UL (ref 0–0.1)
BASOPHILS NFR MAR MANUAL: 0 % (ref 0–1)
BUN SERPL-MCNC: 31 MG/DL (ref 5–25)
CALCIUM SERPL-MCNC: 8.8 MG/DL (ref 8.4–10.2)
CHLORIDE SERPL-SCNC: 96 MMOL/L (ref 96–108)
CO2 SERPL-SCNC: 36 MMOL/L (ref 21–32)
CREAT SERPL-MCNC: 1.27 MG/DL (ref 0.6–1.3)
EOSINOPHIL # BLD MANUAL: 0.63 THOUSAND/UL (ref 0–0.4)
EOSINOPHIL NFR BLD MANUAL: 3 % (ref 0–6)
ERYTHROCYTE [DISTWIDTH] IN BLOOD BY AUTOMATED COUNT: 14.3 % (ref 11.6–15.1)
GFR SERPL CREATININE-BSD FRML MDRD: 50 ML/MIN/1.73SQ M
GLUCOSE SERPL-MCNC: 154 MG/DL (ref 65–140)
GLUCOSE SERPL-MCNC: 169 MG/DL (ref 65–140)
GLUCOSE SERPL-MCNC: 208 MG/DL (ref 65–140)
GLUCOSE SERPL-MCNC: 269 MG/DL (ref 65–140)
GLUCOSE SERPL-MCNC: 289 MG/DL (ref 65–140)
HCT VFR BLD AUTO: 27.1 % (ref 36.5–49.3)
HGB BLD-MCNC: 7.8 G/DL (ref 12–17)
LYMPHOCYTES # BLD AUTO: 29 % (ref 14–44)
LYMPHOCYTES # BLD AUTO: 6.12 THOUSAND/UL (ref 0.6–4.47)
MAGNESIUM SERPL-MCNC: 2.3 MG/DL (ref 1.9–2.7)
MCH RBC QN AUTO: 28.1 PG (ref 26.8–34.3)
MCHC RBC AUTO-ENTMCNC: 28.8 G/DL (ref 31.4–37.4)
MCV RBC AUTO: 98 FL (ref 82–98)
METAMYELOCYTE ABSOLUTE CT: 0.42 THOUSAND/UL (ref 0–0.1)
METAMYELOCYTES NFR BLD MANUAL: 2 % (ref 0–1)
MONOCYTES # BLD AUTO: 2.32 THOUSAND/UL (ref 0–1.22)
MONOCYTES NFR BLD: 11 % (ref 4–12)
MRSA NOSE QL CULT: NORMAL
MYELOCYTE ABSOLUTE CT: 0.21 THOUSAND/UL (ref 0–0.1)
MYELOCYTES NFR BLD MANUAL: 1 % (ref 0–1)
NEUTROPHILS # BLD MANUAL: 11.39 THOUSAND/UL (ref 1.85–7.62)
NEUTS SEG NFR BLD AUTO: 54 % (ref 43–75)
PLATELET # BLD AUTO: 324 THOUSANDS/UL (ref 149–390)
PLATELET BLD QL SMEAR: ADEQUATE
PMV BLD AUTO: 9 FL (ref 8.9–12.7)
POLYCHROMASIA BLD QL SMEAR: PRESENT
POTASSIUM SERPL-SCNC: 3.5 MMOL/L (ref 3.5–5.3)
RBC # BLD AUTO: 2.78 MILLION/UL (ref 3.88–5.62)
RBC MORPH BLD: PRESENT
SODIUM SERPL-SCNC: 141 MMOL/L (ref 135–147)
WBC # BLD AUTO: 21.09 THOUSAND/UL (ref 4.31–10.16)

## 2025-06-04 PROCEDURE — 88185 FLOWCYTOMETRY/TC ADD-ON: CPT | Performed by: STUDENT IN AN ORGANIZED HEALTH CARE EDUCATION/TRAINING PROGRAM

## 2025-06-04 PROCEDURE — 83735 ASSAY OF MAGNESIUM: CPT | Performed by: INTERNAL MEDICINE

## 2025-06-04 PROCEDURE — 85007 BL SMEAR W/DIFF WBC COUNT: CPT | Performed by: INTERNAL MEDICINE

## 2025-06-04 PROCEDURE — 80048 BASIC METABOLIC PNL TOTAL CA: CPT | Performed by: INTERNAL MEDICINE

## 2025-06-04 PROCEDURE — 94760 N-INVAS EAR/PLS OXIMETRY 1: CPT

## 2025-06-04 PROCEDURE — 88184 FLOWCYTOMETRY/ TC 1 MARKER: CPT | Performed by: STUDENT IN AN ORGANIZED HEALTH CARE EDUCATION/TRAINING PROGRAM

## 2025-06-04 PROCEDURE — 94640 AIRWAY INHALATION TREATMENT: CPT

## 2025-06-04 PROCEDURE — 82948 REAGENT STRIP/BLOOD GLUCOSE: CPT

## 2025-06-04 PROCEDURE — 99232 SBSQ HOSP IP/OBS MODERATE 35: CPT

## 2025-06-04 PROCEDURE — 99232 SBSQ HOSP IP/OBS MODERATE 35: CPT | Performed by: INTERNAL MEDICINE

## 2025-06-04 PROCEDURE — 85027 COMPLETE CBC AUTOMATED: CPT | Performed by: INTERNAL MEDICINE

## 2025-06-04 RX ORDER — BUMETANIDE 0.25 MG/ML
2 INJECTION, SOLUTION INTRAMUSCULAR; INTRAVENOUS ONCE
Status: COMPLETED | OUTPATIENT
Start: 2025-06-04 | End: 2025-06-04

## 2025-06-04 RX ORDER — BUMETANIDE 0.25 MG/ML
1 INJECTION INTRAMUSCULAR; INTRAVENOUS CONTINUOUS
Status: DISCONTINUED | OUTPATIENT
Start: 2025-06-04 | End: 2025-06-05

## 2025-06-04 RX ORDER — INSULIN LISPRO 100 [IU]/ML
5 INJECTION, SOLUTION INTRAVENOUS; SUBCUTANEOUS
Status: DISCONTINUED | OUTPATIENT
Start: 2025-06-04 | End: 2025-06-09

## 2025-06-04 RX ADMIN — LEVALBUTEROL HYDROCHLORIDE 1.25 MG: 1.25 SOLUTION RESPIRATORY (INHALATION) at 20:40

## 2025-06-04 RX ADMIN — LEVALBUTEROL HYDROCHLORIDE 1.25 MG: 1.25 SOLUTION RESPIRATORY (INHALATION) at 07:43

## 2025-06-04 RX ADMIN — INSULIN LISPRO 2 UNITS: 100 INJECTION, SOLUTION INTRAVENOUS; SUBCUTANEOUS at 12:07

## 2025-06-04 RX ADMIN — INSULIN LISPRO 3 UNITS: 100 INJECTION, SOLUTION INTRAVENOUS; SUBCUTANEOUS at 17:17

## 2025-06-04 RX ADMIN — DONEPEZIL HYDROCHLORIDE 10 MG: 5 TABLET ORAL at 08:14

## 2025-06-04 RX ADMIN — INSULIN LISPRO 5 UNITS: 100 INJECTION, SOLUTION INTRAVENOUS; SUBCUTANEOUS at 17:17

## 2025-06-04 RX ADMIN — LEVALBUTEROL HYDROCHLORIDE 1.25 MG: 1.25 SOLUTION RESPIRATORY (INHALATION) at 13:44

## 2025-06-04 RX ADMIN — LEVOTHYROXINE SODIUM 137 MCG: 112 TABLET ORAL at 05:51

## 2025-06-04 RX ADMIN — INSULIN LISPRO 2 UNITS: 100 INJECTION, SOLUTION INTRAVENOUS; SUBCUTANEOUS at 23:20

## 2025-06-04 RX ADMIN — Medication 1 MG/HR: at 12:15

## 2025-06-04 RX ADMIN — INSULIN LISPRO 1 UNITS: 100 INJECTION, SOLUTION INTRAVENOUS; SUBCUTANEOUS at 08:13

## 2025-06-04 RX ADMIN — Medication 1 MG/HR: at 18:38

## 2025-06-04 RX ADMIN — FERROUS SULFATE TAB 325 MG (65 MG ELEMENTAL FE) 325 MG: 325 (65 FE) TAB at 08:15

## 2025-06-04 RX ADMIN — BUSPIRONE HYDROCHLORIDE 7.5 MG: 15 TABLET ORAL at 08:14

## 2025-06-04 RX ADMIN — ACETAMINOPHEN 650 MG: 325 TABLET, FILM COATED ORAL at 19:48

## 2025-06-04 RX ADMIN — RIVAROXABAN 15 MG: 15 TABLET, FILM COATED ORAL at 17:17

## 2025-06-04 RX ADMIN — DULOXETINE HYDROCHLORIDE 60 MG: 30 CAPSULE, DELAYED RELEASE ORAL at 08:15

## 2025-06-04 RX ADMIN — Medication 400 MG: at 17:16

## 2025-06-04 RX ADMIN — SPIRONOLACTONE 25 MG: 25 TABLET, FILM COATED ORAL at 08:15

## 2025-06-04 RX ADMIN — METOPROLOL SUCCINATE 25 MG: 25 TABLET, EXTENDED RELEASE ORAL at 08:14

## 2025-06-04 RX ADMIN — BUSPIRONE HYDROCHLORIDE 7.5 MG: 15 TABLET ORAL at 17:17

## 2025-06-04 RX ADMIN — LOSARTAN POTASSIUM 25 MG: 25 TABLET, FILM COATED ORAL at 08:15

## 2025-06-04 RX ADMIN — PANTOPRAZOLE SODIUM 40 MG: 40 TABLET, DELAYED RELEASE ORAL at 05:51

## 2025-06-04 RX ADMIN — INSULIN GLARGINE 8 UNITS: 100 INJECTION, SOLUTION SUBCUTANEOUS at 23:20

## 2025-06-04 RX ADMIN — Medication 400 MG: at 08:15

## 2025-06-04 RX ADMIN — BUMETANIDE 2 MG: 0.25 INJECTION INTRAMUSCULAR; INTRAVENOUS at 12:14

## 2025-06-04 RX ADMIN — Medication 6 MG: at 23:20

## 2025-06-04 RX ADMIN — ATORVASTATIN CALCIUM 20 MG: 20 TABLET, FILM COATED ORAL at 17:16

## 2025-06-04 NOTE — ASSESSMENT & PLAN NOTE
Chronically on 2 L via NC PRN   Required up to 4 L; now stable on 3 L   Multifactorial w/ CHF and PNA and h/o COPD with inhalers held for uncertain reason.

## 2025-06-04 NOTE — ASSESSMENT & PLAN NOTE
Wt Readings from Last 3 Encounters:   06/04/25 103 kg (226 lb 12.8 oz)   05/28/25 105 kg (232 lb)   03/20/25 99.7 kg (219 lb 12.8 oz)     Presenting to the ED for LOPES.  ; CT chest w/ severe bilateral consolidations and ground glass opacity, greatest in the right lower lobe, compatible with edema and/or PNA   GDMT: Farxiga 10 mg daily, losartan 25 mg daily, metoprolol succinate 25 mg daily, spironolactone 25 mg daily, torsemide 20 mg twice daily w/ Kdur   ECHO (10/2024): EF 40-45%, mild global hypokinesis, atrial dilatation  Cardiology following, recs appreciated  S/p IV Lasix which was transitioned to IV Bumex.  Bumex 2 mg BID increased to 3 mg IV BID on 06/03.  Escalated to bumex gtt 6/4. Started telemetry monitoring.  Daily wts, I&O's   Appears net positive though uncertain that intakes are correct.  Weight downtrending from 232 --> 226 today.  Continue to monitor I&O's, though unclear accuracy   C/w losartan, metoprolol, spironolactone

## 2025-06-04 NOTE — PROGRESS NOTES
Progress Note - Cardiology   Name: Trevor Lyons 84 y.o. male I MRN: [id number]  Unit/Bed#: -01 I Date of Admission: 5/30/2025   Date of Service: 6/4/2025 I Hospital Day: 5    Assessment & Plan  Acute combined systolic and diastolic congestive heart failure (HCC)  Wt Readings from Last 3 Encounters:   06/04/25 103 kg (226 lb 12.8 oz)   05/28/25 105 kg (232 lb)   03/20/25 99.7 kg (219 lb 12.8 oz)   Patient with a history of HFrEF -comes to [facility] emergency department on 5/30/2025 with progressive dyspnea, worsening cough and hypoxia.  Echo 10/21/25: EF 40 to 45%, mild global hypokinesis, RV dilatation, left atrial dilatation, well-functioning TAVR, moderate mitral MAC, moderate to severe MR regurgitation, mild MS, Moderate TR  GDMT: Farxiga 10 mg daily, losartan 25 mg daily, metoprolol succinate 25 mg daily, spironolactone 25 mg daily, torsemide 20 mg twice daily with potassium supplementation  Chest x-ray: Moderate pulmonary edema.    Patient examined -remains volume overloaded with rales bilaterally and pitting edema in his lower extremities  - despite going up on the Bumex yesterday-will start Bumex drip today  Persistent atrial fibrillation (HCC)  Patient on Xarelto 20 mg daily for stroke prevention and metoprolol succinate 25 mg daily for rate control -he is 100% V paced at 65 with 100% A-fib burden  Sick sinus syndrome (HCC)  Patient has [employer] permanent pacemaker -set at VVI at 65, last pacemaker check 4/16/2025 shows 98% V paced with patient 100% A-fib  NELLIE (acute kidney injury) ([facility])  Creatinine on arrival 1.8 -trending down  Acute on chronic respiratory failure (HCC)  Remains on 4 L nasal cannula.  Pneumonia  Treated with antibiotics per primary team  Hypertension    Anemia    Plan  Bumex drip 1 mg an hour after 2 mg bolus  Daily BMP  Daily standing weights, strict I's and O's    Subjective   Patient seen and examined without complaints    Objective :  Temp:  [97.9 °F  (36.6 °C)-98.3 °F (36.8 °C)] 97.9 °F (36.6 °C)  HR:  [59-60] 59  BP: (112-123)/(47-52) 114/47  Resp:  [16-18] 18  SpO2:  [88 %-94 %] 91 %  O2 Device: Nasal cannula  Nasal Cannula O2 Flow Rate (L/min):  [2 L/min-3 L/min] 2 L/min  Orthostatic Blood Pressures      Flowsheet Row Most Recent Value   Blood Pressure 114/47 filed at 06/04/2025 0717   Patient Position - Orthostatic VS Sitting filed at 06/03/2025 1422          First Weight: Weight - Scale: 105 kg (232 lb) (05/30/25 1403)  Vitals:    06/03/25 0300 06/04/25 0600   Weight: 102 kg (225 lb 8 oz) 103 kg (226 lb 12.8 oz)     Physical Exam  Constitutional:       Appearance: Normal appearance. He is obese.   HENT:      Head: Normocephalic and atraumatic.      Nose: Nose normal.      Mouth/Throat:      Mouth: Mucous membranes are moist.     Cardiovascular:      Rate and Rhythm: Normal rate. Rhythm irregular.      Pulses: Normal pulses.      Heart sounds: Normal heart sounds.   Pulmonary:      Effort: Pulmonary effort is normal.      Breath sounds: Rales present.   Abdominal:      Palpations: Abdomen is soft.     Musculoskeletal:      Cervical back: Normal range of motion.      Right lower leg: Edema present.      Left lower leg: Edema present.     Skin:     General: Skin is warm.      Capillary Refill: Capillary refill takes less than 2 seconds.     Neurological:      General: No focal deficit present.      Mental Status: He is alert and oriented to person, place, and time. Mental status is at baseline.     Psychiatric:         Mood and Affect: Mood normal.         Behavior: Behavior normal.         Thought Content: Thought content normal.         Judgment: Judgment normal.         Lab Results: I have reviewed the following results:  Results from last 7 days   Lab Units 06/04/25  0605 06/03/25  0446 06/02/25  1011   WBC Thousand/uL 21.09* 21.90* 20.72*   HEMOGLOBIN g/dL 7.8* 7.5* 7.9*   HEMATOCRIT % 27.1* 25.7* 27.5*   PLATELETS Thousands/uL 324 299 288     Results  from last 7 days   Lab Units 06/04/25  0605 06/03/25  0446 06/02/25  0529   POTASSIUM mmol/L 3.5 3.4* 3.3*   CHLORIDE mmol/L 96 95* 96   CO2 mmol/L 36* 34* 32   BUN mg/dL 31* 31* 35*   CREATININE mg/dL 1.27 1.24 1.41*   CALCIUM mg/dL 8.8 8.6 8.5     Results from last 7 days   Lab Units 05/30/25  1320   INR  1.87*   PTT seconds 52*     Lab Results   Component Value Date    HGBA1C 5.2 05/28/2025     Lab Results   Component Value Date    TROPONINI 0.04 10/21/2021       Imaging Results Review: I reviewed radiology reports from this admission including: chest xray and CT chest.  Other Study Results Review: EKG was reviewed.     VTE Pharmacologic Prophylaxis: VTE covered by:  rivaroxaban, Oral, 15 mg at 06/03/25 1726     VTE Mechanical Prophylaxis: sequential compression device

## 2025-06-04 NOTE — ASSESSMENT & PLAN NOTE
Recent Labs     06/02/25  1011 06/03/25  0446 06/04/25  0605   HGB 7.9* 7.5* 7.8*     Per chart review, baseline Hgb ~11  Normocytic anemia  No active bleeding   Folate  >22, B12 312   Iron panel mixed -inflammatory/SHELIA  C/w iron supplement   Haptoglobin, SPEP, free light chains pending   Appreciate hematology's recs

## 2025-06-04 NOTE — ASSESSMENT & PLAN NOTE
Lab Results   Component Value Date    HGBA1C 5.2 05/28/2025     Recent Labs     06/03/25  1129 06/03/25  1724 06/03/25  2156 06/04/25  0742   POCGLU 212* 231* 204* 154*     Blood Sugar Average: Last 72 hrs:  (P) [id number]    Maintained on farxiga 10mg daily, glimerpiride 1mg daily, lantus 8U qhs, metformin 1000mg BID  C/w SSI AC/QHS, Accu-Cheks, CCD  Titrate Lantus from 5 to 8 U qhs

## 2025-06-04 NOTE — TELEPHONE ENCOUNTER
Attempt to call  but Theo Sheikh was the one who answer the call since I didn't see Theo name on the medical consent I advise Theo to give us a call back . Theo did state he is the one that handles all of  appointments . Theo will call back please schedule a new patient appointment . Thank you

## 2025-06-04 NOTE — PROGRESS NOTES
Progress Note - Hospitalist   Name: Trevor Lyons 84 y.o. male I MRN: [id number]  Unit/Bed#: -01 I Date of Admission: 5/30/2025   Date of Service: 6/4/2025 I Hospital Day: 5    Assessment & Plan  Acute combined systolic and diastolic congestive heart failure (HCC)  Wt Readings from Last 3 Encounters:   06/04/25 103 kg (226 lb 12.8 oz)   05/28/25 105 kg (232 lb)   03/20/25 99.7 kg (219 lb 12.8 oz)     Presenting to the ED for LOPES.  ; CT chest w/ severe bilateral consolidations and ground glass opacity, greatest in the right lower lobe, compatible with edema and/or PNA   GDMT: Farxiga 10 mg daily, losartan 25 mg daily, metoprolol succinate 25 mg daily, spironolactone 25 mg daily, torsemide 20 mg twice daily w/ Kdur   ECHO (10/2024): EF 40-45%, mild global hypokinesis, atrial dilatation  Cardiology following, recs appreciated  S/p IV Lasix which was transitioned to IV Bumex.  Bumex 2 mg BID increased to 3 mg IV BID on 06/03.  Escalated to bumex gtt 6/4. Started telemetry monitoring.  Daily wts, I&O's   Appears net positive though uncertain that intakes are correct.  Weight downtrending from 232 --> 226 today.  Continue to monitor I&O's, though unclear accuracy   C/w losartan, metoprolol, spironolactone  Severe sepsis (HCC)  Criteria e/b tachypnea, leukocytosis, lactic acidosis, and NELLIE 2/2 PNA    CT chest  Severe bilateral consolidation and groundglass opacity, greatest in the right lower lobe, compatible with edema and/or pneumonia in the appropriate clinical setting   Lactic acid 2.9, now cleared   Procal: 0.57 > 0.63 > 0.56 > 0.27   Bcx NGTD  S/p 3 days of Azithro; 5 days of rocephin  Hemodynamically stable  Pneumonia  Met severe sepsis criteria on admission   CT chest: Severe bilateral consolidation and groundglass opacity, greatest in the right lower lobe, compatible with edema and/or pneumonia in the appropriate clinical setting   S/p 3 days of Azithro; 5 days of rocephin.  Acute on chronic  respiratory failure (HCC)  Chronically on 2 L via NC PRN   Required up to 4 L; now stable on 3 L   Multifactorial w/ CHF and PNA and h/o COPD with inhalers held for uncertain reason.  Leukocytosis  Recent Labs     06/02/25  1011 06/03/25  0446 06/04/25  0605   WBC 20.72* 21.90* 21.09*     Stable at 20k  ID curbsided,given clinical improvement monitor; low threshold to reimage if symptoms worsen   Hematology consulted  F/u peripheral blood flow, SPEP which remain pending.  Elevated haptoglobin, elevated Ig kappa/ Ig kal free light chains.  Reticulocyte count mildly elevated  LDH elevated  Possible reactionary w/ acute infxn?  Procal improving   Anemia  Recent Labs     06/02/25  1011 06/03/25  0446 06/04/25  0605   HGB 7.9* 7.5* 7.8*     Per chart review, baseline Hgb ~11  Normocytic anemia  No active bleeding   Folate  >22, B12 312   Iron panel mixed -inflammatory/SHELIA  C/w iron supplement   Haptoglobin, SPEP, free light chains pending   Appreciate hematology's recs  NELLIE (acute kidney injury) (HCC)  Recent Labs     06/02/25  0529 06/03/25  0446 06/04/25  0605   CREATININE 1.41* 1.24 1.27   EGFR 44 52 50     Peak Cr. 1.84; baseline Cr. 1-1.2   Renal func improved   C/w urinary retention protocol & diuretics  Avoid hypotension/nephrotoxins  Persistent atrial fibrillation (HCC)  Rate controlled   C/w Toprol-XL 25 mg  q day & Xarelto  Sick sinus syndrome (HCC)  S/p pacemaker placement  Hypertension  Home regimen: Toprol-XL 25 mg  q day, Losartan 25 mg daily, & spironolactone 25 mg daily, c/w  BP acceptable   Type 2 diabetes mellitus without complication, with long-term current use of insulin ([facility])  Lab Results   Component Value Date    HGBA1C 5.2 05/28/2025     Recent Labs     06/03/25  1129 06/03/25  1724 06/03/25  2156 06/04/25  0742   POCGLU 212* 231* 204* 154*     Blood Sugar Average: Last 72 hrs:  (P) [id number]    Maintained on farxiga 10mg daily, glimerpiride 1mg daily, lantus 8U qhs, metformin 1000mg  BID  C/w SSI AC/QHS, Accu-Cheks, CCD  Titrate Lantus from 5 to 8 U qhs   Acquired hypothyroidism  TSH 6.2 in 05/2025, T4 therapeutic; recommend outpt monitoring in 4-6 wks   Cw levothyroxine    VTE Pharmacologic Prophylaxis:   Moderate Risk (Score 3-4) - Pharmacological DVT Prophylaxis Ordered: rivaroxaban (Xarelto).    Mobility:   Basic Mobility Inpatient Raw Score: 19  JH-HLM Goal: 6: Walk 10 steps or more  JH-HLM Achieved: 7: Walk 25 feet or more  JH-HLM Goal achieved. Continue to encourage appropriate mobility.    Patient Centered Rounds: I performed bedside rounds with nursing staff today.   Discussions with Specialists or Other Care Team Provider: bridget LOPEZ    Education and Discussions with Family / Patient: Updated  (daughter) via phone.    Current Length of Stay: 5 day(s)  Current Patient Status: Inpatient   Certification Statement: The patient will continue to require additional inpatient hospital stay due to volume management with IV diuretics.  Discharge Plan: Anticipate discharge in 24-48 hrs to prior assisted or independent living facility.    Code Status: Level 3 - DNAR and DNI    Subjective   Still feeling SOB and poorly generally.     Objective :  Temp:  [97.9 °F (36.6 °C)-98.3 °F (36.8 °C)] 97.9 °F (36.6 °C)  HR:  [59-64] 59  BP: (112-123)/(47-52) 114/47  Resp:  [16-21] 18  SpO2:  [88 %-94 %] 90 %  O2 Device: Nasal cannula  Nasal Cannula O2 Flow Rate (L/min):  [2 L/min-3 L/min] 2 L/min    Body mass index is 34.48 kg/m².     Input and Output Summary (last 24 hours):     Intake/Output Summary (Last 24 hours) at 6/4/2025 0749  Last data filed at 6/4/2025 0601  Gross per 24 hour   Intake 1404 ml   Output 1170 ml   Net 234 ml       Physical Exam  Vitals reviewed.   Constitutional:       General: He is in acute distress.      Appearance: Normal appearance. He is ill-appearing.   HENT:      Head: Normocephalic and atraumatic.     Eyes:      General: No scleral icterus.      Cardiovascular:       Rate and Rhythm: Normal rate and regular rhythm.      Heart sounds: Normal heart sounds.   Pulmonary:      Breath sounds: No stridor. Decreased breath sounds and rales (bibasilar) present.      Comments: Increased WOB.  Abdominal:      General: Abdomen is flat. Bowel sounds are normal. There is no distension.      Palpations: Abdomen is soft.     Musculoskeletal:         General: Normal range of motion.      Cervical back: Normal range of motion.      Right lower leg: Edema present.      Left lower leg: Edema present.      Comments: +2 pitting bilaterally.     Skin:     Coloration: Skin is pale. Skin is not jaundiced.     Neurological:      Mental Status: He is alert.           Lab Results: I have reviewed the following results:   Results from last 7 days   Lab Units 06/04/25  0605 06/02/25  1011 06/01/25 0426 05/31/25 0938 05/30/25  1135   WBC Thousand/uL 21.09*   < > 21.01*   < > 15.91*   HEMOGLOBIN g/dL 7.8*   < > 7.7*   < > 8.4*   HEMATOCRIT % 27.1*   < > 26.4*   < > 29.1*   PLATELETS Thousands/uL 324   < > 275   < > 262   BANDS PCT %  --   --   --   --  3   LYMPHO PCT %  --   --  39  --  35   MONO PCT %  --   --  8  --  11   EOS PCT %  --   --   --   --  0    < > = values in this interval not displayed.     Results from last 7 days   Lab Units 06/04/25  0605 06/02/25  0529 06/01/25  0426 05/31/25  0938 05/30/25  1135   SODIUM mmol/L 141   < > 137   < > 138   POTASSIUM mmol/L 3.5   < > 3.5   < > 4.6   CHLORIDE mmol/L 96   < > 95*   < > 96   CO2 mmol/L 36*   < > 31   < > 27   BUN mg/dL 31*   < > 39*   < > 36*   CREATININE mg/dL 1.27   < > 1.66*   < > 1.81*   ANION GAP mmol/L 9   < > 11   < > 15*   CALCIUM mg/dL 8.8   < > 8.7   < > 9.0   ALBUMIN g/dL  --   --   --   --  3.9   TOTAL BILIRUBIN mg/dL  --   --  0.92  --  1.14*   ALK PHOS U/L  --   --   --   --  79   ALT U/L  --   --   --   --  9   AST U/L  --   --   --   --  13   GLUCOSE RANDOM mg/dL 169*   < > 112   < > 201*    < > = values in this interval not  displayed.     Results from last 7 days   Lab Units 05/30/25  1320   INR  1.87*     Results from last 7 days   Lab Units 06/04/25  0742 06/03/25  2156 06/03/25  1724 06/03/25  1129 06/03/25  0757 06/02/25  2117 06/02/25  1548 06/02/25  1047 06/02/25  0743 06/01/25  2124 06/01/25  1626 06/01/25  1123   POC GLUCOSE mg/dl 154* 204* 231* 212* 236* 247* 146* 224* 146* 154* 172* 162*     Results from last 7 days   Lab Units 05/28/25  0941   HEMOGLOBIN A1C % 5.2     Results from last 7 days   Lab Units 06/03/25  0446 06/01/25  0426 05/31/25  0539 05/30/25  2028 05/30/25  1655 05/30/25  1245 05/30/25  1135   LACTIC ACID mmol/L  --   --   --  1.3 2.8* 2.9*  --    PROCALCITONIN ng/ml 0.27* 0.56* 0.63*  --   --   --  0.57*       Recent Cultures (last 7 days):   Results from last 7 days   Lab Units 05/30/25  1725 05/30/25  1245   BLOOD CULTURE   --  No Growth After 4 Days.  No Growth After 4 Days.   LEGIONELLA URINARY ANTIGEN  Negative  --        Imaging Results Review: No pertinent imaging studies reviewed.  Other Study Results Review: No additional pertinent studies reviewed.    Last 24 Hours Medication List:     Current Facility-Administered Medications:     acetaminophen (TYLENOL) tablet 650 mg, Q6H PRN    atorvastatin (LIPITOR) tablet 20 mg, Daily With Dinner    busPIRone (BUSPAR) tablet 7.5 mg, BID    cefTRIAXone (ROCEPHIN) IVPB (premix in dextrose) 2,000 mg 50 mL, Q24H, Last Rate: 2,000 mg (06/03/25 1322)    clonazePAM (KlonoPIN) tablet 1 mg, HS PRN    donepezil (ARICEPT) tablet 10 mg, QAM    DULoxetine (CYMBALTA) delayed release capsule 60 mg, Daily    ferrous sulfate tablet 325 mg, Every Other Day    [Held by provider] Fluticasone Furoate-Vilanterol 100-25 mcg/actuation 1 puff, Daily **AND** [Held by provider] umeclidinium 62.5 mcg/actuation inhaler AEPB 1 puff, Daily    hydrOXYzine HCL (ATARAX) tablet 50 mg, Q6H PRN    insulin glargine (LANTUS) subcutaneous injection 8 Units 0.08 mL, HS    insulin lispro  (HumALOG/ADMELOG) 100 units/mL subcutaneous injection 1-5 Units, TID AC **AND** Fingerstick Glucose (POCT), TID AC    insulin lispro (HumALOG/ADMELOG) 100 units/mL subcutaneous injection 1-6 Units, HS    levalbuterol (XOPENEX) inhalation solution 1.25 mg, TID    levothyroxine tablet 137 mcg, Early Morning    losartan (COZAAR) tablet 25 mg, Daily    magnesium Oxide (MAG-OX) tablet 400 mg, BID    melatonin tablet 6 mg, HS    metoprolol succinate (TOPROL-XL) 24 hr tablet 25 mg, QAM    pantoprazole (PROTONIX) EC tablet 40 mg, Daily Before Breakfast    polyethylene glycol (MIRALAX) packet 17 g, Daily    rivaroxaban (XARELTO) tablet 15 mg, Daily With Dinner    senna (SENOKOT) tablet 8.6 mg, Daily    spironolactone (ALDACTONE) tablet 25 mg, Daily    Administrative Statements   Today, Patient Was Seen By: Aurea Germain PA-C    **Please Note: This note may have been constructed using a voice recognition system.**

## 2025-06-04 NOTE — PLAN OF CARE
Problem: Potential for Falls  Goal: Patient will remain free of falls  Description: INTERVENTIONS:  - Educate patient/family on patient safety including physical limitations  - Instruct patient to call for assistance with activity   - Consider consulting OT/PT to assist with strengthening/mobility based on AM PAC & JH-HLM score  - Consult OT/PT to assist with strengthening/mobility   - Keep Call bell within reach  - Keep bed low and locked with side rails adjusted as appropriate  - Keep care items and personal belongings within reach  - Initiate and maintain comfort rounds  - Make Fall Risk Sign visible to staff  - Offer Toileting every 2 Hours, in advance of need  - Initiate/Maintain bed alarm  - Obtain necessary fall risk management equipment: socks  - Apply yellow socks and bracelet for high fall risk patients  - Consider moving patient to room near nurses station  Outcome: Progressing     Problem: PAIN - ADULT  Goal: Verbalizes/displays adequate comfort level or baseline comfort level  Description: Interventions:  - Encourage patient to monitor pain and request assistance  - Assess pain using appropriate pain scale  - Administer analgesics as ordered based on type and severity of pain and evaluate response  - Implement non-pharmacological measures as appropriate and evaluate response  - Consider cultural and social influences on pain and pain management  - Notify physician/advanced practitioner if interventions unsuccessful or patient reports new pain  - Educate patient/family on pain management process including their role and importance of  reporting pain   - Provide non-pharmacologic/complimentary pain relief interventions  Outcome: Progressing     Problem: INFECTION - ADULT  Goal: Absence or prevention of progression during hospitalization  Description: INTERVENTIONS:  - Assess and monitor for signs and symptoms of infection  - Monitor lab/diagnostic results  - Monitor all insertion sites, i.e. indwelling  lines, tubes, and drains  - Monitor endotracheal if appropriate and nasal secretions for changes in amount and color  - [place] appropriate cooling/warming therapies per order  - Administer medications as ordered  - Instruct and encourage patient and family to use good hand hygiene technique  - Identify and instruct in appropriate isolation precautions for identified infection/condition  Outcome: Progressing     Problem: SAFETY ADULT  Goal: Patient will remain free of falls  Description: INTERVENTIONS:  - Educate patient/family on patient safety including physical limitations  - Instruct patient to call for assistance with activity   - Consider consulting OT/PT to assist with strengthening/mobility based on AM PAC & -HLM score  - Consult OT/PT to assist with strengthening/mobility   - Keep Call bell within reach  - Keep bed low and locked with side rails adjusted as appropriate  - Keep care items and personal belongings within reach  - Initiate and maintain comfort rounds  - Make Fall Risk Sign visible to staff  - Offer Toileting every 2 Hours, in advance of need  - Initiate/Maintain bed alarm  - Obtain necessary fall risk management equipment: socks  - Apply yellow socks and bracelet for high fall risk patients  - Consider moving patient to room near nurses station  Outcome: Progressing  Goal: Maintain or return to baseline ADL function  Description: INTERVENTIONS:  -  Assess patient's ability to carry out ADLs; assess patient's baseline for ADL function and identify physical deficits which impact ability to perform ADLs (bathing, care of mouth/teeth, toileting, grooming, dressing, etc.)  - Assess/evaluate cause of self-care deficits   - Assess range of motion  - Assess patient's mobility; develop plan if impaired  - Assess patient's need for assistive devices and provide as appropriate  - Encourage maximum independence but intervene and supervise when necessary  - Involve family in performance of ADLs  -  Assess for home care needs following discharge   - Consider OT consult to assist with ADL evaluation and planning for discharge  - Provide patient education as appropriate  - Monitor functional capacity and physical performance, use of AM PAC & JH-HLM   - Monitor gait, balance and fatigue with ambulation    Outcome: Progressing  Goal: Maintains/Returns to pre admission functional level  Description: INTERVENTIONS:  - Perform AM-PAC 6 Click Basic Mobility/ Daily Activity assessment daily.  - Set and communicate daily mobility goal to care team and patient/family/caregiver.   - Collaborate with rehabilitation services on mobility goals if consulted  - Perform Range of Motion 2 times a day.  - Reposition patient every 2 hours.  - Dangle patient 2 times a day  - Stand patient 2 times a day  - Ambulate patient 2 times a day  - Out of bed to chair 2 times a day   - Out of bed for meals 2 times a day  - Out of bed for toileting  - Record patient progress and toleration of activity level   Outcome: Progressing     Problem: DISCHARGE PLANNING  Goal: Discharge to home or other facility with appropriate resources  Description: INTERVENTIONS:  - Identify barriers to discharge w/patient and caregiver  - Arrange for needed discharge resources and transportation as appropriate  - Identify discharge learning needs (meds, wound care, etc.)  - Arrange for interpretive services to assist at discharge as needed  - Refer to Case Management Department for coordinating discharge planning if the patient needs post-hospital services based on physician/advanced practitioner order or complex needs related to functional status, cognitive ability, or social support system  Outcome: Progressing     Problem: Knowledge Deficit  Goal: Patient/family/caregiver demonstrates understanding of disease process, treatment plan, medications, and discharge instructions  Description: Complete learning assessment and assess knowledge base.  Interventions:  -  Provide teaching at level of understanding  - Provide teaching via preferred learning methods  Outcome: Progressing

## 2025-06-04 NOTE — ASSESSMENT & PLAN NOTE
Wt Readings from Last 3 Encounters:   06/04/25 103 kg (226 lb 12.8 oz)   05/28/25 105 kg (232 lb)   03/20/25 99.7 kg (219 lb 12.8 oz)   Patient with a history of HFrEF -comes to [facility] emergency department on 5/30/2025 with progressive dyspnea, worsening cough and hypoxia.  Echo 10/21/25: EF 40 to 45%, mild global hypokinesis, RV dilatation, left atrial dilatation, well-functioning TAVR, moderate mitral MAC, moderate to severe MR regurgitation, mild MS, Moderate TR  GDMT: Farxiga 10 mg daily, losartan 25 mg daily, metoprolol succinate 25 mg daily, spironolactone 25 mg daily, torsemide 20 mg twice daily with potassium supplementation  Chest x-ray: Moderate pulmonary edema.    Patient examined -remains volume overloaded with rales bilaterally and pitting edema in his lower extremities  - despite going up on the Bumex yesterday-will start Bumex drip today

## 2025-06-04 NOTE — ASSESSMENT & PLAN NOTE
Recent Labs     06/02/25  0529 06/03/25  0446 06/04/25  0605   CREATININE 1.41* 1.24 1.27   EGFR 44 52 50     Peak Cr. 1.84; baseline Cr. 1-1.2   Renal func improved   C/w urinary retention protocol & diuretics  Avoid hypotension/nephrotoxins

## 2025-06-04 NOTE — ASSESSMENT & PLAN NOTE
Criteria e/b tachypnea, leukocytosis, lactic acidosis, and NELLIE 2/2 PNA    CT chest  Severe bilateral consolidation and groundglass opacity, greatest in the right lower lobe, compatible with edema and/or pneumonia in the appropriate clinical setting   Lactic acid 2.9, now cleared   Procal: 0.57 > 0.63 > 0.56 > 0.27   Bcx NGTD  S/p 3 days of Azithro; 5 days of rocephin  Hemodynamically stable

## 2025-06-04 NOTE — ASSESSMENT & PLAN NOTE
Recent Labs     06/02/25  1011 06/03/25  0446 06/04/25  0605   WBC 20.72* 21.90* 21.09*     Stable at 20k  ID curbsided,given clinical improvement monitor; low threshold to reimage if symptoms worsen   Hematology consulted  F/u peripheral blood flow, SPEP which remain pending.  Elevated haptoglobin, elevated Ig kappa/ Ig kal free light chains.  Reticulocyte count mildly elevated  LDH elevated  Possible reactionary w/ acute infxn?  Procal improving

## 2025-06-04 NOTE — ASSESSMENT & PLAN NOTE
Met severe sepsis criteria on admission   CT chest: Severe bilateral consolidation and groundglass opacity, greatest in the right lower lobe, compatible with edema and/or pneumonia in the appropriate clinical setting   S/p 3 days of Azithro; 5 days of rocephin.

## 2025-06-05 PROBLEM — A41.9 SEVERE SEPSIS (HCC): Status: RESOLVED | Noted: 2025-05-30 | Resolved: 2025-06-05

## 2025-06-05 PROBLEM — N18.30 STAGE 3 CHRONIC KIDNEY DISEASE (HCC): Status: ACTIVE | Noted: 2025-06-05

## 2025-06-05 PROBLEM — R65.20 SEVERE SEPSIS (HCC): Status: RESOLVED | Noted: 2025-05-30 | Resolved: 2025-06-05

## 2025-06-05 PROBLEM — J18.9 PNEUMONIA: Status: RESOLVED | Noted: 2025-05-30 | Resolved: 2025-06-05

## 2025-06-05 PROBLEM — N17.9 AKI (ACUTE KIDNEY INJURY) (HCC): Status: RESOLVED | Noted: 2025-01-28 | Resolved: 2025-06-05

## 2025-06-05 LAB
ALBUMIN SERPL ELPH-MCNC: 3.05 G/DL (ref 3.2–5.1)
ALBUMIN SERPL ELPH-MCNC: 46.9 % (ref 48–70)
ALPHA1 GLOB SERPL ELPH-MCNC: 0.62 G/DL (ref 0.15–0.47)
ALPHA1 GLOB SERPL ELPH-MCNC: 9.6 % (ref 1.8–7)
ALPHA2 GLOB SERPL ELPH-MCNC: 1.1 G/DL (ref 0.42–1.04)
ALPHA2 GLOB SERPL ELPH-MCNC: 16.9 % (ref 5.9–14.9)
ANION GAP SERPL CALCULATED.3IONS-SCNC: 8 MMOL/L (ref 4–13)
BETA GLOB ABNORMAL SERPL ELPH-MCNC: 0.46 G/DL (ref 0.31–0.57)
BETA1 GLOB SERPL ELPH-MCNC: 7.1 % (ref 4.7–7.7)
BETA2 GLOB SERPL ELPH-MCNC: 6.5 % (ref 3.1–7.9)
BETA2+GAMMA GLOB SERPL ELPH-MCNC: 0.42 G/DL (ref 0.2–0.58)
BUN SERPL-MCNC: 36 MG/DL (ref 5–25)
CALCIUM SERPL-MCNC: 8.5 MG/DL (ref 8.4–10.2)
CHLORIDE SERPL-SCNC: 95 MMOL/L (ref 96–108)
CO2 SERPL-SCNC: 34 MMOL/L (ref 21–32)
CREAT SERPL-MCNC: 1.31 MG/DL (ref 0.6–1.3)
ERYTHROCYTE [DISTWIDTH] IN BLOOD BY AUTOMATED COUNT: 14.4 % (ref 11.6–15.1)
GAMMA GLOB ABNORMAL SERPL ELPH-MCNC: 0.85 G/DL (ref 0.4–1.66)
GAMMA GLOB SERPL ELPH-MCNC: 13 % (ref 6.9–22.3)
GFR SERPL CREATININE-BSD FRML MDRD: 48 ML/MIN/1.73SQ M
GLUCOSE SERPL-MCNC: 186 MG/DL (ref 65–140)
GLUCOSE SERPL-MCNC: 192 MG/DL (ref 65–140)
GLUCOSE SERPL-MCNC: 201 MG/DL (ref 65–140)
GLUCOSE SERPL-MCNC: 204 MG/DL (ref 65–140)
GLUCOSE SERPL-MCNC: 230 MG/DL (ref 65–140)
HCT VFR BLD AUTO: 26.1 % (ref 36.5–49.3)
HGB BLD-MCNC: 7.6 G/DL (ref 12–17)
IGG/ALB SER: 0.88 {RATIO} (ref 1.1–1.8)
MAGNESIUM SERPL-MCNC: 2.2 MG/DL (ref 1.9–2.7)
MCH RBC QN AUTO: 28.3 PG (ref 26.8–34.3)
MCHC RBC AUTO-ENTMCNC: 29.1 G/DL (ref 31.4–37.4)
MCV RBC AUTO: 97 FL (ref 82–98)
PLATELET # BLD AUTO: 335 THOUSANDS/UL (ref 149–390)
PMV BLD AUTO: 8.8 FL (ref 8.9–12.7)
POTASSIUM SERPL-SCNC: 3.4 MMOL/L (ref 3.5–5.3)
PROT SERPL-MCNC: 6.5 G/DL (ref 6.4–8.4)
RBC # BLD AUTO: 2.69 MILLION/UL (ref 3.88–5.62)
SCAN RESULT: NORMAL
SODIUM SERPL-SCNC: 137 MMOL/L (ref 135–147)
WBC # BLD AUTO: 20.75 THOUSAND/UL (ref 4.31–10.16)

## 2025-06-05 PROCEDURE — 85027 COMPLETE CBC AUTOMATED: CPT

## 2025-06-05 PROCEDURE — 84165 PROTEIN E-PHORESIS SERUM: CPT | Performed by: PATHOLOGY

## 2025-06-05 PROCEDURE — 86334 IMMUNOFIX E-PHORESIS SERUM: CPT | Performed by: PATHOLOGY

## 2025-06-05 PROCEDURE — 99232 SBSQ HOSP IP/OBS MODERATE 35: CPT | Performed by: INTERNAL MEDICINE

## 2025-06-05 PROCEDURE — 83735 ASSAY OF MAGNESIUM: CPT

## 2025-06-05 PROCEDURE — 80048 BASIC METABOLIC PNL TOTAL CA: CPT

## 2025-06-05 PROCEDURE — 94760 N-INVAS EAR/PLS OXIMETRY 1: CPT

## 2025-06-05 PROCEDURE — 94640 AIRWAY INHALATION TREATMENT: CPT

## 2025-06-05 PROCEDURE — 99232 SBSQ HOSP IP/OBS MODERATE 35: CPT | Performed by: PHYSICIAN ASSISTANT

## 2025-06-05 PROCEDURE — 82948 REAGENT STRIP/BLOOD GLUCOSE: CPT

## 2025-06-05 RX ORDER — METOLAZONE 2.5 MG/1
2.5 TABLET ORAL DAILY
Status: DISCONTINUED | OUTPATIENT
Start: 2025-06-05 | End: 2025-06-05

## 2025-06-05 RX ORDER — METOLAZONE 2.5 MG/1
5 TABLET ORAL DAILY
Status: COMPLETED | OUTPATIENT
Start: 2025-06-05 | End: 2025-06-05

## 2025-06-05 RX ORDER — ACETAMINOPHEN 10 MG/ML
1000 INJECTION, SOLUTION INTRAVENOUS EVERY 6 HOURS SCHEDULED
Status: DISCONTINUED | OUTPATIENT
Start: 2025-06-05 | End: 2025-06-07

## 2025-06-05 RX ORDER — BUMETANIDE 0.25 MG/ML
1 INJECTION INTRAMUSCULAR; INTRAVENOUS CONTINUOUS
Status: DISCONTINUED | OUTPATIENT
Start: 2025-06-05 | End: 2025-06-12

## 2025-06-05 RX ORDER — POTASSIUM CHLORIDE 1500 MG/1
40 TABLET, EXTENDED RELEASE ORAL ONCE
Status: COMPLETED | OUTPATIENT
Start: 2025-06-05 | End: 2025-06-05

## 2025-06-05 RX ORDER — HYDROXYZINE HYDROCHLORIDE 25 MG/1
50 TABLET, FILM COATED ORAL EVERY 6 HOURS PRN
Status: DISCONTINUED | OUTPATIENT
Start: 2025-06-05 | End: 2025-06-13 | Stop reason: HOSPADM

## 2025-06-05 RX ADMIN — INSULIN LISPRO 5 UNITS: 100 INJECTION, SOLUTION INTRAVENOUS; SUBCUTANEOUS at 11:40

## 2025-06-05 RX ADMIN — SENNOSIDES 8.6 MG: 8.6 TABLET, FILM COATED ORAL at 09:03

## 2025-06-05 RX ADMIN — Medication 400 MG: at 17:01

## 2025-06-05 RX ADMIN — RIVAROXABAN 15 MG: 15 TABLET, FILM COATED ORAL at 17:01

## 2025-06-05 RX ADMIN — BUSPIRONE HYDROCHLORIDE 7.5 MG: 15 TABLET ORAL at 09:02

## 2025-06-05 RX ADMIN — INSULIN GLARGINE 8 UNITS: 100 INJECTION, SOLUTION SUBCUTANEOUS at 22:12

## 2025-06-05 RX ADMIN — Medication 6 MG: at 22:12

## 2025-06-05 RX ADMIN — LEVALBUTEROL HYDROCHLORIDE 1.25 MG: 1.25 SOLUTION RESPIRATORY (INHALATION) at 14:03

## 2025-06-05 RX ADMIN — ACETAMINOPHEN 1000 MG: 1000 INJECTION, SOLUTION INTRAVENOUS at 20:08

## 2025-06-05 RX ADMIN — PANTOPRAZOLE SODIUM 40 MG: 40 TABLET, DELAYED RELEASE ORAL at 06:29

## 2025-06-05 RX ADMIN — POLYETHYLENE GLYCOL 3350 17 G: 17 POWDER, FOR SOLUTION ORAL at 09:02

## 2025-06-05 RX ADMIN — Medication 1 MG/HR: at 17:01

## 2025-06-05 RX ADMIN — INSULIN LISPRO 2 UNITS: 100 INJECTION, SOLUTION INTRAVENOUS; SUBCUTANEOUS at 09:03

## 2025-06-05 RX ADMIN — BUSPIRONE HYDROCHLORIDE 7.5 MG: 15 TABLET ORAL at 17:01

## 2025-06-05 RX ADMIN — LEVALBUTEROL HYDROCHLORIDE 1.25 MG: 1.25 SOLUTION RESPIRATORY (INHALATION) at 19:25

## 2025-06-05 RX ADMIN — INSULIN LISPRO 1 UNITS: 100 INJECTION, SOLUTION INTRAVENOUS; SUBCUTANEOUS at 11:40

## 2025-06-05 RX ADMIN — INSULIN LISPRO 5 UNITS: 100 INJECTION, SOLUTION INTRAVENOUS; SUBCUTANEOUS at 09:03

## 2025-06-05 RX ADMIN — DONEPEZIL HYDROCHLORIDE 10 MG: 5 TABLET ORAL at 09:02

## 2025-06-05 RX ADMIN — LOSARTAN POTASSIUM 25 MG: 25 TABLET, FILM COATED ORAL at 09:02

## 2025-06-05 RX ADMIN — LEVOTHYROXINE SODIUM 137 MCG: 112 TABLET ORAL at 06:29

## 2025-06-05 RX ADMIN — INSULIN LISPRO 1 UNITS: 100 INJECTION, SOLUTION INTRAVENOUS; SUBCUTANEOUS at 17:00

## 2025-06-05 RX ADMIN — METOLAZONE 5 MG: 2.5 TABLET ORAL at 11:36

## 2025-06-05 RX ADMIN — Medication 400 MG: at 09:02

## 2025-06-05 RX ADMIN — UMECLIDINIUM 1 PUFF: 62.5 AEROSOL, POWDER ORAL at 09:04

## 2025-06-05 RX ADMIN — ACETAMINOPHEN 650 MG: 325 TABLET, FILM COATED ORAL at 06:29

## 2025-06-05 RX ADMIN — LEVALBUTEROL HYDROCHLORIDE 1.25 MG: 1.25 SOLUTION RESPIRATORY (INHALATION) at 07:51

## 2025-06-05 RX ADMIN — ATORVASTATIN CALCIUM 20 MG: 20 TABLET, FILM COATED ORAL at 17:01

## 2025-06-05 RX ADMIN — DULOXETINE HYDROCHLORIDE 60 MG: 30 CAPSULE, DELAYED RELEASE ORAL at 09:02

## 2025-06-05 RX ADMIN — POTASSIUM CHLORIDE 40 MEQ: 1500 TABLET, EXTENDED RELEASE ORAL at 11:37

## 2025-06-05 RX ADMIN — INSULIN LISPRO 5 UNITS: 100 INJECTION, SOLUTION INTRAVENOUS; SUBCUTANEOUS at 16:59

## 2025-06-05 RX ADMIN — ACETAMINOPHEN 650 MG: 325 TABLET, FILM COATED ORAL at 16:04

## 2025-06-05 RX ADMIN — SPIRONOLACTONE 25 MG: 25 TABLET, FILM COATED ORAL at 09:02

## 2025-06-05 RX ADMIN — FLUTICASONE FUROATE AND VILANTEROL TRIFENATATE 1 PUFF: 100; 25 POWDER RESPIRATORY (INHALATION) at 09:04

## 2025-06-05 RX ADMIN — Medication 1 MG/HR: at 07:20

## 2025-06-05 RX ADMIN — METOPROLOL SUCCINATE 25 MG: 25 TABLET, EXTENDED RELEASE ORAL at 09:02

## 2025-06-05 RX ADMIN — INSULIN LISPRO 3 UNITS: 100 INJECTION, SOLUTION INTRAVENOUS; SUBCUTANEOUS at 22:13

## 2025-06-05 NOTE — APP STUDENT NOTE
DEEJAY STUDENT  Inpatient Progress Note for TRAINING ONLY  Not Part of Legal Medical Record       Progress Note - Trevor Lyons 84 y.o. male MRN: [id number]    Unit/Bed#: -[facility] Encounter: [id number]      Assessment and Plan:  Acute combined systolic and diastolic CHF  Initial presentation to the ED was with CC of SOB on 5/30  S/p IV Lasix which was transitioned to IV Bumex.  Bumex 2 mg BID increased to 3 mg IV BID on 06/03.  Bumex gtt was started 6/4. Patient remains net positive at last review of I/O. Patient reports he has been urinating the same amount as is his baseline. Upon exam, patient still with bibasilar rales and lower extremity 2+ edema. Monitor strict I/O. Per cardiology, 1 time dose of metolazone   Continue home regimen of Toprol XL 25 mg, Losartan 25 mg, spironolactone 25 mg   Severe Sepsis (Resolved)  Upon admission, met criteria with tachypnea, leukocytosis, lactic acidosis, NELLIE, PNA  Lactic acid resolved on 5/30  Procal 0.27 on 6/3  Blood cultures were negative x 5 days   Treated with azithromycin and rocephin  Remains with leukocytosis and periodic tachypnea. Afebrile, heart rate <90  Pneumonia (Resolved)  CT chest from admission:  Severe bilateral consolidation and groundglass opacity, greatest in the right lower lobe, compatible with edema and/or pneumonia in the appropriate clinical setting   S/P 3 days of Azithromycin and 5 days of rocephin  Afebrile. Monitor vitals and WBC  Chronic respiratory failure  Today patient is at baseline of 2 L via NC  This AM, SpO2 94% on 2L  Continue to assess respiratory status   Leukocytosis  WBC today 20.75 from 21.09  Continue to follow CBC  Anemia  Hemoglobin today 7.6 from 7.8   Continue to follow CBC  Continue scheduled iron supplement  Recommend hematology / outpatient follow up   NELLIE  Today BUN 36, Cr 1.31  Avoid nephrotoxic medications   Monitor renal function in setting of diuretic use  Persistent a fib  Toprol XL 25 mg  Xarelto 15 mg  Sick  "Sinus Syndrome  S/P pacemaker   Hypertension  Continue home regimen of Toprol XL 25 mg, Losartan 25 mg, spironolactone 25 mg   Type 2 DM, with long term use of insulin  SSI, accuchecks   Acquired hypothyroidism  Continue levothyroxine  Follow up outpatient with PCP  Low back pain  Per chart review, history of degeneration of intervertebral disc of lumbosacral region with discogenic back pain and lower extremity pain. Patient complains of chronic back pain, today 7/10  Acetaminophen 650 mg Q6 hours PRN mild pain  Cymbalta 60 mg       Subjective:   Patient is an 84 year old male with extensive PMH including persistent a fib, sick sinus syndrome, s/p pacemaker, DM2, acquired hypothyroidism, Alzheimer's dementia, combined systolic/diastolic CHF, chronic respiratory failure, COPD, CKD3, and TAVR who presents on hospital day 6. He was initially admitted for severe sepsis, PNA, and acute combined systolic / diastolic CHF. He feels like he is doing okay overall today with his breathing. Denies chest pain, significant SOB, cough, abdominal pain, N/V/D. He is unable to comment on how he feels like his edema has or has not improved. He notes he is urinating close to his baseline amount. He is uncomfortable secondary to chronic low back pain, describing it as 7/10 and not traveling down the legs.     Objective:     Vitals: Blood pressure (!) 112/49, pulse 60, temperature 98 °F (36.7 °C), temperature source Oral, resp. rate 20, height 5' 8\" (1.727 m), weight 104 kg (228 lb 6.3 oz), SpO2 94%.,Body mass index is 34.73 kg/m².      Intake/Output Summary (Last 24 hours) at 6/5/2025 1102  Last data filed at 6/5/2025 0910  Gross per 24 hour   Intake 1740 ml   Output 1125 ml   Net 615 ml       Physical Exam  Vitals reviewed.   Constitutional:       General: He is not in acute distress.     Appearance: He is obese.   HENT:      Head: Normocephalic and atraumatic.     Eyes:      General: No scleral icterus.     Conjunctiva/sclera: " Conjunctivae normal.       Cardiovascular:      Rate and Rhythm: Normal rate and regular rhythm.      Heart sounds: No murmur heard.     Comments: Atrial fibrillation not noted at time of encounter  Pulmonary:      Effort: No respiratory distress.      Breath sounds: Rales present.      Comments: Diminished breath sounds  Abdominal:      Palpations: Abdomen is soft. There is no mass.      Tenderness: There is no abdominal tenderness.     Musculoskeletal:      Cervical back: Neck supple.      Right lower le+ Edema present.      Left lower le+ Edema present.      Right ankle: Swelling present.      Left ankle: Swelling present.     Skin:     General: Skin is warm and dry.     Neurological:      General: No focal deficit present.      Mental Status: He is alert.     Psychiatric:         Mood and Affect: Mood normal.         Behavior: Behavior normal.        Invasive Devices       Peripheral Intravenous Line  Duration             Peripheral IV 25 Dorsal (posterior);Left Forearm 1 day                    Lab, Imaging and other studies: Results Review Statement: No pertinent imaging studies reviewed.  VTE Pharmacologic Prophylaxis: VTE covered by:  rivaroxaban, Oral, 15 mg at 25 1717      VTE Mechanical Prophylaxis: sequential compression device

## 2025-06-05 NOTE — ASSESSMENT & PLAN NOTE
History of moderate late onset AD.  Currently resides at [facility].  Alert and oriented to person place, time with disorientation to situation.    Managed with Aricept 10 mg p.o. daily, BuSpar 7.5 twice daily, Cymbalta 60 mg p.o. daily with as needed clonazepam at at bedtime.  Delirium precautions: Optimize nutrition hydration and sleep hygiene.  Increase socialization, prevent falls.  Monitor for constipation or change in urination pattern.

## 2025-06-05 NOTE — PROGRESS NOTES
"Brief, introductory meeting with pt \"\" due to LOS. Provided education on role, space to verbally process and supportive presence which was met with gratitude.  is pleasantly confused in conversation and does not indicate further SC need at this time, though we are available to follow upon request.     Thank you!        06/05/25 1100   Clinical Encounter Type   Visited With Patient   Routine Visit Introduction       "

## 2025-06-05 NOTE — ASSESSMENT & PLAN NOTE
Wt Readings from Last 3 Encounters:   06/05/25 104 kg (228 lb 6.3 oz)   05/28/25 105 kg (232 lb)   03/20/25 99.7 kg (219 lb 12.8 oz)   Patient with a history of HFrEF -comes to [facility] emergency department on 5/30/2025 with progressive dyspnea, worsening cough and hypoxia.  Echo 10/21/25: EF 40 to 45%, mild global hypokinesis, RV dilatation, left atrial dilatation, well-functioning TAVR, moderate mitral MAC, moderate to severe MR regurgitation, mild MS, Moderate TR  GDMT: Farxiga 10 mg daily, losartan 25 mg daily, metoprolol succinate 25 mg daily, spironolactone 25 mg daily, torsemide 20 mg twice daily with potassium supplementation  Chest x-ray on admission: moderate pulmonary edema.    Patient examined -remains volume overloaded with rales bilaterally and pitting edema in his lower extremities    On Bumex 1 mg /hr - will add a dose of metolazone today

## 2025-06-05 NOTE — ASSESSMENT & PLAN NOTE
Wt Readings from Last 3 Encounters:   06/05/25 104 kg (228 lb 6.3 oz)   05/28/25 105 kg (232 lb)   03/20/25 99.7 kg (219 lb 12.8 oz)     Presenting to the ED for LOPES.  ; CT chest w/ severe bilateral consolidations and ground glass opacity, greatest in the right lower lobe, compatible with edema and/or PNA   GDMT: Farxiga 10 mg daily, losartan 25 mg daily, metoprolol succinate 25 mg daily, spironolactone 25 mg daily, torsemide 20 mg twice daily w/ Kdur   ECHO (10/2024): EF 40-45%, mild global hypokinesis, atrial dilatation  Cardiology following, recs appreciated  S/p IV Lasix which was transitioned to IV Bumex.  Bumex 2 mg BID increased to 3 mg IV BID on 06/03.  Escalated to bumex gtt 6/4. Started telemetry monitoring.  Daily wts, I&O's   Still net positive today - will d/w cardiology regarding increase rate of gtt versus addition of metolazone  Weight downtrending from 232 --> 228 today.  Continue to monitor I&O's, though unclear accuracy   C/w losartan, metoprolol, spironolactone

## 2025-06-05 NOTE — ASSESSMENT & PLAN NOTE
Recent Labs     06/03/25  0446 06/04/25  0605 06/05/25  0603   HGB 7.5* 7.8* 7.6*     Per chart review, baseline Hgb ~11  Normocytic anemia  No active bleeding   Folate  >22, B12 312   Iron panel mixed -inflammatory/SHELIA  C/w iron supplement   Hematology following

## 2025-06-05 NOTE — ASSESSMENT & PLAN NOTE
Chronically on 2 L via NC PRN   Required up to 4 L; now stable on 2 L   Multifactorial w/ CHF and PNA and h/o COPD with inhalers held for uncertain reason.

## 2025-06-05 NOTE — ASSESSMENT & PLAN NOTE
Chronic LBP managed with menthol topical patch, Cymbalta, and Tylenol.  Essentially wheelchair-bound however will get up and ambulate a few steps with standby assist.   PT/OT for RTF

## 2025-06-05 NOTE — PROGRESS NOTES
Progress Note - Hospitalist   Name: Trevor Lyons 84 y.o. male I MRN: [id number]  Unit/Bed#: -01 I Date of Admission: 5/30/2025   Date of Service: 6/5/2025 I Hospital Day: 6    Assessment & Plan  Acute combined systolic and diastolic congestive heart failure (HCC)  Wt Readings from Last 3 Encounters:   06/05/25 104 kg (228 lb 6.3 oz)   05/28/25 105 kg (232 lb)   03/20/25 99.7 kg (219 lb 12.8 oz)     Presenting to the ED for LOPES.  ; CT chest w/ severe bilateral consolidations and ground glass opacity, greatest in the right lower lobe, compatible with edema and/or PNA   GDMT: Farxiga 10 mg daily, losartan 25 mg daily, metoprolol succinate 25 mg daily, spironolactone 25 mg daily, torsemide 20 mg twice daily w/ Kdur   ECHO (10/2024): EF 40-45%, mild global hypokinesis, atrial dilatation  Cardiology following, recs appreciated  S/p IV Lasix which was transitioned to IV Bumex.  Bumex 2 mg BID increased to 3 mg IV BID on 06/03.  Escalated to bumex gtt 6/4. Started telemetry monitoring.  Daily wts, I&O's   Still net positive today - will d/w cardiology regarding increase rate of gtt versus addition of metolazone  Weight downtrending from 232 --> 228 today.  Continue to monitor I&O's, though unclear accuracy   C/w losartan, metoprolol, spironolactone  Severe sepsis (HCC) (Resolved: 6/5/2025)  Criteria e/b tachypnea, leukocytosis, lactic acidosis, and NELLIE 2/2 PNA    CT chest  Severe bilateral consolidation and groundglass opacity, greatest in the right lower lobe, compatible with edema and/or pneumonia in the appropriate clinical setting   Lactic acid 2.9, now cleared   Procal: 0.57 > 0.63 > 0.56 > 0.27   Bcx NGTD  S/p 3 days of Azithro; 5 days of rocephin  Hemodynamically stable  Pneumonia (Resolved: 6/5/2025)  Met severe sepsis criteria on admission   CT chest: Severe bilateral consolidation and groundglass opacity, greatest in the right lower lobe, compatible with edema and/or pneumonia in the  appropriate clinical setting   S/p 3 days of Azithro; 5 days of rocephin.  Chronic respiratory failure (HCC)  Chronically on 2 L via NC PRN   Required up to 4 L; now stable on 2 L   Multifactorial w/ CHF and PNA and h/o COPD with inhalers held for uncertain reason.  Leukocytosis  Recent Labs     06/03/25  0446 06/04/25  0605 06/05/25  0603   WBC 21.90* 21.09* 20.75*     Stable at 20k  ID curbsided,given clinical improvement monitor; low threshold to reimage if symptoms worsen   Hematology consulted  F/u peripheral blood flow, SPEP which remain pending.  Elevated haptoglobin, elevated Ig kappa/ Ig kal free light chains.  Reticulocyte count mildly elevated  LDH elevated  Possible reactionary w/ acute infxn?  Procal improving   Anemia  Recent Labs     06/03/25  0446 06/04/25  0605 06/05/25  0603   HGB 7.5* 7.8* 7.6*     Per chart review, baseline Hgb ~11  Normocytic anemia  No active bleeding   Folate  >22, B12 312   Iron panel mixed -inflammatory/SHELIA  C/w iron supplement   Hematology following  NELLIE (acute kidney injury) (HCC) (Resolved: 6/5/2025)  Recent Labs     06/03/25  0446 06/04/25  0605 06/05/25  0603   CREATININE 1.24 1.27 1.31*   EGFR 52 50 48     Peak Cr. 1.84; baseline Cr. 1-1.2   Renal func improved   C/w urinary retention protocol & diuretics  Avoid hypotension/nephrotoxins  Persistent atrial fibrillation (HCC)  Rate controlled   C/w Toprol-XL 25 mg  q day & Xarelto  Sick sinus syndrome (HCC)  S/p pacemaker placement  Hypertension  Home regimen: Toprol-XL 25 mg  q day, Losartan 25 mg daily, & spironolactone 25 mg daily, c/w  BP acceptable   Type 2 diabetes mellitus without complication, with long-term current use of insulin ([facility])  Lab Results   Component Value Date    HGBA1C 5.2 05/28/2025     Recent Labs     06/04/25  1051 06/04/25  1633 06/04/25 2022 06/05/25  0718   POCGLU 269* 289* 208* 204*     Blood Sugar Average: Last 72 hrs:  (P) [id number]    Maintained on farxiga 10mg daily,  glimerpiride 1mg daily, lantus 8U qhs, metformin 1000mg BID  C/w SSI AC/QHS, Accu-Cheks, CCD  Titrate Lantus from 5 to 8 U qhs   Acquired hypothyroidism  TSH 6.2 in 05/2025, T4 therapeutic; recommend outpt monitoring in 4-6 wks   Cw levothyroxine  Chronic left-sided low back pain with left-sided sciatica  Chronic LBP managed with menthol topical patch, Cymbalta, and Tylenol.  Essentially wheelchair-bound however will get up and ambulate a few steps with standby assist.   PT/OT for RTF  Moderate Alzheimer's dementia, unspecified timing of dementia onset, unspecified whether behavioral, psychotic, or mood disturbance or anxiety ([facility])  History of moderate late onset AD.  Currently resides at [facility].  Alert and oriented to person place, time with disorientation to situation.    Managed with Aricept 10 mg p.o. daily, BuSpar 7.5 twice daily, Cymbalta 60 mg p.o. daily with as needed clonazepam at at bedtime.  Delirium precautions: Optimize nutrition hydration and sleep hygiene.  Increase socialization, prevent falls.  Monitor for constipation or change in urination pattern.  Stage 3 chronic kidney disease (HCC)  Lab Results   Component Value Date    EGFR 48 06/05/2025    EGFR 50 06/04/2025    EGFR 52 06/03/2025    CREATININE 1.31 (H) 06/05/2025    CREATININE 1.27 06/04/2025    CREATININE 1.24 06/03/2025   Presented with NELLIE with creatinine 1.8, renal function now stabilized  Monitor daily while on Bumex infusion    Mobility:   Basic Mobility Inpatient Raw Score: 20  JH-HLM Goal: 6: Walk 10 steps or more  JH-HLM Achieved: 7: Walk 25 feet or more  JH-HLM Goal achieved. Continue to encourage appropriate mobility.    VTE Pharmacologic Prophylaxis: VTE Score: 5 High Risk (Score >/= 5) - Pharmacological DVT Prophylaxis Ordered: rivaroxaban (Xarelto). Sequential Compression Devices Ordered.    Education and Discussions with Family / Patient: Updated  (daughter) via phone.    Current Length of Stay: 6 day(s)  Current  Patient Status: Inpatient   Certification Statement: The patient will continue to require additional inpatient hospital stay due to heart failure requiring IV Bumex infusion, continuous monitoring on telemetry  Discharge Plan: Anticipate discharge in 48-72 hrs to IC    Code Status: Level 3 - DNAR and DNI    Subjective:   No overnight events, still with swollen legs.  No complaints presently    Objective:     Vitals:   Temp (24hrs), Av.3 °F (36.8 °C), Min:98 °F (36.7 °C), Max:98.4 °F (36.9 °C)    Temp:  [98 °F (36.7 °C)-98.4 °F (36.9 °C)] 98 °F (36.7 °C)  HR:  [59-60] 60  Resp:  [17-20] 20  BP: (111-114)/(47-49) 112/49  SpO2:  [90 %-96 %] 94 %  Body mass index is 34.73 kg/m².     Input and Output Summary (last 24 hours):     Intake/Output Summary (Last 24 hours) at 2025 1053  Last data filed at 2025 0910  Gross per 24 hour   Intake 1740 ml   Output 1125 ml   Net 615 ml       Physical Exam:   Physical Exam  Vitals and nursing note reviewed.   Constitutional:       General: He is not in acute distress.     Appearance: Normal appearance. He is not ill-appearing.   HENT:      Head: Normocephalic and atraumatic.     Eyes:      General: No scleral icterus.        Right eye: No discharge.         Left eye: No discharge.       Cardiovascular:      Rate and Rhythm: Normal rate and regular rhythm.      Pulses: Normal pulses.      Heart sounds: Murmur heard.      No friction rub. No gallop.   Pulmonary:      Effort: Pulmonary effort is normal. No respiratory distress.      Breath sounds: Rales present. No wheezing or rhonchi.   Abdominal:      General: Bowel sounds are normal. There is no distension.      Palpations: Abdomen is soft.      Tenderness: There is no abdominal tenderness. There is no guarding or rebound.     Musculoskeletal:         General: No swelling or deformity.      Cervical back: Neck supple.      Right lower leg: Edema present.      Left lower leg: Edema present.     Skin:     General: Skin is  warm and dry.      Capillary Refill: Capillary refill takes less than 2 seconds.      Coloration: Skin is not jaundiced or pale.      Findings: No erythema or rash.     Neurological:      General: No focal deficit present.      Mental Status: He is alert. Mental status is at baseline.     Psychiatric:         Mood and Affect: Mood normal.         Behavior: Behavior normal.          Additional Data:     Labs:  Results from last 7 days   Lab Units 06/05/25  0603 06/04/25  0605 05/31/25  0938 05/30/25  1135   WBC Thousand/uL 20.75* 21.09*   < > 15.91*   HEMOGLOBIN g/dL 7.6* 7.8*   < > 8.4*   HEMATOCRIT % 26.1* 27.1*   < > 29.1*   PLATELETS Thousands/uL 335 324   < > 262   BANDS PCT %  --   --   --  3   LYMPHO PCT %  --  29   < > 35   MONO PCT %  --  11   < > 11   EOS PCT %  --  3  --  0    < > = values in this interval not displayed.     Results from last 7 days   Lab Units 06/05/25  0603 06/02/25  0529 06/01/25  0426 05/31/25  0938 05/30/25  1135   SODIUM mmol/L 137   < > 137   < > 138   POTASSIUM mmol/L 3.4*   < > 3.5   < > 4.6   CHLORIDE mmol/L 95*   < > 95*   < > 96   CO2 mmol/L 34*   < > 31   < > 27   BUN mg/dL 36*   < > 39*   < > 36*   CREATININE mg/dL 1.31*   < > 1.66*   < > 1.81*   ANION GAP mmol/L 8   < > 11   < > 15*   CALCIUM mg/dL 8.5   < > 8.7   < > 9.0   ALBUMIN g/dL  --   --   --   --  3.9   TOTAL BILIRUBIN mg/dL  --   --  0.92  --  1.14*   ALK PHOS U/L  --   --   --   --  79   ALT U/L  --   --   --   --  9   AST U/L  --   --   --   --  13   GLUCOSE RANDOM mg/dL 186*   < > 112   < > 201*    < > = values in this interval not displayed.     Results from last 7 days   Lab Units 05/30/25  1320   INR  1.87*     Results from last 7 days   Lab Units 06/05/25  0718 06/04/25  2022 06/04/25  1633 06/04/25  1051 06/04/25  0742 06/03/25  2156 06/03/25  1724 06/03/25  1129 06/03/25  0757 06/02/25  2117 06/02/25  1548 06/02/25  1047   POC GLUCOSE mg/dl 204* 208* 289* 269* 154* 204* 231* 212* 236* 247* 146* 224*          Results from last 7 days   Lab Units 06/03/25  0446 06/01/25  0426 05/31/25  0539 05/30/25  2028 05/30/25  1655 05/30/25  1245 05/30/25  1135   LACTIC ACID mmol/L  --   --   --  1.3 2.8* 2.9*  --    PROCALCITONIN ng/ml 0.27* 0.56* 0.63*  --   --   --  0.57*       Imaging: Radiology Review: No additional pertinent studies reviewed.    Recent Cultures (last 7 days):   Results from last 7 days   Lab Units 05/30/25  1725 05/30/25  1245   BLOOD CULTURE   --  No Growth After 5 Days.  No Growth After 5 Days.   LEGIONELLA URINARY ANTIGEN  Negative  --        Telemetry Orders (From admission, onward)               24 Hour Telemetry Monitoring  Continuous x 24 Hours (Telem)        Expiring   Question:  Reason for 24 Hour Telemetry  Answer:  Decompensated CHF- and any one of the following: continuous diuretic infusion or total diuretic dose >200 mg daily, associated electrolyte derangement (I.e. K < 3.0), inotropic drip (continuous infusion), hx of ventricular arrhythmia, or new EF < 35%                        Last 24 Hours Medication List:   Current Facility-Administered Medications   Medication Dose Route Frequency Provider Last Rate    acetaminophen  650 mg Oral Q6H PRN Anya Dash MD      atorvastatin  20 mg Oral Daily With Dinner Anya Dash MD      bumetanide (BUMEX) 12.5 mg infusion 50 mL  1 mg/hr Intravenous Continuous SAMIRA Sadler 1 mg/hr (06/05/25 0720)    busPIRone  7.5 mg Oral BID Anya Dash MD      clonazePAM  1 mg Oral HS PRN Brigitte Hudson MD      donepezil  10 mg Oral QAM Anya Dash MD      DULoxetine  60 mg Oral Daily Anya Dash MD      ferrous sulfate  325 mg Oral Every Other Day Debbie Riggins PA-C      Fluticasone Furoate-Vilanterol  1 puff Inhalation Daily Aurea Germain PA-C      And    umeclidinium  1 puff Inhalation Daily Aurea Germain PA-C      hydrOXYzine HCL  50 mg Oral Q6H PRN Usha Decker PA-C      insulin glargine  8 Units Subcutaneous HS Usha Decker PA-C      insulin  lispro  1-5 Units Subcutaneous TID AC Anya Dash MD      insulin lispro  1-6 Units Subcutaneous HS Anya Dash MD      insulin lispro  5 Units Subcutaneous TID With Meals Aurea Germain PA-C      levalbuterol  1.25 mg Nebulization TID Anya Dash MD      levothyroxine  137 mcg Oral Early Morning Anya Dash MD      losartan  25 mg Oral Daily SAMIRA Sadler      magnesium Oxide  400 mg Oral BID Anya Dash MD      melatonin  6 mg Oral HS Usha Quinn PA-C      metolazone  5 mg Oral Daily SAMIRA Sadler      metoprolol succinate  25 mg Oral QAM Anya Dash MD      pantoprazole  40 mg Oral Daily Before Breakfast Anya Dash MD      polyethylene glycol  17 g Oral Daily Anya Dash MD      potassium chloride  40 mEq Oral Once SAMIRA Sadler      rivaroxaban  15 mg Oral Daily With Dinner Anya Dash MD      senna  1 tablet Oral Daily Anya Dash MD      spironolactone  25 mg Oral Daily SAMIRA Sadler          Today, Patient Was Seen By: Sergio Wisdom PA-C    **Please Note: This note may have been constructed using a voice recognition system.**

## 2025-06-05 NOTE — ASSESSMENT & PLAN NOTE
Lab Results   Component Value Date    EGFR 48 06/05/2025    EGFR 50 06/04/2025    EGFR 52 06/03/2025    CREATININE 1.31 (H) 06/05/2025    CREATININE 1.27 06/04/2025    CREATININE 1.24 06/03/2025   Presented with NELLIE with creatinine 1.8, renal function now stabilized  Monitor daily while on Bumex infusion

## 2025-06-05 NOTE — PLAN OF CARE
Problem: Potential for Falls  Goal: Patient will remain free of falls  Description: INTERVENTIONS:  - Educate patient/family on patient safety including physical limitations  - Instruct patient to call for assistance with activity   - Consider consulting OT/PT to assist with strengthening/mobility based on AM PAC & JH-HLM score  - Consult OT/PT to assist with strengthening/mobility   - Keep Call bell within reach  - Keep bed low and locked with side rails adjusted as appropriate  - Keep care items and personal belongings within reach  - Initiate and maintain comfort rounds  - Make Fall Risk Sign visible to staff  - Offer Toileting every 2 Hours, in advance of need  - Initiate/Maintain bed alarm  - Obtain necessary fall risk management equipment: socks  - Apply yellow socks and bracelet for high fall risk patients  - Consider moving patient to room near nurses station  Outcome: Progressing     Problem: PAIN - ADULT  Goal: Verbalizes/displays adequate comfort level or baseline comfort level  Description: Interventions:  - Encourage patient to monitor pain and request assistance  - Assess pain using appropriate pain scale  - Administer analgesics as ordered based on type and severity of pain and evaluate response  - Implement non-pharmacological measures as appropriate and evaluate response  - Consider cultural and social influences on pain and pain management  - Notify physician/advanced practitioner if interventions unsuccessful or patient reports new pain  - Educate patient/family on pain management process including their role and importance of  reporting pain   - Provide non-pharmacologic/complimentary pain relief interventions  Outcome: Progressing     Problem: INFECTION - ADULT  Goal: Absence or prevention of progression during hospitalization  Description: INTERVENTIONS:  - Assess and monitor for signs and symptoms of infection  - Monitor lab/diagnostic results  - Monitor all insertion sites, i.e. indwelling  lines, tubes, and drains  - Monitor endotracheal if appropriate and nasal secretions for changes in amount and color  - [place] appropriate cooling/warming therapies per order  - Administer medications as ordered  - Instruct and encourage patient and family to use good hand hygiene technique  - Identify and instruct in appropriate isolation precautions for identified infection/condition  Outcome: Progressing  Goal: Absence of fever/infection during neutropenic period  Description: INTERVENTIONS:  - Monitor WBC  - Perform strict hand hygiene  - Limit to healthy visitors only  - No plants, dried, fresh or silk flowers with damon in patient room  Outcome: Progressing     Problem: SAFETY ADULT  Goal: Patient will remain free of falls  Description: INTERVENTIONS:  - Educate patient/family on patient safety including physical limitations  - Instruct patient to call for assistance with activity   - Consider consulting OT/PT to assist with strengthening/mobility based on AM PAC & JH-HLM score  - Consult OT/PT to assist with strengthening/mobility   - Keep Call bell within reach  - Keep bed low and locked with side rails adjusted as appropriate  - Keep care items and personal belongings within reach  - Initiate and maintain comfort rounds  - Make Fall Risk Sign visible to staff  - Offer Toileting every 2 Hours, in advance of need  - Initiate/Maintain bed alarm  - Obtain necessary fall risk management equipment: socks  - Apply yellow socks and bracelet for high fall risk patients  - Consider moving patient to room near nurses station  Outcome: Progressing  Goal: Maintain or return to baseline ADL function  Description: INTERVENTIONS:  -  Assess patient's ability to carry out ADLs; assess patient's baseline for ADL function and identify physical deficits which impact ability to perform ADLs (bathing, care of mouth/teeth, toileting, grooming, dressing, etc.)  - Assess/evaluate cause of self-care deficits   - Assess range of  motion  - Assess patient's mobility; develop plan if impaired  - Assess patient's need for assistive devices and provide as appropriate  - Encourage maximum independence but intervene and supervise when necessary  - Involve family in performance of ADLs  - Assess for home care needs following discharge   - Consider OT consult to assist with ADL evaluation and planning for discharge  - Provide patient education as appropriate  - Monitor functional capacity and physical performance, use of AM PAC & JH-HLM   - Monitor gait, balance and fatigue with ambulation    Outcome: Progressing  Goal: Maintains/Returns to pre admission functional level  Description: INTERVENTIONS:  - Perform AM-PAC 6 Click Basic Mobility/ Daily Activity assessment daily.  - Set and communicate daily mobility goal to care team and patient/family/caregiver.   - Collaborate with rehabilitation services on mobility goals if consulted  - Perform Range of Motion 2 times a day.  - Reposition patient every 2 hours.  - Dangle patient 2 times a day  - Stand patient 2 times a day  - Ambulate patient 2 times a day  - Out of bed to chair 2 times a day   - Out of bed for meals 2 times a day  - Out of bed for toileting  - Record patient progress and toleration of activity level   Outcome: Progressing     Problem: DISCHARGE PLANNING  Goal: Discharge to home or other facility with appropriate resources  Description: INTERVENTIONS:  - Identify barriers to discharge w/patient and caregiver  - Arrange for needed discharge resources and transportation as appropriate  - Identify discharge learning needs (meds, wound care, etc.)  - Arrange for interpretive services to assist at discharge as needed  - Refer to Case Management Department for coordinating discharge planning if the patient needs post-hospital services based on physician/advanced practitioner order or complex needs related to functional status, cognitive ability, or social support system  Outcome:  Progressing     Problem: Knowledge Deficit  Goal: Patient/family/caregiver demonstrates understanding of disease process, treatment plan, medications, and discharge instructions  Description: Complete learning assessment and assess knowledge base.  Interventions:  - Provide teaching at level of understanding  - Provide teaching via preferred learning methods  Outcome: Progressing     Problem: Prexisting or High Potential for Compromised Skin Integrity  Goal: Skin integrity is maintained or improved  Description: INTERVENTIONS:  - Identify patients at risk for skin breakdown  - Assess and monitor skin integrity including under and around medical devices   - Assess and monitor nutrition and hydration status  - Monitor labs  - Assess for incontinence   - Turn and reposition patient  - Assist with mobility/ambulation  - Relieve pressure over payam prominences   - Avoid friction and shearing  - Provide appropriate hygiene as needed including keeping skin clean and dry  - Evaluate need for skin moisturizer/barrier cream  - Collaborate with interdisciplinary team  - Patient/family teaching  - Consider wound care consult    Assess:  - Review Pascual scale daily  - Clean and moisturize skin every shift  - Inspect skin when repositioning, toileting, and assisting with ADLS  - Assess under medical devices   - Assess extremities for adequate circulation and sensation     Bed Management:  - Have minimal linens on bed & keep smooth, unwrinkled  - Change linens as needed when moist or perspiring  - Avoid sitting or lying in one position for more than 2 hours while in bed. Keep HOB at 30 degrees or more   - Toileting:  - Offer bedside commode  - Assess for incontinence every shift  - Use incontinent care products after each incontinent episode    Activity:  - Mobilize patient 3 times a day  - Encourage activity and walks on unit  - Encourage or provide ROM exercises   - Turn and reposition patient every 2 Hours  - Use appropriate  equipment to lift or move patient in bed  - Instruct/ Assist with weight shifting every hour when out of bed in chair  - Consider limitation of chair time 3 hour intervals    Skin Care:  - Avoid use of baby powder, tape, friction and shearing, hot water or constrictive clothing  - Relieve pressure over bony prominences   - Do not massage red bony areas    Next Steps:  - Teach patient strategies to minimize risks  - Consider consults to  interdisciplinary teams  Outcome: Progressing

## 2025-06-05 NOTE — ASSESSMENT & PLAN NOTE
Recent Labs     06/03/25  0446 06/04/25  0605 06/05/25  0603   CREATININE 1.24 1.27 1.31*   EGFR 52 50 48     Peak Cr. 1.84; baseline Cr. 1-1.2   Renal func improved   C/w urinary retention protocol & diuretics  Avoid hypotension/nephrotoxins

## 2025-06-05 NOTE — PROGRESS NOTES
Progress Note - Cardiology   Name: Trevor Lyons 84 y.o. male I MRN: [id number]  Unit/Bed#: -01 I Date of Admission: 5/30/2025   Date of Service: 6/5/2025 I Hospital Day: 6    Assessment & Plan  Acute combined systolic and diastolic congestive heart failure (HCC)  Wt Readings from Last 3 Encounters:   06/05/25 104 kg (228 lb 6.3 oz)   05/28/25 105 kg (232 lb)   03/20/25 99.7 kg (219 lb 12.8 oz)   Patient with a history of HFrEF -comes to [facility] emergency department on 5/30/2025 with progressive dyspnea, worsening cough and hypoxia.  Echo 10/21/25: EF 40 to 45%, mild global hypokinesis, RV dilatation, left atrial dilatation, well-functioning TAVR, moderate mitral MAC, moderate to severe MR regurgitation, mild MS, Moderate TR  GDMT: Farxiga 10 mg daily, losartan 25 mg daily, metoprolol succinate 25 mg daily, spironolactone 25 mg daily, torsemide 20 mg twice daily with potassium supplementation  Chest x-ray on admission: moderate pulmonary edema.    Patient examined -remains volume overloaded with rales bilaterally and pitting edema in his lower extremities    On Bumex 1 mg /hr - will add a dose of metolazone today   Persistent atrial fibrillation (HCC)  Patient on Xarelto 20 mg daily for stroke prevention and metoprolol succinate 25 mg daily for rate control -he is 100% V paced at 65 with 100% A-fib burden  Sick sinus syndrome (HCC)  Patient has [employer] permanent pacemaker -set at VVI at 65, last pacemaker check 4/16/2025 shows 98% V paced with patient 100% A-fib  NELLIE (acute kidney injury) (Formerly [facility])  Creatinine on arrival 1.8   Acute on chronic respiratory failure (HCC)  Remains on 2 L nasal cannula.  Pneumonia  Treated with antibiotics per primary team  Hypertension  Normotensive at this time on antihypertensives   Anemia    Plan  Continue Bumex drip at 1 mg an hour  Dose of metolazone 5 mg x 1  Replete potassium  Strict I's and O's, daily weights, daily BMP    Subjective   Patient seen  and examined    Objective :  Temp:  [98 °F (36.7 °C)-98.4 °F (36.9 °C)] 98 °F (36.7 °C)  HR:  [59-60] 60  BP: (111-114)/(47-49) 112/49  Resp:  [17-20] 20  SpO2:  [90 %-96 %] 94 %  O2 Device: Nasal cannula  Nasal Cannula O2 Flow Rate (L/min):  [2 L/min] 2 L/min  Orthostatic Blood Pressures      Flowsheet Row Most Recent Value   Blood Pressure 112/49 filed at 06/05/2025 0628   Patient Position - Orthostatic VS Sitting filed at 06/05/2025 0628          First Weight: Weight - Scale: 105 kg (232 lb) (05/30/25 1403)  Vitals:    06/04/25 0600 06/05/25 0600   Weight: 103 kg (226 lb 12.8 oz) 104 kg (228 lb 6.3 oz)     Physical Exam  Constitutional:       Appearance: Normal appearance. He is obese.   HENT:      Head: Normocephalic and atraumatic.      Nose: Nose normal.      Mouth/Throat:      Mouth: Mucous membranes are moist.     Cardiovascular:      Rate and Rhythm: Normal rate. Rhythm irregular.      Pulses: Normal pulses.      Heart sounds: Normal heart sounds.   Pulmonary:      Effort: Pulmonary effort is normal.      Breath sounds: Rales present.   Abdominal:      Palpations: Abdomen is soft.     Musculoskeletal:      Cervical back: Normal range of motion.      Right lower leg: Edema present.      Left lower leg: Edema present.     Skin:     General: Skin is warm.      Capillary Refill: Capillary refill takes less than 2 seconds.     Neurological:      General: No focal deficit present.      Mental Status: He is alert. Mental status is at baseline.     Psychiatric:         Mood and Affect: Mood normal.         Behavior: Behavior normal.         Thought Content: Thought content normal.           Lab Results: I have reviewed the following results:  Results from last 7 days   Lab Units 06/05/25  0603 06/04/25  0605 06/03/25  0446   WBC Thousand/uL 20.75* 21.09* 21.90*   HEMOGLOBIN g/dL 7.6* 7.8* 7.5*   HEMATOCRIT % 26.1* 27.1* 25.7*   PLATELETS Thousands/uL 335 324 299     Results from last 7 days   Lab Units  06/05/25  0603 06/04/25  0605 06/03/25  0446   POTASSIUM mmol/L 3.4* 3.5 3.4*   CHLORIDE mmol/L 95* 96 95*   CO2 mmol/L 34* 36* 34*   BUN mg/dL 36* 31* 31*   CREATININE mg/dL 1.31* 1.27 1.24   CALCIUM mg/dL 8.5 8.8 8.6     Results from last 7 days   Lab Units 05/30/25  1320   INR  1.87*   PTT seconds 52*     Lab Results   Component Value Date    HGBA1C 5.2 05/28/2025     Lab Results   Component Value Date    TROPONINI 0.04 10/21/2021       Imaging Results Review: I reviewed radiology reports from this admission including: chest xray and CT chest.  Other Study Results Review: EKG was reviewed.     VTE Pharmacologic Prophylaxis: VTE covered by:  rivaroxaban, Oral, 15 mg at 06/04/25 1717     VTE Mechanical Prophylaxis: sequential compression device

## 2025-06-05 NOTE — ASSESSMENT & PLAN NOTE
Recent Labs     06/03/25  0446 06/04/25  0605 06/05/25  0603   WBC 21.90* 21.09* 20.75*     Stable at 20k  ID curbsided,given clinical improvement monitor; low threshold to reimage if symptoms worsen   Hematology consulted  F/u peripheral blood flow, SPEP which remain pending.  Elevated haptoglobin, elevated Ig kappa/ Ig kal free light chains.  Reticulocyte count mildly elevated  LDH elevated  Possible reactionary w/ acute infxn?  Procal improving

## 2025-06-05 NOTE — ASSESSMENT & PLAN NOTE
Lab Results   Component Value Date    HGBA1C 5.2 05/28/2025     Recent Labs     06/04/25  1051 06/04/25  1633 06/04/25 2022 06/05/25  0718   POCGLU 269* 289* 208* 204*     Blood Sugar Average: Last 72 hrs:  (P) [id number]    Maintained on farxiga 10mg daily, glimerpiride 1mg daily, lantus 8U qhs, metformin 1000mg BID  C/w SSI AC/QHS, Accu-Cheks, CCD  Titrate Lantus from 5 to 8 U qhs

## 2025-06-06 ENCOUNTER — APPOINTMENT (INPATIENT)
Dept: RADIOLOGY | Facility: HOSPITAL | Age: 85
DRG: 871 | End: 2025-06-06
Payer: MEDICARE

## 2025-06-06 ENCOUNTER — TELEPHONE (OUTPATIENT)
Dept: HEMATOLOGY ONCOLOGY | Facility: CLINIC | Age: 85
End: 2025-06-06

## 2025-06-06 LAB
ANION GAP SERPL CALCULATED.3IONS-SCNC: 11 MMOL/L (ref 4–13)
ANION GAP SERPL CALCULATED.3IONS-SCNC: 8 MMOL/L (ref 4–13)
ATRIAL RATE: 40 BPM
BUN SERPL-MCNC: 41 MG/DL (ref 5–25)
BUN SERPL-MCNC: 45 MG/DL (ref 5–25)
CALCIUM SERPL-MCNC: 8.9 MG/DL (ref 8.4–10.2)
CALCIUM SERPL-MCNC: 9 MG/DL (ref 8.4–10.2)
CHLORIDE SERPL-SCNC: 86 MMOL/L (ref 96–108)
CHLORIDE SERPL-SCNC: 87 MMOL/L (ref 96–108)
CO2 SERPL-SCNC: 35 MMOL/L (ref 21–32)
CO2 SERPL-SCNC: 37 MMOL/L (ref 21–32)
CREAT SERPL-MCNC: 1.28 MG/DL (ref 0.6–1.3)
CREAT SERPL-MCNC: 1.58 MG/DL (ref 0.6–1.3)
ERYTHROCYTE [DISTWIDTH] IN BLOOD BY AUTOMATED COUNT: 14.6 % (ref 11.6–15.1)
GFR SERPL CREATININE-BSD FRML MDRD: 38 ML/MIN/1.73SQ M
GFR SERPL CREATININE-BSD FRML MDRD: 49 ML/MIN/1.73SQ M
GLUCOSE SERPL-MCNC: 207 MG/DL (ref 65–140)
GLUCOSE SERPL-MCNC: 209 MG/DL (ref 65–140)
GLUCOSE SERPL-MCNC: 227 MG/DL (ref 65–140)
GLUCOSE SERPL-MCNC: 252 MG/DL (ref 65–140)
GLUCOSE SERPL-MCNC: 269 MG/DL (ref 65–140)
GLUCOSE SERPL-MCNC: 291 MG/DL (ref 65–140)
HCT VFR BLD AUTO: 25.7 % (ref 36.5–49.3)
HGB BLD-MCNC: 7.7 G/DL (ref 12–17)
MAGNESIUM SERPL-MCNC: 2.2 MG/DL (ref 1.9–2.7)
MCH RBC QN AUTO: 28.5 PG (ref 26.8–34.3)
MCHC RBC AUTO-ENTMCNC: 30 G/DL (ref 31.4–37.4)
MCV RBC AUTO: 95 FL (ref 82–98)
PLATELET # BLD AUTO: 354 THOUSANDS/UL (ref 149–390)
PMV BLD AUTO: 8.9 FL (ref 8.9–12.7)
POTASSIUM SERPL-SCNC: 3.4 MMOL/L (ref 3.5–5.3)
POTASSIUM SERPL-SCNC: 3.8 MMOL/L (ref 3.5–5.3)
QRS AXIS: 253 DEGREES
QRSD INTERVAL: 190 MS
QT INTERVAL: 510 MS
QTC INTERVAL: 517 MS
RBC # BLD AUTO: 2.7 MILLION/UL (ref 3.88–5.62)
SODIUM SERPL-SCNC: 132 MMOL/L (ref 135–147)
SODIUM SERPL-SCNC: 132 MMOL/L (ref 135–147)
T WAVE AXIS: 78 DEGREES
VENTRICULAR RATE: 62 BPM
WBC # BLD AUTO: 18.72 THOUSAND/UL (ref 4.31–10.16)

## 2025-06-06 PROCEDURE — 94760 N-INVAS EAR/PLS OXIMETRY 1: CPT

## 2025-06-06 PROCEDURE — 71046 X-RAY EXAM CHEST 2 VIEWS: CPT

## 2025-06-06 PROCEDURE — 83735 ASSAY OF MAGNESIUM: CPT | Performed by: PHYSICIAN ASSISTANT

## 2025-06-06 PROCEDURE — 99233 SBSQ HOSP IP/OBS HIGH 50: CPT | Performed by: NURSE PRACTITIONER

## 2025-06-06 PROCEDURE — NC001 PR NO CHARGE: Performed by: INTERNAL MEDICINE

## 2025-06-06 PROCEDURE — 99232 SBSQ HOSP IP/OBS MODERATE 35: CPT | Performed by: PHYSICIAN ASSISTANT

## 2025-06-06 PROCEDURE — 99232 SBSQ HOSP IP/OBS MODERATE 35: CPT | Performed by: INTERNAL MEDICINE

## 2025-06-06 PROCEDURE — 85027 COMPLETE CBC AUTOMATED: CPT | Performed by: PHYSICIAN ASSISTANT

## 2025-06-06 PROCEDURE — 93010 ELECTROCARDIOGRAM REPORT: CPT | Performed by: INTERNAL MEDICINE

## 2025-06-06 PROCEDURE — 94640 AIRWAY INHALATION TREATMENT: CPT

## 2025-06-06 PROCEDURE — 80048 BASIC METABOLIC PNL TOTAL CA: CPT | Performed by: PHYSICIAN ASSISTANT

## 2025-06-06 PROCEDURE — 82948 REAGENT STRIP/BLOOD GLUCOSE: CPT

## 2025-06-06 RX ORDER — POTASSIUM CHLORIDE 1500 MG/1
40 TABLET, EXTENDED RELEASE ORAL 2 TIMES DAILY
Status: DISCONTINUED | OUTPATIENT
Start: 2025-06-06 | End: 2025-06-13 | Stop reason: HOSPADM

## 2025-06-06 RX ORDER — POTASSIUM CHLORIDE 1500 MG/1
40 TABLET, EXTENDED RELEASE ORAL ONCE
Status: COMPLETED | OUTPATIENT
Start: 2025-06-06 | End: 2025-06-06

## 2025-06-06 RX ORDER — METOLAZONE 2.5 MG/1
5 TABLET ORAL DAILY
Status: DISCONTINUED | OUTPATIENT
Start: 2025-06-06 | End: 2025-06-06

## 2025-06-06 RX ADMIN — CLONAZEPAM 1.5 MG: 0.5 TABLET ORAL at 22:44

## 2025-06-06 RX ADMIN — ACETAMINOPHEN 1000 MG: 1000 INJECTION, SOLUTION INTRAVENOUS at 08:15

## 2025-06-06 RX ADMIN — HYDROXYZINE HYDROCHLORIDE 50 MG: 25 TABLET, FILM COATED ORAL at 02:34

## 2025-06-06 RX ADMIN — INSULIN LISPRO 1 UNITS: 100 INJECTION, SOLUTION INTRAVENOUS; SUBCUTANEOUS at 12:28

## 2025-06-06 RX ADMIN — ACETAMINOPHEN 1000 MG: 1000 INJECTION, SOLUTION INTRAVENOUS at 20:14

## 2025-06-06 RX ADMIN — INSULIN LISPRO 5 UNITS: 100 INJECTION, SOLUTION INTRAVENOUS; SUBCUTANEOUS at 17:25

## 2025-06-06 RX ADMIN — SPIRONOLACTONE 25 MG: 25 TABLET, FILM COATED ORAL at 08:16

## 2025-06-06 RX ADMIN — INSULIN LISPRO 2 UNITS: 100 INJECTION, SOLUTION INTRAVENOUS; SUBCUTANEOUS at 08:28

## 2025-06-06 RX ADMIN — DONEPEZIL HYDROCHLORIDE 10 MG: 5 TABLET ORAL at 08:15

## 2025-06-06 RX ADMIN — FLUTICASONE FUROATE AND VILANTEROL TRIFENATATE 1 PUFF: 100; 25 POWDER RESPIRATORY (INHALATION) at 08:29

## 2025-06-06 RX ADMIN — LEVALBUTEROL HYDROCHLORIDE 1.25 MG: 1.25 SOLUTION RESPIRATORY (INHALATION) at 13:56

## 2025-06-06 RX ADMIN — POTASSIUM CHLORIDE 40 MEQ: 1500 TABLET, EXTENDED RELEASE ORAL at 09:44

## 2025-06-06 RX ADMIN — ATORVASTATIN CALCIUM 20 MG: 20 TABLET, FILM COATED ORAL at 17:18

## 2025-06-06 RX ADMIN — POLYETHYLENE GLYCOL 3350 17 G: 17 POWDER, FOR SOLUTION ORAL at 08:15

## 2025-06-06 RX ADMIN — SODIUM CHLORIDE 500 MG: 9 INJECTION, SOLUTION INTRAVENOUS at 13:01

## 2025-06-06 RX ADMIN — RIVAROXABAN 15 MG: 15 TABLET, FILM COATED ORAL at 17:18

## 2025-06-06 RX ADMIN — BUSPIRONE HYDROCHLORIDE 7.5 MG: 15 TABLET ORAL at 08:15

## 2025-06-06 RX ADMIN — INSULIN LISPRO 5 UNITS: 100 INJECTION, SOLUTION INTRAVENOUS; SUBCUTANEOUS at 12:29

## 2025-06-06 RX ADMIN — Medication 400 MG: at 17:18

## 2025-06-06 RX ADMIN — CLONAZEPAM 1.5 MG: 0.5 TABLET ORAL at 00:20

## 2025-06-06 RX ADMIN — LEVALBUTEROL HYDROCHLORIDE 1.25 MG: 1.25 SOLUTION RESPIRATORY (INHALATION) at 19:42

## 2025-06-06 RX ADMIN — POTASSIUM CHLORIDE 40 MEQ: 1500 TABLET, EXTENDED RELEASE ORAL at 17:18

## 2025-06-06 RX ADMIN — ACETAMINOPHEN 1000 MG: 1000 INJECTION, SOLUTION INTRAVENOUS at 14:34

## 2025-06-06 RX ADMIN — METOPROLOL SUCCINATE 25 MG: 25 TABLET, EXTENDED RELEASE ORAL at 08:16

## 2025-06-06 RX ADMIN — PANTOPRAZOLE SODIUM 40 MG: 40 TABLET, DELAYED RELEASE ORAL at 05:39

## 2025-06-06 RX ADMIN — LEVOTHYROXINE SODIUM 137 MCG: 112 TABLET ORAL at 05:39

## 2025-06-06 RX ADMIN — FERROUS SULFATE TAB 325 MG (65 MG ELEMENTAL FE) 325 MG: 325 (65 FE) TAB at 08:15

## 2025-06-06 RX ADMIN — Medication 1 MG/HR: at 15:24

## 2025-06-06 RX ADMIN — DULOXETINE HYDROCHLORIDE 60 MG: 30 CAPSULE, DELAYED RELEASE ORAL at 08:15

## 2025-06-06 RX ADMIN — Medication 6 MG: at 22:44

## 2025-06-06 RX ADMIN — Medication 1 MG/HR: at 05:39

## 2025-06-06 RX ADMIN — ACETAMINOPHEN 1000 MG: 1000 INJECTION, SOLUTION INTRAVENOUS at 02:34

## 2025-06-06 RX ADMIN — INSULIN LISPRO 5 UNITS: 100 INJECTION, SOLUTION INTRAVENOUS; SUBCUTANEOUS at 08:28

## 2025-06-06 RX ADMIN — LEVALBUTEROL HYDROCHLORIDE 1.25 MG: 1.25 SOLUTION RESPIRATORY (INHALATION) at 09:03

## 2025-06-06 RX ADMIN — BUSPIRONE HYDROCHLORIDE 7.5 MG: 15 TABLET ORAL at 17:18

## 2025-06-06 RX ADMIN — Medication 400 MG: at 08:15

## 2025-06-06 RX ADMIN — INSULIN LISPRO 3 UNITS: 100 INJECTION, SOLUTION INTRAVENOUS; SUBCUTANEOUS at 22:44

## 2025-06-06 RX ADMIN — UMECLIDINIUM 1 PUFF: 62.5 AEROSOL, POWDER ORAL at 08:29

## 2025-06-06 RX ADMIN — INSULIN LISPRO 2 UNITS: 100 INJECTION, SOLUTION INTRAVENOUS; SUBCUTANEOUS at 17:25

## 2025-06-06 RX ADMIN — SENNOSIDES 8.6 MG: 8.6 TABLET, FILM COATED ORAL at 08:15

## 2025-06-06 RX ADMIN — POTASSIUM CHLORIDE 40 MEQ: 1500 TABLET, EXTENDED RELEASE ORAL at 12:38

## 2025-06-06 RX ADMIN — INSULIN GLARGINE 8 UNITS: 100 INJECTION, SOLUTION SUBCUTANEOUS at 22:43

## 2025-06-06 NOTE — ASSESSMENT & PLAN NOTE
Wt Readings from Last 3 Encounters:   06/06/25 103 kg (227 lb 9.6 oz)   05/28/25 105 kg (232 lb)   03/20/25 99.7 kg (219 lb 12.8 oz)     Presenting to the ED for LOPES.  ; CT chest w/ severe bilateral consolidations and ground glass opacity, greatest in the right lower lobe, compatible with edema and/or PNA   GDMT: Farxiga 10 mg daily, losartan 25 mg daily, metoprolol succinate 25 mg daily, spironolactone 25 mg daily, torsemide 20 mg twice daily w/ Kdur   ECHO (10/2024): EF 40-45%, mild global hypokinesis, atrial dilatation  Cardiology following, recs appreciated  S/p IV Lasix which was transitioned to IV Bumex.  Bumex 2 mg BID increased to 3 mg IV BID on 06/03.  Continue Bumex infusion today  Continuous telemetry monitoring  Daily wts, I&O's   Patient diuresing appropriately, status post 1 dose of metolazone 6/5  Weight downtrending from 232 --> 227 today.  Continue to monitor I&O's, though unclear accuracy   C/w losartan, metoprolol, spironolactone

## 2025-06-06 NOTE — ASSESSMENT & PLAN NOTE
History of moderate late onset AD.  Currently resides at [facility].  Alert and oriented to person place, time with disorientation to situation.    Managed with Aricept 10 mg p.o. daily, BuSpar 7.5 twice daily, Cymbalta 60 mg p.o. daily with as needed clonazepam at at bedtime.  Delirium precautions: Optimize nutrition hydration and sleep hygiene.  Increase socialization, prevent falls.  Monitor for constipation or change in urination pattern.

## 2025-06-06 NOTE — TELEPHONE ENCOUNTER
Called to speak with patient and Theo answered. He stated patient is currently admitted at [facility]. Thank you.

## 2025-06-06 NOTE — ASSESSMENT & PLAN NOTE
Lab Results   Component Value Date    EGFR 49 06/06/2025    EGFR 48 06/05/2025    EGFR 50 06/04/2025    CREATININE 1.28 06/06/2025    CREATININE 1.31 (H) 06/05/2025    CREATININE 1.27 06/04/2025   Presented with NELLIE with creatinine 1.8, renal function now stabilized  Monitor daily while on Bumex infusion

## 2025-06-06 NOTE — ASSESSMENT & PLAN NOTE
Wt Readings from Last 3 Encounters:   06/06/25 103 kg (227 lb 9.6 oz)   05/28/25 105 kg (232 lb)   03/20/25 99.7 kg (219 lb 12.8 oz)   Patient with a history of HFrEF -comes to [facility] emergency department on 5/30/2025 with progressive dyspnea, worsening cough and hypoxia.  Echo 10/21/25: EF 40 to 45%, mild global hypokinesis, RV dilatation, left atrial dilatation, well-functioning TAVR, moderate mitral MAC, moderate to severe MR regurgitation, mild MS, Moderate TR  GDMT: Farxiga 10 mg daily, losartan 25 mg daily, metoprolol succinate 25 mg daily, spironolactone 25 mg daily, torsemide 20 mg twice daily with potassium supplementation  Chest x-ray on admission: moderate pulmonary edema -with bilateral pneumonia.  Repeat chest x-ray on 6/6 demonstrates continued pulmonary edema, although less  On Bumex 1 mg /hr started on 6/4, dose of metolazone on 6/5    Patient examined -remains volume overloaded with rales bilaterally and pitting edema in his lower extremities .  Creatinine stable

## 2025-06-06 NOTE — ASSESSMENT & PLAN NOTE
Patient admitted CHF exacerbation, sepsis secondary to pneumonia.  White count reached a peak of 21 likely in the setting of acute infection.  He has associated neutrophilia but also some lymphocytosis as well as some monocytes and metamyelocytes on peripheral smear  Flow cytometry on peripheral blood reviewed a monoclonal B-cell population suggestive of CLL  PCR for BCR able pending  PNH profile, WBC pending    Overall, WBC is trending down.  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  Given patient's advanced age, underlying dementia and multiple other comorbidities recommend observation and continuing monitoring CBC.  Will arrange outpatient follow-up with hematology on discharge

## 2025-06-06 NOTE — APP STUDENT NOTE
"DEEJAY STUDENT  Inpatient Progress Note for TRAINING ONLY  Not Part of Legal Medical Record       Progress Note - Trevor Lyons 85 y.o. male MRN: [id number]    Unit/Bed#: -Mateus[facility] Encounter: [id number]      Assessment and Plan:  Acute combined systolic and diastolic CHF  Initial presentation to the ED was with CC of SOB on 5/30  S/p IV Lasix which was transitioned to IV Bumex.  Bumex 2 mg BID increased to 3 mg IV BID on 06/03.  Bumex gtt was started 6/4  Continue Bumex 1mg / hr. Per cardiology, adding a dose of metolazone again today.  Continue home regimen of Toprol XL 25 mg, Losartan 25 mg, spironolactone 25 mg   Chronic respiratory failure  Today patient is at baseline of 2 L via NC  This AM, SpO2 96% on 2L  Continue to assess respiratory status   Leukocytosis  WBC today 18.72 from 20.75  Continue to follow CBC  Anemia  Hemoglobin today 7.7  Continue to follow CBC  Continue scheduled iron supplement  Hematology is following  SPEP Report from 6/2 specimen: \"The SPEP shows an abnormal distribution in the gamma region. Immunofixation to be performed. 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 2) Lack of CD16 expression of granulocytes\"  NELLIE  Today BUN 41, Cr 1.28  Recheck BMP in afternoon  Avoid nephrotoxic medications   Persistent a fib  Toprol XL 25 mg  Xarelto 15 mg  Sick Sinus Syndrome  S/P pacemaker   Hypertension  Continue home regimen of Toprol XL 25 mg, Losartan 25 mg, spironolactone 25 mg   Type 2 DM, with long term use of insulin  SSI, accuchecks   Acquired hypothyroidism  Continue levothyroxine  Follow up outpatient with PCP  Low back pain  Per chart review, history of degeneration of intervertebral disc of lumbosacral region with discogenic back " "pain and lower extremity pain.  Acetaminophen 650 mg Q6 hours PRN mild pain  Cymbalta 60 mg       Subjective:   Patient is an 85 year old male with extensive PMH including persistent a fib, sick sinus syndrome, s/p pacemaker, DM2, acquired hypothyroidism, Alzheimer's dementia, combined systolic/diastolic CHF, chronic respiratory failure, COPD, CKD3, and TAVR who presents on hospital day 7. He was initially admitted for severe sepsis, PNA, and acute combined systolic / diastolic CHF. Today he feels fatigued and complains of bilateral lower extremity pain. Legs feel heavy and achy. He feels like breathing is fine with no complaints. He denies chest pain, significant SOB, cough, abdominal pain, N/V/D.   Objective:     Vitals: Blood pressure (!) 116/48, pulse 61, temperature 97.8 °F (36.6 °C), temperature source Oral, resp. rate 18, height 5' 8\" (1.727 m), weight 103 kg (227 lb 9.6 oz), SpO2 96%.,Body mass index is 34.61 kg/m².      Intake/Output Summary (Last 24 hours) at 2025 1138  Last data filed at 2025 1000  Gross per 24 hour   Intake 2011 ml   Output 3700 ml   Net -1689 ml       Physical Exam  Vitals reviewed.   Constitutional:       General: He is not in acute distress.     Appearance: He is obese.   HENT:      Head: Normocephalic and atraumatic.     Eyes:      General: No scleral icterus.     Conjunctiva/sclera: Conjunctivae normal.       Cardiovascular:      Rate and Rhythm: Normal rate and regular rhythm.      Heart sounds: Murmur heard.   Pulmonary:      Effort: No respiratory distress.      Breath sounds: Rales present.      Comments: Diminished breath sounds  Abdominal:      Palpations: Abdomen is soft. There is no mass.      Tenderness: There is no abdominal tenderness.     Musculoskeletal:      Cervical back: Neck supple.      Right lower le+ Edema present.      Left lower le+ Edema present.      Right ankle: Swelling present.      Left ankle: Swelling present.     Skin:     General: Skin " is warm and dry.     Neurological:      General: No focal deficit present.      Mental Status: He is alert.     Psychiatric:         Mood and Affect: Mood normal.         Behavior: Behavior normal.        Invasive Devices       Peripheral Intravenous Line  Duration             Peripheral IV 06/03/25 Dorsal (posterior);Left Forearm 2 days    Peripheral IV 06/05/25 Distal;Left;Upper;Ventral (anterior) Arm <1 day                    Lab, Imaging and other studies: Results Review Statement: No pertinent imaging studies reviewed.  VTE Pharmacologic Prophylaxis: VTE covered by:  rivaroxaban, Oral, 15 mg at 06/05/25 1701      VTE Mechanical Prophylaxis: sequential compression device

## 2025-06-06 NOTE — PROGRESS NOTES
Progress Note - Cardiology   Name: Trevor Lyons 85 y.o. male I MRN: [id number]  Unit/Bed#: -01 I Date of Admission: 5/30/2025   Date of Service: 6/6/2025 I Hospital Day: 7    Assessment & Plan  Acute combined systolic and diastolic congestive heart failure (HCC)  Wt Readings from Last 3 Encounters:   06/06/25 103 kg (227 lb 9.6 oz)   05/28/25 105 kg (232 lb)   03/20/25 99.7 kg (219 lb 12.8 oz)   Patient with a history of HFrEF -comes to [facility] emergency department on 5/30/2025 with progressive dyspnea, worsening cough and hypoxia.  Echo 10/21/25: EF 40 to 45%, mild global hypokinesis, RV dilatation, left atrial dilatation, well-functioning TAVR, moderate mitral MAC, moderate to severe MR regurgitation, mild MS, Moderate TR  GDMT: Farxiga 10 mg daily, losartan 25 mg daily, metoprolol succinate 25 mg daily, spironolactone 25 mg daily, torsemide 20 mg twice daily with potassium supplementation  Chest x-ray on admission: moderate pulmonary edema -with bilateral pneumonia.  Repeat chest x-ray on 6/6 demonstrates continued pulmonary edema, although less  On Bumex 1 mg /hr started on 6/4, dose of metolazone on 6/5    Patient examined -remains volume overloaded with rales bilaterally and pitting edema in his lower extremities .  Creatinine stable  Persistent atrial fibrillation (HCC)  Patient on Xarelto 20 mg daily for stroke prevention and metoprolol succinate 25 mg daily for rate control -he is 100% V paced at 65 with 100% A-fib burden  Sick sinus syndrome (HCC)  Patient has [employer] permanent pacemaker -set at VVI at 65, last pacemaker check 4/16/2025 shows 98% V paced with patient 100% A-fib  Chronic respiratory failure (HCC)  Remains on 2 L nasal cannula.  Hypertension  Normotensive at this time -Cozaar held in setting of NELLIE and diuresis  Anemia    Chronic left-sided low back pain with left-sided sciatica    Moderate Alzheimer's dementia, unspecified timing of dementia onset,  unspecified whether behavioral, psychotic, or mood disturbance or anxiety (HCC)    Stage 3 chronic kidney disease (HCC)    Plan  Will give a dose of Diuril 500 mg x 1 now  Extra potassium given  Continue to hold Cozaar while diuresing -plan to restart at discharge  Continue spironolactone, metoprolol as ordered  Continue Xarelto as ordered    Subjective   Patient seen and examined -without complaints    Objective :  Temp:  [97.8 °F (36.6 °C)-97.9 °F (36.6 °C)] 97.8 °F (36.6 °C)  HR:  [60-61] 61  BP: (106-116)/(46-50) 116/48  Resp:  [18-20] 18  SpO2:  [89 %-95 %] 95 %  O2 Device: Nasal cannula  Nasal Cannula O2 Flow Rate (L/min):  [2 L/min] 2 L/min  Orthostatic Blood Pressures      Flowsheet Row Most Recent Value   Blood Pressure 116/48  [nurse notified] filed at 06/06/2025 0732   Patient Position - Orthostatic VS Sitting filed at 06/06/2025 0732          First Weight: Weight - Scale: 105 kg (232 lb) (05/30/25 1403)  Vitals:    06/05/25 0600 06/06/25 0550   Weight: 104 kg (228 lb 6.3 oz) 103 kg (227 lb 9.6 oz)     Physical Exam  Constitutional:       Appearance: Normal appearance. He is obese.   HENT:      Head: Normocephalic and atraumatic.      Nose: Nose normal.      Mouth/Throat:      Mouth: Mucous membranes are moist.     Cardiovascular:      Rate and Rhythm: Normal rate. Rhythm irregular.      Pulses: Normal pulses.      Heart sounds: Normal heart sounds.   Pulmonary:      Effort: Pulmonary effort is normal.      Breath sounds: Rales present.   Abdominal:      Palpations: Abdomen is soft.     Musculoskeletal:      Cervical back: Normal range of motion.      Right lower leg: Edema present.      Left lower leg: Edema present.     Skin:     General: Skin is warm.      Capillary Refill: Capillary refill takes less than 2 seconds.     Neurological:      General: No focal deficit present.      Mental Status: He is alert. Mental status is at baseline.     Psychiatric:         Mood and Affect: Mood normal.          Behavior: Behavior normal.         Lab Results: I have reviewed the following results:  Results from last 7 days   Lab Units 06/06/25  0547 06/05/25  0603 06/04/25  0605   WBC Thousand/uL 18.72* 20.75* 21.09*   HEMOGLOBIN g/dL 7.7* 7.6* 7.8*   HEMATOCRIT % 25.7* 26.1* 27.1*   PLATELETS Thousands/uL 354 335 324     Results from last 7 days   Lab Units 06/06/25  0547 06/05/25  0603 06/04/25  0605   POTASSIUM mmol/L 3.4* 3.4* 3.5   CHLORIDE mmol/L 87* 95* 96   CO2 mmol/L 37* 34* 36*   BUN mg/dL 41* 36* 31*   CREATININE mg/dL 1.28 1.31* 1.27   CALCIUM mg/dL 8.9 8.5 8.8     Results from last 7 days   Lab Units 05/30/25  1320   INR  1.87*   PTT seconds 52*     Lab Results   Component Value Date    HGBA1C 5.2 05/28/2025     Lab Results   Component Value Date    TROPONINI 0.04 10/21/2021       Imaging Results Review: I reviewed radiology reports from this admission including: chest xray.  Other Study Results Review: EKG was reviewed.     VTE Pharmacologic Prophylaxis: VTE covered by:  rivaroxaban, Oral, 15 mg at 06/05/25 1701     VTE Mechanical Prophylaxis: sequential compression device

## 2025-06-06 NOTE — PLAN OF CARE
Problem: Potential for Falls  Goal: Patient will remain free of falls  Description: INTERVENTIONS:  - Educate patient/family on patient safety including physical limitations  - Instruct patient to call for assistance with activity   - Consider consulting OT/PT to assist with strengthening/mobility based on AM PAC & JH-HLM score  - Consult OT/PT to assist with strengthening/mobility   - Keep Call bell within reach  - Keep bed low and locked with side rails adjusted as appropriate  - Keep care items and personal belongings within reach  - Initiate and maintain comfort rounds  - Make Fall Risk Sign visible to staff  - Offer Toileting every 2 Hours, in advance of need  - Initiate/Maintain bed alarm  - Obtain necessary fall risk management equipment: socks  - Apply yellow socks and bracelet for high fall risk patients  - Consider moving patient to room near nurses station  Outcome: Progressing     Problem: PAIN - ADULT  Goal: Verbalizes/displays adequate comfort level or baseline comfort level  Description: Interventions:  - Encourage patient to monitor pain and request assistance  - Assess pain using appropriate pain scale  - Administer analgesics as ordered based on type and severity of pain and evaluate response  - Implement non-pharmacological measures as appropriate and evaluate response  - Consider cultural and social influences on pain and pain management  - Notify physician/advanced practitioner if interventions unsuccessful or patient reports new pain  - Educate patient/family on pain management process including their role and importance of  reporting pain   - Provide non-pharmacologic/complimentary pain relief interventions  Outcome: Progressing     Problem: INFECTION - ADULT  Goal: Absence or prevention of progression during hospitalization  Description: INTERVENTIONS:  - Assess and monitor for signs and symptoms of infection  - Monitor lab/diagnostic results  - Monitor all insertion sites, i.e. indwelling  lines, tubes, and drains  - Monitor endotracheal if appropriate and nasal secretions for changes in amount and color  - [place] appropriate cooling/warming therapies per order  - Administer medications as ordered  - Instruct and encourage patient and family to use good hand hygiene technique  - Identify and instruct in appropriate isolation precautions for identified infection/condition  Outcome: Progressing     Problem: SAFETY ADULT  Goal: Patient will remain free of falls  Description: INTERVENTIONS:  - Educate patient/family on patient safety including physical limitations  - Instruct patient to call for assistance with activity   - Consider consulting OT/PT to assist with strengthening/mobility based on AM PAC & JH-HLM score  - Consult OT/PT to assist with strengthening/mobility   - Keep Call bell within reach  - Keep bed low and locked with side rails adjusted as appropriate  - Keep care items and personal belongings within reach  - Initiate and maintain comfort rounds  - Make Fall Risk Sign visible to staff  - Offer Toileting every 2 Hours, in advance of need  - Initiate/Maintain bed alarm  - Obtain necessary fall risk management equipment: socks  - Apply yellow socks and bracelet for high fall risk patients  - Consider moving patient to room near nurses station  Outcome: Progressing     Problem: DISCHARGE PLANNING  Goal: Discharge to home or other facility with appropriate resources  Description: INTERVENTIONS:  - Identify barriers to discharge w/patient and caregiver  - Arrange for needed discharge resources and transportation as appropriate  - Identify discharge learning needs (meds, wound care, etc.)  - Arrange for interpretive services to assist at discharge as needed  - Refer to Case Management Department for coordinating discharge planning if the patient needs post-hospital services based on physician/advanced practitioner order or complex needs related to functional status, cognitive ability, or  social support system  Outcome: Progressing     Problem: Knowledge Deficit  Goal: Patient/family/caregiver demonstrates understanding of disease process, treatment plan, medications, and discharge instructions  Description: Complete learning assessment and assess knowledge base.  Interventions:  - Provide teaching at level of understanding  - Provide teaching via preferred learning methods  Outcome: Progressing     Problem: Prexisting or High Potential for Compromised Skin Integrity  Goal: Skin integrity is maintained or improved  Description: INTERVENTIONS:  - Identify patients at risk for skin breakdown  - Assess and monitor skin integrity including under and around medical devices   - Assess and monitor nutrition and hydration status  - Monitor labs  - Assess for incontinence   - Turn and reposition patient  - Assist with mobility/ambulation  - Relieve pressure over payam prominences   - Avoid friction and shearing  - Provide appropriate hygiene as needed including keeping skin clean and dry  - Evaluate need for skin moisturizer/barrier cream  - Collaborate with interdisciplinary team  - Patient/family teaching  - Consider wound care consult    Assess:  - Review Pascual scale daily  - Clean and moisturize skin every day  - Inspect skin when repositioning, toileting, and assisting with ADLS  - Assess under medical devices such as O2 every 2 hours  - Assess extremities for adequate circulation and sensation     Bed Management:  - Have minimal linens on bed & keep smooth, unwrinkled  - Change linens as needed when moist or perspiring  - Avoid sitting or lying in one position for more than 2 hours while in bed?Keep HOB at 30 degrees   - Toileting:  - Offer bedside commode  - Assess for incontinence every 2 hours  - Use incontinent care products after each incontinent episode such as calazime cream    Activity:  - Mobilize patient 3 times a day  - Encourage activity and walks on unit  - Encourage or provide ROM  exercises   - Turn and reposition patient every 2 Hours  - Use appropriate equipment to lift or move patient in bed  - Instruct/ Assist with weight shifting every 30 min when out of bed in chair  - Consider limitation of chair time 2 hour intervals    Skin Care:  - Avoid use of baby powder, tape, friction and shearing, hot water or constrictive clothing  - Relieve pressure over bony prominences using pillows and wedges  - Do not massage red bony areas    Next Steps:  - Teach patient strategies to minimize risks such as frequent turning and weight-shifting  - Consider consults to  interdisciplinary teams such as PT/OT  Outcome: Progressing     Problem: CARDIOVASCULAR - ADULT  Goal: Maintains optimal cardiac output and hemodynamic stability  Description: INTERVENTIONS:  - Monitor I/O, vital signs and rhythm  - Monitor for S/S and trends of decreased cardiac output  - Administer and titrate ordered vasoactive medications to optimize hemodynamic stability  - Assess quality of pulses, skin color and temperature  - Assess for signs of decreased coronary artery perfusion  - Instruct patient to report change in severity of symptoms  Outcome: Progressing

## 2025-06-06 NOTE — ASSESSMENT & PLAN NOTE
Recent Labs     06/04/25  0605 06/05/25  0603 06/06/25  0547   HGB 7.8* 7.6* 7.7*     Per chart review, baseline Hgb ~11  Normocytic anemia  No active bleeding   Folate  >22, B12 312   Iron panel mixed -inflammatory/SHELIA  C/w iron supplement   Hematology following

## 2025-06-06 NOTE — ASSESSMENT & PLAN NOTE
Lab Results   Component Value Date    HGBA1C 5.2 05/28/2025     Recent Labs     06/05/25  1610 06/05/25  2149 06/06/25  0730 06/06/25  1130   POCGLU 192* 230* 227* 209*     Blood Sugar Average: Last 72 hrs:  (P) 219    Maintained on farxiga 10mg daily, glimerpiride 1mg daily, lantus 8U qhs, metformin 1000mg BID  C/w SSI AC/QHS, Accu-Cheks, CCD  Titrate Lantus from 5 to 8 U qhs

## 2025-06-06 NOTE — ASSESSMENT & PLAN NOTE
Lab Results   Component Value Date    EGFR 49 06/06/2025    EGFR 48 06/05/2025    EGFR 50 06/04/2025    CREATININE 1.28 06/06/2025    CREATININE 1.31 (H) 06/05/2025    CREATININE 1.27 06/04/2025

## 2025-06-06 NOTE — ASSESSMENT & PLAN NOTE
Recent Labs     06/04/25  0605 06/05/25  0603 06/06/25  0547   WBC 21.09* 20.75* 18.72*     Stable at 20k  ID curbsided,given clinical improvement monitor; low threshold to reimage if symptoms worsen   Hematology consulted  Elevated haptoglobin, elevated Ig kappa/ Ig kal free light chains.  Reticulocyte count mildly elevated  LDH elevated  Flow cytometry with possible CLL, SPEP immunofixation negative  Per hematology we will closely monitor, continue to trend CBC

## 2025-06-06 NOTE — PLAN OF CARE
Problem: Potential for Falls  Goal: Patient will remain free of falls  Description: INTERVENTIONS:  - Educate patient/family on patient safety including physical limitations  - Instruct patient to call for assistance with activity   - Consider consulting OT/PT to assist with strengthening/mobility based on AM PAC & JH-HLM score  - Consult OT/PT to assist with strengthening/mobility   - Keep Call bell within reach  - Keep bed low and locked with side rails adjusted as appropriate  - Keep care items and personal belongings within reach  - Initiate and maintain comfort rounds  - Make Fall Risk Sign visible to staff  - Offer Toileting every 2 Hours, in advance of need  - Initiate/Maintain bed alarm  - Obtain necessary fall risk management equipment: socks  - Apply yellow socks and bracelet for high fall risk patients  - Consider moving patient to room near nurses station  Outcome: Progressing     Problem: PAIN - ADULT  Goal: Verbalizes/displays adequate comfort level or baseline comfort level  Description: Interventions:  - Encourage patient to monitor pain and request assistance  - Assess pain using appropriate pain scale  - Administer analgesics as ordered based on type and severity of pain and evaluate response  - Implement non-pharmacological measures as appropriate and evaluate response  - Consider cultural and social influences on pain and pain management  - Notify physician/advanced practitioner if interventions unsuccessful or patient reports new pain  - Educate patient/family on pain management process including their role and importance of  reporting pain   - Provide non-pharmacologic/complimentary pain relief interventions  Outcome: Progressing     Problem: INFECTION - ADULT  Goal: Absence or prevention of progression during hospitalization  Description: INTERVENTIONS:  - Assess and monitor for signs and symptoms of infection  - Monitor lab/diagnostic results  - Monitor all insertion sites, i.e. indwelling  lines, tubes, and drains  - Monitor endotracheal if appropriate and nasal secretions for changes in amount and color  - [place] appropriate cooling/warming therapies per order  - Administer medications as ordered  - Instruct and encourage patient and family to use good hand hygiene technique  - Identify and instruct in appropriate isolation precautions for identified infection/condition  Outcome: Progressing  Goal: Absence of fever/infection during neutropenic period  Description: INTERVENTIONS:  - Monitor WBC  - Perform strict hand hygiene  - Limit to healthy visitors only  - No plants, dried, fresh or silk flowers with damon in patient room  Outcome: Progressing     Problem: SAFETY ADULT  Goal: Patient will remain free of falls  Description: INTERVENTIONS:  - Educate patient/family on patient safety including physical limitations  - Instruct patient to call for assistance with activity   - Consider consulting OT/PT to assist with strengthening/mobility based on AM PAC & JH-HLM score  - Consult OT/PT to assist with strengthening/mobility   - Keep Call bell within reach  - Keep bed low and locked with side rails adjusted as appropriate  - Keep care items and personal belongings within reach  - Initiate and maintain comfort rounds  - Make Fall Risk Sign visible to staff  - Offer Toileting every 2 Hours, in advance of need  - Initiate/Maintain bed alarm  - Obtain necessary fall risk management equipment: socks  - Apply yellow socks and bracelet for high fall risk patients  - Consider moving patient to room near nurses station  Outcome: Progressing  Goal: Maintain or return to baseline ADL function  Description: INTERVENTIONS:  -  Assess patient's ability to carry out ADLs; assess patient's baseline for ADL function and identify physical deficits which impact ability to perform ADLs (bathing, care of mouth/teeth, toileting, grooming, dressing, etc.)  - Assess/evaluate cause of self-care deficits   - Assess range of  motion  - Assess patient's mobility; develop plan if impaired  - Assess patient's need for assistive devices and provide as appropriate  - Encourage maximum independence but intervene and supervise when necessary  - Involve family in performance of ADLs  - Assess for home care needs following discharge   - Consider OT consult to assist with ADL evaluation and planning for discharge  - Provide patient education as appropriate  - Monitor functional capacity and physical performance, use of AM PAC & JH-HLM   - Monitor gait, balance and fatigue with ambulation    Outcome: Progressing  Goal: Maintains/Returns to pre admission functional level  Description: INTERVENTIONS:  - Perform AM-PAC 6 Click Basic Mobility/ Daily Activity assessment daily.  - Set and communicate daily mobility goal to care team and patient/family/caregiver.   - Collaborate with rehabilitation services on mobility goals if consulted  - Perform Range of Motion 2 times a day.  - Reposition patient every 2 hours.  - Dangle patient 2 times a day  - Stand patient 2 times a day  - Ambulate patient 2 times a day  - Out of bed to chair 2 times a day   - Out of bed for meals 2 times a day  - Out of bed for toileting  - Record patient progress and toleration of activity level   Outcome: Progressing     Problem: DISCHARGE PLANNING  Goal: Discharge to home or other facility with appropriate resources  Description: INTERVENTIONS:  - Identify barriers to discharge w/patient and caregiver  - Arrange for needed discharge resources and transportation as appropriate  - Identify discharge learning needs (meds, wound care, etc.)  - Arrange for interpretive services to assist at discharge as needed  - Refer to Case Management Department for coordinating discharge planning if the patient needs post-hospital services based on physician/advanced practitioner order or complex needs related to functional status, cognitive ability, or social support system  Outcome:  Progressing     Problem: Knowledge Deficit  Goal: Patient/family/caregiver demonstrates understanding of disease process, treatment plan, medications, and discharge instructions  Description: Complete learning assessment and assess knowledge base.  Interventions:  - Provide teaching at level of understanding  - Provide teaching via preferred learning methods  Outcome: Progressing     Problem: Prexisting or High Potential for Compromised Skin Integrity  Goal: Skin integrity is maintained or improved  Description: INTERVENTIONS:  - Identify patients at risk for skin breakdown  - Assess and monitor skin integrity including under and around medical devices   - Assess and monitor nutrition and hydration status  - Monitor labs  - Assess for incontinence   - Turn and reposition patient  - Assist with mobility/ambulation  - Relieve pressure over payam prominences   - Avoid friction and shearing  - Provide appropriate hygiene as needed including keeping skin clean and dry  - Evaluate need for skin moisturizer/barrier cream  - Collaborate with interdisciplinary team  - Patient/family teaching  - Consider wound care consult    Assess:  - Review Pascual scale daily  - Clean and moisturize skin every xx  - Inspect skin when repositioning, toileting, and assisting with ADLS  - Assess under medical devices such as x every x  - Assess extremities for adequate circulation and sensation     Bed Management:  - Have minimal linens on bed & keep smooth, unwrinkled  - Change linens as needed when moist or perspiring  - Avoid sitting or lying in one position for more than x hours while in bed?Keep HOB at xxdegrees   - Toileting:  - Offer bedside commode  - Assess for incontinence every x  - Use incontinent care products after each incontinent episode such as x    Activity:  - Mobilize patient x times a day  - Encourage activity and walks on unit  - Encourage or provide ROM exercises   - Turn and reposition patient every x Hours  - Use  appropriate equipment to lift or move patient in bed  - Instruct/ Assist with weight shifting every x when out of bed in chair  - Consider limitation of chair time x hour intervals    Skin Care:  - Avoid use of baby powder, tape, friction and shearing, hot water or constrictive clothing  - Relieve pressure over bony prominences using x  - Do not massage red bony areas    Next Steps:  - Teach patient strategies to minimize risks such as x  - Consider consults to  interdisciplinary teams such as x  Outcome: Progressing     Problem: CARDIOVASCULAR - ADULT  Goal: Maintains optimal cardiac output and hemodynamic stability  Description: INTERVENTIONS:  - Monitor I/O, vital signs and rhythm  - Monitor for S/S and trends of decreased cardiac output  - Administer and titrate ordered vasoactive medications to optimize hemodynamic stability  - Assess quality of pulses, skin color and temperature  - Assess for signs of decreased coronary artery perfusion  - Instruct patient to report change in severity of symptoms  Outcome: Progressing  Goal: Absence of cardiac dysrhythmias or at baseline rhythm  Description: INTERVENTIONS:  - Continuous cardiac monitoring, vital signs, obtain 12 lead EKG if ordered  - Administer antiarrhythmic and heart rate control medications as ordered  - Monitor electrolytes and administer replacement therapy as ordered  Outcome: Progressing

## 2025-06-06 NOTE — PROGRESS NOTES
Progress Note - Oncology-Medical   Name: Trevor Lyons 85 y.o. male I MRN: [id number]  Unit/Bed#: -01 I Date of Admission: 5/30/2025   Date of Service: 6/6/2025 I Hospital Day: 7    Assessment & Plan  Anemia  Normocytic anemia  Hemoglobin 12.7 in January  Hemoglobin 8.4 on admission  Folate and B12 are normal  Iron panel shows mixed picture of inflammation and iron deficiency.  Serum ferritin is normal  continue oral iron supplementation as patient does have evidence of volume overload  SPEP negative for monoclonal gammopathy  Hemolysis unlikely with normal T. bili, mildly elevated haptoglobin    Hgb has remained stable now transfusion support.   Likely anemia of chronic disease  Leukocytosis  Patient admitted CHF exacerbation, sepsis secondary to pneumonia.  White count reached a peak of 21 likely in the setting of acute infection.  He has associated neutrophilia but also some lymphocytosis as well as some monocytes and metamyelocytes on peripheral smear  Flow cytometry on peripheral blood reviewed a monoclonal B-cell population suggestive of CLL  PCR for BCR able pending  PNH profile, WBC pending    Overall, WBC is trending down.  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  Given patient's advanced age, underlying dementia and multiple other comorbidities recommend observation and continuing monitoring CBC.  Will arrange outpatient follow-up with hematology on discharge    Moderate Alzheimer's dementia, unspecified timing of dementia onset, unspecified whether behavioral, psychotic, or mood disturbance or anxiety (HCC)    Stage 3 chronic kidney disease (HCC)  Lab Results   Component Value Date    EGFR 49 06/06/2025    EGFR 48 06/05/2025    EGFR 50 06/04/2025    CREATININE 1.28 06/06/2025    CREATININE 1.31 (H) 06/05/2025     CREATININE 1.27 06/04/2025     Persistent atrial fibrillation (HCC)    Sick sinus syndrome (HCC)    Acute combined systolic and diastolic congestive heart failure (HCC)  Wt Readings from Last 3 Encounters:   06/06/25 103 kg (227 lb 9.6 oz)   05/28/25 105 kg (232 lb)   03/20/25 99.7 kg (219 lb 12.8 oz)         Objective :  Temp:  [97.8 °F (36.6 °C)-97.9 °F (36.6 °C)] 97.8 °F (36.6 °C)  HR:  [60-61] 61  BP: (106-116)/(46-48) 116/48  Resp:  [18-20] 18  SpO2:  [89 %-96 %] 96 %  O2 Device: Nasal cannula  Nasal Cannula O2 Flow Rate (L/min):  [2 L/min] 2 L/min        Lab Results: I have reviewed the following results:  Lab Results   Component Value Date    K 3.4 (L) 06/06/2025    CL 87 (L) 06/06/2025    CO2 37 (H) 06/06/2025    BUN 41 (H) 06/06/2025    CREATININE 1.28 06/06/2025    GLUF 105 (H) 05/28/2025    CALCIUM 8.9 06/06/2025    CORRECTEDCA 8.8 10/24/2024    AST 13 05/30/2025    ALT 9 05/30/2025    ALKPHOS 79 05/30/2025    EGFR 49 06/06/2025     Lab Results   Component Value Date    WBC 18.72 (H) 06/06/2025    HGB 7.7 (L) 06/06/2025    HCT 25.7 (L) 06/06/2025    MCV 95 06/06/2025     06/06/2025     Lab Results   Component Value Date    NEUTROABS 10.72 (H) 06/01/2025             Administrative Statements   I have spent a total time of 25 minutes in caring for this patient on the day of the visit/encounter including Documenting in the medical record, Reviewing/placing orders in the medical record (including tests, medications, and/or procedures), and Communicating with other healthcare professionals .

## 2025-06-06 NOTE — ASSESSMENT & PLAN NOTE
Wt Readings from Last 3 Encounters:   06/06/25 103 kg (227 lb 9.6 oz)   05/28/25 105 kg (232 lb)   03/20/25 99.7 kg (219 lb 12.8 oz)

## 2025-06-06 NOTE — ASSESSMENT & PLAN NOTE
Normocytic anemia  Hemoglobin 12.7 in January  Hemoglobin 8.4 on admission  Folate and B12 are normal  Iron panel shows mixed picture of inflammation and iron deficiency.  Serum ferritin is normal  continue oral iron supplementation as patient does have evidence of volume overload  SPEP negative for monoclonal gammopathy  Hemolysis unlikely with normal T. bili, mildly elevated haptoglobin    Hgb has remained stable now transfusion support.   Likely anemia of chronic disease

## 2025-06-06 NOTE — PROGRESS NOTES
Progress Note - Hospitalist   Name: Trevor Lyons 85 y.o. male I MRN: [id number]  Unit/Bed#: -01 I Date of Admission: 5/30/2025   Date of Service: 6/6/2025 I Hospital Day: 7    Assessment & Plan  Acute combined systolic and diastolic congestive heart failure (HCC)  Wt Readings from Last 3 Encounters:   06/06/25 103 kg (227 lb 9.6 oz)   05/28/25 105 kg (232 lb)   03/20/25 99.7 kg (219 lb 12.8 oz)     Presenting to the ED for LOPES.  ; CT chest w/ severe bilateral consolidations and ground glass opacity, greatest in the right lower lobe, compatible with edema and/or PNA   GDMT: Farxiga 10 mg daily, losartan 25 mg daily, metoprolol succinate 25 mg daily, spironolactone 25 mg daily, torsemide 20 mg twice daily w/ Kdur   ECHO (10/2024): EF 40-45%, mild global hypokinesis, atrial dilatation  Cardiology following, recs appreciated  S/p IV Lasix which was transitioned to IV Bumex.  Bumex 2 mg BID increased to 3 mg IV BID on 06/03.  Continue Bumex infusion today  Continuous telemetry monitoring  Daily wts, I&O's   Patient diuresing appropriately, status post 1 dose of metolazone 6/5  Weight downtrending from 232 --> 227 today.  Continue to monitor I&O's, though unclear accuracy   C/w losartan, metoprolol, spironolactone  Chronic respiratory failure (HCC)  Chronically on 2 L via NC PRN   Required up to 4 L; now stable on 2 L   Multifactorial w/ CHF and PNA and h/o COPD with inhalers held for uncertain reason.  Leukocytosis  Recent Labs     06/04/25  0605 06/05/25  0603 06/06/25  0547   WBC 21.09* 20.75* 18.72*     Stable at 20k  ID curbsided,given clinical improvement monitor; low threshold to reimage if symptoms worsen   Hematology consulted  Elevated haptoglobin, elevated Ig kappa/ Ig kal free light chains.  Reticulocyte count mildly elevated  LDH elevated  Flow cytometry with possible CLL, SPEP immunofixation negative  Per hematology we will closely monitor, continue to trend CBC  Anemia  Recent Labs      06/04/25  0605 06/05/25  0603 06/06/25  0547   HGB 7.8* 7.6* 7.7*     Per chart review, baseline Hgb ~11  Normocytic anemia  No active bleeding   Folate  >22, B12 312   Iron panel mixed -inflammatory/SHELIA  C/w iron supplement   Hematology following  Persistent atrial fibrillation (HCC)  Rate controlled   C/w Toprol-XL 25 mg  q day & Xarelto  Sick sinus syndrome (HCC)  S/p pacemaker placement  Hypertension  Home regimen: Toprol-XL 25 mg  q day, Losartan 25 mg daily, & spironolactone 25 mg daily, c/w  BP acceptable   Type 2 diabetes mellitus without complication, with long-term current use of insulin ([facility])  Lab Results   Component Value Date    HGBA1C 5.2 05/28/2025     Recent Labs     06/05/25  1610 06/05/25  2149 06/06/25  0730 06/06/25  1130   POCGLU 192* 230* 227* 209*     Blood Sugar Average: Last 72 hrs:  (P) 219    Maintained on farxiga 10mg daily, glimerpiride 1mg daily, lantus 8U qhs, metformin 1000mg BID  C/w SSI AC/QHS, Accu-Cheks, CCD  Titrate Lantus from 5 to 8 U qhs   Acquired hypothyroidism  TSH 6.2 in 05/2025, T4 therapeutic; recommend outpt monitoring in 4-6 wks   Cw levothyroxine  Chronic left-sided low back pain with left-sided sciatica  Chronic LBP managed with menthol topical patch, Cymbalta, and Tylenol.  Essentially wheelchair-bound however will get up and ambulate a few steps with standby assist.   PT/OT for RTF  Moderate Alzheimer's dementia, unspecified timing of dementia onset, unspecified whether behavioral, psychotic, or mood disturbance or anxiety (HCC)  History of moderate late onset AD.  Currently resides at [facility].  Alert and oriented to person place, time with disorientation to situation.    Managed with Aricept 10 mg p.o. daily, BuSpar 7.5 twice daily, Cymbalta 60 mg p.o. daily with as needed clonazepam at at bedtime.  Delirium precautions: Optimize nutrition hydration and sleep hygiene.  Increase socialization, prevent falls.  Monitor for constipation or change in urination  pattern.  Stage 3 chronic kidney disease (HCC)  Lab Results   Component Value Date    EGFR 49 2025    EGFR 48 2025    EGFR 50 2025    CREATININE 1.28 2025    CREATININE 1.31 (H) 2025    CREATININE 1.27 2025   Presented with NELLIE with creatinine 1.8, renal function now stabilized  Monitor daily while on Bumex infusion    Mobility:   Basic Mobility Inpatient Raw Score: 16  -HLM Goal: 5: Stand one or more mins  JH-HLM Achieved: 5: Stand (1 or more minutes)  -HLM Goal achieved. Continue to encourage appropriate mobility.    VTE Pharmacologic Prophylaxis: VTE Score: 5 High Risk (Score >/= 5) - Pharmacological DVT Prophylaxis Ordered: rivaroxaban (Xarelto). Sequential Compression Devices Ordered.    Education and Discussions with Family / Patient: Attempted to update  (daughter) via phone. Left voicemail.     Current Length of Stay: 7 day(s)  Current Patient Status: Inpatient   Certification Statement: The patient will continue to require additional inpatient hospital stay due to volume overload requiring IV Bumex infusion, close monitoring of electrolytes  Discharge Plan: Anticipate discharge in 48-72 hrs to prior assisted or independent living facility.    Code Status: Level 3 - DNAR and DNI    Subjective:   Patient examined at bedside, feels about the same as yesterday, no overnight events.  Has been urinating frequently.    Objective:     Vitals:   Temp (24hrs), Av.9 °F (36.6 °C), Min:97.8 °F (36.6 °C), Max:97.9 °F (36.6 °C)    Temp:  [97.8 °F (36.6 °C)-97.9 °F (36.6 °C)] 97.8 °F (36.6 °C)  HR:  [60-61] 61  Resp:  [18-20] 18  BP: (106-116)/(46-50) 116/48  SpO2:  [89 %-96 %] 96 %  Body mass index is 34.61 kg/m².     Input and Output Summary (last 24 hours):     Intake/Output Summary (Last 24 hours) at 2025 1136  Last data filed at 2025 1000  Gross per 24 hour   Intake 2011 ml   Output 3700 ml   Net -1689 ml       Physical Exam:   Physical Exam  Vitals and  nursing note reviewed.   Constitutional:       General: He is not in acute distress.     Appearance: Normal appearance. He is not ill-appearing.   HENT:      Head: Normocephalic and atraumatic.     Eyes:      General: No scleral icterus.        Right eye: No discharge.         Left eye: No discharge.       Cardiovascular:      Rate and Rhythm: Normal rate and regular rhythm.      Pulses: Normal pulses.      Heart sounds: Murmur heard.      No friction rub. No gallop.   Pulmonary:      Effort: Pulmonary effort is normal. No respiratory distress.      Breath sounds: Normal breath sounds. No wheezing, rhonchi or rales.   Abdominal:      General: Bowel sounds are normal. There is no distension.      Palpations: Abdomen is soft.      Tenderness: There is no abdominal tenderness. There is no guarding or rebound.     Musculoskeletal:         General: No swelling or deformity.      Cervical back: Neck supple.      Right lower leg: Edema present.      Left lower leg: Edema present.     Skin:     General: Skin is warm and dry.      Capillary Refill: Capillary refill takes less than 2 seconds.      Coloration: Skin is not jaundiced or pale.      Findings: No erythema or rash.     Neurological:      General: No focal deficit present.      Mental Status: He is alert and oriented to person, place, and time. Mental status is at baseline.     Psychiatric:         Mood and Affect: Mood normal.         Behavior: Behavior normal.          Additional Data:     Labs:  Results from last 7 days   Lab Units 06/06/25  0547 06/05/25  0603 06/04/25  0605   WBC Thousand/uL 18.72*   < > 21.09*   HEMOGLOBIN g/dL 7.7*   < > 7.8*   HEMATOCRIT % 25.7*   < > 27.1*   PLATELETS Thousands/uL 354   < > 324   LYMPHO PCT %  --   --  29   MONO PCT %  --   --  11   EOS PCT %  --   --  3    < > = values in this interval not displayed.     Results from last 7 days   Lab Units 06/06/25  0547 06/02/25  0529 06/01/25  0426   SODIUM mmol/L 132*   < > 137    POTASSIUM mmol/L 3.4*   < > 3.5   CHLORIDE mmol/L 87*   < > 95*   CO2 mmol/L 37*   < > 31   BUN mg/dL 41*   < > 39*   CREATININE mg/dL 1.28   < > 1.66*   ANION GAP mmol/L 8   < > 11   CALCIUM mg/dL 8.9   < > 8.7   TOTAL BILIRUBIN mg/dL  --   --  0.92   GLUCOSE RANDOM mg/dL 207*   < > 112    < > = values in this interval not displayed.     Results from last 7 days   Lab Units 05/30/25  1320   INR  1.87*     Results from last 7 days   Lab Units 06/06/25  1130 06/06/25  0730 06/05/25  2149 06/05/25  1610 06/05/25  1056 06/05/25  0718 06/04/25  2022 06/04/25  1633 06/04/25  1051 06/04/25  0742 06/03/25  2156 06/03/25  1724   POC GLUCOSE mg/dl 209* 227* 230* 192* 201* 204* 208* 289* 269* 154* 204* 231*         Results from last 7 days   Lab Units 06/03/25  0446 06/01/25  0426 05/31/25  0539 05/30/25  2028 05/30/25  1655 05/30/25  1245   LACTIC ACID mmol/L  --   --   --  1.3 2.8* 2.9*   PROCALCITONIN ng/ml 0.27* 0.56* 0.63*  --   --   --        Imaging: Radiology Review: No additional pertinent studies reviewed.    Recent Cultures (last 7 days):   Results from last 7 days   Lab Units 05/30/25  1725 05/30/25  1245   BLOOD CULTURE   --  No Growth After 5 Days.  No Growth After 5 Days.   LEGIONELLA URINARY ANTIGEN  Negative  --        Telemetry Orders (From admission, onward)               24 Hour Telemetry Monitoring  Continuous x 24 Hours (Telem)        Expiring   Question:  Reason for 24 Hour Telemetry  Answer:  Decompensated CHF- and any one of the following: continuous diuretic infusion or total diuretic dose >200 mg daily, associated electrolyte derangement (I.e. K < 3.0), inotropic drip (continuous infusion), hx of ventricular arrhythmia, or new EF < 35%                        Last 24 Hours Medication List:   Current Facility-Administered Medications   Medication Dose Route Frequency Provider Last Rate    acetaminophen  1,000 mg Intravenous Q6H [facility] Segun Lennon PA-C 1,000 mg (06/06/25 0815)    atorvastatin  20  mg Oral Daily With Dinner Anya Dash MD      bumetanide (BUMEX) 12.5 mg infusion 50 mL  1 mg/hr Intravenous Continuous Sergio Wisdom PA-C 1 mg/hr (06/06/25 0539)    busPIRone  7.5 mg Oral BID Anya Dash MD      clonazePAM  1.5 mg Oral HS PRN Sergio Wisdom PA-C      donepezil  10 mg Oral QAM Anya Dash MD      DULoxetine  60 mg Oral Daily Anya Dash MD      ferrous sulfate  325 mg Oral Every Other Day Debbie Riggins PA-C      Fluticasone Furoate-Vilanterol  1 puff Inhalation Daily Aurea Germain PA-C      And    umeclidinium  1 puff Inhalation Daily Aurea Germain PA-C      hydrOXYzine HCL  50 mg Oral Q6H PRN Sergio Wisdom PA-C      insulin glargine  8 Units Subcutaneous HS Usha Decker PA-C      insulin lispro  1-5 Units Subcutaneous TID AC Anya Dash MD      insulin lispro  1-6 Units Subcutaneous HS Anya Dash MD      insulin lispro  5 Units Subcutaneous TID With Meals Aurea Germain PA-C      levalbuterol  1.25 mg Nebulization TID Anya Dash MD      levothyroxine  137 mcg Oral Early Morning Anya Dash MD      [Held by provider] losartan  25 mg Oral Daily SAMIRA Sadler      magnesium Oxide  400 mg Oral BID Anya Dash MD      melatonin  6 mg Oral HS Usha Quinn PA-C      metoprolol succinate  25 mg Oral QAM Anya Dash MD      pantoprazole  40 mg Oral Daily Before Breakfast Anya Dash MD      polyethylene glycol  17 g Oral Daily Anya Dash MD      rivaroxaban  15 mg Oral Daily With Dinner Anya Dash MD      senna  1 tablet Oral Daily Anya Dash MD      spironolactone  25 mg Oral Daily SAMIRA Sadler          Today, Patient Was Seen By: Sergio Wisdom PA-C    **Please Note: This note may have been constructed using a voice recognition system.**

## 2025-06-07 LAB
ANION GAP SERPL CALCULATED.3IONS-SCNC: 10 MMOL/L (ref 4–13)
ANION GAP SERPL CALCULATED.3IONS-SCNC: 7 MMOL/L (ref 4–13)
ANISOCYTOSIS BLD QL SMEAR: PRESENT
BASOPHILS # BLD MANUAL: 0.2 THOUSAND/UL (ref 0–0.1)
BASOPHILS NFR MAR MANUAL: 1 % (ref 0–1)
BUN SERPL-MCNC: 42 MG/DL (ref 5–25)
BUN SERPL-MCNC: 49 MG/DL (ref 5–25)
CALCIUM SERPL-MCNC: 9.1 MG/DL (ref 8.4–10.2)
CALCIUM SERPL-MCNC: 9.3 MG/DL (ref 8.4–10.2)
CHLORIDE SERPL-SCNC: 84 MMOL/L (ref 96–108)
CHLORIDE SERPL-SCNC: 85 MMOL/L (ref 96–108)
CO2 SERPL-SCNC: 39 MMOL/L (ref 21–32)
CO2 SERPL-SCNC: 40 MMOL/L (ref 21–32)
CREAT SERPL-MCNC: 1.36 MG/DL (ref 0.6–1.3)
CREAT SERPL-MCNC: 1.57 MG/DL (ref 0.6–1.3)
EOSINOPHIL # BLD MANUAL: 0.78 THOUSAND/UL (ref 0–0.4)
EOSINOPHIL NFR BLD MANUAL: 4 % (ref 0–6)
ERYTHROCYTE [DISTWIDTH] IN BLOOD BY AUTOMATED COUNT: 15.2 % (ref 11.6–15.1)
GFR SERPL CREATININE-BSD FRML MDRD: 39 ML/MIN/1.73SQ M
GFR SERPL CREATININE-BSD FRML MDRD: 46 ML/MIN/1.73SQ M
GIANT PLATELETS BLD QL SMEAR: PRESENT
GLUCOSE SERPL-MCNC: 210 MG/DL (ref 65–140)
GLUCOSE SERPL-MCNC: 240 MG/DL (ref 65–140)
GLUCOSE SERPL-MCNC: 247 MG/DL (ref 65–140)
GLUCOSE SERPL-MCNC: 277 MG/DL (ref 65–140)
GLUCOSE SERPL-MCNC: 330 MG/DL (ref 65–140)
GLUCOSE SERPL-MCNC: 366 MG/DL (ref 65–140)
HCT VFR BLD AUTO: 26.4 % (ref 36.5–49.3)
HGB BLD-MCNC: 8 G/DL (ref 12–17)
LYMPHOCYTES # BLD AUTO: 39 % (ref 14–44)
LYMPHOCYTES # BLD AUTO: 7.61 THOUSAND/UL (ref 0.6–4.47)
MCH RBC QN AUTO: 28.7 PG (ref 26.8–34.3)
MCHC RBC AUTO-ENTMCNC: 30.3 G/DL (ref 31.4–37.4)
MCV RBC AUTO: 95 FL (ref 82–98)
MONOCYTES # BLD AUTO: 0.98 THOUSAND/UL (ref 0–1.22)
MONOCYTES NFR BLD: 5 % (ref 4–12)
MYELOCYTE ABSOLUTE CT: 0.2 THOUSAND/UL (ref 0–0.1)
MYELOCYTES NFR BLD MANUAL: 1 % (ref 0–1)
NEUTROPHILS # BLD MANUAL: 9.76 THOUSAND/UL (ref 1.85–7.62)
NEUTS SEG NFR BLD AUTO: 50 % (ref 43–75)
PLATELET # BLD AUTO: 401 THOUSANDS/UL (ref 149–390)
PLATELET BLD QL SMEAR: ABNORMAL
PMV BLD AUTO: 8.9 FL (ref 8.9–12.7)
POIKILOCYTOSIS BLD QL SMEAR: PRESENT
POLYCHROMASIA BLD QL SMEAR: PRESENT
POTASSIUM SERPL-SCNC: 4 MMOL/L (ref 3.5–5.3)
POTASSIUM SERPL-SCNC: 4.1 MMOL/L (ref 3.5–5.3)
RBC # BLD AUTO: 2.79 MILLION/UL (ref 3.88–5.62)
RBC MORPH BLD: PRESENT
SODIUM SERPL-SCNC: 132 MMOL/L (ref 135–147)
SODIUM SERPL-SCNC: 133 MMOL/L (ref 135–147)
WBC # BLD AUTO: 19.51 THOUSAND/UL (ref 4.31–10.16)

## 2025-06-07 PROCEDURE — 99232 SBSQ HOSP IP/OBS MODERATE 35: CPT | Performed by: PHYSICIAN ASSISTANT

## 2025-06-07 PROCEDURE — 80048 BASIC METABOLIC PNL TOTAL CA: CPT | Performed by: PHYSICIAN ASSISTANT

## 2025-06-07 PROCEDURE — 85027 COMPLETE CBC AUTOMATED: CPT | Performed by: PHYSICIAN ASSISTANT

## 2025-06-07 PROCEDURE — 85007 BL SMEAR W/DIFF WBC COUNT: CPT | Performed by: PHYSICIAN ASSISTANT

## 2025-06-07 PROCEDURE — 94760 N-INVAS EAR/PLS OXIMETRY 1: CPT

## 2025-06-07 PROCEDURE — 82948 REAGENT STRIP/BLOOD GLUCOSE: CPT

## 2025-06-07 PROCEDURE — 94640 AIRWAY INHALATION TREATMENT: CPT

## 2025-06-07 PROCEDURE — 99232 SBSQ HOSP IP/OBS MODERATE 35: CPT | Performed by: INTERNAL MEDICINE

## 2025-06-07 RX ORDER — ACETAMINOPHEN 325 MG/1
650 TABLET ORAL EVERY 6 HOURS PRN
Status: DISCONTINUED | OUTPATIENT
Start: 2025-06-07 | End: 2025-06-13 | Stop reason: HOSPADM

## 2025-06-07 RX ADMIN — Medication 1 MG/HR: at 03:08

## 2025-06-07 RX ADMIN — DULOXETINE HYDROCHLORIDE 60 MG: 30 CAPSULE, DELAYED RELEASE ORAL at 08:32

## 2025-06-07 RX ADMIN — ACETAMINOPHEN 1000 MG: 1000 INJECTION, SOLUTION INTRAVENOUS at 02:45

## 2025-06-07 RX ADMIN — BUSPIRONE HYDROCHLORIDE 7.5 MG: 15 TABLET ORAL at 17:12

## 2025-06-07 RX ADMIN — INSULIN LISPRO 5 UNITS: 100 INJECTION, SOLUTION INTRAVENOUS; SUBCUTANEOUS at 12:10

## 2025-06-07 RX ADMIN — LEVALBUTEROL HYDROCHLORIDE 1.25 MG: 1.25 SOLUTION RESPIRATORY (INHALATION) at 19:48

## 2025-06-07 RX ADMIN — Medication 6 MG: at 21:56

## 2025-06-07 RX ADMIN — BUSPIRONE HYDROCHLORIDE 7.5 MG: 15 TABLET ORAL at 08:32

## 2025-06-07 RX ADMIN — DONEPEZIL HYDROCHLORIDE 10 MG: 5 TABLET ORAL at 08:33

## 2025-06-07 RX ADMIN — ATORVASTATIN CALCIUM 20 MG: 20 TABLET, FILM COATED ORAL at 17:12

## 2025-06-07 RX ADMIN — RIVAROXABAN 15 MG: 15 TABLET, FILM COATED ORAL at 17:12

## 2025-06-07 RX ADMIN — INSULIN LISPRO 2 UNITS: 100 INJECTION, SOLUTION INTRAVENOUS; SUBCUTANEOUS at 17:14

## 2025-06-07 RX ADMIN — POTASSIUM CHLORIDE 40 MEQ: 1500 TABLET, EXTENDED RELEASE ORAL at 17:12

## 2025-06-07 RX ADMIN — PANTOPRAZOLE SODIUM 40 MG: 40 TABLET, DELAYED RELEASE ORAL at 05:56

## 2025-06-07 RX ADMIN — POLYETHYLENE GLYCOL 3350 17 G: 17 POWDER, FOR SOLUTION ORAL at 08:33

## 2025-06-07 RX ADMIN — Medication 400 MG: at 17:12

## 2025-06-07 RX ADMIN — LEVALBUTEROL HYDROCHLORIDE 1.25 MG: 1.25 SOLUTION RESPIRATORY (INHALATION) at 13:48

## 2025-06-07 RX ADMIN — POTASSIUM CHLORIDE 40 MEQ: 1500 TABLET, EXTENDED RELEASE ORAL at 08:32

## 2025-06-07 RX ADMIN — SENNOSIDES 8.6 MG: 8.6 TABLET, FILM COATED ORAL at 08:32

## 2025-06-07 RX ADMIN — Medication 400 MG: at 08:32

## 2025-06-07 RX ADMIN — SPIRONOLACTONE 25 MG: 25 TABLET, FILM COATED ORAL at 08:32

## 2025-06-07 RX ADMIN — INSULIN GLARGINE 8 UNITS: 100 INJECTION, SOLUTION SUBCUTANEOUS at 21:56

## 2025-06-07 RX ADMIN — INSULIN LISPRO 5 UNITS: 100 INJECTION, SOLUTION INTRAVENOUS; SUBCUTANEOUS at 08:36

## 2025-06-07 RX ADMIN — FLUTICASONE FUROATE AND VILANTEROL TRIFENATATE 1 PUFF: 100; 25 POWDER RESPIRATORY (INHALATION) at 08:36

## 2025-06-07 RX ADMIN — METOPROLOL SUCCINATE 25 MG: 25 TABLET, EXTENDED RELEASE ORAL at 08:33

## 2025-06-07 RX ADMIN — INSULIN LISPRO 5 UNITS: 100 INJECTION, SOLUTION INTRAVENOUS; SUBCUTANEOUS at 17:14

## 2025-06-07 RX ADMIN — INSULIN LISPRO 2 UNITS: 100 INJECTION, SOLUTION INTRAVENOUS; SUBCUTANEOUS at 08:36

## 2025-06-07 RX ADMIN — LEVALBUTEROL HYDROCHLORIDE 1.25 MG: 1.25 SOLUTION RESPIRATORY (INHALATION) at 08:18

## 2025-06-07 RX ADMIN — ACETAMINOPHEN 1000 MG: 1000 INJECTION, SOLUTION INTRAVENOUS at 07:30

## 2025-06-07 RX ADMIN — INSULIN LISPRO 6 UNITS: 100 INJECTION, SOLUTION INTRAVENOUS; SUBCUTANEOUS at 21:56

## 2025-06-07 RX ADMIN — CLONAZEPAM 1.5 MG: 0.5 TABLET ORAL at 21:56

## 2025-06-07 RX ADMIN — LEVOTHYROXINE SODIUM 137 MCG: 112 TABLET ORAL at 05:56

## 2025-06-07 RX ADMIN — UMECLIDINIUM 1 PUFF: 62.5 AEROSOL, POWDER ORAL at 08:36

## 2025-06-07 RX ADMIN — INSULIN LISPRO 3 UNITS: 100 INJECTION, SOLUTION INTRAVENOUS; SUBCUTANEOUS at 12:10

## 2025-06-07 RX ADMIN — Medication 1 MG/HR: at 12:11

## 2025-06-07 NOTE — ASSESSMENT & PLAN NOTE
Lab Results   Component Value Date    HGBA1C 5.2 05/28/2025     Recent Labs     06/06/25  1716 06/06/25  2103 06/07/25  0635 06/07/25  1041   POCGLU 252* 269* 247* 277*     Blood Sugar Average: Last 72 hrs:  (P) [id number]    Maintained on farxiga 10mg daily, glimerpiride 1mg daily, lantus 8U qhs, metformin 1000mg BID  C/w SSI AC/QHS, Accu-Cheks, CCD  Titrate Lantus from 5 to 8 U qhs

## 2025-06-07 NOTE — ASSESSMENT & PLAN NOTE
History of moderate late onset AD.  Currently resides at [facility].  Alert and oriented to person place, time with disorientation to situation.    Managed with Aricept 10 mg p.o. daily, BuSpar 7.5 twice daily, Cymbalta 60 mg p.o. daily with as needed clonazepam at at bedtime.  Delirium precautions: Optimize nutrition hydration and sleep hygiene.  Increase socialization, prevent falls.  Monitor for constipation or change in urination pattern.

## 2025-06-07 NOTE — PLAN OF CARE
Problem: Potential for Falls  Goal: Patient will remain free of falls  Description: INTERVENTIONS:  - Educate patient/family on patient safety including physical limitations  - Instruct patient to call for assistance with activity   - Consider consulting OT/PT to assist with strengthening/mobility based on AM PAC & JH-HLM score  - Consult OT/PT to assist with strengthening/mobility   - Keep Call bell within reach  - Keep bed low and locked with side rails adjusted as appropriate  - Keep care items and personal belongings within reach  - Initiate and maintain comfort rounds  - Make Fall Risk Sign visible to staff  - Offer Toileting every 2 Hours, in advance of need  - Initiate/Maintain bed alarm  - Obtain necessary fall risk management equipment: socks  - Apply yellow socks and bracelet for high fall risk patients  - Consider moving patient to room near nurses station  Outcome: Progressing     Problem: PAIN - ADULT  Goal: Verbalizes/displays adequate comfort level or baseline comfort level  Description: Interventions:  - Encourage patient to monitor pain and request assistance  - Assess pain using appropriate pain scale  - Administer analgesics as ordered based on type and severity of pain and evaluate response  - Implement non-pharmacological measures as appropriate and evaluate response  - Consider cultural and social influences on pain and pain management  - Notify physician/advanced practitioner if interventions unsuccessful or patient reports new pain  - Educate patient/family on pain management process including their role and importance of  reporting pain   - Provide non-pharmacologic/complimentary pain relief interventions  Outcome: Progressing     Problem: INFECTION - ADULT  Goal: Absence or prevention of progression during hospitalization  Description: INTERVENTIONS:  - Assess and monitor for signs and symptoms of infection  - Monitor lab/diagnostic results  - Monitor all insertion sites, i.e. indwelling  lines, tubes, and drains  - Monitor endotracheal if appropriate and nasal secretions for changes in amount and color  - [place] appropriate cooling/warming therapies per order  - Administer medications as ordered  - Instruct and encourage patient and family to use good hand hygiene technique  - Identify and instruct in appropriate isolation precautions for identified infection/condition  Outcome: Progressing     Problem: SAFETY ADULT  Goal: Patient will remain free of falls  Description: INTERVENTIONS:  - Educate patient/family on patient safety including physical limitations  - Instruct patient to call for assistance with activity   - Consider consulting OT/PT to assist with strengthening/mobility based on AM PAC & JH-HLM score  - Consult OT/PT to assist with strengthening/mobility   - Keep Call bell within reach  - Keep bed low and locked with side rails adjusted as appropriate  - Keep care items and personal belongings within reach  - Initiate and maintain comfort rounds  - Make Fall Risk Sign visible to staff  - Offer Toileting every 2 Hours, in advance of need  - Initiate/Maintain bed alarm  - Obtain necessary fall risk management equipment: socks  - Apply yellow socks and bracelet for high fall risk patients  - Consider moving patient to room near nurses station  Outcome: Progressing     Problem: DISCHARGE PLANNING  Goal: Discharge to home or other facility with appropriate resources  Description: INTERVENTIONS:  - Identify barriers to discharge w/patient and caregiver  - Arrange for needed discharge resources and transportation as appropriate  - Identify discharge learning needs (meds, wound care, etc.)  - Arrange for interpretive services to assist at discharge as needed  - Refer to Case Management Department for coordinating discharge planning if the patient needs post-hospital services based on physician/advanced practitioner order or complex needs related to functional status, cognitive ability, or  social support system  Outcome: Progressing     Problem: Prexisting or High Potential for Compromised Skin Integrity  Goal: Skin integrity is maintained or improved  Description: INTERVENTIONS:  - Identify patients at risk for skin breakdown  - Assess and monitor skin integrity including under and around medical devices   - Assess and monitor nutrition and hydration status  - Monitor labs  - Assess for incontinence   - Turn and reposition patient  - Assist with mobility/ambulation  - Relieve pressure over payam prominences   - Avoid friction and shearing  - Provide appropriate hygiene as needed including keeping skin clean and dry  - Evaluate need for skin moisturizer/barrier cream  - Collaborate with interdisciplinary team  - Patient/family teaching  - Consider wound care consult    Assess:  - Review [place] scale daily  - Clean and moisturize skin every day  - Inspect skin when repositioning, toileting, and assisting with ADLS  - Assess under medical devices such as O2 tubing every 4 hours  - Assess extremities for adequate circulation and sensation     Bed Management:  - Have minimal linens on bed & keep smooth, unwrinkled  - Change linens as needed when moist or perspiring  - Avoid sitting or lying in one position for more than 2 hours while in bed?Keep HOB at 30 degrees   - Toileting:  - Offer bedside commode  - Assess for incontinence every 2 hours  - Use incontinent care products after each incontinent episode such as calazime cream    Activity:  - Mobilize patient 3 times a day  - Encourage activity and walks on unit  - Encourage or provide ROM exercises   - Turn and reposition patient every 2 Hours  - Use appropriate equipment to lift or move patient in bed  - Instruct/ Assist with weight shifting every 30 min when out of bed in chair  - Consider limitation of chair time 4 hour intervals    Skin Care:  - Avoid use of baby powder, tape, friction and shearing, hot water or constrictive clothing  - Relieve  pressure over bony prominences using pillows and wedges  - Do not massage red bony areas    Next Steps:  - Teach patient strategies to minimize risks such as frequent turning and weight-shifting  - Consider consults to  interdisciplinary teams such as PT/OT  Outcome: Progressing     Problem: CARDIOVASCULAR - ADULT  Goal: Maintains optimal cardiac output and hemodynamic stability  Description: INTERVENTIONS:  - Monitor I/O, vital signs and rhythm  - Monitor for S/S and trends of decreased cardiac output  - Administer and titrate ordered vasoactive medications to optimize hemodynamic stability  - Assess quality of pulses, skin color and temperature  - Assess for signs of decreased coronary artery perfusion  - Instruct patient to report change in severity of symptoms  Outcome: Progressing

## 2025-06-07 NOTE — ASSESSMENT & PLAN NOTE
Hemoglobin of 8, up from 7.7, a barrier to continued management of his volume  With asked the primary team to intervene as needed to improve his hemoglobin

## 2025-06-07 NOTE — ASSESSMENT & PLAN NOTE
Lab Results   Component Value Date    EGFR 46 06/07/2025    EGFR 38 06/06/2025    EGFR 49 06/06/2025    CREATININE 1.36 (H) 06/07/2025    CREATININE 1.58 (H) 06/06/2025    CREATININE 1.28 06/06/2025   Improving after presenting with NELLIE due to CHF

## 2025-06-07 NOTE — ASSESSMENT & PLAN NOTE
Wt Readings from Last 3 Encounters:   06/07/25 101 kg (223 lb)   05/28/25 105 kg (232 lb)   03/20/25 99.7 kg (219 lb 12.8 oz)   Patient with a history of HFrEF -comes to [facility] emergency department on 5/30/2025 with progressive dyspnea, worsening cough and hypoxia.  Echo 10/21/25: EF 40 to 45%, mild global hypokinesis, RV dilatation, left atrial dilatation, well-functioning TAVR, moderate mitral MAC, moderate to severe MR regurgitation, mild MS, Moderate TR  GDMT: Farxiga 10 mg daily, losartan 25 mg daily, metoprolol succinate 25 mg daily, spironolactone 25 mg daily, torsemide 20 mg twice daily with potassium supplementation  Bumex drip 1 mg/h, received 5 mg of metolazone on Thursday  5 L diuresis, still very volume overloaded, anticipate at least another 10 pounds of diuresis necessary

## 2025-06-07 NOTE — ASSESSMENT & PLAN NOTE
Lab Results   Component Value Date    EGFR 46 06/07/2025    EGFR 38 06/06/2025    EGFR 49 06/06/2025    CREATININE 1.36 (H) 06/07/2025    CREATININE 1.58 (H) 06/06/2025    CREATININE 1.28 06/06/2025   Presented with NELLIE with creatinine 1.8, renal function now stabilized  Monitor daily while on Bumex infusion

## 2025-06-07 NOTE — PROGRESS NOTES
General Cardiology Progress Note   Trevor Lyons 85 y.o. male MRN: [id number]  Unit/Bed#: -01 Encounter: [id number]      Assessment & Plan  Acute combined systolic and diastolic congestive heart failure (HCC)  Wt Readings from Last 3 Encounters:   06/07/25 101 kg (223 lb)   05/28/25 105 kg (232 lb)   03/20/25 99.7 kg (219 lb 12.8 oz)   Patient with a history of HFrEF -comes to [facility] emergency department on 5/30/2025 with progressive dyspnea, worsening cough and hypoxia.  Echo 10/21/25: EF 40 to 45%, mild global hypokinesis, RV dilatation, left atrial dilatation, well-functioning TAVR, moderate mitral MAC, moderate to severe MR regurgitation, mild MS, Moderate TR  GDMT: Farxiga 10 mg daily, losartan 25 mg daily, metoprolol succinate 25 mg daily, spironolactone 25 mg daily, torsemide 20 mg twice daily with potassium supplementation  Bumex drip 1 mg/h, received 5 mg of metolazone on Thursday  5 L diuresis, still very volume overloaded, anticipate at least another 10 pounds of diuresis necessary  Persistent atrial fibrillation (HCC)  On Xarelto, low-dose metoprolol, mostly paced  Sick sinus syndrome (HCC)  [employer] permanent pacemaker VVI 65  Chronic respiratory failure (HCC)  Remains on 2 L nasal cannula.  Hypertension  Blood pressures controlled, losartan held due to NELLIE  Anemia  Hemoglobin of 8, up from 7.7, a barrier to continued management of his volume  With asked the primary team to intervene as needed to improve his hemoglobin  Chronic left-sided low back pain with left-sided sciatica    Moderate Alzheimer's dementia, unspecified timing of dementia onset, unspecified whether behavioral, psychotic, or mood disturbance or anxiety (HCC)    Stage 3 chronic kidney disease (HCC)  Lab Results   Component Value Date    EGFR 46 06/07/2025    EGFR 38 06/06/2025    EGFR 49 06/06/2025    CREATININE 1.36 (H) 06/07/2025    CREATININE 1.58 (H) 06/06/2025    CREATININE 1.28 06/06/2025  "  Improving after presenting with NELLIE due to CHF    Plan:    Continue Bumex drip, he has 3+ pitting edema, dense pleural effusions bilaterally, at least 10 pounds of diuresis still necessary    Subjective:   Patient seen and examined.      Short of breath, leg swollen, just tells me he does not feel well    Review of Systems   Constitutional: Negative for diaphoresis, fever, malaise/fatigue and night sweats.   Cardiovascular:  Positive for dyspnea on exertion and leg swelling. Negative for chest pain, orthopnea, palpitations and syncope.   Respiratory:  Positive for shortness of breath. Negative for cough, sputum production and wheezing.    Skin:  Negative for itching and rash.   Gastrointestinal:  Negative for abdominal pain, change in bowel habit and diarrhea.   Neurological:  Negative for dizziness, focal weakness, light-headedness and weakness.       Objective   Vitals: Blood pressure 116/54, pulse 60, temperature 97.7 °F (36.5 °C), temperature source Oral, resp. rate 18, height 5' 8\" (1.727 m), weight 101 kg (223 lb), SpO2 96%., Body mass index is 33.91 kg/m²., I/O last 3 completed shifts:  In: 3133 [P.O.:2683; I.V.:50; IV Piggyback:400]  Out: 7825 [Urine:7825]  I/O this shift:  In: 120 [P.O.:120]  Out: 1180 [Urine:1180]  Wt Readings from Last 3 Encounters:   06/07/25 101 kg (223 lb)   05/28/25 105 kg (232 lb)   03/20/25 99.7 kg (219 lb 12.8 oz)       Intake/Output Summary (Last 24 hours) at 6/7/2025 1101  Last data filed at 6/7/2025 1059  Gross per 24 hour   Intake 1682 ml   Output 5955 ml   Net -4273 ml     I/O last 3 completed shifts:  In: 3133 [P.O.:2683; I.V.:50; IV Piggyback:400]  Out: 7825 [Urine:7825]    No significant arrhythmias seen on telemetry review.     Physical Exam  Constitutional:       General: He is not in acute distress.     Appearance: He is not diaphoretic.   HENT:      Head: Normocephalic and atraumatic.   Neck:      Vascular: JVD present.     Cardiovascular:      Rate and Rhythm: Normal " rate and regular rhythm.      Heart sounds: Normal heart sounds. No murmur heard.  Pulmonary:      Effort: Pulmonary effort is normal. No respiratory distress.      Breath sounds: Examination of the right-middle field reveals rales. Examination of the left-middle field reveals rales. Examination of the right-lower field reveals decreased breath sounds. Examination of the left-lower field reveals decreased breath sounds. Decreased breath sounds and rales present. No wheezing.   Abdominal:      General: Bowel sounds are normal. There is no distension.      Palpations: Abdomen is soft.      Tenderness: There is no abdominal tenderness.     Musculoskeletal:      Cervical back: Normal range of motion and neck supple.      Right lower leg: Edema present.      Left lower leg: Edema present.     Skin:     General: Skin is warm and dry.     Neurological:      Mental Status: He is alert and oriented to person, place, and time.         Meds/Allergies   Allergies[1]  Current Medications[2]    Laboratory Results:        CBC with diff:   Results from last 7 days   Lab Units 06/07/25  0555 06/06/25  0547 06/05/25  0603 06/04/25  0605 06/03/25  0446 06/02/25  1011 06/01/25  0426   WBC Thousand/uL 19.51* 18.72* 20.75* 21.09* 21.90* 20.72* 21.01*   HEMOGLOBIN g/dL 8.0* 7.7* 7.6* 7.8* 7.5* 7.9* 7.7*   HEMATOCRIT % 26.4* 25.7* 26.1* 27.1* 25.7* 27.5* 26.4*   MCV fL 95 95 97 98 96 94 94   PLATELETS Thousands/uL 401* 354 335 324 299 288 275   RBC Million/uL 2.79* 2.70* 2.69* 2.78* 2.69* 2.94* 2.82*   MCH pg 28.7 28.5 28.3 28.1 27.9 26.9 27.3   MCHC g/dL 30.3* 30.0* 29.1* 28.8* 29.2* 28.7* 29.2*   RDW % 15.2* 14.6 14.4 14.3 14.3 14.2 14.1   MPV fL 8.9 8.9 8.8* 9.0 9.0 8.8* 9.0       CMP:  Results from last 7 days   Lab Units 06/07/25  0555 06/06/25  1820 06/06/25  0547 06/05/25  0603 06/04/25  0605 06/03/25  0446 06/02/25  0529   SODIUM mmol/L 133* 132* 132* 137 141 138 138   POTASSIUM mmol/L 4.1 3.8 3.4* 3.4* 3.5 3.4* 3.3*   CHLORIDE  "mmol/L 84* 86* 87* 95* 96 95* 96   CO2 mmol/L 39* 35* 37* 34* 36* 34* 32   BUN mg/dL 42* 45* 41* 36* 31* 31* 35*   CREATININE mg/dL 1.36* 1.58* 1.28 1.31* 1.27 1.24 1.41*   CALCIUM mg/dL 9.3 9.0 8.9 8.5 8.8 8.6 8.5   EGFR ml/min/1.73sq m 46 38 49 48 50 52 44       BMP:  Results from last 7 days   Lab Units 06/07/25  0555 06/06/25  1820 06/06/25  0547 06/05/25  0603 06/04/25  0605 06/03/25  0446 06/02/25  0529   SODIUM mmol/L 133* 132* 132* 137 141 138 138   POTASSIUM mmol/L 4.1 3.8 3.4* 3.4* 3.5 3.4* 3.3*   CHLORIDE mmol/L 84* 86* 87* 95* 96 95* 96   CO2 mmol/L 39* 35* 37* 34* 36* 34* 32   BUN mg/dL 42* 45* 41* 36* 31* 31* 35*   CREATININE mg/dL 1.36* 1.58* 1.28 1.31* 1.27 1.24 1.41*   CALCIUM mg/dL 9.3 9.0 8.9 8.5 8.8 8.6 8.5       NT-proBNP: No results for input(s): \"NTBNP\" in the last 72 hours.     Magnesium:   Results from last 7 days   Lab Units 06/06/25  0547 06/05/25  0603 06/04/25  0605 06/03/25  0446   MAGNESIUM mg/dL 2.2 2.2 2.3 2.3       Coags:       TSH:        Hemoglobin A1C )      Lipid Profile:   No results found for: \"CHOL\"  No results found for: \"HDL\"  No results found for: \"LDLCALC\"  No results found for: \"LDLDIRECT\"  No results found for: \"TRIG\"    Cardiac testing:   EKG personally reviewed by Willis Mitchell MD.     Cath:  ECHO:  Stress TEST:  Other:        Willis Mitchell MD    Portions of the record may have been created with voice recognition software.  Occasional wrong word or \"sound a like\" substitutions may have occurred due to the inherent limitations of voice recognition software.  Read the chart carefully and recognize, using context, where substitutions have occurred.             [1] No Known Allergies  [2]   Current Facility-Administered Medications:     acetaminophen (TYLENOL) tablet 650 mg, 650 mg, Oral, Q6H PRN, Sergio Wisdom PA-C    atorvastatin (LIPITOR) tablet 20 mg, 20 mg, Oral, Daily With Dinner, Anya Dash MD, 20 mg at 06/06/25 6458    bumetanide (BUMEX) 12.5 mg infusion 50 mL, " 1 mg/hr, Intravenous, Continuous, Sergio Wisdom PA-C, Last Rate: 4 mL/hr at 06/07/25 0308, 1 mg/hr at 06/07/25 0308    busPIRone (BUSPAR) tablet 7.5 mg, 7.5 mg, Oral, BID, Anya Dash MD, 7.5 mg at 06/07/25 0832    clonazePAM (KlonoPIN) tablet 1.5 mg, 1.5 mg, Oral, HS PRN, Sergio Wisdom PA-C, 1.5 mg at 06/06/25 2244    donepezil (ARICEPT) tablet 10 mg, 10 mg, Oral, QAM, Anya Dash MD, 10 mg at 06/07/25 0833    DULoxetine (CYMBALTA) delayed release capsule 60 mg, 60 mg, Oral, Daily, Anya Dash MD, 60 mg at 06/07/25 0832    ferrous sulfate tablet 325 mg, 325 mg, Oral, Every Other Day, Debbie Riggins PA-C, 325 mg at 06/06/25 0815    Fluticasone Furoate-Vilanterol 100-25 mcg/actuation 1 puff, 1 puff, Inhalation, Daily, 1 puff at 06/07/25 0836 **AND** umeclidinium 62.5 mcg/actuation inhaler AEPB 1 puff, 1 puff, Inhalation, Daily, Aurea Germain PA-C, 1 puff at 06/07/25 0836    hydrOXYzine HCL (ATARAX) tablet 50 mg, 50 mg, Oral, Q6H PRN, Sergio Wisdom PA-C, 50 mg at 06/06/25 0234    insulin glargine (LANTUS) subcutaneous injection 8 Units 0.08 mL, 8 Units, Subcutaneous, HS, Usha Decker PA-C, 8 Units at 06/06/25 2243    insulin lispro (HumALOG/ADMELOG) 100 units/mL subcutaneous injection 1-5 Units, 1-5 Units, Subcutaneous, TID AC, 2 Units at 06/07/25 0836 **AND** Fingerstick Glucose (POCT), , , TID AC, Anya Dash MD    insulin lispro (HumALOG/ADMELOG) 100 units/mL subcutaneous injection 1-6 Units, 1-6 Units, Subcutaneous, HS, Anya Dash MD, 3 Units at 06/06/25 2244    insulin lispro (HumALOG/ADMELOG) 100 units/mL subcutaneous injection 5 Units, 5 Units, Subcutaneous, TID With Meals, Aurea Germain PA-C, 5 Units at 06/07/25 0836    levalbuterol (XOPENEX) inhalation solution 1.25 mg, 1.25 mg, Nebulization, TID, Anya Dash MD, 1.25 mg at 06/07/25 0818    levothyroxine tablet 137 mcg, 137 mcg, Oral, Early Morning, Anya Dash MD, 137 mcg at 06/07/25 0556    [Held by provider] losartan (COZAAR) tablet 25 mg, 25  mg, Oral, Daily, SAMIRA Sadler, 25 mg at 06/05/25 0902    magnesium Oxide (MAG-OX) tablet 400 mg, 400 mg, Oral, BID, Anya Dash MD, 400 mg at 06/07/25 0832    melatonin tablet 6 mg, 6 mg, Oral, HS, Usha Quinn PA-C, 6 mg at 06/06/25 2244    metoprolol succinate (TOPROL-XL) 24 hr tablet 25 mg, 25 mg, Oral, QAM, Anya Dash MD, 25 mg at 06/07/25 0833    pantoprazole (PROTONIX) EC tablet 40 mg, 40 mg, Oral, Daily Before Breakfast, Anya Dash MD, 40 mg at 06/07/25 0556    polyethylene glycol (MIRALAX) packet 17 g, 17 g, Oral, Daily, Anya Dash MD, 17 g at 06/07/25 0833    potassium chloride (Klor-Con M20) CR tablet 40 mEq, 40 mEq, Oral, BID, Tank Luther MD, 40 mEq at 06/07/25 0832    rivaroxaban (XARELTO) tablet 15 mg, 15 mg, Oral, Daily With Dinner, Anya Dash MD, 15 mg at 06/06/25 1718    senna (SENOKOT) tablet 8.6 mg, 1 tablet, Oral, Daily, Anya Dash MD, 8.6 mg at 06/07/25 0832    spironolactone (ALDACTONE) tablet 25 mg, 25 mg, Oral, Daily, SAMIRA Sadler, 25 mg at 06/07/25 0832

## 2025-06-07 NOTE — PLAN OF CARE
Problem: Potential for Falls  Goal: Patient will remain free of falls  Description: INTERVENTIONS:  - Educate patient/family on patient safety including physical limitations  - Instruct patient to call for assistance with activity   - Consider consulting OT/PT to assist with strengthening/mobility based on AM PAC & JH-HLM score  - Consult OT/PT to assist with strengthening/mobility   - Keep Call bell within reach  - Keep bed low and locked with side rails adjusted as appropriate  - Keep care items and personal belongings within reach  - Initiate and maintain comfort rounds  - Make Fall Risk Sign visible to staff  - Offer Toileting every 2 Hours, in advance of need  - Initiate/Maintain bed alarm  - Obtain necessary fall risk management equipment: socks  - Apply yellow socks and bracelet for high fall risk patients  - Consider moving patient to room near nurses station  Outcome: Progressing     Problem: PAIN - ADULT  Goal: Verbalizes/displays adequate comfort level or baseline comfort level  Description: Interventions:  - Encourage patient to monitor pain and request assistance  - Assess pain using appropriate pain scale  - Administer analgesics as ordered based on type and severity of pain and evaluate response  - Implement non-pharmacological measures as appropriate and evaluate response  - Consider cultural and social influences on pain and pain management  - Notify physician/advanced practitioner if interventions unsuccessful or patient reports new pain  - Educate patient/family on pain management process including their role and importance of  reporting pain   - Provide non-pharmacologic/complimentary pain relief interventions  Outcome: Progressing     Problem: INFECTION - ADULT  Goal: Absence or prevention of progression during hospitalization  Description: INTERVENTIONS:  - Assess and monitor for signs and symptoms of infection  - Monitor lab/diagnostic results  - Monitor all insertion sites, i.e. indwelling  lines, tubes, and drains  - Monitor endotracheal if appropriate and nasal secretions for changes in amount and color  - [place] appropriate cooling/warming therapies per order  - Administer medications as ordered  - Instruct and encourage patient and family to use good hand hygiene technique  - Identify and instruct in appropriate isolation precautions for identified infection/condition  Outcome: Progressing  Goal: Absence of fever/infection during neutropenic period  Description: INTERVENTIONS:  - Monitor WBC  - Perform strict hand hygiene  - Limit to healthy visitors only  - No plants, dried, fresh or silk flowers with damon in patient room  Outcome: Progressing     Problem: SAFETY ADULT  Goal: Patient will remain free of falls  Description: INTERVENTIONS:  - Educate patient/family on patient safety including physical limitations  - Instruct patient to call for assistance with activity   - Consider consulting OT/PT to assist with strengthening/mobility based on AM PAC & JH-HLM score  - Consult OT/PT to assist with strengthening/mobility   - Keep Call bell within reach  - Keep bed low and locked with side rails adjusted as appropriate  - Keep care items and personal belongings within reach  - Initiate and maintain comfort rounds  - Make Fall Risk Sign visible to staff  - Offer Toileting every 2 Hours, in advance of need  - Initiate/Maintain bed alarm  - Obtain necessary fall risk management equipment: socks  - Apply yellow socks and bracelet for high fall risk patients  - Consider moving patient to room near nurses station  Outcome: Progressing  Goal: Maintain or return to baseline ADL function  Description: INTERVENTIONS:  -  Assess patient's ability to carry out ADLs; assess patient's baseline for ADL function and identify physical deficits which impact ability to perform ADLs (bathing, care of mouth/teeth, toileting, grooming, dressing, etc.)  - Assess/evaluate cause of self-care deficits   - Assess range of  motion  - Assess patient's mobility; develop plan if impaired  - Assess patient's need for assistive devices and provide as appropriate  - Encourage maximum independence but intervene and supervise when necessary  - Involve family in performance of ADLs  - Assess for home care needs following discharge   - Consider OT consult to assist with ADL evaluation and planning for discharge  - Provide patient education as appropriate  - Monitor functional capacity and physical performance, use of AM PAC & JH-HLM   - Monitor gait, balance and fatigue with ambulation    Outcome: Progressing  Goal: Maintains/Returns to pre admission functional level  Description: INTERVENTIONS:  - Perform AM-PAC 6 Click Basic Mobility/ Daily Activity assessment daily.  - Set and communicate daily mobility goal to care team and patient/family/caregiver.   - Collaborate with rehabilitation services on mobility goals if consulted  - Perform Range of Motion 2 times a day.  - Reposition patient every 2 hours.  - Dangle patient 2 times a day  - Stand patient 2 times a day  - Ambulate patient 2 times a day  - Out of bed to chair 2 times a day   - Out of bed for meals 2 times a day  - Out of bed for toileting  - Record patient progress and toleration of activity level   Outcome: Progressing     Problem: DISCHARGE PLANNING  Goal: Discharge to home or other facility with appropriate resources  Description: INTERVENTIONS:  - Identify barriers to discharge w/patient and caregiver  - Arrange for needed discharge resources and transportation as appropriate  - Identify discharge learning needs (meds, wound care, etc.)  - Arrange for interpretive services to assist at discharge as needed  - Refer to Case Management Department for coordinating discharge planning if the patient needs post-hospital services based on physician/advanced practitioner order or complex needs related to functional status, cognitive ability, or social support system  Outcome:  Progressing     Problem: Knowledge Deficit  Goal: Patient/family/caregiver demonstrates understanding of disease process, treatment plan, medications, and discharge instructions  Description: Complete learning assessment and assess knowledge base.  Interventions:  - Provide teaching at level of understanding  - Provide teaching via preferred learning methods  Outcome: Progressing     Problem: Prexisting or High Potential for Compromised Skin Integrity  Goal: Skin integrity is maintained or improved  Description: INTERVENTIONS:  - Identify patients at risk for skin breakdown  - Assess and monitor skin integrity including under and around medical devices   - Assess and monitor nutrition and hydration status  - Monitor labs  - Assess for incontinence   - Turn and reposition patient  - Assist with mobility/ambulation  - Relieve pressure over payam prominences   - Avoid friction and shearing  - Provide appropriate hygiene as needed including keeping skin clean and dry  - Evaluate need for skin moisturizer/barrier cream  - Collaborate with interdisciplinary team  - Patient/family teaching  - Consider wound care consult    Assess:  - Review [place] scale daily  - Clean and moisturize skin every x  - Inspect skin when repositioning, toileting, and assisting with ADLS  - Assess under medical devices such as x every x  - Assess extremities for adequate circulation and sensation     Bed Management:  - Have minimal linens on bed & keep smooth, unwrinkled  - Change linens as needed when moist or perspiring  - Avoid sitting or lying in one position for more than x hours while in bed?Keep HOB at xdegrees   - Toileting:  - Offer bedside commode  - Assess for incontinence every x  - Use incontinent care products after each incontinent episode such as x    Activity:  - Mobilize patient xx times a day  - Encourage activity and walks on unit  - Encourage or provide ROM exercises   - Turn and reposition patient every x Hours  - Use  appropriate equipment to lift or move patient in bed  - Instruct/ Assist with weight shifting every x when out of bed in chair  - Consider limitation of chair time xx hour intervals    Skin Care:  - Avoid use of baby powder, tape, friction and shearing, hot water or constrictive clothing  - Relieve pressure over bony prominences using x  - Do not massage red bony areas    Next Steps:  - Teach patient strategies to minimize risks such as x  - Consider consults to  interdisciplinary teams such as x  Outcome: Progressing     Problem: CARDIOVASCULAR - ADULT  Goal: Maintains optimal cardiac output and hemodynamic stability  Description: INTERVENTIONS:  - Monitor I/O, vital signs and rhythm  - Monitor for S/S and trends of decreased cardiac output  - Administer and titrate ordered vasoactive medications to optimize hemodynamic stability  - Assess quality of pulses, skin color and temperature  - Assess for signs of decreased coronary artery perfusion  - Instruct patient to report change in severity of symptoms  Outcome: Progressing  Goal: Absence of cardiac dysrhythmias or at baseline rhythm  Description: INTERVENTIONS:  - Continuous cardiac monitoring, vital signs, obtain 12 lead EKG if ordered  - Administer antiarrhythmic and heart rate control medications as ordered  - Monitor electrolytes and administer replacement therapy as ordered  Outcome: Progressing

## 2025-06-07 NOTE — ASSESSMENT & PLAN NOTE
Wt Readings from Last 3 Encounters:   06/07/25 101 kg (223 lb)   05/28/25 105 kg (232 lb)   03/20/25 99.7 kg (219 lb 12.8 oz)     Presenting to the ED for LOPES.  ; CT chest w/ severe bilateral consolidations and ground glass opacity, greatest in the right lower lobe, compatible with edema and/or PNA   GDMT: Farxiga 10 mg daily, losartan 25 mg daily, metoprolol succinate 25 mg daily, spironolactone 25 mg daily, torsemide 20 mg twice daily w/ Kdur   ECHO (10/2024): EF 40-45%, mild global hypokinesis, atrial dilatation  Cardiology following, recs appreciated  S/p IV Lasix which was transitioned to IV Bumex.  Continue Bumex infusion today  Continuous telemetry monitoring  Daily wts, I&O's   Patient diuresing appropriately, status post 1 dose of metolazone 6/5  Weight downtrending from 232 --> 223 today.  Continue to monitor I&O's, though unclear accuracy   C/w losartan, metoprolol, spironolactone

## 2025-06-07 NOTE — ASSESSMENT & PLAN NOTE
Recent Labs     06/05/25  0603 06/06/25  0547 06/07/25  0555   HGB 7.6* 7.7* 8.0*     Per chart review, baseline Hgb ~11  Normocytic anemia  No active bleeding   Folate  >22, B12 312   Iron panel mixed -inflammatory/SHELIA  C/w iron supplement   Hematology following

## 2025-06-07 NOTE — PROGRESS NOTES
Progress Note - Hospitalist   Name: Trevor Lyons 85 y.o. male I MRN: [id number]  Unit/Bed#: -01 I Date of Admission: 5/30/2025   Date of Service: 6/7/2025 I Hospital Day: 8    Assessment & Plan  Acute combined systolic and diastolic congestive heart failure (HCC)  Wt Readings from Last 3 Encounters:   06/07/25 101 kg (223 lb)   05/28/25 105 kg (232 lb)   03/20/25 99.7 kg (219 lb 12.8 oz)     Presenting to the ED for LOPES.  ; CT chest w/ severe bilateral consolidations and ground glass opacity, greatest in the right lower lobe, compatible with edema and/or PNA   GDMT: Farxiga 10 mg daily, losartan 25 mg daily, metoprolol succinate 25 mg daily, spironolactone 25 mg daily, torsemide 20 mg twice daily w/ Kdur   ECHO (10/2024): EF 40-45%, mild global hypokinesis, atrial dilatation  Cardiology following, recs appreciated  S/p IV Lasix which was transitioned to IV Bumex.  Continue Bumex infusion today  Continuous telemetry monitoring  Daily wts, I&O's   Patient diuresing appropriately, status post 1 dose of metolazone 6/5  Weight downtrending from 232 --> 223 today.  Continue to monitor I&O's, though unclear accuracy   C/w losartan, metoprolol, spironolactone  Chronic respiratory failure (HCC)  Chronically on 2 L via NC PRN   Required up to 4 L; now stable on 2 L   Multifactorial w/ CHF and PNA and h/o COPD with inhalers held for uncertain reason.  Leukocytosis  Recent Labs     06/05/25  0603 06/06/25  0547 06/07/25  0555   WBC 20.75* 18.72* 19.51*     Stable at 20k  ID curbsided,given clinical improvement monitor; low threshold to reimage if symptoms worsen   Hematology consulted  Elevated haptoglobin, elevated Ig kappa/ Ig kal free light chains.  Reticulocyte count mildly elevated  LDH elevated  Flow cytometry with possible CLL, SPEP immunofixation negative  Per hematology we will closely monitor, continue to trend CBC  Anemia  Recent Labs     06/05/25  0603 06/06/25  0547 06/07/25  0555   HGB 7.6*  7.7* 8.0*     Per chart review, baseline Hgb ~11  Normocytic anemia  No active bleeding   Folate  >22, B12 312   Iron panel mixed -inflammatory/SHELIA  C/w iron supplement   Hematology following  Persistent atrial fibrillation (HCC)  Rate controlled   C/w Toprol-XL 25 mg  q day & Xarelto  Sick sinus syndrome (HCC)  S/p pacemaker placement  Hypertension  Home regimen: Toprol-XL 25 mg  q day, Losartan 25 mg daily, & spironolactone 25 mg daily, c/w  BP acceptable   Type 2 diabetes mellitus without complication, with long-term current use of insulin ([facility])  Lab Results   Component Value Date    HGBA1C 5.2 05/28/2025     Recent Labs     06/06/25  1716 06/06/25  2103 06/07/25  0635 06/07/25  1041   POCGLU 252* 269* 247* 277*     Blood Sugar Average: Last 72 hrs:  (P) [id number]    Maintained on farxiga 10mg daily, glimerpiride 1mg daily, lantus 8U qhs, metformin 1000mg BID  C/w SSI AC/QHS, Accu-Cheks, CCD  Titrate Lantus from 5 to 8 U qhs   Acquired hypothyroidism  TSH 6.2 in 05/2025, T4 therapeutic; recommend outpt monitoring in 4-6 wks   Cw levothyroxine  Chronic left-sided low back pain with left-sided sciatica  Chronic LBP managed with menthol topical patch, Cymbalta, and Tylenol.  Essentially wheelchair-bound however will get up and ambulate a few steps with standby assist.   PT/OT for RTF  Moderate Alzheimer's dementia, unspecified timing of dementia onset, unspecified whether behavioral, psychotic, or mood disturbance or anxiety ([facility])  History of moderate late onset AD.  Currently resides at [facility].  Alert and oriented to person place, time with disorientation to situation.    Managed with Aricept 10 mg p.o. daily, BuSpar 7.5 twice daily, Cymbalta 60 mg p.o. daily with as needed clonazepam at at bedtime.  Delirium precautions: Optimize nutrition hydration and sleep hygiene.  Increase socialization, prevent falls.  Monitor for constipation or change in urination pattern.  Stage 3 chronic kidney disease  ([facility])  Lab Results   Component Value Date    EGFR 46 2025    EGFR 38 2025    EGFR 49 2025    CREATININE 1.36 (H) 2025    CREATININE 1.58 (H) 2025    CREATININE 1.28 2025   Presented with NELLIE with creatinine 1.8, renal function now stabilized  Monitor daily while on Bumex infusion    Mobility:   Basic Mobility Inpatient Raw Score: 16  -HLM Goal: 5: Stand one or more mins  JH-HLM Achieved: 5: Stand (1 or more minutes)  JH-HLM Goal achieved. Continue to encourage appropriate mobility.    VTE Pharmacologic Prophylaxis: VTE Score: 5 High Risk (Score >/= 5) - Pharmacological DVT Prophylaxis Ordered: rivaroxaban (Xarelto). Sequential Compression Devices Ordered.    Education and Discussions with Family / Patient: Attempted to update  (daughter) via phone. Left voicemail.     Current Length of Stay: 8 day(s)  Current Patient Status: Inpatient   Certification Statement: The patient will continue to require additional inpatient hospital stay due to acute heart failure on IV Bumex infusion as above  Discharge Plan: Anticipate discharge in 48-72 hrs to prior assisted or independent living facility.    Code Status: Level 3 - DNAR and DNI    Subjective:   No overnight events, continues to diurese however is severely volume overloaded    Objective:     Vitals:   Temp (24hrs), Av.8 °F (36.6 °C), Min:97.7 °F (36.5 °C), Max:97.9 °F (36.6 °C)    Temp:  [97.7 °F (36.5 °C)-97.9 °F (36.6 °C)] 97.7 °F (36.5 °C)  HR:  [60] 60  Resp:  [18-20] 18  BP: (115-127)/(53-83) 116/54  SpO2:  [95 %-100 %] 99 %  Body mass index is 33.91 kg/m².     Input and Output Summary (last 24 hours):     Intake/Output Summary (Last 24 hours) at 2025 1331  Last data filed at 2025 1241  Gross per 24 hour   Intake 1822 ml   Output 5275 ml   Net -3453 ml       Physical Exam:   Physical Exam  Vitals and nursing note reviewed.   Constitutional:       General: He is not in acute distress.     Appearance:  Normal appearance. He is not ill-appearing.   HENT:      Head: Normocephalic and atraumatic.     Eyes:      General: No scleral icterus.        Right eye: No discharge.         Left eye: No discharge.       Cardiovascular:      Rate and Rhythm: Normal rate and regular rhythm.      Pulses: Normal pulses.      Heart sounds: Murmur heard.      No friction rub. No gallop.   Pulmonary:      Effort: Pulmonary effort is normal. No respiratory distress.      Breath sounds: Normal breath sounds. No wheezing, rhonchi or rales.   Abdominal:      General: Bowel sounds are normal. There is no distension.      Palpations: Abdomen is soft.      Tenderness: There is no abdominal tenderness. There is no guarding or rebound.     Musculoskeletal:         General: No swelling or deformity.      Cervical back: Neck supple.      Right lower leg: Edema present.      Left lower leg: Edema present.     Skin:     General: Skin is warm and dry.      Capillary Refill: Capillary refill takes less than 2 seconds.      Coloration: Skin is not jaundiced or pale.      Findings: No erythema or rash.     Neurological:      General: No focal deficit present.      Mental Status: He is alert and oriented to person, place, and time. Mental status is at baseline.     Psychiatric:         Mood and Affect: Mood normal.         Behavior: Behavior normal.          Additional Data:     Labs:  Results from last 7 days   Lab Units 06/07/25  0555   WBC Thousand/uL 19.51*   HEMOGLOBIN g/dL 8.0*   HEMATOCRIT % 26.4*   PLATELETS Thousands/uL 401*   LYMPHO PCT % 39   MONO PCT % 5   EOS PCT % 4     Results from last 7 days   Lab Units 06/07/25  0555 06/02/25  0529 06/01/25  0426   SODIUM mmol/L 133*   < > 137   POTASSIUM mmol/L 4.1   < > 3.5   CHLORIDE mmol/L 84*   < > 95*   CO2 mmol/L 39*   < > 31   BUN mg/dL 42*   < > 39*   CREATININE mg/dL 1.36*   < > 1.66*   ANION GAP mmol/L 10   < > 11   CALCIUM mg/dL 9.3   < > 8.7   TOTAL BILIRUBIN mg/dL  --   --  0.92   GLUCOSE  RANDOM mg/dL 210*   < > 112    < > = values in this interval not displayed.         Results from last 7 days   Lab Units 06/07/25  1041 06/07/25  0635 06/06/25  2103 06/06/25  1716 06/06/25  1130 06/06/25  0730 06/05/25  2149 06/05/25  1610 06/05/25  1056 06/05/25  0718 06/04/25  2022 06/04/25  1633   POC GLUCOSE mg/dl 277* 247* 269* 252* 209* 227* 230* 192* 201* 204* 208* 289*         Results from last 7 days   Lab Units 06/03/25  0446 06/01/25  0426   PROCALCITONIN ng/ml 0.27* 0.56*       Imaging: Radiology Review: No additional pertinent studies reviewed.    Recent Cultures (last 7 days):         Telemetry Orders (From admission, onward)               24 Hour Telemetry Monitoring  Continuous x 24 Hours (Telem)        Expiring   Question:  Reason for 24 Hour Telemetry  Answer:  Decompensated CHF- and any one of the following: continuous diuretic infusion or total diuretic dose >200 mg daily, associated electrolyte derangement (I.e. K < 3.0), inotropic drip (continuous infusion), hx of ventricular arrhythmia, or new EF < 35%                        Last 24 Hours Medication List:   Current Facility-Administered Medications   Medication Dose Route Frequency Provider Last Rate    acetaminophen  650 mg Oral Q6H PRN Sergio Wisdom PA-C      atorvastatin  20 mg Oral Daily With Dinner Anya Dash MD      bumetanide (BUMEX) 12.5 mg infusion 50 mL  1 mg/hr Intravenous Continuous Sergio Wisdom PA-C 1 mg/hr (06/07/25 1211)    busPIRone  7.5 mg Oral BID Anya Dash MD      clonazePAM  1.5 mg Oral HS PRN Sergio Wisdom PA-C      donepezil  10 mg Oral QAM Anya Dash MD      DULoxetine  60 mg Oral Daily Anya Dash MD      ferrous sulfate  325 mg Oral Every Other Day Debbie Riggins PA-C      Fluticasone Furoate-Vilanterol  1 puff Inhalation Daily Aurea Germain PA-C      And    umeclidinium  1 puff Inhalation Daily Aurea Lojek, PA-C      hydrOXYzine HCL  50 mg Oral Q6H PRN Sergio Wisdom PA-C      insulin glargine  8  Units Subcutaneous HS Usha Decker PA-C      insulin lispro  1-5 Units Subcutaneous TID AC Anya Dash MD      insulin lispro  1-6 Units Subcutaneous HS Anya Dash MD      insulin lispro  5 Units Subcutaneous TID With Meals Aurea Germain PA-C      levalbuterol  1.25 mg Nebulization TID Anya Dash MD      levothyroxine  137 mcg Oral Early Morning Anya Dash MD      [Held by provider] losartan  25 mg Oral Daily SAMIRA Sadler      magnesium Oxide  400 mg Oral BID Anya Dash MD      melatonin  6 mg Oral HS Usha Quinn PA-C      metoprolol succinate  25 mg Oral QAM Anya Dash MD      pantoprazole  40 mg Oral Daily Before Breakfast Anya Dash MD      polyethylene glycol  17 g Oral Daily Anya Dash MD      potassium chloride  40 mEq Oral BID Tank Luther MD      rivaroxaban  15 mg Oral Daily With Dinner Anya Dash MD      senna  1 tablet Oral Daily Anya Dash MD      spironolactone  25 mg Oral Daily SAMIRA Sadler          Today, Patient Was Seen By: Sergio Wisdom PA-C    **Please Note: This note may have been constructed using a voice recognition system.**

## 2025-06-07 NOTE — ASSESSMENT & PLAN NOTE
Recent Labs     06/05/25  0603 06/06/25  0547 06/07/25  0555   WBC 20.75* 18.72* 19.51*     Stable at 20k  ID curbsided,given clinical improvement monitor; low threshold to reimage if symptoms worsen   Hematology consulted  Elevated haptoglobin, elevated Ig kappa/ Ig kal free light chains.  Reticulocyte count mildly elevated  LDH elevated  Flow cytometry with possible CLL, SPEP immunofixation negative  Per hematology we will closely monitor, continue to trend CBC

## 2025-06-08 LAB
ANION GAP SERPL CALCULATED.3IONS-SCNC: 10 MMOL/L (ref 4–13)
ANION GAP SERPL CALCULATED.3IONS-SCNC: 8 MMOL/L (ref 4–13)
BUN SERPL-MCNC: 49 MG/DL (ref 5–25)
BUN SERPL-MCNC: 53 MG/DL (ref 5–25)
CALCIUM SERPL-MCNC: 9 MG/DL (ref 8.4–10.2)
CALCIUM SERPL-MCNC: 9.3 MG/DL (ref 8.4–10.2)
CHLORIDE SERPL-SCNC: 84 MMOL/L (ref 96–108)
CHLORIDE SERPL-SCNC: 84 MMOL/L (ref 96–108)
CO2 SERPL-SCNC: 39 MMOL/L (ref 21–32)
CO2 SERPL-SCNC: 42 MMOL/L (ref 21–32)
CREAT SERPL-MCNC: 1.43 MG/DL (ref 0.6–1.3)
CREAT SERPL-MCNC: 1.54 MG/DL (ref 0.6–1.3)
ERYTHROCYTE [DISTWIDTH] IN BLOOD BY AUTOMATED COUNT: 14.4 % (ref 11.6–15.1)
GFR SERPL CREATININE-BSD FRML MDRD: 39 ML/MIN/1.73SQ M
GFR SERPL CREATININE-BSD FRML MDRD: 43 ML/MIN/1.73SQ M
GLUCOSE SERPL-MCNC: 200 MG/DL (ref 65–140)
GLUCOSE SERPL-MCNC: 228 MG/DL (ref 65–140)
GLUCOSE SERPL-MCNC: 244 MG/DL (ref 65–140)
GLUCOSE SERPL-MCNC: 245 MG/DL (ref 65–140)
GLUCOSE SERPL-MCNC: 249 MG/DL (ref 65–140)
GLUCOSE SERPL-MCNC: 289 MG/DL (ref 65–140)
HCT VFR BLD AUTO: 26.9 % (ref 36.5–49.3)
HGB BLD-MCNC: 8 G/DL (ref 12–17)
MAGNESIUM SERPL-MCNC: 2.4 MG/DL (ref 1.9–2.7)
MCH RBC QN AUTO: 28 PG (ref 26.8–34.3)
MCHC RBC AUTO-ENTMCNC: 29.7 G/DL (ref 31.4–37.4)
MCV RBC AUTO: 94 FL (ref 82–98)
NRBC BLD AUTO-RTO: 0 /100 WBCS
PLATELET # BLD AUTO: 434 THOUSANDS/UL (ref 149–390)
PMV BLD AUTO: 8.8 FL (ref 8.9–12.7)
POTASSIUM SERPL-SCNC: 3.6 MMOL/L (ref 3.5–5.3)
POTASSIUM SERPL-SCNC: 3.8 MMOL/L (ref 3.5–5.3)
RBC # BLD AUTO: 2.86 MILLION/UL (ref 3.88–5.62)
SODIUM SERPL-SCNC: 133 MMOL/L (ref 135–147)
SODIUM SERPL-SCNC: 134 MMOL/L (ref 135–147)
WBC # BLD AUTO: 21.11 THOUSAND/UL (ref 4.31–10.16)

## 2025-06-08 PROCEDURE — 85027 COMPLETE CBC AUTOMATED: CPT | Performed by: PHYSICIAN ASSISTANT

## 2025-06-08 PROCEDURE — 80048 BASIC METABOLIC PNL TOTAL CA: CPT | Performed by: PHYSICIAN ASSISTANT

## 2025-06-08 PROCEDURE — 94760 N-INVAS EAR/PLS OXIMETRY 1: CPT

## 2025-06-08 PROCEDURE — 94640 AIRWAY INHALATION TREATMENT: CPT

## 2025-06-08 PROCEDURE — 99232 SBSQ HOSP IP/OBS MODERATE 35: CPT | Performed by: INTERNAL MEDICINE

## 2025-06-08 PROCEDURE — 82948 REAGENT STRIP/BLOOD GLUCOSE: CPT

## 2025-06-08 PROCEDURE — 83735 ASSAY OF MAGNESIUM: CPT | Performed by: PHYSICIAN ASSISTANT

## 2025-06-08 PROCEDURE — 99232 SBSQ HOSP IP/OBS MODERATE 35: CPT | Performed by: PHYSICIAN ASSISTANT

## 2025-06-08 RX ADMIN — INSULIN LISPRO 4 UNITS: 100 INJECTION, SOLUTION INTRAVENOUS; SUBCUTANEOUS at 22:43

## 2025-06-08 RX ADMIN — LEVOTHYROXINE SODIUM 137 MCG: 112 TABLET ORAL at 06:39

## 2025-06-08 RX ADMIN — Medication 1 MG/HR: at 00:39

## 2025-06-08 RX ADMIN — Medication 1 MG/HR: at 23:29

## 2025-06-08 RX ADMIN — LEVALBUTEROL HYDROCHLORIDE 1.25 MG: 1.25 SOLUTION RESPIRATORY (INHALATION) at 19:15

## 2025-06-08 RX ADMIN — BUSPIRONE HYDROCHLORIDE 7.5 MG: 15 TABLET ORAL at 18:54

## 2025-06-08 RX ADMIN — POLYETHYLENE GLYCOL 3350 17 G: 17 POWDER, FOR SOLUTION ORAL at 08:29

## 2025-06-08 RX ADMIN — INSULIN LISPRO 2 UNITS: 100 INJECTION, SOLUTION INTRAVENOUS; SUBCUTANEOUS at 12:52

## 2025-06-08 RX ADMIN — LEVALBUTEROL HYDROCHLORIDE 1.25 MG: 1.25 SOLUTION RESPIRATORY (INHALATION) at 07:34

## 2025-06-08 RX ADMIN — Medication 1 MG/HR: at 10:12

## 2025-06-08 RX ADMIN — FLUTICASONE FUROATE AND VILANTEROL TRIFENATATE 1 PUFF: 100; 25 POWDER RESPIRATORY (INHALATION) at 08:34

## 2025-06-08 RX ADMIN — ACETAMINOPHEN 650 MG: 325 TABLET, FILM COATED ORAL at 04:16

## 2025-06-08 RX ADMIN — INSULIN LISPRO 5 UNITS: 100 INJECTION, SOLUTION INTRAVENOUS; SUBCUTANEOUS at 08:34

## 2025-06-08 RX ADMIN — CLONAZEPAM 1.5 MG: 0.5 TABLET ORAL at 22:47

## 2025-06-08 RX ADMIN — FERROUS SULFATE TAB 325 MG (65 MG ELEMENTAL FE) 325 MG: 325 (65 FE) TAB at 08:29

## 2025-06-08 RX ADMIN — Medication 400 MG: at 08:29

## 2025-06-08 RX ADMIN — DONEPEZIL HYDROCHLORIDE 10 MG: 5 TABLET ORAL at 08:29

## 2025-06-08 RX ADMIN — ACETAMINOPHEN 650 MG: 325 TABLET, FILM COATED ORAL at 21:02

## 2025-06-08 RX ADMIN — ATORVASTATIN CALCIUM 20 MG: 20 TABLET, FILM COATED ORAL at 16:44

## 2025-06-08 RX ADMIN — INSULIN LISPRO 5 UNITS: 100 INJECTION, SOLUTION INTRAVENOUS; SUBCUTANEOUS at 16:46

## 2025-06-08 RX ADMIN — PANTOPRAZOLE SODIUM 40 MG: 40 TABLET, DELAYED RELEASE ORAL at 06:39

## 2025-06-08 RX ADMIN — INSULIN LISPRO 2 UNITS: 100 INJECTION, SOLUTION INTRAVENOUS; SUBCUTANEOUS at 08:34

## 2025-06-08 RX ADMIN — DULOXETINE HYDROCHLORIDE 60 MG: 30 CAPSULE, DELAYED RELEASE ORAL at 08:29

## 2025-06-08 RX ADMIN — HYDROXYZINE HYDROCHLORIDE 50 MG: 25 TABLET, FILM COATED ORAL at 02:22

## 2025-06-08 RX ADMIN — SENNOSIDES 8.6 MG: 8.6 TABLET, FILM COATED ORAL at 08:29

## 2025-06-08 RX ADMIN — ACETAMINOPHEN 650 MG: 325 TABLET, FILM COATED ORAL at 12:54

## 2025-06-08 RX ADMIN — INSULIN LISPRO 2 UNITS: 100 INJECTION, SOLUTION INTRAVENOUS; SUBCUTANEOUS at 16:46

## 2025-06-08 RX ADMIN — INSULIN GLARGINE 8 UNITS: 100 INJECTION, SOLUTION SUBCUTANEOUS at 22:47

## 2025-06-08 RX ADMIN — Medication 6 MG: at 21:02

## 2025-06-08 RX ADMIN — LEVALBUTEROL HYDROCHLORIDE 1.25 MG: 1.25 SOLUTION RESPIRATORY (INHALATION) at 13:28

## 2025-06-08 RX ADMIN — INSULIN LISPRO 5 UNITS: 100 INJECTION, SOLUTION INTRAVENOUS; SUBCUTANEOUS at 12:53

## 2025-06-08 RX ADMIN — UMECLIDINIUM 1 PUFF: 62.5 AEROSOL, POWDER ORAL at 08:34

## 2025-06-08 RX ADMIN — BUSPIRONE HYDROCHLORIDE 7.5 MG: 15 TABLET ORAL at 08:29

## 2025-06-08 RX ADMIN — RIVAROXABAN 15 MG: 15 TABLET, FILM COATED ORAL at 16:44

## 2025-06-08 RX ADMIN — POTASSIUM CHLORIDE 40 MEQ: 1500 TABLET, EXTENDED RELEASE ORAL at 08:29

## 2025-06-08 RX ADMIN — Medication 400 MG: at 18:54

## 2025-06-08 RX ADMIN — POTASSIUM CHLORIDE 40 MEQ: 1500 TABLET, EXTENDED RELEASE ORAL at 18:54

## 2025-06-08 NOTE — ASSESSMENT & PLAN NOTE
Lab Results   Component Value Date    EGFR 43 06/08/2025    EGFR 39 06/07/2025    EGFR 46 06/07/2025    CREATININE 1.43 (H) 06/08/2025    CREATININE 1.57 (H) 06/07/2025    CREATININE 1.36 (H) 06/07/2025   Presented with NELLIE with creatinine 1.8, renal function now stabilized  Monitor daily while on Bumex infusion

## 2025-06-08 NOTE — PROGRESS NOTES
Progress Note - Cardiology   Name: Trevor Lyons 85 y.o. male I MRN: [id number]  Unit/Bed#: -01 I Date of Admission: 5/30/2025   Date of Service: 6/8/2025 I Hospital Day: 9     Assessment & Plan  Acute combined systolic and diastolic congestive heart failure (HCC)  Wt Readings from Last 3 Encounters:   06/08/25 99 kg (218 lb 3.2 oz)   05/28/25 105 kg (232 lb)   03/20/25 99.7 kg (219 lb 12.8 oz)   Patient with a history of HFrEF -comes to [facility] emergency department on 5/30/2025 with progressive dyspnea, worsening cough and hypoxia.  Echo 10/21/25: EF 40 to 45%, mild global hypokinesis, RV dilatation, left atrial dilatation, well-functioning TAVR, moderate mitral MAC, moderate to severe MR regurgitation, mild MS, Moderate TR  GDMT: Farxiga 10 mg daily, losartan 25 mg daily, metoprolol succinate 25 mg daily, spironolactone 25 mg daily, torsemide 20 mg twice daily with potassium supplementation  Bumex drip 1 mg/h, received 5 mg of metolazone on Thursday  3900 ml out over the past 24 hours, still very volume overloaded.  Continue IV Bumex at a rate of 1 mg/h.  Persistent atrial fibrillation (HCC)  On Xarelto (dose reduced for GFR <50), low-dose metoprolol, mostly paced.  Sick sinus syndrome (HCC)  [employer] permanent pacemaker VVI 65  Chronic respiratory failure (HCC)  Remains on 2 L nasal cannula.  Hypertension  Blood pressures controlled, losartan held due to NELLIE  Stage 3 chronic kidney disease (HCC)  Lab Results   Component Value Date    EGFR 43 06/08/2025    EGFR 39 06/07/2025    EGFR 46 06/07/2025    CREATININE 1.43 (H) 06/08/2025    CREATININE 1.57 (H) 06/07/2025    CREATININE 1.36 (H) 06/07/2025   Improving after presenting with NELLIE due to CHF    Subjective     Patient seen and evaluated.  He remains short of breath, with bilateral lower extremity swelling. Feels better, not quite back to baseline.     Objective :  Temp:  [97.5 °F (36.4 °C)-98.2 °F (36.8 °C)] 97.9 °F (36.6 °C)  HR:   [59-63] 59  BP: (108-117)/(50-77) 115/50  Resp:  [16-20] 18  SpO2:  [96 %-99 %] 96 %  O2 Device: Nasal cannula  Nasal Cannula O2 Flow Rate (L/min):  [2 L/min] 2 L/min  Orthostatic Blood Pressures      Flowsheet Row Most Recent Value   Blood Pressure 115/50 filed at 06/08/2025 1459   Patient Position - Orthostatic VS Sitting filed at 06/08/2025 0009          First Weight: Weight - Scale: 105 kg (232 lb) (05/30/25 1403)  Vitals:    06/07/25 0316 06/08/25 0600   Weight: 101 kg (223 lb) 99 kg (218 lb 3.2 oz)     Physical Exam  Gen: A&Ox3  HEENT: Positive JVD  CVS: RRR, S1S2 audible and w/o m/r/g  Resp: Bilateral rales. Diminished breath sounds. No wheezing.   Abd: Soft, non-tender, non-distended.   MSK: 2-3+ LE edema.       Lab Results: I have reviewed the following results:  Results from last 7 days   Lab Units 06/08/25  0405 06/07/25  0555 06/06/25  0547   WBC Thousand/uL 21.11* 19.51* 18.72*   HEMOGLOBIN g/dL 8.0* 8.0* 7.7*   HEMATOCRIT % 26.9* 26.4* 25.7*   PLATELETS Thousands/uL 434* 401* 354     Results from last 7 days   Lab Units 06/08/25  0405 06/07/25  1839 06/07/25  0555   POTASSIUM mmol/L 3.6 4.0 4.1   CHLORIDE mmol/L 84* 85* 84*   CO2 mmol/L 39* 40* 39*   BUN mg/dL 49* 49* 42*   CREATININE mg/dL 1.43* 1.57* 1.36*   CALCIUM mg/dL 9.3 9.1 9.3         Lab Results   Component Value Date    HGBA1C 5.2 05/28/2025     Lab Results   Component Value Date    TROPONINI 0.04 10/21/2021         Beatriz Molina MD

## 2025-06-08 NOTE — ASSESSMENT & PLAN NOTE
Recent Labs     06/06/25  0547 06/07/25  0555 06/08/25  0405   WBC 18.72* 19.51* 21.11*     Stable at 20k  ID curbsided,given clinical improvement monitor; low threshold to reimage if symptoms worsen   Hematology consulted  Elevated haptoglobin, elevated Ig kappa/ Ig kal free light chains.  Reticulocyte count mildly elevated  LDH elevated  Flow cytometry with possible CLL, SPEP immunofixation negative  Per hematology we will closely monitor, continue to trend CBC

## 2025-06-08 NOTE — ASSESSMENT & PLAN NOTE
Wt Readings from Last 3 Encounters:   06/08/25 99 kg (218 lb 3.2 oz)   05/28/25 105 kg (232 lb)   03/20/25 99.7 kg (219 lb 12.8 oz)     Presenting to the ED for LOPES.  ; CT chest w/ severe bilateral consolidations and ground glass opacity, greatest in the right lower lobe, compatible with edema and/or PNA   GDMT: Farxiga 10 mg daily, losartan 25 mg daily, metoprolol succinate 25 mg daily, spironolactone 25 mg daily, torsemide 20 mg twice daily w/ Kdur   ECHO (10/2024): EF 40-45%, mild global hypokinesis, atrial dilatation  Cardiology following, recs appreciated  S/p IV Lasix which was transitioned to IV Bumex.  Continue Bumex infusion today  Continuous telemetry monitoring  Daily wts, I&O's   Patient diuresing appropriately, status post 1 dose of metolazone 6/5, 1 dose of diuril 6/6  Weight downtrending from 232 --> 218 today.  Continue to monitor I&O's  C/w losartan, metoprolol, spironolactone

## 2025-06-08 NOTE — ASSESSMENT & PLAN NOTE
Wt Readings from Last 3 Encounters:   06/08/25 99 kg (218 lb 3.2 oz)   05/28/25 105 kg (232 lb)   03/20/25 99.7 kg (219 lb 12.8 oz)   Patient with a history of HFrEF -comes to [facility] emergency department on 5/30/2025 with progressive dyspnea, worsening cough and hypoxia.  Echo 10/21/25: EF 40 to 45%, mild global hypokinesis, RV dilatation, left atrial dilatation, well-functioning TAVR, moderate mitral MAC, moderate to severe MR regurgitation, mild MS, Moderate TR  GDMT: Farxiga 10 mg daily, losartan 25 mg daily, metoprolol succinate 25 mg daily, spironolactone 25 mg daily, torsemide 20 mg twice daily with potassium supplementation  Bumex drip 1 mg/h, received 5 mg of metolazone on Thursday  3900 ml out over the past 24 hours, still very volume overloaded.  Continue IV Bumex at a rate of 1 mg/h.

## 2025-06-08 NOTE — PLAN OF CARE
Problem: Potential for Falls  Goal: Patient will remain free of falls  Description: INTERVENTIONS:  - Educate patient/family on patient safety including physical limitations  - Instruct patient to call for assistance with activity   - Consider consulting OT/PT to assist with strengthening/mobility based on AM PAC & JH-HLM score  - Consult OT/PT to assist with strengthening/mobility   - Keep Call bell within reach  - Keep bed low and locked with side rails adjusted as appropriate  - Keep care items and personal belongings within reach  - Initiate and maintain comfort rounds  - Make Fall Risk Sign visible to staff  - Offer Toileting every 2 Hours, in advance of need  - Initiate/Maintain bed alarm  - Obtain necessary fall risk management equipment: socks  - Apply yellow socks and bracelet for high fall risk patients  - Consider moving patient to room near nurses station  Outcome: Progressing     Problem: PAIN - ADULT  Goal: Verbalizes/displays adequate comfort level or baseline comfort level  Description: Interventions:  - Encourage patient to monitor pain and request assistance  - Assess pain using appropriate pain scale  - Administer analgesics as ordered based on type and severity of pain and evaluate response  - Implement non-pharmacological measures as appropriate and evaluate response  - Consider cultural and social influences on pain and pain management  - Notify physician/advanced practitioner if interventions unsuccessful or patient reports new pain  - Educate patient/family on pain management process including their role and importance of  reporting pain   - Provide non-pharmacologic/complimentary pain relief interventions  Outcome: Progressing     Problem: SAFETY ADULT  Goal: Patient will remain free of falls  Description: INTERVENTIONS:  - Educate patient/family on patient safety including physical limitations  - Instruct patient to call for assistance with activity   - Consider consulting OT/PT to assist  with strengthening/mobility based on AM PAC & JH-HLM score  - Consult OT/PT to assist with strengthening/mobility   - Keep Call bell within reach  - Keep bed low and locked with side rails adjusted as appropriate  - Keep care items and personal belongings within reach  - Initiate and maintain comfort rounds  - Make Fall Risk Sign visible to staff  - Offer Toileting every 2 Hours, in advance of need  - Initiate/Maintain bed alarm  - Obtain necessary fall risk management equipment: socks  - Apply yellow socks and bracelet for high fall risk patients  - Consider moving patient to room near nurses station  Outcome: Progressing  Goal: Maintain or return to baseline ADL function  Description: INTERVENTIONS:  -  Assess patient's ability to carry out ADLs; assess patient's baseline for ADL function and identify physical deficits which impact ability to perform ADLs (bathing, care of mouth/teeth, toileting, grooming, dressing, etc.)  - Assess/evaluate cause of self-care deficits   - Assess range of motion  - Assess patient's mobility; develop plan if impaired  - Assess patient's need for assistive devices and provide as appropriate  - Encourage maximum independence but intervene and supervise when necessary  - Involve family in performance of ADLs  - Assess for home care needs following discharge   - Consider OT consult to assist with ADL evaluation and planning for discharge  - Provide patient education as appropriate  - Monitor functional capacity and physical performance, use of AM PAC & JH-HLM   - Monitor gait, balance and fatigue with ambulation    Outcome: Progressing  Goal: Maintains/Returns to pre admission functional level  Description: INTERVENTIONS:  - Perform AM-PAC 6 Click Basic Mobility/ Daily Activity assessment daily.  - Set and communicate daily mobility goal to care team and patient/family/caregiver.   - Collaborate with rehabilitation services on mobility goals if consulted  - Perform Range of Motion 2  times a day.  - Reposition patient every 2 hours.  - Dangle patient 2 times a day  - Stand patient 2 times a day  - Ambulate patient 2 times a day  - Out of bed to chair 2 times a day   - Out of bed for meals 2 times a day  - Out of bed for toileting  - Record patient progress and toleration of activity level   Outcome: Progressing     Problem: DISCHARGE PLANNING  Goal: Discharge to home or other facility with appropriate resources  Description: INTERVENTIONS:  - Identify barriers to discharge w/patient and caregiver  - Arrange for needed discharge resources and transportation as appropriate  - Identify discharge learning needs (meds, wound care, etc.)  - Arrange for interpretive services to assist at discharge as needed  - Refer to Case Management Department for coordinating discharge planning if the patient needs post-hospital services based on physician/advanced practitioner order or complex needs related to functional status, cognitive ability, or social support system  Outcome: Progressing     Problem: CARDIOVASCULAR - ADULT  Goal: Maintains optimal cardiac output and hemodynamic stability  Description: INTERVENTIONS:  - Monitor I/O, vital signs and rhythm  - Monitor for S/S and trends of decreased cardiac output  - Administer and titrate ordered vasoactive medications to optimize hemodynamic stability  - Assess quality of pulses, skin color and temperature  - Assess for signs of decreased coronary artery perfusion  - Instruct patient to report change in severity of symptoms  Outcome: Progressing  Goal: Absence of cardiac dysrhythmias or at baseline rhythm  Description: INTERVENTIONS:  - Continuous cardiac monitoring, vital signs, obtain 12 lead EKG if ordered  - Administer antiarrhythmic and heart rate control medications as ordered  - Monitor electrolytes and administer replacement therapy as ordered  Outcome: Progressing

## 2025-06-08 NOTE — PLAN OF CARE
Problem: Potential for Falls  Goal: Patient will remain free of falls  Description: INTERVENTIONS:  - Educate patient/family on patient safety including physical limitations  - Instruct patient to call for assistance with activity   - Consider consulting OT/PT to assist with strengthening/mobility based on AM PAC & JH-HLM score  - Consult OT/PT to assist with strengthening/mobility   - Keep Call bell within reach  - Keep bed low and locked with side rails adjusted as appropriate  - Keep care items and personal belongings within reach  - Initiate and maintain comfort rounds  - Make Fall Risk Sign visible to staff  - Offer Toileting every 2 Hours, in advance of need  - Initiate/Maintain bed alarm  - Obtain necessary fall risk management equipment: socks  - Apply yellow socks and bracelet for high fall risk patients  - Consider moving patient to room near nurses station  Outcome: Progressing     Problem: PAIN - ADULT  Goal: Verbalizes/displays adequate comfort level or baseline comfort level  Description: Interventions:  - Encourage patient to monitor pain and request assistance  - Assess pain using appropriate pain scale  - Administer analgesics as ordered based on type and severity of pain and evaluate response  - Implement non-pharmacological measures as appropriate and evaluate response  - Consider cultural and social influences on pain and pain management  - Notify physician/advanced practitioner if interventions unsuccessful or patient reports new pain  - Educate patient/family on pain management process including their role and importance of  reporting pain   - Provide non-pharmacologic/complimentary pain relief interventions  Outcome: Progressing     Problem: INFECTION - ADULT  Goal: Absence or prevention of progression during hospitalization  Description: INTERVENTIONS:  - Assess and monitor for signs and symptoms of infection  - Monitor lab/diagnostic results  - Monitor all insertion sites, i.e. indwelling  lines, tubes, and drains  - Monitor endotracheal if appropriate and nasal secretions for changes in amount and color  - [place] appropriate cooling/warming therapies per order  - Administer medications as ordered  - Instruct and encourage patient and family to use good hand hygiene technique  - Identify and instruct in appropriate isolation precautions for identified infection/condition  Outcome: Progressing  Goal: Absence of fever/infection during neutropenic period  Description: INTERVENTIONS:  - Monitor WBC  - Perform strict hand hygiene  - Limit to healthy visitors only  - No plants, dried, fresh or silk flowers with damon in patient room  Outcome: Progressing     Problem: SAFETY ADULT  Goal: Patient will remain free of falls  Description: INTERVENTIONS:  - Educate patient/family on patient safety including physical limitations  - Instruct patient to call for assistance with activity   - Consider consulting OT/PT to assist with strengthening/mobility based on AM PAC & JH-HLM score  - Consult OT/PT to assist with strengthening/mobility   - Keep Call bell within reach  - Keep bed low and locked with side rails adjusted as appropriate  - Keep care items and personal belongings within reach  - Initiate and maintain comfort rounds  - Make Fall Risk Sign visible to staff  - Offer Toileting every 2 Hours, in advance of need  - Initiate/Maintain bed alarm  - Obtain necessary fall risk management equipment: socks  - Apply yellow socks and bracelet for high fall risk patients  - Consider moving patient to room near nurses station  Outcome: Progressing  Goal: Maintain or return to baseline ADL function  Description: INTERVENTIONS:  -  Assess patient's ability to carry out ADLs; assess patient's baseline for ADL function and identify physical deficits which impact ability to perform ADLs (bathing, care of mouth/teeth, toileting, grooming, dressing, etc.)  - Assess/evaluate cause of self-care deficits   - Assess range of  motion  - Assess patient's mobility; develop plan if impaired  - Assess patient's need for assistive devices and provide as appropriate  - Encourage maximum independence but intervene and supervise when necessary  - Involve family in performance of ADLs  - Assess for home care needs following discharge   - Consider OT consult to assist with ADL evaluation and planning for discharge  - Provide patient education as appropriate  - Monitor functional capacity and physical performance, use of AM PAC & JH-HLM   - Monitor gait, balance and fatigue with ambulation    Outcome: Progressing  Goal: Maintains/Returns to pre admission functional level  Description: INTERVENTIONS:  - Perform AM-PAC 6 Click Basic Mobility/ Daily Activity assessment daily.  - Set and communicate daily mobility goal to care team and patient/family/caregiver.   - Collaborate with rehabilitation services on mobility goals if consulted  - Perform Range of Motion 2 times a day.  - Reposition patient every 2 hours.  - Dangle patient 2 times a day  - Stand patient 2 times a day  - Ambulate patient 2 times a day  - Out of bed to chair 2 times a day   - Out of bed for meals 2 times a day  - Out of bed for toileting  - Record patient progress and toleration of activity level   Outcome: Progressing     Problem: DISCHARGE PLANNING  Goal: Discharge to home or other facility with appropriate resources  Description: INTERVENTIONS:  - Identify barriers to discharge w/patient and caregiver  - Arrange for needed discharge resources and transportation as appropriate  - Identify discharge learning needs (meds, wound care, etc.)  - Arrange for interpretive services to assist at discharge as needed  - Refer to Case Management Department for coordinating discharge planning if the patient needs post-hospital services based on physician/advanced practitioner order or complex needs related to functional status, cognitive ability, or social support system  Outcome:  Progressing     Problem: Knowledge Deficit  Goal: Patient/family/caregiver demonstrates understanding of disease process, treatment plan, medications, and discharge instructions  Description: Complete learning assessment and assess knowledge base.  Interventions:  - Provide teaching at level of understanding  - Provide teaching via preferred learning methods  Outcome: Progressing     Problem: Prexisting or High Potential for Compromised Skin Integrity  Goal: Skin integrity is maintained or improved  Description: INTERVENTIONS:  - Identify patients at risk for skin breakdown  - Assess and monitor skin integrity including under and around medical devices   - Assess and monitor nutrition and hydration status  - Monitor labs  - Assess for incontinence   - Turn and reposition patient  - Assist with mobility/ambulation  - Relieve pressure over payam prominences   - Avoid friction and shearing  - Provide appropriate hygiene as needed including keeping skin clean and dry  - Evaluate need for skin moisturizer/barrier cream  - Collaborate with interdisciplinary team  - Patient/family teaching  - Consider wound care consult    Assess:  - Review [place] scale daily  - Clean and moisturize skin every x  - Inspect skin when repositioning, toileting, and assisting with ADLS  - Assess under medical devices such as x every xx  - Assess extremities for adequate circulation and sensation     Bed Management:  - Have minimal linens on bed & keep smooth, unwrinkled  - Change linens as needed when moist or perspiring  - Avoid sitting or lying in one position for more than x hours while in bed?Keep HOB at xdegrees   - Toileting:  - Offer bedside commode  - Assess for incontinence every x  - Use incontinent care products after each incontinent episode such as x    Activity:  - Mobilize patient x times a day  - Encourage activity and walks on unit  - Encourage or provide ROM exercises   - Turn and reposition patient every x Hours  - Use  appropriate equipment to lift or move patient in bed  - Instruct/ Assist with weight shifting every x when out of bed in chair  - Consider limitation of chair time x hour intervals    Skin Care:  - Avoid use of baby powder, tape, friction and shearing, hot water or constrictive clothing  - Relieve pressure over bony prominences using x  - Do not massage red bony areas    Next Steps:  - Teach patient strategies to minimize risks such as x  - Consider consults to  interdisciplinary teams such as x  Outcome: Progressing     Problem: CARDIOVASCULAR - ADULT  Goal: Maintains optimal cardiac output and hemodynamic stability  Description: INTERVENTIONS:  - Monitor I/O, vital signs and rhythm  - Monitor for S/S and trends of decreased cardiac output  - Administer and titrate ordered vasoactive medications to optimize hemodynamic stability  - Assess quality of pulses, skin color and temperature  - Assess for signs of decreased coronary artery perfusion  - Instruct patient to report change in severity of symptoms  Outcome: Progressing  Goal: Absence of cardiac dysrhythmias or at baseline rhythm  Description: INTERVENTIONS:  - Continuous cardiac monitoring, vital signs, obtain 12 lead EKG if ordered  - Administer antiarrhythmic and heart rate control medications as ordered  - Monitor electrolytes and administer replacement therapy as ordered  Outcome: Progressing

## 2025-06-08 NOTE — ASSESSMENT & PLAN NOTE
Lab Results   Component Value Date    HGBA1C 5.2 05/28/2025     Recent Labs     06/07/25  1614 06/07/25 2032 06/08/25  0803 06/08/25  1141   POCGLU 240* 366* 228* 245*     Blood Sugar Average: Last 72 hrs:  (P) [id number]    Maintained on farxiga 10mg daily, glimerpiride 1mg daily, lantus 8U qhs, metformin 1000mg BID  C/w SSI AC/QHS, Accu-Cheks, CCD  Titrate Lantus from 5 to 8 U qhs

## 2025-06-08 NOTE — ASSESSMENT & PLAN NOTE
History of moderate late onset AD.  Currently resides at [facility].  Alert and oriented to person place, time with disorientation to situation.    Managed with Aricept 10 mg p.o. daily, BuSpar 7.5 twice daily, Cymbalta 60 mg p.o. daily with as needed clonazepam at at bedtime.  Delirium precautions: Optimize nutrition hydration and sleep hygiene.  Increase socialization, prevent falls.  Monitor for constipation or change in urination pattern.

## 2025-06-08 NOTE — PROGRESS NOTES
[facility] ambulatory encounter  SURGERY POSTOPERATIVE VISIT      HISTORY OF PRESENT ILLNESS:   Antonieta Chow is a 57year old male who presents for postoperative visit for repair of incisional hernia with mesh. Today he is doing well. He denies any complaints. .     Physical Exam:  Vital Signs:   Visit Vitals  /87   Pulse 87   Temp 98.7 Â°F (37.1 Â°C)   Wt 93.9 kg (207 lb)   BMI 28.87 kg/mÂ²      Constitutional: Patient is alert and appropriate. No distress. Operative Site: The incision(s) look clean and dry. There is no evidence of infection. His dressing was removed. His incisions clean and intact. There are no signs of infection or seroma. Assessment:    Satisfactory course    PLAN: Patient is doing well. I have asked her to refrain from any lifting for the next 4 weeks. All of his questions were answered. No follow-ups on file. Instructions provided as documented in the After Visit Summary. The patient indicated understanding of the diagnosis and agreed with the plan of care.       Baudilio Cruz MD Progress Note - Hospitalist   Name: Trevor Lyons 85 y.o. male I MRN: [id number]  Unit/Bed#: -01 I Date of Admission: 5/30/2025   Date of Service: 6/8/2025 I Hospital Day: 9    Assessment & Plan  Acute combined systolic and diastolic congestive heart failure (HCC)  Wt Readings from Last 3 Encounters:   06/08/25 99 kg (218 lb 3.2 oz)   05/28/25 105 kg (232 lb)   03/20/25 99.7 kg (219 lb 12.8 oz)     Presenting to the ED for LOPES.  ; CT chest w/ severe bilateral consolidations and ground glass opacity, greatest in the right lower lobe, compatible with edema and/or PNA   GDMT: Farxiga 10 mg daily, losartan 25 mg daily, metoprolol succinate 25 mg daily, spironolactone 25 mg daily, torsemide 20 mg twice daily w/ Kdur   ECHO (10/2024): EF 40-45%, mild global hypokinesis, atrial dilatation  Cardiology following, recs appreciated  S/p IV Lasix which was transitioned to IV Bumex.  Continue Bumex infusion today  Continuous telemetry monitoring  Daily wts, I&O's   Patient diuresing appropriately, status post 1 dose of metolazone 6/5, 1 dose of diuril 6/6  Weight downtrending from 232 --> 218 today.  Continue to monitor I&O's  C/w losartan, metoprolol, spironolactone  Chronic respiratory failure (HCC)  Chronically on 2 L via NC PRN   Required up to 4 L; now stable on 2 L   Multifactorial w/ CHF and PNA and h/o COPD with inhalers held for uncertain reason.  Leukocytosis  Recent Labs     06/06/25  0547 06/07/25  0555 06/08/25  0405   WBC 18.72* 19.51* 21.11*     Stable at 20k  ID curbsided,given clinical improvement monitor; low threshold to reimage if symptoms worsen   Hematology consulted  Elevated haptoglobin, elevated Ig kappa/ Ig kal free light chains.  Reticulocyte count mildly elevated  LDH elevated  Flow cytometry with possible CLL, SPEP immunofixation negative  Per hematology we will closely monitor, continue to trend CBC  Anemia  Recent Labs     06/06/25  0547 06/07/25  0555 06/08/25  0405   B  7.7* 8.0* 8.0*     Per chart review, baseline Hgb ~11  Normocytic anemia  No active bleeding   Folate  >22, B12 312   Iron panel mixed -inflammatory/SHELIA  C/w iron supplement   Hematology following  Persistent atrial fibrillation (HCC)  Rate controlled   C/w Toprol-XL 25 mg  q day & Xarelto  Sick sinus syndrome (HCC)  S/p pacemaker placement  Hypertension  Home regimen: Toprol-XL 25 mg  q day, Losartan 25 mg daily, & spironolactone 25 mg daily, c/w  BP acceptable   Type 2 diabetes mellitus without complication, with long-term current use of insulin ([facility])  Lab Results   Component Value Date    HGBA1C 5.2 05/28/2025     Recent Labs     06/07/25  1614 06/07/25  2032 06/08/25  0803 06/08/25  1141   POCGLU 240* 366* 228* 245*     Blood Sugar Average: Last 72 hrs:  (P) [id number]    Maintained on farxiga 10mg daily, glimerpiride 1mg daily, lantus 8U qhs, metformin 1000mg BID  C/w SSI AC/QHS, Accu-Cheks, CCD  Titrate Lantus from 5 to 8 U qhs   Acquired hypothyroidism  TSH 6.2 in 05/2025, T4 therapeutic; recommend outpt monitoring in 4-6 wks   Cw levothyroxine  Chronic left-sided low back pain with left-sided sciatica  Chronic LBP managed with menthol topical patch, Cymbalta, and Tylenol.  Essentially wheelchair-bound however will get up and ambulate a few steps with standby assist.   PT/OT for RTF  Moderate Alzheimer's dementia, unspecified timing of dementia onset, unspecified whether behavioral, psychotic, or mood disturbance or anxiety ([facility])  History of moderate late onset AD.  Currently resides at [facility].  Alert and oriented to person place, time with disorientation to situation.    Managed with Aricept 10 mg p.o. daily, BuSpar 7.5 twice daily, Cymbalta 60 mg p.o. daily with as needed clonazepam at at bedtime.  Delirium precautions: Optimize nutrition hydration and sleep hygiene.  Increase socialization, prevent falls.  Monitor for constipation or change in urination pattern.  Stage 3 chronic kidney disease  ([facility])  Lab Results   Component Value Date    EGFR 43 2025    EGFR 39 2025    EGFR 46 2025    CREATININE 1.43 (H) 2025    CREATININE 1.57 (H) 2025    CREATININE 1.36 (H) 2025   Presented with NELLIE with creatinine 1.8, renal function now stabilized  Monitor daily while on Bumex infusion    Mobility:   Basic Mobility Inpatient Raw Score: 16  -[facility] Goal: 5: Stand one or more mins  JH-HLM Achieved: 4: Move to chair/commode  JH-HLM Goal achieved. Continue to encourage appropriate mobility.    VTE Pharmacologic Prophylaxis: VTE Score: 5 Moderate Risk (Score 3-4) - Pharmacological DVT Prophylaxis Ordered: rivaroxaban (Xarelto).    Education and Discussions with Family / Patient: Attempted to update  (daughter) via phone. Left voicemail.     Current Length of Stay: 9 day(s)  Current Patient Status: Inpatient   Certification Statement: The patient will continue to require additional inpatient hospital stay due to volume overload on IV Bumex infusion  Discharge Plan: Anticipate discharge in 48-72 hrs to prior assisted or independent living facility.    Code Status: Level 3 - DNAR and DNI    Subjective:   Patient continues to diurese appropriately, no overnight events.    Objective:     Vitals:   Temp (24hrs), Av.9 °F (36.6 °C), Min:97.5 °F (36.4 °C), Max:98.2 °F (36.8 °C)    Temp:  [97.5 °F (36.4 °C)-98.2 °F (36.8 °C)] 97.9 °F (36.6 °C)  HR:  [60-63] 63  Resp:  [16-20] 16  BP: (108-117)/(51-77) 112/77  SpO2:  [96 %-99 %] 96 %  Body mass index is 33.18 kg/m².     Input and Output Summary (last 24 hours):     Intake/Output Summary (Last 24 hours) at 2025 1410  Last data filed at 2025 1247  Gross per 24 hour   Intake 1701 ml   Output 3525 ml   Net -1824 ml       Physical Exam:   Physical Exam  Vitals and nursing note reviewed.   Constitutional:       General: He is not in acute distress.     Appearance: Normal appearance. He is not ill-appearing.   HENT:      Head:  Normocephalic and atraumatic.     Eyes:      General: No scleral icterus.        Right eye: No discharge.         Left eye: No discharge.       Cardiovascular:      Rate and Rhythm: Normal rate and regular rhythm.      Pulses: Normal pulses.      Heart sounds: Murmur heard.      No friction rub. No gallop.   Pulmonary:      Effort: Pulmonary effort is normal. No respiratory distress.      Breath sounds: Normal breath sounds. No wheezing, rhonchi or rales.   Abdominal:      General: Bowel sounds are normal. There is no distension.      Palpations: Abdomen is soft.      Tenderness: There is no abdominal tenderness. There is no guarding or rebound.     Musculoskeletal:         General: No swelling or deformity.      Cervical back: Neck supple.      Right lower leg: Edema present.      Left lower leg: Edema present.     Skin:     General: Skin is warm and dry.      Capillary Refill: Capillary refill takes less than 2 seconds.      Coloration: Skin is not jaundiced or pale.      Findings: No erythema or rash.     Neurological:      General: No focal deficit present.      Mental Status: He is alert and oriented to person, place, and time. Mental status is at baseline.     Psychiatric:         Mood and Affect: Mood normal.         Behavior: Behavior normal.          Additional Data:     Labs:  Results from last 7 days   Lab Units 06/08/25  0405 06/07/25  0555   WBC Thousand/uL 21.11* 19.51*   HEMOGLOBIN g/dL 8.0* 8.0*   HEMATOCRIT % 26.9* 26.4*   PLATELETS Thousands/uL 434* 401*   LYMPHO PCT %  --  39   MONO PCT %  --  5   EOS PCT %  --  4     Results from last 7 days   Lab Units 06/08/25  0405   SODIUM mmol/L 133*   POTASSIUM mmol/L 3.6   CHLORIDE mmol/L 84*   CO2 mmol/L 39*   BUN mg/dL 49*   CREATININE mg/dL 1.43*   ANION GAP mmol/L 10   CALCIUM mg/dL 9.3   GLUCOSE RANDOM mg/dL 200*         Results from last 7 days   Lab Units 06/08/25  1141 06/08/25  0803 06/07/25  2032 06/07/25  1614 06/07/25  1041 06/07/25  0635  06/06/25  2103 06/06/25  1716 06/06/25  1130 06/06/25  0730 06/05/25  2149 06/05/25  1610   POC GLUCOSE mg/dl 245* 228* 366* 240* 277* 247* 269* 252* 209* 227* 230* 192*         Results from last 7 days   Lab Units 06/03/25  0446   PROCALCITONIN ng/ml 0.27*       Imaging: Radiology Review: No additional pertinent studies reviewed.    Recent Cultures (last 7 days):         Telemetry Orders (From admission, onward)               24 Hour Telemetry Monitoring  Continuous x 24 Hours ([place])        Expiring   Question:  Reason for 24 Hour Telemetry  Answer:  Decompensated CHF- and any one of the following: continuous diuretic infusion or total diuretic dose >200 mg daily, associated electrolyte derangement (I.e. K < 3.0), inotropic drip (continuous infusion), hx of ventricular arrhythmia, or new EF < 35%                        Last 24 Hours Medication List:   Current Facility-Administered Medications   Medication Dose Route Frequency Provider Last Rate    acetaminophen  650 mg Oral Q6H PRN Sergio Wisdom PA-C      atorvastatin  20 mg Oral Daily With Dinner Anya Dash MD      bumetanide (BUMEX) 12.5 mg infusion 50 mL  1 mg/hr Intravenous Continuous Sergio Wisdom PA-C 1 mg/hr (06/08/25 1012)    busPIRone  7.5 mg Oral BID Anya Dash MD      clonazePAM  1.5 mg Oral HS PRN Sergio Wisdom PA-C      donepezil  10 mg Oral QAM Anya Dash MD      DULoxetine  60 mg Oral Daily Anya Dash MD      ferrous sulfate  325 mg Oral Every Other Day Debbie Riggins PA-C      Fluticasone Furoate-Vilanterol  1 puff Inhalation Daily Aurea Germain PA-C      And    umeclidinium  1 puff Inhalation Daily Aurea Germain PA-C      hydrOXYzine HCL  50 mg Oral Q6H PRN Sergio Wisdom PA-C      insulin glargine  8 Units Subcutaneous HS Usha Decker PA-C      insulin lispro  1-5 Units Subcutaneous TID AC Anya Dash MD      insulin lispro  1-6 Units Subcutaneous HS Anya Dash MD      insulin lispro  5 Units Subcutaneous TID With Meals Aurea  NADIA Germain      levalbuterol  1.25 mg Nebulization TID Anya Dash MD      levothyroxine  137 mcg Oral Early Morning Anya Dash MD      [Held by provider] losartan  25 mg Oral Daily SAMIRA Sadler      magnesium Oxide  400 mg Oral BID Anya Dash MD      melatonin  6 mg Oral HS Usha Quinn PA-C      metoprolol succinate  25 mg Oral QAM Anya Dash MD      pantoprazole  40 mg Oral Daily Before Breakfast Anya Dash MD      polyethylene glycol  17 g Oral Daily Anya Dash MD      potassium chloride  40 mEq Oral BID Tank Luther MD      rivaroxaban  15 mg Oral Daily With Dinner Anya Dash MD      senna  1 tablet Oral Daily Anya Dash MD      spironolactone  25 mg Oral Daily SAMIRA Sadler          Today, Patient Was Seen By: Sergio Wisdom PA-C    **Please Note: This note may have been constructed using a voice recognition system.**

## 2025-06-08 NOTE — ASSESSMENT & PLAN NOTE
Recent Labs     06/06/25  0547 06/07/25  0555 06/08/25  0405   HGB 7.7* 8.0* 8.0*     Per chart review, baseline Hgb ~11  Normocytic anemia  No active bleeding   Folate  >22, B12 312   Iron panel mixed -inflammatory/SHELIA  C/w iron supplement   Hematology following

## 2025-06-08 NOTE — ASSESSMENT & PLAN NOTE
Lab Results   Component Value Date    EGFR 43 06/08/2025    EGFR 39 06/07/2025    EGFR 46 06/07/2025    CREATININE 1.43 (H) 06/08/2025    CREATININE 1.57 (H) 06/07/2025    CREATININE 1.36 (H) 06/07/2025   Improving after presenting with NELLIE due to CHF

## 2025-06-09 ENCOUNTER — TRANSCRIBE ORDERS (OUTPATIENT)
Dept: LAB | Facility: HOSPITAL | Age: 85
End: 2025-06-09

## 2025-06-09 DIAGNOSIS — I50.41 ACUTE COMBINED SYSTOLIC AND DIASTOLIC CONGESTIVE HEART FAILURE (HCC): ICD-10-CM

## 2025-06-09 DIAGNOSIS — D64.9 ANEMIA, UNSPECIFIED TYPE: Primary | ICD-10-CM

## 2025-06-09 LAB
ANION GAP SERPL CALCULATED.3IONS-SCNC: 10 MMOL/L (ref 4–13)
ANION GAP SERPL CALCULATED.3IONS-SCNC: 12 MMOL/L (ref 4–13)
BASOPHILS # BLD AUTO: 0.05 THOUSANDS/ÂΜL (ref 0–0.1)
BASOPHILS NFR BLD AUTO: 0 % (ref 0–1)
BUN SERPL-MCNC: 52 MG/DL (ref 5–25)
BUN SERPL-MCNC: 52 MG/DL (ref 5–25)
CALCIUM SERPL-MCNC: 9 MG/DL (ref 8.4–10.2)
CALCIUM SERPL-MCNC: 9.5 MG/DL (ref 8.4–10.2)
CHLORIDE SERPL-SCNC: 85 MMOL/L (ref 96–108)
CHLORIDE SERPL-SCNC: 85 MMOL/L (ref 96–108)
CO2 SERPL-SCNC: 37 MMOL/L (ref 21–32)
CO2 SERPL-SCNC: 38 MMOL/L (ref 21–32)
CREAT SERPL-MCNC: 1.46 MG/DL (ref 0.6–1.3)
CREAT SERPL-MCNC: 1.48 MG/DL (ref 0.6–1.3)
EOSINOPHIL # BLD AUTO: 0.16 THOUSAND/ÂΜL (ref 0–0.61)
EOSINOPHIL NFR BLD AUTO: 1 % (ref 0–6)
ERYTHROCYTE [DISTWIDTH] IN BLOOD BY AUTOMATED COUNT: 15.9 % (ref 11.6–15.1)
GFR SERPL CREATININE-BSD FRML MDRD: 41 ML/MIN/1.73SQ M
GFR SERPL CREATININE-BSD FRML MDRD: 42 ML/MIN/1.73SQ M
GLUCOSE SERPL-MCNC: 208 MG/DL (ref 65–140)
GLUCOSE SERPL-MCNC: 225 MG/DL (ref 65–140)
GLUCOSE SERPL-MCNC: 236 MG/DL (ref 65–140)
GLUCOSE SERPL-MCNC: 250 MG/DL (ref 65–140)
GLUCOSE SERPL-MCNC: 274 MG/DL (ref 65–140)
GLUCOSE SERPL-MCNC: 295 MG/DL (ref 65–140)
HCT VFR BLD AUTO: 26.8 % (ref 36.5–49.3)
HGB BLD-MCNC: 8.5 G/DL (ref 12–17)
IMM GRANULOCYTES # BLD AUTO: 0.11 THOUSAND/UL (ref 0–0.2)
IMM GRANULOCYTES NFR BLD AUTO: 1 % (ref 0–2)
LYMPHOCYTES # BLD AUTO: 9.64 THOUSANDS/ÂΜL (ref 0.6–4.47)
LYMPHOCYTES NFR BLD AUTO: 58 % (ref 14–44)
Lab: NORMAL
Lab: NORMAL
MCH RBC QN AUTO: 30.8 PG (ref 26.8–34.3)
MCHC RBC AUTO-ENTMCNC: 31.7 G/DL (ref 31.4–37.4)
MCV RBC AUTO: 97 FL (ref 82–98)
MISCELLANEOUS LAB TEST RESULT: NORMAL
MONOCYTES # BLD AUTO: 0.96 THOUSAND/ÂΜL (ref 0.17–1.22)
MONOCYTES NFR BLD AUTO: 6 % (ref 4–12)
NEUTROPHILS # BLD AUTO: 5.5 THOUSANDS/ÂΜL (ref 1.85–7.62)
NEUTS SEG NFR BLD AUTO: 34 % (ref 43–75)
NRBC BLD AUTO-RTO: 0 /100 WBCS
PLATELET # BLD AUTO: 387 THOUSANDS/UL (ref 149–390)
PMV BLD AUTO: 9.5 FL (ref 8.9–12.7)
POTASSIUM SERPL-SCNC: 3.6 MMOL/L (ref 3.5–5.3)
POTASSIUM SERPL-SCNC: 4.3 MMOL/L (ref 3.5–5.3)
PROCALCITONIN SERPL-MCNC: 0.13 NG/ML
RBC # BLD AUTO: 2.76 MILLION/UL (ref 3.88–5.62)
SCAN RESULT: NORMAL
SCAN RESULT: NORMAL
SODIUM SERPL-SCNC: 132 MMOL/L (ref 135–147)
SODIUM SERPL-SCNC: 135 MMOL/L (ref 135–147)
WBC # BLD AUTO: 16.42 THOUSAND/UL (ref 4.31–10.16)

## 2025-06-09 PROCEDURE — 94760 N-INVAS EAR/PLS OXIMETRY 1: CPT

## 2025-06-09 PROCEDURE — 99232 SBSQ HOSP IP/OBS MODERATE 35: CPT | Performed by: INTERNAL MEDICINE

## 2025-06-09 PROCEDURE — 94640 AIRWAY INHALATION TREATMENT: CPT

## 2025-06-09 PROCEDURE — 80048 BASIC METABOLIC PNL TOTAL CA: CPT | Performed by: PHYSICIAN ASSISTANT

## 2025-06-09 PROCEDURE — 82948 REAGENT STRIP/BLOOD GLUCOSE: CPT

## 2025-06-09 PROCEDURE — 84145 PROCALCITONIN (PCT): CPT

## 2025-06-09 PROCEDURE — 99232 SBSQ HOSP IP/OBS MODERATE 35: CPT

## 2025-06-09 PROCEDURE — 85025 COMPLETE CBC W/AUTO DIFF WBC: CPT | Performed by: PHYSICIAN ASSISTANT

## 2025-06-09 RX ORDER — INSULIN LISPRO 100 [IU]/ML
8 INJECTION, SOLUTION INTRAVENOUS; SUBCUTANEOUS
Status: DISCONTINUED | OUTPATIENT
Start: 2025-06-09 | End: 2025-06-13 | Stop reason: HOSPADM

## 2025-06-09 RX ORDER — LIDOCAINE 50 MG/G
1 PATCH TOPICAL DAILY
Status: DISCONTINUED | OUTPATIENT
Start: 2025-06-09 | End: 2025-06-13 | Stop reason: HOSPADM

## 2025-06-09 RX ORDER — INSULIN GLARGINE 100 [IU]/ML
10 INJECTION, SOLUTION SUBCUTANEOUS
Status: DISCONTINUED | OUTPATIENT
Start: 2025-06-09 | End: 2025-06-13 | Stop reason: HOSPADM

## 2025-06-09 RX ADMIN — LEVALBUTEROL HYDROCHLORIDE 1.25 MG: 1.25 SOLUTION RESPIRATORY (INHALATION) at 07:26

## 2025-06-09 RX ADMIN — BUSPIRONE HYDROCHLORIDE 7.5 MG: 15 TABLET ORAL at 17:49

## 2025-06-09 RX ADMIN — INSULIN LISPRO 5 UNITS: 100 INJECTION, SOLUTION INTRAVENOUS; SUBCUTANEOUS at 08:37

## 2025-06-09 RX ADMIN — SENNOSIDES 8.6 MG: 8.6 TABLET, FILM COATED ORAL at 08:38

## 2025-06-09 RX ADMIN — FLUTICASONE FUROATE AND VILANTEROL TRIFENATATE 1 PUFF: 100; 25 POWDER RESPIRATORY (INHALATION) at 08:38

## 2025-06-09 RX ADMIN — UMECLIDINIUM 1 PUFF: 62.5 AEROSOL, POWDER ORAL at 08:38

## 2025-06-09 RX ADMIN — POTASSIUM CHLORIDE 40 MEQ: 1500 TABLET, EXTENDED RELEASE ORAL at 08:38

## 2025-06-09 RX ADMIN — Medication 400 MG: at 08:39

## 2025-06-09 RX ADMIN — INSULIN LISPRO 2 UNITS: 100 INJECTION, SOLUTION INTRAVENOUS; SUBCUTANEOUS at 08:36

## 2025-06-09 RX ADMIN — LEVALBUTEROL HYDROCHLORIDE 1.25 MG: 1.25 SOLUTION RESPIRATORY (INHALATION) at 19:24

## 2025-06-09 RX ADMIN — DONEPEZIL HYDROCHLORIDE 10 MG: 5 TABLET ORAL at 08:39

## 2025-06-09 RX ADMIN — PANTOPRAZOLE SODIUM 40 MG: 40 TABLET, DELAYED RELEASE ORAL at 06:03

## 2025-06-09 RX ADMIN — Medication 6 MG: at 22:37

## 2025-06-09 RX ADMIN — POLYETHYLENE GLYCOL 3350 17 G: 17 POWDER, FOR SOLUTION ORAL at 08:44

## 2025-06-09 RX ADMIN — ACETAMINOPHEN 650 MG: 325 TABLET, FILM COATED ORAL at 22:37

## 2025-06-09 RX ADMIN — ATORVASTATIN CALCIUM 20 MG: 20 TABLET, FILM COATED ORAL at 17:49

## 2025-06-09 RX ADMIN — RIVAROXABAN 15 MG: 15 TABLET, FILM COATED ORAL at 17:49

## 2025-06-09 RX ADMIN — CLONAZEPAM 1.5 MG: 0.5 TABLET ORAL at 22:37

## 2025-06-09 RX ADMIN — ACETAMINOPHEN 650 MG: 325 TABLET, FILM COATED ORAL at 08:44

## 2025-06-09 RX ADMIN — SPIRONOLACTONE 25 MG: 25 TABLET, FILM COATED ORAL at 08:38

## 2025-06-09 RX ADMIN — INSULIN LISPRO 3 UNITS: 100 INJECTION, SOLUTION INTRAVENOUS; SUBCUTANEOUS at 22:33

## 2025-06-09 RX ADMIN — BUSPIRONE HYDROCHLORIDE 7.5 MG: 15 TABLET ORAL at 08:38

## 2025-06-09 RX ADMIN — INSULIN LISPRO 8 UNITS: 100 INJECTION, SOLUTION INTRAVENOUS; SUBCUTANEOUS at 11:41

## 2025-06-09 RX ADMIN — POTASSIUM CHLORIDE 40 MEQ: 1500 TABLET, EXTENDED RELEASE ORAL at 17:49

## 2025-06-09 RX ADMIN — METOPROLOL SUCCINATE 25 MG: 25 TABLET, EXTENDED RELEASE ORAL at 08:38

## 2025-06-09 RX ADMIN — Medication 400 MG: at 17:49

## 2025-06-09 RX ADMIN — INSULIN LISPRO 3 UNITS: 100 INJECTION, SOLUTION INTRAVENOUS; SUBCUTANEOUS at 11:42

## 2025-06-09 RX ADMIN — DULOXETINE HYDROCHLORIDE 60 MG: 30 CAPSULE, DELAYED RELEASE ORAL at 08:38

## 2025-06-09 RX ADMIN — Medication 1 MG/HR: at 11:41

## 2025-06-09 RX ADMIN — INSULIN GLARGINE 10 UNITS: 100 INJECTION, SOLUTION SUBCUTANEOUS at 22:33

## 2025-06-09 RX ADMIN — LEVOTHYROXINE SODIUM 137 MCG: 112 TABLET ORAL at 06:03

## 2025-06-09 RX ADMIN — LIDOCAINE 5% 1 PATCH: 700 PATCH TOPICAL at 11:41

## 2025-06-09 RX ADMIN — Medication 1 MG/HR: at 23:33

## 2025-06-09 RX ADMIN — INSULIN LISPRO 1 UNITS: 100 INJECTION, SOLUTION INTRAVENOUS; SUBCUTANEOUS at 17:49

## 2025-06-09 RX ADMIN — LEVALBUTEROL HYDROCHLORIDE 1.25 MG: 1.25 SOLUTION RESPIRATORY (INHALATION) at 14:16

## 2025-06-09 RX ADMIN — INSULIN LISPRO 8 UNITS: 100 INJECTION, SOLUTION INTRAVENOUS; SUBCUTANEOUS at 17:48

## 2025-06-09 NOTE — ASSESSMENT & PLAN NOTE
"Hx of CHF and COPD  Chronically on 2 L via NC PRN - patient reports wearing this \"most of the time\"  Stable on this currently   "

## 2025-06-09 NOTE — ASSESSMENT & PLAN NOTE
History of moderate late onset AD.    Resident at assisted living at [facility].    Alert and oriented to person place, time with disorientation to situation.    Managed with Aricept 10 mg p.o. daily, BuSpar 7.5 twice daily, Cymbalta 60 mg p.o. daily with as needed clonazepam at at bedtime.  Delirium precautions: Optimize nutrition hydration and sleep hygiene.  Increase socialization, prevent falls.  Monitor for constipation or change in urination pattern.

## 2025-06-09 NOTE — PLAN OF CARE
Problem: Potential for Falls  Goal: Patient will remain free of falls  Description: INTERVENTIONS:  - Educate patient/family on patient safety including physical limitations  - Instruct patient to call for assistance with activity   - Consider consulting OT/PT to assist with strengthening/mobility based on AM PAC & JH-HLM score  - Consult OT/PT to assist with strengthening/mobility   - Keep Call bell within reach  - Keep bed low and locked with side rails adjusted as appropriate  - Keep care items and personal belongings within reach  - Initiate and maintain comfort rounds  - Make Fall Risk Sign visible to staff  - Offer Toileting every 2 Hours, in advance of need  - Initiate/Maintain bed alarm  - Obtain necessary fall risk management equipment: socks  - Apply yellow socks and bracelet for high fall risk patients  - Consider moving patient to room near nurses station  Outcome: Progressing     Problem: PAIN - ADULT  Goal: Verbalizes/displays adequate comfort level or baseline comfort level  Description: Interventions:  - Encourage patient to monitor pain and request assistance  - Assess pain using appropriate pain scale  - Administer analgesics as ordered based on type and severity of pain and evaluate response  - Implement non-pharmacological measures as appropriate and evaluate response  - Consider cultural and social influences on pain and pain management  - Notify physician/advanced practitioner if interventions unsuccessful or patient reports new pain  - Educate patient/family on pain management process including their role and importance of  reporting pain   - Provide non-pharmacologic/complimentary pain relief interventions  Outcome: Progressing     Problem: INFECTION - ADULT  Goal: Absence or prevention of progression during hospitalization  Description: INTERVENTIONS:  - Assess and monitor for signs and symptoms of infection  - Monitor lab/diagnostic results  - Monitor all insertion sites, i.e. indwelling  lines, tubes, and drains  - Monitor endotracheal if appropriate and nasal secretions for changes in amount and color  - [place] appropriate cooling/warming therapies per order  - Administer medications as ordered  - Instruct and encourage patient and family to use good hand hygiene technique  - Identify and instruct in appropriate isolation precautions for identified infection/condition  Outcome: Progressing  Goal: Absence of fever/infection during neutropenic period  Description: INTERVENTIONS:  - Monitor WBC  - Perform strict hand hygiene  - Limit to healthy visitors only  - No plants, dried, fresh or silk flowers with damon in patient room  Outcome: Progressing     Problem: SAFETY ADULT  Goal: Patient will remain free of falls  Description: INTERVENTIONS:  - Educate patient/family on patient safety including physical limitations  - Instruct patient to call for assistance with activity   - Consider consulting OT/PT to assist with strengthening/mobility based on AM PAC & JH-HLM score  - Consult OT/PT to assist with strengthening/mobility   - Keep Call bell within reach  - Keep bed low and locked with side rails adjusted as appropriate  - Keep care items and personal belongings within reach  - Initiate and maintain comfort rounds  - Make Fall Risk Sign visible to staff  - Offer Toileting every 2 Hours, in advance of need  - Initiate/Maintain bed alarm  - Obtain necessary fall risk management equipment: socks  - Apply yellow socks and bracelet for high fall risk patients  - Consider moving patient to room near nurses station  Outcome: Progressing  Goal: Maintain or return to baseline ADL function  Description: INTERVENTIONS:  -  Assess patient's ability to carry out ADLs; assess patient's baseline for ADL function and identify physical deficits which impact ability to perform ADLs (bathing, care of mouth/teeth, toileting, grooming, dressing, etc.)  - Assess/evaluate cause of self-care deficits   - Assess range of  motion  - Assess patient's mobility; develop plan if impaired  - Assess patient's need for assistive devices and provide as appropriate  - Encourage maximum independence but intervene and supervise when necessary  - Involve family in performance of ADLs  - Assess for home care needs following discharge   - Consider OT consult to assist with ADL evaluation and planning for discharge  - Provide patient education as appropriate  - Monitor functional capacity and physical performance, use of AM PAC & JH-HLM   - Monitor gait, balance and fatigue with ambulation    Outcome: Progressing  Goal: Maintains/Returns to pre admission functional level  Description: INTERVENTIONS:  - Perform AM-PAC 6 Click Basic Mobility/ Daily Activity assessment daily.  - Set and communicate daily mobility goal to care team and patient/family/caregiver.   - Collaborate with rehabilitation services on mobility goals if consulted  - Perform Range of Motion 2 times a day.  - Reposition patient every 2 hours.  - Dangle patient 2 times a day  - Stand patient 2 times a day  - Ambulate patient 2 times a day  - Out of bed to chair 2 times a day   - Out of bed for meals 2 times a day  - Out of bed for toileting  - Record patient progress and toleration of activity level   Outcome: Progressing     Problem: DISCHARGE PLANNING  Goal: Discharge to home or other facility with appropriate resources  Description: INTERVENTIONS:  - Identify barriers to discharge w/patient and caregiver  - Arrange for needed discharge resources and transportation as appropriate  - Identify discharge learning needs (meds, wound care, etc.)  - Arrange for interpretive services to assist at discharge as needed  - Refer to Case Management Department for coordinating discharge planning if the patient needs post-hospital services based on physician/advanced practitioner order or complex needs related to functional status, cognitive ability, or social support system  Outcome:  Progressing     Problem: Knowledge Deficit  Goal: Patient/family/caregiver demonstrates understanding of disease process, treatment plan, medications, and discharge instructions  Description: Complete learning assessment and assess knowledge base.  Interventions:  - Provide teaching at level of understanding  - Provide teaching via preferred learning methods  Outcome: Progressing     Problem: Prexisting or High Potential for Compromised Skin Integrity  Goal: Skin integrity is maintained or improved  Description: INTERVENTIONS:  - Identify patients at risk for skin breakdown  - Assess and monitor skin integrity including under and around medical devices   - Assess and monitor nutrition and hydration status  - Monitor labs  - Assess for incontinence   - Turn and reposition patient  - Assist with mobility/ambulation  - Relieve pressure over payam prominences   - Avoid friction and shearing  - Provide appropriate hygiene as needed including keeping skin clean and dry  - Evaluate need for skin moisturizer/barrier cream  - Collaborate with interdisciplinary team  - Patient/family teaching  - Consider wound care consult    Assess:  - Review [place] scale daily  - Clean and moisturize skin every ***  - Inspect skin when repositioning, toileting, and assisting with ADLS  - Assess under medical devices such as *** every ***  - Assess extremities for adequate circulation and sensation     Bed Management:  - Have minimal linens on bed & keep smooth, unwrinkled  - Change linens as needed when moist or perspiring  - Avoid sitting or lying in one position for more than *** hours while in bed?Keep HOB at ***degrees   - Toileting:  - Offer bedside commode  - Assess for incontinence every ***  - Use incontinent care products after each incontinent episode such as ***    Activity:  - Mobilize patient *** times a day  - Encourage activity and walks on unit  - Encourage or provide ROM exercises   - Turn and reposition patient every ***  Hours  - Use appropriate equipment to lift or move patient in bed  - Instruct/ Assist with weight shifting every *** when out of bed in chair  - Consider limitation of chair time *** hour intervals    Skin Care:  - Avoid use of baby powder, tape, friction and shearing, hot water or constrictive clothing  - Relieve pressure over bony prominences using ***  - Do not massage red bony areas    Next Steps:  - Teach patient strategies to minimize risks such as ***  - Consider consults to  interdisciplinary teams such as ***  Outcome: Progressing     Problem: CARDIOVASCULAR - ADULT  Goal: Maintains optimal cardiac output and hemodynamic stability  Description: INTERVENTIONS:  - Monitor I/O, vital signs and rhythm  - Monitor for S/S and trends of decreased cardiac output  - Administer and titrate ordered vasoactive medications to optimize hemodynamic stability  - Assess quality of pulses, skin color and temperature  - Assess for signs of decreased coronary artery perfusion  - Instruct patient to report change in severity of symptoms  Outcome: Progressing  Goal: Absence of cardiac dysrhythmias or at baseline rhythm  Description: INTERVENTIONS:  - Continuous cardiac monitoring, vital signs, obtain 12 lead EKG if ordered  - Administer antiarrhythmic and heart rate control medications as ordered  - Monitor electrolytes and administer replacement therapy as ordered  Outcome: Progressing

## 2025-06-09 NOTE — ASSESSMENT & PLAN NOTE
TSH 6.2 in 05/2025, T4 therapeutic  Recommend outpt monitoring in 4-6 wks   Continue levothyroxine

## 2025-06-09 NOTE — ASSESSMENT & PLAN NOTE
Recent Labs     06/07/25  0555 06/08/25  0405 06/09/25  0528   HGB 8.0* 8.0* 8.5*     Per chart review, baseline Hgb ~11  Hgb currently stable > 8  Normocytic anemia  No active bleeding   Folate  >22, B12 312   Iron panel mixed -inflammatory/SHELIA  C/w iron supplement   Hematology following   Recommend outpatient follow up

## 2025-06-09 NOTE — PROGRESS NOTES
Progress Note - Cardiology   Name: Trevor Lyons 85 y.o. male I MRN: [id number]  Unit/Bed#: -01 I Date of Admission: 5/30/2025   Date of Service: 6/9/2025 I Hospital Day: 10     Assessment & Plan  Acute combined systolic and diastolic congestive heart failure (HCC)  Wt Readings from Last 3 Encounters:   06/09/25 98.3 kg (216 lb 12.8 oz)   05/28/25 105 kg (232 lb)   03/20/25 99.7 kg (219 lb 12.8 oz)       Intake/Output Summary (Last 24 hours) at 6/9/2025 1138  Last data filed at 6/9/2025 1118  Gross per 24 hour   Intake 1842 ml   Output 2675 ml   Net -833 ml       Patient with a history of HFrEF -comes to [facility] emergency department on 5/30/2025 with progressive dyspnea, worsening cough and hypoxia.  Echo 10/21/25: EF 40 to 45%, mild global hypokinesis, RV dilatation, left atrial dilatation, well-functioning TAVR, moderate mitral MAC, moderate to severe MR regurgitation, mild MS, Moderate TR  GDMT: Farxiga 10 mg daily, losartan 25 mg daily, metoprolol succinate 25 mg daily, spironolactone 25 mg daily, torsemide 20 mg twice daily with potassium supplementation  Bumex drip 1 mg/h, received 5 mg of metolazone on Thursday    Still overloaded. Continue bumex gtt    Persistent atrial fibrillation (HCC)  On Xarelto (dose reduced for GFR <50), low-dose metoprolol, mostly paced.  Sick sinus syndrome (HCC)  [employer] permanent pacemaker VVI 65  Chronic respiratory failure (HCC)  Remains on 2 L nasal cannula.  Hypertension  Blood pressures controlled, losartan held due to NELLIE  Stage 3 chronic kidney disease (HCC)  Lab Results   Component Value Date    EGFR 42 06/09/2025    EGFR 39 06/08/2025    EGFR 43 06/08/2025    CREATININE 1.46 (H) 06/09/2025    CREATININE 1.54 (H) 06/08/2025    CREATININE 1.43 (H) 06/08/2025   Improving after presenting with NELLIE due to CHF    Subjective:  Patient seen and examined. No acute events overnight.     Objective:     Vitals: Blood pressure 140/58, pulse 61,  "temperature 98.1 °F (36.7 °C), temperature source Oral, resp. rate 18, height 5' 8\" (1.727 m), weight 98.3 kg (216 lb 12.8 oz), SpO2 99%., Body mass index is 32.96 kg/m².,   Orthostatic Blood Pressures      Flowsheet Row Most Recent Value   Blood Pressure 140/58 filed at 06/09/2025 0707   Patient Position - Orthostatic VS Sitting filed at 06/09/2025 0707              Intake/Output Summary (Last 24 hours) at 6/9/2025 1141  Last data filed at 6/9/2025 1118  Gross per 24 hour   Intake 1842 ml   Output 2675 ml   Net -833 ml           Physical Exam:    General: Trevor Lyons is an ill appearing elderly male, in no acute distress, sitting in a chair  HEENT: moist mucous membrans, EOMI  Neck:  unable to assess JVD, supple, trachea midline  Cardiovascular: RRR  Pulmonary: normal respiratory effort, basilar rales  Abdomen: soft and nondistended  Extremities: +1 pitting bilateral lower extremity edema. Warm and well perfused extremities.  Neuro: deferred  Psych: deferred      Medications:    Current Medications[1]     Labs & Results:        Results from last 7 days   Lab Units 06/09/25  0528 06/08/25  0405 06/07/25  0555   WBC Thousand/uL 16.42* 21.11* 19.51*   HEMOGLOBIN g/dL 8.5* 8.0* 8.0*   HEMATOCRIT % 26.8* 26.9* 26.4*   PLATELETS Thousands/uL 387 434* 401*         Results from last 7 days   Lab Units 06/09/25  0528 06/08/25  1801 06/08/25  0405   POTASSIUM mmol/L 3.6 3.8 3.6   CHLORIDE mmol/L 85* 84* 84*   CO2 mmol/L 38* 42* 39*   BUN mg/dL 52* 53* 49*   CREATININE mg/dL 1.46* 1.54* 1.43*   CALCIUM mg/dL 9.5 9.0 9.3         Results from last 7 days   Lab Units 06/08/25  0405 06/06/25  0547 06/05/25  0603   MAGNESIUM mg/dL 2.4 2.2 2.2           This note was completed in part utilizing Dragon [employer] One voice recognition software. Grammatical errors, random word insertion, spelling mistakes, occasional wrong word or \"sound-alike\" substitutions and incomplete sentences may be an occasional consequence of the system " secondary to software limitations, ambient noise and hardware issues. At the time of dictation, efforts were made to edit, clarify and /or correct errors.  Please read the chart carefully and recognize, using context, where substitutions have occurred.  If you have any questions or concerns about the context, text or information contained within the body of this dictation, please contact myself, the provider, for further clarification.         [1]   Current Facility-Administered Medications:     acetaminophen (TYLENOL) tablet 650 mg, 650 mg, Oral, Q6H PRN, Sergio Wisdom PA-C, 650 mg at 06/09/25 0844    atorvastatin (LIPITOR) tablet 20 mg, 20 mg, Oral, Daily With Dinner, Anya Dash MD, 20 mg at 06/08/25 1644    bumetanide (BUMEX) 12.5 mg infusion 50 mL, 1 mg/hr, Intravenous, Continuous, Sergio Wisdom PA-C, Last Rate: 4 mL/hr at 06/08/25 2329, 1 mg/hr at 06/08/25 2329    busPIRone (BUSPAR) tablet 7.5 mg, 7.5 mg, Oral, BID, Anya Dash MD, 7.5 mg at 06/09/25 0838    clonazePAM (KlonoPIN) tablet 1.5 mg, 1.5 mg, Oral, HS PRN, Sergio Wisdom PA-C, 1.5 mg at 06/08/25 2247    donepezil (ARICEPT) tablet 10 mg, 10 mg, Oral, QAM, Anya Dash MD, 10 mg at 06/09/25 0839    DULoxetine (CYMBALTA) delayed release capsule 60 mg, 60 mg, Oral, Daily, Anya Dash MD, 60 mg at 06/09/25 0838    ferrous sulfate tablet 325 mg, 325 mg, Oral, Every Other Day, Debbie Riggins PA-C, 325 mg at 06/08/25 0829    Fluticasone Furoate-Vilanterol 100-25 mcg/actuation 1 puff, 1 puff, Inhalation, Daily, 1 puff at 06/09/25 0838 **AND** umeclidinium 62.5 mcg/actuation inhaler AEPB 1 puff, 1 puff, Inhalation, Daily, Aurea Germain PA-C, 1 puff at 06/09/25 0838    hydrOXYzine HCL (ATARAX) tablet 50 mg, 50 mg, Oral, Q6H PRN, Sergio Wisdom PA-C, 50 mg at 06/08/25 0222    insulin glargine (LANTUS) subcutaneous injection 10 Units 0.1 mL, 10 Units, Subcutaneous, HS, Nupur Blanca PA-C    insulin lispro (HumALOG/ADMELOG) 100 units/mL  subcutaneous injection 1-5 Units, 1-5 Units, Subcutaneous, TID AC, 2 Units at 06/09/25 0836 **AND** Fingerstick Glucose (POCT), , , TID AC, Anya Dash MD    insulin lispro (HumALOG/ADMELOG) 100 units/mL subcutaneous injection 1-6 Units, 1-6 Units, Subcutaneous, HS, Anya Dash MD, 4 Units at 06/08/25 2243    insulin lispro (HumALOG/ADMELOG) 100 units/mL subcutaneous injection 8 Units, 8 Units, Subcutaneous, TID With Meals, Nupur Blanca PA-C    levalbuterol (XOPENEX) inhalation solution 1.25 mg, 1.25 mg, Nebulization, TID, Anya Dash MD, 1.25 mg at 06/09/25 0726    levothyroxine tablet 137 mcg, 137 mcg, Oral, Early Morning, Anya Dash MD, 137 mcg at 06/09/25 0603    lidocaine (LIDODERM) 5 % patch 1 patch, 1 patch, Topical, Daily, Nupur Blanca PA-C    [Held by provider] losartan (COZAAR) tablet 25 mg, 25 mg, Oral, Daily, SAMIRA Sadler, 25 mg at 06/05/25 0902    magnesium Oxide (MAG-OX) tablet 400 mg, 400 mg, Oral, BID, Anya Dash MD, 400 mg at 06/09/25 0839    melatonin tablet 6 mg, 6 mg, Oral, HS, Usha Quinn PA-C, 6 mg at 06/08/25 2102    metoprolol succinate (TOPROL-XL) 24 hr tablet 25 mg, 25 mg, Oral, QAM, Anya Dash MD, 25 mg at 06/09/25 0838    pantoprazole (PROTONIX) EC tablet 40 mg, 40 mg, Oral, Daily Before Breakfast, Anya Dash MD, 40 mg at 06/09/25 0603    polyethylene glycol (MIRALAX) packet 17 g, 17 g, Oral, Daily, Anya Dash MD, 17 g at 06/09/25 0844    potassium chloride (Klor-Con M20) CR tablet 40 mEq, 40 mEq, Oral, BID, Tank Luther MD, 40 mEq at 06/09/25 0838    rivaroxaban (XARELTO) tablet 15 mg, 15 mg, Oral, Daily With Dinner, Anya Dash MD, 15 mg at 06/08/25 1644    senna (SENOKOT) tablet 8.6 mg, 1 tablet, Oral, Daily, Anya Dash MD, 8.6 mg at 06/09/25 0838    spironolactone (ALDACTONE) tablet 25 mg, 25 mg, Oral, Daily, SAMIRA Sadler, 25 mg at 06/09/25 0838

## 2025-06-09 NOTE — ASSESSMENT & PLAN NOTE
Wt Readings from Last 3 Encounters:   06/09/25 98.3 kg (216 lb 12.8 oz)   05/28/25 105 kg (232 lb)   03/20/25 99.7 kg (219 lb 12.8 oz)       Intake/Output Summary (Last 24 hours) at 6/9/2025 1138  Last data filed at 6/9/2025 1118  Gross per 24 hour   Intake 1842 ml   Output 2675 ml   Net -833 ml       Patient with a history of HFrEF -comes to [facility] emergency department on 5/30/2025 with progressive dyspnea, worsening cough and hypoxia.  Echo 10/21/25: EF 40 to 45%, mild global hypokinesis, RV dilatation, left atrial dilatation, well-functioning TAVR, moderate mitral MAC, moderate to severe MR regurgitation, mild MS, Moderate TR  GDMT: Farxiga 10 mg daily, losartan 25 mg daily, metoprolol succinate 25 mg daily, spironolactone 25 mg daily, torsemide 20 mg twice daily with potassium supplementation  Bumex drip 1 mg/h, received 5 mg of metolazone on Thursday    Still overloaded. Continue bumex gtt

## 2025-06-09 NOTE — ASSESSMENT & PLAN NOTE
Home regimen: Toprol-XL 25 mg  q day, Losartan 25 mg daily, & spironolactone 25 mg daily  BP acceptable    Is This A New Presentation, Or A Follow-Up?: Basal Cell Carcinoma When Was Basal Cell Biopsied? (Optional): 10/20/21 Location From Outside Provider (Will Override Previously Chosen Location): right medial earlobe

## 2025-06-09 NOTE — ASSESSMENT & PLAN NOTE
Lab Results   Component Value Date    EGFR 42 06/09/2025    EGFR 39 06/08/2025    EGFR 43 06/08/2025    CREATININE 1.46 (H) 06/09/2025    CREATININE 1.54 (H) 06/08/2025    CREATININE 1.43 (H) 06/08/2025   Cr baseline 1-1.3  Presented with NELLIE (creatinine 1.8) --> now improved to 1.46  Retention protocol, avoid nephrotoxins & hypotension   Monitor BMP closely on bumex gtt

## 2025-06-09 NOTE — ASSESSMENT & PLAN NOTE
Wt Readings from Last 3 Encounters:   06/09/25 98.3 kg (216 lb 12.8 oz)   05/28/25 105 kg (232 lb)   03/20/25 99.7 kg (219 lb 12.8 oz)     Presented with LOPES + LE Edema  CT Chest: Severe bilateral consolidation and groundglass opacity, greatest in the right lower lobe, compatible with edema and/or pneumonia in the appropriate clinical setting     ECHO (10/2024): EF 40-45%, moderate TV regurg, severe MV regurg   GDMT:   Farxiga 10 mg daily  Losartan 25 mg daily  Metoprolol succinate 25 mg daily  Spironolactone 25 mg daily  Torsemide 20 mg twice daily w/ Kdur - held   Cardiology following   S/P Metolazone 6/5 x 1 & diuril 6/6 x 1  Currently on IV bumex gtt, remains clinically volume overloaded   Continuous telemetry   Daily wts, I&O's   Adequate UOP  Weight downtrending from 228 --> 216 today

## 2025-06-09 NOTE — PROGRESS NOTES
"Progress Note - Hospitalist   Name: Trevor Lyons 85 y.o. male I MRN: [id number]  Unit/Bed#: -01 I Date of Admission: 5/30/2025   Date of Service: 6/9/2025 I Hospital Day: 10    Assessment & Plan  Acute combined systolic and diastolic congestive heart failure (HCC)  Wt Readings from Last 3 Encounters:   06/09/25 98.3 kg (216 lb 12.8 oz)   05/28/25 105 kg (232 lb)   03/20/25 99.7 kg (219 lb 12.8 oz)     Presented with LOPES + LE Edema  CT Chest: Severe bilateral consolidation and groundglass opacity, greatest in the right lower lobe, compatible with edema and/or pneumonia in the appropriate clinical setting     ECHO (10/2024): EF 40-45%, moderate TV regurg, severe MV regurg   GDMT:   Farxiga 10 mg daily  Losartan 25 mg daily  Metoprolol succinate 25 mg daily  Spironolactone 25 mg daily  Torsemide 20 mg twice daily w/ Kdur - held   Cardiology following   S/P Metolazone 6/5 x 1 & diuril 6/6 x 1  Currently on IV bumex gtt, remains clinically volume overloaded   Continuous telemetry   Daily wts, I&O's   Adequate UOP  Weight downtrending from 228 --> 216 today  Chronic respiratory failure (HCC)  Hx of CHF and COPD  Chronically on 2 L via NC PRN - patient reports wearing this \"most of the time\"  Stable on this currently   Leukocytosis  Recent Labs     06/07/25  0555 06/08/25  0405 06/09/25  0528   WBC 19.51* 21.11* 16.42*     WBC fluctuating around 20k  WBC chronically around 10-13 since 2024  Not meeting additional SIRS   Heme consulted  Elevated haptoglobin, elevated Ig kappa/ Ig kal free light chains.  Reticulocyte count mildly elevated  LDH elevated  Flow cytometry with possible CLL, SPEP immunofixation negative  Per hematology we will closely monitor, continue to trend CBC  ID curb-sided by prior provider, given clinical improvement monitor; low threshold to reimage if symptoms worsen (initial CT with ?PNA)  S/P 4 days IV rocephin on admission, now off abx  WBC currently improved to 16 " K  Clinically improving without s/sx of infection, continue to monitor closely   Anemia  Recent Labs     06/07/25  0555 06/08/25  0405 06/09/25  0528   HGB 8.0* 8.0* 8.5*     Per chart review, baseline Hgb ~11  Hgb currently stable > 8  Normocytic anemia  No active bleeding   Folate  >22, B12 312   Iron panel mixed -inflammatory/SHELIA  C/w iron supplement   Hematology following   Recommend outpatient follow up   Persistent atrial fibrillation (HCC)  Rate control: Toprol XL 25 mg daily   OAC: Xarelto   Sick sinus syndrome (HCC)  S/p pacemaker placement  Hypertension  Home regimen: Toprol-XL 25 mg  q day, Losartan 25 mg daily, & spironolactone 25 mg daily  BP acceptable   Type 2 diabetes mellitus without complication, with long-term current use of insulin ([facility])  Lab Results   Component Value Date    HGBA1C 5.2 05/28/2025     Recent Labs     06/08/25  1141 06/08/25  1607 06/08/25  2242 06/09/25  0707   POCGLU 245* 244* 289* 236*     Hold home glimepiride 1 mg daily + metformin 1000 mg BID   Continue farxiga 10 mg daily   Home Lantus: 8 units HS  Increase to 10 units HS   Initiated on humalog 8 units TID AC while inpatient   ISS + Accuchecks   Carb controlled diet   Acquired hypothyroidism  TSH 6.2 in 05/2025, T4 therapeutic  Recommend outpt monitoring in 4-6 wks   Continue levothyroxine  Chronic left-sided low back pain with left-sided sciatica  Chronic LBP managed with menthol topical patch, Cymbalta, and Tylenol.    PT/OT - no rehab needs   Moderate Alzheimer's dementia, unspecified timing of dementia onset, unspecified whether behavioral, psychotic, or mood disturbance or anxiety (HCC)  History of moderate late onset AD.    Resident at assisted living at [facility].    Alert and oriented to person place, time with disorientation to situation.    Managed with Aricept 10 mg p.o. daily, BuSpar 7.5 twice daily, Cymbalta 60 mg p.o. daily with as needed clonazepam at at bedtime.  Delirium precautions: Optimize  nutrition hydration and sleep hygiene.  Increase socialization, prevent falls.  Monitor for constipation or change in urination pattern.  Stage 3 chronic kidney disease (HCC)  Lab Results   Component Value Date    EGFR 42 06/09/2025    EGFR 39 06/08/2025    EGFR 43 06/08/2025    CREATININE 1.46 (H) 06/09/2025    CREATININE 1.54 (H) 06/08/2025    CREATININE 1.43 (H) 06/08/2025   Cr baseline 1-1.3  Presented with NELLIE (creatinine 1.8) --> now improved to 1.46  Retention protocol, avoid nephrotoxins & hypotension   Monitor BMP closely on bumex gtt     VTE Pharmacologic Prophylaxis: VTE Score: 5 High Risk (Score >/= 5) - Pharmacological DVT Prophylaxis Ordered: rivaroxaban (Xarelto). Sequential Compression Devices Ordered.    Mobility:   Basic Mobility Inpatient Raw Score: 17  JH-HLM Goal: 5: Stand one or more mins  JH-HLM Achieved: 5: Stand (1 or more minutes)  JH-HLM Goal achieved. Continue to encourage appropriate mobility.    Patient Centered Rounds: I performed bedside rounds with nursing staff today.   Discussions with Specialists or Other Care Team Provider: None     Education and Discussions with Family / Patient: Updated  (daughter) via phone.    Current Length of Stay: 10 day(s)  Current Patient Status: Inpatient   Certification Statement: The patient will continue to require additional inpatient hospital stay due to IV diuresis  Discharge Plan: Anticipate discharge in 48 hrs to prior assisted or independent living facility.    Code Status: Level 3 - DNAR and DNI    Subjective   Patient reports still feeling chest heaviness/tightness but SOB continues to improve overall. No CP. Mild cough    Objective :  Temp:  [97.9 °F (36.6 °C)-98.1 °F (36.7 °C)] 98.1 °F (36.7 °C)  HR:  [59-63] 61  BP: (112-140)/(50-77) 140/58  Resp:  [16-18] 18  SpO2:  [96 %-99 %] 99 %  O2 Device: Nasal cannula  Nasal Cannula O2 Flow Rate (L/min):  [2 L/min] 2 L/min    Body mass index is 32.96 kg/m².     Input and Output  Summary (last 24 hours):     Intake/Output Summary (Last 24 hours) at 6/9/2025 0918  Last data filed at 6/9/2025 0831  Gross per 24 hour   Intake 1670 ml   Output 3050 ml   Net -1380 ml       Physical Exam  Vitals and nursing note reviewed.   Constitutional:       General: He is not in acute distress.     Appearance: He is well-developed. He is ill-appearing.   HENT:      Head: Normocephalic and atraumatic.     Eyes:      General:         Right eye: No discharge.         Left eye: No discharge.      Extraocular Movements: Extraocular movements intact.      Conjunctiva/sclera: Conjunctivae normal.       Cardiovascular:      Rate and Rhythm: Normal rate and regular rhythm.   Pulmonary:      Effort: Pulmonary effort is normal. No respiratory distress.      Breath sounds: Rales present. No wheezing or rhonchi.      Comments: Stable on 2L  Abdominal:      General: There is no distension.      Palpations: Abdomen is soft.      Tenderness: There is no abdominal tenderness.     Musculoskeletal:      Cervical back: Neck supple.      Right lower leg: Edema present.      Left lower leg: Edema present.     Skin:     General: Skin is warm and dry.      Capillary Refill: Capillary refill takes less than 2 seconds.     Neurological:      General: No focal deficit present.      Mental Status: He is alert. Mental status is at baseline.      Cranial Nerves: No cranial nerve deficit.     Psychiatric:         Mood and Affect: Mood normal.         Behavior: Behavior normal.           Lines/Drains:          Lab Results: I have reviewed the following results:   Results from last 7 days   Lab Units 06/09/25  0528   WBC Thousand/uL 16.42*   HEMOGLOBIN g/dL 8.5*   HEMATOCRIT % 26.8*   PLATELETS Thousands/uL 387   SEGS PCT % 34*   LYMPHO PCT % 58*   MONO PCT % 6   EOS PCT % 1     Results from last 7 days   Lab Units 06/09/25  0528   SODIUM mmol/L 135   POTASSIUM mmol/L 3.6   CHLORIDE mmol/L 85*   CO2 mmol/L 38*   BUN mg/dL 52*   CREATININE  mg/dL 1.46*   ANION GAP mmol/L 12   CALCIUM mg/dL 9.5   GLUCOSE RANDOM mg/dL 225*         Results from last 7 days   Lab Units 06/09/25  0707 06/08/25  2242 06/08/25  1607 06/08/25  1141 06/08/25  0803 06/07/25  2032 06/07/25  1614 06/07/25  1041 06/07/25  0635 06/06/25  2103 06/06/25  1716 06/06/25  1130   POC GLUCOSE mg/dl 236* 289* 244* 245* 228* 366* 240* 277* 247* 269* 252* 209*         Results from last 7 days   Lab Units 06/03/25  0446   PROCALCITONIN ng/ml 0.27*       Recent Cultures (last 7 days):         Imaging Results Review: I reviewed radiology reports from this admission including: CT chest.  Other Study Results Review: No additional pertinent studies reviewed.    Last 24 Hours Medication List:     Current Facility-Administered Medications:     acetaminophen (TYLENOL) tablet 650 mg, Q6H PRN    atorvastatin (LIPITOR) tablet 20 mg, Daily With Dinner    bumetanide (BUMEX) 12.5 mg infusion 50 mL, Continuous, Last Rate: 1 mg/hr (06/08/25 2329)    busPIRone (BUSPAR) tablet 7.5 mg, BID    clonazePAM (KlonoPIN) tablet 1.5 mg, HS PRN    donepezil (ARICEPT) tablet 10 mg, QAM    DULoxetine (CYMBALTA) delayed release capsule 60 mg, Daily    ferrous sulfate tablet 325 mg, Every Other Day    Fluticasone Furoate-Vilanterol 100-25 mcg/actuation 1 puff, Daily **AND** umeclidinium 62.5 mcg/actuation inhaler AEPB 1 puff, Daily    hydrOXYzine HCL (ATARAX) tablet 50 mg, Q6H PRN    insulin glargine (LANTUS) subcutaneous injection 10 Units 0.1 mL, HS    insulin lispro (HumALOG/ADMELOG) 100 units/mL subcutaneous injection 1-5 Units, TID AC **AND** Fingerstick Glucose (POCT), TID AC    insulin lispro (HumALOG/ADMELOG) 100 units/mL subcutaneous injection 1-6 Units, HS    insulin lispro (HumALOG/ADMELOG) 100 units/mL subcutaneous injection 8 Units, TID With Meals    levalbuterol (XOPENEX) inhalation solution 1.25 mg, TID    levothyroxine tablet 137 mcg, Early Morning    [Held by provider] losartan (COZAAR) tablet 25 mg, Daily     magnesium Oxide (MAG-OX) tablet 400 mg, BID    melatonin tablet 6 mg, HS    metoprolol succinate (TOPROL-XL) 24 hr tablet 25 mg, QAM    pantoprazole (PROTONIX) EC tablet 40 mg, Daily Before Breakfast    polyethylene glycol (MIRALAX) packet 17 g, Daily    potassium chloride (Klor-Con M20) CR tablet 40 mEq, BID    rivaroxaban (XARELTO) tablet 15 mg, Daily With Dinner    senna (SENOKOT) tablet 8.6 mg, Daily    spironolactone (ALDACTONE) tablet 25 mg, Daily    Administrative Statements   Today, Patient Was Seen By: Nupur Blanca PA-C  I have spent a total time of 35 minutes in caring for this patient on the day of the visit/encounter including Diagnostic results, Documenting in the medical record, Reviewing/placing orders in the medical record (including tests, medications, and/or procedures), Obtaining or reviewing history  , and Communicating with other healthcare professionals .    **Please Note: This note may have been constructed using a voice recognition system.**

## 2025-06-09 NOTE — ASSESSMENT & PLAN NOTE
Recent Labs     06/07/25  0555 06/08/25  0405 06/09/25  0528   WBC 19.51* 21.11* 16.42*     WBC fluctuating around 20k  WBC chronically around 10-13 since 2024  Not meeting additional SIRS   Heme consulted  Elevated haptoglobin, elevated Ig kappa/ Ig kal free light chains.  Reticulocyte count mildly elevated  LDH elevated  Flow cytometry with possible CLL, SPEP immunofixation negative  Per hematology we will closely monitor, continue to trend CBC  ID curb-sided by prior provider, given clinical improvement monitor; low threshold to reimage if symptoms worsen (initial CT with ?PNA)  S/P 4 days IV rocephin on admission, now off abx  WBC currently improved to 16 K  Clinically improving without s/sx of infection, continue to monitor closely

## 2025-06-09 NOTE — ASSESSMENT & PLAN NOTE
Chronic LBP managed with menthol topical patch, Cymbalta, and Tylenol.    PT/OT - no rehab needs    No

## 2025-06-09 NOTE — ASSESSMENT & PLAN NOTE
Lab Results   Component Value Date    HGBA1C 5.2 05/28/2025     Recent Labs     06/08/25  1141 06/08/25  1607 06/08/25  2242 06/09/25  0707   POCGLU 245* 244* 289* 236*     Hold home glimepiride 1 mg daily + metformin 1000 mg BID   Continue farxiga 10 mg daily   Home Lantus: 8 units HS  Increase to 10 units HS   Initiated on humalog 8 units TID AC while inpatient   ISS + Accuchecks   Carb controlled diet

## 2025-06-09 NOTE — ASSESSMENT & PLAN NOTE
Lab Results   Component Value Date    EGFR 42 06/09/2025    EGFR 39 06/08/2025    EGFR 43 06/08/2025    CREATININE 1.46 (H) 06/09/2025    CREATININE 1.54 (H) 06/08/2025    CREATININE 1.43 (H) 06/08/2025   Improving after presenting with NELLIE due to CHF

## 2025-06-10 LAB
ANION GAP SERPL CALCULATED.3IONS-SCNC: 9 MMOL/L (ref 4–13)
ANION GAP SERPL CALCULATED.3IONS-SCNC: 9 MMOL/L (ref 4–13)
BASOPHILS # BLD MANUAL: 0 THOUSAND/UL (ref 0–0.1)
BASOPHILS NFR MAR MANUAL: 0 % (ref 0–1)
BUN SERPL-MCNC: 54 MG/DL (ref 5–25)
BUN SERPL-MCNC: 56 MG/DL (ref 5–25)
CALCIUM SERPL-MCNC: 9.1 MG/DL (ref 8.4–10.2)
CALCIUM SERPL-MCNC: 9.1 MG/DL (ref 8.4–10.2)
CHLORIDE SERPL-SCNC: 84 MMOL/L (ref 96–108)
CHLORIDE SERPL-SCNC: 85 MMOL/L (ref 96–108)
CO2 SERPL-SCNC: 38 MMOL/L (ref 21–32)
CO2 SERPL-SCNC: 40 MMOL/L (ref 21–32)
CREAT SERPL-MCNC: 1.5 MG/DL (ref 0.6–1.3)
CREAT SERPL-MCNC: 1.66 MG/DL (ref 0.6–1.3)
EOSINOPHIL # BLD MANUAL: 0.16 THOUSAND/UL (ref 0–0.4)
EOSINOPHIL NFR BLD MANUAL: 1 % (ref 0–6)
ERYTHROCYTE [DISTWIDTH] IN BLOOD BY AUTOMATED COUNT: 14.2 % (ref 11.6–15.1)
GFR SERPL CREATININE-BSD FRML MDRD: 36 ML/MIN/1.73SQ M
GFR SERPL CREATININE-BSD FRML MDRD: 41 ML/MIN/1.73SQ M
GLUCOSE SERPL-MCNC: 232 MG/DL (ref 65–140)
GLUCOSE SERPL-MCNC: 234 MG/DL (ref 65–140)
GLUCOSE SERPL-MCNC: 259 MG/DL (ref 65–140)
GLUCOSE SERPL-MCNC: 277 MG/DL (ref 65–140)
GLUCOSE SERPL-MCNC: 298 MG/DL (ref 65–140)
GLUCOSE SERPL-MCNC: 315 MG/DL (ref 65–140)
HCT VFR BLD AUTO: 26.7 % (ref 36.5–49.3)
HGB BLD-MCNC: 7.9 G/DL (ref 12–17)
LYMPHOCYTES # BLD AUTO: 25 % (ref 14–44)
LYMPHOCYTES # BLD AUTO: 4.05 THOUSAND/UL (ref 0.6–4.47)
MCH RBC QN AUTO: 27.8 PG (ref 26.8–34.3)
MCHC RBC AUTO-ENTMCNC: 29.6 G/DL (ref 31.4–37.4)
MCV RBC AUTO: 94 FL (ref 82–98)
MONOCYTES # BLD AUTO: 1.3 THOUSAND/UL (ref 0–1.22)
MONOCYTES NFR BLD: 8 % (ref 4–12)
NEUTROPHILS # BLD MANUAL: 10.69 THOUSAND/UL (ref 1.85–7.62)
NEUTS SEG NFR BLD AUTO: 66 % (ref 43–75)
PLATELET # BLD AUTO: 417 THOUSANDS/UL (ref 149–390)
PLATELET BLD QL SMEAR: ABNORMAL
PMV BLD AUTO: 8.7 FL (ref 8.9–12.7)
POTASSIUM SERPL-SCNC: 4 MMOL/L (ref 3.5–5.3)
POTASSIUM SERPL-SCNC: 4.1 MMOL/L (ref 3.5–5.3)
RBC # BLD AUTO: 2.84 MILLION/UL (ref 3.88–5.62)
RBC MORPH BLD: NORMAL
SODIUM SERPL-SCNC: 132 MMOL/L (ref 135–147)
SODIUM SERPL-SCNC: 133 MMOL/L (ref 135–147)
WBC # BLD AUTO: 16.2 THOUSAND/UL (ref 4.31–10.16)

## 2025-06-10 PROCEDURE — 82948 REAGENT STRIP/BLOOD GLUCOSE: CPT

## 2025-06-10 PROCEDURE — 99232 SBSQ HOSP IP/OBS MODERATE 35: CPT | Performed by: INTERNAL MEDICINE

## 2025-06-10 PROCEDURE — 99232 SBSQ HOSP IP/OBS MODERATE 35: CPT | Performed by: PHYSICIAN ASSISTANT

## 2025-06-10 PROCEDURE — 80048 BASIC METABOLIC PNL TOTAL CA: CPT | Performed by: PHYSICIAN ASSISTANT

## 2025-06-10 PROCEDURE — 94640 AIRWAY INHALATION TREATMENT: CPT

## 2025-06-10 PROCEDURE — 85027 COMPLETE CBC AUTOMATED: CPT | Performed by: INTERNAL MEDICINE

## 2025-06-10 PROCEDURE — 94760 N-INVAS EAR/PLS OXIMETRY 1: CPT

## 2025-06-10 PROCEDURE — 85007 BL SMEAR W/DIFF WBC COUNT: CPT | Performed by: INTERNAL MEDICINE

## 2025-06-10 RX ADMIN — ATORVASTATIN CALCIUM 20 MG: 20 TABLET, FILM COATED ORAL at 17:26

## 2025-06-10 RX ADMIN — LEVOTHYROXINE SODIUM 137 MCG: 112 TABLET ORAL at 05:46

## 2025-06-10 RX ADMIN — INSULIN LISPRO 8 UNITS: 100 INJECTION, SOLUTION INTRAVENOUS; SUBCUTANEOUS at 11:59

## 2025-06-10 RX ADMIN — BUSPIRONE HYDROCHLORIDE 7.5 MG: 15 TABLET ORAL at 08:45

## 2025-06-10 RX ADMIN — POTASSIUM CHLORIDE 40 MEQ: 1500 TABLET, EXTENDED RELEASE ORAL at 08:45

## 2025-06-10 RX ADMIN — Medication 1 MG/HR: at 19:21

## 2025-06-10 RX ADMIN — INSULIN LISPRO 2 UNITS: 100 INJECTION, SOLUTION INTRAVENOUS; SUBCUTANEOUS at 07:49

## 2025-06-10 RX ADMIN — RIVAROXABAN 15 MG: 15 TABLET, FILM COATED ORAL at 17:26

## 2025-06-10 RX ADMIN — FLUTICASONE FUROATE AND VILANTEROL TRIFENATATE 1 PUFF: 100; 25 POWDER RESPIRATORY (INHALATION) at 08:45

## 2025-06-10 RX ADMIN — LEVALBUTEROL HYDROCHLORIDE 1.25 MG: 1.25 SOLUTION RESPIRATORY (INHALATION) at 19:50

## 2025-06-10 RX ADMIN — Medication 1 MG/HR: at 08:46

## 2025-06-10 RX ADMIN — Medication 6 MG: at 22:37

## 2025-06-10 RX ADMIN — INSULIN LISPRO 3 UNITS: 100 INJECTION, SOLUTION INTRAVENOUS; SUBCUTANEOUS at 22:38

## 2025-06-10 RX ADMIN — LEVALBUTEROL HYDROCHLORIDE 1.25 MG: 1.25 SOLUTION RESPIRATORY (INHALATION) at 13:28

## 2025-06-10 RX ADMIN — Medication 400 MG: at 08:45

## 2025-06-10 RX ADMIN — LEVALBUTEROL HYDROCHLORIDE 1.25 MG: 1.25 SOLUTION RESPIRATORY (INHALATION) at 07:19

## 2025-06-10 RX ADMIN — FERROUS SULFATE TAB 325 MG (65 MG ELEMENTAL FE) 325 MG: 325 (65 FE) TAB at 08:45

## 2025-06-10 RX ADMIN — INSULIN LISPRO 3 UNITS: 100 INJECTION, SOLUTION INTRAVENOUS; SUBCUTANEOUS at 17:24

## 2025-06-10 RX ADMIN — POLYETHYLENE GLYCOL 3350 17 G: 17 POWDER, FOR SOLUTION ORAL at 08:45

## 2025-06-10 RX ADMIN — BUSPIRONE HYDROCHLORIDE 7.5 MG: 15 TABLET ORAL at 17:26

## 2025-06-10 RX ADMIN — DONEPEZIL HYDROCHLORIDE 10 MG: 5 TABLET ORAL at 08:44

## 2025-06-10 RX ADMIN — PANTOPRAZOLE SODIUM 40 MG: 40 TABLET, DELAYED RELEASE ORAL at 06:28

## 2025-06-10 RX ADMIN — INSULIN LISPRO 8 UNITS: 100 INJECTION, SOLUTION INTRAVENOUS; SUBCUTANEOUS at 17:23

## 2025-06-10 RX ADMIN — SENNOSIDES 8.6 MG: 8.6 TABLET, FILM COATED ORAL at 08:45

## 2025-06-10 RX ADMIN — SPIRONOLACTONE 25 MG: 25 TABLET, FILM COATED ORAL at 08:45

## 2025-06-10 RX ADMIN — Medication 400 MG: at 17:27

## 2025-06-10 RX ADMIN — INSULIN LISPRO 2 UNITS: 100 INJECTION, SOLUTION INTRAVENOUS; SUBCUTANEOUS at 11:59

## 2025-06-10 RX ADMIN — UMECLIDINIUM 1 PUFF: 62.5 AEROSOL, POWDER ORAL at 08:46

## 2025-06-10 RX ADMIN — METOPROLOL SUCCINATE 25 MG: 25 TABLET, EXTENDED RELEASE ORAL at 08:45

## 2025-06-10 RX ADMIN — DULOXETINE HYDROCHLORIDE 60 MG: 30 CAPSULE, DELAYED RELEASE ORAL at 08:45

## 2025-06-10 RX ADMIN — INSULIN GLARGINE 10 UNITS: 100 INJECTION, SOLUTION SUBCUTANEOUS at 22:37

## 2025-06-10 RX ADMIN — POTASSIUM CHLORIDE 40 MEQ: 1500 TABLET, EXTENDED RELEASE ORAL at 17:26

## 2025-06-10 RX ADMIN — INSULIN LISPRO 8 UNITS: 100 INJECTION, SOLUTION INTRAVENOUS; SUBCUTANEOUS at 07:49

## 2025-06-10 RX ADMIN — ACETAMINOPHEN 650 MG: 325 TABLET, FILM COATED ORAL at 05:46

## 2025-06-10 RX ADMIN — CHLOROTHIAZIDE SODIUM 500 MG: 500 INJECTION, POWDER, LYOPHILIZED, FOR SOLUTION INTRAVENOUS at 10:28

## 2025-06-10 RX ADMIN — LIDOCAINE 5% 1 PATCH: 700 PATCH TOPICAL at 08:45

## 2025-06-10 NOTE — ASSESSMENT & PLAN NOTE
History of moderate late onset AD.    Resident at assisted living at [facility].    Alert and oriented to person place, time with disorientation to situation.    Managed with Aricept 10 mg p.o. daily, BuSpar 7.5 twice daily, Cymbalta 60 mg p.o. daily with as needed clonazepam at at bedtime.  Delirium precautions: Optimize nutrition hydration and sleep hygiene.  Increase socialization, prevent falls.  Monitor for constipation or change in urination pattern.

## 2025-06-10 NOTE — ASSESSMENT & PLAN NOTE
Home regimen: Toprol-XL 25 mg  q day, Losartan 25 mg daily, & spironolactone 25 mg daily  BP acceptable

## 2025-06-10 NOTE — ASSESSMENT & PLAN NOTE
Recent Labs     06/08/25  0405 06/09/25  0528 06/10/25  0450   WBC 21.11* 16.42* 16.20*     WBC fluctuating around 20k  WBC chronically around 10-13 since 2024  Not meeting additional SIRS   Heme consulted  Elevated haptoglobin, elevated Ig kappa/ Ig kal free light chains.  Reticulocyte count mildly elevated  LDH elevated  Flow cytometry with possible CLL, SPEP immunofixation negative  Per hematology we will closely monitor, continue to trend CBC  ID curb-sided by prior provider, given clinical improvement monitor; low threshold to reimage if symptoms worsen (initial CT with ?PNA)  S/P 4 days IV rocephin on admission, now off abx  WBC currently improved to 16 K  Clinically improving without s/sx of infection, continue to monitor closely

## 2025-06-10 NOTE — ASSESSMENT & PLAN NOTE
Lab Results   Component Value Date    HGBA1C 5.2 05/28/2025     Recent Labs     06/09/25  1117 06/09/25  1608 06/09/25  2232 06/10/25  0746   POCGLU 274* 208* 250* 234*     Hold home glimepiride 1 mg daily + metformin 1000 mg BID   Continue farxiga 10 mg daily   Home Lantus: 8 units HS  Increase to 10 units HS   Initiated on humalog 8 units TID AC while inpatient   ISS + Accuchecks   Carb controlled diet

## 2025-06-10 NOTE — ASSESSMENT & PLAN NOTE
Recent Labs     06/08/25  0405 06/09/25  0528 06/10/25  0450   HGB 8.0* 8.5* 7.9*     Per chart review, baseline Hgb ~11  Hgb currently stable > 8  Normocytic anemia  No active bleeding   Folate  >22, B12 312   Iron panel mixed -inflammatory/SHELIA  C/w iron supplement   Hematology following   Recommend outpatient follow up

## 2025-06-10 NOTE — ASSESSMENT & PLAN NOTE
Lab Results   Component Value Date    EGFR 41 06/09/2025    EGFR 42 06/09/2025    EGFR 39 06/08/2025    CREATININE 1.48 (H) 06/09/2025    CREATININE 1.46 (H) 06/09/2025    CREATININE 1.54 (H) 06/08/2025   Cr baseline 1-1.3  Presented with NELLIE (creatinine 1.8) --> now improved to 1.46  Retention protocol, avoid nephrotoxins & hypotension   Monitor BMP closely on bumex gtt

## 2025-06-10 NOTE — ASSESSMENT & PLAN NOTE
Wt Readings from Last 3 Encounters:   06/10/25 100 kg (220 lb 12.8 oz)   05/28/25 105 kg (232 lb)   03/20/25 99.7 kg (219 lb 12.8 oz)       Intake/Output Summary (Last 24 hours) at 6/10/2025 1038  Last data filed at 6/10/2025 0805  Gross per 24 hour   Intake 1368 ml   Output 2195 ml   Net -827 ml       Patient with a history of HFrEF -comes to [facility] emergency department on 5/30/2025 with progressive dyspnea, worsening cough and hypoxia.  Echo 10/21/25: EF 40 to 45%, mild global hypokinesis, RV dilatation, left atrial dilatation, well-functioning TAVR, moderate mitral MAC, moderate to severe MR regurgitation, mild MS, Moderate TR  GDMT: Farxiga 10 mg daily, losartan 25 mg daily, metoprolol succinate 25 mg daily, spironolactone 25 mg daily, torsemide 20 mg twice daily with potassium supplementation  Bumex drip 1 mg/h  Still overloaded. Continue bumex gtt  Add diuril IV today x 1

## 2025-06-10 NOTE — ASSESSMENT & PLAN NOTE
Wt Readings from Last 3 Encounters:   06/10/25 100 kg (220 lb 12.8 oz)   05/28/25 105 kg (232 lb)   03/20/25 99.7 kg (219 lb 12.8 oz)     Presented with LOPES + LE Edema  CT Chest: Severe bilateral consolidation and groundglass opacity, greatest in the right lower lobe, compatible with edema and/or pneumonia in the appropriate clinical setting     ECHO (10/2024): EF 40-45%, moderate TV regurg, severe MV regurg   GDMT:   Farxiga 10 mg daily  Losartan 25 mg daily  Metoprolol succinate 25 mg daily  Spironolactone 25 mg daily  Torsemide 20 mg twice daily w/ Kdur - held   Cardiology following   S/P Metolazone 6/5 x 1 & diuril 6/6 x 1  Will give additional IV diuril today 6/10  Currently on IV bumex gtt, remains clinically volume overloaded   Continuous telemetry   Daily wts, I&O's   Adequate UOP  Weight downtrending

## 2025-06-10 NOTE — PROGRESS NOTES
Progress Note - Cardiology   Name: Trevor Lyons 85 y.o. male I MRN: [id number]  Unit/Bed#: -01 I Date of Admission: 5/30/2025   Date of Service: 6/10/2025 I Hospital Day: 11     Assessment & Plan  Acute combined systolic and diastolic congestive heart failure (HCC)  Wt Readings from Last 3 Encounters:   06/10/25 100 kg (220 lb 12.8 oz)   05/28/25 105 kg (232 lb)   03/20/25 99.7 kg (219 lb 12.8 oz)       Intake/Output Summary (Last 24 hours) at 6/10/2025 1038  Last data filed at 6/10/2025 0805  Gross per 24 hour   Intake 1368 ml   Output 2195 ml   Net -827 ml       Patient with a history of HFrEF -comes to [facility] emergency department on 5/30/2025 with progressive dyspnea, worsening cough and hypoxia.  Echo 10/21/25: EF 40 to 45%, mild global hypokinesis, RV dilatation, left atrial dilatation, well-functioning TAVR, moderate mitral MAC, moderate to severe MR regurgitation, mild MS, Moderate TR  GDMT: Farxiga 10 mg daily, losartan 25 mg daily, metoprolol succinate 25 mg daily, spironolactone 25 mg daily, torsemide 20 mg twice daily with potassium supplementation  Bumex drip 1 mg/h  Still overloaded. Continue bumex gtt  Add diuril IV today x 1    Persistent atrial fibrillation (HCC)  On Xarelto (dose reduced for GFR <50), low-dose metoprolol, mostly paced.  Sick sinus syndrome (HCC)  [employer] permanent pacemaker VVI 65  Chronic respiratory failure (HCC)  Remains on 2 L nasal cannula.  Hypertension  Blood pressures controlled, losartan held due to NELLIE  Stage 3 chronic kidney disease (HCC)  Lab Results   Component Value Date    EGFR 41 06/09/2025    EGFR 42 06/09/2025    EGFR 39 06/08/2025    CREATININE 1.48 (H) 06/09/2025    CREATININE 1.46 (H) 06/09/2025    CREATININE 1.54 (H) 06/08/2025   Improving after presenting with NELLIE due to CHF    Subjective:  Patient seen and examined. No acute events overnight.     Objective:     Vitals: Blood pressure 127/57, pulse 60, temperature (!) 97.4 °F  "(36.3 °C), temperature source Oral, resp. rate 16, height 5' 8\" (1.727 m), weight 100 kg (220 lb 12.8 oz), SpO2 (!) 85%., Body mass index is 33.57 kg/m².,   Orthostatic Blood Pressures      Flowsheet Row Most Recent Value   Blood Pressure 127/57 filed at 06/10/2025 0805   Patient Position - Orthostatic VS Sitting filed at 06/10/2025 0805              Intake/Output Summary (Last 24 hours) at 6/10/2025 1038  Last data filed at 6/10/2025 0805  Gross per 24 hour   Intake 1368 ml   Output 2195 ml   Net -827 ml       Physical Exam:    General: Trevor Lyons is an obese and ill appearing elderly male  HEENT: moist mucous membranes, EOMI  Neck:  unable to assess JVD, supple, trachea midline  Cardiovascular: RRR  Pulmonary: normal respiratory effort, rales  Abdomen: soft and nondistended  Extremities: + lower extremity edema. Warm and well perfused extremities.  Neuro:deferred  Psych: deferred      Medications:    Current Medications[1]     Labs & Results:        Results from last 7 days   Lab Units 06/10/25  0450 06/09/25  0528 06/08/25  0405   WBC Thousand/uL 16.20* 16.42* 21.11*   HEMOGLOBIN g/dL 7.9* 8.5* 8.0*   HEMATOCRIT % 26.7* 26.8* 26.9*   PLATELETS Thousands/uL 417* 387 434*         Results from last 7 days   Lab Units 06/09/25  1803 06/09/25  0528 06/08/25  1801   POTASSIUM mmol/L 4.3 3.6 3.8   CHLORIDE mmol/L 85* 85* 84*   CO2 mmol/L 37* 38* 42*   BUN mg/dL 52* 52* 53*   CREATININE mg/dL 1.48* 1.46* 1.54*   CALCIUM mg/dL 9.0 9.5 9.0         Results from last 7 days   Lab Units 06/08/25  0405 06/06/25  0547 06/05/25  0603   MAGNESIUM mg/dL 2.4 2.2 2.2       This note was completed in part utilizing Dragon [employer] One voice recognition software. Grammatical errors, random word insertion, spelling mistakes, occasional wrong word or \"sound-alike\" substitutions and incomplete sentences may be an occasional consequence of the system secondary to software limitations, ambient noise and hardware issues. At the time " of dictation, efforts were made to edit, clarify and /or correct errors.  Please read the chart carefully and recognize, using context, where substitutions have occurred.  If you have any questions or concerns about the context, text or information contained within the body of this dictation, please contact myself, the provider, for further clarification.         [1]   Current Facility-Administered Medications:     acetaminophen (TYLENOL) tablet 650 mg, 650 mg, Oral, Q6H PRN, Sergio Wisdom PA-C, 650 mg at 06/10/25 0546    atorvastatin (LIPITOR) tablet 20 mg, 20 mg, Oral, Daily With Dinner, Anya Dash MD, 20 mg at 06/09/25 1749    bumetanide (BUMEX) 12.5 mg infusion 50 mL, 1 mg/hr, Intravenous, Continuous, Sergio Wisdom PA-C, Last Rate: 4 mL/hr at 06/10/25 0846, 1 mg/hr at 06/10/25 0846    busPIRone (BUSPAR) tablet 7.5 mg, 7.5 mg, Oral, BID, Anya Dash MD, 7.5 mg at 06/10/25 0845    chlorothiazide (DIURIL) 500 mg in sodium chloride 0.9 % 100 mL IV, 500 mg, Intravenous, Once, Tank Luther MD, Last Rate: 200 mL/hr at 06/10/25 1028, 500 mg at 06/10/25 1028    clonazePAM (KlonoPIN) tablet 1.5 mg, 1.5 mg, Oral, HS PRN, Sergio Wisdom PA-C, 1.5 mg at 06/09/25 2237    donepezil (ARICEPT) tablet 10 mg, 10 mg, Oral, QAM, Anya Dash MD, 10 mg at 06/10/25 0844    DULoxetine (CYMBALTA) delayed release capsule 60 mg, 60 mg, Oral, Daily, Anya Dash MD, 60 mg at 06/10/25 0845    ferrous sulfate tablet 325 mg, 325 mg, Oral, Every Other Day, Debbie Riggins PA-C, 325 mg at 06/10/25 0845    Fluticasone Furoate-Vilanterol 100-25 mcg/actuation 1 puff, 1 puff, Inhalation, Daily, 1 puff at 06/10/25 0845 **AND** umeclidinium 62.5 mcg/actuation inhaler AEPB 1 puff, 1 puff, Inhalation, Daily, Aurea Germain PA-C, 1 puff at 06/10/25 0846    hydrOXYzine HCL (ATARAX) tablet 50 mg, 50 mg, Oral, Q6H PRN, Sergio Wisdom PA-C, 50 mg at 06/08/25 0222    insulin glargine (LANTUS) subcutaneous injection 10 Units 0.1 mL, 10  Units, Subcutaneous, HS, Nupur Blanca PA-C, 10 Units at 06/09/25 2233    insulin lispro (HumALOG/ADMELOG) 100 units/mL subcutaneous injection 1-5 Units, 1-5 Units, Subcutaneous, TID AC, 2 Units at 06/10/25 0749 **AND** Fingerstick Glucose (POCT), , , TID AC, Anya Dash MD    insulin lispro (HumALOG/ADMELOG) 100 units/mL subcutaneous injection 1-6 Units, 1-6 Units, Subcutaneous, HS, Anya Dash MD, 3 Units at 06/09/25 2233    insulin lispro (HumALOG/ADMELOG) 100 units/mL subcutaneous injection 8 Units, 8 Units, Subcutaneous, TID With Meals, Nupur lBanca PA-C, 8 Units at 06/10/25 0749    levalbuterol (XOPENEX) inhalation solution 1.25 mg, 1.25 mg, Nebulization, TID, Anya Dash MD, 1.25 mg at 06/10/25 0719    levothyroxine tablet 137 mcg, 137 mcg, Oral, Early Morning, Anya Dash MD, 137 mcg at 06/10/25 0546    lidocaine (LIDODERM) 5 % patch 1 patch, 1 patch, Topical, Daily, Nupur Blanca PA-C, 1 patch at 06/10/25 0845    [Held by provider] losartan (COZAAR) tablet 25 mg, 25 mg, Oral, Daily, SAMIRA Sadler, 25 mg at 06/05/25 0902    magnesium Oxide (MAG-OX) tablet 400 mg, 400 mg, Oral, BID, Anya Dash MD, 400 mg at 06/10/25 0845    melatonin tablet 6 mg, 6 mg, Oral, HS, Usha Quinn PA-C, 6 mg at 06/09/25 2237    metoprolol succinate (TOPROL-XL) 24 hr tablet 25 mg, 25 mg, Oral, QAM, Anya Dash MD, 25 mg at 06/10/25 0845    pantoprazole (PROTONIX) EC tablet 40 mg, 40 mg, Oral, Daily Before Breakfast, Anya Dash MD, 40 mg at 06/10/25 0628    polyethylene glycol (MIRALAX) packet 17 g, 17 g, Oral, Daily, Anya Dash MD, 17 g at 06/10/25 0845    potassium chloride (Klor-Con M20) CR tablet 40 mEq, 40 mEq, Oral, BID, Tank Luther MD, 40 mEq at 06/10/25 0845    rivaroxaban (XARELTO) tablet 15 mg, 15 mg, Oral, Daily With Dinner, Anya Dash MD, 15 mg at 06/09/25 1749    senna (SENOKOT) tablet 8.6 mg, 1 tablet, Oral, Daily, Anya Dash MD, 8.6 mg at 06/10/25  0845    spironolactone (ALDACTONE) tablet 25 mg, 25 mg, Oral, Daily, SAMIRA Sadler, 25 mg at 06/10/25 3931

## 2025-06-10 NOTE — PROGRESS NOTES
"Progress Note - Hospitalist   Name: Trevor Lyons 85 y.o. male I MRN: [id number]  Unit/Bed#: -01 I Date of Admission: 5/30/2025   Date of Service: 6/10/2025 I Hospital Day: 11    Assessment & Plan  Acute combined systolic and diastolic congestive heart failure (HCC)  Wt Readings from Last 3 Encounters:   06/10/25 100 kg (220 lb 12.8 oz)   05/28/25 105 kg (232 lb)   03/20/25 99.7 kg (219 lb 12.8 oz)     Presented with LOPES + LE Edema  CT Chest: Severe bilateral consolidation and groundglass opacity, greatest in the right lower lobe, compatible with edema and/or pneumonia in the appropriate clinical setting     ECHO (10/2024): EF 40-45%, moderate TV regurg, severe MV regurg   GDMT:   Farxiga 10 mg daily  Losartan 25 mg daily  Metoprolol succinate 25 mg daily  Spironolactone 25 mg daily  Torsemide 20 mg twice daily w/ Kdur - held   Cardiology following   S/P Metolazone 6/5 x 1 & diuril 6/6 x 1  Will give additional IV diuril today 6/10  Currently on IV bumex gtt, remains clinically volume overloaded   Continuous telemetry   Daily wts, I&O's   Adequate UOP  Weight downtrending  Chronic respiratory failure (HCC)  Hx of CHF and COPD  Chronically on 2 L via NC PRN - patient reports wearing this \"most of the time\"  Stable on this currently   Leukocytosis  Recent Labs     06/08/25  0405 06/09/25  0528 06/10/25  0450   WBC 21.11* 16.42* 16.20*     WBC fluctuating around 20k  WBC chronically around 10-13 since 2024  Not meeting additional SIRS   Heme consulted  Elevated haptoglobin, elevated Ig kappa/ Ig kal free light chains.  Reticulocyte count mildly elevated  LDH elevated  Flow cytometry with possible CLL, SPEP immunofixation negative  Per hematology we will closely monitor, continue to trend CBC  ID curb-sided by prior provider, given clinical improvement monitor; low threshold to reimage if symptoms worsen (initial CT with ?PNA)  S/P 4 days IV rocephin on admission, now off abx  WBC currently " improved to 16 K  Clinically improving without s/sx of infection, continue to monitor closely   Anemia  Recent Labs     06/08/25  0405 06/09/25  0528 06/10/25  0450   HGB 8.0* 8.5* 7.9*     Per chart review, baseline Hgb ~11  Hgb currently stable > 8  Normocytic anemia  No active bleeding   Folate  >22, B12 312   Iron panel mixed -inflammatory/SHELIA  C/w iron supplement   Hematology following   Recommend outpatient follow up   Persistent atrial fibrillation (HCC)  Rate control: Toprol XL 25 mg daily   OAC: Xarelto   Sick sinus syndrome (HCC)  S/p pacemaker placement  Hypertension  Home regimen: Toprol-XL 25 mg  q day, Losartan 25 mg daily, & spironolactone 25 mg daily  BP acceptable   Type 2 diabetes mellitus without complication, with long-term current use of insulin ([facility])  Lab Results   Component Value Date    HGBA1C 5.2 05/28/2025     Recent Labs     06/09/25  1117 06/09/25  1608 06/09/25  2232 06/10/25  0746   POCGLU 274* 208* 250* 234*     Hold home glimepiride 1 mg daily + metformin 1000 mg BID   Continue farxiga 10 mg daily   Home Lantus: 8 units HS  Increase to 10 units HS   Initiated on humalog 8 units TID AC while inpatient   ISS + Accuchecks   Carb controlled diet   Acquired hypothyroidism  TSH 6.2 in 05/2025, T4 therapeutic  Recommend outpt monitoring in 4-6 wks   Continue levothyroxine  Chronic left-sided low back pain with left-sided sciatica  Chronic LBP managed with menthol topical patch, Cymbalta, and Tylenol.    PT/OT - no rehab needs   Moderate Alzheimer's dementia, unspecified timing of dementia onset, unspecified whether behavioral, psychotic, or mood disturbance or anxiety (HCC)  History of moderate late onset AD.    Resident at assisted living at [facility].    Alert and oriented to person place, time with disorientation to situation.    Managed with Aricept 10 mg p.o. daily, BuSpar 7.5 twice daily, Cymbalta 60 mg p.o. daily with as needed clonazepam at at bedtime.  Delirium precautions:  Optimize nutrition hydration and sleep hygiene.  Increase socialization, prevent falls.  Monitor for constipation or change in urination pattern.  Stage 3 chronic kidney disease (HCC)  Lab Results   Component Value Date    EGFR 41 2025    EGFR 42 2025    EGFR 39 2025    CREATININE 1.48 (H) 2025    CREATININE 1.46 (H) 2025    CREATININE 1.54 (H) 2025   Cr baseline 1-1.3  Presented with NELLIE (creatinine 1.8) --> now improved to 1.46  Retention protocol, avoid nephrotoxins & hypotension   Monitor BMP closely on bumex gtt     Mobility:   Basic Mobility Inpatient Raw Score: 17  JH-HLM Goal: 5: Stand one or more mins  JH-HLM Achieved: 5: Stand (1 or more minutes)  JH-HLM Goal achieved. Continue to encourage appropriate mobility.    VTE Pharmacologic Prophylaxis: VTE Score: 5 High Risk (Score >/= 5) - Pharmacological DVT Prophylaxis Ordered: rivaroxaban (Xarelto). Sequential Compression Devices Ordered.    Education and Discussions with Family / Patient: Attempted to update  (daughter) via phone. Left voicemail.     Current Length of Stay: 11 day(s)  Current Patient Status: Inpatient   Certification Statement: The patient will continue to require additional inpatient hospital stay due to CHF exacerbation on Bumex drip  Discharge Plan: Anticipate discharge in 48 hrs to discharge location to be determined pending rehab evaluations.    Code Status: Level 3 - DNAR and DNI    Subjective:   No overnight events, patient on room air presently, still with lower extremity edema    Objective:     Vitals:   Temp (24hrs), Av °F (36.7 °C), Min:97.4 °F (36.3 °C), Max:98.7 °F (37.1 °C)    Temp:  [97.4 °F (36.3 °C)-98.7 °F (37.1 °C)] 97.4 °F (36.3 °C)  HR:  [59-61] 60  Resp:  [16-20] 16  BP: (104-133)/(49-57) 127/57  SpO2:  [85 %-100 %] 85 %  Body mass index is 33.57 kg/m².     Input and Output Summary (last 24 hours):     Intake/Output Summary (Last 24 hours) at 6/10/2025 0835  Last  data filed at 6/10/2025 0805  Gross per 24 hour   Intake 1590 ml   Output 2195 ml   Net -605 ml       Physical Exam:   Physical Exam  Vitals and nursing note reviewed.   Constitutional:       General: He is not in acute distress.     Appearance: Normal appearance. He is not ill-appearing.   HENT:      Head: Normocephalic and atraumatic.     Eyes:      General: No scleral icterus.        Right eye: No discharge.         Left eye: No discharge.       Cardiovascular:      Rate and Rhythm: Normal rate and regular rhythm.      Pulses: Normal pulses.      Heart sounds:      No friction rub. No gallop.   Pulmonary:      Effort: Pulmonary effort is normal. No respiratory distress.      Breath sounds: Normal breath sounds. No wheezing, rhonchi or rales.   Abdominal:      General: Bowel sounds are normal. There is no distension.      Palpations: Abdomen is soft.      Tenderness: There is no abdominal tenderness. There is no guarding or rebound.     Musculoskeletal:         General: No swelling or deformity.      Cervical back: Neck supple.      Right lower leg: Edema present.      Left lower leg: Edema present.     Skin:     General: Skin is warm and dry.      Capillary Refill: Capillary refill takes less than 2 seconds.      Coloration: Skin is not jaundiced or pale.      Findings: No erythema or rash.     Neurological:      General: No focal deficit present.      Mental Status: He is alert and oriented to person, place, and time. Mental status is at baseline.     Psychiatric:         Mood and Affect: Mood normal.         Behavior: Behavior normal.          Additional Data:     Labs:  Results from last 7 days   Lab Units 06/10/25  0450 06/09/25  0528   WBC Thousand/uL 16.20* 16.42*   HEMOGLOBIN g/dL 7.9* 8.5*   HEMATOCRIT % 26.7* 26.8*   PLATELETS Thousands/uL 417* 387   SEGS PCT %  --  34*   LYMPHO PCT % 25 58*   MONO PCT % 8 6   EOS PCT % 1 1     Results from last 7 days   Lab Units 06/09/25  1803   SODIUM mmol/L 132*    POTASSIUM mmol/L 4.3   CHLORIDE mmol/L 85*   CO2 mmol/L 37*   BUN mg/dL 52*   CREATININE mg/dL 1.48*   ANION GAP mmol/L 10   CALCIUM mg/dL 9.0   GLUCOSE RANDOM mg/dL 295*         Results from last 7 days   Lab Units 06/10/25  0746 06/09/25  2232 06/09/25  1608 06/09/25  1117 06/09/25  0707 06/08/25  2242 06/08/25  1607 06/08/25  1141 06/08/25  0803 06/07/25  2032 06/07/25  1614 06/07/25  1041   POC GLUCOSE mg/dl 234* 250* 208* 274* 236* 289* 244* 245* 228* 366* 240* 277*         Results from last 7 days   Lab Units 06/09/25  0528   PROCALCITONIN ng/ml 0.13       Imaging: Radiology Review: No additional pertinent studies reviewed.    Recent Cultures (last 7 days):         Telemetry Orders (From admission, onward)               24 Hour Telemetry Monitoring  Continuous x 24 Hours (Telem)        Expiring   Question:  Reason for 24 Hour Telemetry  Answer:  Decompensated CHF- and any one of the following: continuous diuretic infusion or total diuretic dose >200 mg daily, associated electrolyte derangement (I.e. K < 3.0), inotropic drip (continuous infusion), hx of ventricular arrhythmia, or new EF < 35%                        Last 24 Hours Medication List:   Current Facility-Administered Medications   Medication Dose Route Frequency Provider Last Rate    acetaminophen  650 mg Oral Q6H PRN Sergio Wisdom PA-C      atorvastatin  20 mg Oral Daily With Dinner Anya Dash MD      bumetanide (BUMEX) 12.5 mg infusion 50 mL  1 mg/hr Intravenous Continuous Sergio Wisdom PA-C 1 mg/hr (06/09/25 2333)    busPIRone  7.5 mg Oral BID Anya Dash MD      clonazePAM  1.5 mg Oral HS PRN Sergio Wisdom PA-C      donepezil  10 mg Oral QAM Anya Dash MD      DULoxetine  60 mg Oral Daily Anya Dash MD      ferrous sulfate  325 mg Oral Every Other Day Debbie Riggins PA-C      Fluticasone Furoate-Vilanterol  1 puff Inhalation Daily Aurea Germain PA-C      And    umeclidinium  1 puff Inhalation Daily Aurea Germain PA-C       hydrOXYzine HCL  50 mg Oral Q6H PRN Sergio Wisdom PA-C      insulin glargine  10 Units Subcutaneous HS Nupur Blanca PA-C      insulin lispro  1-5 Units Subcutaneous TID AC Anya Dash MD      insulin lispro  1-6 Units Subcutaneous HS Anya Dash MD      insulin lispro  8 Units Subcutaneous TID With Meals Nupur Blanca PA-C      levalbuterol  1.25 mg Nebulization TID Anya Dash MD      levothyroxine  137 mcg Oral Early Morning Anya Dash MD      lidocaine  1 patch Topical Daily Nupur Blanca PA-C      [Held by provider] losartan  25 mg Oral Daily SAMIRA Sadler      magnesium Oxide  400 mg Oral BID Anya Dash MD      melatonin  6 mg Oral HS Usha Quinn PA-C      metoprolol succinate  25 mg Oral QAM Anya Dash MD      pantoprazole  40 mg Oral Daily Before Breakfast Anya Dash MD      polyethylene glycol  17 g Oral Daily Anya Dash MD      potassium chloride  40 mEq Oral BID Tank Luther MD      rivaroxaban  15 mg Oral Daily With Dinner Anya Dash MD      senna  1 tablet Oral Daily Anya Dash MD      spironolactone  25 mg Oral Daily SAMIRA Sadler          Today, Patient Was Seen By: Sergio Wisdom PA-C    **Please Note: This note may have been constructed using a voice recognition system.**

## 2025-06-10 NOTE — PLAN OF CARE
Problem: Potential for Falls  Goal: Patient will remain free of falls  Description: INTERVENTIONS:  - Educate patient/family on patient safety including physical limitations  - Instruct patient to call for assistance with activity   - Consider consulting OT/PT to assist with strengthening/mobility based on AM PAC & JH-HLM score  - Consult OT/PT to assist with strengthening/mobility   - Keep Call bell within reach  - Keep bed low and locked with side rails adjusted as appropriate  - Keep care items and personal belongings within reach  - Initiate and maintain comfort rounds  - Make Fall Risk Sign visible to staff  - Offer Toileting every 2 Hours, in advance of need  - Initiate/Maintain bed alarm  - Obtain necessary fall risk management equipment: socks  - Apply yellow socks and bracelet for high fall risk patients  - Consider moving patient to room near nurses station  Outcome: Progressing     Problem: INFECTION - ADULT  Goal: Absence or prevention of progression during hospitalization  Description: INTERVENTIONS:  - Assess and monitor for signs and symptoms of infection  - Monitor lab/diagnostic results  - Monitor all insertion sites, i.e. indwelling lines, tubes, and drains  - Monitor endotracheal if appropriate and nasal secretions for changes in amount and color  - [place] appropriate cooling/warming therapies per order  - Administer medications as ordered  - Instruct and encourage patient and family to use good hand hygiene technique  - Identify and instruct in appropriate isolation precautions for identified infection/condition  Outcome: Progressing

## 2025-06-10 NOTE — ASSESSMENT & PLAN NOTE
Lab Results   Component Value Date    EGFR 41 06/09/2025    EGFR 42 06/09/2025    EGFR 39 06/08/2025    CREATININE 1.48 (H) 06/09/2025    CREATININE 1.46 (H) 06/09/2025    CREATININE 1.54 (H) 06/08/2025   Improving after presenting with NELLIE due to CHF

## 2025-06-11 LAB
ANION GAP SERPL CALCULATED.3IONS-SCNC: 10 MMOL/L (ref 4–13)
ANION GAP SERPL CALCULATED.3IONS-SCNC: 12 MMOL/L (ref 4–13)
BUN SERPL-MCNC: 58 MG/DL (ref 5–25)
BUN SERPL-MCNC: 59 MG/DL (ref 5–25)
CALCIUM SERPL-MCNC: 9 MG/DL (ref 8.4–10.2)
CALCIUM SERPL-MCNC: 9.5 MG/DL (ref 8.4–10.2)
CHLORIDE SERPL-SCNC: 85 MMOL/L (ref 96–108)
CHLORIDE SERPL-SCNC: 87 MMOL/L (ref 96–108)
CO2 SERPL-SCNC: 33 MMOL/L (ref 21–32)
CO2 SERPL-SCNC: 40 MMOL/L (ref 21–32)
CREAT SERPL-MCNC: 1.61 MG/DL (ref 0.6–1.3)
CREAT SERPL-MCNC: 1.62 MG/DL (ref 0.6–1.3)
GFR SERPL CREATININE-BSD FRML MDRD: 37 ML/MIN/1.73SQ M
GFR SERPL CREATININE-BSD FRML MDRD: 37 ML/MIN/1.73SQ M
GLUCOSE SERPL-MCNC: 179 MG/DL (ref 65–140)
GLUCOSE SERPL-MCNC: 196 MG/DL (ref 65–140)
GLUCOSE SERPL-MCNC: 202 MG/DL (ref 65–140)
GLUCOSE SERPL-MCNC: 224 MG/DL (ref 65–140)
GLUCOSE SERPL-MCNC: 303 MG/DL (ref 65–140)
GLUCOSE SERPL-MCNC: 313 MG/DL (ref 65–140)
POTASSIUM SERPL-SCNC: 3.6 MMOL/L (ref 3.5–5.3)
POTASSIUM SERPL-SCNC: 3.9 MMOL/L (ref 3.5–5.3)
SODIUM SERPL-SCNC: 132 MMOL/L (ref 135–147)
SODIUM SERPL-SCNC: 135 MMOL/L (ref 135–147)

## 2025-06-11 PROCEDURE — 94640 AIRWAY INHALATION TREATMENT: CPT

## 2025-06-11 PROCEDURE — 82948 REAGENT STRIP/BLOOD GLUCOSE: CPT

## 2025-06-11 PROCEDURE — 99232 SBSQ HOSP IP/OBS MODERATE 35: CPT | Performed by: PHYSICIAN ASSISTANT

## 2025-06-11 PROCEDURE — 80048 BASIC METABOLIC PNL TOTAL CA: CPT | Performed by: PHYSICIAN ASSISTANT

## 2025-06-11 PROCEDURE — 99232 SBSQ HOSP IP/OBS MODERATE 35: CPT | Performed by: INTERNAL MEDICINE

## 2025-06-11 PROCEDURE — 94760 N-INVAS EAR/PLS OXIMETRY 1: CPT

## 2025-06-11 RX ORDER — ACETAZOLAMIDE 500 MG/5ML
500 INJECTION, POWDER, LYOPHILIZED, FOR SOLUTION INTRAVENOUS ONCE
Status: COMPLETED | OUTPATIENT
Start: 2025-06-11 | End: 2025-06-11

## 2025-06-11 RX ADMIN — POLYETHYLENE GLYCOL 3350 17 G: 17 POWDER, FOR SOLUTION ORAL at 08:47

## 2025-06-11 RX ADMIN — LEVALBUTEROL HYDROCHLORIDE 1.25 MG: 1.25 SOLUTION RESPIRATORY (INHALATION) at 15:25

## 2025-06-11 RX ADMIN — INSULIN LISPRO 1 UNITS: 100 INJECTION, SOLUTION INTRAVENOUS; SUBCUTANEOUS at 17:52

## 2025-06-11 RX ADMIN — DONEPEZIL HYDROCHLORIDE 10 MG: 5 TABLET ORAL at 08:56

## 2025-06-11 RX ADMIN — ACETAZOLAMIDE 500 MG: 500 INJECTION, POWDER, LYOPHILIZED, FOR SOLUTION INTRAVENOUS at 12:04

## 2025-06-11 RX ADMIN — CLONAZEPAM 1.5 MG: 0.5 TABLET ORAL at 02:00

## 2025-06-11 RX ADMIN — ACETAMINOPHEN 650 MG: 325 TABLET, FILM COATED ORAL at 02:00

## 2025-06-11 RX ADMIN — POTASSIUM CHLORIDE 40 MEQ: 1500 TABLET, EXTENDED RELEASE ORAL at 08:56

## 2025-06-11 RX ADMIN — INSULIN GLARGINE 10 UNITS: 100 INJECTION, SOLUTION SUBCUTANEOUS at 22:12

## 2025-06-11 RX ADMIN — Medication 1 MG/HR: at 19:34

## 2025-06-11 RX ADMIN — RIVAROXABAN 15 MG: 15 TABLET, FILM COATED ORAL at 17:53

## 2025-06-11 RX ADMIN — INSULIN LISPRO 8 UNITS: 100 INJECTION, SOLUTION INTRAVENOUS; SUBCUTANEOUS at 07:45

## 2025-06-11 RX ADMIN — INSULIN LISPRO 1 UNITS: 100 INJECTION, SOLUTION INTRAVENOUS; SUBCUTANEOUS at 12:11

## 2025-06-11 RX ADMIN — DULOXETINE HYDROCHLORIDE 60 MG: 30 CAPSULE, DELAYED RELEASE ORAL at 08:56

## 2025-06-11 RX ADMIN — INSULIN LISPRO 8 UNITS: 100 INJECTION, SOLUTION INTRAVENOUS; SUBCUTANEOUS at 17:51

## 2025-06-11 RX ADMIN — SPIRONOLACTONE 25 MG: 25 TABLET, FILM COATED ORAL at 09:15

## 2025-06-11 RX ADMIN — INSULIN LISPRO 8 UNITS: 100 INJECTION, SOLUTION INTRAVENOUS; SUBCUTANEOUS at 12:11

## 2025-06-11 RX ADMIN — SENNOSIDES 8.6 MG: 8.6 TABLET, FILM COATED ORAL at 08:56

## 2025-06-11 RX ADMIN — Medication 400 MG: at 08:56

## 2025-06-11 RX ADMIN — INSULIN LISPRO 2 UNITS: 100 INJECTION, SOLUTION INTRAVENOUS; SUBCUTANEOUS at 08:43

## 2025-06-11 RX ADMIN — BUSPIRONE HYDROCHLORIDE 7.5 MG: 15 TABLET ORAL at 17:53

## 2025-06-11 RX ADMIN — LEVOTHYROXINE SODIUM 137 MCG: 112 TABLET ORAL at 05:00

## 2025-06-11 RX ADMIN — BUSPIRONE HYDROCHLORIDE 7.5 MG: 15 TABLET ORAL at 08:56

## 2025-06-11 RX ADMIN — METOPROLOL SUCCINATE 25 MG: 25 TABLET, EXTENDED RELEASE ORAL at 09:14

## 2025-06-11 RX ADMIN — UMECLIDINIUM 1 PUFF: 62.5 AEROSOL, POWDER ORAL at 08:59

## 2025-06-11 RX ADMIN — ACETAMINOPHEN 650 MG: 325 TABLET, FILM COATED ORAL at 08:47

## 2025-06-11 RX ADMIN — Medication 1 MG/HR: at 05:21

## 2025-06-11 RX ADMIN — LEVALBUTEROL HYDROCHLORIDE 1.25 MG: 1.25 SOLUTION RESPIRATORY (INHALATION) at 09:04

## 2025-06-11 RX ADMIN — ATORVASTATIN CALCIUM 20 MG: 20 TABLET, FILM COATED ORAL at 17:53

## 2025-06-11 RX ADMIN — PANTOPRAZOLE SODIUM 40 MG: 40 TABLET, DELAYED RELEASE ORAL at 06:22

## 2025-06-11 RX ADMIN — POTASSIUM CHLORIDE 40 MEQ: 1500 TABLET, EXTENDED RELEASE ORAL at 17:53

## 2025-06-11 RX ADMIN — INSULIN LISPRO 4 UNITS: 100 INJECTION, SOLUTION INTRAVENOUS; SUBCUTANEOUS at 22:12

## 2025-06-11 RX ADMIN — LIDOCAINE 5% 1 PATCH: 700 PATCH TOPICAL at 09:01

## 2025-06-11 RX ADMIN — Medication 400 MG: at 17:53

## 2025-06-11 RX ADMIN — FLUTICASONE FUROATE AND VILANTEROL TRIFENATATE 1 PUFF: 100; 25 POWDER RESPIRATORY (INHALATION) at 08:59

## 2025-06-11 RX ADMIN — LEVALBUTEROL HYDROCHLORIDE 1.25 MG: 1.25 SOLUTION RESPIRATORY (INHALATION) at 19:36

## 2025-06-11 RX ADMIN — Medication 6 MG: at 21:56

## 2025-06-11 NOTE — ASSESSMENT & PLAN NOTE
Recent Labs     06/09/25  0528 06/10/25  0450   HGB 8.5* 7.9*     Per chart review, baseline Hgb ~11  Hgb currently stable > 8  Normocytic anemia  No active bleeding   Folate  >22, B12 312   Iron panel mixed -inflammatory/SHELIA  C/w iron supplement   Hematology following   Recommend outpatient follow up

## 2025-06-11 NOTE — ASSESSMENT & PLAN NOTE
Wt Readings from Last 3 Encounters:   06/11/25 100 kg (220 lb 8 oz)   05/28/25 105 kg (232 lb)   03/20/25 99.7 kg (219 lb 12.8 oz)     Presented with LOPES + LE Edema  CT Chest: Severe bilateral consolidation and groundglass opacity, greatest in the right lower lobe, compatible with edema and/or pneumonia in the appropriate clinical setting     ECHO (10/2024): EF 40-45%, moderate TV regurg, severe MV regurg   GDMT:   Farxiga 10 mg daily  Losartan 25 mg daily  Metoprolol succinate 25 mg daily  Spironolactone 25 mg daily  Torsemide 20 mg twice daily w/ Kdur - held   Cardiology following   S/P Metolazone 6/5 x 1 & diuril 6/6 x 1  Rec'd IV diuril today 6/10  Currently on IV bumex gtt, remains clinically volume overloaded   Continuous telemetry   Daily wts, I&O's   Adequate UOP

## 2025-06-11 NOTE — ASSESSMENT & PLAN NOTE
History of moderate late onset AD.    Resident at assisted living at [facility].    Alert and oriented to person place, time with disorientation to situation.    Managed with Aricept 10 mg p.o. daily, BuSpar 7.5 twice daily, Cymbalta 60 mg p.o. daily with as needed clonazepam at at bedtime.  Delirium precautions: Optimize nutrition hydration and sleep hygiene.  Increase socialization, prevent falls.  Monitor for constipation or change in urination pattern.

## 2025-06-11 NOTE — ASSESSMENT & PLAN NOTE
Lab Results   Component Value Date    EGFR 37 06/11/2025    EGFR 36 06/10/2025    EGFR 41 06/10/2025    CREATININE 1.62 (H) 06/11/2025    CREATININE 1.66 (H) 06/10/2025    CREATININE 1.50 (H) 06/10/2025   Cr baseline 1-1.3  Presented with NELLIE (creatinine 1.8)   Creatinine stable on Bumex infusion  Retention protocol, avoid nephrotoxins & hypotension   Monitor BMP closely on bumex gtt

## 2025-06-11 NOTE — PLAN OF CARE
Problem: SAFETY ADULT  Goal: Patient will remain free of falls  Description: INTERVENTIONS:  - Educate patient/family on patient safety including physical limitations  - Instruct patient to call for assistance with activity   - Consider consulting OT/PT to assist with strengthening/mobility based on AM PAC & JH-HLM score  - Consult OT/PT to assist with strengthening/mobility   - Keep Call bell within reach  - Keep bed low and locked with side rails adjusted as appropriate  - Keep care items and personal belongings within reach  - Initiate and maintain comfort rounds  - Make Fall Risk Sign visible to staff  - Offer Toileting every 2 Hours, in advance of need  - Initiate/Maintain bed alarm  - Obtain necessary fall risk management equipment: socks  - Apply yellow socks and bracelet for high fall risk patients  - Consider moving patient to room near nurses station  Outcome: Progressing     Problem: SAFETY ADULT  Goal: Maintain or return to baseline ADL function  Description: INTERVENTIONS:  -  Assess patient's ability to carry out ADLs; assess patient's baseline for ADL function and identify physical deficits which impact ability to perform ADLs (bathing, care of mouth/teeth, toileting, grooming, dressing, etc.)  - Assess/evaluate cause of self-care deficits   - Assess range of motion  - Assess patient's mobility; develop plan if impaired  - Assess patient's need for assistive devices and provide as appropriate  - Encourage maximum independence but intervene and supervise when necessary  - Involve family in performance of ADLs  - Assess for home care needs following discharge   - Consider OT consult to assist with ADL evaluation and planning for discharge  - Provide patient education as appropriate  - Monitor functional capacity and physical performance, use of AM PAC & JH-HLM   - Monitor gait, balance and fatigue with ambulation    Outcome: Progressing

## 2025-06-11 NOTE — ASSESSMENT & PLAN NOTE
Recent Labs     06/09/25  0528 06/10/25  0450   WBC 16.42* 16.20*     WBC fluctuating around 20k  WBC chronically around 10-13 since 2024  Not meeting additional SIRS   Heme consulted  Elevated haptoglobin, elevated Ig kappa/ Ig kal free light chains.  Reticulocyte count mildly elevated  LDH elevated  Flow cytometry with possible CLL, SPEP immunofixation negative  Per hematology we will closely monitor, continue to trend CBC  ID curb-sided by prior provider, given clinical improvement monitor; low threshold to reimage if symptoms worsen (initial CT with ?PNA)  S/P 4 days IV rocephin on admission, now off abx  WBC currently improved to 16 K  Clinically improving without s/sx of infection, continue to monitor closely

## 2025-06-11 NOTE — ASSESSMENT & PLAN NOTE
Wt Readings from Last 3 Encounters:   06/11/25 100 kg (220 lb 8 oz)   05/28/25 105 kg (232 lb)   03/20/25 99.7 kg (219 lb 12.8 oz)       Intake/Output Summary (Last 24 hours) at 6/11/2025 1130  Last data filed at 6/11/2025 1058  Gross per 24 hour   Intake 1485.67 ml   Output 2030 ml   Net -544.33 ml       Patient with a history of HFrEF -comes to [facility] emergency department on 5/30/2025 with progressive dyspnea, worsening cough and hypoxia.  Echo 10/21/25: EF 40 to 45%, mild global hypokinesis, RV dilatation, left atrial dilatation, well-functioning TAVR, moderate mitral MAC, moderate to severe MR regurgitation, mild MS, Moderate TR  GDMT: Farxiga 10 mg daily, losartan 25 mg daily, metoprolol succinate 25 mg daily, spironolactone 25 mg daily, torsemide 20 mg twice daily with potassium supplementation  Bumex drip 1 mg/h  Still overloaded but he is looking improved today. Continue bumex gtt  Add diamox IV today x 1

## 2025-06-11 NOTE — ASSESSMENT & PLAN NOTE
Lab Results   Component Value Date    HGBA1C 5.2 05/28/2025     Recent Labs     06/10/25  1118 06/10/25  1642 06/10/25  2048 06/11/25  0731   POCGLU 259* 298* 232* 224*     Hold home glimepiride 1 mg daily + metformin 1000 mg BID   Continue farxiga 10 mg daily   Home Lantus: 8 units HS  Increase to 10 units HS   Initiated on humalog 8 units TID AC while inpatient   ISS + Accuchecks   Carb controlled diet

## 2025-06-11 NOTE — PROGRESS NOTES
"Progress Note - Cardiology   Name: Trevor Lyons 85 y.o. male I MRN: [id number]  Unit/Bed#: -01 I Date of Admission: 5/30/2025   Date of Service: 6/11/2025 I Hospital Day: 12     Assessment & Plan  Acute combined systolic and diastolic congestive heart failure (HCC)  Wt Readings from Last 3 Encounters:   06/11/25 100 kg (220 lb 8 oz)   05/28/25 105 kg (232 lb)   03/20/25 99.7 kg (219 lb 12.8 oz)       Intake/Output Summary (Last 24 hours) at 6/11/2025 1130  Last data filed at 6/11/2025 1058  Gross per 24 hour   Intake 1485.67 ml   Output 2030 ml   Net -544.33 ml       Patient with a history of HFrEF -comes to [facility] emergency department on 5/30/2025 with progressive dyspnea, worsening cough and hypoxia.  Echo 10/21/25: EF 40 to 45%, mild global hypokinesis, RV dilatation, left atrial dilatation, well-functioning TAVR, moderate mitral MAC, moderate to severe MR regurgitation, mild MS, Moderate TR  GDMT: Farxiga 10 mg daily, losartan 25 mg daily, metoprolol succinate 25 mg daily, spironolactone 25 mg daily, torsemide 20 mg twice daily with potassium supplementation  Bumex drip 1 mg/h  Still overloaded but he is looking improved today. Continue bumex gtt  Add diamox IV today x 1    Persistent atrial fibrillation (HCC)  On Xarelto (dose reduced for GFR <50), low-dose metoprolol, mostly paced.  Sick sinus syndrome (HCC)  [employer] permanent pacemaker VVI 65  Hypertension  Blood pressures controlled, losartan held due to NELLIE    Subjective:  Patient seen and examined. No acute events overnight.     Objective:     Vitals: Blood pressure 115/51, pulse 61, temperature (!) 97.2 °F (36.2 °C), resp. rate 18, height 5' 8\" (1.727 m), weight 100 kg (220 lb 8 oz), SpO2 100%., Body mass index is 33.53 kg/m².,   Orthostatic Blood Pressures      Flowsheet Row Most Recent Value   Blood Pressure 115/51 filed at 06/11/2025 0912   Patient Position - Orthostatic VS Sitting filed at 06/11/2025 0734      " "        Intake/Output Summary (Last 24 hours) at 6/11/2025 1130  Last data filed at 6/11/2025 1058  Gross per 24 hour   Intake 1485.67 ml   Output 2030 ml   Net -544.33 ml       Physical Exam:    General: Trevor yLons is an obese and well  appearing elderly male  HEENT: moist mucous membranes, EOMI  Neck:  unable to assess JVD, supple, trachea midline  Cardiovascular: RRR  Pulmonary: normal respiratory effort, rales  Abdomen: soft and nondistended  Extremities: +1 lower extremity edema. Warm and well perfused extremities.  Neuro:deferred  Psych: deferred      Medications:    Current Medications[1]     Labs & Results:        Results from last 7 days   Lab Units 06/10/25  0450 06/09/25  0528 06/08/25  0405   WBC Thousand/uL 16.20* 16.42* 21.11*   HEMOGLOBIN g/dL 7.9* 8.5* 8.0*   HEMATOCRIT % 26.7* 26.8* 26.9*   PLATELETS Thousands/uL 417* 387 434*         Results from last 7 days   Lab Units 06/11/25  0513 06/10/25  1834 06/10/25  1010   POTASSIUM mmol/L 3.6 4.0 4.1   CHLORIDE mmol/L 85* 84* 85*   CO2 mmol/L 40* 40* 38*   BUN mg/dL 58* 56* 54*   CREATININE mg/dL 1.62* 1.66* 1.50*   CALCIUM mg/dL 9.5 9.1 9.1         Results from last 7 days   Lab Units 06/08/25  0405 06/06/25  0547 06/05/25  0603   MAGNESIUM mg/dL 2.4 2.2 2.2       This note was completed in part utilizing Dragon [employer] One voice recognition software. Grammatical errors, random word insertion, spelling mistakes, occasional wrong word or \"sound-alike\" substitutions and incomplete sentences may be an occasional consequence of the system secondary to software limitations, ambient noise and hardware issues. At the time of dictation, efforts were made to edit, clarify and /or correct errors.  Please read the chart carefully and recognize, using context, where substitutions have occurred.  If you have any questions or concerns about the context, text or information contained within the body of this dictation, please contact myself, the provider, for " further clarification.         [1]   Current Facility-Administered Medications:     acetaminophen (TYLENOL) tablet 650 mg, 650 mg, Oral, Q6H PRN, Sergio Wisdom PA-C, 650 mg at 06/11/25 0847    acetaZOLAMIDE (DIAMOX) injection 500 mg, 500 mg, Intravenous, Once, Tank Luther MD    atorvastatin (LIPITOR) tablet 20 mg, 20 mg, Oral, Daily With Dinner, Anya Dash MD, 20 mg at 06/10/25 1726    bumetanide (BUMEX) 12.5 mg infusion 50 mL, 1 mg/hr, Intravenous, Continuous, Sergio Wisdom PA-C, Last Rate: 4 mL/hr at 06/11/25 0913, 1 mg/hr at 06/11/25 0913    busPIRone (BUSPAR) tablet 7.5 mg, 7.5 mg, Oral, BID, Anya Dash MD, 7.5 mg at 06/11/25 0856    clonazePAM (KlonoPIN) tablet 1.5 mg, 1.5 mg, Oral, HS PRN, Sergio Wisdom PA-C, 1.5 mg at 06/11/25 0200    donepezil (ARICEPT) tablet 10 mg, 10 mg, Oral, QAM, Anya Dash MD, 10 mg at 06/11/25 0856    DULoxetine (CYMBALTA) delayed release capsule 60 mg, 60 mg, Oral, Daily, Anya Dash MD, 60 mg at 06/11/25 0856    ferrous sulfate tablet 325 mg, 325 mg, Oral, Every Other Day, Debbie Riggins PA-C, 325 mg at 06/10/25 0845    Fluticasone Furoate-Vilanterol 100-25 mcg/actuation 1 puff, 1 puff, Inhalation, Daily, 1 puff at 06/11/25 0859 **AND** umeclidinium 62.5 mcg/actuation inhaler AEPB 1 puff, 1 puff, Inhalation, Daily, Aurea Germain PA-C, 1 puff at 06/11/25 0859    hydrOXYzine HCL (ATARAX) tablet 50 mg, 50 mg, Oral, Q6H PRN, Sergio Wisdom PA-C, 50 mg at 06/08/25 0222    insulin glargine (LANTUS) subcutaneous injection 10 Units 0.1 mL, 10 Units, Subcutaneous, HS, Nupur Blanca PA-C, 10 Units at 06/10/25 2237    insulin lispro (HumALOG/ADMELOG) 100 units/mL subcutaneous injection 1-5 Units, 1-5 Units, Subcutaneous, TID AC, 2 Units at 06/11/25 0843 **AND** Fingerstick Glucose (POCT), , , TID AC, Anya Dash MD    insulin lispro (HumALOG/ADMELOG) 100 units/mL subcutaneous injection 1-6 Units, 1-6 Units, Subcutaneous, HS, Anya Dash MD, 3 Units at  06/10/25 2238    insulin lispro (HumALOG/ADMELOG) 100 units/mL subcutaneous injection 8 Units, 8 Units, Subcutaneous, TID With Meals, Nupur Blanca PA-C, 8 Units at 06/11/25 0745    levalbuterol (XOPENEX) inhalation solution 1.25 mg, 1.25 mg, Nebulization, TID, Anya Dash MD, 1.25 mg at 06/11/25 0904    levothyroxine tablet 137 mcg, 137 mcg, Oral, Early Morning, Anya Dash MD, 137 mcg at 06/11/25 0500    lidocaine (LIDODERM) 5 % patch 1 patch, 1 patch, Topical, Daily, Nupur Blanca PA-C, 1 patch at 06/11/25 0901    [Held by provider] losartan (COZAAR) tablet 25 mg, 25 mg, Oral, Daily, SAMIRA Sadler, 25 mg at 06/05/25 0902    magnesium Oxide (MAG-OX) tablet 400 mg, 400 mg, Oral, BID, Anya Dash MD, 400 mg at 06/11/25 0856    melatonin tablet 6 mg, 6 mg, Oral, HS, Usha Quinn PA-C, 6 mg at 06/10/25 2237    metoprolol succinate (TOPROL-XL) 24 hr tablet 25 mg, 25 mg, Oral, QAM, Anya Dash MD, 25 mg at 06/11/25 0914    pantoprazole (PROTONIX) EC tablet 40 mg, 40 mg, Oral, Daily Before Breakfast, Anya Dash MD, 40 mg at 06/11/25 0622    polyethylene glycol (MIRALAX) packet 17 g, 17 g, Oral, Daily, Anya Dash MD, 17 g at 06/11/25 0847    potassium chloride (Klor-Con M20) CR tablet 40 mEq, 40 mEq, Oral, BID, Tank Luther MD, 40 mEq at 06/11/25 0856    rivaroxaban (XARELTO) tablet 15 mg, 15 mg, Oral, Daily With Dinner, Anya Dash MD, 15 mg at 06/10/25 1726    senna (SENOKOT) tablet 8.6 mg, 1 tablet, Oral, Daily, Anya Dash MD, 8.6 mg at 06/11/25 0856    spironolactone (ALDACTONE) tablet 25 mg, 25 mg, Oral, Daily, SAMIRA Sadler, 25 mg at 06/11/25 0915

## 2025-06-11 NOTE — PROGRESS NOTES
"Progress Note - Hospitalist   Name: Trevor Lyons 85 y.o. male I MRN: [id number]  Unit/Bed#: -01 I Date of Admission: 5/30/2025   Date of Service: 6/11/2025 I Hospital Day: 12    Assessment & Plan  Acute combined systolic and diastolic congestive heart failure (HCC)  Wt Readings from Last 3 Encounters:   06/11/25 100 kg (220 lb 8 oz)   05/28/25 105 kg (232 lb)   03/20/25 99.7 kg (219 lb 12.8 oz)     Presented with LOPES + LE Edema  CT Chest: Severe bilateral consolidation and groundglass opacity, greatest in the right lower lobe, compatible with edema and/or pneumonia in the appropriate clinical setting     ECHO (10/2024): EF 40-45%, moderate TV regurg, severe MV regurg   GDMT:   Farxiga 10 mg daily  Losartan 25 mg daily  Metoprolol succinate 25 mg daily  Spironolactone 25 mg daily  Torsemide 20 mg twice daily w/ Kdur - held   Cardiology following   S/P Metolazone 6/5 x 1 & diuril 6/6 x 1  Rec'd IV diuril today 6/10  Currently on IV bumex gtt, remains clinically volume overloaded   Continuous telemetry   Daily wts, I&O's   Adequate UOP  Chronic respiratory failure (HCC)  Hx of CHF and COPD  Chronically on 2 L via NC PRN - patient reports wearing this \"most of the time\"  Stable on this currently   Leukocytosis  Recent Labs     06/09/25  0528 06/10/25  0450   WBC 16.42* 16.20*     WBC fluctuating around 20k  WBC chronically around 10-13 since 2024  Not meeting additional SIRS   Heme consulted  Elevated haptoglobin, elevated Ig kappa/ Ig kal free light chains.  Reticulocyte count mildly elevated  LDH elevated  Flow cytometry with possible CLL, SPEP immunofixation negative  Per hematology we will closely monitor, continue to trend CBC  ID curb-sided by prior provider, given clinical improvement monitor; low threshold to reimage if symptoms worsen (initial CT with ?PNA)  S/P 4 days IV rocephin on admission, now off abx  WBC currently improved to 16 K  Clinically improving without s/sx of infection, " continue to monitor closely   Anemia  Recent Labs     06/09/25  0528 06/10/25  0450   HGB 8.5* 7.9*     Per chart review, baseline Hgb ~11  Hgb currently stable > 8  Normocytic anemia  No active bleeding   Folate  >22, B12 312   Iron panel mixed -inflammatory/SHELIA  C/w iron supplement   Hematology following   Recommend outpatient follow up   Persistent atrial fibrillation (HCC)  Rate control: Toprol XL 25 mg daily   OAC: Xarelto   Sick sinus syndrome (HCC)  S/p pacemaker placement  Hypertension  Home regimen: Toprol-XL 25 mg  q day, Losartan 25 mg daily, & spironolactone 25 mg daily  BP acceptable   Type 2 diabetes mellitus without complication, with long-term current use of insulin (HCC)  Lab Results   Component Value Date    HGBA1C 5.2 05/28/2025     Recent Labs     06/10/25  1118 06/10/25  1642 06/10/25  2048 06/11/25  0731   POCGLU 259* 298* 232* 224*     Hold home glimepiride 1 mg daily + metformin 1000 mg BID   Continue farxiga 10 mg daily   Home Lantus: 8 units HS  Increase to 10 units HS   Initiated on humalog 8 units TID AC while inpatient   ISS + Accuchecks   Carb controlled diet   Acquired hypothyroidism  TSH 6.2 in 05/2025, T4 therapeutic  Recommend outpt monitoring in 4-6 wks   Continue levothyroxine  Chronic left-sided low back pain with left-sided sciatica  Chronic LBP managed with menthol topical patch, Cymbalta, and Tylenol.    PT/OT - no rehab needs   Moderate Alzheimer's dementia, unspecified timing of dementia onset, unspecified whether behavioral, psychotic, or mood disturbance or anxiety (HCC)  History of moderate late onset AD.    Resident at assisted living at [facility] ([place], [place]).    Alert and oriented to person place, time with disorientation to situation.    Managed with Aricept 10 mg p.o. daily, BuSpar 7.5 twice daily, Cymbalta 60 mg p.o. daily with as needed clonazepam at at bedtime.  Delirium precautions: Optimize nutrition hydration and sleep hygiene.  Increase socialization, prevent  falls.  Monitor for constipation or change in urination pattern.  Stage 3 chronic kidney disease (HCC)  Lab Results   Component Value Date    EGFR 37 2025    EGFR 36 06/10/2025    EGFR 41 06/10/2025    CREATININE 1.62 (H) 2025    CREATININE 1.66 (H) 06/10/2025    CREATININE 1.50 (H) 06/10/2025   Cr baseline 1-1.3  Presented with NELLIE (creatinine 1.8)   Creatinine stable on Bumex infusion  Retention protocol, avoid nephrotoxins & hypotension   Monitor BMP closely on bumex gtt     Mobility:   Basic Mobility Inpatient Raw Score: 17  JH-HLM Goal: 5: Stand one or more mins  JH-HLM Achieved: 6: Walk 10 steps or more  JH-HLM Goal achieved. Continue to encourage appropriate mobility.    VTE Pharmacologic Prophylaxis: VTE Score: 5 High Risk (Score >/= 5) - Pharmacological DVT Prophylaxis Ordered: rivaroxaban (Xarelto). Sequential Compression Devices Ordered.    Education and Discussions with Family / Patient: Attempted to update  (daughter) via phone. Left voicemail.     Current Length of Stay: 12 day(s)  Current Patient Status: Inpatient   Certification Statement: The patient will continue to require additional inpatient hospital stay due to volume overload on IV Bumex infusion require close monitoring of volume status  Discharge Plan: Anticipate discharge in 48-72 hrs to prior assisted or independent living facility.    Code Status: Level 3 - DNAR and DNI    Subjective:   Continues to urinate while on Bumex drip, no overnight events reported.    Objective:     Vitals:   Temp (24hrs), Av.7 °F (36.5 °C), Min:97.2 °F (36.2 °C), Max:98 °F (36.7 °C)    Temp:  [97.2 °F (36.2 °C)-98 °F (36.7 °C)] 97.2 °F (36.2 °C)  HR:  [55-61] 61  Resp:  [12-20] 18  BP: ()/(43-51) 115/51  SpO2:  [97 %-100 %] 100 %  Body mass index is 33.53 kg/m².     Input and Output Summary (last 24 hours):     Intake/Output Summary (Last 24 hours) at 2025 1059  Last data filed at 2025 1058  Gross per 24 hour    Intake 1485.67 ml   Output 2230 ml   Net -744.33 ml       Physical Exam:   Physical Exam  Vitals and nursing note reviewed.   Constitutional:       General: He is not in acute distress.     Appearance: Normal appearance. He is not ill-appearing.   HENT:      Head: Normocephalic and atraumatic.     Eyes:      General: No scleral icterus.        Right eye: No discharge.         Left eye: No discharge.       Cardiovascular:      Rate and Rhythm: Normal rate and regular rhythm.      Pulses: Normal pulses.      Heart sounds:      No friction rub. No gallop.   Pulmonary:      Effort: Pulmonary effort is normal. No respiratory distress.      Breath sounds: Normal breath sounds. No wheezing, rhonchi or rales.   Abdominal:      General: Bowel sounds are normal. There is no distension.      Palpations: Abdomen is soft.      Tenderness: There is no abdominal tenderness. There is no guarding or rebound.     Musculoskeletal:         General: No swelling or deformity.      Cervical back: Neck supple.      Right lower leg: Edema present.      Left lower leg: Edema present.     Skin:     General: Skin is warm and dry.      Capillary Refill: Capillary refill takes less than 2 seconds.      Coloration: Skin is not jaundiced or pale.      Findings: No erythema or rash.     Neurological:      General: No focal deficit present.      Mental Status: He is alert and oriented to person, place, and time. Mental status is at baseline.     Psychiatric:         Mood and Affect: Mood normal.         Behavior: Behavior normal.          Additional Data:     Labs:  Results from last 7 days   Lab Units 06/10/25  0450 06/09/25  0528   WBC Thousand/uL 16.20* 16.42*   HEMOGLOBIN g/dL 7.9* 8.5*   HEMATOCRIT % 26.7* 26.8*   PLATELETS Thousands/uL 417* 387   SEGS PCT %  --  34*   LYMPHO PCT % 25 58*   MONO PCT % 8 6   EOS PCT % 1 1     Results from last 7 days   Lab Units 06/11/25  0513   SODIUM mmol/L 135   POTASSIUM mmol/L 3.6   CHLORIDE mmol/L 85*    CO2 mmol/L 40*   BUN mg/dL 58*   CREATININE mg/dL 1.62*   ANION GAP mmol/L 10   CALCIUM mg/dL 9.5   GLUCOSE RANDOM mg/dL 196*         Results from last 7 days   Lab Units 06/11/25  0731 06/10/25  2048 06/10/25  1642 06/10/25  1118 06/10/25  0746 06/09/25  2232 06/09/25  1608 06/09/25  1117 06/09/25  0707 06/08/25  2242 06/08/25  1607 06/08/25  1141   POC GLUCOSE mg/dl 224* 232* 298* 259* 234* 250* 208* 274* 236* 289* 244* 245*         Results from last 7 days   Lab Units 06/09/25  0528   PROCALCITONIN ng/ml 0.13       Imaging: Radiology Review: No additional pertinent studies reviewed.    Recent Cultures (last 7 days):         Telemetry Orders (From admission, onward)               24 Hour Telemetry Monitoring  Continuous x 24 Hours (Telem)        Expiring   Question:  Reason for 24 Hour Telemetry  Answer:  Decompensated CHF- and any one of the following: continuous diuretic infusion or total diuretic dose >200 mg daily, associated electrolyte derangement (I.e. K < 3.0), inotropic drip (continuous infusion), hx of ventricular arrhythmia, or new EF < 35%                        Last 24 Hours Medication List:   Current Facility-Administered Medications   Medication Dose Route Frequency Provider Last Rate    acetaminophen  650 mg Oral Q6H PRN Sergio Wisdom PA-C      acetaZOLAMIDE  500 mg Intravenous Once Tank Luther MD      atorvastatin  20 mg Oral Daily With Dinner Anya Dash MD      bumetanide (BUMEX) 12.5 mg infusion 50 mL  1 mg/hr Intravenous Continuous Sergio Wisdom PA-C 1 mg/hr (06/11/25 0913)    busPIRone  7.5 mg Oral BID Anya Dash MD      clonazePAM  1.5 mg Oral HS PRN Sergio Wisdom PA-C      donepezil  10 mg Oral QAM Anya Dash MD      DULoxetine  60 mg Oral Daily Anya Dash MD      ferrous sulfate  325 mg Oral Every Other Day Debbie Riggins PA-C      Fluticasone Furoate-Vilanterol  1 puff Inhalation Daily Aurea Germain PA-C      And    umeclidinium  1 puff Inhalation Daily Aurea  NADIA Germain      hydrOXYzine HCL  50 mg Oral Q6H PRN Sergio Wisdom PA-C      insulin glargine  10 Units Subcutaneous HS Nupur Blanca PA-C      insulin lispro  1-5 Units Subcutaneous TID AC Anya Dash MD      insulin lispro  1-6 Units Subcutaneous HS Anya Dash MD      insulin lispro  8 Units Subcutaneous TID With Meals Nupur Blanca PA-C      levalbuterol  1.25 mg Nebulization TID Anya Dash MD      levothyroxine  137 mcg Oral Early Morning Anya Dash MD      lidocaine  1 patch Topical Daily Nupur Blanca PA-C      [Held by provider] losartan  25 mg Oral Daily SAMIRA Sadelr      magnesium Oxide  400 mg Oral BID Anya Dash MD      melatonin  6 mg Oral HS Usha Quinn PA-C      metoprolol succinate  25 mg Oral QAM Anya Dash MD      pantoprazole  40 mg Oral Daily Before Breakfast Anya Dash MD      polyethylene glycol  17 g Oral Daily Anya Dash MD      potassium chloride  40 mEq Oral BID Tank Luther MD      rivaroxaban  15 mg Oral Daily With Dinner Anya Dash MD      senna  1 tablet Oral Daily Anya Dash MD      spironolactone  25 mg Oral Daily SAMIRA Sadler          Today, Patient Was Seen By: Sergio Wisdom PA-C    **Please Note: This note may have been constructed using a voice recognition system.**

## 2025-06-11 NOTE — PLAN OF CARE
Problem: Potential for Falls  Goal: Patient will remain free of falls  Description: INTERVENTIONS:  - Educate patient/family on patient safety including physical limitations  - Instruct patient to call for assistance with activity   - Consider consulting OT/PT to assist with strengthening/mobility based on AM PAC & JH-HLM score  - Consult OT/PT to assist with strengthening/mobility   - Keep Call bell within reach  - Keep bed low and locked with side rails adjusted as appropriate  - Keep care items and personal belongings within reach  - Initiate and maintain comfort rounds  - Make Fall Risk Sign visible to staff  - Offer Toileting every 2 Hours, in advance of need  - Initiate/Maintain bed alarm  - Obtain necessary fall risk management equipment: socks  - Apply yellow socks and bracelet for high fall risk patients  - Consider moving patient to room near nurses station  Outcome: Progressing     Problem: PAIN - ADULT  Goal: Verbalizes/displays adequate comfort level or baseline comfort level  Description: Interventions:  - Encourage patient to monitor pain and request assistance  - Assess pain using appropriate pain scale  - Administer analgesics as ordered based on type and severity of pain and evaluate response  - Implement non-pharmacological measures as appropriate and evaluate response  - Consider cultural and social influences on pain and pain management  - Notify physician/advanced practitioner if interventions unsuccessful or patient reports new pain  - Educate patient/family on pain management process including their role and importance of  reporting pain   - Provide non-pharmacologic/complimentary pain relief interventions  Outcome: Progressing     Problem: INFECTION - ADULT  Goal: Absence or prevention of progression during hospitalization  Description: INTERVENTIONS:  - Assess and monitor for signs and symptoms of infection  - Monitor lab/diagnostic results  - Monitor all insertion sites, i.e. indwelling  lines, tubes, and drains  - Monitor endotracheal if appropriate and nasal secretions for changes in amount and color  - [place] appropriate cooling/warming therapies per order  - Administer medications as ordered  - Instruct and encourage patient and family to use good hand hygiene technique  - Identify and instruct in appropriate isolation precautions for identified infection/condition  Outcome: Progressing  Goal: Absence of fever/infection during neutropenic period  Description: INTERVENTIONS:  - Monitor WBC  - Perform strict hand hygiene  - Limit to healthy visitors only  - No plants, dried, fresh or silk flowers with damon in patient room  Outcome: Progressing     Problem: SAFETY ADULT  Goal: Patient will remain free of falls  Description: INTERVENTIONS:  - Educate patient/family on patient safety including physical limitations  - Instruct patient to call for assistance with activity   - Consider consulting OT/PT to assist with strengthening/mobility based on AM PAC & JH-HLM score  - Consult OT/PT to assist with strengthening/mobility   - Keep Call bell within reach  - Keep bed low and locked with side rails adjusted as appropriate  - Keep care items and personal belongings within reach  - Initiate and maintain comfort rounds  - Make Fall Risk Sign visible to staff  - Offer Toileting every 2 Hours, in advance of need  - Initiate/Maintain bed alarm  - Obtain necessary fall risk management equipment: socks  - Apply yellow socks and bracelet for high fall risk patients  - Consider moving patient to room near nurses station  Outcome: Progressing  Goal: Maintain or return to baseline ADL function  Description: INTERVENTIONS:  -  Assess patient's ability to carry out ADLs; assess patient's baseline for ADL function and identify physical deficits which impact ability to perform ADLs (bathing, care of mouth/teeth, toileting, grooming, dressing, etc.)  - Assess/evaluate cause of self-care deficits   - Assess range of  motion  - Assess patient's mobility; develop plan if impaired  - Assess patient's need for assistive devices and provide as appropriate  - Encourage maximum independence but intervene and supervise when necessary  - Involve family in performance of ADLs  - Assess for home care needs following discharge   - Consider OT consult to assist with ADL evaluation and planning for discharge  - Provide patient education as appropriate  - Monitor functional capacity and physical performance, use of AM PAC & JH-HLM   - Monitor gait, balance and fatigue with ambulation    Outcome: Progressing  Goal: Maintains/Returns to pre admission functional level  Description: INTERVENTIONS:  - Perform AM-PAC 6 Click Basic Mobility/ Daily Activity assessment daily.  - Set and communicate daily mobility goal to care team and patient/family/caregiver.   - Collaborate with rehabilitation services on mobility goals if consulted  - Perform Range of Motion 2 times a day.  - Reposition patient every 2 hours.  - Dangle patient 2 times a day  - Stand patient 2 times a day  - Ambulate patient 2 times a day  - Out of bed to chair 2 times a day   - Out of bed for meals 2 times a day  - Out of bed for toileting  - Record patient progress and toleration of activity level   Outcome: Progressing     Problem: DISCHARGE PLANNING  Goal: Discharge to home or other facility with appropriate resources  Description: INTERVENTIONS:  - Identify barriers to discharge w/patient and caregiver  - Arrange for needed discharge resources and transportation as appropriate  - Identify discharge learning needs (meds, wound care, etc.)  - Arrange for interpretive services to assist at discharge as needed  - Refer to Case Management Department for coordinating discharge planning if the patient needs post-hospital services based on physician/advanced practitioner order or complex needs related to functional status, cognitive ability, or social support system  Outcome:  Progressing     Problem: Knowledge Deficit  Goal: Patient/family/caregiver demonstrates understanding of disease process, treatment plan, medications, and discharge instructions  Description: Complete learning assessment and assess knowledge base.  Interventions:  - Provide teaching at level of understanding  - Provide teaching via preferred learning methods  Outcome: Progressing     Problem: Prexisting or High Potential for Compromised Skin Integrity  Goal: Skin integrity is maintained or improved  Description: INTERVENTIONS:  - Identify patients at risk for skin breakdown  - Assess and monitor skin integrity including under and around medical devices   - Assess and monitor nutrition and hydration status  - Monitor labs  - Assess for incontinence   - Turn and reposition patient  - Assist with mobility/ambulation  - Relieve pressure over payam prominences   - Avoid friction and shearing  - Provide appropriate hygiene as needed including keeping skin clean and dry  - Evaluate need for skin moisturizer/barrier cream  - Collaborate with interdisciplinary team  - Patient/family teaching  - Consider wound care consult    Assess:  - Review Pascual scale daily  - Clean and moisturize skin every   - Inspect skin when repositioning, toileting, and assisting with ADLS  - Assess under medical devices such as  every   - Assess extremities for adequate circulation and sensation     Bed Management:  - Have minimal linens on bed & keep smooth, unwrinkled  - Change linens as needed when moist or perspiring  - Avoid sitting or lying in one position for more than  hours while in bed?Keep HOB at degrees   - Toileting:  - Offer bedside commode  - Assess for incontinence every   - Use incontinent care products after each incontinent episode such as     Activity:  - Mobilize patient  times a day  - Encourage activity and walks on unit  - Encourage or provide ROM exercises   - Turn and reposition patient every  Hours  - Use appropriate  equipment to lift or move patient in bed  - Instruct/ Assist with weight shifting every  when out of bed in chair  - Consider limitation of chair time  hour intervals    Skin Care:  - Avoid use of baby powder, tape, friction and shearing, hot water or constrictive clothing  - Relieve pressure over bony prominences using   - Do not massage red bony areas    Next Steps:  - Teach patient strategies to minimize risks   - Consider consults to  interdisciplinary teams   Outcome: Progressing

## 2025-06-12 ENCOUNTER — HOME CARE VISIT (OUTPATIENT)
Dept: HOME HEALTH SERVICES | Facility: HOME HEALTHCARE | Age: 85
End: 2025-06-12

## 2025-06-12 LAB
ANION GAP SERPL CALCULATED.3IONS-SCNC: 9 MMOL/L (ref 4–13)
ANISOCYTOSIS BLD QL SMEAR: PRESENT
BASO STIPL BLD QL SMEAR: PRESENT
BASOPHILS # BLD MANUAL: 0 THOUSAND/UL (ref 0–0.1)
BASOPHILS NFR MAR MANUAL: 0 % (ref 0–1)
BUN SERPL-MCNC: 56 MG/DL (ref 5–25)
CALCIUM SERPL-MCNC: 9.1 MG/DL (ref 8.4–10.2)
CHLORIDE SERPL-SCNC: 87 MMOL/L (ref 96–108)
CO2 SERPL-SCNC: 38 MMOL/L (ref 21–32)
CREAT SERPL-MCNC: 1.61 MG/DL (ref 0.6–1.3)
EOSINOPHIL # BLD MANUAL: 0.55 THOUSAND/UL (ref 0–0.4)
EOSINOPHIL NFR BLD MANUAL: 4 % (ref 0–6)
ERYTHROCYTE [DISTWIDTH] IN BLOOD BY AUTOMATED COUNT: 14.4 % (ref 11.6–15.1)
GFR SERPL CREATININE-BSD FRML MDRD: 37 ML/MIN/1.73SQ M
GLUCOSE SERPL-MCNC: 212 MG/DL (ref 65–140)
GLUCOSE SERPL-MCNC: 219 MG/DL (ref 65–140)
GLUCOSE SERPL-MCNC: 238 MG/DL (ref 65–140)
GLUCOSE SERPL-MCNC: 260 MG/DL (ref 65–140)
GLUCOSE SERPL-MCNC: 305 MG/DL (ref 65–140)
HCT VFR BLD AUTO: 24.2 % (ref 36.5–49.3)
HGB BLD-MCNC: 7.4 G/DL (ref 12–17)
LYMPHOCYTES # BLD AUTO: 29 % (ref 14–44)
LYMPHOCYTES # BLD AUTO: 4.01 THOUSAND/UL (ref 0.6–4.47)
MCH RBC QN AUTO: 28.9 PG (ref 26.8–34.3)
MCHC RBC AUTO-ENTMCNC: 30.6 G/DL (ref 31.4–37.4)
MCV RBC AUTO: 95 FL (ref 82–98)
MONOCYTES # BLD AUTO: 1.8 THOUSAND/UL (ref 0–1.22)
MONOCYTES NFR BLD: 13 % (ref 4–12)
NEUTROPHILS # BLD MANUAL: 7.46 THOUSAND/UL (ref 1.85–7.62)
NEUTS SEG NFR BLD AUTO: 54 % (ref 43–75)
OVALOCYTES BLD QL SMEAR: PRESENT
PLATELET # BLD AUTO: 412 THOUSANDS/UL (ref 149–390)
PLATELET BLD QL SMEAR: ABNORMAL
PMV BLD AUTO: 8.9 FL (ref 8.9–12.7)
POIKILOCYTOSIS BLD QL SMEAR: PRESENT
POLYCHROMASIA BLD QL SMEAR: PRESENT
POTASSIUM SERPL-SCNC: 3.7 MMOL/L (ref 3.5–5.3)
RBC # BLD AUTO: 2.56 MILLION/UL (ref 3.88–5.62)
RBC MORPH BLD: PRESENT
SMUDGE CELLS BLD QL SMEAR: PRESENT
SODIUM SERPL-SCNC: 134 MMOL/L (ref 135–147)
WBC # BLD AUTO: 13.82 THOUSAND/UL (ref 4.31–10.16)

## 2025-06-12 PROCEDURE — 99232 SBSQ HOSP IP/OBS MODERATE 35: CPT | Performed by: INTERNAL MEDICINE

## 2025-06-12 PROCEDURE — 85007 BL SMEAR W/DIFF WBC COUNT: CPT | Performed by: PHYSICIAN ASSISTANT

## 2025-06-12 PROCEDURE — 82948 REAGENT STRIP/BLOOD GLUCOSE: CPT

## 2025-06-12 PROCEDURE — 94640 AIRWAY INHALATION TREATMENT: CPT

## 2025-06-12 PROCEDURE — 85027 COMPLETE CBC AUTOMATED: CPT | Performed by: PHYSICIAN ASSISTANT

## 2025-06-12 PROCEDURE — 94760 N-INVAS EAR/PLS OXIMETRY 1: CPT

## 2025-06-12 PROCEDURE — 80048 BASIC METABOLIC PNL TOTAL CA: CPT | Performed by: PHYSICIAN ASSISTANT

## 2025-06-12 RX ORDER — TORSEMIDE 20 MG/1
40 TABLET ORAL DAILY
Status: DISCONTINUED | OUTPATIENT
Start: 2025-06-12 | End: 2025-06-13 | Stop reason: HOSPADM

## 2025-06-12 RX ADMIN — Medication 400 MG: at 17:27

## 2025-06-12 RX ADMIN — Medication 400 MG: at 09:23

## 2025-06-12 RX ADMIN — PANTOPRAZOLE SODIUM 40 MG: 40 TABLET, DELAYED RELEASE ORAL at 04:42

## 2025-06-12 RX ADMIN — INSULIN GLARGINE 10 UNITS: 100 INJECTION, SOLUTION SUBCUTANEOUS at 21:53

## 2025-06-12 RX ADMIN — INSULIN LISPRO 3 UNITS: 100 INJECTION, SOLUTION INTRAVENOUS; SUBCUTANEOUS at 17:27

## 2025-06-12 RX ADMIN — ATORVASTATIN CALCIUM 20 MG: 20 TABLET, FILM COATED ORAL at 17:27

## 2025-06-12 RX ADMIN — METOPROLOL SUCCINATE 25 MG: 25 TABLET, EXTENDED RELEASE ORAL at 09:24

## 2025-06-12 RX ADMIN — INSULIN LISPRO 2 UNITS: 100 INJECTION, SOLUTION INTRAVENOUS; SUBCUTANEOUS at 12:30

## 2025-06-12 RX ADMIN — INSULIN LISPRO 8 UNITS: 100 INJECTION, SOLUTION INTRAVENOUS; SUBCUTANEOUS at 17:27

## 2025-06-12 RX ADMIN — UMECLIDINIUM 1 PUFF: 62.5 AEROSOL, POWDER ORAL at 09:26

## 2025-06-12 RX ADMIN — Medication 1 MG/HR: at 07:36

## 2025-06-12 RX ADMIN — FERROUS SULFATE TAB 325 MG (65 MG ELEMENTAL FE) 325 MG: 325 (65 FE) TAB at 09:23

## 2025-06-12 RX ADMIN — POLYETHYLENE GLYCOL 3350 17 G: 17 POWDER, FOR SOLUTION ORAL at 09:25

## 2025-06-12 RX ADMIN — INSULIN LISPRO 8 UNITS: 100 INJECTION, SOLUTION INTRAVENOUS; SUBCUTANEOUS at 09:26

## 2025-06-12 RX ADMIN — LEVOTHYROXINE SODIUM 137 MCG: 112 TABLET ORAL at 04:42

## 2025-06-12 RX ADMIN — POTASSIUM CHLORIDE 40 MEQ: 1500 TABLET, EXTENDED RELEASE ORAL at 09:24

## 2025-06-12 RX ADMIN — CHLOROTHIAZIDE SODIUM 500 MG: 500 INJECTION, POWDER, LYOPHILIZED, FOR SOLUTION INTRAVENOUS at 10:35

## 2025-06-12 RX ADMIN — TORSEMIDE 40 MG: 20 TABLET ORAL at 15:24

## 2025-06-12 RX ADMIN — DULOXETINE HYDROCHLORIDE 60 MG: 30 CAPSULE, DELAYED RELEASE ORAL at 09:23

## 2025-06-12 RX ADMIN — ACETAMINOPHEN 650 MG: 325 TABLET, FILM COATED ORAL at 20:05

## 2025-06-12 RX ADMIN — INSULIN LISPRO 8 UNITS: 100 INJECTION, SOLUTION INTRAVENOUS; SUBCUTANEOUS at 12:30

## 2025-06-12 RX ADMIN — ACETAMINOPHEN 650 MG: 325 TABLET, FILM COATED ORAL at 04:42

## 2025-06-12 RX ADMIN — POTASSIUM CHLORIDE 40 MEQ: 1500 TABLET, EXTENDED RELEASE ORAL at 17:26

## 2025-06-12 RX ADMIN — BUSPIRONE HYDROCHLORIDE 7.5 MG: 15 TABLET ORAL at 17:27

## 2025-06-12 RX ADMIN — INSULIN LISPRO 2 UNITS: 100 INJECTION, SOLUTION INTRAVENOUS; SUBCUTANEOUS at 09:25

## 2025-06-12 RX ADMIN — LEVALBUTEROL HYDROCHLORIDE 1.25 MG: 1.25 SOLUTION RESPIRATORY (INHALATION) at 19:27

## 2025-06-12 RX ADMIN — Medication 6 MG: at 21:53

## 2025-06-12 RX ADMIN — LIDOCAINE 5% 1 PATCH: 700 PATCH TOPICAL at 09:25

## 2025-06-12 RX ADMIN — DONEPEZIL HYDROCHLORIDE 10 MG: 5 TABLET ORAL at 09:25

## 2025-06-12 RX ADMIN — SENNOSIDES 8.6 MG: 8.6 TABLET, FILM COATED ORAL at 09:24

## 2025-06-12 RX ADMIN — FLUTICASONE FUROATE AND VILANTEROL TRIFENATATE 1 PUFF: 100; 25 POWDER RESPIRATORY (INHALATION) at 09:26

## 2025-06-12 RX ADMIN — ACETAMINOPHEN 650 MG: 325 TABLET, FILM COATED ORAL at 13:44

## 2025-06-12 RX ADMIN — BUSPIRONE HYDROCHLORIDE 7.5 MG: 15 TABLET ORAL at 09:23

## 2025-06-12 RX ADMIN — SPIRONOLACTONE 25 MG: 25 TABLET, FILM COATED ORAL at 09:24

## 2025-06-12 RX ADMIN — INSULIN LISPRO 3 UNITS: 100 INJECTION, SOLUTION INTRAVENOUS; SUBCUTANEOUS at 21:52

## 2025-06-12 RX ADMIN — LEVALBUTEROL HYDROCHLORIDE 1.25 MG: 1.25 SOLUTION RESPIRATORY (INHALATION) at 08:27

## 2025-06-12 RX ADMIN — RIVAROXABAN 15 MG: 15 TABLET, FILM COATED ORAL at 17:27

## 2025-06-12 RX ADMIN — LEVALBUTEROL HYDROCHLORIDE 1.25 MG: 1.25 SOLUTION RESPIRATORY (INHALATION) at 14:26

## 2025-06-12 NOTE — CASE MANAGEMENT
Case Management Progress Note    Patient name Trevor Lyons  Location /-01 MRN [id number]  : 1940 Date 2025       LOS (days): 13  Geometric Mean LOS (GMLOS) (days): 4.9  Days to GMLOS:-8        OBJECTIVE:        Current admission status: Inpatient  Preferred Pharmacy:   [id number] - [place] [address]  Phone: [phone] Fax: [phone]    [facility], [facility]. - [place] [id number]  Phone: [phone] Fax: [phone]    Primary Care Provider: Ira Borden DO    Primary Insurance: MEDICARE MISC REPLACEMENT  Secondary Insurance:     PROGRESS NOTE: Referrals placed for hospice for DC back to [address]. Dtr is not able to speak until 2pm when she takes a break at work. CM following and will provide agency choices to dtr .

## 2025-06-12 NOTE — ASSESSMENT & PLAN NOTE
History of moderate late onset AD.    Resident at assisted living at [facility].    Alert and oriented to person place, time with disorientation to situation.    Managed with Aricept 10 mg p.o. daily, BuSpar 7.5 twice daily, Cymbalta 60 mg p.o. daily with as needed clonazepam at at bedtime.  Delirium precautions: Optimize nutrition hydration and sleep hygiene.  Increase socialization, prevent falls.  Monitor for constipation or change in urination pattern.

## 2025-06-12 NOTE — ASSESSMENT & PLAN NOTE
Chronic LBP managed with menthol topical patch, Cymbalta, and Tylenol.    PT/OT - no rehab needs    sacrum

## 2025-06-12 NOTE — ASSESSMENT & PLAN NOTE
Recent Labs     06/10/25  0450 06/12/25  0418   WBC 16.20* 13.82*     WBC fluctuating around 20k  WBC chronically around 10-13 since 2024  Not meeting additional SIRS   Heme consulted  Elevated haptoglobin, elevated Ig kappa/ Ig kal free light chains.  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

## 2025-06-12 NOTE — CASE MANAGEMENT
Case Management Progress Note    Patient name Trevor Lyons  Location /-01 MRN [id number]  : 1940 Date 2025       LOS (days): 13  Geometric Mean LOS (GMLOS) (days): 4.9  Days to GMLOS:-8.1        OBJECTIVE:        Current admission status: Inpatient  Preferred Pharmacy:   [id number] - [place], [place] - [address] [place] [address]  Phone: [phone] Fax: [phone]    [facility], [facility]. - [place], [place] - [address] [place] [address]  Phone: [phone] Fax: [phone]    Primary Care Provider: Ira Borden DO    Primary Insurance: MEDICARE MISC REPLACEMENT  Secondary Insurance:     PROGRESS NOTE: Pt will dc back to [address] with [facility]. Cm waiting for agency to update on when pt with be SOC so that dc can be arranged.

## 2025-06-12 NOTE — ASSESSMENT & PLAN NOTE
Wt Readings from Last 3 Encounters:   06/12/25 99.8 kg (220 lb 1.6 oz)   05/28/25 105 kg (232 lb)   03/20/25 99.7 kg (219 lb 12.8 oz)     Presented with LOPES + LE Edema  CT Chest: Severe bilateral consolidation and groundglass opacity, greatest in the right lower lobe, compatible with edema and/or pneumonia in the appropriate clinical setting     ECHO (10/2024): EF 40-45%, moderate TV regurg, severe MV regurg   GDMT:   Farxiga 10 mg daily  Losartan 25 mg daily  Metoprolol succinate 25 mg daily  Spironolactone 25 mg daily  Torsemide 20 mg twice daily w/ Kdur - held   Cardiology following   S/P Metolazone x 1 (6/5) & diuril 6/6 x 3 ( 6/6, 6/10, 6/12)  S/p Diamox x1 on 6/11  Currently on IV bumex gtt, remains clinically volume overloaded   Continuous telemetry   Daily wts, I&O's   Even after multiple attempts to redirect and reeducate, patient has been noncompliant with his fluid restriction as well as his output tracking  6/12: Overnight patient was witnessed going to and from the bathroom and filling up a cup with water.  This has likely been going on for a few days.  Discussed with cardiology, we will have a discussion regarding hospice with patient's daughter today.

## 2025-06-12 NOTE — ASSESSMENT & PLAN NOTE
Lab Results   Component Value Date    EGFR 37 06/12/2025    EGFR 37 06/11/2025    EGFR 37 06/11/2025    CREATININE 1.61 (H) 06/12/2025    CREATININE 1.61 (H) 06/11/2025    CREATININE 1.62 (H) 06/11/2025   Cr baseline 1-1.3  Presented with NELLIE (creatinine 1.8)   Creatinine stable on Bumex infusion  Retention protocol, avoid nephrotoxins & hypotension   Monitor BMP closely on bumex gtt

## 2025-06-12 NOTE — NURSING NOTE
RN encouraged patient to move form chair to bed for sleep. Patient refusing. RN educated patient on pressure redistribution for the skin and importance of repositioning which cannot be preformed as successfully in the chair. Patient refusing intervention at this time. Chair alarm in place.

## 2025-06-12 NOTE — ASSESSMENT & PLAN NOTE
Lab Results   Component Value Date    HGBA1C 5.2 05/28/2025     Recent Labs     06/11/25  1114 06/11/25  1631 06/11/25  2134 06/12/25  0719   POCGLU 202* 179* 303* 219*     Hold home glimepiride 1 mg daily + metformin 1000 mg BID   Continue farxiga 10 mg daily   Home Lantus: 8 units HS  Increase to 10 units HS   Initiated on humalog 8 units TID AC while inpatient   ISS + Accuchecks   Carb controlled diet

## 2025-06-12 NOTE — PLAN OF CARE
Problem: Potential for Falls  Goal: Patient will remain free of falls  Description: INTERVENTIONS:  - Educate patient/family on patient safety including physical limitations  - Instruct patient to call for assistance with activity   - Consider consulting OT/PT to assist with strengthening/mobility based on AM PAC & JH-HLM score  - Consult OT/PT to assist with strengthening/mobility   - Keep Call bell within reach  - Keep bed low and locked with side rails adjusted as appropriate  - Keep care items and personal belongings within reach  - Initiate and maintain comfort rounds  - Make Fall Risk Sign visible to staff  - Offer Toileting every 2 Hours, in advance of need  - Initiate/Maintain bed alarm  - Obtain necessary fall risk management equipment: socks  - Apply yellow socks and bracelet for high fall risk patients  - Consider moving patient to room near nurses station  Outcome: Progressing     Problem: PAIN - ADULT  Goal: Verbalizes/displays adequate comfort level or baseline comfort level  Description: Interventions:  - Encourage patient to monitor pain and request assistance  - Assess pain using appropriate pain scale  - Administer analgesics as ordered based on type and severity of pain and evaluate response  - Implement non-pharmacological measures as appropriate and evaluate response  - Consider cultural and social influences on pain and pain management  - Notify physician/advanced practitioner if interventions unsuccessful or patient reports new pain  - Educate patient/family on pain management process including their role and importance of  reporting pain   - Provide non-pharmacologic/complimentary pain relief interventions  Outcome: Progressing     Problem: INFECTION - ADULT  Goal: Absence or prevention of progression during hospitalization  Description: INTERVENTIONS:  - Assess and monitor for signs and symptoms of infection  - Monitor lab/diagnostic results  - Monitor all insertion sites, i.e. indwelling  lines, tubes, and drains  - Monitor endotracheal if appropriate and nasal secretions for changes in amount and color  - [place] appropriate cooling/warming therapies per order  - Administer medications as ordered  - Instruct and encourage patient and family to use good hand hygiene technique  - Identify and instruct in appropriate isolation precautions for identified infection/condition  Outcome: Progressing  Goal: Absence of fever/infection during neutropenic period  Description: INTERVENTIONS:  - Monitor WBC  - Perform strict hand hygiene  - Limit to healthy visitors only  - No plants, dried, fresh or silk flowers with damon in patient room  Outcome: Progressing     Problem: SAFETY ADULT  Goal: Patient will remain free of falls  Description: INTERVENTIONS:  - Educate patient/family on patient safety including physical limitations  - Instruct patient to call for assistance with activity   - Consider consulting OT/PT to assist with strengthening/mobility based on AM PAC & JH-HLM score  - Consult OT/PT to assist with strengthening/mobility   - Keep Call bell within reach  - Keep bed low and locked with side rails adjusted as appropriate  - Keep care items and personal belongings within reach  - Initiate and maintain comfort rounds  - Make Fall Risk Sign visible to staff  - Offer Toileting every 2 Hours, in advance of need  - Initiate/Maintain bed alarm  - Obtain necessary fall risk management equipment: socks  - Apply yellow socks and bracelet for high fall risk patients  - Consider moving patient to room near nurses station  Outcome: Progressing  Goal: Maintain or return to baseline ADL function  Description: INTERVENTIONS:  -  Assess patient's ability to carry out ADLs; assess patient's baseline for ADL function and identify physical deficits which impact ability to perform ADLs (bathing, care of mouth/teeth, toileting, grooming, dressing, etc.)  - Assess/evaluate cause of self-care deficits   - Assess range of  motion  - Assess patient's mobility; develop plan if impaired  - Assess patient's need for assistive devices and provide as appropriate  - Encourage maximum independence but intervene and supervise when necessary  - Involve family in performance of ADLs  - Assess for home care needs following discharge   - Consider OT consult to assist with ADL evaluation and planning for discharge  - Provide patient education as appropriate  - Monitor functional capacity and physical performance, use of AM PAC & JH-HLM   - Monitor gait, balance and fatigue with ambulation    Outcome: Progressing  Goal: Maintains/Returns to pre admission functional level  Description: INTERVENTIONS:  - Perform AM-PAC 6 Click Basic Mobility/ Daily Activity assessment daily.  - Set and communicate daily mobility goal to care team and patient/family/caregiver.   - Collaborate with rehabilitation services on mobility goals if consulted  - Perform Range of Motion 2 times a day.  - Reposition patient every 2 hours.  - Dangle patient 2 times a day  - Stand patient 2 times a day  - Ambulate patient 2 times a day  - Out of bed to chair 2 times a day   - Out of bed for meals 2 times a day  - Out of bed for toileting  - Record patient progress and toleration of activity level   Outcome: Progressing     Problem: DISCHARGE PLANNING  Goal: Discharge to home or other facility with appropriate resources  Description: INTERVENTIONS:  - Identify barriers to discharge w/patient and caregiver  - Arrange for needed discharge resources and transportation as appropriate  - Identify discharge learning needs (meds, wound care, etc.)  - Arrange for interpretive services to assist at discharge as needed  - Refer to Case Management Department for coordinating discharge planning if the patient needs post-hospital services based on physician/advanced practitioner order or complex needs related to functional status, cognitive ability, or social support system  Outcome:  Progressing     Problem: Knowledge Deficit  Goal: Patient/family/caregiver demonstrates understanding of disease process, treatment plan, medications, and discharge instructions  Description: Complete learning assessment and assess knowledge base.  Interventions:  - Provide teaching at level of understanding  - Provide teaching via preferred learning methods  Outcome: Progressing     Problem: Prexisting or High Potential for Compromised Skin Integrity  Goal: Skin integrity is maintained or improved  Description: INTERVENTIONS:  - Identify patients at risk for skin breakdown  - Assess and monitor skin integrity including under and around medical devices   - Assess and monitor nutrition and hydration status  - Monitor labs  - Assess for incontinence   - Turn and reposition patient  - Assist with mobility/ambulation  - Relieve pressure over payam prominences   - Avoid friction and shearing  - Provide appropriate hygiene as needed including keeping skin clean and dry  - Evaluate need for skin moisturizer/barrier cream  - Collaborate with interdisciplinary team  - Patient/family teaching  - Consider wound care consult    Assess:  - Review Pascual scale daily  - Clean and moisturize skin every shift  - Inspect skin when repositioning, toileting, and assisting with ADLS  - Assess extremities for adequate circulation and sensation     Bed Management:  - Have minimal linens on bed & keep smooth, unwrinkled  - Change linens as needed when moist or perspiring  - Avoid sitting or lying in one position for more than 2 hours while in bed?Keep HOB at 30 degrees   - Toileting:  - Offer bedside commode  - Assess for incontinence every shift  - Use incontinent care products after each incontinent episode such as chucks    Activity:  - Mobilize patient 2 times a day  - Encourage activity and walks on unit  - Encourage or provide ROM exercises   - Turn and reposition patient every 2 Hours  - Use appropriate equipment to lift or move  patient in bed  - Instruct/ Assist with weight shifting every 2 hrs  when out of bed in chair  - Consider limitation of chair time 2 hour intervals    Skin Care:  - Avoid use of baby powder, tape, friction and shearing, hot water or constrictive clothing  - Relieve pressure over bony prominences using weight shifting  - Do not massage red bony areas    Next Steps:  - Teach patient strategies to minimize risks such as weight shifting  - Consider consults to  interdisciplinary teams such as   Outcome: Progressing     Problem: CARDIOVASCULAR - ADULT  Goal: Maintains optimal cardiac output and hemodynamic stability  Description: INTERVENTIONS:  - Monitor I/O, vital signs and rhythm  - Monitor for S/S and trends of decreased cardiac output  - Administer and titrate ordered vasoactive medications to optimize hemodynamic stability  - Assess quality of pulses, skin color and temperature  - Assess for signs of decreased coronary artery perfusion  - Instruct patient to report change in severity of symptoms  Outcome: Progressing  Goal: Absence of cardiac dysrhythmias or at baseline rhythm  Description: INTERVENTIONS:  - Continuous cardiac monitoring, vital signs, obtain 12 lead EKG if ordered  - Administer antiarrhythmic and heart rate control medications as ordered  - Monitor electrolytes and administer replacement therapy as ordered  Outcome: Progressing

## 2025-06-12 NOTE — PROGRESS NOTES
Progress Note - Cardiology   Name: Trevor Lyons 85 y.o. male I MRN: [id number]  Unit/Bed#: -01 I Date of Admission: 5/30/2025   Date of Service: 6/12/2025 I Hospital Day: 13     Assessment & Plan  Acute combined systolic and diastolic congestive heart failure (HCC)  Wt Readings from Last 3 Encounters:   06/12/25 99.8 kg (220 lb 1.6 oz)   05/28/25 105 kg (232 lb)   03/20/25 99.7 kg (219 lb 12.8 oz)       Intake/Output Summary (Last 24 hours) at 6/12/2025 1400  Last data filed at 6/12/2025 1301  Gross per 24 hour   Intake 1703 ml   Output 2975 ml   Net -1272 ml       Patient with a history of HFrEF -comes to [facility] emergency department on 5/30/2025 with progressive dyspnea, worsening cough and hypoxia.  Echo 10/21/25: EF 40 to 45%, mild global hypokinesis, RV dilatation, left atrial dilatation, well-functioning TAVR, moderate mitral MAC, moderate to severe MR regurgitation, mild MS, Moderate TR  GDMT: Farxiga 10 mg daily, losartan 25 mg daily, metoprolol succinate 25 mg daily, spironolactone 25 mg daily, torsemide 20 mg twice daily with potassium supplementation  Bumex drip 1 mg/h  Still overloaded but he is looking improved today. Continue bumex gtt  Add diuril IV today x 1    Persistent atrial fibrillation (HCC)  On Xarelto (dose reduced for GFR <50), low-dose metoprolol, mostly paced.  Sick sinus syndrome (HCC)  [employer] permanent pacemaker VVI 65  Hypertension  Blood pressures controlled, losartan held due to NELLIE      Patient is persistently overloaded. Trending up in weight. Exam not improving. UOP seemingly not responding to diuretics. However, turns out patient is using bathroom frequently without urinal or hat to allow for counting and drinking excessive water directly from the sink.   His dementia is a huge impediment to his care. He is at high risk for further decompensation and readmission with disposition back to nursing home.  Given advancing dementia, renal failure, CHF,  "and COPD, d/w daughter today at length (Marisa) re: 's GOC. His quality of life is rapidly diminishing. We discussed options including home palliative and home hospice care. My opinion is that  is most appropriate for home hospice care to reduce further hospitalization and maximize quality of life and comfort rather than become stuck in a cycle of hospital readmissions and decompensations. Marisa agrees wholeheartedly and would like to speak to CM regarding arranging hospice services.     D/w primary team.     Subjective:  Patient seen and examined. No acute events overnight.     Objective:     Vitals: Blood pressure 110/50, pulse 61, temperature 97.7 °F (36.5 °C), resp. rate 18, height 5' 8\" (1.727 m), weight 99.8 kg (220 lb 1.6 oz), SpO2 97%., Body mass index is 33.47 kg/m².,   Orthostatic Blood Pressures      Flowsheet Row Most Recent Value   Blood Pressure 110/50 filed at 06/12/2025 0719   Patient Position - Orthostatic VS Sitting filed at 06/11/2025 1934              Intake/Output Summary (Last 24 hours) at 6/12/2025 1400  Last data filed at 6/12/2025 1301  Gross per 24 hour   Intake 1703 ml   Output 2975 ml   Net -1272 ml       Physical Exam:    General: Trevor Lyons is an obese and well  appearing elderly male  HEENT: moist mucous membranes, EOMI  Neck:  unable to assess JVD, supple, trachea midline  Cardiovascular: RRR  Pulmonary: normal respiratory effort, rales  Abdomen: soft and nondistended  Extremities: +2 lower extremity edema. Warm and well perfused extremities.  Neuro:deferred  Psych: deferred      Medications:    Current Medications[1]     Labs & Results:        Results from last 7 days   Lab Units 06/12/25  0418 06/10/25  0450 06/09/25  0528   WBC Thousand/uL 13.82* 16.20* 16.42*   HEMOGLOBIN g/dL 7.4* 7.9* 8.5*   HEMATOCRIT % 24.2* 26.7* 26.8*   PLATELETS Thousands/uL 412* 417* 387         Results from last 7 days   Lab Units 06/12/25  0418 06/11/25  1852 06/11/25  0513   POTASSIUM " "mmol/L 3.7 3.9 3.6   CHLORIDE mmol/L 87* 87* 85*   CO2 mmol/L 38* 33* 40*   BUN mg/dL 56* 59* 58*   CREATININE mg/dL 1.61* 1.61* 1.62*   CALCIUM mg/dL 9.1 9.0 9.5         Results from last 7 days   Lab Units 06/08/25  0405 06/06/25  0547   MAGNESIUM mg/dL 2.4 2.2       This note was completed in part utilizing Dragon [employer] One voice recognition software. Grammatical errors, random word insertion, spelling mistakes, occasional wrong word or \"sound-alike\" substitutions and incomplete sentences may be an occasional consequence of the system secondary to software limitations, ambient noise and hardware issues. At the time of dictation, efforts were made to edit, clarify and /or correct errors.  Please read the chart carefully and recognize, using context, where substitutions have occurred.  If you have any questions or concerns about the context, text or information contained within the body of this dictation, please contact myself, the provider, for further clarification.         [1]   Current Facility-Administered Medications:     acetaminophen (TYLENOL) tablet 650 mg, 650 mg, Oral, Q6H PRN, Sergio Wisdom PA-C, 650 mg at 06/12/25 1344    atorvastatin (LIPITOR) tablet 20 mg, 20 mg, Oral, Daily With Dinner, Anya Dash MD, 20 mg at 06/11/25 1753    bumetanide (BUMEX) 12.5 mg infusion 50 mL, 1 mg/hr, Intravenous, Continuous, Sergio Wisdom PA-C, Last Rate: 4 mL/hr at 06/12/25 0736, 1 mg/hr at 06/12/25 0736    busPIRone (BUSPAR) tablet 7.5 mg, 7.5 mg, Oral, BID, Anya Dash MD, 7.5 mg at 06/12/25 0923    clonazePAM (KlonoPIN) tablet 1.5 mg, 1.5 mg, Oral, HS PRN, Sergio Wisdom PA-C, 1.5 mg at 06/11/25 0200    donepezil (ARICEPT) tablet 10 mg, 10 mg, Oral, QAM, Anya Dash MD, 10 mg at 06/12/25 0925    DULoxetine (CYMBALTA) delayed release capsule 60 mg, 60 mg, Oral, Daily, Anya Dash MD, 60 mg at 06/12/25 0923    ferrous sulfate tablet 325 mg, 325 mg, Oral, Every Other Day, Debbie Riggins PA-C, 325 mg at " 06/12/25 0923    Fluticasone Furoate-Vilanterol 100-25 mcg/actuation 1 puff, 1 puff, Inhalation, Daily, 1 puff at 06/12/25 0926 **AND** umeclidinium 62.5 mcg/actuation inhaler AEPB 1 puff, 1 puff, Inhalation, Daily, Aurea Germain PA-C, 1 puff at 06/12/25 0926    hydrOXYzine HCL (ATARAX) tablet 50 mg, 50 mg, Oral, Q6H PRN, Sergio Wisdom PA-C, 50 mg at 06/08/25 0222    insulin glargine (LANTUS) subcutaneous injection 10 Units 0.1 mL, 10 Units, Subcutaneous, HS, Nupur Blanca PA-C, 10 Units at 06/11/25 2212    insulin lispro (HumALOG/ADMELOG) 100 units/mL subcutaneous injection 1-5 Units, 1-5 Units, Subcutaneous, TID AC, 2 Units at 06/12/25 1230 **AND** Fingerstick Glucose (POCT), , , TID AC, Anya Dash MD    insulin lispro (HumALOG/ADMELOG) 100 units/mL subcutaneous injection 1-6 Units, 1-6 Units, Subcutaneous, HS, Anya Dash MD, 4 Units at 06/11/25 2212    insulin lispro (HumALOG/ADMELOG) 100 units/mL subcutaneous injection 8 Units, 8 Units, Subcutaneous, TID With Meals, Nupur Blanca PA-C, 8 Units at 06/12/25 1230    levalbuterol (XOPENEX) inhalation solution 1.25 mg, 1.25 mg, Nebulization, TID, Anya Dash MD, 1.25 mg at 06/12/25 0827    levothyroxine tablet 137 mcg, 137 mcg, Oral, Early Morning, Anya Dash MD, 137 mcg at 06/12/25 0442    lidocaine (LIDODERM) 5 % patch 1 patch, 1 patch, Topical, Daily, Nupur Blanca PA-C, 1 patch at 06/12/25 0925    [Held by provider] losartan (COZAAR) tablet 25 mg, 25 mg, Oral, Daily, SAMIRA Sadler, 25 mg at 06/05/25 0902    magnesium Oxide (MAG-OX) tablet 400 mg, 400 mg, Oral, BID, Anya Dash MD, 400 mg at 06/12/25 0923    melatonin tablet 6 mg, 6 mg, Oral, HS, Usha Quinn PA-C, 6 mg at 06/11/25 2156    metoprolol succinate (TOPROL-XL) 24 hr tablet 25 mg, 25 mg, Oral, QAM, Anya Dash MD, 25 mg at 06/12/25 0924    pantoprazole (PROTONIX) EC tablet 40 mg, 40 mg, Oral, Daily Before Breakfast, Anya Dash MD, 40 mg at  06/12/25 0442    polyethylene glycol (MIRALAX) packet 17 g, 17 g, Oral, Daily, Anya Dash MD, 17 g at 06/12/25 0925    potassium chloride (Klor-Con M20) CR tablet 40 mEq, 40 mEq, Oral, BID, Tank Luther MD, 40 mEq at 06/12/25 0924    rivaroxaban (XARELTO) tablet 15 mg, 15 mg, Oral, Daily With Dinner, Anya Dash MD, 15 mg at 06/11/25 1753    senna (SENOKOT) tablet 8.6 mg, 1 tablet, Oral, Daily, Anya Dash MD, 8.6 mg at 06/12/25 0924    spironolactone (ALDACTONE) tablet 25 mg, 25 mg, Oral, Daily, SAMIRA Sadler, 25 mg at 06/12/25 0924

## 2025-06-12 NOTE — PLAN OF CARE
Problem: Potential for Falls  Goal: Patient will remain free of falls  Description: INTERVENTIONS:  - Educate patient/family on patient safety including physical limitations  - Instruct patient to call for assistance with activity   - Consider consulting OT/PT to assist with strengthening/mobility based on AM PAC & JH-HLM score  - Consult OT/PT to assist with strengthening/mobility   - Keep Call bell within reach  - Keep bed low and locked with side rails adjusted as appropriate  - Keep care items and personal belongings within reach  - Initiate and maintain comfort rounds  - Make Fall Risk Sign visible to staff  - Offer Toileting every 2 Hours, in advance of need  - Initiate/Maintain bed alarm  - Obtain necessary fall risk management equipment: socks  - Apply yellow socks and bracelet for high fall risk patients  - Consider moving patient to room near nurses station  Outcome: Progressing     Problem: PAIN - ADULT  Goal: Verbalizes/displays adequate comfort level or baseline comfort level  Description: Interventions:  - Encourage patient to monitor pain and request assistance  - Assess pain using appropriate pain scale  - Administer analgesics as ordered based on type and severity of pain and evaluate response  - Implement non-pharmacological measures as appropriate and evaluate response  - Consider cultural and social influences on pain and pain management  - Notify physician/advanced practitioner if interventions unsuccessful or patient reports new pain  - Educate patient/family on pain management process including their role and importance of  reporting pain   - Provide non-pharmacologic/complimentary pain relief interventions  Outcome: Progressing     Problem: DISCHARGE PLANNING  Goal: Discharge to home or other facility with appropriate resources  Description: INTERVENTIONS:  - Identify barriers to discharge w/patient and caregiver  - Arrange for needed discharge resources and transportation as appropriate  -  Identify discharge learning needs (meds, wound care, etc.)  - Arrange for interpretive services to assist at discharge as needed  - Refer to Case Management Department for coordinating discharge planning if the patient needs post-hospital services based on physician/advanced practitioner order or complex needs related to functional status, cognitive ability, or social support system  Outcome: Progressing

## 2025-06-12 NOTE — PROGRESS NOTES
"Progress Note - Hospitalist   Name: Trevor Lyons 85 y.o. male I MRN: [id number]  Unit/Bed#: -01 I Date of Admission: 5/30/2025   Date of Service: 6/12/2025 I Hospital Day: 13    Assessment & Plan  Acute combined systolic and diastolic congestive heart failure (HCC)  Wt Readings from Last 3 Encounters:   06/12/25 99.8 kg (220 lb 1.6 oz)   05/28/25 105 kg (232 lb)   03/20/25 99.7 kg (219 lb 12.8 oz)     Presented with LOPES + LE Edema  CT Chest: Severe bilateral consolidation and groundglass opacity, greatest in the right lower lobe, compatible with edema and/or pneumonia in the appropriate clinical setting     ECHO (10/2024): EF 40-45%, moderate TV regurg, severe MV regurg   GDMT:   Farxiga 10 mg daily  Losartan 25 mg daily  Metoprolol succinate 25 mg daily  Spironolactone 25 mg daily  Torsemide 20 mg twice daily w/ Kdur - held   Cardiology following   S/P Metolazone x 1 (6/5) & diuril 6/6 x 3 ( 6/6, 6/10, 6/12)  S/p Diamox x1 on 6/11  Currently on IV bumex gtt, remains clinically volume overloaded   Continuous telemetry   Daily wts, I&O's   Even after multiple attempts to redirect and reeducate, patient has been noncompliant with his fluid restriction as well as his output tracking  6/12: Overnight patient was witnessed going to and from the bathroom and filling up a cup with water.  This has likely been going on for a few days.  Discussed with cardiology, we will have a discussion regarding hospice with patient's daughter today.  Chronic respiratory failure (HCC)  Hx of CHF and COPD  Chronically on 2 L via NC PRN - patient reports wearing this \"most of the time\"  Stable on this currently   Leukocytosis  Recent Labs     06/10/25  0450 06/12/25  0418   WBC 16.20* 13.82*     WBC fluctuating around 20k  WBC chronically around 10-13 since 2024  Not meeting additional SIRS   Heme consulted  Elevated haptoglobin, elevated Ig kappa/ Ig kal free light chains.  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, SPEP immunofixation negative  Per hematology we will closely monitor, continue to trend CBC  ID curb-sided by prior provider, given clinical improvement monitor; low threshold to reimage if symptoms worsen (initial CT with ?PNA)  S/P 4 days IV rocephin on admission, now off abx  WBC currently improved to 13 K  Clinically improving without s/sx of infection, continue to monitor closely   Anemia  Recent Labs     06/10/25  0450 06/12/25  0418   HGB 7.9* 7.4*     Per chart review, baseline Hgb ~11  Hgb currently stable > 8  Normocytic anemia  No active bleeding   Folate  >22, B12 312   Iron panel mixed -inflammatory/SHELIA  C/w iron supplement   Hematology following   Recommend outpatient follow up   Persistent atrial fibrillation (HCC)  Rate control: Toprol XL 25 mg daily   OAC: Xarelto   Sick sinus syndrome (HCC)  S/p pacemaker placement  Hypertension  Home regimen: Toprol-XL 25 mg  q day, Losartan 25 mg daily, & spironolactone 25 mg daily  BP acceptable   Type 2 diabetes mellitus without complication, with long-term current use of insulin (HCC)  Lab Results   Component Value Date    HGBA1C 5.2 05/28/2025     Recent Labs     06/11/25  1114 06/11/25  1631 06/11/25  2134 06/12/25  0719   POCGLU 202* 179* 303* 219*     Hold home glimepiride 1 mg daily + metformin 1000 mg BID   Continue farxiga 10 mg daily   Home Lantus: 8 units HS  Increase to 10 units HS   Initiated on humalog 8 units TID AC while inpatient   ISS + Accuchecks   Carb controlled diet   Acquired hypothyroidism  TSH 6.2 in 05/2025, T4 therapeutic  Recommend outpt monitoring in 4-6 wks   Continue levothyroxine  Chronic left-sided low back pain with left-sided sciatica  Chronic LBP managed with menthol topical patch, Cymbalta, and Tylenol.    PT/OT - no rehab needs   Moderate Alzheimer's dementia, unspecified timing of dementia onset, unspecified whether behavioral, psychotic, or mood disturbance or anxiety  ([facility])  History of moderate late onset AD.    Resident at assisted living at [facility].    Alert and oriented to person place, time with disorientation to situation.    Managed with Aricept 10 mg p.o. daily, BuSpar 7.5 twice daily, Cymbalta 60 mg p.o. daily with as needed clonazepam at at bedtime.  Delirium precautions: Optimize nutrition hydration and sleep hygiene.  Increase socialization, prevent falls.  Monitor for constipation or change in urination pattern.  Stage 3 chronic kidney disease (HCC)  Lab Results   Component Value Date    EGFR 37 06/12/2025    EGFR 37 06/11/2025    EGFR 37 06/11/2025    CREATININE 1.61 (H) 06/12/2025    CREATININE 1.61 (H) 06/11/2025    CREATININE 1.62 (H) 06/11/2025   Cr baseline 1-1.3  Presented with NELLIE (creatinine 1.8)   Creatinine stable on Bumex infusion  Retention protocol, avoid nephrotoxins & hypotension   Monitor BMP closely on bumex gtt     Mobility:   Basic Mobility Inpatient Raw Score: 17  JH-HLM Goal: 5: Stand one or more mins  JH-HLM Achieved: 5: Stand (1 or more minutes)  JH-HLM Goal achieved. Continue to encourage appropriate mobility.    VTE Pharmacologic Prophylaxis: VTE Score: 5 High Risk (Score >/= 5) - Pharmacological DVT Prophylaxis Ordered: rivaroxaban (Xarelto). Sequential Compression Devices Ordered.    Education and Discussions with Family / Patient: Updated  (daughter) via phone.    Current Length of Stay: 13 day(s)  Current Patient Status: Inpatient   Certification Statement: The patient will continue to require additional inpatient hospital stay due to volume overload as above on IV Bumex infusion pending further goals of care conversations  Discharge Plan: Anticipate discharge in 24-48 hrs to prior assisted or independent living facility.    Code Status: Level 3 - DNAR and DNI    Subjective:   Patient witnessed overnight feeling up a cup with water in the bathroom, noncompliant with fluid restriction as well as output tracking.   He is still mildly volume overloaded and remains on Bumex infusion.    Objective:     Vitals:   Temp (24hrs), Av.9 °F (36.6 °C), Min:97.7 °F (36.5 °C), Max:98.1 °F (36.7 °C)    Temp:  [97.7 °F (36.5 °C)-98.1 °F (36.7 °C)] 97.7 °F (36.5 °C)  HR:  [57-61] 61  Resp:  [18-20] 18  BP: (110-118)/(48-53) 110/50  SpO2:  [97 %-100 %] 97 %  Body mass index is 33.47 kg/m².     Input and Output Summary (last 24 hours):     Intake/Output Summary (Last 24 hours) at 2025 1056  Last data filed at 2025 0901  Gross per 24 hour   Intake 1493 ml   Output 2275 ml   Net -782 ml       Physical Exam:   Physical Exam  Vitals and nursing note reviewed.   Constitutional:       General: He is not in acute distress.     Appearance: Normal appearance. He is not ill-appearing.   HENT:      Head: Normocephalic and atraumatic.     Eyes:      General: No scleral icterus.        Right eye: No discharge.         Left eye: No discharge.       Cardiovascular:      Rate and Rhythm: Normal rate and regular rhythm.      Pulses: Normal pulses.      Heart sounds:      No friction rub. No gallop.   Pulmonary:      Effort: Pulmonary effort is normal. No respiratory distress.      Breath sounds: Normal breath sounds. No wheezing, rhonchi or rales.   Abdominal:      General: Bowel sounds are normal. There is no distension.      Palpations: Abdomen is soft.      Tenderness: There is no abdominal tenderness. There is no guarding or rebound.     Musculoskeletal:         General: No swelling or deformity.      Cervical back: Neck supple.      Right lower leg: Edema present.      Left lower leg: Edema present.     Skin:     General: Skin is warm and dry.      Capillary Refill: Capillary refill takes less than 2 seconds.      Coloration: Skin is not jaundiced or pale.      Findings: No erythema or rash.     Neurological:      General: No focal deficit present.      Mental Status: He is alert and oriented to person, place, and time. Mental status is at  baseline.     Psychiatric:         Mood and Affect: Mood normal.         Behavior: Behavior normal.         Additional Data:     Labs:  Results from last 7 days   Lab Units 06/12/25  0418 06/10/25  0450 06/09/25  0528   WBC Thousand/uL 13.82*   < > 16.42*   HEMOGLOBIN g/dL 7.4*   < > 8.5*   HEMATOCRIT % 24.2*   < > 26.8*   PLATELETS Thousands/uL 412*   < > 387   SEGS PCT %  --   --  34*   LYMPHO PCT % 29   < > 58*   MONO PCT % 13*   < > 6   EOS PCT % 4   < > 1    < > = values in this interval not displayed.     Results from last 7 days   Lab Units 06/12/25  0418   SODIUM mmol/L 134*   POTASSIUM mmol/L 3.7   CHLORIDE mmol/L 87*   CO2 mmol/L 38*   BUN mg/dL 56*   CREATININE mg/dL 1.61*   ANION GAP mmol/L 9   CALCIUM mg/dL 9.1   GLUCOSE RANDOM mg/dL 212*         Results from last 7 days   Lab Units 06/12/25  0719 06/11/25  2134 06/11/25  1631 06/11/25  1114 06/11/25  0731 06/10/25  2048 06/10/25  1642 06/10/25  1118 06/10/25  0746 06/09/25  2232 06/09/25  1608 06/09/25  1117   POC GLUCOSE mg/dl 219* 303* 179* 202* 224* 232* 298* 259* 234* 250* 208* 274*         Results from last 7 days   Lab Units 06/09/25  0528   PROCALCITONIN ng/ml 0.13       Imaging: Radiology Review: No additional pertinent studies reviewed.    Recent Cultures (last 7 days):         Telemetry Orders (From admission, onward)               24 Hour Telemetry Monitoring  Continuous x 24 Hours (Telem)        Expiring   Question:  Reason for 24 Hour Telemetry  Answer:  Decompensated CHF- and any one of the following: continuous diuretic infusion or total diuretic dose >200 mg daily, associated electrolyte derangement (I.e. K < 3.0), inotropic drip (continuous infusion), hx of ventricular arrhythmia, or new EF < 35%                        Last 24 Hours Medication List:   Current Facility-Administered Medications   Medication Dose Route Frequency Provider Last Rate    acetaminophen  650 mg Oral Q6H PRN Sergio Wisdom PA-C      atorvastatin  20 mg Oral  Daily With Dinner Anya Dash MD      bumetanide (BUMEX) 12.5 mg infusion 50 mL  1 mg/hr Intravenous Continuous Sergio Wisdom PA-C 1 mg/hr (06/12/25 0736)    busPIRone  7.5 mg Oral BID Anya Dash MD      chlorothiazide  500 mg Intravenous Once Tank Luther  mg (06/12/25 1035)    clonazePAM  1.5 mg Oral HS PRN Sergio Wisdom PA-C      donepezil  10 mg Oral QAM Anya Dash MD      DULoxetine  60 mg Oral Daily Anya Dash MD      ferrous sulfate  325 mg Oral Every Other Day Debbie Riggins PA-C      Fluticasone Furoate-Vilanterol  1 puff Inhalation Daily Aurea Germain PA-C      And    umeclidinium  1 puff Inhalation Daily Aurea Germain PA-C      hydrOXYzine HCL  50 mg Oral Q6H PRN Sergio Wisdom PA-C      insulin glargine  10 Units Subcutaneous HS Nupur Blanca PA-C      insulin lispro  1-5 Units Subcutaneous TID AC Anya Dash MD      insulin lispro  1-6 Units Subcutaneous HS Anya Dash MD      insulin lispro  8 Units Subcutaneous TID With Meals Nupur Blanca PA-C      levalbuterol  1.25 mg Nebulization TID Anya Dash MD      levothyroxine  137 mcg Oral Early Morning Anya Dash MD      lidocaine  1 patch Topical Daily Nupur Blanca PA-C      [Held by provider] losartan  25 mg Oral Daily SAMIRA Sadler      magnesium Oxide  400 mg Oral BID Anya Dash MD      melatonin  6 mg Oral HS Usha Quinn PA-C      metoprolol succinate  25 mg Oral QAM Anya Dash MD      pantoprazole  40 mg Oral Daily Before Breakfast Anya Dash MD      polyethylene glycol  17 g Oral Daily Anya Dash MD      potassium chloride  40 mEq Oral BID aTnk Luther MD      rivaroxaban  15 mg Oral Daily With Dinner Anya Dash MD      senna  1 tablet Oral Daily Anya Dash MD      spironolactone  25 mg Oral Daily SAMIRA Sadler          Today, Patient Was Seen By: Sergio Wisdom PA-C    **Please Note: This note may have been constructed using a  voice recognition system.**

## 2025-06-12 NOTE — ASSESSMENT & PLAN NOTE
Wt Readings from Last 3 Encounters:   06/12/25 99.8 kg (220 lb 1.6 oz)   05/28/25 105 kg (232 lb)   03/20/25 99.7 kg (219 lb 12.8 oz)       Intake/Output Summary (Last 24 hours) at 6/12/2025 1400  Last data filed at 6/12/2025 1301  Gross per 24 hour   Intake 1703 ml   Output 2975 ml   Net -1272 ml       Patient with a history of HFrEF -comes [facility] department on 5/30/2025 with progressive dyspnea, worsening cough and hypoxia.  Echo 10/21/25: EF 40 to 45%, mild global hypokinesis, RV dilatation, left atrial dilatation, well-functioning TAVR, moderate mitral MAC, moderate to severe MR regurgitation, mild MS, Moderate TR  GDMT: Farxiga 10 mg daily, losartan 25 mg daily, metoprolol succinate 25 mg daily, spironolactone 25 mg daily, torsemide 20 mg twice daily with potassium supplementation  Bumex drip 1 mg/h  Still overloaded but he is looking improved today. Continue bumex gtt  Add diuril IV today x 1

## 2025-06-12 NOTE — QUICK NOTE
Notified by case management that patient's daughter has decided on hospice care.  Case management will arrange with hospice agency as to when they can open patient and when he can be discharged back to [facility].    Bumex drip discontinued in favor of oral torsemide.  Can discontinue telemetry monitoring.    Sergio Wisdom PA-C

## 2025-06-12 NOTE — ASSESSMENT & PLAN NOTE
Recent Labs     06/10/25  0450 06/12/25  0418   HGB 7.9* 7.4*     Per chart review, baseline Hgb ~11  Hgb currently stable > 8  Normocytic anemia  No active bleeding   Folate  >22, B12 312   Iron panel mixed -inflammatory/SHELIA  C/w iron supplement   Hematology following   Recommend outpatient follow up

## 2025-06-12 NOTE — PROGRESS NOTES
Patient:    MRN:  [id number]    Aidin Request ID:  [id number]    Level of care reserved:  Hospice    Partner Reserved:  [facility], [place] [address] [phone]    Clinical needs requested:    Geography searched:  [phone]    Start of Service:    Request sent:  12:05pm EDT on 6/12/2025 by Flory Chatman    Partner reserved:  2:34pm EDT on 6/12/2025 by Flory Chatman    Choice list shared:  2:34pm EDT on 6/12/2025 by Flory Chatman

## 2025-06-13 VITALS
BODY MASS INDEX: 33.16 KG/M2 | SYSTOLIC BLOOD PRESSURE: 128 MMHG | OXYGEN SATURATION: 98 % | WEIGHT: 218.8 LBS | DIASTOLIC BLOOD PRESSURE: 56 MMHG | HEIGHT: 68 IN | HEART RATE: 60 BPM | TEMPERATURE: 97.8 F | RESPIRATION RATE: 20 BRPM

## 2025-06-13 LAB
ANION GAP SERPL CALCULATED.3IONS-SCNC: 10 MMOL/L (ref 4–13)
BUN SERPL-MCNC: 63 MG/DL (ref 5–25)
CALCIUM SERPL-MCNC: 9.3 MG/DL (ref 8.4–10.2)
CHLORIDE SERPL-SCNC: 87 MMOL/L (ref 96–108)
CO2 SERPL-SCNC: 38 MMOL/L (ref 21–32)
CREAT SERPL-MCNC: 1.72 MG/DL (ref 0.6–1.3)
GFR SERPL CREATININE-BSD FRML MDRD: 34 ML/MIN/1.73SQ M
GLUCOSE SERPL-MCNC: 208 MG/DL (ref 65–140)
GLUCOSE SERPL-MCNC: 219 MG/DL (ref 65–140)
GLUCOSE SERPL-MCNC: 257 MG/DL (ref 65–140)
POTASSIUM SERPL-SCNC: 3.5 MMOL/L (ref 3.5–5.3)
SODIUM SERPL-SCNC: 135 MMOL/L (ref 135–147)

## 2025-06-13 PROCEDURE — 80048 BASIC METABOLIC PNL TOTAL CA: CPT | Performed by: INTERNAL MEDICINE

## 2025-06-13 PROCEDURE — 94760 N-INVAS EAR/PLS OXIMETRY 1: CPT

## 2025-06-13 PROCEDURE — 99239 HOSP IP/OBS DSCHRG MGMT >30: CPT

## 2025-06-13 PROCEDURE — 99232 SBSQ HOSP IP/OBS MODERATE 35: CPT | Performed by: INTERNAL MEDICINE

## 2025-06-13 PROCEDURE — 82948 REAGENT STRIP/BLOOD GLUCOSE: CPT

## 2025-06-13 PROCEDURE — 94640 AIRWAY INHALATION TREATMENT: CPT

## 2025-06-13 RX ORDER — ATORVASTATIN CALCIUM 20 MG/1
20 TABLET, FILM COATED ORAL
Qty: 30 TABLET | Refills: 0 | Status: SHIPPED | OUTPATIENT
Start: 2025-06-13

## 2025-06-13 RX ORDER — INSULIN LISPRO 100 [IU]/ML
INJECTION, SOLUTION INTRAVENOUS; SUBCUTANEOUS
Qty: 6 ML | Refills: 0 | Status: SHIPPED | OUTPATIENT
Start: 2025-06-13

## 2025-06-13 RX ORDER — INSULIN GLARGINE 100 [IU]/ML
10 INJECTION, SOLUTION SUBCUTANEOUS DAILY
Qty: 3 ML | Refills: 0 | Status: SHIPPED | OUTPATIENT
Start: 2025-06-13

## 2025-06-13 RX ORDER — GLIMEPIRIDE 1 MG/1
1 TABLET ORAL
Qty: 30 TABLET | Refills: 0 | Status: SHIPPED | OUTPATIENT
Start: 2025-06-13

## 2025-06-13 RX ORDER — LOSARTAN POTASSIUM 25 MG/1
25 TABLET ORAL DAILY
Qty: 30 TABLET | Refills: 0 | Status: SHIPPED | OUTPATIENT
Start: 2025-06-13

## 2025-06-13 RX ORDER — FOLIC ACID 1 MG/1
1000 TABLET ORAL EVERY MORNING
Qty: 30 TABLET | Refills: 0 | Status: SHIPPED | OUTPATIENT
Start: 2025-06-13

## 2025-06-13 RX ORDER — DONEPEZIL HYDROCHLORIDE 10 MG/1
10 TABLET, FILM COATED ORAL EVERY MORNING
Qty: 30 TABLET | Refills: 0 | Status: SHIPPED | OUTPATIENT
Start: 2025-06-13

## 2025-06-13 RX ORDER — SPIRONOLACTONE 25 MG/1
25 TABLET ORAL DAILY
Qty: 30 TABLET | Refills: 0 | Status: SHIPPED | OUTPATIENT
Start: 2025-06-13

## 2025-06-13 RX ORDER — TORSEMIDE 20 MG/1
40 TABLET ORAL DAILY
Qty: 60 TABLET | Refills: 0 | Status: SHIPPED | OUTPATIENT
Start: 2025-06-14

## 2025-06-13 RX ORDER — DULOXETIN HYDROCHLORIDE 60 MG/1
60 CAPSULE, DELAYED RELEASE ORAL DAILY
Qty: 30 CAPSULE | Refills: 0 | Status: SHIPPED | OUTPATIENT
Start: 2025-06-13

## 2025-06-13 RX ORDER — BUSPIRONE HYDROCHLORIDE 7.5 MG/1
7.5 TABLET ORAL 2 TIMES DAILY
Qty: 60 TABLET | Refills: 0 | Status: SHIPPED | OUTPATIENT
Start: 2025-06-13

## 2025-06-13 RX ORDER — DAPAGLIFLOZIN 10 MG/1
10 TABLET, FILM COATED ORAL DAILY
Qty: 30 TABLET | Refills: 0 | Status: SHIPPED | OUTPATIENT
Start: 2025-06-13

## 2025-06-13 RX ORDER — POTASSIUM CHLORIDE 1500 MG/1
40 TABLET, EXTENDED RELEASE ORAL 2 TIMES DAILY
Qty: 120 TABLET | Refills: 0 | Status: SHIPPED | OUTPATIENT
Start: 2025-06-13

## 2025-06-13 RX ORDER — ALBUTEROL SULFATE 0.83 MG/ML
2.5 SOLUTION RESPIRATORY (INHALATION) 4 TIMES DAILY
Qty: 100 ML | Refills: 0 | Status: SHIPPED | OUTPATIENT
Start: 2025-06-13

## 2025-06-13 RX ORDER — METFORMIN HYDROCHLORIDE 500 MG/1
1000 TABLET, EXTENDED RELEASE ORAL 2 TIMES DAILY WITH MEALS
Qty: 60 TABLET | Refills: 0 | Status: SHIPPED | OUTPATIENT
Start: 2025-06-13

## 2025-06-13 RX ORDER — METOPROLOL SUCCINATE 25 MG/1
25 TABLET, EXTENDED RELEASE ORAL EVERY MORNING
Qty: 30 TABLET | Refills: 0 | Status: SHIPPED | OUTPATIENT
Start: 2025-06-13

## 2025-06-13 RX ADMIN — INSULIN LISPRO 8 UNITS: 100 INJECTION, SOLUTION INTRAVENOUS; SUBCUTANEOUS at 13:07

## 2025-06-13 RX ADMIN — TORSEMIDE 40 MG: 20 TABLET ORAL at 08:24

## 2025-06-13 RX ADMIN — INSULIN LISPRO 2 UNITS: 100 INJECTION, SOLUTION INTRAVENOUS; SUBCUTANEOUS at 08:27

## 2025-06-13 RX ADMIN — LEVOTHYROXINE SODIUM 137 MCG: 112 TABLET ORAL at 06:16

## 2025-06-13 RX ADMIN — BUSPIRONE HYDROCHLORIDE 7.5 MG: 15 TABLET ORAL at 08:27

## 2025-06-13 RX ADMIN — INSULIN LISPRO 2 UNITS: 100 INJECTION, SOLUTION INTRAVENOUS; SUBCUTANEOUS at 13:07

## 2025-06-13 RX ADMIN — SPIRONOLACTONE 25 MG: 25 TABLET, FILM COATED ORAL at 08:25

## 2025-06-13 RX ADMIN — INSULIN LISPRO 8 UNITS: 100 INJECTION, SOLUTION INTRAVENOUS; SUBCUTANEOUS at 08:27

## 2025-06-13 RX ADMIN — SENNOSIDES 8.6 MG: 8.6 TABLET, FILM COATED ORAL at 08:27

## 2025-06-13 RX ADMIN — POLYETHYLENE GLYCOL 3350 17 G: 17 POWDER, FOR SOLUTION ORAL at 08:26

## 2025-06-13 RX ADMIN — METOPROLOL SUCCINATE 25 MG: 25 TABLET, EXTENDED RELEASE ORAL at 08:25

## 2025-06-13 RX ADMIN — LIDOCAINE 5% 1 PATCH: 700 PATCH TOPICAL at 08:31

## 2025-06-13 RX ADMIN — PANTOPRAZOLE SODIUM 40 MG: 40 TABLET, DELAYED RELEASE ORAL at 06:16

## 2025-06-13 RX ADMIN — DONEPEZIL HYDROCHLORIDE 10 MG: 5 TABLET ORAL at 08:26

## 2025-06-13 RX ADMIN — DULOXETINE HYDROCHLORIDE 60 MG: 30 CAPSULE, DELAYED RELEASE ORAL at 08:26

## 2025-06-13 RX ADMIN — LEVALBUTEROL HYDROCHLORIDE 1.25 MG: 1.25 SOLUTION RESPIRATORY (INHALATION) at 13:30

## 2025-06-13 RX ADMIN — POTASSIUM CHLORIDE 40 MEQ: 1500 TABLET, EXTENDED RELEASE ORAL at 08:26

## 2025-06-13 RX ADMIN — UMECLIDINIUM 1 PUFF: 62.5 AEROSOL, POWDER ORAL at 08:27

## 2025-06-13 RX ADMIN — FLUTICASONE FUROATE AND VILANTEROL TRIFENATATE 1 PUFF: 100; 25 POWDER RESPIRATORY (INHALATION) at 08:27

## 2025-06-13 RX ADMIN — Medication 400 MG: at 08:26

## 2025-06-13 RX ADMIN — LEVALBUTEROL HYDROCHLORIDE 1.25 MG: 1.25 SOLUTION RESPIRATORY (INHALATION) at 08:18

## 2025-06-13 NOTE — ASSESSMENT & PLAN NOTE
Recent Labs     06/12/25  0418   HGB 7.4*     Per chart review, baseline hemoglobin around 11 g/dL, but more recently around 7 to 8 g/dL.    Patient's hemoglobin currently stable at 7.4 g/dL and without signs of active bleeding.    Patient with normocytic anemia, folate (>22), vitamin B12 (312)  Per hematology, likely anemia of chronic disease.  Will continue iron supplementation.  Patient can follow-up outpatient with hematology/oncology.

## 2025-06-13 NOTE — ASSESSMENT & PLAN NOTE
Review, patient with history of chronic low back pain, but medically managed.    Home Regimen:   Topical menthol patches   Cymbalta 60 mg, daily  PRN Tylenol     Appreciate PT/OT consultation, where no needs recommended.

## 2025-06-13 NOTE — ASSESSMENT & PLAN NOTE
Recent Labs     06/12/25  0418   WBC 13.82*     WBC fluctuating around 20k  WBC chronically around 10-13 since 2024.   Patient does not meet additional SIRS criteria.  Appreciate hematology/oncology consultation.   Elevated haptoglobin, elevated Ig kappa/ Ig kal free light chains.  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   Continue to monitor off further antibiotics.  WBC currently improved to 13K  Patient remains clinically improve without signs symptoms of infection.

## 2025-06-13 NOTE — PLAN OF CARE
Problem: Potential for Falls  Goal: Patient will remain free of falls  Description: INTERVENTIONS:  - Educate patient/family on patient safety including physical limitations  - Instruct patient to call for assistance with activity   - Consider consulting OT/PT to assist with strengthening/mobility based on AM PAC & JH-HLM score  - Consult OT/PT to assist with strengthening/mobility   - Keep Call bell within reach  - Keep bed low and locked with side rails adjusted as appropriate  - Keep care items and personal belongings within reach  - Initiate and maintain comfort rounds  - Make Fall Risk Sign visible to staff  - Offer Toileting every 2 Hours, in advance of need  - Initiate/Maintain bed alarm  - Obtain necessary fall risk management equipment: socks  - Apply yellow socks and bracelet for high fall risk patients  - Consider moving patient to room near nurses station  Outcome: Progressing     Problem: PAIN - ADULT  Goal: Verbalizes/displays adequate comfort level or baseline comfort level  Description: Interventions:  - Encourage patient to monitor pain and request assistance  - Assess pain using appropriate pain scale  - Administer analgesics as ordered based on type and severity of pain and evaluate response  - Implement non-pharmacological measures as appropriate and evaluate response  - Consider cultural and social influences on pain and pain management  - Notify physician/advanced practitioner if interventions unsuccessful or patient reports new pain  - Educate patient/family on pain management process including their role and importance of  reporting pain   - Provide non-pharmacologic/complimentary pain relief interventions  Outcome: Progressing     Problem: INFECTION - ADULT  Goal: Absence or prevention of progression during hospitalization  Description: INTERVENTIONS:  - Assess and monitor for signs and symptoms of infection  - Monitor lab/diagnostic results  - Monitor all insertion sites, i.e. indwelling  lines, tubes, and drains  - Monitor endotracheal if appropriate and nasal secretions for changes in amount and color  - [place] appropriate cooling/warming therapies per order  - Administer medications as ordered  - Instruct and encourage patient and family to use good hand hygiene technique  - Identify and instruct in appropriate isolation precautions for identified infection/condition  Outcome: Progressing  Goal: Absence of fever/infection during neutropenic period  Description: INTERVENTIONS:  - Monitor WBC  - Perform strict hand hygiene  - Limit to healthy visitors only  - No plants, dried, fresh or silk flowers with damon in patient room  Outcome: Progressing     Problem: SAFETY ADULT  Goal: Patient will remain free of falls  Description: INTERVENTIONS:  - Educate patient/family on patient safety including physical limitations  - Instruct patient to call for assistance with activity   - Consider consulting OT/PT to assist with strengthening/mobility based on AM PAC & JH-HLM score  - Consult OT/PT to assist with strengthening/mobility   - Keep Call bell within reach  - Keep bed low and locked with side rails adjusted as appropriate  - Keep care items and personal belongings within reach  - Initiate and maintain comfort rounds  - Make Fall Risk Sign visible to staff  - Offer Toileting every 2 Hours, in advance of need  - Initiate/Maintain bed alarm  - Obtain necessary fall risk management equipment: socks  - Apply yellow socks and bracelet for high fall risk patients  - Consider moving patient to room near nurses station  Outcome: Progressing  Goal: Maintain or return to baseline ADL function  Description: INTERVENTIONS:  -  Assess patient's ability to carry out ADLs; assess patient's baseline for ADL function and identify physical deficits which impact ability to perform ADLs (bathing, care of mouth/teeth, toileting, grooming, dressing, etc.)  - Assess/evaluate cause of self-care deficits   - Assess range of  motion  - Assess patient's mobility; develop plan if impaired  - Assess patient's need for assistive devices and provide as appropriate  - Encourage maximum independence but intervene and supervise when necessary  - Involve family in performance of ADLs  - Assess for home care needs following discharge   - Consider OT consult to assist with ADL evaluation and planning for discharge  - Provide patient education as appropriate  - Monitor functional capacity and physical performance, use of AM PAC & JH-HLM   - Monitor gait, balance and fatigue with ambulation    Outcome: Progressing  Goal: Maintains/Returns to pre admission functional level  Description: INTERVENTIONS:  - Perform AM-PAC 6 Click Basic Mobility/ Daily Activity assessment daily.  - Set and communicate daily mobility goal to care team and patient/family/caregiver.   - Collaborate with rehabilitation services on mobility goals if consulted  - Perform Range of Motion 2 times a day.  - Reposition patient every 2 hours.  - Dangle patient 2 times a day  - Stand patient 2 times a day  - Ambulate patient 2 times a day  - Out of bed to chair 2 times a day   - Out of bed for meals 2 times a day  - Out of bed for toileting  - Record patient progress and toleration of activity level   Outcome: Progressing     Problem: DISCHARGE PLANNING  Goal: Discharge to home or other facility with appropriate resources  Description: INTERVENTIONS:  - Identify barriers to discharge w/patient and caregiver  - Arrange for needed discharge resources and transportation as appropriate  - Identify discharge learning needs (meds, wound care, etc.)  - Arrange for interpretive services to assist at discharge as needed  - Refer to Case Management Department for coordinating discharge planning if the patient needs post-hospital services based on physician/advanced practitioner order or complex needs related to functional status, cognitive ability, or social support system  Outcome:  Progressing     Problem: Prexisting or High Potential for Compromised Skin Integrity  Goal: Skin integrity is maintained or improved  Description: INTERVENTIONS:  - Identify patients at risk for skin breakdown  - Assess and monitor skin integrity including under and around medical devices   - Assess and monitor nutrition and hydration status  - Monitor labs  - Assess for incontinence   - Turn and reposition patient  - Assist with mobility/ambulation  - Relieve pressure over payam prominences   - Avoid friction and shearing  - Provide appropriate hygiene as needed including keeping skin clean and dry  - Evaluate need for skin moisturizer/barrier cream  - Collaborate with interdisciplinary team  - Patient/family teaching  - Consider wound care consult    Assess:  - Review P[place]ual scale daily  - Clean and moisturize skin every shift  - Inspect skin when repositioning, toileting, and assisting with ADLS  - Assess extremities for adequate circulation and sensation     Bed Management:  - Have minimal linens on bed & keep smooth, unwrinkled  - Change linens as needed when moist or perspiring  - Avoid sitting or lying in one position for more than 2 hours while in bed?Keep HOB at 30 degrees   - Toileting:  - Offer bedside commode  - Assess for incontinence every shift  - Use incontinent care products after each incontinent episode such as chucks    Activity:  - Mobilize patient 3 times a day  - Encourage activity and walks on unit  - Encourage or provide ROM exercises   - Turn and reposition patient every 2 Hours  - Use appropriate equipment to lift or move patient in bed  - Instruct/ Assist with weight shifting every 2 hrs when out of bed in chair  - Consider limitation of chair time 2 hour intervals    Skin Care:  - Avoid use of baby powder, tape, friction and shearing, hot water or constrictive clothing  - Relieve pressure over bony prominences using weight shifting  - Do not massage red bony areas    Next Steps:  - Teach  patient strategies to minimize risks such as weight shifting  - Consider consults to  interdisciplinary teams such as   Outcome: Progressing     Problem: CARDIOVASCULAR - ADULT  Goal: Maintains optimal cardiac output and hemodynamic stability  Description: INTERVENTIONS:  - Monitor I/O, vital signs and rhythm  - Monitor for S/S and trends of decreased cardiac output  - Administer and titrate ordered vasoactive medications to optimize hemodynamic stability  - Assess quality of pulses, skin color and temperature  - Assess for signs of decreased coronary artery perfusion  - Instruct patient to report change in severity of symptoms  Outcome: Progressing  Goal: Absence of cardiac dysrhythmias or at baseline rhythm  Description: INTERVENTIONS:  - Continuous cardiac monitoring, vital signs, obtain 12 lead EKG if ordered  - Administer antiarrhythmic and heart rate control medications as ordered  - Monitor electrolytes and administer replacement therapy as ordered  Outcome: Progressing

## 2025-06-13 NOTE — ASSESSMENT & PLAN NOTE
Wt Readings from Last 3 Encounters:   06/13/25 99.2 kg (218 lb 12.8 oz)   05/28/25 105 kg (232 lb)   03/20/25 99.7 kg (219 lb 12.8 oz)       Intake/Output Summary (Last 24 hours) at 6/13/2025 1405  Last data filed at 6/13/2025 1236  Gross per 24 hour   Intake 1520.13 ml   Output 1335 ml   Net 185.13 ml       Patient with a history of HFrEF -comes to [facility] emergency department on 5/30/2025 with progressive dyspnea, worsening cough and hypoxia.  Echo 10/21/25: EF 40 to 45%, mild global hypokinesis, RV dilatation, left atrial dilatation, well-functioning TAVR, moderate mitral MAC, moderate to severe MR regurgitation, mild MS, Moderate TR  GDMT: Farxiga 10 mg daily, losartan 25 mg daily, metoprolol succinate 25 mg daily, spironolactone 25 mg daily, torsemide 20 mg twice daily with potassium supplementation    Per yesterday's note, now progressing to hospice care  Returned to oral torsemide

## 2025-06-13 NOTE — ASSESSMENT & PLAN NOTE
"Hx of CHF and COPD  Chronically on 2 L via NC PRN - patient reports wearing this \"most of the time\"  Patient remains stable on 2 L via nasal cannula and does not appear in respiratory distress.  Patient has home DME supplies with HHC services and hospice.  "

## 2025-06-13 NOTE — ASSESSMENT & PLAN NOTE
"Wt Readings from Last 3 Encounters:   06/13/25 99.2 kg (218 lb 12.8 oz)   05/28/25 105 kg (232 lb)   03/20/25 99.7 kg (219 lb 12.8 oz)     Presented with LOPES + LE Edema  CT Chest: \"Severe bilateral consolidation and groundglass opacity, greatest in the right lower lobe, compatible with edema and/or pneumonia in the appropriate clinical setting.\"     ECHO (10/2024): EF 40-45%, moderate TV regurg, severe MV regurg   GDMT:   Farxiga 10 mg, daily  Losartan 25 mg ,daily  Toprol-XL 25 mg, daily  Spironolactone 25 mg, daily  Torsemide 20 mg twice w/ Kdur - held   Appreciate cardiology recommendations.   S/P Metolazone x 1 (6/5) & diuril 6/6 x 3 ( 6/6, 6/10, 6/12)  S/p Diamox x1 on 6/11  Patient was transferred to torsemide 40 mg, daily.    Continue daily weights and I&O's   Per prior provider, has had multiple conversations about patient's noncompliance with fluid restriction and tracking of I/Os.  Per review, it was noted that patient was witnessed going to bathroom and filling up his cup with water, what was likely it was happening for multiple days.    During conversation today, patient expressing that he has \"thirsty\" and needs more to drink.  Per cardiology, had discussion with patient and daughter, where patient's daughter requesting patient to be transitioned to hospice on discharge.   "

## 2025-06-13 NOTE — ASSESSMENT & PLAN NOTE
TSH 6.2 in 05/2025, T4 therapeutic  Patient have outpatient follow-up PCP in 4 to 6 weeks for repeat TSH level.   Continue levothyroxine

## 2025-06-13 NOTE — DISCHARGE SUMMARY
"Discharge Summary - Hospitalist   Name: Trevor Lyons 85 y.o. male I MRN: [id number]  Unit/Bed#: -01 I Date of Admission: 5/30/2025   Date of Service: 6/13/2025 I Hospital Day: 14     Assessment & Plan  Acute combined systolic and diastolic congestive heart failure (HCC)  Wt Readings from Last 3 Encounters:   06/13/25 99.2 kg (218 lb 12.8 oz)   05/28/25 105 kg (232 lb)   03/20/25 99.7 kg (219 lb 12.8 oz)     Presented with LOPES + LE Edema  CT Chest: \"Severe bilateral consolidation and groundglass opacity, greatest in the right lower lobe, compatible with edema and/or pneumonia in the appropriate clinical setting.\"     ECHO (10/2024): EF 40-45%, moderate TV regurg, severe MV regurg   GDMT:   Farxiga 10 mg, daily  Losartan 25 mg ,daily  Toprol-XL 25 mg, daily  Spironolactone 25 mg, daily  Torsemide 20 mg, twice daily  Appreciate cardiology recommendations.   S/P Metolazone x 1 (6/5), diuril ( 6/6, 6/10, 6/12), and Diamox (6/11)  Patient was trsnitioned to torsemide 40 mg, daily.    Continue daily weights and I&O's   Per prior provider, has had multiple conversations about patient's noncompliance with fluid restriction and tracking of I/Os.  Per review, it was noted that patient was witnessed going to bathroom and filling up his cup with water, what was likely it was happening for multiple days.    During conversation today, patient expressing that he has \"thirsty\" and needs more to drink.  Per cardiology, had discussion with patient and daughter, where patient's daughter requesting patient to be transitioned to hospice on discharge.   Patient stable on home dose, where noted weight loss.  It is recommended the patient to continue FR 1800 mL and medication compliance.  Patient to follow-up with outpatient PCP.  Chronic respiratory failure (HCC)  Hx of CHF and COPD  Chronically on 2 L via NC PRN - patient reports wearing this \"most of the time\"  Patient remains stable on 2 L via nasal cannula and does " not appear in respiratory distress.  Patient has home DME supplies with [facility] services and hospice.  Leukocytosis  Recent Labs     06/12/25  0418   WBC 13.82*     WBC fluctuating around 20k  WBC chronically around 10-13 since 2024.   Patient does not meet additional SIRS criteria.  Appreciate hematology/oncology consultation.   Elevated haptoglobin, elevated Ig kappa/ Ig kal free light chains.  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   Continue to monitor off further antibiotics.  WBC currently improved to 13K  Patient remains clinically improve without signs symptoms of infection.   Anemia  Recent Labs     06/12/25  0418   HGB 7.4*     Per chart review, baseline hemoglobin around 11 g/dL, but more recently around 7 to 8 g/dL.    Patient's hemoglobin currently stable at 7.4 g/dL and without signs of active bleeding.    Patient with normocytic anemia, folate (>22), vitamin B12 (312)  Per hematology, likely anemia of chronic disease.  Will continue iron supplementation.  Patient can follow-up outpatient with hematology/oncology.    Persistent atrial fibrillation (HCC)  RC: Toprol-XL 25 mg, daily  OAC: Xarelto      Continue home regimen.  Sick sinus syndrome (HCC)  S/P permanent pacemaker placement.   Hypertension  Home Regimen:   Toprol-XL 25 mg, daily  Losartan 25 mg, daily  Spironolactone 25 mg,  Per chart review, patient with acceptable BPs.   Type 2 diabetes mellitus without complication, with long-term current use of insulin ([facility])  Lab Results   Component Value Date    HGBA1C 5.2 05/28/2025     Recent Labs     06/12/25  1606 06/12/25  2038 06/13/25  0716 06/13/25  1047   POCGLU 305* 260* 219* 257*     Home  Regimen:   Glimepiride 1 mg, daily   Metformin 1000 mg, twice daily   Farxiga 10 mg, daily  Lantus 8 units, daily  SSI, as needed    .  Will continue Lantus 10 units, daily on discharge.    Patient with elevated glucose levels, but can have further recommendations per hospice/PCP on discharge.  Will continue home regimen.  Continue Accu-Cheks.  Continue SSI, as needed.  Continue carb-controlled diet.  Acquired hypothyroidism  TSH 6.2 in 05/2025, T4 therapeutic  Patient have outpatient follow-up PCP in 4 to 6 weeks for repeat TSH level.   Continue levothyroxine  Chronic left-sided low back pain with left-sided sciatica  Per chart review, patient with history of chronic low back pain, but medically managed.    Home Regimen:   Topical menthol patches   Cymbalta 60 mg, daily  PRN Tylenol      Appreciate PT/OT consultation, where no needs recommended.  Patient to be discharged on home hospice.  Moderate Alzheimer's dementia, unspecified timing of dementia onset, unspecified whether behavioral, psychotic, or mood disturbance or anxiety (HCC)  Review, patient with history of moderate-late onset AD.  Originally, patient was a resident at [facility], where he lives in assisted living.      Home Regimen:   Aricept 10 mg, daily   BuSpar 7.5 mg, twice daily   Cymbalta 60 mg, daily     Continue encourage adequate oral nutrition/hydration.    Continue delirium precautions.    Patient to be transferred on hospice care, per family they are focused on comfort measures and quality of life.     Stage 3 chronic kidney disease (HCC)  Lab Results   Component Value Date    EGFR 34 06/13/2025    EGFR 37 06/12/2025    EGFR 37 06/11/2025    CREATININE 1.72 (H) 06/13/2025    CREATININE 1.61 (H) 06/12/2025    CREATININE 1.61 (H) 06/11/2025     Per chart review, baseline creatinine around 1-1.3.    Cr elevated (1.72), but Cr seems to be around 1.5-1.7, which may be new baseline.  Continue to avoid and/or limit nephrotoxins, if  "possible.  Patient to be transitioned on hospice care with comfort measures only.     Medical Problems       Resolved Problems  Date Reviewed: 5/30/2025          Resolved    NELLIE (acute kidney injury) (HCC) 6/5/2025     Resolved by  Sergio Wisdom PA-C    Severe sepsis (HCC) 6/5/2025     Resolved by  Sergio Wisdom PA-C    Pneumonia 6/5/2025     Resolved by  Sergio Wisdom PA-C    Lactic acidosis 5/31/2025     Resolved by  Debbie Riggins PA-C        Discharging Physician / Practitioner: SAMIRA Valencia  PCP: Ira Borden DO  Admission Date:   Admission Orders (From admission, onward)       Ordered        05/30/25 1325  INPATIENT ADMISSION  Once                          Discharge Date: 06/13/25    Next Steps for Physician/AP Assuming Care:  Patient to follow-up with outpatient PCP in 1 week.  Patient follow-up with hematology/oncology within 2 weeks.  Patient to follow-up with outpatient hospice care.    Test Results Pending at Discharge (will require follow up):  None    Medication Changes for Discharge & Rationale:   Continue Lantus 10 units, daily (this medication was increased).  Continue Humalog (sliding scale), as needed.  Continue potassium chloride 40 mEq, twice daily (this medication was increased due to higher dose of diuretic).  Continue Xarelto 15 mg, daily with dinner (this medication was decreased, due to elevated creatinine).  Continue torsemide 40 mg, daily.  Continue to take all other medications, as prescribed.   See after visit summary for reconciled discharge medications provided to patient and/or family.     Consultations During Hospital Stay:  Hematology  Cardiology    Procedures Performed:   None    Significant Findings / Test Results:   XR chest (5/30): \"Bilateral multifocal pneumonia\"   CT chest wo contrast (5/30): \"Severe bilateral consolidation and groundglass opacity, greatest in the right lower lobe, compatible with edema and/or pneumonia in the appropriate clinical " "setting. Small hiatal hernia.\"  XR chest PA/lateral (6/6): \"Improving pulmonary edema with small pleural effusions.\"    Incidental Findings:   None      Hospital Course:   Trevor Lyons is a 85 y.o. male patient who originally presented to the hospital on 5/30/2025 due to increased shortness of breath symptoms.  On admission, patient with elevated BNP at (502).  The patient was started on IV diuretics and consult placed to cardiology.  The patient had echocardiogram in 10/2024, which showed EF of 40 to 45%, mildly reduced systolic function, global hypokinesis, RV dilation, left atrial dilation, noted TAVR bioprosthetic valve, moderate to severe regurgitation of MV, moderate regurg of TV.  The patient was initiated on IV bumetanide infusion, per cardiology.  In addition, the patient admitted to the hospital meeting severe sepsis criteria with noted tachypnea, leukocytosis, lactic acidosis, and NELLIE.  It was suspected that patient with bilateral pneumonia, as evidenced by CT chest.  The patient had been started on IV ceftriaxone and azithromycin, where patient completed antibiotic course.  The patient had persistently elevated leukocytes, where hematology was consulted for further evaluation, where flow symmetry on peripheral blood reviewed a monoclonal B-cell population suggestive of CLL.  The patient was encouraged to follow-up as outpatient with hematology.  Per oncology, given advanced age, underlying dementia, and other multiple co-morbidities it was recommended observation continue monitoring with CBC.  The patient continued to diurese s/p metolazone, Diuril, and Diamox.  However, during hospitalization it was noted that patient with lack of compliance to fluid restriction and monitoring of I/Os.  The patient/family had a GOC meeting, as patient with volume overload, despite 2 weeks in hospital.  The patient's family recommending that they would rather focus on the patient's quality of life, rather than " "fixing all comorbidities.  The patient and family deciding on comfort focused care, where patient will be discharged on comfort measures.  The patient was transitioned off of the bumetanide drip and transition to oral torsemide and discontinued from telemetry.  The patient needed to have outpatient hospice services, where appreciated case management following for further recommendations.  Per family, patient will go home with grandson, where patient will have home hospice services and enjoy quality of life.    Please see above list of diagnoses and related plan for additional information.     Discharge Day Visit / Exam:   Subjective:  The patient was seen and examined at the bedside this morning. The patient expressing that he feels okay. The patient explains, \"same stuff\". The patient denies any breathing difficulties.  The patient denies any fever or chills.  The patient expressing that he is eating and drinking, noting that he was like some water to drink.  The patient expressing that he has no complaints this morning.     Per nursing, no acute events overnight.    Vitals: Blood Pressure: 128/56 (06/13/25 1429)  Pulse: 60 (06/13/25 1429)  Temperature: 97.8 °F (36.6 °C) (06/13/25 1429)  Temp Source: Oral (06/12/25 2005)  Respirations: 20 (06/13/25 1429)  Height: 5' 8\" (172.7 cm) (06/05/25 0600)  Weight - Scale: 99.2 kg (218 lb 12.8 oz) (06/13/25 0600)  SpO2: 98 % (06/13/25 1429)    Physical Exam  Vitals and nursing note reviewed.   Constitutional:       General: He is awake. He is not in acute distress.     Appearance: He is not ill-appearing.   HENT:      Head: Normocephalic and atraumatic.     Eyes:      General: No scleral icterus.     Conjunctiva/sclera: Conjunctivae normal.       Cardiovascular:      Rate and Rhythm: Normal rate and regular rhythm.      Heart sounds: Normal heart sounds, S1 normal and S2 normal. No murmur heard.  Pulmonary:      Effort: No respiratory distress.      Breath sounds: No " decreased air movement. Examination of the right-lower field reveals rales. Examination of the left-lower field reveals rales. Rales present. No wheezing or rhonchi.   Abdominal:      General: Bowel sounds are normal. There is no distension.      Palpations: Abdomen is soft.      Tenderness: There is no abdominal tenderness.     Musculoskeletal:      Right lower leg: 3+ Edema present.      Left lower leg: 3+ Edema present.     Skin:     General: Skin is warm and dry.     Neurological:      Mental Status: He is alert.      Sensory: Sensation is intact.      Motor: Motor function is intact.      Comments: On exam, patient is AxOx2-3.    Psychiatric:         Mood and Affect: Affect is flat.         Speech: Speech normal.         Thought Content: Thought content does not include homicidal or suicidal ideation.        Discussion with Family: Updated  (daughter) via phone.    Discharge instructions/Information to patient and family:   See after visit summary for information provided to patient and family.      Provisions for Follow-Up Care:  See after visit summary for information related to follow-up care and any pertinent home health orders.      Mobility at time of Discharge:   Basic Mobility Inpatient Raw Score: 17  JH-HLM Goal: 5: Stand one or more mins  JH-HLM Achieved: 5: Stand (1 or more minutes)  HLM Goal achieved. Continue to encourage appropriate mobility.     Disposition:   Other: Home with Home Hospice Services     Planned Readmission: No, no planned readmission.     Administrative Statements   Discharge Statement:  I have spent a total time of 65 minutes in caring for this patient on the day of the visit/encounter. >30 minutes of time was spent on: Diagnostic results, Instructions for management, Patient and family education, Counseling / Coordination of care, Documenting in the medical record, and Reviewing / ordering tests, medicine, procedures  .    **Please Note: This note may have been  constructed using a voice recognition system**

## 2025-06-13 NOTE — ASSESSMENT & PLAN NOTE
Lab Results   Component Value Date    EGFR 34 06/13/2025    EGFR 37 06/12/2025    EGFR 37 06/11/2025    CREATININE 1.72 (H) 06/13/2025    CREATININE 1.61 (H) 06/12/2025    CREATININE 1.61 (H) 06/11/2025   Cr baseline 1-1.3  Presented with NELLIE (creatinine 1.8)   Creatinine stable on Bumex infusion  Retention protocol, avoid nephrotoxins & hypotension   Monitor BMP closely on bumex gtt

## 2025-06-13 NOTE — ASSESSMENT & PLAN NOTE
"Wt Readings from Last 3 Encounters:   06/13/25 99.2 kg (218 lb 12.8 oz)   05/28/25 105 kg (232 lb)   03/20/25 99.7 kg (219 lb 12.8 oz)     Presented with LOPES + LE Edema  CT Chest: \"Severe bilateral consolidation and groundglass opacity, greatest in the right lower lobe, compatible with edema and/or pneumonia in the appropriate clinical setting.\"     ECHO (10/2024): EF 40-45%, moderate TV regurg, severe MV regurg   GDMT:   Farxiga 10 mg, daily  Losartan 25 mg ,daily  Toprol-XL 25 mg, daily  Spironolactone 25 mg, daily  Torsemide 20 mg, twice daily  Appreciate cardiology recommendations.   S/P Metolazone x 1 (6/5), diuril ( 6/6, 6/10, 6/12), and Diamox (6/11)  Patient was trsnitioned to torsemide 40 mg, daily.    Continue daily weights and I&O's   Per prior provider, has had multiple conversations about patient's noncompliance with fluid restriction and tracking of I/Os.  Per review, it was noted that patient was witnessed going to bathroom and filling up his cup with water, what was likely it was happening for multiple days.    During conversation today, patient expressing that he has \"thirsty\" and needs more to drink.  Per cardiology, had discussion with patient and daughter, where patient's daughter requesting patient to be transitioned to hospice on discharge.   Patient stable on home dose, where noted weight loss.  It is recommended the patient to continue FR 1800 mL and medication compliance.  Patient to follow-up with outpatient PCP.  "

## 2025-06-13 NOTE — ASSESSMENT & PLAN NOTE
Review, patient with history of moderate-late onset AD.  Originally, patient was a resident at [facility], where he lives in assisted living.      Home Regimen:   Aricept 10 mg, daily   BuSpar 7.5 mg, twice daily   Cymbalta 60 mg, daily     Continue encourage adequate oral nutrition/hydration.    Continue delirium precautions.    Patient to be transferred on hospice care, per family they are focused on comfort measures and quality of life.

## 2025-06-13 NOTE — ASSESSMENT & PLAN NOTE
Per chart review, patient with history of chronic low back pain, but medically managed.    Home Regimen:   Topical menthol patches   Cymbalta 60 mg, daily  PRN Tylenol      Appreciate PT/OT consultation, where no needs recommended.  Patient to be discharged on home hospice.

## 2025-06-13 NOTE — ASSESSMENT & PLAN NOTE
Lab Results   Component Value Date    EGFR 34 06/13/2025    EGFR 37 06/12/2025    EGFR 37 06/11/2025    CREATININE 1.72 (H) 06/13/2025    CREATININE 1.61 (H) 06/12/2025    CREATININE 1.61 (H) 06/11/2025     Per chart review, baseline creatinine around 1-1.3.    Cr elevated (1.72), but Cr seems to be around 1.5-1.7, which may be new baseline.  Continue to avoid and/or limit nephrotoxins, if possible.  Patient to be transitioned on hospice care with comfort measures only.

## 2025-06-13 NOTE — ASSESSMENT & PLAN NOTE
History of moderate late onset AD.    Resident at assisted living at [facility].    Alert and oriented to person place, time with disorientation to situation.    Managed with Aricept 10 mg p.o. daily, BuSpar 7.5 twice daily, Cymbalta 60 mg p.o. daily with as needed clonazepam at at bedtime.  Delirium precautions: Optimize nutrition hydration and sleep hygiene.  Increase socialization, prevent falls.  Monitor for constipation or change in urination pattern.

## 2025-06-13 NOTE — PROGRESS NOTES
"Progress Note - Cardiology   Name: Trevor Lyons 85 y.o. male I MRN: [id number]  Unit/Bed#: -01 I Date of Admission: 5/30/2025   Date of Service: 6/13/2025 I Hospital Day: 14     Assessment & Plan  Acute combined systolic and diastolic congestive heart failure (HCC)  Wt Readings from Last 3 Encounters:   06/13/25 99.2 kg (218 lb 12.8 oz)   05/28/25 105 kg (232 lb)   03/20/25 99.7 kg (219 lb 12.8 oz)       Intake/Output Summary (Last 24 hours) at 6/13/2025 1405  Last data filed at 6/13/2025 1236  Gross per 24 hour   Intake 1520.13 ml   Output 1335 ml   Net 185.13 ml       Patient with a history of HFrEF -comes to [facility] emergency department on 5/30/2025 with progressive dyspnea, worsening cough and hypoxia.  Echo 10/21/25: EF 40 to 45%, mild global hypokinesis, RV dilatation, left atrial dilatation, well-functioning TAVR, moderate mitral MAC, moderate to severe MR regurgitation, mild MS, Moderate TR  GDMT: Farxiga 10 mg daily, losartan 25 mg daily, metoprolol succinate 25 mg daily, spironolactone 25 mg daily, torsemide 20 mg twice daily with potassium supplementation    Per yesterday's note, now progressing to hospice care  Returned to oral torsemide    Persistent atrial fibrillation (HCC)  On Xarelto (dose reduced for GFR <50), low-dose metoprolol, mostly paced.  Sick sinus syndrome (HCC)  [employer] permanent pacemaker VVI 65  Hypertension  Blood pressures controlled, losartan held due to NELLIE    Cardiology will sign off.    Subjective:  Patient seen and examined. No acute events overnight.     Objective:     Vitals: Blood pressure 106/51, pulse 60, temperature 97.8 °F (36.6 °C), resp. rate 16, height 5' 8\" (1.727 m), weight 99.2 kg (218 lb 12.8 oz), SpO2 100%., Body mass index is 33.27 kg/m².,   Orthostatic Blood Pressures      Flowsheet Row Most Recent Value   Blood Pressure 106/51 filed at 06/13/2025 0828   Patient Position - Orthostatic VS Sitting filed at 06/11/2025 1934      " "        Intake/Output Summary (Last 24 hours) at 6/13/2025 1405  Last data filed at 6/13/2025 1236  Gross per 24 hour   Intake 1520.13 ml   Output 1335 ml   Net 185.13 ml       Physical Exam:    General: Trevor Lyons is an obese and ill   appearing elderly male  HEENT: moist mucous membranes, EOMI  Neck:  unable to assess JVD, supple, trachea midline  Cardiovascular: RRR  Pulmonary: normal respiratory effort, rales  Abdomen: soft and nondistended  Extremities: +2 lower extremity edema. Warm and well perfused extremities.  Neuro:deferred  Psych: deferred      Medications:    Current Medications[1]     Labs & Results:        Results from last 7 days   Lab Units 06/12/25  0418 06/10/25  0450 06/09/25  0528   WBC Thousand/uL 13.82* 16.20* 16.42*   HEMOGLOBIN g/dL 7.4* 7.9* 8.5*   HEMATOCRIT % 24.2* 26.7* 26.8*   PLATELETS Thousands/uL 412* 417* 387         Results from last 7 days   Lab Units 06/13/25  0402 06/12/25  0418 06/11/25  1852   POTASSIUM mmol/L 3.5 3.7 3.9   CHLORIDE mmol/L 87* 87* 87*   CO2 mmol/L 38* 38* 33*   BUN mg/dL 63* 56* 59*   CREATININE mg/dL 1.72* 1.61* 1.61*   CALCIUM mg/dL 9.3 9.1 9.0         Results from last 7 days   Lab Units 06/08/25  0405   MAGNESIUM mg/dL 2.4       This note was completed in part utilizing Dragon [employer] One voice recognition software. Grammatical errors, random word insertion, spelling mistakes, occasional wrong word or \"sound-alike\" substitutions and incomplete sentences may be an occasional consequence of the system secondary to software limitations, ambient noise and hardware issues. At the time of dictation, efforts were made to edit, clarify and /or correct errors.  Please read the chart carefully and recognize, using context, where substitutions have occurred.  If you have any questions or concerns about the context, text or information contained within the body of this dictation, please contact myself, the provider, for further clarification.         [1] "   Current Facility-Administered Medications:     acetaminophen (TYLENOL) tablet 650 mg, 650 mg, Oral, Q6H PRN, Sergio Wisdom PA-C, 650 mg at 06/12/25 2005    atorvastatin (LIPITOR) tablet 20 mg, 20 mg, Oral, Daily With Dinner, Anya Dash MD, 20 mg at 06/12/25 1727    busPIRone (BUSPAR) tablet 7.5 mg, 7.5 mg, Oral, BID, Anya Dash MD, 7.5 mg at 06/13/25 0827    clonazePAM (KlonoPIN) tablet 1.5 mg, 1.5 mg, Oral, HS PRN, Sergio Wisdom PA-C, 1.5 mg at 06/11/25 0200    donepezil (ARICEPT) tablet 10 mg, 10 mg, Oral, QAM, Anya Dash MD, 10 mg at 06/13/25 0826    DULoxetine (CYMBALTA) delayed release capsule 60 mg, 60 mg, Oral, Daily, Anya Dash MD, 60 mg at 06/13/25 0826    ferrous sulfate tablet 325 mg, 325 mg, Oral, Every Other Day, Debbie Riggins PA-C, 325 mg at 06/12/25 0923    Fluticasone Furoate-Vilanterol 100-25 mcg/actuation 1 puff, 1 puff, Inhalation, Daily, 1 puff at 06/13/25 0827 **AND** umeclidinium 62.5 mcg/actuation inhaler AEPB 1 puff, 1 puff, Inhalation, Daily, Aurea Germain PA-C, 1 puff at 06/13/25 0827    hydrOXYzine HCL (ATARAX) tablet 50 mg, 50 mg, Oral, Q6H PRN, Sergio Wisdom PA-C, 50 mg at 06/08/25 0222    insulin glargine (LANTUS) subcutaneous injection 10 Units 0.1 mL, 10 Units, Subcutaneous, HS, Nupur Blanca PA-C, 10 Units at 06/12/25 2153    insulin lispro (HumALOG/ADMELOG) 100 units/mL subcutaneous injection 1-5 Units, 1-5 Units, Subcutaneous, TID AC, 2 Units at 06/13/25 1307 **AND** Fingerstick Glucose (POCT), , , TID AC, Anya Dash MD    insulin lispro (HumALOG/ADMELOG) 100 units/mL subcutaneous injection 1-6 Units, 1-6 Units, Subcutaneous, HS, Anya Dash MD, 3 Units at 06/12/25 2152    insulin lispro (HumALOG/ADMELOG) 100 units/mL subcutaneous injection 8 Units, 8 Units, Subcutaneous, TID With Meals, Nupur Blanca PA-C, 8 Units at 06/13/25 1307    levalbuterol (XOPENEX) inhalation solution 1.25 mg, 1.25 mg, Nebulization, TID, Anya Dash MD, 1.25 mg  at 06/13/25 1330    levothyroxine tablet 137 mcg, 137 mcg, Oral, Early Morning, Anya Dash MD, 137 mcg at 06/13/25 0616    lidocaine (LIDODERM) 5 % patch 1 patch, 1 patch, Topical, Daily, Nupur Blanca PA-C, 1 patch at 06/13/25 0831    magnesium Oxide (MAG-OX) tablet 400 mg, 400 mg, Oral, BID, Anya Dash MD, 400 mg at 06/13/25 0826    melatonin tablet 6 mg, 6 mg, Oral, HS, Usha Quinn PA-C, 6 mg at 06/12/25 2153    metoprolol succinate (TOPROL-XL) 24 hr tablet 25 mg, 25 mg, Oral, QAM, Anya Dash MD, 25 mg at 06/13/25 0825    pantoprazole (PROTONIX) EC tablet 40 mg, 40 mg, Oral, Daily Before Breakfast, Anya Dash MD, 40 mg at 06/13/25 0616    polyethylene glycol (MIRALAX) packet 17 g, 17 g, Oral, Daily, Anya Dash MD, 17 g at 06/13/25 0826    potassium chloride (Klor-Con M20) CR tablet 40 mEq, 40 mEq, Oral, BID, Tank Luther MD, 40 mEq at 06/13/25 0826    rivaroxaban (XARELTO) tablet 15 mg, 15 mg, Oral, Daily With Dinner, Anya Dash MD, 15 mg at 06/12/25 1727    senna (SENOKOT) tablet 8.6 mg, 1 tablet, Oral, Daily, Anya Dash MD, 8.6 mg at 06/13/25 0827    spironolactone (ALDACTONE) tablet 25 mg, 25 mg, Oral, Daily, SAMIRA Sadler, 25 mg at 06/13/25 0825    torsemide (DEMADEX) tablet 40 mg, 40 mg, Oral, Daily, Tank Luther MD, 40 mg at 06/13/25 0824

## 2025-06-13 NOTE — CASE MANAGEMENT
Case Management Discharge Planning Note    Patient name Trevor Lyons  Location /-01 MRN [id number]  : 1940 Date 2025       Current Admission Date: 2025  Current Admission Diagnosis:Acute combined systolic and diastolic congestive heart failure (HCC)   Patient Active Problem List    Diagnosis Date Noted    Stage 3 chronic kidney disease (HCC) 2025    Hypertension 2025    Acute combined systolic and diastolic congestive heart failure (HCC) 2025    Chronic respiratory failure ([facility]) 2025    Leukocytosis 2025    Acute on chronic systolic heart failure ([facility]) 2025    Metabolic alkalosis 2025    Encounter for examination following treatment at hospital 2024    History of transcatheter aortic valve replacement (TAVR) 10/22/2024    Acute hypoxic respiratory failure ([facility]) 10/19/2024    Anemia 10/19/2024    Coronary artery disease involving native coronary artery of native heart without angina pectoris 2024    S/P CABG (coronary artery bypass graft) 2024    DDD (degenerative disc disease), lumbosacral 2024    COPD (chronic obstructive pulmonary disease) ([facility]) 2024    Recurrent falls 2024    Persistent atrial fibrillation ([facility]) 2024    Chronic combined systolic and diastolic CHF (congestive heart failure) ([facility]) 2024    Depression with anxiety 2024    NSVT (nonsustained ventricular tachycardia) ([facility]) 2024    Orthostatic hypotension 2024    Pulmonary embolism ([facility]) 2024    Cardiac pacemaker 2024    Pulmonary hypertension ([facility]) 2024    Sick sinus syndrome ([facility]) 2024    Dextroscoliosis 2024    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 2024    Mixed hyperlipidemia 2024    Type 2 diabetes mellitus without complication, with long-term current use of insulin ([facility]) 2024    Osteopenia of multiple sites 2024    Chronic left-sided low back pain with left-sided  sciatica 03/12/2024    Aneurysm of ascending aorta without rupture (HCC) 03/12/2024    Moderate Alzheimer's dementia, unspecified timing of dementia onset, unspecified whether behavioral, psychotic, or mood disturbance or anxiety (HCC) 03/12/2024    Acquired hypothyroidism 11/10/2022    Bilateral carotid bruits 11/11/2019      LOS (days): 14  Geometric Mean LOS (GMLOS) (days): 4.9  Days to GMLOS:-9.2     OBJECTIVE:  Risk of Unplanned Readmission Score: 39.19         Current admission status: Inpatient   Preferred Pharmacy:   [id number] - [place], [place] - [address]  [address] [address]  Phone: [phone] Fax: [phone]    [facility] - [facility] [address]  [address] [address]  Phone: [phone] Fax: [phone]    [facility] - [place] - [address] [place] [address].  [address].  [facility] [address]  Phone: [phone] Fax: [phone]    Primary Care Provider: Ira Borden DO    Primary Insurance: MEDICARE MISC REPLACEMENT  Secondary Insurance:     DISCHARGE DETAILS:    Discharge planning discussed with:: Marisa (daughter)  Freedom of Choice: Yes  Comments - Freedom of Choice: Plan to dc to grandsons home with [facility]  CM contacted family/caregiver?: Yes  Were Treatment Team discharge recommendations reviewed with patient/caregiver?: Yes  Did patient/caregiver verbalize understanding of patient care needs?: Yes  Were patient/caregiver advised of the risks associated with not following Treatment Team discharge recommendations?: Yes    Contacts  Patient Contacts: Marisa Jansen, Thomas  Relationship to Patient:: Family  Contact Method: Phone  Phone Number: [phone]  Reason/Outcome: Discharge Planning, Referral      Treatment Team Recommendation: Hospice  Expected Discharge Disposition: Hospice - Home     Additional Comments: CM spoke with Sarika at [facility] who states pt is accepted and all DME was  delivered to his grandson's apartment. CM called and spoke with pt's daughter Marisa who confirmed same. Marisa states pt will dc [facility] his grandson[facility] [address] [facility] [address] [facility] [address] CM arranged with REGAN S for a 4pm dc to his grandson's home. CMN completed; copy placed on chart and medical record bin. CM notified pt's bedside RN Monica, pt's daughter Marisa, and Amada with [facility] of dc time.

## 2025-06-13 NOTE — ASSESSMENT & PLAN NOTE
"Hx of CHF and COPD  Chronically on 2 L via NC PRN - patient reports wearing this \"most of the time\"  Patient remains stable on 2 L via nasal cannula and does not appear in respiratory distress.  "

## 2025-06-13 NOTE — ASSESSMENT & PLAN NOTE
Recent Labs     06/12/25  0418   HGB 7.4*     Per chart review, baseline Hgb ~11  Hgb currently stable > 8  Normocytic anemia  No active bleeding   Folate  >22, B12 312   Iron panel mixed -inflammatory/SHELIA  C/w iron supplement   Hematology following   Recommend outpatient follow up

## 2025-06-13 NOTE — DISCHARGE INSTR - AVS FIRST PAGE
Follow-Up:   Please follow-up with PCP within 1 week of discharge.  Please follow-up with outpatient hospice services upon discharge.    Medication Changes:   Continue Lantus 10 units, daily (this medication was increased).  Continue Humalog (sliding scale), as needed.  Continue potassium chloride 40 mEq, twice daily (this medication was increased due to higher dose of diuretic).  Continue Xarelto 15 mg, daily with dinner (this medication was decreased, due to elevated creatinine).  Continue torsemide 40 mg, daily.  Continue to take all other medications, as prescribed.     Discharge Instructions:   Please continue to take medications, as prescribed.  During hospitalization, we did increase your insulin (Lantus) to 10 units, daily.  However, we did not give you some of the oral antihyperglycemics during hospitalization due to elevated creatinine.  I would continue to recommend that you check your blood sugar, 4 times daily (before meals and at bedtime).  If you start to have hypoglycemia, would recommend reaching out to PCP or hospice for further recommendations.  In addition, before stopping medications would reach out to hospice to allow for conversation about symptom management.    It was a pleasure taking care of you in the hospital!  SAMIRA Baker  [facility]

## 2025-06-13 NOTE — ASSESSMENT & PLAN NOTE
Home Regimen:   Toprol-XL 25 mg, daily  Losartan 25 mg, daily  Spironolactone 25 mg,  Per chart review, patient with acceptable BPs.

## 2025-06-13 NOTE — ASSESSMENT & PLAN NOTE
Lab Results   Component Value Date    HGBA1C 5.2 05/28/2025     Recent Labs     06/12/25  1606 06/12/25 2038 06/13/25  0716 06/13/25  1047   POCGLU 305* 260* 219* 257*     Home Regimen:   Glimepiride 1 mg, daily   Metformin 1000 mg, twice daily   Farxiga 10 mg, daily  Lantus 8 units, daily  SSI, as needed    Continue Farxiga 10 mg, daily   Continue Lantus 10 units, daily.    Continue Humalog 8 units, 3 times daily with meals  Continue Accu-Cheks.  Continue SSI, as needed  Continue carb controlled diet.

## 2025-06-13 NOTE — ASSESSMENT & PLAN NOTE
Lab Results   Component Value Date    HGBA1C 5.2 05/28/2025     Recent Labs     06/12/25  1606 06/12/25  2038 06/13/25  0716 06/13/25  1047   POCGLU 305* 260* 219* 257*     Home Regimen:   Glimepiride 1 mg, daily   Metformin 1000 mg, twice daily   Farxiga 10 mg, daily  Lantus 8 units, daily  SSI, as needed    .  Will continue Lantus 10 units, daily on discharge.    Patient with elevated glucose levels, but can have further recommendations per hospice/PCP on discharge.  Will continue home regimen.  Continue Accu-Cheks.  Continue SSI, as needed.  Continue carb-controlled diet.

## 2025-06-13 NOTE — PLAN OF CARE
Problem: Potential for Falls  Goal: Patient will remain free of falls  Description: INTERVENTIONS:  - Educate patient/family on patient safety including physical limitations  - Instruct patient to call for assistance with activity   - Consider consulting OT/PT to assist with strengthening/mobility based on AM PAC & JH-HLM score  - Consult OT/PT to assist with strengthening/mobility   - Keep Call bell within reach  - Keep bed low and locked with side rails adjusted as appropriate  - Keep care items and personal belongings within reach  - Initiate and maintain comfort rounds  - Make Fall Risk Sign visible to staff  - Offer Toileting every 2 Hours, in advance of need  - Initiate/Maintain bed alarm  - Obtain necessary fall risk management equipment: socks  - Apply yellow socks and bracelet for high fall risk patients  - Consider moving patient to room near nurses station  Outcome: Progressing     Problem: INFECTION - ADULT  Goal: Absence or prevention of progression during hospitalization  Description: INTERVENTIONS:  - Assess and monitor for signs and symptoms of infection  - Monitor lab/diagnostic results  - Monitor all insertion sites, i.e. indwelling lines, tubes, and drains  - Monitor endotracheal if appropriate and nasal secretions for changes in amount and color  - Merrill appropriate cooling/warming therapies per order  - Administer medications as ordered  - Instruct and encourage patient and family to use good hand hygiene technique  - Identify and instruct in appropriate isolation precautions for identified infection/condition  Outcome: Progressing

## 2025-06-13 NOTE — NURSING NOTE
Patients Ivs were removed. AVS gone over and given to patient. EMS took patient to [facility].

## 2025-06-16 ENCOUNTER — TELEPHONE (OUTPATIENT)
Dept: FAMILY MEDICINE CLINIC | Facility: HOSPITAL | Age: 85
End: 2025-06-16

## 2025-06-16 DIAGNOSIS — E11.9 TYPE 2 DIABETES MELLITUS WITHOUT COMPLICATION, WITHOUT LONG-TERM CURRENT USE OF INSULIN (HCC): ICD-10-CM

## 2025-06-16 RX ORDER — LANCETS 33 GAUGE
EACH MISCELLANEOUS
Qty: 300 EACH | Refills: 1 | Status: SHIPPED | OUTPATIENT
Start: 2025-06-16

## 2025-06-16 NOTE — TELEPHONE ENCOUNTER
Medication: Lancets; Insulin Tips (I do not see on chart)     Dose/Frequency: 33G    Quantity:     Pharmacy: [facility]     Office:   [x] PCP/Provider -   [] Speciality/Provider -     Does the patient have enough for 3 days?   [] Yes   [x] No - Send as HP to POD

## 2025-06-16 NOTE — TELEPHONE ENCOUNTER
Bakari called to let Dr Borden know that a form from [facility] is being faxed over to have his UGI gas turned back on.   Stated that his grandfather  is living with him and is on hospice.

## 2025-06-16 NOTE — UTILIZATION REVIEW
NOTIFICATION OF ADMISSION DISCHARGE   This is a Notification of Discharge from [facility]. Please be advised that this patient has been discharge from our facility. Below you will find the admission and discharge date and time including the patient’s disposition.   UTILIZATION REVIEW CONTACT:  Utilization Review Assistants  Network Utilization Review Department  Phone: [phone] x carefully listen to the prompts. All voicemails are confidential.  Email: [email or web address]     ADMISSION INFORMATION  PRESENTATION DATE: 5/30/2025 11:23 AM  OBERVATION ADMISSION DATE: N/A  INPATIENT ADMISSION DATE: 5/30/25  1:25 PM   DISCHARGE DATE: 6/13/2025  4:56 PM   DISPOSITION:Home with Hospice Care    [facility] Utilization Review Department  ATTENTION: Please call with any questions or concerns to [phone] and carefully listen to the prompts so that you are directed to the right person. All voicemails are confidential.   For Discharge needs, contact Care Management DC Support Team at [phone] opt. 2  Send all requests for admission clinical reviews, approved or denied determinations and any other requests to dedicated fax number below belonging to the campus where the patient is receiving treatment. List of dedicated fax numbers for the Facilities:  FACILITY NAME UR FAX NUMBER   ADMISSION DENIALS (Administrative/Medical Necessity) [phone]   DISCHARGE SUPPORT TEAM ([facility]) [phone]   PARENT CHILD HEALTH (Maternity/NICU/Pediatrics) [phone]   [facility] [phone]   [facility] [phone]   [facility] [phone]   [facility] [phone]   [facility] [phone]   [facility] [phone]   [facility] [phone]   [facility] [phone]   [facility]  [facility] [phone]   [facility] [phone]   [facility] [phone]   [facility] [phone]

## 2025-06-17 ENCOUNTER — TRANSCRIBE ORDERS (OUTPATIENT)
Dept: LAB | Facility: HOSPITAL | Age: 85
End: 2025-06-17

## 2025-06-17 DIAGNOSIS — I50.23 ACUTE ON CHRONIC SYSTOLIC HEART FAILURE (HCC): Primary | ICD-10-CM

## 2025-06-17 DIAGNOSIS — D64.9 ANEMIA, UNSPECIFIED TYPE: ICD-10-CM

## 2025-06-17 LAB
Lab: NORMAL
Lab: NORMAL
MISCELLANEOUS LAB TEST RESULT: NORMAL

## 2025-06-18 ENCOUNTER — TRANSCRIBE ORDERS (OUTPATIENT)
Dept: LAB | Facility: HOSPITAL | Age: 85
End: 2025-06-18

## 2025-06-18 DIAGNOSIS — D64.9 ANEMIA, UNSPECIFIED TYPE: ICD-10-CM

## 2025-06-18 DIAGNOSIS — I50.23 ACUTE ON CHRONIC SYSTOLIC HEART FAILURE (HCC): Primary | ICD-10-CM

## 2025-06-18 LAB
Lab: NORMAL
Lab: NORMAL
MISCELLANEOUS LAB TEST RESULT: NORMAL

## 2025-06-19 ENCOUNTER — TELEPHONE (OUTPATIENT)
Age: 85
End: 2025-06-19

## 2025-06-19 PROCEDURE — 81455 SO/HL 51/>GSAP DNA/DNA&RNA: CPT

## 2025-06-19 NOTE — TELEPHONE ENCOUNTER
Viki from [facility] - [phone] called to let the doctor know that the patient past away this morning at 9:00.  The family will be using [facility] and they might be calling to get a death certificate.  Please advise.

## 2025-06-19 NOTE — TELEPHONE ENCOUNTER
Noemí with [facility] call to say she will be faxing the death certificate for Dr Borden to sign. Please advise back to Noemí if needed

## 2025-06-20 NOTE — TELEPHONE ENCOUNTER
[facility] called for an update on the death certificate and advised they are needing it sent back urgently to proceed with services.    Please fax to [phone] once signed

## 2025-06-28 LAB
ANISOCYTOSIS BLD QL SMEAR: PRESENT
LYMPHOCYTES # BLD AUTO: 39 %
LYMPHOCYTES # BLD AUTO: 8.19 THOUSAND/UL (ref 0.6–4.47)
METAMYELOCYTES # BLD MANUAL: 0.42 THOUSAND/UL (ref 0–0.1)
METAMYELOCYTES NFR BLD MANUAL: 2 % (ref 0–1)
MONOCYTES # BLD AUTO: 1.68 THOUSAND/UL (ref 0–1.22)
MONOCYTES NFR BLD AUTO: 8 % (ref 4–12)
NEUTS SEG # BLD: 10.72 THOUSAND/UL (ref 1.81–6.82)
NEUTS SEG NFR BLD AUTO: 51 %
PATHOLOGY REVIEW: YES
PLATELET BLD QL SMEAR: ADEQUATE
TOTAL CELLS COUNTED SPEC: 100